# Patient Record
Sex: MALE | Race: BLACK OR AFRICAN AMERICAN | NOT HISPANIC OR LATINO | Employment: OTHER | URBAN - METROPOLITAN AREA
[De-identification: names, ages, dates, MRNs, and addresses within clinical notes are randomized per-mention and may not be internally consistent; named-entity substitution may affect disease eponyms.]

---

## 2021-04-24 ENCOUNTER — COMMUNICATION - HEALTHEAST (OUTPATIENT)
Dept: SCHEDULING | Facility: CLINIC | Age: 66
End: 2021-04-24

## 2021-04-24 ENCOUNTER — TRANSFERRED RECORDS (OUTPATIENT)
Dept: HEALTH INFORMATION MANAGEMENT | Facility: CLINIC | Age: 66
End: 2021-04-24

## 2021-04-24 LAB — HBA1C MFR BLD: 6.2 % (ref 0–5.7)

## 2021-04-26 ENCOUNTER — TELEPHONE (OUTPATIENT)
Dept: FAMILY MEDICINE | Facility: CLINIC | Age: 66
End: 2021-04-26

## 2021-04-26 NOTE — TELEPHONE ENCOUNTER
Date of discharge: 04/24/2021  Facility of discharge: [facility]  Patient concerns about condition: No concerns at this time.  Patient concerns about medications: No concerns at this time.  Full med reconciliation will be completed at clinic visit.  Patient concerns about transitioning: No concerns at this time.    Was the patient diagnosed with COVID while in the hospital? No    Clinic office visit appointment date: 05/07/2021  Patient reminded to bring all medications (prescription and over-the-counter) to clinic appointment: Yes

## 2021-05-02 ENCOUNTER — MEDICAL CORRESPONDENCE (OUTPATIENT)
Dept: HEALTH INFORMATION MANAGEMENT | Facility: CLINIC | Age: 66
End: 2021-05-02

## 2021-05-03 ENCOUNTER — TELEPHONE (OUTPATIENT)
Dept: FAMILY MEDICINE | Facility: CLINIC | Age: 66
End: 2021-05-03

## 2021-05-03 NOTE — TELEPHONE ENCOUNTER
[facility] phone call message - order or referral request for patient:     Order or referral being requested: Verbal order to ok start of care for nursing 3x a week for 4 weeks an d 2x a week for 4 weeks after.   PT Eval  to ok nursing to change wound on leg with saline, adaptic with gauze 3x a week.       Additional Comments: Abigail would also like to place a referral to [facility] vascular clinic to address wound and lipoidemia. Patient has an appointment 5/7 @ 10 AM with Dr. Albert     OK to leave a message on voice mail? Yes    Primary language: English      needed? No    Call taken on May 3, 2021 at 8:08 AM by Aleja Farrell

## 2021-05-03 NOTE — TELEPHONE ENCOUNTER
Called Abigail QURESHI, given verbal approval for requested skilled nursing orders below and PT evaluation/treat.     Abigail would like to inform Dr ROLLY Ngo of wound care intervention she did for today's home visit: 1 large wound right, posterior calf, 1 large wound left, posterior calf and 2 wounds on outer,left, calf. No current wound care instructions/order Abigail changed dressing and has done the following: cleansed areas with saline, placed adaptive to cover areas, secured with roll of gauze and tape.     Hx- chronic Lymphedema with wounds. On discharge from hospital, patient was not given referral for Vascular Clinic and nurse feels this would best suit Shaheen. Home care can perform dressing changes for now until Shaheen can be seen at Vascular Clinic. Shaheen has follow up appt with Dr Ngo for 5/7/2021. Nanette QURESHI

## 2021-05-07 ENCOUNTER — RECORDS - HEALTHEAST (OUTPATIENT)
Dept: VASCULAR SURGERY | Facility: CLINIC | Age: 66
End: 2021-05-07

## 2021-05-07 ENCOUNTER — OFFICE VISIT (OUTPATIENT)
Dept: FAMILY MEDICINE | Facility: CLINIC | Age: 66
End: 2021-05-07
Payer: COMMERCIAL

## 2021-05-07 ENCOUNTER — RECORDS - HEALTHEAST (OUTPATIENT)
Dept: ADMINISTRATIVE | Facility: OTHER | Age: 66
End: 2021-05-07

## 2021-05-07 ENCOUNTER — OFFICE VISIT (OUTPATIENT)
Dept: PHARMACY | Facility: CLINIC | Age: 66
End: 2021-05-07
Payer: COMMERCIAL

## 2021-05-07 VITALS
OXYGEN SATURATION: 95 % | HEART RATE: 108 BPM | TEMPERATURE: 97.5 F | WEIGHT: 315 LBS | DIASTOLIC BLOOD PRESSURE: 84 MMHG | SYSTOLIC BLOOD PRESSURE: 150 MMHG | HEIGHT: 75 IN | BODY MASS INDEX: 39.17 KG/M2 | RESPIRATION RATE: 22 BRPM

## 2021-05-07 DIAGNOSIS — E11.9 TYPE 2 DIABETES MELLITUS WITHOUT COMPLICATION, WITHOUT LONG-TERM CURRENT USE OF INSULIN (H): ICD-10-CM

## 2021-05-07 DIAGNOSIS — I89.0 LYMPHEDEMA: ICD-10-CM

## 2021-05-07 DIAGNOSIS — I10 BENIGN ESSENTIAL HYPERTENSION: Primary | ICD-10-CM

## 2021-05-07 DIAGNOSIS — I10 ESSENTIAL HYPERTENSION: Primary | ICD-10-CM

## 2021-05-07 DIAGNOSIS — E11.69 TYPE 2 DIABETES MELLITUS WITH OTHER SPECIFIED COMPLICATION, WITHOUT LONG-TERM CURRENT USE OF INSULIN (H): ICD-10-CM

## 2021-05-07 DIAGNOSIS — I89.0 LYMPHEDEMA OF BOTH LOWER EXTREMITIES: ICD-10-CM

## 2021-05-07 PROBLEM — Z87.898 H/O ANGIOEDEMA: Status: ACTIVE | Noted: 2020-06-15

## 2021-05-07 PROBLEM — E87.70 HYPERVOLEMIA: Status: ACTIVE | Noted: 2021-04-23

## 2021-05-07 LAB
BUN SERPL-MCNC: 14 MG/DL (ref 7–30)
CALCIUM SERPL-MCNC: 9.6 MG/DL (ref 8.5–10.4)
CHLORIDE SERPLBLD-SCNC: 98 MMOL/L (ref 94–109)
CO2 SERPL-SCNC: 30 MMOL/L (ref 20–32)
CREAT SERPL-MCNC: 1 MG/DL (ref 0.8–1.5)
EGFR CALCULATED (NON BLACK REFERENCE): 79.2 ML/MIN
GLUCOSE SERPL-MCNC: 105 MG/DL (ref 60–109)
POTASSIUM SERPL-SCNC: 3.6 MMOL/L (ref 3.4–5.3)
SODIUM SERPL-SCNC: 139 MMOL/L (ref 133–144)

## 2021-05-07 PROCEDURE — 99207 PR NO CHARGE LOS: CPT | Performed by: PHARMACIST

## 2021-05-07 PROCEDURE — 99495 TRANSJ CARE MGMT MOD F2F 14D: CPT | Mod: GC | Performed by: STUDENT IN AN ORGANIZED HEALTH CARE EDUCATION/TRAINING PROGRAM

## 2021-05-07 PROCEDURE — 36415 COLL VENOUS BLD VENIPUNCTURE: CPT | Performed by: STUDENT IN AN ORGANIZED HEALTH CARE EDUCATION/TRAINING PROGRAM

## 2021-05-07 PROCEDURE — 80048 BASIC METABOLIC PNL TOTAL CA: CPT | Performed by: STUDENT IN AN ORGANIZED HEALTH CARE EDUCATION/TRAINING PROGRAM

## 2021-05-07 RX ORDER — LOSARTAN POTASSIUM 25 MG/1
25 TABLET ORAL DAILY
Qty: 90 TABLET | Refills: 3 | Status: SHIPPED | OUTPATIENT
Start: 2021-05-07 | End: 2021-05-17

## 2021-05-07 RX ORDER — METHADONE HYDROCHLORIDE 10 MG/ML
100 CONCENTRATE ORAL DAILY
COMMUNITY

## 2021-05-07 RX ORDER — LOSARTAN POTASSIUM 25 MG/1
25 TABLET ORAL DAILY
Qty: 90 TABLET | Refills: 3 | COMMUNITY
Start: 2021-05-07 | End: 2021-05-07

## 2021-05-07 RX ORDER — LOSARTAN POTASSIUM 25 MG/1
1 TABLET ORAL DAILY
COMMUNITY
Start: 2021-04-24 | End: 2021-05-07

## 2021-05-07 RX ORDER — ATORVASTATIN CALCIUM 20 MG/1
20 TABLET, FILM COATED ORAL DAILY
Qty: 90 TABLET | Refills: 3 | Status: SHIPPED | OUTPATIENT
Start: 2021-05-07 | End: 2022-07-05

## 2021-05-07 RX ORDER — FUROSEMIDE 40 MG
40 TABLET ORAL 2 TIMES DAILY
COMMUNITY
Start: 2020-06-15 | End: 2021-05-07

## 2021-05-07 RX ORDER — ATORVASTATIN CALCIUM 20 MG/1
20 TABLET, FILM COATED ORAL DAILY
COMMUNITY
Start: 2020-06-15 | End: 2021-05-07

## 2021-05-07 RX ORDER — FUROSEMIDE 40 MG
40 TABLET ORAL 2 TIMES DAILY
Qty: 180 TABLET | Refills: 3 | Status: SHIPPED | OUTPATIENT
Start: 2021-05-07 | End: 2022-06-08

## 2021-05-07 RX ORDER — FUROSEMIDE 40 MG
40 TABLET ORAL 2 TIMES DAILY
Qty: 180 TABLET | Refills: 3 | COMMUNITY
Start: 2021-05-07 | End: 2021-05-07

## 2021-05-07 ASSESSMENT — MIFFLIN-ST. JEOR: SCORE: 2651.2

## 2021-05-07 NOTE — PROGRESS NOTES
Preceptor Attestation:  Patient's case reviewed and discussed with the resident, Evgeny Ngo MD, and I personally evaluated the patient. I agree with written assessment and plan of care.    Supervising Physician:  Socorro Rubin MD   [facility]

## 2021-05-07 NOTE — PROGRESS NOTES
Medication Therapy Management (MTM) Encounter    ASSESSMENT:                            HTN: Uncontrolled but likely warrants more data/assessment before changes. New Losartan to replace Amlodipine, opportunity to titrate prior to adding second agent.   DM: Controlled <7%. Sounds like opportunity for dietary counseling but not addressed today!  Lymphedema: Uncontrolled, opportunity to optimize Furosemide.   Asthma: Needs further assessment, physician visit before mine today so warm hand off. PCP agreeable to reassess at next visit.   Heartburn: Uncontrolled, need further assessment from physician but if truly heartburn could consider H2RA PRN or scheduled.     PLAN:                            - Continue medications unchanged   --- you can take your second Furosemide pill as early as 3 pm    Follow-up: Return for as needed per patient or provider request.    SUBJECTIVE/OBJECTIVE:                          Shaheen Sepulveda is a 65 year old male coming in for a TCM visit.  He was discharged from [facility] on 4/24/21 for worsening of chronic lymphedema; during his hospitalization his amlodipine was discontinue due to concerns for contributing to his lymphedema and he was changed to Losartan for blood pressure management. Today's visit is a co-visit with Dr. Ngo. Patient was accompanied by his daughter.     Reason for visit: medication reconciliation.    Allergies/ADRs: Reviewed in chart  Social History     Tobacco Use     Smoking status: Former Smoker     Smokeless tobacco: Former User   Substance Use Topics     Alcohol use: Not on file     Drug use: Not on file     ^Reviewed today  Past Medical History: Reviewed in chart      Medication Adherence/Access: no issues reported  HTN: Losartan daily. No concerns for dizziness / lightheadedness. Stopped Amlodipine.   DM: Metformin BID, no concerns for adverse effects. Congratulated patient on a1c result, patient reports that daughter encourages him to make dietary changes  "however he doesn't always comply :)    A1c in [facility] Everywhere record: 6.2 (H) (4/24/2021)    Lymphedema: Furosemide BID, second dose 6 pm. Concerns somewhat for night time awakenings.   Asthma: History in Care Everywhere record, however no current medicines in use.   Heartburn: Uses multiple doses of Tums daily. Reports intermittent constipation.     Today's Vitals:   BP Readings from Last 1 Encounters:   05/07/21 (!) 150/84     Pulse Readings from Last 1 Encounters:   05/07/21 108     Wt Readings from Last 1 Encounters:   05/07/21 (!) 394 lb 12 oz (179.1 kg)     Ht Readings from Last 1 Encounters:   05/07/21 6' 3\" (1.905 m)     Estimated body mass index is 49.34 kg/m  as calculated from the following:    Height as of an earlier encounter on 5/7/21: 6' 3\" (1.905 m).    Weight as of an earlier encounter on 5/7/21: 394 lb 12 oz (179.1 kg).    Temp Readings from Last 1 Encounters:   05/07/21 97.5  F (36.4  C)     ----------------  Post Discharge Medication Reconciliation Status: discharge medications reconciled, continue medications without change.    I spent 20 minutes with this patient today. Dr. Ngo was provided the recommendations above  in clinic today and Dr. Rubin is the authorizing prescriber for this visit through the pharmacist collaborative practice agreement.. A copy of the visit note was provided to the patient's primary care provider.    The patient was given a summary of these recommendations. See Provider note/AVS from today.     Magdalena Mooney, PharmD, CDCES (previously, CDE)  [facility]  Phone: [phone]  May 7, 2021 at 10:45 AM    For insurance/tracking purposes, MTM Team Documentation:      Medication Therapy Recommendations  Lymphedema    Current Medication: furosemide (LASIX) 40 MG tablet   Rationale: Incorrect administration   Recommendation: Provide Education - patient interested in retiming evening dosing to earlier to see if improves night " awakenings   Status: Patient Agreed - Adherence/Education

## 2021-05-07 NOTE — PATIENT INSTRUCTIONS
Today:  - Continue medications unchanged   --- you can take your second Furosemide pill as early as 3 pm  - Home care will call you   - Lymphedema clinic will call you   - Return for annual physical   --- next time we will talk more about your heartburn to see if another medicine can help this    Mediterranean diet-Plant based    Diet overview: Largely plant-based using minimally processed fruits, vegetables, bread, seeds, beans, nuts. Olive oil is used as the primary source of fats. Avoidance of red meats, white-flour products, and sweets. Using herbs to enhance flavor instead of salt or fat. Mediterranean countries had fewer deaths related to coronary heart disease because their diet reduces CVD risk factors.    Diet Example:  Fresh fruits: 3 servings/day (1 serving =   -1 cup)  Vegetables: over 3 servings/day (1 serving =   -1 cup)  Legumes (beans and lentils): 3 servings/week  Extra virgin olive oil: at least 1 Tbsp/day (no more than 4 Tbsp)  Fish: 3 servings/week (1 serving = 3-4 oz)  Nuts: At least 3 servings/week (1 serving = 1 oz or   cup)  Whole grains: 3-6 servings/day (1 serving =   cup cooked, 1 slice of bread, 1 oz dry cereal)  Poultry (white meat)  Dairy: fat-free or low-fat  Eggs: Limit egg yolks, 1/day  No red meat or no more than 1 serving /week (1 serving = 3 oz)  Avoid baked goods, sweets, or deserts  Red wine in moderation    Effect on health outcome:  CV Health:   may reduce risk for  heart attack, pulmonary embolism, stroke, and death related to heart problems.  may improve non-LDL cholesterol levels: increasing HDL and lowering Triglycerides    Diabetes   Reduces risk for metabolic syndrome (cluster of conditions including high blood pressure, high blood sugar, increased waist circumference, and abnormal cholesterol levels)   Metabolic syndrome can increase risk of stroke and diabetes  May reduce HbA1c and weight in patients with type 2 diabetes   Maybe even more than low-fat diet    Weight  loss/maintenance   Associated with increased weight loss and lower BMI   Similar weight loss at 5 years to low-fat diets in patients with high risk CVD  Low carb Mediterranean diet may increase weight loss compared to other diets recommended to patients with diabetes    Diet Pros:   -Can be used long-term and integrate into any diet   -Allows for more calories from healthy fats like olive oil   -May protect against chronic conditions like cancer, Alzheimer s, and Parkinson    Diet Cons:  -Iron deficient patients should ensure they consume enough dietary irons (spinach) with vitamin C (strawberries).   -Will reduce calcium intake due to less dairy product consumption    Where to get more info:  [facility], [email or web address]  [facility], [email or web address]  American Academy of Family Physicians, familydoctor.org  American Heart Association, heart.org  National Institutes of Health MedlinePlus, medlineplus.gov    Referral for :     Lymphedema    LOCATION/PLACE/Provider :    [facility]   DATE & TIME :    Faxed referral and visit notes to location, they will call   PHONE :     [phone]  FAX :    [phone]  Appointment made by clinic staff/:    Yovana    Referral for :     Home care    LOCATION/PLACE/Provider :    [facility]   DATE & TIME :    Faxed documents to location   PHONE :     [phone]  FAX :    [phone]  Appointment made by clinic staff/:    Yovana
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
## 2021-05-08 ASSESSMENT — ASTHMA QUESTIONNAIRES: ACT_TOTALSCORE: 25

## 2021-05-10 ENCOUNTER — AMBULATORY - HEALTHEAST (OUTPATIENT)
Dept: VASCULAR SURGERY | Facility: CLINIC | Age: 66
End: 2021-05-10

## 2021-05-10 DIAGNOSIS — R22.43 LOCALIZED SWELLING, MASS AND LUMP, LOWER LIMB, BILATERAL: ICD-10-CM

## 2021-05-10 DIAGNOSIS — I89.0 LYMPHEDEMA: ICD-10-CM

## 2021-05-14 ENCOUNTER — TELEPHONE (OUTPATIENT)
Dept: FAMILY MEDICINE | Facility: CLINIC | Age: 66
End: 2021-05-14

## 2021-05-14 NOTE — TELEPHONE ENCOUNTER
TCM APPOINTMENT FOLLOW UP    Language:English    Medication questions: no    Specialist follow up: no    Concerns since last visit: no    Next appointment at [facility]:Yes    Comments: Pt is doing ok.    @[facility] [email or web address]@ Yes   J CARLOS Gomez

## 2021-05-17 ENCOUNTER — OFFICE VISIT (OUTPATIENT)
Dept: FAMILY MEDICINE | Facility: CLINIC | Age: 66
End: 2021-05-17
Payer: COMMERCIAL

## 2021-05-17 VITALS
HEIGHT: 75 IN | HEART RATE: 92 BPM | BODY MASS INDEX: 39.17 KG/M2 | OXYGEN SATURATION: 95 % | RESPIRATION RATE: 20 BRPM | DIASTOLIC BLOOD PRESSURE: 76 MMHG | TEMPERATURE: 97.6 F | WEIGHT: 315 LBS | SYSTOLIC BLOOD PRESSURE: 157 MMHG

## 2021-05-17 DIAGNOSIS — I10 BENIGN ESSENTIAL HYPERTENSION: ICD-10-CM

## 2021-05-17 DIAGNOSIS — I89.0 LYMPHEDEMA OF BOTH LOWER EXTREMITIES: ICD-10-CM

## 2021-05-17 DIAGNOSIS — Z00.00 HEALTHCARE MAINTENANCE: ICD-10-CM

## 2021-05-17 DIAGNOSIS — Z02.89 ENCOUNTER FOR OTHER ADMINISTRATIVE EXAMINATIONS: ICD-10-CM

## 2021-05-17 DIAGNOSIS — Z00.00 ROUTINE GENERAL MEDICAL EXAMINATION AT A HEALTH CARE FACILITY: Primary | ICD-10-CM

## 2021-05-17 DIAGNOSIS — E66.01 OBESITY, MORBID, BMI 40.0-49.9 (H): ICD-10-CM

## 2021-05-17 DIAGNOSIS — Z87.891 HISTORY OF TOBACCO USE DISORDER: ICD-10-CM

## 2021-05-17 LAB
BUN SERPL-MCNC: 15 MG/DL (ref 7–30)
CALCIUM SERPL-MCNC: 9.3 MG/DL (ref 8.5–10.4)
CHLORIDE SERPLBLD-SCNC: 96 MMOL/L (ref 94–109)
CO2 SERPL-SCNC: 31 MMOL/L (ref 20–32)
CREAT SERPL-MCNC: 1 MG/DL (ref 0.8–1.5)
EGFR CALCULATED (NON BLACK REFERENCE): 79.2 ML/MIN
GLUCOSE SERPL-MCNC: 93 MG/DL (ref 60–109)
POTASSIUM SERPL-SCNC: 3.7 MMOL/L (ref 3.4–5.3)
SODIUM SERPL-SCNC: 137 MMOL/L (ref 133–144)

## 2021-05-17 PROCEDURE — 99397 PER PM REEVAL EST PAT 65+ YR: CPT | Mod: GC | Performed by: STUDENT IN AN ORGANIZED HEALTH CARE EDUCATION/TRAINING PROGRAM

## 2021-05-17 PROCEDURE — 80048 BASIC METABOLIC PNL TOTAL CA: CPT | Performed by: STUDENT IN AN ORGANIZED HEALTH CARE EDUCATION/TRAINING PROGRAM

## 2021-05-17 PROCEDURE — 36415 COLL VENOUS BLD VENIPUNCTURE: CPT | Performed by: STUDENT IN AN ORGANIZED HEALTH CARE EDUCATION/TRAINING PROGRAM

## 2021-05-17 RX ORDER — LOSARTAN POTASSIUM 25 MG/1
50 TABLET ORAL DAILY
COMMUNITY
Start: 2021-05-17 | End: 2021-06-10

## 2021-05-17 ASSESSMENT — MIFFLIN-ST. JEOR: SCORE: 2625.12

## 2021-05-17 NOTE — PATIENT INSTRUCTIONS
Preventive Health Recommendations:     See your health care provider every year to    Review health changes.     Discuss preventive care.      Review your medicines if your doctor has prescribed any.      Talk with your health care provider about whether you should have a test to screen for prostate cancer (PSA).    Every 3 years, have a diabetes test (fasting glucose). If you are at risk for diabetes, you should have this test more often.    Every 5 years, have a cholesterol test. Have this test more often if you are at risk for high cholesterol or heart disease.     Every 10 years, have a colonoscopy. Or, have a yearly FIT test (stool test). These exams will check for colon cancer.    Talk to with your health care provider about screening for Abdominal Aortic Aneurysm if you have a family history of AAA or have a history of smoking.    Shots:     Get a flu shot each year.     Get a tetanus shot every 10 years.     Talk to your doctor about your pneumonia vaccines. There are now two you should receive - Pneumovax (PPSV 23) and Prevnar (PCV 13).     Talk to your pharmacist about a shingles vaccine.     Talk to your doctor about the hepatitis B vaccine.  Nutrition:     Eat at least 5 servings of fruits and vegetables each day.     Eat whole-grain bread, whole-wheat pasta and brown rice instead of white grains and rice.     Get adequate Calcium and Vitamin D.   Lifestyle    Exercise for at least 150 minutes a week (30 minutes a day, 5 days a week). This will help you control your weight and prevent disease.     Limit alcohol to one drink per day.     No smoking.     Wear sunscreen to prevent skin cancer.    See your dentist every six months for an exam and cleaning.    See your eye doctor every 1 to 2 years to screen for conditions such as glaucoma, macular degeneration, cataracts, etc.    Personalized Prevention Plan  You are due for the preventive services outlined below.  Your care team is available to assist you  in scheduling these services.  If you have already completed any of these items, please share that information with your care team to update in your medical record.  Health Maintenance Due   Topic Date Due     Cholesterol Lab  Never done     Kidney Microalbumin Urine Test  Never done     Diabetic Foot Exam  Never done     Discuss Advance Care Planning  Never done     Eye Exam  Never done     Pneumococcal Vaccine (1 of 2 - PPSV23) Never done     Pneumococcal Vaccine (1 of 2 - PPSV23) Never done     Colorectal Cancer Screening  Never done     Hepatitis C Screening  Never done     Diptheria Tetanus Pertussis (DTAP/TDAP/TD) Vaccine (1 - Tdap) Never done     Zoster (Shingles) Vaccine (1 of 2) Never done     FALL RISK ASSESSMENT  Never done     AORTIC ANEURYSM SCREENING (SYSTEM ASSIGNED)  Never done   Lung cancer screening has at least three risks--    A lung cancer screening test can suggest that a person has lung cancer when no cancer is present. This is called a false-positive result. False-positive results can lead to follow-up tests and surgeries that are not needed and may have more risks.  A lung cancer screening test can find cases of cancer that may never have caused a problem for the patient. This is called overdiagnosis. Overdiagnosis can lead to treatment that is not needed.  Radiation from repeated LDCT tests can cause cancer in otherwise healthy people.    Goal for next visit: Get out for walks    Referral for :    US aorta    LOCATION/PLACE/Provider :    [facility]   DATE & TIME :    Faxed referral, location will reach out   PHONE :     [phone]  FAX :    [phone]  Appointment made by clinic staff/:    Yovana

## 2021-05-17 NOTE — PROGRESS NOTES
Faculty Supervision of Residents   I have examined this patient and the medical care has been evaluated and discussed with the resident. See resident note to follow outlining our discussion.    DOS 5/17/2021    Alessia Calderon MD

## 2021-05-17 NOTE — PROGRESS NOTES
Male Physical Note      Concerns today:   Patient has home visits coming up.  Apparently there is going to be a physician visiting his home to assess the need for future home visits.    We discussed getting in to see Dr. Reyes.  Sounds like she is booked out until September 2021.  He will continue to have home lymphedema wraps in the interim.    Patient states he has been checking his blood pressure at home.  His values have been about what we are seeing here today, 150s over 70s.    Quit smoking 8 years ago. Started smoking 20-19 years of age. 1 PPD.     Discussed bariatric surgery, and daughter had a friend who ended up dying after bariatric surgery, so they would not like to pursue this at any point time.    ROS:                      A 10 point review of systems including constitutional, cardiovascular, respiratory, HEENT, GI, , neurological, MSK, skin, endocrine, is negative unless stated in HPI    Past Medical History:   Diagnosis Date     Diabetes (H)      Hypertension      Uncomplicated asthma         Reviewed no other significant FH           Family History and past Medical History reviewed and unchanged/updated.    Social History     Tobacco Use     Smoking status: Former Smoker     Smokeless tobacco: Former User   Substance Use Topics     Alcohol use: Not on file       Children ? yes daughter    Has anyone hurt you physically, for example by pushing, hitting, slapping or kicking you or forcing you to have sex? Denies  Do you feel threatened or controlled by a partner, ex-partner or anyone in your life? Denies    RISK BEHAVIORS AND HEALTHY HABITS:  Tobacco Use/Smoking: Details smoked from age 18 to roughly 53.  He was a 1 pack/day smoker.  Illicit Drug Use: None  ETOH: None  Do you use alcohol? No  Sexually Active: No  Diet (5-7 servings of fruits/veg daily): Trying to change diet; daughter increasing veggies  Exercise (30 min accumulated most days):Trying to get out and walk  Dental Care:  "No, recommended    Colon CA Screening (>50-75 ): Recommended.  Patient is going to think about whether or not he wants to do FIT testing or colonoscopy.  We will discuss in 2 weeks., US for AAA (men 65-76 yo who ever smoked):  Recommended and patient accepted testing. and Lung CA Screening with low dose CT (55 - 80, with >= 30 ppy smoking history who are current smokers or quit within last 15y - annual low dose CT) Recommended and patient declined testing.  Prostate screening discussed and patient informed that risks of screening may exceed benefits. and Patient declined PSA screening.    No FMH colon  No FMH prostate        There is no immunization history on file for this patient.     EXAMINATION:  BP (!) 157/76   Pulse 92   Temp 97.6  F (36.4  C)   Resp 20   Ht 1.905 m (6' 3\")   Wt (!) 176.4 kg (389 lb)   SpO2 95%   BMI 48.62 kg/m    GENERAL: healthy, alert and no distress  EYES: Eyes grossly normal to inspection, extraocular movements - intact, and PERRL  HENT: ear canals- normal; TMs- normal; Nose- normal; Mouth- no ulcers, no lesions  NECK: no tenderness, no adenopathy, no asymmetry, no masses, no stiffness; thyroid- normal to palpation  RESP: lungs clear to auscultation - no rales, no rhonchi, no wheezes  BREAST: no masses, no tenderness, no nipple discharge, no palpable axillary masses or adenopathy  CV: regular rates and rhythm, normal S1 S2, no S3 or S4 and no murmur, no click or rub -  ABDOMEN: soft, no tenderness, no  hepatosplenomegaly, no masses, normal bowel sounds  MS: extremities- no gross deformities noted, no edema  SKIN: no suspicious lesions, no rashes  NEURO: strength and tone- normal, sensory exam- grossly normal, mentation- intact, speech- normal, reflexes- symmetric  BACK: no CVA tenderness, no paralumbar tenderness  - male: testicles- normal, no atrophy, no masses;  no inguinal hernias  RECTAL- male: no masses, no hemorhoids, Prostate- symmetric, no  nodularity, no masses, no " hypertrophy  PSYCH: Alert and oriented times 3; speech- coherent , normal rate and volume; able to articulate logical thoughts, able to abstract reason, no tangential thoughts, no hallucinations or delusions, affect- normal  LYMPHATICS: ant. cervical- normal, post. cervical- normal, axillary- normal, supraclavicular- normal, inguinal- normal    ASSESSMENT/PLAN:  1. Routine general medical examination at a health care facility  Patient will decide whether or not he wants to do FIT or colonoscopy, which we will discuss when he comes back in 2 weeks.  AAA screening with ultrasound order was placed.  We discussed risks and benefits of lung cancer screening, patient declined at this time.  - Hepatitis C Antibody ([facility])    2. Benign essential hypertension  Elevated here and at home.  Increase lisinopril to 50 mg.  BMP looks good.  - losartan (COZAAR) 25 MG tablet; Take 2 tablets (50 mg) by mouth daily  - Basic Metabolic Panel ([facility]) - Results < 1 hr    3. Lymphedema of both lower extremities  Sounds like he will have home care coming out to assess his ongoing needs.  Disability parking form filled out in relation to patient's lymphedema.    4. History of tobacco use disorder  -  [employer] AAA SCREENING; Future    5. Healthcare maintenance  Discussed FIT versus colonoscopy at next visit.  Should discuss lung cancer screening again in 1 year, he declined at this time.  -  [employer] AAA SCREENING; Future    6. Obesity, morbid, BMI 40.0-49.9 (H)  Declined bariatric referral.    7. Encounter for other administrative examinations  Form filled out for disability parking.      Evgeny Ngo MD  [facility] Resident, PGY3    Staffed with Dr. Rubin

## 2021-05-18 LAB — HCV AB SER QL: NEGATIVE

## 2021-05-19 ENCOUNTER — RECORDS - HEALTHEAST (OUTPATIENT)
Dept: SCHEDULING | Facility: CLINIC | Age: 66
End: 2021-05-19

## 2021-05-19 ENCOUNTER — RECORDS - HEALTHEAST (OUTPATIENT)
Dept: ADMINISTRATIVE | Facility: OTHER | Age: 66
End: 2021-05-19

## 2021-05-19 DIAGNOSIS — Z87.891 PERSONAL HISTORY OF TOBACCO USE: ICD-10-CM

## 2021-05-19 DIAGNOSIS — Z00.00 HEALTHCARE MAINTENANCE: ICD-10-CM

## 2021-05-20 DIAGNOSIS — Z53.9 DIAGNOSIS NOT YET DEFINED: Primary | ICD-10-CM

## 2021-06-09 NOTE — PROGRESS NOTES
"       HPI:       Shaheen Sepulveda is a 65 year old  male who presents for follow up of the following.     Chronic Bilateral Lymphedema  Longstanding history of chronic lymphedema in both legs, no noticible becker, endorses difficulty moving, has clinic appointment with Dr. Reyes. They have not heard back about home care yet after I placed the last order.  There had been insurance issues in the past.     Benign Essential Hypertension  Persistent hypertension (today 157/67) despite multiple medication changes, previously tried lisinopril (caused angioedema), amlodipine (possible added side effect of peripheral edema and discontinued) and now on Losartan (50 mg daily).     Health Maintenance               Obesity, morbid, BMI 40.0-49.9 (H)               Encounter for other administrative examinations  Declined bariatric referral.  Agreed to colonoscopy. Okay with eventual abdominal ultrasound for AAA and CT for lung cancer screening. Feels guilty for having to get daughter to take off work to take him to clinical appointments and did not follow up on AAA ultrasound appointment because of this.  Daughter filled out forms for diabillity parking and waiting to hear back.  Trying to eat healthy by adding veggies and fruit, difficult to exercise with hot weather. Does not endorse new stressors, depression, sleep changes, or sick contacts.     From last visit:  \"ASSESSMENT/PLAN:  1. Routine general medical examination at a health care facility  Patient will decide whether or not he wants to do FIT or colonoscopy, which we will discuss when he comes back in 2 weeks.  AAA screening with ultrasound order was placed.  We discussed risks and benefits of lung cancer screening, patient declined at this time.  - Hepatitis C Antibody ([facility])     2. Benign essential hypertension  Elevated here and at home.  Increase lisinopril to 50 mg.  BMP looks good.  - losartan (COZAAR) 25 MG tablet; Take 2 tablets (50 mg) by mouth daily  - " "Basic Metabolic Panel ([facility]) - Results < 1 hr     3. Lymphedema of both lower extremities  Sounds like he will have home care coming out to assess his ongoing needs.  Disability parking form filled out in relation to patient's lymphedema.     4. History of tobacco use disorder  -  [employer] AAA SCREENING; Future     5. Healthcare maintenance  Discussed FIT versus colonoscopy at next visit.  Should discuss lung cancer screening again in 1 year, he declined at this time.  -  [employer] AAA SCREENING; Future     6. Obesity, morbid, BMI 40.0-49.9 (H)  Declined bariatric referral.     7. Encounter for other administrative examinations  Form filled out for disability parking.\"           PMHX:     Patient Active Problem List   Diagnosis     Hypertension     H/O angioedema     Hip joint replacement status     Hypervolemia     Lymphedema     Lymphedema of both lower extremities     Methadone use (H)     Obesity, morbid, BMI 40.0-49.9 (H)     Obstructive sleep apnea     Osteoarthritis of hip     Osteoarthritis of knee     History of tobacco use disorder       Current Outpatient Medications   Medication Sig Dispense Refill     atorvastatin (LIPITOR) 20 MG tablet Take 1 tablet (20 mg) by mouth daily 90 tablet 3     furosemide (LASIX) 40 MG tablet Take 1 tablet (40 mg) by mouth 2 times daily 180 tablet 3     losartan (COZAAR) 100 MG tablet Take 1 tablet (100 mg) by mouth daily 30 tablet 3     metFORMIN (GLUCOPHAGE) 500 MG tablet Take 1 tablet (500 mg) by mouth 2 times daily (with meals) 180 tablet 3     methadone (DOLOPHINE-INTENSOL) 10 MG/ML (HIGH CONC) solution Take 100 mg by mouth daily         Social History     Socioeconomic History     Marital status: Single     Spouse name: Not on file     Number of children: Not on file     Years of education: Not on file     Highest education level: Not on file   Occupational History     Not on file   Social Needs     Financial resource strain: Not on file     Food " "insecurity     Worry: Not on file     Inability: Not on file     Transportation needs     Medical: Not on file     Non-medical: Not on file   Tobacco Use     Smoking status: Former Smoker     Smokeless tobacco: Former User   Substance and Sexual Activity     Alcohol use: Not on file     Drug use: Not on file     Sexual activity: Not on file   Lifestyle     Physical activity     Days per week: Not on file     Minutes per session: Not on file     Stress: Not on file   Relationships     Social connections     Talks on phone: Not on file     Gets together: Not on file     Attends [employer] service: Not on file     Active member of club or organization: Not on file     Attends meetings of clubs or organizations: Not on file     Relationship status: Not on file     Intimate partner violence     Fear of current or ex partner: Not on file     Emotionally abused: Not on file     Physically abused: Not on file     Forced sexual activity: Not on file   Other Topics Concern     Not on file   Social History Narrative     Not on file          Allergies   Allergen Reactions     Lisinopril      Patient reports \"neck swelling\"       Results for orders placed or performed in visit on 06/10/21 (from the past 24 hour(s))   Basic Metabolic Panel ([facility]) - Results < 1 hr   Result Value Ref Range    Glucose 112.0 (H) 60.0 - 109.0 mg/dL    Urea Nitrogen 16.0 7.0 - 30.0 mg/dL    Creatinine 1.0 0.8 - 1.5 mg/dL    Sodium 142.0 133.0 - 144.0 mmol/L    Potassium 4.4 3.4 - 5.3 mmol/L    Chloride 100.0 94.0 - 109.0 mmol/L    Carbon Dioxide 33.0 (H) 20.0 - 32.0 mmol/L    Calcium 9.5 8.5 - 10.4 mg/dL    eGFR Calculated (Non Black Reference) 79.2 >60.0 mL/min            Review of Systems:     A 10 point review of systems including constitutional, cardiovascular, respiratory, HEENT, GI, , neurological, MSK, skin, endocrine, is negative unless stated in HPI          Physical Exam:     Vitals:    06/10/21 1500 06/10/21 1503   BP: (!) 146/78 (!) " "154/67   Pulse: 83    Temp: 97.5  F (36.4  C)    SpO2: 96%    Weight: (!) 183.3 kg (404 lb)    Height: 1.905 m (6' 3\")      Body mass index is 50.5 kg/m .    GENERAL APPEARANCE: healthy, alert and no distress,  EYES: lateral deviation of the left eye, PERRL  NECK: no adenopathy, no asymmetry, masses, or scars and thyroid normal to palpation  RESP: lungs clear to auscultation - no rales, rhonchi or wheezes  CV: regular rate and rhythm,  and no murmur, click,  rub or gallop  MSK: Bilateral severe lymphedema of both legs extending up to the hip reduced range of motion in the legs  SKIN: 30 cm keratosis over the left cubital fossa, otherwise no suspicious lesions or rashes on visible skin  PSYCH: mood and affect normal/bright    Assessment and Plan     1. Essential hypertension  Uncontrolled in clinic here today.  Plan to increase to losartan 100 mg daily from 50 mg daily.  He has an allergy to lisinopril with past angioedema.  Should not start amlodipine as he has bad peripheral edema.  Could contemplate adding thiazide if still uncontrolled on losartan and Lasix.  BMP looks good today, bicarb is 33.  Question if he has some ongoing CO2 retention secondary to obesity hypoventilation.  Plan to have patient come in for a nurse only visit in 2 to 3 weeks to check a BMP after changing to losartan 100 mg today.  - Basic Metabolic Panel ([facility]) - Results < 1 hr  - Basic Metabolic Panel ([facility]) - Results < 1 hr; Future  - losartan (COZAAR) 100 MG tablet; Take 1 tablet (100 mg) by mouth daily  Dispense: 30 tablet; Refill: 3    2. Lymphedema of both lower extremities  I sent a message to our referral coordinator on the question of home care, as he will not be able to get in until fall 2021 with Dr. Reyes at the lymphedema clinic.  He has not heard back from the home care folks since I replaced the order.    3. Healthcare maintenance  Patient feels he is a burden to his daughter, this is one of the main reasons he does not " want to go in for health maintenance screens.  He did decide on colonoscopy today.  AAA ultrasound order has already been placed at last visit.  Can continue to talk about low-dose lung CT, but okay to defer further discussion until 5/2022.  - GASTROENTEROLOGY ADULT REF PROCEDURE ONLY; Future    4. Obesity, morbid, BMI 40.0-49.9 (H)  Discussed healthy diet, exercise today.  Bicarb is 33 on today's BMP.  Question if he has some ongoing CO2 retention secondary to obesity hypoventilation.  Currently deferring sleep study secondary to so many things going on, but he would almost certainly benefit from CPAP therapy, both from an DG standpoint, and from a likely obesity hypoventilation standpoint    Options for treatment and follow-up care were reviewed with the patient and/or guardian. Shaheen Sepulveda and/or guardian engaged in the decision making process and verbalized understanding of the options discussed and agreed with the final plan.    Evgeny Ngo MD  [facility] Resident, PGY3      Precepted today with: Peg Galeana MD

## 2021-06-10 ENCOUNTER — OFFICE VISIT (OUTPATIENT)
Dept: FAMILY MEDICINE | Facility: CLINIC | Age: 66
End: 2021-06-10
Payer: COMMERCIAL

## 2021-06-10 VITALS
HEIGHT: 75 IN | HEART RATE: 83 BPM | DIASTOLIC BLOOD PRESSURE: 67 MMHG | SYSTOLIC BLOOD PRESSURE: 154 MMHG | OXYGEN SATURATION: 96 % | WEIGHT: 315 LBS | TEMPERATURE: 97.5 F | BODY MASS INDEX: 39.17 KG/M2

## 2021-06-10 DIAGNOSIS — Z00.00 HEALTHCARE MAINTENANCE: ICD-10-CM

## 2021-06-10 DIAGNOSIS — I10 BENIGN ESSENTIAL HYPERTENSION: ICD-10-CM

## 2021-06-10 DIAGNOSIS — E66.01 OBESITY, MORBID, BMI 40.0-49.9 (H): ICD-10-CM

## 2021-06-10 DIAGNOSIS — I10 ESSENTIAL HYPERTENSION: Primary | ICD-10-CM

## 2021-06-10 DIAGNOSIS — I89.0 LYMPHEDEMA OF BOTH LOWER EXTREMITIES: ICD-10-CM

## 2021-06-10 LAB
BUN SERPL-MCNC: 16 MG/DL (ref 7–30)
CALCIUM SERPL-MCNC: 9.5 MG/DL (ref 8.5–10.4)
CHLORIDE SERPLBLD-SCNC: 100 MMOL/L (ref 94–109)
CO2 SERPL-SCNC: 33 MMOL/L (ref 20–32)
CREAT SERPL-MCNC: 1 MG/DL (ref 0.8–1.5)
EGFR CALCULATED (NON BLACK REFERENCE): 79.2 ML/MIN
GLUCOSE SERPL-MCNC: 112 MG/DL (ref 60–109)
POTASSIUM SERPL-SCNC: 4.4 MMOL/L (ref 3.4–5.3)
SODIUM SERPL-SCNC: 142 MMOL/L (ref 133–144)

## 2021-06-10 PROCEDURE — 80048 BASIC METABOLIC PNL TOTAL CA: CPT | Performed by: STUDENT IN AN ORGANIZED HEALTH CARE EDUCATION/TRAINING PROGRAM

## 2021-06-10 PROCEDURE — 36415 COLL VENOUS BLD VENIPUNCTURE: CPT | Performed by: STUDENT IN AN ORGANIZED HEALTH CARE EDUCATION/TRAINING PROGRAM

## 2021-06-10 PROCEDURE — 99214 OFFICE O/P EST MOD 30 MIN: CPT | Mod: GC | Performed by: STUDENT IN AN ORGANIZED HEALTH CARE EDUCATION/TRAINING PROGRAM

## 2021-06-10 RX ORDER — LOSARTAN POTASSIUM 100 MG/1
100 TABLET ORAL DAILY
Qty: 30 TABLET | Refills: 3 | Status: SHIPPED | OUTPATIENT
Start: 2021-06-10 | End: 2021-10-20

## 2021-06-10 ASSESSMENT — MIFFLIN-ST. JEOR: SCORE: 2693.16

## 2021-06-10 NOTE — PROGRESS NOTES
Preceptor Attestation:  Patient's case reviewed and discussed with Evgeny Ngo MD resident and I evaluated the patient. I agree with written assessment and plan of care.  Supervising Physician:  GABRIELA POSEY MD  [facility]

## 2021-06-10 NOTE — LETTER
June 11, 2021      Shaheen Sepulveda  [address] [address]        Dear ,    We are writing to inform you of your test results.    Results are normal. Continue on current dose of losartan at 100 mg.     Resulted Orders   Basic Metabolic Panel ([facility]) - Results < 1 hr   Result Value Ref Range    Glucose 112.0 (H) 60.0 - 109.0 mg/dL    Urea Nitrogen 16.0 7.0 - 30.0 mg/dL    Creatinine 1.0 0.8 - 1.5 mg/dL    Sodium 142.0 133.0 - 144.0 mmol/L    Potassium 4.4 3.4 - 5.3 mmol/L    Chloride 100.0 94.0 - 109.0 mmol/L    Carbon Dioxide 33.0 (H) 20.0 - 32.0 mmol/L    Calcium 9.5 8.5 - 10.4 mg/dL    eGFR Calculated (Non Black Reference) 79.2 >60.0 mL/min      Comment:      eGFR is calculated by the CKD-EPI creatinine equation, without race   adjustment. eGFR can be influenced by muscle mass, exercise, and diet. The   reported eGFR is an estimation only and is only applicable if the renal   function is stable.         If you have any questions or concerns, please call the clinic at the number listed above.       Sincerely,      Peg Galeana MD

## 2021-06-14 ENCOUNTER — TELEPHONE (OUTPATIENT)
Dept: FAMILY MEDICINE | Facility: CLINIC | Age: 66
End: 2021-06-14

## 2021-06-14 NOTE — TELEPHONE ENCOUNTER
Made a call out to [facility], they stated that they don't have the referral,  notified of the number I sent it to she said that it was correct and wasn't sure why they hadn't gotten it.   She asked for patient's insurance, she then notified me that they don't take it.   I faxed his referral over to [facility]    [facility]- is a full capacity.     Faxed to [facility]- out of network with them.    Faxed to [facility]- Declined no lymphedema care available.     Faxed to [facility]- [facility]. Is out of network    Faxed to [facility] ([phone])- UNABLE  To take patient.

## 2021-06-25 NOTE — PATIENT INSTRUCTIONS
Sent referral for colonoscopy over to [facility] as patient's BMI is to high to do at surgery center (their protocol).

## 2021-08-06 NOTE — LETTER
May 18, 2021      Shaheen Sepulveda  [address] [address]        Dear ,    We are writing to inform you of your test results.    Lab results are normal.     Resulted Orders   Basic Metabolic Panel ([facility]) - Results < 1 hr   Result Value Ref Range    Glucose 93.0 60.0 - 109.0 mg/dL    Urea Nitrogen 15.0 7.0 - 30.0 mg/dL    Creatinine 1.0 0.8 - 1.5 mg/dL    Sodium 137.0 133.0 - 144.0 mmol/L    Potassium 3.7 3.4 - 5.3 mmol/L    Chloride 96.0 94.0 - 109.0 mmol/L    Carbon Dioxide 31.0 20.0 - 32.0 mmol/L    Calcium 9.3 8.5 - 10.4 mg/dL    eGFR Calculated (Non Black Reference) 79.2 >60.0 mL/min      Comment:      eGFR is calculated by the CKD-EPI creatinine equation, without race   adjustment. eGFR can be influenced by muscle mass, exercise, and diet. The   reported eGFR is an estimation only and is only applicable if the renal   function is stable.     Hepatitis C Antibody ([facility] [facility])   Result Value Ref Range    Hepatitis C Antibody Screen Negative Negative    Narrative    Test performed by:  [facility] [address] [place] [address]       If you have any questions or concerns, please call the clinic at the number listed above.       Sincerely,      Socorro Rubin MD           see operative report

## 2021-09-22 ENCOUNTER — ANCILLARY PROCEDURE (OUTPATIENT)
Dept: VASCULAR ULTRASOUND | Facility: CLINIC | Age: 66
End: 2021-09-22
Attending: PHYSICAL MEDICINE & REHABILITATION
Payer: COMMERCIAL

## 2021-09-22 ENCOUNTER — OFFICE VISIT (OUTPATIENT)
Dept: VASCULAR SURGERY | Facility: CLINIC | Age: 66
End: 2021-09-22
Attending: PHYSICAL MEDICINE & REHABILITATION
Payer: COMMERCIAL

## 2021-09-22 VITALS
SYSTOLIC BLOOD PRESSURE: 144 MMHG | DIASTOLIC BLOOD PRESSURE: 80 MMHG | RESPIRATION RATE: 12 BRPM | TEMPERATURE: 98 F | HEART RATE: 80 BPM

## 2021-09-22 DIAGNOSIS — L85.9 HYPERKERATOSIS OF SKIN: ICD-10-CM

## 2021-09-22 DIAGNOSIS — I87.333 VENOUS HYPERTENSION, CHRONIC, WITH ULCER AND INFLAMMATION, BILATERAL (H): ICD-10-CM

## 2021-09-22 DIAGNOSIS — L85.9 EPITHELIAL HYPERPLASIA OF SKIN: ICD-10-CM

## 2021-09-22 DIAGNOSIS — I87.2 VENOUS (PERIPHERAL) INSUFFICIENCY: ICD-10-CM

## 2021-09-22 DIAGNOSIS — M79.89 LEG SWELLING: Primary | ICD-10-CM

## 2021-09-22 DIAGNOSIS — B35.1 ONYCHOMYCOSIS: ICD-10-CM

## 2021-09-22 DIAGNOSIS — E66.01 OBESITY, MORBID, BMI 40.0-49.9 (H): ICD-10-CM

## 2021-09-22 DIAGNOSIS — L60.2 ONYCHAUXIS: ICD-10-CM

## 2021-09-22 DIAGNOSIS — I89.0 LYMPHEDEMA: ICD-10-CM

## 2021-09-22 DIAGNOSIS — E11.42 TYPE 2 DIABETES MELLITUS WITH PERIPHERAL NEUROPATHY (H): ICD-10-CM

## 2021-09-22 DIAGNOSIS — I89.0 LYMPHEDEMA OF BOTH LOWER EXTREMITIES: ICD-10-CM

## 2021-09-22 DIAGNOSIS — R22.43 LOCALIZED SWELLING, MASS AND LUMP, LOWER LIMB, BILATERAL: ICD-10-CM

## 2021-09-22 PROBLEM — S81.809A OPEN WOUND OF LOWER LIMB: Status: ACTIVE | Noted: 2021-09-22

## 2021-09-22 PROCEDURE — 250N000009 HC RX 250: Performed by: PHYSICAL MEDICINE & REHABILITATION

## 2021-09-22 PROCEDURE — 99205 OFFICE O/P NEW HI 60 MIN: CPT | Performed by: PHYSICAL MEDICINE & REHABILITATION

## 2021-09-22 PROCEDURE — 87205 SMEAR GRAM STAIN: CPT | Performed by: PHYSICAL MEDICINE & REHABILITATION

## 2021-09-22 PROCEDURE — G0463 HOSPITAL OUTPT CLINIC VISIT: HCPCS | Mod: 25

## 2021-09-22 PROCEDURE — 11045 DBRDMT SUBQ TISS EACH ADDL: CPT | Performed by: PHYSICAL MEDICINE & REHABILITATION

## 2021-09-22 PROCEDURE — 11042 DBRDMT SUBQ TIS 1ST 20SQCM/<: CPT | Performed by: PHYSICAL MEDICINE & REHABILITATION

## 2021-09-22 PROCEDURE — 93970 EXTREMITY STUDY: CPT | Mod: 26 | Performed by: SURGERY

## 2021-09-22 PROCEDURE — 11044 DBRDMT BONE 1ST 20 SQ CM/<: CPT | Performed by: PHYSICAL MEDICINE & REHABILITATION

## 2021-09-22 PROCEDURE — 93970 EXTREMITY STUDY: CPT

## 2021-09-22 PROCEDURE — 11721 DEBRIDE NAIL 6 OR MORE: CPT | Mod: 59 | Performed by: PHYSICAL MEDICINE & REHABILITATION

## 2021-09-22 RX ORDER — AMMONIUM LACTATE 12 G/100G
LOTION TOPICAL DAILY PRN
Qty: 500 G | Refills: 4 | Status: SHIPPED | OUTPATIENT
Start: 2021-09-22 | End: 2021-10-22

## 2021-09-22 RX ORDER — LIDOCAINE HYDROCHLORIDE 20 MG/ML
JELLY TOPICAL DAILY PRN
Status: ACTIVE | OUTPATIENT
Start: 2021-09-22

## 2021-09-22 RX ADMIN — LIDOCAINE HYDROCHLORIDE: 20 JELLY TOPICAL at 10:47

## 2021-09-22 ASSESSMENT — PAIN SCALES - GENERAL: PAINLEVEL: NO PAIN (0)

## 2021-09-22 NOTE — PROGRESS NOTES
Compression Applied to Bilateral  2-Layer Coban Lite: I Applied the inner foam layer with the foot dorsiflexed and started atthe base of the fifth metatarsal head. I Left the bottom of the heel exposed, and proceed by winding the foam up the leg using minimal overlap to just below the fibular head. I then applied the compression layer with the foot dorsiflexed and startingat the base of the fifth metatarsal head. I applied at full stretch and proceeded up the leg using 50% overlap. The bottom of the heel is covered with the compression layer up to the end at the fibular head just below the back of the knee and levelwith the top edge of the foam layer.  I gently pressed and conformed the entire surface of the system to ensurethat the two layers are firmly bound together

## 2021-09-22 NOTE — LETTER
2021    [facility]  Fax: [phone] Wound Dressing Rx and Order Form  Customer Service: [phone] Order Status: New   Verbal: Kim QURESHI  Patient Info:  Name: Shaheen Sepulveda  : 1955  Address: [address] [address]        Insurance Info:  INSURER: Payor: COMMERCIAL / Plan: [employer] MEDICARE ADVANTAGE / Product Type: Medicare /   Policy ID#:  [id number]  : 1955    Physician Info:   Name: Lucia Reyes MD   Dept Address/Phones:   [address] [place] [address], [address] [address] [phone]  Fax: [phone]        Impression:   Encounter Diagnoses   Name Primary?     Leg swelling Yes     Venous hypertension, chronic, with ulcer and inflammation, bilateral (H)      Venous (peripheral) insufficiency      Lymphedema of both lower extremities      Onychauxis      Onychomycosis      Hyperkeratosis of skin      Epithelial hyperplasia of skin      Type 2 diabetes mellitus with peripheral neuropathy (H)      Obesity, morbid, BMI 40.0-49.9 (H)        Lymphedema circumferential measurements (in cm):      Circumferential Measures (cm)  Right just above MTP: 28.7  Right Ankle: 35.2  Right Widest Calf: 69.8  Right Thigh Up 10cm: 81.5  Left - just above MTP: 28.2  Left Ankle: 33.9  Left Widest Calf: 69  Left Thigh Up 10cm: 76.8       Wound info:    VASC Wound Right lower leg lateral (Active)   Pre Size Length 1.5 21 1000   Pre Size Width 3 21 1000   Pre Size Depth 0.3 21 1000   Pre Total Sq cm 4.5 21 1000       VASC Wound Left lower leg medial (Active)   Pre Size Length 4.2 21 1000   Pre Size Width 5 21 1000   Pre Size Depth 0.3 21 1000   Pre Total Sq cm 21 21 1000         Drainage: Moderate  Thickness:  Full  Duration of Need: 30 days  Days Supply: 30 days  Start Date: 2021  Starter Kit:NA  Qualifying wound/Debridement: Yes     Dressing Type Brand Size Number of pieces  Frequency of change   Primary Silver alginate  4x4 24 (for 2 wounds) 2 times a week   Secondary Abd pads  8X10 24 2 times per week    Roll gauze  4 inch 24 2 times per week    Paper tape  1 inch 2 rolls 2 times per week                   Secondary                                        Tape              Note: If total out of pocket is more than $50.00 please contact the patient before processing order.     OK to forward to covered supplier.    Electronically Signed Physician: AMY RODRIGUEZ                         Date: 9/22/2021

## 2021-09-22 NOTE — PROGRESS NOTES
Patient had swelling in both legs and wounds. Been dealing with for a long time. Has not been wearing compression, does not have any at home. For wounds is using neosporin and gauze. They have been slowly healing but still weeping. He used to have home care but no longer.

## 2021-09-22 NOTE — PROGRESS NOTES
Leg Swelling Consult         I have been asked to see Shaheen Sepulveda referred by Radha Sal MD     Date of Service: September 22, 2021     Chief Complaint:  Leg swelling and sores    History of Present Illness:     Shaheen Sepulveda presents to the [facility] as a new consult to evaluate  bilateral leg swelling with sores. Daughter is present. The swelling began around 10 years ago and he was treated with lymphedema therapy with wrapping and massage.  He continued to get custom zippered socks but they wore down and when he retired insurance would not cover the $500 cost so he stopped wearing them.  He started to get sores over a year ago and he has been using neosporin and gauze to them until he could see me. The sores are pretty stable. There was no associated trauma, medication change or major illness. The swelling has worsened since onset.  There is no pain.. The patient sleeps in a bed.The swelling is improved with elevation.  There has been no new numbness, new tingling and new weakness .  There have been no new masses, rashes, or swellings of any other joints. There has been no new decrease in appetite, unexplained weight loss, abdominal bloating and bowel or bladder changes. He has always been heavy but has gained about 40 pounds in the last year. Venous insufficiency study today showed deep and superficial vein insufficiency on right with proximal superficial insufficiency in SFV on left.  No blood clots were noted. He has had his immunizations against the COVID-19.    Past Medical History:    Past Medical History:   Diagnosis Date     Diabetes (H)      H/O angioedema 6/15/2020    Formatting of this note might be different from the original. Unexplained angioedema twice, discontinue lisinopril for possible connection to it, though he had used it afterwards with no symptoms     History of tobacco use disorder 8/1/2013    Formatting of this note might be different  "from the original. Added per documetation     Hypertension      Lymphedema of both lower extremities 12/22/2014     Methadone use (H) 5/8/2012     Obstructive sleep apnea 2/2/2015    Formatting of this note might be different from the original. Epic     Pleural mass 7/2/2013     Uncomplicated asthma         Surgical History:   Past Surgical History:   Procedure Laterality Date     JOINT REPLACEMENT          Medications:    Current Outpatient Medications   Medication     atorvastatin (LIPITOR) 20 MG tablet     furosemide (LASIX) 40 MG tablet     losartan (COZAAR) 100 MG tablet     metFORMIN (GLUCOPHAGE) 500 MG tablet     methadone (DOLOPHINE-INTENSOL) 10 MG/ML (HIGH CONC) solution     Current Facility-Administered Medications   Medication     lidocaine (XYLOCAINE) 2 % external gel        Allergies:    Allergies   Allergen Reactions     Lisinopril Swelling     Patient reports \"neck swelling\"  Swelling in throat        Family history:   Family History   Problem Relation Age of Onset     No Known Problems Mother      No Known Problems Father         Social History:   Social History     Tobacco Use     Smoking status: Former Smoker     Smokeless tobacco: Former User   Substance Use Topics     Alcohol use: None     Drug use: None          Labs:      I personally reviewed the following lab results today and those on care everywhere, if indicated     CRP   Date Value Ref Range Status   04/23/2021 8.5 (H) 0.0 - 0.8 mg/dL Final      No results found for: SED   Last Renal Panel:  Sodium   Date Value Ref Range Status   06/10/2021 142.0 133.0 - 144.0 mmol/L Final     Potassium   Date Value Ref Range Status   06/10/2021 4.4 3.4 - 5.3 mmol/L Final     Chloride   Date Value Ref Range Status   06/10/2021 100.0 94.0 - 109.0 mmol/L Final     Carbon Dioxide   Date Value Ref Range Status   06/10/2021 33.0 (H) 20.0 - 32.0 mmol/L Final     Anion Gap   Date Value Ref Range Status   04/24/2021 10 5 - 18 mmol/L Final     Glucose   Date Value " Ref Range Status   06/10/2021 112.0 (H) 60.0 - 109.0 mg/dL Final     Urea Nitrogen   Date Value Ref Range Status   06/10/2021 16.0 7.0 - 30.0 mg/dL Final     Creatinine   Date Value Ref Range Status   06/10/2021 1.0 0.8 - 1.5 mg/dL Final     GFR Estimate   Date Value Ref Range Status   04/24/2021 >60 >60 mL/min/1.73m2 Final     Calcium   Date Value Ref Range Status   06/10/2021 9.5 8.5 - 10.4 mg/dL Final     Albumin   Date Value Ref Range Status   04/24/2021 2.7 (L) 3.5 - 5.0 g/dL Final      Lab Results   Component Value Date    WBC 6.5 04/24/2021     Lab Results   Component Value Date    RBC 3.84 04/24/2021     Lab Results   Component Value Date    HGB 9.3 04/24/2021     Lab Results   Component Value Date    HCT 31.6 04/24/2021     No components found for: MCT  Lab Results   Component Value Date    MCV 82 04/24/2021     Lab Results   Component Value Date    MCH 24.2 04/24/2021     Lab Results   Component Value Date    MCHC 29.4 04/24/2021     Lab Results   Component Value Date    RDW 14.3 04/24/2021     Lab Results   Component Value Date     04/24/2021      Lab Results   Component Value Date    A1C 6.2 04/24/2021    A1C 6.2 04/24/2021      No results found for: TSH   No results found for: VITDT     @[facility][cult:*@      Imaging:     I personally reviewed the following imaging results today and those on care everywhere, if indicated     EXAM: XR CHEST 2 VIEWS  LOCATION: [facility]  DATE/TIME: 4/23/2021 1:56 PM     INDICATION: SOB  COMPARISON: 09/22/2014     IMPRESSION:  The heart and pulmonary vasculature are within normal limits and the lungs are clear.    EXAM: US VENOUS LEGS BILATERAL  LOCATION: [facility]  DATE/TIME: 4/23/2021 1:54 PM     INDICATION: Worsening leg swelling  COMPARISON: None.  TECHNIQUE: Venous Duplex ultrasound of bilateral lower extremities with and without compression, augmentation and duplex. Color flow and spectral Doppler with  waveform analysis performed.     FINDINGS: Exam includes the common femoral, femoral, popliteal veins as well as segmentally visualized deep calf veins and greater saphenous vein.      RIGHT: No deep vein thrombosis. No superficial thrombophlebitis. No popliteal cyst.     LEFT: No deep vein thrombosis. No superficial thrombophlebitis. No popliteal cyst.     IMPRESSION:  1.  No deep venous thrombosis in the bilateral lower extremities. Examination was somewhat limited due to lymphedema and skin thickening                   Review of Symptoms:  Full 12 point review of systems is negative, except as noted above.     Physical Exam:     Vital Signs: BP (!) 144/80   Pulse 80   Temp 98  F (36.7  C) (Oral)   Resp 12  There is no height or weight on file to calculate BMI.   Wt Readings from Last 2 Encounters:   06/10/21 (!) 404 lb (183.3 kg)   05/17/21 (!) 389 lb (176.4 kg)        Circumferential volume measures:    Circumferential Measures 9/22/2021   Right just above MTP 28.7   Right Ankle 35.2   Right Widest Calf 69.8   Right Thigh Up 10cm 81.5   Left - just above MTP 28.2   Left Ankle 33.9   Left Widest Calf 69   Left Thigh Up 10cm 76.8       Ulceration(s)/Wound(s):     VASC Wound Right lower leg lateral (Active)   Pre Size Length 1.5 09/22/21 1000   Pre Size Width 3 09/22/21 1000   Pre Size Depth 0.3 09/22/21 1000   Pre Total Sq cm 4.5 09/22/21 1000   Number of days: 0       VASC Wound Left lower leg medial (Active)   Pre Size Length 4.2 09/22/21 1000   Pre Size Width 5 09/22/21 1000   Pre Size Depth 0.3 09/22/21 1000   Pre Total Sq cm 21 09/22/21 1000   Number of days: 0        General: In no apparent distress.      Psych: Alert and oriented X 3. Affect normal.     HEENT: Atraumatic, normocephalic     Respiratory:  Lungs clear to ausculation throughout with full inspiration. No crackles or rales.    Cardiovascular: Normal S1, S2 without murmur, gallop or rub.  No audible carotid bruits. Regular rhythm.      Abdominal: Normal bowel sounds without any pain, guarding or rigidity. No inguinal lymphadenopathy palpated.  Exam compromised by body habitus.    Musculoskeletal:  Normal range of motion in hips, knees and ankles bilaterally.  There is no active joint synovitis, erythema, swelling or joint laxity.      Neurological:  Sensation is is decreased to pinprick and light touch in a stocking distribution in both legs Strength testing is normal in hip flexion, knee flexion, knee extension, ankle dorsiflexion and great toe extension bilaterally. Deep tendon reflexes knee jerks and ankle jerks are not present bilaterally.     Vascular: Dorsalis pedis and posterior tibialis pulses are decreased on manual palpation in both feet and audible with handheld doppler with biphasic sounds bilaterally. There are no significant telangietasias, medial ankle venous flares, venous varicosities and spider veins.      Integumentary: Skin of the legs is  crusting, weeping, fibrotic, scarred and thickened with severe papillomatosis and multiple ulcerations along bilateral calves and small on on the dorsum on the left foot.  Ulcerations are irregularly-shaped.  There is significant fibrin and slough with strong odor.  There is no pain to palpation or debridement.  Periwound areas have slight maceration.  Ulcers are full thickness with moderate serous drainage.  Nails are thickened, thin surrounding skin, decreased hair growth on toes, discolored, ingrown, brittle, enlarged with increased curvature, digging into the skin and painful.  They are essentially horned toenails digging into the skin and extremely long.  Please see flow sheet for measures of wounds and also see photos below.              Legs                                                Right medial calf                          Left medial calf    Impression:     1. Bilateral leg swelling  2. Venous hypertension with insufficiency of legs  3. Bilateral leg and left foot  ulcerations-full thickness with moderate drainage  4. Acquired lymphedema  5. Multiple joint osteoarthritis  6. Severe onychomycosis with onychauxis   7. Hyperkeratosis of skin with epithelial skin hyperplasia  8. DM with PN  9. Morbid obesity     Plan:     1. Type of swelling was reviewed in detail with the patient and daughter.  Questions were answered and education was completed.  This appears to be a phlebolymphedema with venous and lymphatic dysfunction longstanding complicated by type 2 diabetes peripheral neuropathy with morbid obesity.  There are significant ulcerations due to this which are now chronic.  With the significant odor there is concern about underlying wound infection.  2. Bariatric medicine consultation.  He is considering a sleep medicine referral after discussion with me.  However, as not to become too overwhelmed I will let bariatric medicine address this as this is usually part of their evaluation and treatment program.  3. After discussion of risk factors and consent obtained 2% Lidocaine HCL jelly was applied, under clean conditions, the bilateral, calves, venous stasis/insufficiency with venous hypertension and diabetic ulceration(s) were debrided using currette.  Devitalized and non viable tissue, along with any fibrin and slough, was removed to improve granulation tissue formation, stimulate wound healing, decrease overall bacteria load, disrupt biofilm formation and decrease edge senescence.  Total excisional debridement was 26 sq cm into the subcutaneous tissue.   Ulcers were improved afterwards and .  Measures were as noted on the flow sheet .  4. Because of underlying concern of cellulitis and wound infection, Spear swab culture technique was used to send off for aerobic Gram stain and culture from the left lateral calf ulcer.  5. Wound treatment will include irrigation and dressings to promote autolytic debridement and will be:  Bilateral legs-Vashe wash then alginate with  silver then abd then 2 layer lite bilaterally change twice a week.  Therapy can do once they start.    If patient is coming in to clinic, nurses certified for wound debridement may debride the wounds.    6. Urea-based lotion has been ordered for the hyperkeratosis.  Will be used during dressing changes.  7. There is significant thickening of the toenails with decreased hair growth, discoloration, shininess to the skin of the toes with some rubor.  There is onychomycosis noted.  Patient has lymphedema and diabetes mellitus type 2.  Thus, after risks factors were discussed and permission was obtained, toenails were all debrided.  8. I am sending him to therapy to get the swelling down along with the fibrosis and scarring.  Once his swelling is down he will need to be measured for Velcro compression.  This was ordered.  9. With history of diabetes and peripheral neuropathy per exam with right foot ulceration, I will also order ABIs.  10. Patient will follow up in in 4 weeks with CNP, or when needed. CNP will continue wound care. He will be scheduled for follow up with Dr. Maza in about 4 months.  If a new wound care provider MD/ comes to the clinic I have asked nursing to coordinate that.   If any questions or concerns they are to contact the clinic.  If the wounds are not healing then I would recommend nutritional work-up along with consideration of abdominal pelvic CT scan in view of some inguinal lymphadenopathy seen on his ultrasound.  Most likely the latter is due to the significant inflammation with longstanding wounds.  In addition, biopsy of one of the wounds could be considered.  I also feel he would eventually benefit from diabetic shoes and insoles.   This hopefully be addressed at follow up visits.    Thank you very much for referring Shaheen Sepuvleda  If you have any questions please feel free to contact me at [phone].     On day of encounter time spent in chart review and with patient in consultation,  exam, education and coordination of care, excluding procedures:  70 minutes         Lucia Reyes MD, Founding Diplomate ABWMS, FACCWS, FAAPMR   Medical Director Wound Care and Lymphedema   [facility]   [phone]       This note was dictated using a voice recognition software.  Any grammatical or context distortion are unintentional and inherent to the software.

## 2021-09-22 NOTE — PATIENT INSTRUCTIONS
Wound Care:  Bilateral legs-Vashe wash then alginate with silver then abd then 2 layer lite bilaterally change twice a week in the clinic.  Therapy can do once they start (bring supplies with you)  *vashe wash available for purschase in [facility] pharmacy      To schedule your appointment with Bariatric Care, please call:   [phone]       About 2-3 weeks after therapy starts, you can make an appointment for new compression:  [facility] (Please call to make an appointment)  [facility]  [address], [address]  [place], [place].  Phone: [phone]      Other [facility] Locations: (Please call ahead- depending on location may be limited in what products are available)    [facility]  [address]  [place] [address]  Phone [phone]    [facility]  [address]  [place] [address]  Phone: [phone]    [facility]   [address]  [place] [address]  Phone: [phone]    [facility]  [address]  [place] [address]  Phone: [phone]    [facility]  [address]  [place] [address]  Phone: [phone]    [facility]  [address]  [address]   [place] [address]   Phone: [phone]    [facility]  [address]  [place] [address] [phone]    [facility]  [address] [place] [address]   Phone: [phone]             Please call us if your compression wraps fall more than 1-2 inches below the bend of the knee. Remove the wraps if you experience any shortness of breathe or notice your toes turning blue/purple and then give us a call. Call if they are too painful. Call if they get  wet. If it is a weekend and the wraps fall down, are too painful, or get wet take the wraps off and put on another form of compression. Compression such as velcro wraps, compression stockings, short stretch bandages, or tubular compression. Apply one of these until you can be seen in clinic. Please call us if you have any questions [phone].    - Treatment:  Layered Compression Bandaging (2-layer)    What is it?  The layered compression bandaging has a layer of absorbent material that will soak up drainage.     Why we do it.   This is done to treat swelling, wounds, or both.  This will in turn help circulation and healing.    How to care for your bandages.  The wraps need to be kept dry. If  the wraps become wet, remove them and call the clinic to have another wrap applied.    What to expect.  It is common for the wraps to be uncomfortable at the beginning. The first two days are usually the hardest; then they will become more comfortable.       Elevating your legs will help the discomfort. Try to elevate your legs as much as possible.    If rest and elevation does not help your discomfort, call your provider.  If your provider is not available you can remove the wrap and leave a message for further instructions.    - Swelling and Compression Therapy    Swelling in the legs can be caused by many reasons. No matter what the reason, treatment usually includes some type of compression. This may be done with a support sock, dressing, ace wrap, or layered wraps.     It is important to treat the swelling for many reasons. If the swelling is not treated you may develop blisters that can lead to ulcerations. This is caused when extra fluid goes into tissue causing damage and blocking blood flow to the tissue.     It is important that you wear your compression every day, including days that you will be seen in clinic.     Compression is often the most important part of treating leg wounds. Without controlling the  swelling it is often not possible to heal wounds.     Going without compression for even brief periods of time can be damaging to your legs and your health.  Your compression should be put on first thing in the morning. Take the compression off at night only when instructed by your care provider to do so. Sometimes wearing compression 24 hours a day will be recommended.       If you are having difficulty wearing your compression it is important to notify your care provider so that other options may be reviewed.    Thank you for choosing [facility]. Please call us if you have any questions [phone].

## 2021-09-24 ENCOUNTER — ALLIED HEALTH/NURSE VISIT (OUTPATIENT)
Dept: VASCULAR SURGERY | Facility: CLINIC | Age: 66
End: 2021-09-24
Attending: PHYSICAL MEDICINE & REHABILITATION
Payer: COMMERCIAL

## 2021-09-24 VITALS — TEMPERATURE: 97.9 F | SYSTOLIC BLOOD PRESSURE: 180 MMHG | DIASTOLIC BLOOD PRESSURE: 90 MMHG | HEART RATE: 104 BPM

## 2021-09-24 DIAGNOSIS — I87.333 VENOUS HYPERTENSION, CHRONIC, WITH ULCER AND INFLAMMATION, BILATERAL (H): Primary | ICD-10-CM

## 2021-09-24 LAB
GRAM STAIN RESULT: ABNORMAL

## 2021-09-24 PROCEDURE — 29581 APPL MULTLAYER CMPRN SYS LEG: CPT

## 2021-09-24 PROCEDURE — 97602 WOUND(S) CARE NON-SELECTIVE: CPT

## 2021-09-24 ASSESSMENT — PAIN SCALES - GENERAL: PAINLEVEL: NO PAIN (0)

## 2021-09-24 NOTE — NURSING NOTE
"[facility]  -  Nurse Visit              Chief Complaint: Patient presents to clinic for assement, and treatment of their wound(s) and swelling. I measured wound areas as entire inflamed area, noted scattered openings throughout.     Dressing on Arrival: bilateral 2 layer lite      ICD-10-CM    1. Venous hypertension, chronic, with ulcer and inflammation, bilateral (H)  I87.333     L97.919     L97.929          Allergies:   Allergies   Allergen Reactions     Lisinopril Swelling     Patient reports \"neck swelling\"  Swelling in throat       Medications:   Current Outpatient Medications:      ammonium lactate (LAC-HYDRIN) 12 % external lotion, Apply topically daily as needed for dry skin (with dressing changes) Put on crusting areas., Disp: 500 g, Rfl: 4     atorvastatin (LIPITOR) 20 MG tablet, Take 1 tablet (20 mg) by mouth daily, Disp: 90 tablet, Rfl: 3     furosemide (LASIX) 40 MG tablet, Take 1 tablet (40 mg) by mouth 2 times daily, Disp: 180 tablet, Rfl: 3     losartan (COZAAR) 100 MG tablet, Take 1 tablet (100 mg) by mouth daily, Disp: 30 tablet, Rfl: 3     metFORMIN (GLUCOPHAGE) 500 MG tablet, Take 1 tablet (500 mg) by mouth 2 times daily (with meals), Disp: 180 tablet, Rfl: 3     methadone (DOLOPHINE-INTENSOL) 10 MG/ML (HIGH CONC) solution, Take 100 mg by mouth daily, Disp: , Rfl:     Current Facility-Administered Medications:      lidocaine (XYLOCAINE) 2 % external gel, , Topical, Daily PRN, Lucia Reyes MD, Given at 09/22/21 1047    Vital Signs: BP (!) 180/90   Pulse 104   Temp 97.9  F (36.6  C)         Assessment:    General:  Patient presents to clinic in no apparent distress.  Psychiatric:  Alert and oriented x3.   Lower extremity:  edema is present.    Integumentary:  Skin is inflamed    Swelling Measurements:  Circumferential Measures 9/22/2021   Right just above MTP 28.7   Right Ankle 35.2   Right Widest Calf 69.8   Right Thigh Up 10cm 81.5   Left - just above MTP 28.2 "   Left Ankle 33.9   Left Widest Calf 69   Left Thigh Up 10cm 76.8       Wound info:  VASC Wound Right lower leg lateral (Active)   Pre Size Length 16.5 21 1200   Pre Size Width 10 21 1200   Pre Size Depth 0.3 21 1200   Pre Total Sq cm 165 21 1200       VASC Wound Left lower leg medial (Active)   Pre Size Length 15 21 1200   Pre Size Width 15 21 1200   Pre Size Depth 0.3 21 1200   Pre Total Sq cm 225 21 1200       Undermining is not present.    The periwoundskin is inflamed    Plan:         1. Patient will follow up in 4 days.          2. Treatment provided will include irrigation and dressings to promote autolytic debridement and will be as listed below     Cleansed with: vashe wash    Protected skin with: Remedy skin repair lotion    Dressings Applied: Silver alginate, ABD and Secured with roll gauze and tape.     Compression Applied to the Left Le-Layer Coban Lite    Compression Applied to the Right Le-Layer [place]an Lite    Offloading applied: None    Trial Products: No  Provider notified regarding concerns: No  Treatment Changes: No        Judith Kevin

## 2021-09-27 ENCOUNTER — TELEPHONE (OUTPATIENT)
Dept: VASCULAR SURGERY | Facility: CLINIC | Age: 66
End: 2021-09-27

## 2021-09-27 NOTE — TELEPHONE ENCOUNTER
----- Message from Lucia Reyes MD sent at 9/27/2021  3:46 PM CDT -----  Please let the patient know that based on his culture results, if he is doing the vashe wash and alginate with silver with wound care prescribed this should work well for him.  If fever, worsening of ulcers or increased pain please let us know.

## 2021-09-28 ENCOUNTER — ALLIED HEALTH/NURSE VISIT (OUTPATIENT)
Dept: VASCULAR SURGERY | Facility: CLINIC | Age: 66
End: 2021-09-28
Attending: NURSE PRACTITIONER
Payer: COMMERCIAL

## 2021-09-28 VITALS
HEART RATE: 88 BPM | RESPIRATION RATE: 14 BRPM | DIASTOLIC BLOOD PRESSURE: 70 MMHG | SYSTOLIC BLOOD PRESSURE: 142 MMHG | TEMPERATURE: 98.2 F

## 2021-09-28 DIAGNOSIS — I89.0 LYMPHEDEMA OF BOTH LOWER EXTREMITIES: ICD-10-CM

## 2021-09-28 DIAGNOSIS — M79.89 LEG SWELLING: ICD-10-CM

## 2021-09-28 DIAGNOSIS — I87.2 VENOUS (PERIPHERAL) INSUFFICIENCY: ICD-10-CM

## 2021-09-28 DIAGNOSIS — E66.01 OBESITY, MORBID, BMI 40.0-49.9 (H): ICD-10-CM

## 2021-09-28 DIAGNOSIS — I87.333 VENOUS HYPERTENSION, CHRONIC, WITH ULCER AND INFLAMMATION, BILATERAL (H): Primary | ICD-10-CM

## 2021-09-28 PROCEDURE — 97602 WOUND(S) CARE NON-SELECTIVE: CPT

## 2021-09-28 PROCEDURE — 29581 APPL MULTLAYER CMPRN SYS LEG: CPT

## 2021-09-28 ASSESSMENT — PAIN SCALES - GENERAL: PAINLEVEL: NO PAIN (0)

## 2021-09-28 NOTE — PROGRESS NOTES
"                Bilateral weeping/swelling lower legs    [facility]  -  Nurse Visit      Chief Complaint: Patient presents to clinic for assement, and treatment of their wound(s) and swelling.bilateral lower legs continues. Odor continues, denies pain, moderate amount of clear fluid. Tolerates dressing change with no complaints. 3 2 layer lites needed, I did add soft roll to ankle folds. Advised to elevate as much as possible and call with any issues.    Dressing on Arrival:      ICD-10-CM    1. Venous hypertension, chronic, with ulcer and inflammation, bilateral (H)  I87.333     L97.919     L97.929    2. Leg swelling  M79.89    3. Venous (peripheral) insufficiency  I87.2    4. Obesity, morbid, BMI 40.0-49.9 (H)  E66.01    5. Lymphedema of both lower extremities  I89.0          Allergies:   Allergies   Allergen Reactions     Lisinopril Swelling     Patient reports \"neck swelling\"  Swelling in throat       Medications:   Current Outpatient Medications:      ammonium lactate (LAC-HYDRIN) 12 % external lotion, Apply topically daily as needed for dry skin (with dressing changes) Put on crusting areas., Disp: 500 g, Rfl: 4     atorvastatin (LIPITOR) 20 MG tablet, Take 1 tablet (20 mg) by mouth daily, Disp: 90 tablet, Rfl: 3     furosemide (LASIX) 40 MG tablet, Take 1 tablet (40 mg) by mouth 2 times daily, Disp: 180 tablet, Rfl: 3     losartan (COZAAR) 100 MG tablet, Take 1 tablet (100 mg) by mouth daily, Disp: 30 tablet, Rfl: 3     metFORMIN (GLUCOPHAGE) 500 MG tablet, Take 1 tablet (500 mg) by mouth 2 times daily (with meals), Disp: 180 tablet, Rfl: 3     methadone (DOLOPHINE-INTENSOL) 10 MG/ML (HIGH CONC) solution, Take 100 mg by mouth daily, Disp: , Rfl:     Current Facility-Administered Medications:      lidocaine (XYLOCAINE) 2 % external gel, , Topical, Daily PRN, Lucia Reyes MD, Given at 09/22/21 1047    Vital Signs: BP (!) 142/70   Pulse 88   Temp 98.2  F (36.8  C)   Resp 14 "         Assessment:    General:  Patient presents to clinic in no apparent distress.  Psychiatric:  Alert and oriented x3.   Lower extremity:  edema is present.    Integumentary:  Skin is crusted skin continues    Swelling Measurements:  Circumferential Measures 2021   Right just above MTP 28.7 29   Right Ankle 35.2 37.5   Right Widest Calf 69.8 64   Right Thigh Up 10cm 81.5 -   Left - just above MTP 28.2 28   Left Ankle 33.9 35   Left Widest Calf 69 60   Left Thigh Up 10cm 76.8 -       Wound info:  VASC Wound Right lower leg lateral (Active)   Pre Size Length 16.5 21 0800   Pre Size Width 10 21 0800   Pre Size Depth 0.3 21 0800   Pre Total Sq cm 165 21 0800   Product Used Alginate 21 0800       VASC Wound Left lower leg medial (Active)   Pre Size Length 15 21 0800   Pre Size Width 15 21 0800   Pre Size Depth 0.3 21 0800   Pre Total Sq cm 225 21 0800   Product Used Alginate 21 0800       Undermining is not present.    The periwoundskin is wet crusting areas continue    Plan:         1. Patient will follow up in Friday.          2. Treatment provided will include irrigation and dressings to promote autolytic debridement and will be as listed below     Cleansed with: Vashe wash patient brings in    Protected skin with: Remedy skin repair lotion intact skin, legs washed with soap and water    Dressings Applied: Silver alginate    Compression Applied to the Left Le-Layer Coban Lite    Compression Applied to the Right Le-Layer Coban Lite  Compression Applied to Bilateral  2-Layer Coban Lite: I Applied the inner foam layer with the foot dorsiflexed and started atthe base of the fifth metatarsal head. I Left the bottom of the heel exposed, and proceed by winding the foam up the leg using minimal overlap to just below the fibular head. I then applied the compression layer with the foot dorsiflexed and startingat the base of the fifth  metatarsal head. I applied at full stretch and proceeded up the leg using 50% overlap. The bottom of the heel is covered with the compression layer up to the end at the fibular head just below the back of the knee and levelwith the top edge of the foam layer.  I gently pressed and conformed the entire surface of the system to ensurethat the two layers are firmly bound together  Offloading applied: None    Trial Products: No  Provider notified regarding concerns: No  Treatment Changes: No        Mychal Bagley RN

## 2021-09-28 NOTE — PATIENT INSTRUCTIONS
Wound Care:  Bilateral legs-Vashe wash then alginate with silver then abd then 2 layer lite bilaterally change twice a week in the clinic.  Therapy can do once they start (bring supplies with you)  *vashe wash available for purschase in [facility] pharmacy        To schedule your appointment with Bariatric Care, please call:   [phone]         About 2-3 weeks after therapy starts, you can make an appointment for new compression:  [facility] (Please call to make an appointment)  [facility]  [facility], [address]  [place], [place].  Phone: [phone]

## 2021-10-01 ENCOUNTER — ALLIED HEALTH/NURSE VISIT (OUTPATIENT)
Dept: VASCULAR SURGERY | Facility: CLINIC | Age: 66
End: 2021-10-01
Attending: NURSE PRACTITIONER
Payer: COMMERCIAL

## 2021-10-01 VITALS
DIASTOLIC BLOOD PRESSURE: 76 MMHG | TEMPERATURE: 97.6 F | HEART RATE: 80 BPM | SYSTOLIC BLOOD PRESSURE: 150 MMHG | RESPIRATION RATE: 18 BRPM

## 2021-10-01 DIAGNOSIS — I87.333 VENOUS HYPERTENSION, CHRONIC, WITH ULCER AND INFLAMMATION, BILATERAL (H): Primary | ICD-10-CM

## 2021-10-01 PROCEDURE — 29581 APPL MULTLAYER CMPRN SYS LEG: CPT

## 2021-10-01 PROCEDURE — 97602 WOUND(S) CARE NON-SELECTIVE: CPT

## 2021-10-01 ASSESSMENT — PAIN SCALES - GENERAL: PAINLEVEL: NO PAIN (0)

## 2021-10-01 NOTE — PATIENT INSTRUCTIONS
Wound Care:  Bilateral legs-Vashe wash then alginate with silver then abd then 2 layer lite bilaterally change twice a week in the clinic.  Therapy can do once they start (bring supplies with you)  *vashe wash available for purschase in [facility] pharmacy        To schedule your appointment with Bariatric Care, please call:   [phone]         About 2-3 weeks after therapy starts, you can make an appointment for new compression:  [facility] (Please call to make an appointment)  [facility]  [facility], [address]  [place], [place].  Phone: [phone]

## 2021-10-01 NOTE — PROGRESS NOTES
"[facility]  -  Nurse Visit      Chief Complaint: Patient presents to clinic for assement, and treatment of their wound(s) and swelling.bilateral lower legs continues. Odor continues, denies pain, moderate amount of clear fluid. Tolerated dressing change, no complaints.. 3 2-layer lites needed. Soft roll reapplied to ankle folds.    Dressing on Arrival: silver alginate, abd, roll gauze, soft roll, and 2-layer lite compression.    No diagnosis found.      Allergies:   Allergies   Allergen Reactions     Lisinopril Swelling     Patient reports \"neck swelling\"  Swelling in throat       Medications:   Current Outpatient Medications:      ammonium lactate (LAC-HYDRIN) 12 % external lotion, Apply topically daily as needed for dry skin (with dressing changes) Put on crusting areas., Disp: 500 g, Rfl: 4     atorvastatin (LIPITOR) 20 MG tablet, Take 1 tablet (20 mg) by mouth daily, Disp: 90 tablet, Rfl: 3     furosemide (LASIX) 40 MG tablet, Take 1 tablet (40 mg) by mouth 2 times daily, Disp: 180 tablet, Rfl: 3     losartan (COZAAR) 100 MG tablet, Take 1 tablet (100 mg) by mouth daily, Disp: 30 tablet, Rfl: 3     metFORMIN (GLUCOPHAGE) 500 MG tablet, Take 1 tablet (500 mg) by mouth 2 times daily (with meals), Disp: 180 tablet, Rfl: 3     methadone (DOLOPHINE-INTENSOL) 10 MG/ML (HIGH CONC) solution, Take 100 mg by mouth daily, Disp: , Rfl:     Current Facility-Administered Medications:      lidocaine (XYLOCAINE) 2 % external gel, , Topical, Daily PRN, Lucia Reyes MD, Given at 09/22/21 1047    Vital Signs: BP (!) 150/76   Pulse 80   Temp 97.6  F (36.4  C)   Resp 18         Assessment:    General:  Patient presents to clinic in no apparent distress.  Psychiatric:  Alert and oriented x3.   Lower extremity:  edema is present.    Integumentary:  Skin is crusted    Swelling Measurements:  Circumferential Measures 9/22/2021 9/28/2021   Right just above MTP 28.7 29   Right Ankle 35.2 37.5   Right " Widest Calf 69.8 64   Right Thigh Up 10cm 81.5 -   Left - just above MTP 28.2 28   Left Ankle 33.9 35   Left Widest Calf 69 60   Left Thigh Up 10cm 76.8 -       Wound info:  VASC Wound Right lower leg lateral (Active)   Pre Size Length 16.5 10/01/21 0800   Pre Size Width 9.8 10/01/21 0800   Pre Size Depth 0.3 10/01/21 0800   Pre Total Sq cm 161.7 10/01/21 0800   Product Used Alginate 21       VASC Wound Left lower leg medial (Active)   Pre Size Length 4.1 10/01/21 0800   Pre Size Width 4.3 10/01/21 0800   Pre Size Depth 0.3 10/01/21 0800   Pre Total Sq cm 17.63 10/01/21 0800   Product Used Alginate 21       Undermining is not present.    The periwoundskin is wet, crusting areas continue    Plan:         1. Patient will follow up in four days.         2. Treatment provided will include irrigation and dressings to promote autolytic debridement and will be as listed below     Cleansed with: Vashe wash patient brings in     Protected skin with: Remedy skin repair lotion intact skin, legs washed with soap and water     Dressings Applied: Silver alginate     Compression Applied to the Left Le-Layer Coban Lite     Compression Applied to the Right Le-Layer Coban Lite  Compression Applied to Bilateral  2-Layer Coban Lite: I Applied the inner foam layer with the foot dorsiflexed and started atthe base of the fifth metatarsal head. I Left the bottom of the heel exposed, and proceed by winding the foam up the leg using minimal overlap to just below the fibular head. I then applied the compression layer with the foot dorsiflexed and startingat the base of the fifth metatarsal head. I applied at full stretch and proceeded up the leg using 50% overlap. The bottom of the heel is covered with the compression layer up to the end at the fibular head just below the back of the knee and levelwith the top edge of the foam layer.  I gently pressed and conformed the entire surface of the system to ensurethat  the two layers are firmly bound together    Offloading applied: None      Trial Products: No  Provider notified regarding concerns: No  Treatment Changes: No        Michaelle Valencia RN

## 2021-10-05 ENCOUNTER — ALLIED HEALTH/NURSE VISIT (OUTPATIENT)
Dept: VASCULAR SURGERY | Facility: CLINIC | Age: 66
End: 2021-10-05
Attending: PHYSICAL MEDICINE & REHABILITATION
Payer: COMMERCIAL

## 2021-10-05 ENCOUNTER — ANCILLARY PROCEDURE (OUTPATIENT)
Dept: VASCULAR ULTRASOUND | Facility: CLINIC | Age: 66
End: 2021-10-05
Attending: PHYSICAL MEDICINE & REHABILITATION
Payer: COMMERCIAL

## 2021-10-05 VITALS
DIASTOLIC BLOOD PRESSURE: 90 MMHG | TEMPERATURE: 98 F | RESPIRATION RATE: 12 BRPM | SYSTOLIC BLOOD PRESSURE: 130 MMHG | HEART RATE: 84 BPM

## 2021-10-05 DIAGNOSIS — I87.2 VENOUS (PERIPHERAL) INSUFFICIENCY: ICD-10-CM

## 2021-10-05 DIAGNOSIS — I87.333 VENOUS HYPERTENSION, CHRONIC, WITH ULCER AND INFLAMMATION, BILATERAL (H): Primary | ICD-10-CM

## 2021-10-05 DIAGNOSIS — L60.2 ONYCHAUXIS: ICD-10-CM

## 2021-10-05 DIAGNOSIS — E11.42 TYPE 2 DIABETES MELLITUS WITH PERIPHERAL NEUROPATHY (H): ICD-10-CM

## 2021-10-05 DIAGNOSIS — L85.9 HYPERKERATOSIS OF SKIN: ICD-10-CM

## 2021-10-05 DIAGNOSIS — M79.89 LEG SWELLING: ICD-10-CM

## 2021-10-05 DIAGNOSIS — L85.9 EPITHELIAL HYPERPLASIA OF SKIN: ICD-10-CM

## 2021-10-05 DIAGNOSIS — E66.01 OBESITY, MORBID, BMI 40.0-49.9 (H): ICD-10-CM

## 2021-10-05 DIAGNOSIS — B35.1 ONYCHOMYCOSIS: ICD-10-CM

## 2021-10-05 DIAGNOSIS — I89.0 LYMPHEDEMA OF BOTH LOWER EXTREMITIES: ICD-10-CM

## 2021-10-05 DIAGNOSIS — I87.333 VENOUS HYPERTENSION, CHRONIC, WITH ULCER AND INFLAMMATION, BILATERAL (H): ICD-10-CM

## 2021-10-05 PROCEDURE — 29581 APPL MULTLAYER CMPRN SYS LEG: CPT

## 2021-10-05 PROCEDURE — 93922 UPR/L XTREMITY ART 2 LEVELS: CPT

## 2021-10-05 PROCEDURE — 93925 LOWER EXTREMITY STUDY: CPT

## 2021-10-05 PROCEDURE — 97602 WOUND(S) CARE NON-SELECTIVE: CPT

## 2021-10-05 ASSESSMENT — PAIN SCALES - GENERAL: PAINLEVEL: NO PAIN (0)

## 2021-10-05 NOTE — PROGRESS NOTES
"  bilat lower extem swelling               [facility]  -  Nurse Visit      Chief Complaint: Patient presents to clinic for assement, and treatment of their wound(s) and swelling.tolerating 2 layer lite dressing change with no complaints. US done prior to dressing change. tostart therapy 10/14/21. Moderate drainage continues with odor.    Dressing on Arrival: bilateral 2 layer lite,silvercel,abd,roll gauze intact      ICD-10-CM    1. Venous hypertension, chronic, with ulcer and inflammation, bilateral (H)  I87.333     L97.919     L97.929    2. Leg swelling  M79.89    3. Venous (peripheral) insufficiency  I87.2    4. Obesity, morbid, BMI 40.0-49.9 (H)  E66.01    5. Lymphedema of both lower extremities  I89.0    6. Hyperkeratosis of skin  L85.9          Allergies:   Allergies   Allergen Reactions     Lisinopril Swelling     Patient reports \"neck swelling\"  Swelling in throat       Medications:   Current Outpatient Medications:      ammonium lactate (LAC-HYDRIN) 12 % external lotion, Apply topically daily as needed for dry skin (with dressing changes) Put on crusting areas., Disp: 500 g, Rfl: 4     atorvastatin (LIPITOR) 20 MG tablet, Take 1 tablet (20 mg) by mouth daily, Disp: 90 tablet, Rfl: 3     furosemide (LASIX) 40 MG tablet, Take 1 tablet (40 mg) by mouth 2 times daily, Disp: 180 tablet, Rfl: 3     losartan (COZAAR) 100 MG tablet, Take 1 tablet (100 mg) by mouth daily, Disp: 30 tablet, Rfl: 3     metFORMIN (GLUCOPHAGE) 500 MG tablet, Take 1 tablet (500 mg) by mouth 2 times daily (with meals), Disp: 180 tablet, Rfl: 3     methadone (DOLOPHINE-INTENSOL) 10 MG/ML (HIGH CONC) solution, Take 100 mg by mouth daily, Disp: , Rfl:     Current Facility-Administered Medications:      lidocaine (XYLOCAINE) 2 % external gel, , Topical, Daily PRN, Lucia Reyes MD, Given at 09/22/21 1047    Vital Signs: BP (!) 130/90   Pulse 84   Temp 98  F (36.7  C)   Resp 12         Assessment:    General:  " Patient presents to clinic in no apparent distress.  Psychiatric:  Alert and oriented x3.   Lower extremity:  edema is present.    Integumentary:  Skin is weeping o/a's    Swelling Measurements:  Circumferential Measures 2021 2021 10/5/2021   Right just above MTP 28.7 29 30.5   Right Ankle 35.2 37.5 38   Right Widest Calf 69.8 64 57   Right Thigh Up 10cm 81.5 - -   Left - just above MTP 28.2 28 29   Left Ankle 33.9 35 34   Left Widest Calf 69 60 60   Left Thigh Up 10cm 76.8 - -       Wound info:  VASC Wound Right lower leg lateral (Active)   Pre Size Length 16.5 10/05/21 1500   Pre Size Width 10 10/05/21 1500   Pre Size Depth 0.3 10/05/21 1500   Pre Total Sq cm 165 10/05/21 1500   Product Used Alginate 10/05/21 1500       VASC Wound Left lower leg medial (Active)   Pre Size Length 4.1 10/05/21 1500   Pre Size Width 4.3 10/05/21 1500   Pre Size Depth 0.3 10/05/21 1500   Pre Total Sq cm 17.63 10/05/21 1500   Product Used Alginate 10/05/21 1500       Undermining is not present.    The periwoundskin is WNL    Plan:         1. Patient will follow up in Friday.          2. Treatment provided will include irrigation and dressings to promote autolytic debridement and will be as listed below     Cleansed with: vasche  wash    Protected skin with: Remedy skin repair lotion    Dressings Applied: Silver alginate/abd/2 layer    Compression Applied to the Left Le-Layer Coban Lite    Compression Applied to the Right Le-Layer Coban Lite  Compression Applied to Bilateral  2-Layer Coban Lite: I Applied the inner foam layer with the foot dorsiflexed and started atthe base of the fifth metatarsal head. I Left the bottom of the heel exposed, and proceed by winding the foam up the leg using minimal overlap to just below the fibular head. I then applied the compression layer with the foot dorsiflexed and startingat the base of the fifth metatarsal head. I applied at full stretch and proceeded up the leg using 50%  overlap. The bottom of the heel is covered with the compression layer up to the end at the fibular head just below the back of the knee and levelwith the top edge of the foam layer.  I gently pressed and conformed the entire surface of the system to ensurethat the two layers are firmly bound together  Offloading applied: None    Trial Products: No  Provider notified regarding concerns: No  Treatment Changes: No        Mychal Bagley RN

## 2021-10-05 NOTE — PATIENT INSTRUCTIONS
Wound Care:  Bilateral legs-Vashe wash then alginate with silver then abd then 2 layer lite bilaterally change twice a week in the clinic.  Therapy can do once they start (bring supplies with you)  *vashe wash available for purschase in [facility] pharmacy        To schedule your appointment with Bariatric Care, please call:   [phone]         About 2-3 weeks after therapy starts, you can make an appointment for new compression:  [facility] (Please call to make an appointment)  [facility]  [facility], [address]  [place], [place].  Phone: [phone]        - Please call us if your compression wraps fall more than 1-2 inches below the bend of the knee. Remove the wraps if you experience any shortness of breathe or notice your toes turning blue/purple and then give us a call. Call if they are too painful. Call if they get wet. If it is a weekend and the wraps fall down, are too painful, or get wet take the wraps off and put on another form of compression. Compression such as velcro wraps, compression stockings, short stretch bandages, or tubular compression. Apply one of these until you can be seen in clinic. Please call us if you have any questions [phone].    - Treatment:  Layered Compression Bandaging (2-layer)    What is it?  The layered compression bandaging has a layer of absorbent material that will soak up drainage.     Why we do it.   This is done to treat swelling, wounds, or both.  This will in turn help circulation and healing.    How to care for your bandages.  The wraps need to be kept dry. If  the wraps become wet, remove them and call the clinic to have another wrap applied.    What to expect.  It is common for the wraps to be uncomfortable at the beginning. The first two days are usually the hardest; then they will become more comfortable.       Elevating your legs will help the discomfort. Try to elevate your legs as much as  possible.    If rest and elevation does not help your discomfort, call your provider.  If your provider is not available you can remove the wrap and leave a message for further instructions.    - Swelling and Compression Therapy    Swelling in the legs can be caused by many reasons. No matter what the reason, treatment usually includes some type of compression. This may be done with a support sock, dressing, ace wrap, or layered wraps.     It is important to treat the swelling for many reasons. If the swelling is not treated you may develop blisters that can lead to ulcerations. This is caused when extra fluid goes into tissue causing damage and blocking blood flow to the tissue.     It is important that you wear your compression every day, including days that you will be seen in clinic.     Compression is often the most important part of treating leg wounds. Without controlling the swelling it is often not possible to heal wounds.     Going without compression for even brief periods of time can be damaging to your legs and your health.  Your compression should be put on first thing in the morning. Take the compression off at night only when instructed by your care provider to do so. Sometimes wearing compression 24 hours a day will be recommended.       If you are having difficulty wearing your compression it is important to notify your care provider so that other options may be reviewed.    Thank you for choosing  [facility]. Please call us if you have any questions [phone].

## 2021-10-07 ENCOUNTER — ALLIED HEALTH/NURSE VISIT (OUTPATIENT)
Dept: VASCULAR SURGERY | Facility: CLINIC | Age: 66
End: 2021-10-07
Attending: NURSE PRACTITIONER
Payer: COMMERCIAL

## 2021-10-07 ENCOUNTER — TELEPHONE (OUTPATIENT)
Dept: VASCULAR SURGERY | Facility: CLINIC | Age: 66
End: 2021-10-07

## 2021-10-07 VITALS
TEMPERATURE: 97.2 F | DIASTOLIC BLOOD PRESSURE: 100 MMHG | SYSTOLIC BLOOD PRESSURE: 148 MMHG | HEART RATE: 78 BPM | RESPIRATION RATE: 18 BRPM

## 2021-10-07 DIAGNOSIS — I87.333 VENOUS HYPERTENSION, CHRONIC, WITH ULCER AND INFLAMMATION, BILATERAL (H): Primary | ICD-10-CM

## 2021-10-07 DIAGNOSIS — E66.01 OBESITY, MORBID, BMI 40.0-49.9 (H): ICD-10-CM

## 2021-10-07 DIAGNOSIS — M79.89 LEG SWELLING: ICD-10-CM

## 2021-10-07 DIAGNOSIS — I89.0 LYMPHEDEMA OF BOTH LOWER EXTREMITIES: ICD-10-CM

## 2021-10-07 PROCEDURE — 97602 WOUND(S) CARE NON-SELECTIVE: CPT

## 2021-10-07 PROCEDURE — 29581 APPL MULTLAYER CMPRN SYS LEG: CPT

## 2021-10-07 ASSESSMENT — PAIN SCALES - GENERAL: PAINLEVEL: NO PAIN (0)

## 2021-10-07 NOTE — TELEPHONE ENCOUNTER
----- Message from Lucia Reyes MD sent at 10/6/2021  5:03 PM CDT -----  Please let the patient know his arterial studies look good.  He should continue the plan of care as outlined at his visit.

## 2021-10-07 NOTE — PATIENT INSTRUCTIONS
- Please call us if your compression wraps fall more than 1-2 inches below the bend of the knee. Remove the wraps if you experience any shortness of breathe or notice your toes turning blue/purple and then give us a call. Call if they are too painful. Call if they get wet. If it is a weekend and the wraps fall down, are too painful, or get wet take the wraps off and put on another form of compression. Compression such as velcro wraps, compression stockings, short stretch bandages, or tubular compression. Apply one of these until you can be seen in clinic. Please call us if you have any questions [phone].    - Treatment:  Layered Compression Bandaging (2-layer)    What is it?  The layered compression bandaging has a layer of absorbent material that will soak up drainage.     Why we do it.   This is done to treat swelling, wounds, or both.  This will in turn help circulation and healing.    How to care for your bandages.  The wraps need to be kept dry. If  the wraps become wet, remove them and call the clinic to have another wrap applied.    What to expect.  It is common for the wraps to be uncomfortable at the beginning. The first two days are usually the hardest; then they will become more comfortable.       Elevating your legs will help the discomfort. Try to elevate your legs as much as possible.    If rest and elevation does not help your discomfort, call your provider.  If your provider is not available you can remove the wrap and leave a message for further instructions.    - Swelling and Compression Therapy    Swelling in the legs can be caused by many reasons. No matter what the reason, treatment usually includes some type of compression. This may be done with a support sock, dressing, ace wrap, or layered wraps.     It is important to treat the swelling for many reasons. If the swelling is not treated you may develop blisters that can lead to ulcerations. This is caused when extra fluid goes into tissue  causing damage and blocking blood flow to the tissue.     It is important that you wear your compression every day, including days that you will be seen in clinic.     Compression is often the most important part of treating leg wounds. Without controlling the swelling it is often not possible to heal wounds.     Going without compression for even brief periods of time can be damaging to your legs and your health.  Your compression should be put on first thing in the morning. Take the compression off at night only when instructed by your care provider to do so. Sometimes wearing compression 24 hours a day will be recommended.       If you are having difficulty wearing your compression it is important to notify your care provider so that other options may be reviewed.    Thank you for choosing [facility]. Please call us if you have any questions [phone].

## 2021-10-07 NOTE — PROGRESS NOTES
"    Bilateral legs 10/7        Right lateral 10/7        Left medial 10/7    [facility]  -  Nurse Visit      Chief Complaint: Patient presents to clinic for assement, and treatment of their bilateral lower leg swelling and wounds.     Dressing on Arrival: 2-layer lite bilaterally C/D/I      ICD-10-CM    1. Venous hypertension, chronic, with ulcer and inflammation, bilateral (H)  I87.333     L97.919     L97.929    2. Leg swelling  M79.89    3. Obesity, morbid, BMI 40.0-49.9 (H)  E66.01    4. Lymphedema of both lower extremities  I89.0          Allergies:   Allergies   Allergen Reactions     Lisinopril Swelling     Patient reports \"neck swelling\"  Swelling in throat       Medications:   Current Outpatient Medications:      ammonium lactate (LAC-HYDRIN) 12 % external lotion, Apply topically daily as needed for dry skin (with dressing changes) Put on crusting areas., Disp: 500 g, Rfl: 4     atorvastatin (LIPITOR) 20 MG tablet, Take 1 tablet (20 mg) by mouth daily, Disp: 90 tablet, Rfl: 3     furosemide (LASIX) 40 MG tablet, Take 1 tablet (40 mg) by mouth 2 times daily, Disp: 180 tablet, Rfl: 3     losartan (COZAAR) 100 MG tablet, Take 1 tablet (100 mg) by mouth daily, Disp: 30 tablet, Rfl: 3     metFORMIN (GLUCOPHAGE) 500 MG tablet, Take 1 tablet (500 mg) by mouth 2 times daily (with meals), Disp: 180 tablet, Rfl: 3     methadone (DOLOPHINE-INTENSOL) 10 MG/ML (HIGH CONC) solution, Take 100 mg by mouth daily, Disp: , Rfl:     Current Facility-Administered Medications:      lidocaine (XYLOCAINE) 2 % external gel, , Topical, Daily PRN, Lucia Reyes MD, Given at 09/22/21 1047    Vital Signs: BP (!) 148/100 (BP Location: Left arm, Patient Position: Sitting, Cuff Size: Adult Large)   Pulse 78   Temp 97.2  F (36.2  C) (Temporal)   Resp 18         Assessment:    General:  Patient presents to clinic in no apparent distress.  Psychiatric:  Alert and oriented x3.   Lower extremity:  edema is " present.    Integumentary:  Skin is hemosiderin/dry/scaly    Swelling Measurements:  Circumferential Measures 2021 2021 10/5/2021 10/7/2021   Right just above MTP 28.7 29 30.5 29.6   Right Ankle 35.2 37.5 38 36.1   Right Widest Calf 69.8 64 57 59.6   Right Thigh Up 10cm 81.5 - - -   Left - just above MTP 28.2 28 29 27.4   Left Ankle 33.9 35 34 34.4   Left Widest Calf 69 60 60 54.5   Left Thigh Up 10cm 76.8 - - -       Wound info:  VASC Wound Right lower leg lateral (Active)   Pre Size Length 1.4 10/07/21 0900   Pre Size Width 2 10/07/21 0900   Pre Size Depth 0.3 10/07/21 0900   Pre Total Sq cm 2.8 10/07/21 0900   Product Used Alginate 10/07/21 0900       VASC Wound Left lower leg medial (Active)   Pre Size Length 3 10/07/21 0900   Pre Size Width 3.4 10/07/21 0900   Pre Size Depth 0.3 10/07/21 0900   Pre Total Sq cm 10.2 10/07/21 0900   Product Used Alginate 10/07/21 0900       Undermining is not present.    The periwoundskin is macerated    Plan:         1. Patient will follow up in 5 days.          2. Treatment provided will include irrigation and dressings to promote autolytic debridement and will be as listed below     Cleansed with: Vashe wash    Protected skin with: Remedy skin repair lotion    Dressings Applied: silver alginate, ABD    Compression Applied to the Left Le-Layer Coban Lite    Compression Applied to the Right Le-Layer Coban Lite     Compression Applied to Bilateral  2-Layer Coban Lite: I Applied the inner foam layer with the foot dorsiflexed and started atthe base of the fifth metatarsal head. I Left the bottom of the heel exposed, and proceed by winding the foam up the leg using minimal overlap to just below the fibular head. I then applied the compression layer with the foot dorsiflexed and startingat the base of the fifth metatarsal head. I applied at full stretch and proceeded up the leg using 50% overlap. The bottom of the heel is covered with the compression layer up to the  end at the fibular head just below the back of the knee and levelwith the top edge of the foam layer.  I gently pressed and conformed the entire surface of the system to ensurethat the two layers are firmly bound together    Offloading applied: None    Trial Products: No  Provider notified regarding concerns: No  Treatment Changes: No        Tracey Graves RN, WTA-C

## 2021-10-12 ENCOUNTER — ALLIED HEALTH/NURSE VISIT (OUTPATIENT)
Dept: VASCULAR SURGERY | Facility: CLINIC | Age: 66
End: 2021-10-12
Attending: NURSE PRACTITIONER
Payer: COMMERCIAL

## 2021-10-12 VITALS
HEART RATE: 72 BPM | TEMPERATURE: 97.9 F | RESPIRATION RATE: 14 BRPM | SYSTOLIC BLOOD PRESSURE: 148 MMHG | DIASTOLIC BLOOD PRESSURE: 62 MMHG

## 2021-10-12 DIAGNOSIS — M79.89 LEG SWELLING: ICD-10-CM

## 2021-10-12 DIAGNOSIS — I87.333 VENOUS HYPERTENSION, CHRONIC, WITH ULCER AND INFLAMMATION, BILATERAL (H): Primary | ICD-10-CM

## 2021-10-12 DIAGNOSIS — I89.0 LYMPHEDEMA OF BOTH LOWER EXTREMITIES: ICD-10-CM

## 2021-10-12 DIAGNOSIS — E66.01 OBESITY, MORBID, BMI 40.0-49.9 (H): ICD-10-CM

## 2021-10-12 PROCEDURE — 29581 APPL MULTLAYER CMPRN SYS LEG: CPT

## 2021-10-12 ASSESSMENT — PAIN SCALES - GENERAL: PAINLEVEL: NO PAIN (0)

## 2021-10-12 NOTE — PATIENT INSTRUCTIONS
- Please call us if your compression wraps fall more than 1-2 inches below the bend of the knee. Remove the wraps if you experience any shortness of breathe or notice your toes turning blue/purple and then give us a call. Call if they are too painful. Call if they get wet. If it is a weekend and the wraps fall down, are too painful, or get wet take the wraps off and put on another form of compression. Compression such as velcro wraps, compression stockings, short stretch bandages, or tubular compression. Apply one of these until you can be seen in clinic. Please call us if you have any questions [phone].     - Treatment:  Layered Compression Bandaging (2-layer)     What is it?  The layered compression bandaging has a layer of absorbent material that will soak up drainage.      Why we do it.   This is done to treat swelling, wounds, or both.  This will in turn help circulation and healing.     How to care for your bandages.  The wraps need to be kept dry. If  the wraps become wet, remove them and call the clinic to have another wrap applied.     What to expect.  It is common for the wraps to be uncomfortable at the beginning. The first two days are usually the hardest; then they will become more comfortable.       Elevating your legs will help the discomfort. Try to elevate your legs as much as possible.     If rest and elevation does not help your discomfort, call your provider.  If your provider is not available you can remove the wrap and leave a message for further instructions.     - Swelling and Compression Therapy     Swelling in the legs can be caused by many reasons. No matter what the reason, treatment usually includes some type of compression. This may be done with a support sock, dressing, ace wrap, or layered wraps.      It is important to treat the swelling for many reasons. If the swelling is not treated you may develop blisters that can lead to ulcerations. This is caused when extra fluid goes  into tissue causing damage and blocking blood flow to the tissue.      It is important that you wear your compression every day, including days that you will be seen in clinic.      Compression is often the most important part of treating leg wounds. Without controlling the swelling it is often not possible to heal wounds.      Going without compression for even brief periods of time can be damaging to your legs and your health.  Your compression should be put on first thing in the morning. Take the compression off at night only when instructed by your care provider to do so. Sometimes wearing compression 24 hours a day will be recommended.        If you are having difficulty wearing your compression it is important to notify your care provider so that other options may be reviewed.     Thank you for choosing [facility]. Please call us if you have any questions [phone].     Please call us if any increase of pain, numbness or tingling as we are applying a tighter compression today.

## 2021-10-12 NOTE — PROGRESS NOTES
"  xi leg swelling    Left medial    Right shin    Right lateral      Left leg    [facility]  -  Nurse Visit      Chief Complaint: Patient presents to clinic for assement, and treatment of their wound(s) and swelling. Patient arrives without complaints. Tolerating 2 layer lite wraps without issues. Spoke with Kim garcias CNP who will see patient for the first time next week. Ok to increase compression after reviewing tests. Patient advised to call and or remove 2 layer compression if SOB ,increased numbness,tingling or pain.drainage and odor improving.Will see Lymphedema therapy on 10/14/21. Advised to make apts as able.    Dressing on Arrival: intact bilateral 2 layer lite,silvercel,abd roll gauze      ICD-10-CM    1. Venous hypertension, chronic, with ulcer and inflammation, bilateral (H)  I87.333 Wound care    L97.919     L97.929    2. Leg swelling  M79.89 Wound care   3. Obesity, morbid, BMI 40.0-49.9 (H)  E66.01 Wound care   4. Lymphedema of both lower extremities  I89.0 Wound care         Allergies:   Allergies   Allergen Reactions     Lisinopril Swelling     Patient reports \"neck swelling\"  Swelling in throat       Medications:   Current Outpatient Medications:      ammonium lactate (LAC-HYDRIN) 12 % external lotion, Apply topically daily as needed for dry skin (with dressing changes) Put on crusting areas., Disp: 500 g, Rfl: 4     atorvastatin (LIPITOR) 20 MG tablet, Take 1 tablet (20 mg) by mouth daily, Disp: 90 tablet, Rfl: 3     furosemide (LASIX) 40 MG tablet, Take 1 tablet (40 mg) by mouth 2 times daily, Disp: 180 tablet, Rfl: 3     losartan (COZAAR) 100 MG tablet, Take 1 tablet (100 mg) by mouth daily, Disp: 30 tablet, Rfl: 3     metFORMIN (GLUCOPHAGE) 500 MG tablet, Take 1 tablet (500 mg) by mouth 2 times daily (with meals), Disp: 180 tablet, Rfl: 3     methadone (DOLOPHINE-INTENSOL) 10 MG/ML (HIGH CONC) solution, Take 100 mg by mouth daily, Disp: , Rfl:     Current " Facility-Administered Medications:      lidocaine (XYLOCAINE) 2 % external gel, , Topical, Daily PRN, Lucia Reyes MD, Given at 21 1047    Vital Signs: BP (!) 148/62   Pulse 72   Temp 97.9  F (36.6  C)   Resp 14         Assessment:    General:  Patient presents to clinic in no apparent distress.  Psychiatric:  Alert and oriented x3.   Lower extremity:  edema is present.    Integumentary:  Skin is flaking scattered open areas    Swelling Measurements:  Circumferential Measures 2021 2021 10/5/2021 10/7/2021 10/12/2021   Right just above MTP 28.7 29 30.5 29.6 29   Right Ankle 35.2 37.5 38 36.1 37   Right Widest Calf 69.8 64 57 59.6 59   Right Thigh Up 10cm 81.5 - - - -   Left - just above MTP 28.2 28 29 27.4 28.5   Left Ankle 33.9 35 34 34.4 35   Left Widest Calf 69 60 60 54.5 53   Left Thigh Up 10cm 76.8 - - - -       Wound info:  VASC Wound Right lower leg lateral (Active)   Pre Size Length 1.5 10/12/21 0800   Pre Size Width 1 10/12/21 08   Pre Size Depth 0.3 10/12/21 08   Pre Total Sq cm 1.5 10/12/21 0800   Product Used Alginate 10/12/21 08       VASC Wound Left lower leg medial (Active)   Pre Size Length 3.5 10/12/21 0800   Pre Size Width 3 10/12/21 08   Pre Size Depth 0.3 10/12/21 0800   Pre Total Sq cm 10.5 10/12/21 0800   Product Used Alginate 10/12/21 08       Undermining is not present.    The periwoundskin is denudement/improved with less drainage    Plan:         1. Patient will follow up in Friday with [facility].          2. Treatment provided will include irrigation and dressings to promote autolytic debridement and will be as listed below     Cleansed with: vasche wash/intact skin washed with soap and water    Protected skin with: Remedy skin repair lotion    Dressings Applied: silvercel,abd, roll gauze cast padding to ankle folds    Compression Applied to the Left Le-Layer Coban    Compression Applied to the Right Le-Layer Coban  Compression Applied to  Bilateral  2-Layer Coban: I Applied the inner foam layer with the foot dorsiflexed and started atthe base of the fifth metatarsal head. I left the bottom of the heel exposed, and proceed by winding the foam up the leg using minimal overlap to just below the fibular head. I then applied the compression layer with the foot dorsiflexed and startingat the base of the fifth metatarsal head. I applied at full stretch and proceeded up the leg using 50% overlap. The bottom of the heel is covered with the compression layer up to the end at the fibular head just below the back of the knee and levelwith the top edge of the foam layer.  I gently pressed and conformed the entire surface of the system to ensurethat the two layers are firmly bound together  Offloading applied: None    Trial Products: No  Provider notified regarding concerns: Yes: increase compression to regular 2 layer  Treatment Changes: Yes: see orders        Mychal Bagley RN

## 2021-10-14 ENCOUNTER — HOSPITAL ENCOUNTER (OUTPATIENT)
Dept: PHYSICAL THERAPY | Facility: REHABILITATION | Age: 66
End: 2021-10-14
Attending: PHYSICAL MEDICINE & REHABILITATION
Payer: COMMERCIAL

## 2021-10-14 DIAGNOSIS — I89.0 LYMPHEDEMA OF BOTH LOWER EXTREMITIES: Primary | ICD-10-CM

## 2021-10-14 DIAGNOSIS — I87.2 VENOUS (PERIPHERAL) INSUFFICIENCY: ICD-10-CM

## 2021-10-14 DIAGNOSIS — L85.9 EPITHELIAL HYPERPLASIA OF SKIN: ICD-10-CM

## 2021-10-14 DIAGNOSIS — I87.333 VENOUS HYPERTENSION, CHRONIC, WITH ULCER AND INFLAMMATION, BILATERAL (H): ICD-10-CM

## 2021-10-14 DIAGNOSIS — B35.1 ONYCHOMYCOSIS: ICD-10-CM

## 2021-10-14 DIAGNOSIS — E11.42 TYPE 2 DIABETES MELLITUS WITH PERIPHERAL NEUROPATHY (H): ICD-10-CM

## 2021-10-14 DIAGNOSIS — E66.01 OBESITY, MORBID, BMI 40.0-49.9 (H): ICD-10-CM

## 2021-10-14 DIAGNOSIS — L60.2 ONYCHAUXIS: ICD-10-CM

## 2021-10-14 DIAGNOSIS — L85.9 HYPERKERATOSIS OF SKIN: ICD-10-CM

## 2021-10-14 DIAGNOSIS — M79.89 LEG SWELLING: ICD-10-CM

## 2021-10-14 PROCEDURE — 97140 MANUAL THERAPY 1/> REGIONS: CPT | Mod: GP | Performed by: PHYSICAL THERAPIST

## 2021-10-14 PROCEDURE — 97161 PT EVAL LOW COMPLEX 20 MIN: CPT | Mod: GP | Performed by: PHYSICAL THERAPIST

## 2021-10-14 NOTE — PROGRESS NOTES
[facility]        OUTPATIENT PHYSICAL THERAPY EDEMA EVALUATION  PLAN OF TREATMENT FOR OUTPATIENT REHABILITATION  (COMPLETE FOR INITIAL CLAIMS ONLY)  Patient's Last Name, First Name, Shaheen Mishra                              Provider s Name:   [facility] Medical Record No.  [id number]     Start of Care Date:  10/14/21   Onset Date:   Order date: 9/22/21 (re-occuring since late 2014)   Type:  PT   Medical Diagnosis:  Leg swelling ,Venous hypertension, chronic, with ulcer and inflammation, bilateral (H), Venous (peripheral) insufficiency, Lymphedema of both lower extremities, Obesity, morbid, BMI 40.0-49.9 (H), Type 2 diabetes mellitus with peripheral neuropathy (H), Onychauxis, Hyperkeratosis of skin, Epithelial hyperplasia of skin   Therapy Diagnosis:  xi LE lymphedema, venous insufficiency Visits from SOC:  1                                     __________________________________________________________________________________   Plan of Treatment/Functional Goals:    Manual lymph drainage, Gradient compression bandaging, Fit for compression garment, Exercises, Precautions to prevent infection / exacerbation, Education, Manual therapy, Wound care / dressing changes, Skin care / precautions, Scar mobilization, Soft tissue mobilization, Myofascial release, Home management program development        GOALS  1. Goal description: (P) Pt will be independent in HEP to address impairments and improve function       Target date: (P) 11/13/21  2. Goal description: (P) Pt will be independent in self care including compression wear, compression care, self-MLD, exercise, skin and wound care to manage swelling independently long term       Target date: (P) 12/13/21  3. Goal description: (P) Pt will demonstrate a >10% decrease in limb volume to improve donning/doffing shoes and  improve healing of wounds       Target date: (P) 12/13/21  4.            5.            6.               7.             8.              Treatment Frequency: Other (see comments) (3-4x/week)   Treatment duration: 8 weeks due to clinic availability for up to 12-16 sessions    Elaine Seth, PT                                    I CERTIFY THE NEED FOR THESE SERVICES FURNISHED UNDER        THIS PLAN OF TREATMENT AND WHILE UNDER MY CARE     (Physician co-signature of this document indicates review and certification of the therapy plan).                   Certification date from: 10/14/21       Certification date to: (P) 12/13/21           Referring physician: Lucia Reyes MD   Initial Assessment  See Epic Evaluation- Start of care: 10/14/21                10/14/21 1300   Quick Adds   Quick Adds Certification   Rehab Discipline   Discipline PT   Type of Visit   Type of visit Initial Edema Evaluation       present No   General Information   Start of care 10/14/21   Referring physician Lucia Reyes MD   Order date 09/22/21   Medical diagnosis Leg swelling ,Venous hypertension, chronic, with ulcer and inflammation, bilateral (H), Venous (peripheral) insufficiency, Lymphedema of both lower extremities, Obesity, morbid, BMI 40.0-49.9 (H), Type 2 diabetes mellitus with peripheral neuropathy (H), Onychauxis, Hyperkeratosis of skin, Epithelial hyperplasia of skin   Edema onset   (re-occuring since late 2014)   Affected body parts LLE;RLE   Edema etiology Ulcers;Chronic Venous Insufficiency   Pertinent history of current problem (PT: include personal factors and/or comorbidities that impact the POC; OT: include additional occupational profile info) Per chart review: Venous insufficiency study today showed deep and superficial vein insufficiency on right with proximal superficial insufficiency in SFV on left. Arterial studies look good.   Surgical / medical history reviewed Yes   Prior  "treatment Complete decongestive therapy;Compression garments;Gradient compression bandaging;MLD;Elevation   Patient role / employment history Retired   Current assistive devices Standard cane   General observations Pt's daughter present during sessions. Pt has difficulty with transitions, change sleeping positions, bending, stairs, walk on uneven surfaces, lift, groom/dress, kneel/squat, wash/bath/shower, carry laundry/groceries. Currently does not have any compression garments but in the past has used custom compression socsk with zippers but could not afford when insurance changed.   Fall Risk Screen   Fall screen completed by PT   Have you fallen 2 or more times in the past year? No   Have you fallen and had an injury in the past year? No   Is patient a fall risk? No   Abuse Screen (yes response referral indicated)   Feels Unsafe at Home or Work/School no   Feels Threatened by Someone no   Does Anyone Try to Keep You From Having Contact with Others or Doing Things Outside Your Home? no   Physical Signs of Abuse Present no   System Outcome Measures   Outcome Measures Lymphedema   Lymphedema Life Impact Scale (score range 0-72). A higher score indicates greater impairment. 26   Subjective Report   Patient report of symptoms Since starting with the Vascular clinic the weeping is improving. He was wrapped on Tuesday - it was tighter than previously and he noticed an improvement. He does have wound care supplies at home but has not been doing any dressing changes at home. He has been going 2x/week for wound care at the vascular clinic.    Patient / Family Goals   Patient / family goals statement \"reduce and maintain reduction of lymphedema\"   Pain   Patient currently in pain Yes   Pain location xi knees   Pain rating 0/10 at best, 5/10 at worse   Pain description comment pain in knees with sitting too long, not constant   Cognitive Status   Orientation Orientation to person, place and time   Edema Exam / Assessment "   Skin condition Dryness;Other  (hyperkeratosis, Onychomycosis, Onychauxis)   Ulceration Yes   Wounds Wound 1;Wound 2   Wound 1   Location right lateral lower leg   Size 3 cm x 4 cm   Odor Foul   Color White;Yellow   Drainage Serous;Purulent   Drainage amount Small   Classification Venous   Wound 2   Location left medial lower leg   Size 5 cm x 6 cm   Odor Foul   Color Yellow;White   Drainage Serous;Purulent   Drainage amount Small   Classification Venous   Girth Measurements   Girth Measurements Refer to separate girth measurement flowsheet   Volume LE   Right LE (mL) 37799   Left LE (mL) 55986   LE volume comparison RLE volume greater than LLE volume   % difference 87%   Vascular Assessment   Vascular Assessment Vascular concerns   Planned Edema Interventions   Planned edema interventions Manual lymph drainage;Gradient compression bandaging;Fit for compression garment;Exercises;Precautions to prevent infection / exacerbation;Education;Manual therapy;Wound care / dressing changes;Skin care / precautions;Scar mobilization;Soft tissue mobilization;Myofascial release;Home management program development   Clinical Impression   Criteria for skilled therapeutic intervention met Yes   Therapy diagnosis xi LE lymphedema, venous insufficiency   Influenced by the following impairments / conditions Stage 3;Phlebolymphedema;Wounds / Ulcers   Functional limitations due to impairments / conditions donning/doffing shoes, walking, skin care, lifting, ADLs   Clinical Presentation Stable/Uncomplicated   Clinical Decision Making (Complexity) Low complexity   Treatment Frequency Other (see comments)  (3-4x/week)   Treatment duration 8 weeks due to clinic availability for up to 12-16 sessions   Patient / family and/or staff in agreement with plan of care Yes   Risks and benefits of therapy have been explained Yes   Goals   Edema Eval Goals 1;2;3   Goal 1   Goal identifier HEP   Goal description Pt will be independent in HEP to address  impairments and improve function   Target date 11/13/21   Goal 2   Goal identifier Self-care   Goal description Pt will be independent in self care including compression wear, compression care, self-MLD, exercise, skin and wound care to manage swelling independently long term   Target date 12/13/21   Goal 3   Goal identifier Edema    Goal description Pt will demonstrate a >10% decrease in limb volume to improve donning/doffing shoes and improve healing of wounds   Target date 12/13/21   Total Evaluation Time   PT Eval, Low Complexity Minutes (48857) 25   Certification   Certification date from 10/14/21   Certification date to 12/13/21   Medical Diagnosis Leg swelling ,Venous hypertension, chronic, with ulcer and inflammation, bilateral (H), Venous (peripheral) insufficiency, Lymphedema of both lower extremities, Obesity, morbid, BMI 40.0-49.9 (H), Type 2 diabetes mellitus with peripheral neuropathy (H), Onychauxis, Hyperkeratosis of skin, Epithelial hyperplasia of skin   Certification I certify the need for these services furnished under this plan of treatment and while under my care.  (Physician co-signature of this document indicates review and certification of the therapy plan).      Elaine Seth, PT, DPT, CLT-[facility]DELARIO

## 2021-10-19 ENCOUNTER — OFFICE VISIT (OUTPATIENT)
Dept: VASCULAR SURGERY | Facility: CLINIC | Age: 66
End: 2021-10-19
Attending: NURSE PRACTITIONER
Payer: COMMERCIAL

## 2021-10-19 VITALS
TEMPERATURE: 97.6 F | WEIGHT: 315 LBS | BODY MASS INDEX: 52.53 KG/M2 | HEART RATE: 82 BPM | SYSTOLIC BLOOD PRESSURE: 146 MMHG | RESPIRATION RATE: 20 BRPM | DIASTOLIC BLOOD PRESSURE: 80 MMHG

## 2021-10-19 DIAGNOSIS — E11.42 TYPE 2 DIABETES MELLITUS WITH PERIPHERAL NEUROPATHY (H): ICD-10-CM

## 2021-10-19 DIAGNOSIS — I89.0 LYMPHEDEMA OF BOTH LOWER EXTREMITIES: ICD-10-CM

## 2021-10-19 DIAGNOSIS — I87.333 VENOUS HYPERTENSION, CHRONIC, WITH ULCER AND INFLAMMATION, BILATERAL (H): Primary | ICD-10-CM

## 2021-10-19 DIAGNOSIS — D36.9 PAPILLOMATOSIS: ICD-10-CM

## 2021-10-19 DIAGNOSIS — I89.0 ELEPHANTIASIS: ICD-10-CM

## 2021-10-19 DIAGNOSIS — M79.3 LIPODERMATOSCLEROSIS OF BOTH LOWER EXTREMITIES: ICD-10-CM

## 2021-10-19 DIAGNOSIS — I87.2 VENOUS (PERIPHERAL) INSUFFICIENCY: ICD-10-CM

## 2021-10-19 DIAGNOSIS — E66.01 OBESITY, MORBID, BMI 40.0-49.9 (H): ICD-10-CM

## 2021-10-19 DIAGNOSIS — L85.9 HYPERKERATOSIS OF SKIN: ICD-10-CM

## 2021-10-19 DIAGNOSIS — M79.89 LEG SWELLING: ICD-10-CM

## 2021-10-19 DIAGNOSIS — R22.43 LOCALIZED SWELLING, MASS AND LUMP, LOWER LIMB, BILATERAL: ICD-10-CM

## 2021-10-19 PROCEDURE — 11045 DBRDMT SUBQ TISS EACH ADDL: CPT | Performed by: NURSE PRACTITIONER

## 2021-10-19 PROCEDURE — 11044 DBRDMT BONE 1ST 20 SQ CM/<: CPT | Performed by: NURSE PRACTITIONER

## 2021-10-19 PROCEDURE — 11042 DBRDMT SUBQ TIS 1ST 20SQCM/<: CPT | Performed by: NURSE PRACTITIONER

## 2021-10-19 RX ADMIN — LIDOCAINE HYDROCHLORIDE: 20 JELLY TOPICAL at 08:28

## 2021-10-19 ASSESSMENT — PAIN SCALES - GENERAL: PAINLEVEL: NO PAIN (0)

## 2021-10-19 NOTE — PATIENT INSTRUCTIONS
"Consider CT scan of the abdomen and pelvis    Consider scheduling with bariatrics to discuss weight loss options    Continue going to lymphedema therapy; bring all dressings; lotions and supplies with you to these appt    Take x2 protein shakes daily such as premier protein      Wound Care Instructions    Every 3 days , Cleanse your BLE wound(s) with Vashe; pour the vashe onto gauze and gently cleanse the wounds    Use soap and water and wash cloth to the rest of the legs.    Pat Dry with non-sterile gauze    Apply Lotion to the intact skin surrounding your wound and other dry skin locations. Some good lotions include: Remedy Skin Repair Cream, Sarna, Vanicream or Cetaphil    Apply Urea based lotion to the scaling, crusting and thickened skin areas    Primary Dressing: Apply silvercel 4.25\"x4.25\" into/onto the wounds    Secondary dressing: Cover with ABD 8\"x10\"    Secure with non-sterile roll gauze (4\" x 75\" roll) and tape (1\" roll tape) as needed; avoid adhesive directly on the skin    Compression: tubular compression; foam; short stretch    It is not ok to get your wound wet in the bath or shower    SEEK MEDICAL CARE IF:    You have an increase in swelling, pain, or redness around the wound.    You have an increase in the amount of pus coming from the wound.    There is a bad smell coming from the wound.    The wound appears to be worsening/enlarging    You have a fever greater than 101.5 F      It is ok to continue current wound care treatment/products for the next 2-3 days until new wound care supplies are ordered and arrive. If longer than this please contact our office at [phone].    High Protein Foods  Chicken  -Chicken breast, 3.5oz.-30 grams protein  -Chicken thigh-10 grams(average size)  -Drumstick-11 grams  -Wing- 6 grams  -Chicken meat, cooked, 4 oz.  Beef  -Hamburger katia, 4 oz-28 grams protein  -Steak, 6 oz-42 grams  -Most cuts of beef- 7 grams of protein per ounce  Fish  -Most fish fillets or " steaks are about 22 grams of protein for 3 1/2 oz(100 grams) of cooked fish, or 6 grams per ounce  -Tuna, 6 oz can-40 grams of protein  Pork  -Pork chop, average-22 grams protein  -Pork loin or tenderloin, 4 oz.-29 grams  -Ham, 3oz serving- 19 grams  -Ground pork 3oz cooked-22 grams  -Winkler, 1 slice-3 grams  -[place]-style winkler(black winkler), slice-5-6 grams  Eggs and Dairy  -Egg, large-7 grams  -Milk, 1 cup-8 grams  -Cottage cheese, 1/2 cup-15 grams  -Greek yogurt, 1 cup-usually 8-12 grams, check label  -Soft cheeses (Mozzarella, Brie, Camembert)- 6 grams  -Medium cheeses(cheddar, swiss)- 7 or 8 grams per oz  -Hard cheeses(parmesan)- 10 grams per oz  Beans  -Tofu, 1/2 cup 20 grams  -Tofu, 1 oz., 2.3 grams  -Soy milk, 1 cup-6-10 grams  -Most beans(black, marcelino, lentils, etc.) about 7-10 grams protein per half cup of cooked beans  -soy beans, 1/2 cup cooked-14 grams  -Split peas, 1/2 cup cooked- 8 grams  Nuts and Seeds  -Peanut butter, 2 Tablespoons- 8 grams protein  -Almonds, 1/4 cup- 8 grams  -Peanuts, 1/4 cup-9 grams  -Cashews, 1/4 cup- 5 grams  -Pecans, 1/4 cup- 2.5 grams  -Sunflower seeds, 1/4 cup- 6 grams  -Pumpkin seeds, 1/4 cup-8 grams  -Flax seeds- 1/4 cup- 8 grams  Protein Supplements  -Ensure  -Boost  -Glucerna, if diabetic  When you have an open ulcer, your bodies protein needs are much higher, so it is recommended eat good sources of protein

## 2021-10-19 NOTE — LETTER
[facility]    [facility]           Fax: [phone]            Customer Service: [phone]    Wound Dressing Rx and Order Form  Order Status: reorderVerbal: Audrey  Date: 2021      *Due to increased heavy drainage pt needs additional supplies before his next appointment scheduled 21     Shaheen Sepulveda  Gender: male  : 1955  [address] [place] [address]  [phone] (home)     Medical Record: [id number]  Primary Care Provider: Radha Sal      ICD-10-CM    1. Venous hypertension, chronic, with ulcer and inflammation, bilateral (H)  I87.333 DEBRIDE SKIN/SUBQ TISSUE    L97.919 CO DEBRIDEMENT,SUB-Q TISSUE, EA ADDL 20 SQ CM    L97.929    2. Obesity, morbid, BMI 40.0-49.9 (H)  E66.01 DEBRIDE SKIN/SUBQ TISSUE     CO DEBRIDEMENT,SUB-Q TISSUE, EA ADDL 20 SQ CM   3. Lymphedema of both lower extremities  I89.0 DEBRIDE SKIN/SUBQ TISSUE     CO DEBRIDEMENT,SUB-Q TISSUE, EA ADDL 20 SQ CM   4. Leg swelling  M79.89 DEBRIDE SKIN/SUBQ TISSUE     CO DEBRIDEMENT,SUB-Q TISSUE, EA ADDL 20 SQ CM   5. Venous (peripheral) insufficiency  I87.2 DEBRIDE SKIN/SUBQ TISSUE     CO DEBRIDEMENT,SUB-Q TISSUE, EA ADDL 20 SQ CM   6. Hyperkeratosis of skin  L85.9 DEBRIDE SKIN/SUBQ TISSUE     CO DEBRIDEMENT,SUB-Q TISSUE, EA ADDL 20 SQ CM   7. Elephantiasis  I89.0 DEBRIDE SKIN/SUBQ TISSUE     CO DEBRIDEMENT,SUB-Q TISSUE, EA ADDL 20 SQ CM   8. Type 2 diabetes mellitus with peripheral neuropathy (H)  E11.42 DEBRIDE SKIN/SUBQ TISSUE     CO DEBRIDEMENT,SUB-Q TISSUE, EA ADDL 20 SQ CM   9. Localized swelling, mass and lump, lower limb, bilateral  R22.43 DEBRIDE SKIN/SUBQ TISSUE     CO DEBRIDEMENT,SUB-Q TISSUE, EA ADDL 20 SQ CM   10. Lipodermatosclerosis of both lower extremities  I83.11 DEBRIDE SKIN/SUBQ TISSUE    I83.12 CO DEBRIDEMENT,SUB-Q TISSUE, EA ADDL 20 SQ CM   11. Papillomatosis  D36.9 DEBRIDE SKIN/SUBQ TISSUE     CO DEBRIDEMENT,SUB-Q TISSUE, EA ADDL 20 SQ CM         Insurance  Info:  INSURER: Payor: COMMERCIAL / Plan: [employer]IC[employer] ADVANTAGE / Product Type: Medicare /   Policy ID#:  [id number]    Physician Info:   Name:  JOHAN RICHMOND     Dept Address/Phones:   [address] [place] [address], [address] [address] [phone]  Fax: [phone]    Lymphedema circumferential measurements (in cm):  Circumferential Measures 9/28/2021 10/5/2021 10/7/2021 10/12/2021 10/19/2021   Right just above MTP 29 30.5 29.6 29 28.3   Right Ankle 37.5 38 36.1 37 36.2   Right Widest Calf 64 57 59.6 59 60.7   Right Thigh Up 10cm - - - - -   Left - just above MTP 28 29 27.4 28.5 27.6   Left Ankle 35 34 34.4 35 33.6   Left Widest Calf 60 60 54.5 53 58.4   Left Thigh Up 10cm - - - - -   Left Knee to Ankle - - - - 42         Wound info:  VASC Wound Right lower leg lateral (Active)   Pre Size Length 1.8 10/19/21 0800   Pre Size Width 1.8 10/19/21 0800   Pre Size Depth 0.5 10/19/21 0800   Pre Total Sq cm 3.24 10/19/21 0800   Product Used Alginate 10/12/21 0800   Number of days: 47       VASC Wound Left lower leg medial (Active)   Pre Size Length 2 10/19/21 0800   Pre Size Width 5 10/19/21 0800   Pre Size Depth 0.5 10/19/21 0800   Pre Total Sq cm 10 10/19/21 0800   Product Used Alginate 10/12/21 0800   Number of days: 47       VASC Wound rt lateral weeping (Active)   Pre Size Length 10 10/19/21 0800   Pre Size Width 10 10/19/21 0800   Pre Size Depth 0.1 10/19/21 0800   Pre Total Sq cm 100 10/19/21 0800   Number of days: 20       VASC Wound Lt medial (Active)   Pre Size Length 3.5 10/19/21 0800   Pre Size Width 3.5 10/19/21 0800   Pre Size Depth 0.3 10/19/21 0800   Pre Total Sq cm 12.25 10/19/21 0800   Number of days: 20        Drainage: excessive/heavy  Thickness:  full  Duration of Need: 30 DAYS  Days Supply: 30 DAYS  Start Date: 11/8/21  Starter Kit: none  Qualifying wound/Debridement: Yes      Dressing Type Brand Size Number of pieces Frequency of change    Primary silvercel (no  "substitution)  4.25\"x4.25\" 8 Every 3 days    secondary ABD pad  8\"x10\" 10 Every 3 days        Every 3 days , Cleanse your BLE wound(s) with Vashe; pour the vashe onto gauze and gently cleanse the wounds    Use soap and water and wash cloth to the rest of the legs.    Pat Dry with non-sterile gauze    Apply Lotion to the intact skin surrounding your wound and other dry skin locations. Some good lotions include: Remedy Skin Repair Cream, Sarna, Vanicream or Cetaphil    Apply Urea based lotion to the scaling, crusting and thickened skin areas    Primary Dressing: Apply silvercel 4.25\"x4.25\" into/onto the wounds    Secondary dressing: Cover with ABD 8\"x10\"    Secure with non-sterile roll gauze (4\" x 75\" roll) and tape (1\" roll tape) as needed; avoid adhesive directly on the skin    Compression: tubular compression; foam; short stretch    It is not ok to get your wound wet in the bath or shower      Note: If total out of pocket is more than $50.00 please contact the patient before processing order.     OK to forward to covered supplier.    Electronically Signed Physician:  JOHAN RICHMOND             Date: November 8, 2021    "

## 2021-10-19 NOTE — LETTER
[facility]    [facility]           Fax: [phone]            Customer Service: [phone]    Wound Dressing Rx and Order Form  Order Status: reorderVerbal: Audrey  Date: 2021      *Due to increased heavy drainage pt needs additional supplies before his next appointment scheduled 21     Shaheen Sepulveda  Gender: male  : 1955  [address] [facility] [address]  [phone] (home)     Medical Record: [id number]  Primary Care Provider: Radha Sal      ICD-10-CM    1. Venous hypertension, chronic, with ulcer and inflammation, bilateral (H)  I87.333 DEBRIDE SKIN/SUBQ TISSUE    L97.919 NV DEBRIDEMENT,SUB-Q TISSUE, EA ADDL 20 SQ CM    L97.929    2. Obesity, morbid, BMI 40.0-49.9 (H)  E66.01 DEBRIDE SKIN/SUBQ TISSUE     NV DEBRIDEMENT,SUB-Q TISSUE, EA ADDL 20 SQ CM   3. Lymphedema of both lower extremities  I89.0 DEBRIDE SKIN/SUBQ TISSUE     NV DEBRIDEMENT,SUB-Q TISSUE, EA ADDL 20 SQ CM   4. Leg swelling  M79.89 DEBRIDE SKIN/SUBQ TISSUE     NV DEBRIDEMENT,SUB-Q TISSUE, EA ADDL 20 SQ CM   5. Venous (peripheral) insufficiency  I87.2 DEBRIDE SKIN/SUBQ TISSUE     NV DEBRIDEMENT,SUB-Q TISSUE, EA ADDL 20 SQ CM   6. Hyperkeratosis of skin  L85.9 DEBRIDE SKIN/SUBQ TISSUE     NV DEBRIDEMENT,SUB-Q TISSUE, EA ADDL 20 SQ CM   7. Elephantiasis  I89.0 DEBRIDE SKIN/SUBQ TISSUE     NV DEBRIDEMENT,SUB-Q TISSUE, EA ADDL 20 SQ CM   8. Type 2 diabetes mellitus with peripheral neuropathy (H)  E11.42 DEBRIDE SKIN/SUBQ TISSUE     NV DEBRIDEMENT,SUB-Q TISSUE, EA ADDL 20 SQ CM   9. Localized swelling, mass and lump, lower limb, bilateral  R22.43 DEBRIDE SKIN/SUBQ TISSUE     NV DEBRIDEMENT,SUB-Q TISSUE, EA ADDL 20 SQ CM   10. Lipodermatosclerosis of both lower extremities  I83.11 DEBRIDE SKIN/SUBQ TISSUE    I83.12 NV DEBRIDEMENT,SUB-Q TISSUE, EA ADDL 20 SQ CM   11. Papillomatosis  D36.9 DEBRIDE SKIN/SUBQ TISSUE     NV DEBRIDEMENT,SUB-Q TISSUE, EA ADDL 20 SQ CM         Insurance  Info:  INSURER: Payor: COMMERCIAL / Plan: [employer] MEDICARE ADVANTAGE / Product Type: Medicare /   Policy ID#:  [id number]    Physician Info:   Name:  JOHAN RICHMOND     Dept Address/Phones:   [address] [place] [address], [address] [address] [phone]  Fax: [phone]    Lymphedema circumferential measurements (in cm):  Circumferential Measures 9/28/2021 10/5/2021 10/7/2021 10/12/2021 10/19/2021   Right just above MTP 29 30.5 29.6 29 28.3   Right Ankle 37.5 38 36.1 37 36.2   Right Widest Calf 64 57 59.6 59 60.7   Right Thigh Up 10cm - - - - -   Left - just above MTP 28 29 27.4 28.5 27.6   Left Ankle 35 34 34.4 35 33.6   Left Widest Calf 60 60 54.5 53 58.4   Left Thigh Up 10cm - - - - -   Left Knee to Ankle - - - - 42         Wound info:  VASC Wound Right lower leg lateral (Active)   Pre Size Length 1.8 10/19/21 0800   Pre Size Width 1.8 10/19/21 0800   Pre Size Depth 0.5 10/19/21 0800   Pre Total Sq cm 3.24 10/19/21 0800   Product Used Alginate 10/12/21 0800   Number of days: 47       VASC Wound Left lower leg medial (Active)   Pre Size Length 2 10/19/21 0800   Pre Size Width 5 10/19/21 0800   Pre Size Depth 0.5 10/19/21 0800   Pre Total Sq cm 10 10/19/21 0800   Product Used Alginate 10/12/21 0800   Number of days: 47       VASC Wound rt lateral weeping (Active)   Pre Size Length 10 10/19/21 0800   Pre Size Width 10 10/19/21 0800   Pre Size Depth 0.1 10/19/21 0800   Pre Total Sq cm 100 10/19/21 0800   Number of days: 20       VASC Wound Lt medial (Active)   Pre Size Length 3.5 10/19/21 0800   Pre Size Width 3.5 10/19/21 0800   Pre Size Depth 0.3 10/19/21 0800   Pre Total Sq cm 12.25 10/19/21 0800   Number of days: 20        Drainage: excessive/heavy  Thickness:  full  Duration of Need: 30 DAYS  Days Supply: 30 DAYS  Start Date: 11/8/21  Starter Kit: none  Qualifying wound/Debridement: Yes      Dressing Type Brand Size Number of pieces Frequency of change    Primary silvercel (no  "substitution)  4.25\"x4.25\" 8 Every 3 days    secondary ABD pad  8\"x10\" 10 Every 3 days        Every 3 days , Cleanse your BLE wound(s) with Vashe; pour the vashe onto gauze and gently cleanse the wounds    Use soap and water and wash cloth to the rest of the legs.    Pat Dry with non-sterile gauze    Apply Lotion to the intact skin surrounding your wound and other dry skin locations. Some good lotions include: Remedy Skin Repair Cream, Sarna, Vanicream or Cetaphil    Apply Urea based lotion to the scaling, crusting and thickened skin areas    Primary Dressing: Apply silvercel 4.25\"x4.25\" into/onto the wounds    Secondary dressing: Cover with ABD 8\"x10\"    Secure with non-sterile roll gauze (4\" x 75\" roll) and tape (1\" roll tape) as needed; avoid adhesive directly on the skin    Compression: tubular compression; foam; short stretch    It is not ok to get your wound wet in the bath or shower      Note: If total out of pocket is more than $50.00 please contact the patient before processing order.     OK to forward to covered supplier.    Electronically Signed Physician:  JOHAN RICHMOND             Date: November 8, 2021    "

## 2021-10-19 NOTE — LETTER
10/19/2021    [facility]  Fax: [phone] Wound Dressing Rx and Order Form  Customer Service: [phone] Order Status: New   Verbal: Jenny   Patient Info:  Name: Shaheen Sepulveda  : 1955  Address: [address] [address]    Insurance Info:  INSURER: Payor: COMMERCIAL / Plan: [employer] MEDICARE ADVANTAGE / Product Type: Medicare /   Policy ID#:  [id number]  : 1955    Physician Info:   Name: Kim Fuentes   Dept Address/Phones:   [facility] [facility], [address]  [facility] [address] [phone]  Fax: [phone]    Impression:   Encounter Diagnoses   Name Primary?     Venous hypertension, chronic, with ulcer and inflammation, bilateral (H) Yes     Obesity, morbid, BMI 40.0-49.9 (H)      Lymphedema of both lower extremities      Leg swelling      Venous (peripheral) insufficiency      Hyperkeratosis of skin      Elephantiasis      Type 2 diabetes mellitus with peripheral neuropathy (H)      Localized swelling, mass and lump, lower limb, bilateral      Lipodermatosclerosis of both lower extremities      Papillomatosis        Lymphedema circumferential measurements (in cm):      Circumferential Measures (cm)  Right just above MTP: 28.3  Right Ankle: 36.2  Right Widest Calf: 60.7  Left - just above MTP: 27.6  Left Ankle: 33.6  Left Widest Calf: 58.4  Left Knee to Ankle: 42       Wound info:    VASC Wound Right lower leg lateral (Active)   Pre Size Length 1.8 10/19/21 0800   Pre Size Width 1.8 10/19/21 0800   Pre Size Depth 0.5 10/19/21 0800   Pre Total Sq cm 3.24 10/19/21 0800       VASC Wound Left lower leg medial (Active)   Pre Size Length 2 10/19/21 0800   Pre Size Width 5 10/19/21 0800   Pre Size Depth 0.5 10/19/21 0800   Pre Total Sq cm 10 10/19/21 0800       VASC Wound rt lateral weeping (Active)   Pre Size Length 10 10/19/21 0800   Pre Size Width 10 10/19/21 0800   Pre Size Depth 0.1 10/19/21 0800   Pre Total Sq cm 100  10/19/21 0800       [facility] Wound Lt medial (Active)   Pre Size Length 3.5 10/19/21 0800   Pre Size Width 3.5 10/19/21 0800   Pre Size Depth 0.3 10/19/21 0800   Pre Total Sq cm 12.25 10/19/21 0800         Drainage: Moderate  Thickness:  Full  Duration of Need: 30 days  Days Supply: 30 days  Start Date: 10/19/2021  Starter Kit:Ancillary  Qualifying wound/Debridement: yes/yes     Dressing Type Brand Size Number of pieces Frequency of change   Primary Silvercel  4.25''x4.25'' 24 sheets (needs 2 per dressing change)  3 times per week   Secondary ABD Pads  8''x10'' 27 Pads (needs 3 per dressing change) 3 times per week    Sof form roll gauze   4''x75'' 48 rolls (needs 4 per dressing change) 3 times per week    Square gauze   4''x4'' 2 loafs 3 times per week    Latex free tubular compression bandage  Size J/K 1 box 3 times per week    Comprilan   4'' 4 boxes 3 times per week   Tape Paper tape  2'' 2 rolls 3 times per week       Note: If total out of pocket is more than $50.00 please contact the patient before processing order.     OK to forward to covered supplier.    Electronically Signed Physician: JOHAN RICHMOND                         Date: 10/19/2021

## 2021-10-19 NOTE — PROGRESS NOTES
Compression Applied to Bilateral  Tubigrip Size J/K  Compression Applied to Bilateral  Short Stretch

## 2021-10-19 NOTE — PROGRESS NOTES
"            Follow up Vascular Visit       Date of Service:10/19/21      Chief Complaint: BLE wounds and swelling; chronic      Pt returns to [facility] with regards to their BLE wounds and swelling.  They arrive today alone. They are currently using plain alginate; abd; rolled gauze to the wounds. This is being done by lymphedema therapy weekly. They are using lymphedema wraps for compression. They are feeling well today. Denies fevers, chills. No shortness of breath. He just started lymphedema therapy last week. Was previously being seen by Dr. Reyes I will assuming the care of the patient. He has had issues with swelling for 10 years; was doing well with custom compression; however these wore out and he did not replace and stopped wearing. Developed wounds 1 year ago. Venous insufficiency study today showed deep and superficial vein insufficiency on right with proximal superficial insufficiency in SFV on left. Culture was done last visit this grew out mixed ghanshyam without predominating organism. ABIs were also ordered; this was switch to an arterial duplex due to NC arteries and this was normal. Dr. Reyes was considering CT of abdomen and pelvis if swelling and wounds were not improving. She would also like him fit for shoes and insoles once out of wrappings. He was referred to bariatrics; he was called by them but opted to not make appt as he is too overwhelmed with all the other appt.     Allergies:   Allergies   Allergen Reactions     Lisinopril Swelling     Patient reports \"neck swelling\"  Swelling in throat       Medications:   Current Outpatient Medications:      ammonium lactate (LAC-HYDRIN) 12 % external lotion, Apply topically daily as needed for dry skin (with dressing changes) Put on crusting areas., Disp: 500 g, Rfl: 4     atorvastatin (LIPITOR) 20 MG tablet, Take 1 tablet (20 mg) by mouth daily, Disp: 90 tablet, Rfl: 3     furosemide (LASIX) 40 MG tablet, Take 1 tablet (40 mg) " by mouth 2 times daily, Disp: 180 tablet, Rfl: 3     losartan (COZAAR) 100 MG tablet, Take 1 tablet (100 mg) by mouth daily, Disp: 30 tablet, Rfl: 3     metFORMIN (GLUCOPHAGE) 500 MG tablet, Take 1 tablet (500 mg) by mouth 2 times daily (with meals), Disp: 180 tablet, Rfl: 3     methadone (DOLOPHINE-INTENSOL) 10 MG/ML (HIGH CONC) solution, Take 100 mg by mouth daily, Disp: , Rfl:     Current Facility-Administered Medications:      lidocaine (XYLOCAINE) 2 % external gel, , Topical, Daily PRN, Lucia Reyes MD, Given at 10/19/21 0828    History:   Past Medical History:   Diagnosis Date     Diabetes (H)      H/O angioedema 6/15/2020    Formatting of this note might be different from the original. Unexplained angioedema twice, discontinue lisinopril for possible connection to it, though he had used it afterwards with no symptoms     History of tobacco use disorder 8/1/2013    Formatting of this note might be different from the original. Added per documetation     Hypertension      Lymphedema of both lower extremities 12/22/2014     Methadone use 5/8/2012     Obstructive sleep apnea 2/2/2015    Formatting of this note might be different from the original. Epic     Pleural mass 7/2/2013     Uncomplicated asthma        Physical Exam:    BP (!) 146/80   Pulse 82   Temp 97.6  F (36.4  C)   Resp 20   Wt (!) 420 lb 4.8 oz (190.6 kg)   BMI 52.53 kg/m      General:  Patient presents to clinic in no apparent distress.  Head: normocephalic atraumatic  Psychiatric:  Alert and oriented x3.   Respiratory: unlabored breathing; no cough  Integumentary:  Skin is uniformly warm, dry and pink.    Wound #1 Location: right lateral leg inferior Size: 1.8L x 1.8W x 0.5depth.  No sinus tract present, Wound base: surrounding papillomatosis; slough; retained dressing  No undermining present. Wound is full thickness. There is moderate drainage. Periwound: no denudement, erythema, induration, maceration or warmth.      Wound #2  Location: left medial leg Size: 2x5 x 0.5depth.  No sinus tract present, Wound base: surrounding papillomatosis; slough; retained dressing  No undermining present. Wound is full thickness. There is moderate drainage. Periwound: no denudement, erythema, induration, maceration or warmth.        Wound #3 Location: right lateral leg  Size: [id number].  No sinus tract present, Wound base: surrounding papillomatosis; slough; retained dressing  No undermining present. Wound is full thickness. There is moderate drainage. Periwound: no denudement, erythema, induration, maceration or warmth.      Wound #4 Location: left medial  Size: 3.5x3.5x 0.3cmdepth.  No sinus tract present, Wound base: surrounding papillomatosis; slough; retained dressing  No undermining present. Wound is full thickness. There is moderate drainage. Periwound: no denudement, erythema, induration, maceration or warmth.      Significant skin changes due to chronic uncontrolled lymphedema; with severe papillomatous; lipodermatosclerosis; and hyperkeratosis with hyperpigmentation of the skin; tissues are firm and fibrotic; woody; nails were thickened; well trimmed    VASC Wound Right lower leg lateral (Active)   Pre Size Length 1.8 10/19/21 0800   Pre Size Width 1.8 10/19/21 0800   Pre Size Depth 0.5 10/19/21 0800   Pre Total Sq cm 3.24 10/19/21 0800   Product Used Alginate 10/12/21 0800   Number of days: 27       VASC Wound Left lower leg medial (Active)   Pre Size Length 2 10/19/21 0800   Pre Size Width 5 10/19/21 0800   Pre Size Depth 0.5 10/19/21 0800   Pre Total Sq cm 10 10/19/21 0800   Product Used Alginate 10/12/21 0800   Number of days: 27       VASC Wound rt lateral weeping (Active)   Pre Size Length 10 10/19/21 0800   Pre Size Width 10 10/19/21 0800   Pre Size Depth 0.1 10/19/21 0800   Pre Total Sq cm 100 10/19/21 0800   Number of days: 0       VASC Wound Lt medial (Active)   Pre Size Length 3.5 10/19/21 0800   Pre Size Width 3.5 10/19/21 0800    Pre Size Depth 0.3 10/19/21 0800   Pre Total Sq cm 12.25 10/19/21 0800   Number of days: 0            Circumferential volume measures:      Circumferential Measures 9/28/2021 10/5/2021 10/7/2021 10/12/2021 10/19/2021   Right just above MTP 29 30.5 29.6 29 28.3   Right Ankle 37.5 38 36.1 37 36.2   Right Widest Calf 64 57 59.6 59 60.7   Right Thigh Up 10cm - - - - -   Left - just above MTP 28 29 27.4 28.5 27.6   Left Ankle 35 34 34.4 35 33.6   Left Widest Calf 60 60 54.5 53 58.4   Left Thigh Up 10cm - - - - -   Left Knee to Ankle - - - - 42       Labs:    I personally reviewed the following lab results today and those on care everywhere, if indicated     CRP   Date Value Ref Range Status   04/23/2021 8.5 (H) 0.0 - 0.8 mg/dL Final      No results found for: SED   Last Renal Panel:  Sodium   Date Value Ref Range Status   06/10/2021 142.0 133.0 - 144.0 mmol/L Final     Potassium   Date Value Ref Range Status   06/10/2021 4.4 3.4 - 5.3 mmol/L Final     Chloride   Date Value Ref Range Status   06/10/2021 100.0 94.0 - 109.0 mmol/L Final     Carbon Dioxide   Date Value Ref Range Status   06/10/2021 33.0 (H) 20.0 - 32.0 mmol/L Final     Anion Gap   Date Value Ref Range Status   04/24/2021 10 5 - 18 mmol/L Final     Glucose   Date Value Ref Range Status   06/10/2021 112.0 (H) 60.0 - 109.0 mg/dL Final     Urea Nitrogen   Date Value Ref Range Status   06/10/2021 16.0 7.0 - 30.0 mg/dL Final     Creatinine   Date Value Ref Range Status   06/10/2021 1.0 0.8 - 1.5 mg/dL Final     GFR Estimate   Date Value Ref Range Status   04/24/2021 >60 >60 mL/min/1.73m2 Final     Calcium   Date Value Ref Range Status   06/10/2021 9.5 8.5 - 10.4 mg/dL Final     Albumin   Date Value Ref Range Status   04/24/2021 2.7 (L) 3.5 - 5.0 g/dL Final      Lab Results   Component Value Date    WBC 6.5 04/24/2021     Lab Results   Component Value Date    RBC 3.84 04/24/2021     Lab Results   Component Value Date    HGB 9.3 04/24/2021     Lab Results    Component Value Date    HCT 31.6 04/24/2021     No components found for: MCT  Lab Results   Component Value Date    MCV 82 04/24/2021     Lab Results   Component Value Date    MCH 24.2 04/24/2021     Lab Results   Component Value Date    MCHC 29.4 04/24/2021     Lab Results   Component Value Date    RDW 14.3 04/24/2021     Lab Results   Component Value Date     04/24/2021      Lab Results   Component Value Date    A1C 6.2 04/24/2021    A1C 6.2 04/24/2021      No results found for: TSH   No results found for: VITDT                Impression:  Encounter Diagnoses   Name Primary?     Venous hypertension, chronic, with ulcer and inflammation, bilateral (H) Yes     Obesity, morbid, BMI 40.0-49.9 (H)      Lymphedema of both lower extremities      Leg swelling      Venous (peripheral) insufficiency      Hyperkeratosis of skin      Elephantiasis      Type 2 diabetes mellitus with peripheral neuropathy (H)      Localized swelling, mass and lump, lower limb, bilateral      Lipodermatosclerosis of both lower extremities      Papillomatosis                                Are any of these wounds new today: No; Location: na    Assessment/Plan:          1. Debridement: After discussion of risk factors and verbal consent was obtained 2% Lidocaine HCL jelly was applied, under clean conditions, the BLE ulceration(s) were debrided using currette. Devitalized and nonviable tissue, along with any fibrin and slough, was removed to improve granulation tissue formation, stimulate wound healing, decrease overall bacteria load, disrupt biofilm formation and decrease edge senescence.  Total excisional debridement was 125.49 sq cm from the epidermis/dermis area and into the subcutaneous tissue with a depth of 0.1-0.5 cm.   Ulcers were improved afterwards and .  Measures were unchanged after debridement.       2.  Wound treatment: wound treatment will include irrigation and dressings to promote autolytic debridement which will  include:will continue with vashe to the wounds; soap and water to the rest of the legs; urea based lotion to crusting and scaling; silvercel to the wounds; ABD; rolled gauze; change these every 3 days at lymphedema therapy or his daughter can assist when at home Stable            3. Edema: venous insufficiency demonstrated deep system disease in the Right leg with x2 areas of superficial incompetence; would not qualify for closure at this time. I spoke to him about CT of abdomen and pelvis to evaluate for proximal obstruction; again he did not want to do this at this time. His ABIs were normal. Will continue lymphedema therapy; may need to consider pump in the future. The compression wraps were applied today in clinic. Stable            4. Nutrition: we spoke about his weight and how this contributes to his swelling process; he was already referred to bariatrics; he does not want to schedule with them at this time; he does not check his fs; historically his albumin has been low; recommended x2 premier protein shakes daily           5. Offloading: he has good shoes at this time; eventually could be referred for diabetic shoes and insoles     Patient will follow up with me in 4 weeks for reevaluation. They were instructed to call the clinic sooner with any signs or symptoms of infection or any further questions/concerns. Answered all questions.          Kim Fuentes DNP, RN, CNP, CWOCN, CFCN, CLT  [facility]   [phone]        This note was electronically signed by Kim Fuentes NP

## 2021-10-20 DIAGNOSIS — I10 BENIGN ESSENTIAL HYPERTENSION: ICD-10-CM

## 2021-10-20 RX ORDER — LOSARTAN POTASSIUM 100 MG/1
100 TABLET ORAL DAILY
Qty: 30 TABLET | Refills: 0 | Status: SHIPPED | OUTPATIENT
Start: 2021-10-20 | End: 2021-11-22

## 2021-10-20 NOTE — TELEPHONE ENCOUNTER
Message to physician:     Date of last visit: 6/10/2021     Date of next visit if scheduled:none.    Last Comprehensive Metabolic Panel:  Sodium   Date Value Ref Range Status   06/10/2021 142.0 133.0 - 144.0 mmol/L Final     Potassium   Date Value Ref Range Status   06/10/2021 4.4 3.4 - 5.3 mmol/L Final     Chloride   Date Value Ref Range Status   06/10/2021 100.0 94.0 - 109.0 mmol/L Final     Carbon Dioxide   Date Value Ref Range Status   06/10/2021 33.0 (H) 20.0 - 32.0 mmol/L Final     Anion Gap   Date Value Ref Range Status   04/24/2021 10 5 - 18 mmol/L Final     Glucose   Date Value Ref Range Status   06/10/2021 112.0 (H) 60.0 - 109.0 mg/dL Final     Urea Nitrogen   Date Value Ref Range Status   06/10/2021 16.0 7.0 - 30.0 mg/dL Final     Creatinine   Date Value Ref Range Status   06/10/2021 1.0 0.8 - 1.5 mg/dL Final     GFR Estimate   Date Value Ref Range Status   04/24/2021 >60 >60 mL/min/1.73m2 Final     Calcium   Date Value Ref Range Status   06/10/2021 9.5 8.5 - 10.4 mg/dL Final       BP Readings from Last 3 Encounters:   10/19/21 (!) 146/80   10/12/21 (!) 148/62   10/07/21 (!) 148/100       Hemoglobin A1C   Date Value Ref Range Status   04/24/2021 6.2 (H) <=5.6 % Final     Comment:     Prediabetes:   HBA1c       5.7 to 6.4%        Diabetes:        HBA1c        >=6.5%   Patients with Hgb F >5%, total bilirubin >10.0 mg/dL, abnormal red cell turnover, severe renal or hepatic disease or malignancy should not have this A1C method used to diagnose or monitor diabetes.         04/24/2021 6.2 (A) <=5.6 % Final       Please complete refill and CLOSE ENCOUNTER.  Closing the encounter signifies the refill is complete.

## 2021-10-25 ENCOUNTER — TELEPHONE (OUTPATIENT)
Dept: VASCULAR SURGERY | Facility: CLINIC | Age: 66
End: 2021-10-25

## 2021-10-28 ENCOUNTER — HOSPITAL ENCOUNTER (OUTPATIENT)
Dept: PHYSICAL THERAPY | Facility: REHABILITATION | Age: 66
End: 2021-10-28
Payer: COMMERCIAL

## 2021-10-28 DIAGNOSIS — I89.0 LYMPHEDEMA OF BOTH LOWER EXTREMITIES: ICD-10-CM

## 2021-10-28 DIAGNOSIS — B35.1 ONYCHOMYCOSIS: ICD-10-CM

## 2021-10-28 DIAGNOSIS — M79.89 LEG SWELLING: Primary | ICD-10-CM

## 2021-10-28 DIAGNOSIS — I87.333 VENOUS HYPERTENSION, CHRONIC, WITH ULCER AND INFLAMMATION, BILATERAL (H): ICD-10-CM

## 2021-10-28 DIAGNOSIS — I87.2 VENOUS (PERIPHERAL) INSUFFICIENCY: ICD-10-CM

## 2021-10-28 DIAGNOSIS — L85.9 EPITHELIAL HYPERPLASIA OF SKIN: ICD-10-CM

## 2021-10-28 DIAGNOSIS — L85.9 HYPERKERATOSIS OF SKIN: ICD-10-CM

## 2021-10-28 DIAGNOSIS — L60.2 ONYCHAUXIS: ICD-10-CM

## 2021-10-28 PROCEDURE — 97140 MANUAL THERAPY 1/> REGIONS: CPT | Mod: GP | Performed by: PHYSICAL THERAPIST

## 2021-10-28 PROCEDURE — 97110 THERAPEUTIC EXERCISES: CPT | Mod: GP | Performed by: PHYSICAL THERAPIST

## 2021-11-08 NOTE — TELEPHONE ENCOUNTER
"Pt called stating he is running low on wound care supplies, particularly the \"Silverscel and 8 by 10 abdominal pads.\" Please call pt back to discuss.   "

## 2021-11-11 ENCOUNTER — HOSPITAL ENCOUNTER (OUTPATIENT)
Dept: PHYSICAL THERAPY | Facility: REHABILITATION | Age: 66
End: 2021-11-11
Payer: COMMERCIAL

## 2021-11-11 DIAGNOSIS — L85.9 EPITHELIAL HYPERPLASIA OF SKIN: ICD-10-CM

## 2021-11-11 DIAGNOSIS — M79.89 LEG SWELLING: Primary | ICD-10-CM

## 2021-11-11 DIAGNOSIS — L85.9 HYPERKERATOSIS OF SKIN: ICD-10-CM

## 2021-11-11 DIAGNOSIS — I89.0 LYMPHEDEMA OF BOTH LOWER EXTREMITIES: ICD-10-CM

## 2021-11-11 DIAGNOSIS — B35.1 ONYCHOMYCOSIS: ICD-10-CM

## 2021-11-11 DIAGNOSIS — E11.42 TYPE 2 DIABETES MELLITUS WITH PERIPHERAL NEUROPATHY (H): ICD-10-CM

## 2021-11-11 DIAGNOSIS — I87.333 VENOUS HYPERTENSION, CHRONIC, WITH ULCER AND INFLAMMATION, BILATERAL (H): ICD-10-CM

## 2021-11-11 DIAGNOSIS — I87.2 VENOUS (PERIPHERAL) INSUFFICIENCY: ICD-10-CM

## 2021-11-11 DIAGNOSIS — E66.01 OBESITY, MORBID, BMI 40.0-49.9 (H): ICD-10-CM

## 2021-11-11 DIAGNOSIS — L60.2 ONYCHAUXIS: ICD-10-CM

## 2021-11-11 PROCEDURE — 97140 MANUAL THERAPY 1/> REGIONS: CPT | Mod: GP | Performed by: PHYSICAL THERAPIST

## 2021-11-11 PROCEDURE — 97110 THERAPEUTIC EXERCISES: CPT | Mod: GP | Performed by: PHYSICAL THERAPIST

## 2021-11-16 ENCOUNTER — HOSPITAL ENCOUNTER (OUTPATIENT)
Dept: PHYSICAL THERAPY | Facility: REHABILITATION | Age: 66
End: 2021-11-16
Payer: COMMERCIAL

## 2021-11-16 PROCEDURE — 97140 MANUAL THERAPY 1/> REGIONS: CPT | Mod: GP | Performed by: PHYSICAL THERAPIST

## 2021-11-17 NOTE — TELEPHONE ENCOUNTER
Spoke to Shaheen. He has 2 therapy sessions prior to his follow up with Kim Fuentes CNP on 11/23 and he only has enough supplies for one more therapy session. He is having increased drainage so he is needing to use more silvercel and ABD's at this time. He has therapy tomorrow 11/18 at [facility]  and his son can come by the  clinic and pick some up. Plan to reevaluate supply needs at clinic appointment 11/23.

## 2021-11-17 NOTE — TELEPHONE ENCOUNTER
Patient calling back to follow up on supplies as he still has not received any. He is seeing lymphedema therapy tomorrow and will be completely out of supplies after that appointment.

## 2021-11-18 ENCOUNTER — HOSPITAL ENCOUNTER (OUTPATIENT)
Dept: PHYSICAL THERAPY | Facility: REHABILITATION | Age: 66
End: 2021-11-18
Payer: COMMERCIAL

## 2021-11-18 DIAGNOSIS — E11.42 TYPE 2 DIABETES MELLITUS WITH PERIPHERAL NEUROPATHY (H): ICD-10-CM

## 2021-11-18 DIAGNOSIS — L60.2 ONYCHAUXIS: ICD-10-CM

## 2021-11-18 DIAGNOSIS — L85.9 HYPERKERATOSIS OF SKIN: ICD-10-CM

## 2021-11-18 DIAGNOSIS — L85.9 EPITHELIAL HYPERPLASIA OF SKIN: ICD-10-CM

## 2021-11-18 DIAGNOSIS — I87.2 VENOUS (PERIPHERAL) INSUFFICIENCY: ICD-10-CM

## 2021-11-18 DIAGNOSIS — E66.01 OBESITY, MORBID, BMI 40.0-49.9 (H): ICD-10-CM

## 2021-11-18 DIAGNOSIS — M79.89 LEG SWELLING: Primary | ICD-10-CM

## 2021-11-18 DIAGNOSIS — B35.1 ONYCHOMYCOSIS: ICD-10-CM

## 2021-11-18 DIAGNOSIS — I89.0 LYMPHEDEMA OF BOTH LOWER EXTREMITIES: ICD-10-CM

## 2021-11-18 DIAGNOSIS — I87.333 VENOUS HYPERTENSION, CHRONIC, WITH ULCER AND INFLAMMATION, BILATERAL (H): ICD-10-CM

## 2021-11-18 PROCEDURE — 97140 MANUAL THERAPY 1/> REGIONS: CPT | Mod: GP | Performed by: PHYSICAL THERAPIST

## 2021-11-18 PROCEDURE — 97110 THERAPEUTIC EXERCISES: CPT | Mod: GP | Performed by: PHYSICAL THERAPIST

## 2021-11-18 NOTE — PROGRESS NOTES
[facility]    Outpatient Physical Therapy Progress Note  Patient: Shaheen Sepulveda  : 1955    Beginning/End Dates of Reporting Period:  10/14/21 to 2021    Referring Provider: Dr. Reyes (currently being seen by Kim Fuentes)    Therapy Diagnosis: xi LE lymphedema     Client Self Report: Pt kept wrapping on until this morning. He is picking up new wound care supplies today.     Objective Measurements:  Objective Measure: Wound  Details: new wound on left anterior ankle <1 cm long with red tissue, no drainage- healed today, continues to have larger venous ulcers in xi lower legs.     Patient has tried conservative therapy (compression, elevation, exercise) for >4 weeks, but due to the severity of his condition, he is unable to manage at home and has his symptoms remain persistent.    Pt has severe changes in lower extremities due to stage 3 lymphedema including hyperkeratosis fo skin, Epithelial hyperplasia of skin, chronic venous ulcers and onychauxis.    Pt has swelling into his hips and needs the advanced device to address swelling in this area.     Goals:  Goal Identifier HEP   Goal Description Pt will be independent in HEP to address impairments and improve function   Target Date 21   Date Met      Progress (detail required for progress note): progressing     Goal Identifier Self-care   Goal Description Pt will be independent in self care including compression wear, compression care, self-MLD, exercise, skin and wound care to manage swelling independently long term   Target Date 21   Date Met      Progress (detail required for progress note):       Goal Identifier Edema    Goal Description Pt will demonstrate a >10% decrease in limb volume to improve donning/doffing shoes and improve healing of wounds   Target Date 21   Date Met      Progress (detail required for progress  note):       Plan:  Continue therapy per current plan of care.    Discharge:  No    Elaine Seth, PT, DPT, CLARIELLE[facility]

## 2021-11-19 ENCOUNTER — TELEPHONE (OUTPATIENT)
Dept: VASCULAR SURGERY | Facility: CLINIC | Age: 66
End: 2021-11-19

## 2021-11-22 ENCOUNTER — HOSPITAL ENCOUNTER (OUTPATIENT)
Dept: PHYSICAL THERAPY | Facility: REHABILITATION | Age: 66
End: 2021-11-22
Payer: COMMERCIAL

## 2021-11-22 ENCOUNTER — TELEPHONE (OUTPATIENT)
Dept: VASCULAR SURGERY | Facility: CLINIC | Age: 66
End: 2021-11-22

## 2021-11-22 DIAGNOSIS — I89.0 LYMPHEDEMA OF BOTH LOWER EXTREMITIES: ICD-10-CM

## 2021-11-22 DIAGNOSIS — E66.01 OBESITY, MORBID, BMI 40.0-49.9 (H): ICD-10-CM

## 2021-11-22 DIAGNOSIS — M79.89 LEG SWELLING: Primary | ICD-10-CM

## 2021-11-22 DIAGNOSIS — L60.2 ONYCHAUXIS: ICD-10-CM

## 2021-11-22 DIAGNOSIS — L85.9 HYPERKERATOSIS OF SKIN: ICD-10-CM

## 2021-11-22 DIAGNOSIS — I87.333 VENOUS HYPERTENSION, CHRONIC, WITH ULCER AND INFLAMMATION, BILATERAL (H): ICD-10-CM

## 2021-11-22 DIAGNOSIS — I87.2 VENOUS (PERIPHERAL) INSUFFICIENCY: ICD-10-CM

## 2021-11-22 DIAGNOSIS — B35.1 ONYCHOMYCOSIS: ICD-10-CM

## 2021-11-22 DIAGNOSIS — E11.42 TYPE 2 DIABETES MELLITUS WITH PERIPHERAL NEUROPATHY (H): ICD-10-CM

## 2021-11-22 DIAGNOSIS — L85.9 EPITHELIAL HYPERPLASIA OF SKIN: ICD-10-CM

## 2021-11-22 DIAGNOSIS — I10 BENIGN ESSENTIAL HYPERTENSION: ICD-10-CM

## 2021-11-22 PROCEDURE — 97140 MANUAL THERAPY 1/> REGIONS: CPT | Mod: GP | Performed by: PHYSICAL THERAPIST

## 2021-11-22 RX ORDER — LOSARTAN POTASSIUM 100 MG/1
100 TABLET ORAL DAILY
Qty: 90 TABLET | Refills: 3 | Status: SHIPPED | OUTPATIENT
Start: 2021-11-22 | End: 2022-11-29

## 2021-11-23 ENCOUNTER — OFFICE VISIT (OUTPATIENT)
Dept: VASCULAR SURGERY | Facility: CLINIC | Age: 66
End: 2021-11-23
Attending: NURSE PRACTITIONER
Payer: COMMERCIAL

## 2021-11-23 VITALS
TEMPERATURE: 98 F | RESPIRATION RATE: 20 BRPM | DIASTOLIC BLOOD PRESSURE: 72 MMHG | WEIGHT: 315 LBS | HEART RATE: 96 BPM | OXYGEN SATURATION: 92 % | SYSTOLIC BLOOD PRESSURE: 136 MMHG | BODY MASS INDEX: 53 KG/M2

## 2021-11-23 DIAGNOSIS — I87.333 VENOUS HYPERTENSION, CHRONIC, WITH ULCER AND INFLAMMATION, BILATERAL (H): Primary | ICD-10-CM

## 2021-11-23 DIAGNOSIS — M79.89 LEG SWELLING: ICD-10-CM

## 2021-11-23 DIAGNOSIS — I87.2 VENOUS (PERIPHERAL) INSUFFICIENCY: ICD-10-CM

## 2021-11-23 DIAGNOSIS — I89.0 ELEPHANTIASIS: ICD-10-CM

## 2021-11-23 DIAGNOSIS — E11.42 TYPE 2 DIABETES MELLITUS WITH PERIPHERAL NEUROPATHY (H): ICD-10-CM

## 2021-11-23 DIAGNOSIS — L85.9 HYPERKERATOSIS OF SKIN: ICD-10-CM

## 2021-11-23 DIAGNOSIS — I89.0 LYMPHEDEMA OF BOTH LOWER EXTREMITIES: ICD-10-CM

## 2021-11-23 DIAGNOSIS — E66.01 OBESITY, MORBID, BMI 40.0-49.9 (H): ICD-10-CM

## 2021-11-23 PROCEDURE — 97597 DBRDMT OPN WND 1ST 20 CM/<: CPT | Performed by: NURSE PRACTITIONER

## 2021-11-23 PROCEDURE — 97598 DBRDMT OPN WND ADDL 20CM/<: CPT | Performed by: NURSE PRACTITIONER

## 2021-11-23 ASSESSMENT — PAIN SCALES - GENERAL: PAINLEVEL: NO PAIN (0)

## 2021-11-23 NOTE — PROGRESS NOTES
"            Follow up Vascular Visit       Date of Service:11/23/21      Chief Complaint: BLE venous hypertension; lymphedema; BLE ulcers; chronic      Pt returns to [facility] with regards to their  BLE venous hypertension; lymphedema; BLE ulcers; chronic.  They arrive today alone. They are currently using Vashe; silvercel; ABD; rolled gauze to the wounds. This is being done by lymphedema therapy 3 days per week. They are using tubular compression and short stretch for compression. They are feeling well today. Denies fevers, chills. No shortness of breath. He has completed 4 weeks of lymphedema therapy. He has been dealing with swelling in the legs for at least 10 years. He developed wounds on the legs about 1 year ago. Venous insufficiency study today showed deep and superficial vein insufficiency on right with proximal superficial insufficiency in SFV on left. Culture was done last visit this grew out mixed ghanshyam without predominating organism. ABIs were also ordered; this was switch to an arterial duplex due to NC arteries and this was normal. Dr. Reyes was considering CT of abdomen and pelvis if swelling and wounds were not improving. She would also like him fit for shoes and insoles once out of wrappings. He was referred to bariatrics; he was called by them but opted to not make appt as he is too overwhelmed with all the other appt.     Allergies:   Allergies   Allergen Reactions     Lisinopril Swelling     Patient reports \"neck swelling\"  Swelling in throat       Medications:   Current Outpatient Medications:      atorvastatin (LIPITOR) 20 MG tablet, Take 1 tablet (20 mg) by mouth daily, Disp: 90 tablet, Rfl: 3     furosemide (LASIX) 40 MG tablet, Take 1 tablet (40 mg) by mouth 2 times daily, Disp: 180 tablet, Rfl: 3     losartan (COZAAR) 100 MG tablet, Take 1 tablet (100 mg) by mouth daily, Disp: 90 tablet, Rfl: 3     metFORMIN (GLUCOPHAGE) 500 MG tablet, Take 1 tablet (500 mg) by mouth 2 " times daily (with meals), Disp: 180 tablet, Rfl: 3     methadone (DOLOPHINE-INTENSOL) 10 MG/ML (HIGH CONC) solution, Take 100 mg by mouth daily, Disp: , Rfl:     Current Facility-Administered Medications:      lidocaine (XYLOCAINE) 2 % external gel, , Topical, Daily PRN, Lucia Reyes MD, Given at 10/19/21 0828    History:   Past Medical History:   Diagnosis Date     Diabetes (H)      H/O angioedema 6/15/2020    Formatting of this note might be different from the original. Unexplained angioedema twice, discontinue lisinopril for possible connection to it, though he had used it afterwards with no symptoms     History of tobacco use disorder 8/1/2013    Formatting of this note might be different from the original. Added per documetation     Hypertension      Lymphedema of both lower extremities 12/22/2014     Methadone use 5/8/2012     Obstructive sleep apnea 2/2/2015    Formatting of this note might be different from the original. Epic     Pleural mass 7/2/2013     Uncomplicated asthma        Physical Exam:    /72   Pulse 96   Temp 98  F (36.7  C)   Resp 20   Wt (!) 424 lb (192.3 kg)   SpO2 92%   BMI 53.00 kg/m      General:  Patient presents to clinic in no apparent distress.  Head: normocephalic atraumatic  Psychiatric:  Alert and oriented x3.   Respiratory: unlabored breathing; no cough  Integumentary:  Skin is uniformly warm, dry and pink.     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  No sinus tract present, Wound base: macerated weeping sloughing skin within the papillomatosis and deep crevices of the skin  No undermining present. Wound is partial thickness. There is moderate drainage. Periwound: no denudement, erythema, induration, maceration or warmth.      Wound 2 Location: left medial lower leg  Size:3x3W x 0.1depth.  No sinus  tract present, Wound base: macerated weeping sloughing skin within the papillomatosis and deep crevices of the skin  No undermining present. Wound is partial thickness. There is moderate drainage. Periwound: no denudement, erythema, induration, maceration or warmth.          VASC Wound Right lower leg lateral (Active)   Pre Size Length 9 11/23/21 0700   Pre Size Width 14 11/23/21 0700   Pre Size Depth 0.1 11/23/21 0700   Pre Total Sq cm 126 11/23/21 0700   Number of days: 62       VASC Wound Left lower leg medial (Active)   Number of days: 62       VASC Wound rt lateral weeping (Active)   Number of days: 35       VASC Wound Lt medial (Active)   Pre Size Length 3 11/23/21 0700   Pre Size Width 3 11/23/21 0700   Pre Size Depth 0.1 11/23/21 0700   Pre Total Sq cm 9 11/23/21 0700   Number of days: 35            Circumferential volume measures:      Circumferential Measures 10/5/2021 10/7/2021 10/12/2021 10/19/2021 11/23/2021   Right just above MTP 30.5 29.6 29 28.3 28.6   Right Ankle 38 36.1 37 36.2 36.4   Right Widest Calf 57 59.6 59 60.7 58   Right Thigh Up 10cm - - - - -   Left - just above MTP 29 27.4 28.5 27.6 28.1   Left Ankle 34 34.4 35 33.6 34.3   Left Widest Calf 60 54.5 53 58.4 54   Left Thigh Up 10cm - - - - -   Left Knee to Ankle - - - 42 -       Labs:    I personally reviewed the following lab results today and those on care everywhere    CRP   Date Value Ref Range Status   04/23/2021 8.5 (H) 0.0 - 0.8 mg/dL Final      No results found for: SED   Last Renal Panel:  Sodium   Date Value Ref Range Status   06/10/2021 142.0 133.0 - 144.0 mmol/L Final     Potassium   Date Value Ref Range Status   06/10/2021 4.4 3.4 - 5.3 mmol/L Final     Chloride   Date Value Ref Range Status   06/10/2021 100.0 94.0 - 109.0 mmol/L Final     Carbon Dioxide   Date Value Ref Range Status   06/10/2021 33.0 (H) 20.0 - 32.0 mmol/L Final     Anion Gap   Date Value Ref Range Status   04/24/2021 10 5 - 18 mmol/L Final     Glucose   Date  Value Ref Range Status   06/10/2021 112.0 (H) 60.0 - 109.0 mg/dL Final     Urea Nitrogen   Date Value Ref Range Status   06/10/2021 16.0 7.0 - 30.0 mg/dL Final     Creatinine   Date Value Ref Range Status   06/10/2021 1.0 0.8 - 1.5 mg/dL Final     GFR Estimate   Date Value Ref Range Status   04/24/2021 >60 >60 mL/min/1.73m2 Final     Calcium   Date Value Ref Range Status   06/10/2021 9.5 8.5 - 10.4 mg/dL Final     Albumin   Date Value Ref Range Status   04/24/2021 2.7 (L) 3.5 - 5.0 g/dL Final      Lab Results   Component Value Date    WBC 6.5 04/24/2021     Lab Results   Component Value Date    RBC 3.84 04/24/2021     Lab Results   Component Value Date    HGB 9.3 04/24/2021     Lab Results   Component Value Date    HCT 31.6 04/24/2021     No components found for: MCT  Lab Results   Component Value Date    MCV 82 04/24/2021     Lab Results   Component Value Date    MCH 24.2 04/24/2021     Lab Results   Component Value Date    MCHC 29.4 04/24/2021     Lab Results   Component Value Date    RDW 14.3 04/24/2021     Lab Results   Component Value Date     04/24/2021      Lab Results   Component Value Date    A1C 6.2 04/24/2021    A1C 6.2 04/24/2021      No results found for: TSH   No results found for: VITDT                Impression:  Encounter Diagnoses   Name Primary?     Venous hypertension, chronic, with ulcer and inflammation, bilateral (H) Yes     Obesity, morbid, BMI 40.0-49.9 (H)      Lymphedema of both lower extremities      Leg swelling      Venous (peripheral) insufficiency      Hyperkeratosis of skin      Elephantiasis      Type 2 diabetes mellitus with peripheral neuropathy (H)                                Are any of these wounds new today: No; Location: na    Assessment/Plan:          1. Debridement: After discussion of risk factors and verbal consent was obtained 2% Lidocaine HCL jelly was applied, under clean conditions, the BLE ulceration(s) were debrided using currette. Devitalized and nonviable  tissue, along with any fibrin and slough, was removed to improve granulation tissue formation, stimulate wound healing, decrease overall bacteria load, disrupt biofilm formation and decrease edge senescence.  Total excisional debridement was 135 sq cm from the epidermis/dermis area with a depth of 0-0.1 cm.   Ulcers were improved afterwards and .  Measures were unchanged after debridement.       2.  Wound treatment: wound treatment will include irrigation and dressings to promote autolytic debridement which will include:will continue to wash with Vashe solution; today I first used dilute hibiclens and then went over just the wound areas with Vashe; I did not rinse; applied AmLactin to the scaling areas and Remedy lotion to the healthier skin; applied silvercel to the wounds and ABD; rolled gauze; will have these changed at lymphedema therapy 3 days per week; will order supplies again; provided him with samples to go home with as well Stable            3. Edema: venous insufficiency demonstrated deep system disease in the Right leg with x2 areas of superficial incompetence; would not qualify for closure at this time. He declined CT of abdomen and pelvis to evaluate for proximal obstruction.  His ABIs were normal. Will continue lymphedema therapy; may need to consider pump in the future. The compression wraps were applied today in clinic. Stable . The compression wraps were applied today in clinic. Stable            4. Nutrition: we spoke about his weight and how this contributes to his swelling process; he was already referred to bariatrics; he does not want to schedule with them at this time; he does not check his fs; historically his albumin has been low; recommended x2 premier protein shakes daily           5. Offloading: will get him new shoes and insoles once out of the wraps and foot swelling measures are stable.     Patient will follow up with me in 4 weeks for reevaluation. They were instructed to call  the clinic sooner with any signs or symptoms of infection or any further questions/concerns. Answered all questions.          Kim Fuentes DNP, RN, CNP, CWOCN, CFCN, CLT  [facility]   [phone]        This note was electronically signed by Kim Fuentes NP

## 2021-11-23 NOTE — PATIENT INSTRUCTIONS
"Consider CT scan of the abdomen and pelvis    Consider scheduling with bariatrics to discuss weight loss options    Continue going to lymphedema therapy; bring all dressings; lotions and supplies with you to these appt    Take x2 protein shakes daily such as premier protein      Wound Care Instructions    Every 3 days , Cleanse your BLE wound(s) with Vashe; pour the vashe onto gauze and gently cleanse the wounds    Use soap and water and wash cloth to the rest of the legs.    Pat Dry with non-sterile gauze    Apply Lotion to the intact skin surrounding your wound and other dry skin locations. Some good lotions include: Remedy Skin Repair Cream, Sarna, Vanicream or Cetaphil    Apply Urea based lotion to the scaling, crusting and thickened skin areas    Primary Dressing: Apply silvercel 4.25\"x4.25\" into/onto the wounds    Secondary dressing: Cover with ABD 8\"x10\"    Secure with non-sterile roll gauze (4\" x 75\" roll) and tape (1\" roll tape) as needed; avoid adhesive directly on the skin    Compression: tubular compression; foam; short stretch    It is not ok to get your wound wet in the bath or shower    SEEK MEDICAL CARE IF:    You have an increase in swelling, pain, or redness around the wound.    You have an increase in the amount of pus coming from the wound.    There is a bad smell coming from the wound.    The wound appears to be worsening/enlarging    You have a fever greater than 101.5 F      It is ok to continue current wound care treatment/products for the next 2-3 days until new wound care supplies are ordered and arrive. If longer than this please contact our office at [phone].    High Protein Foods  Chicken  -Chicken breast, 3.5oz.-30 grams protein  -Chicken thigh-10 grams(average size)  -Drumstick-11 grams  -Wing- 6 grams  -Chicken meat, cooked, 4 oz.  Beef  -Hamburger katia, 4 oz-28 grams protein  -Steak, 6 oz-42 grams  -Most cuts of beef- 7 grams of protein per ounce  Fish  -Most fish fillets or " steaks are about 22 grams of protein for 3 1/2 oz(100 grams) of cooked fish, or 6 grams per ounce  -Tuna, 6 oz can-40 grams of protein  Pork  -Pork chop, average-22 grams protein  -Pork loin or tenderloin, 4 oz.-29 grams  -Ham, 3oz serving- 19 grams  -Ground pork 3oz cooked-22 grams  -Winkler, 1 slice-3 grams  -[place]-style winkler(black winkler), slice-5-6 grams  Eggs and Dairy  -Egg, large-7 grams  -Milk, 1 cup-8 grams  -Cottage cheese, 1/2 cup-15 grams  -Greek yogurt, 1 cup-usually 8-12 grams, check label  -Soft cheeses (Mozzarella, Brie, Camembert)- 6 grams  -Medium cheeses(cheddar, swiss)- 7 or 8 grams per oz  -Hard cheeses(parmesan)- 10 grams per oz  Beans  -Tofu, 1/2 cup 20 grams  -Tofu, 1 oz., 2.3 grams  -Soy milk, 1 cup-6-10 grams  -Most beans(black, marcelino, lentils, etc.) about 7-10 grams protein per half cup of cooked beans  -soy beans, 1/2 cup cooked-14 grams  -Split peas, 1/2 cup cooked- 8 grams  Nuts and Seeds  -Peanut butter, 2 Tablespoons- 8 grams protein  -Almonds, 1/4 cup- 8 grams  -Peanuts, 1/4 cup-9 grams  -Cashews, 1/4 cup- 5 grams  -Pecans, 1/4 cup- 2.5 grams  -Sunflower seeds, 1/4 cup- 6 grams  -Pumpkin seeds, 1/4 cup-8 grams  -Flax seeds- 1/4 cup- 8 grams  Protein Supplements  -Ensure  -Boost  -Glucerna, if diabetic  When you have an open ulcer, your bodies protein needs are much higher, so it is recommended eat good sources of protein

## 2021-11-23 NOTE — LETTER
2021    [facility]  Fax: [phone] Wound Dressing Rx and Order Form  Customer Service: [phone] Order Status: New   Verbal: Jenny   Patient Info:  Name: Shaheen Sepulveda  : 1955  Address: [address] [place] [address]    Insurance Info:  INSURER: Payor: COMMERCIAL / Plan: [employer] [place] [employer] MEDICARE ADVANTAGE / Product Type: Medicare /   Policy ID#:  [id number]  : 1955    Physician Info:   Name: elaina garcias   Dept Address/Phones:   [facility], [facility] [address] [address] [phone]  Fax: [phone]  Impression:   Encounter Diagnoses   Name Primary?     Venous hypertension, chronic, with ulcer and inflammation, bilateral (H) Yes     Obesity, morbid, BMI 40.0-49.9 (H)      Lymphedema of both lower extremities      Leg swelling      Venous (peripheral) insufficiency      Hyperkeratosis of skin      Elephantiasis      Type 2 diabetes mellitus with peripheral neuropathy (H)        Lymphedema circumferential measurements (in cm):      Circumferential Measures (cm)  Right just above MTP: 28.6  Right Ankle: 36.4  Right Widest Calf: 58  Left - just above MTP: 28.1  Left Ankle: 34.3  Left Widest Calf: 54       Wound info:    VASC Wound Right lower leg lateral (Active)   Pre Size Length 9 21 0700   Pre Size Width 14 21 0700   Pre Size Depth 0.1 21 0700   Pre Total Sq cm 126 21 07       VASC Wound Lt medial (Active)   Pre Size Length 3 21 0700   Pre Size Width 3 21 0700   Pre Size Depth 0.1 21 0700   Pre Total Sq cm 9 21 0700       VASC Wound Lt lateral leg (Active)   Pre Size Length 8 21 0700   Pre Size Width 8 21 0700   Pre Size Depth 0.1 21 0700   Pre Total Sq cm 64 21 0700   Description weeping 21 07         Drainage: Moderate  Thickness:  full  Duration of Need: 30 days  Days Supply: 30 days  Start Date: 2021  Starter Kit:ancillary    Qualifying wound/Debridement: yes     Dressing Type Brand Size Number of pieces Frequency of change   Primary Silvercel   4.25''x4.25'' 24 sheets(needs two sheets per dressing change)  3 times per week    Secondary  ABD Pads   8''x10'' 24 (needs two per dressing change)  3 times per week     Square gauze   4''x4'' 2 loafs 3 times per week     sof form roll gauze   4''x75'' 48 rolls (needs 4 rolls per dressing change0  3 times per week    Tape Paper tape  2'' 2 rolls 3 times per week        Note: If total out of pocket is more than $50.00 please contact the patient before processing order.     OK to forward to covered supplier.    Electronically Signed Physician: JOHAN RICHMOND                         Date: 11/23/2021

## 2021-11-24 ENCOUNTER — MEDICAL CORRESPONDENCE (OUTPATIENT)
Dept: HEALTH INFORMATION MANAGEMENT | Facility: CLINIC | Age: 66
End: 2021-11-24

## 2021-11-29 ENCOUNTER — HOSPITAL ENCOUNTER (OUTPATIENT)
Dept: PHYSICAL THERAPY | Facility: REHABILITATION | Age: 66
End: 2021-11-29
Payer: COMMERCIAL

## 2021-11-29 DIAGNOSIS — E66.01 OBESITY, MORBID, BMI 40.0-49.9 (H): ICD-10-CM

## 2021-11-29 DIAGNOSIS — L60.2 ONYCHAUXIS: ICD-10-CM

## 2021-11-29 DIAGNOSIS — B35.1 ONYCHOMYCOSIS: ICD-10-CM

## 2021-11-29 DIAGNOSIS — I87.333 VENOUS HYPERTENSION, CHRONIC, WITH ULCER AND INFLAMMATION, BILATERAL (H): ICD-10-CM

## 2021-11-29 DIAGNOSIS — L85.9 HYPERKERATOSIS OF SKIN: ICD-10-CM

## 2021-11-29 DIAGNOSIS — L85.9 EPITHELIAL HYPERPLASIA OF SKIN: ICD-10-CM

## 2021-11-29 DIAGNOSIS — I87.2 VENOUS (PERIPHERAL) INSUFFICIENCY: ICD-10-CM

## 2021-11-29 DIAGNOSIS — I89.0 LYMPHEDEMA OF BOTH LOWER EXTREMITIES: ICD-10-CM

## 2021-11-29 DIAGNOSIS — M79.89 LEG SWELLING: Primary | ICD-10-CM

## 2021-11-29 DIAGNOSIS — E11.42 TYPE 2 DIABETES MELLITUS WITH PERIPHERAL NEUROPATHY (H): ICD-10-CM

## 2021-11-29 PROCEDURE — 97140 MANUAL THERAPY 1/> REGIONS: CPT | Mod: GP | Performed by: PHYSICAL THERAPIST

## 2021-12-06 ENCOUNTER — HOSPITAL ENCOUNTER (OUTPATIENT)
Dept: PHYSICAL THERAPY | Facility: REHABILITATION | Age: 66
End: 2021-12-06
Payer: COMMERCIAL

## 2021-12-06 DIAGNOSIS — M79.89 LEG SWELLING: Primary | ICD-10-CM

## 2021-12-06 DIAGNOSIS — I87.2 VENOUS (PERIPHERAL) INSUFFICIENCY: ICD-10-CM

## 2021-12-06 DIAGNOSIS — I87.333 VENOUS HYPERTENSION, CHRONIC, WITH ULCER AND INFLAMMATION, BILATERAL (H): ICD-10-CM

## 2021-12-06 DIAGNOSIS — E11.42 TYPE 2 DIABETES MELLITUS WITH PERIPHERAL NEUROPATHY (H): ICD-10-CM

## 2021-12-06 DIAGNOSIS — E66.01 OBESITY, MORBID, BMI 40.0-49.9 (H): ICD-10-CM

## 2021-12-06 DIAGNOSIS — L60.2 ONYCHAUXIS: ICD-10-CM

## 2021-12-06 DIAGNOSIS — B35.1 ONYCHOMYCOSIS: ICD-10-CM

## 2021-12-06 DIAGNOSIS — L85.9 HYPERKERATOSIS OF SKIN: ICD-10-CM

## 2021-12-06 DIAGNOSIS — I89.0 LYMPHEDEMA OF BOTH LOWER EXTREMITIES: ICD-10-CM

## 2021-12-06 DIAGNOSIS — L85.9 EPITHELIAL HYPERPLASIA OF SKIN: ICD-10-CM

## 2021-12-06 PROCEDURE — 97140 MANUAL THERAPY 1/> REGIONS: CPT | Mod: GP | Performed by: PHYSICAL THERAPIST

## 2021-12-09 ENCOUNTER — HOSPITAL ENCOUNTER (OUTPATIENT)
Dept: PHYSICAL THERAPY | Facility: REHABILITATION | Age: 66
End: 2021-12-09
Payer: COMMERCIAL

## 2021-12-09 DIAGNOSIS — L85.9 HYPERKERATOSIS OF SKIN: ICD-10-CM

## 2021-12-09 DIAGNOSIS — L60.2 ONYCHAUXIS: ICD-10-CM

## 2021-12-09 DIAGNOSIS — M79.89 LEG SWELLING: Primary | ICD-10-CM

## 2021-12-09 DIAGNOSIS — I89.0 LYMPHEDEMA OF BOTH LOWER EXTREMITIES: ICD-10-CM

## 2021-12-09 DIAGNOSIS — I87.333 VENOUS HYPERTENSION, CHRONIC, WITH ULCER AND INFLAMMATION, BILATERAL (H): ICD-10-CM

## 2021-12-09 DIAGNOSIS — I87.2 VENOUS (PERIPHERAL) INSUFFICIENCY: ICD-10-CM

## 2021-12-09 DIAGNOSIS — L85.9 EPITHELIAL HYPERPLASIA OF SKIN: ICD-10-CM

## 2021-12-09 DIAGNOSIS — E66.01 OBESITY, MORBID, BMI 40.0-49.9 (H): ICD-10-CM

## 2021-12-09 DIAGNOSIS — B35.1 ONYCHOMYCOSIS: ICD-10-CM

## 2021-12-09 DIAGNOSIS — E11.42 TYPE 2 DIABETES MELLITUS WITH PERIPHERAL NEUROPATHY (H): ICD-10-CM

## 2021-12-09 PROCEDURE — 97140 MANUAL THERAPY 1/> REGIONS: CPT | Mod: GP | Performed by: PHYSICAL THERAPIST

## 2021-12-13 ENCOUNTER — HOSPITAL ENCOUNTER (OUTPATIENT)
Dept: PHYSICAL THERAPY | Facility: REHABILITATION | Age: 66
End: 2021-12-13
Payer: COMMERCIAL

## 2021-12-13 DIAGNOSIS — I87.333 VENOUS HYPERTENSION, CHRONIC, WITH ULCER AND INFLAMMATION, BILATERAL (H): ICD-10-CM

## 2021-12-13 DIAGNOSIS — B35.1 ONYCHOMYCOSIS: ICD-10-CM

## 2021-12-13 DIAGNOSIS — I89.0 LYMPHEDEMA OF BOTH LOWER EXTREMITIES: ICD-10-CM

## 2021-12-13 DIAGNOSIS — E11.42 TYPE 2 DIABETES MELLITUS WITH PERIPHERAL NEUROPATHY (H): ICD-10-CM

## 2021-12-13 DIAGNOSIS — L85.9 EPITHELIAL HYPERPLASIA OF SKIN: ICD-10-CM

## 2021-12-13 DIAGNOSIS — E66.01 OBESITY, MORBID, BMI 40.0-49.9 (H): ICD-10-CM

## 2021-12-13 DIAGNOSIS — I87.2 VENOUS (PERIPHERAL) INSUFFICIENCY: ICD-10-CM

## 2021-12-13 DIAGNOSIS — M79.89 LEG SWELLING: Primary | ICD-10-CM

## 2021-12-13 DIAGNOSIS — L85.9 HYPERKERATOSIS OF SKIN: ICD-10-CM

## 2021-12-13 DIAGNOSIS — L60.2 ONYCHAUXIS: ICD-10-CM

## 2021-12-13 PROCEDURE — 97140 MANUAL THERAPY 1/> REGIONS: CPT | Mod: GP | Performed by: PHYSICAL THERAPIST

## 2021-12-13 NOTE — PROGRESS NOTES
[facility]    OUTPATIENT PHYSICAL THERAPY  PLAN OF TREATMENT FOR OUTPATIENT REHABILITATION AND PROGRESS NOTE           Patient's Last Name, First Name, Shaheen Mishra    Date of Birth  1955   Provider's Name  [facility] Medical Record No.  [id number]    Onset Date  9/22/21 Start of Care Date  10/14/21   Type:     _X_PT   ___OT   ___SLP Medical Diagnosis  Leg swelling ,Venous hypertension, chronic, with ulcer and inflammation, bilateral (H), Venous (peripheral) insufficiency, Lymphedema of both lower extremities, Obesity, morbid, BMI 40.0-49.9 (H), Type 2 diabetes mellitus with peripheral neuropathy (H), Onychauxis, Hyperkeratosis of skin, Epithelial hyperplasia of skin     PT Diagnosis  xi LE lymphedema, venous insufficiency Plan of Treatment  Frequency/Duration: 2-3x/week for up to 8 weeks and 6-10 sessions  Certification date from 12/13/2021 to 2/11/22     Goals:  Goal Identifier HEP   Goal Description Pt will be independent in HEP to address impairments and improve function   Target Date 11/13/21   Date Met  12/13/21   Progress (detail required for progress note): MET     Goal Identifier Self-care   Goal Description Pt will be independent in self care including compression wear, compression care, self-MLD, exercise, skin and wound care to manage swelling independently long term   Target Date 02/11/22   Date Met      Progress (detail required for progress note): improving- pt able to complete self-MLD, exercise and needs assist with dressing changes and compression bandages     Goal Identifier Edema    Goal Description Pt will demonstrate a >10% decrease in limb volume to improve donning/doffing shoes and improve healing of wounds   Target Date 02/11/22   Date Met      Progress (detail required for progress note): at last session- slight increase      Beginning/End Dates of Progress Note Reporting Period:  10/14/21 to 12/13/2021    Progress Toward Goals:   Progress this reporting period: Pt is completing exercise, wound care and compression bandaging. Despite this, his edema had increased last session slightly.      Client Self (Subjective) Report for Progress Note Reporting Period: Pt is waiting to get the compression pump set up but then he will be able to start. It should happen within the next week. No issues with the bandages this time.     Outcome Measures (Most Recent Score):    Lymphedema Life Impact Scale (score range 0-72). A higher score indicates greater impairment.: 26    Objective Measurements:   Objective Measure: Wounds  Details: increased drainage today off all wounds, red drainage on left lateral medial wound  Objective Measure: New wound  Details: Left anterior ankle- healing                                    I CERTIFY THE NEED FOR THESE SERVICES FURNISHED UNDER        THIS PLAN OF TREATMENT AND WHILE UNDER MY CARE     (Physician co-signature of this document indicates review and certification of the therapy plan).                Referring Provider: CACHORRO Machado, PT, DPT, CLVERONIKA

## 2021-12-16 ENCOUNTER — HOSPITAL ENCOUNTER (OUTPATIENT)
Dept: PHYSICAL THERAPY | Facility: REHABILITATION | Age: 66
End: 2021-12-16
Payer: COMMERCIAL

## 2021-12-16 DIAGNOSIS — L85.9 EPITHELIAL HYPERPLASIA OF SKIN: ICD-10-CM

## 2021-12-16 DIAGNOSIS — I87.2 VENOUS (PERIPHERAL) INSUFFICIENCY: ICD-10-CM

## 2021-12-16 DIAGNOSIS — E66.01 OBESITY, MORBID, BMI 40.0-49.9 (H): ICD-10-CM

## 2021-12-16 DIAGNOSIS — E11.42 TYPE 2 DIABETES MELLITUS WITH PERIPHERAL NEUROPATHY (H): ICD-10-CM

## 2021-12-16 DIAGNOSIS — M79.89 LEG SWELLING: Primary | ICD-10-CM

## 2021-12-16 DIAGNOSIS — L85.9 HYPERKERATOSIS OF SKIN: ICD-10-CM

## 2021-12-16 DIAGNOSIS — I89.0 LYMPHEDEMA OF BOTH LOWER EXTREMITIES: ICD-10-CM

## 2021-12-16 DIAGNOSIS — I87.333 VENOUS HYPERTENSION, CHRONIC, WITH ULCER AND INFLAMMATION, BILATERAL (H): ICD-10-CM

## 2021-12-16 DIAGNOSIS — B35.1 ONYCHOMYCOSIS: ICD-10-CM

## 2021-12-16 DIAGNOSIS — L60.2 ONYCHAUXIS: ICD-10-CM

## 2021-12-16 PROCEDURE — 97140 MANUAL THERAPY 1/> REGIONS: CPT | Mod: GP | Performed by: PHYSICAL THERAPIST

## 2021-12-17 ENCOUNTER — TELEPHONE (OUTPATIENT)
Dept: VASCULAR SURGERY | Facility: CLINIC | Age: 66
End: 2021-12-17

## 2021-12-17 NOTE — TELEPHONE ENCOUNTER
Patient unable to come to clinic today to get supplies.  He will wait until he is seen in clinic in 4 days.

## 2021-12-17 NOTE — TELEPHONE ENCOUNTER
Patient is calling is calling stating he is out of supplies, his next apt is on 12/21. Please advise.

## 2021-12-21 ENCOUNTER — OFFICE VISIT (OUTPATIENT)
Dept: VASCULAR SURGERY | Facility: CLINIC | Age: 66
End: 2021-12-21
Attending: NURSE PRACTITIONER
Payer: COMMERCIAL

## 2021-12-21 VITALS
TEMPERATURE: 98.1 F | WEIGHT: 315 LBS | RESPIRATION RATE: 20 BRPM | BODY MASS INDEX: 52.31 KG/M2 | HEART RATE: 72 BPM | DIASTOLIC BLOOD PRESSURE: 80 MMHG | SYSTOLIC BLOOD PRESSURE: 148 MMHG

## 2021-12-21 DIAGNOSIS — L85.9 HYPERKERATOSIS OF SKIN: ICD-10-CM

## 2021-12-21 DIAGNOSIS — R22.43 LOCALIZED SWELLING, MASS AND LUMP, LOWER LIMB, BILATERAL: ICD-10-CM

## 2021-12-21 DIAGNOSIS — I89.0 LYMPHEDEMA OF BOTH LOWER EXTREMITIES: ICD-10-CM

## 2021-12-21 DIAGNOSIS — M79.3 LIPODERMATOSCLEROSIS OF BOTH LOWER EXTREMITIES: ICD-10-CM

## 2021-12-21 DIAGNOSIS — E11.42 TYPE 2 DIABETES MELLITUS WITH PERIPHERAL NEUROPATHY (H): ICD-10-CM

## 2021-12-21 DIAGNOSIS — D36.9 PAPILLOMATOSIS: ICD-10-CM

## 2021-12-21 DIAGNOSIS — I87.2 VENOUS (PERIPHERAL) INSUFFICIENCY: ICD-10-CM

## 2021-12-21 DIAGNOSIS — I87.333 VENOUS HYPERTENSION, CHRONIC, WITH ULCER AND INFLAMMATION, BILATERAL (H): Primary | ICD-10-CM

## 2021-12-21 DIAGNOSIS — M79.89 LEG SWELLING: ICD-10-CM

## 2021-12-21 DIAGNOSIS — E66.01 OBESITY, MORBID, BMI 40.0-49.9 (H): ICD-10-CM

## 2021-12-21 DIAGNOSIS — I89.0 ELEPHANTIASIS: ICD-10-CM

## 2021-12-21 PROCEDURE — 97597 DBRDMT OPN WND 1ST 20 CM/<: CPT | Performed by: NURSE PRACTITIONER

## 2021-12-21 PROCEDURE — 97598 DBRDMT OPN WND ADDL 20CM/<: CPT | Performed by: NURSE PRACTITIONER

## 2021-12-21 RX ORDER — SODIUM HYPOCHLORITE 1.25 MG/ML
SOLUTION TOPICAL
Qty: 1000 ML | Refills: 3 | Status: SHIPPED | OUTPATIENT
Start: 2021-12-21 | End: 2022-09-13

## 2021-12-21 ASSESSMENT — PAIN SCALES - GENERAL: PAINLEVEL: NO PAIN (0)

## 2021-12-21 NOTE — LETTER
2021    [facility]  Fax: [phone] Wound Dressing Rx and Order Form  Customer Service: [phone] Order Status: new   Verbal: Jenny   Patient Info:  Name: Shaheen Sepulveda  : 1955  Address: [address] [address]    Insurance Info:  INSURER: Payor: COMMERCIAL / Plan: [employer] MEDICARE ADVANTAGE / Product Type: Medicare /   Policy ID#:  [id number]  : 1955    Physician Info:   Name: elaina garcias    Dept Address/Phones:   [facility], [address]  [place] [address] [phone]  Fax: [phone]    Impression:   Encounter Diagnoses   Name Primary?     Venous hypertension, chronic, with ulcer and inflammation, bilateral (H) Yes     Obesity, morbid, BMI 40.0-49.9 (H)      Lymphedema of both lower extremities      Leg swelling      Venous (peripheral) insufficiency      Hyperkeratosis of skin      Elephantiasis      Type 2 diabetes mellitus with peripheral neuropathy (H)      Localized swelling, mass and lump, lower limb, bilateral      Lipodermatosclerosis of both lower extremities      Papillomatosis      Wound info:    VASC Wound Right lower leg lateral (Active)   Pre Size Length 12 21 0800   Pre Size Width 13 21 0800   Pre Size Depth 0.1 21 0800   Pre Total Sq cm 156 21 0800       VASC Wound Lt medial (Active)   Pre Size Length 4 21 0800   Pre Size Width 3 21 0800   Pre Size Depth 0.1 21 0800   Pre Total Sq cm 12 21 0800       VASC Wound Lt lateral leg (Active)   Pre Size Length 14 21 0800   Pre Size Width 9 21 0800   Pre Size Depth 0.1 21 0800   Pre Total Sq cm 126 21 0800         Drainage: Moderate  Thickness:  Full  Duration of Need: 30 days  Days Supply: 30 days  Start Date: 2021  Starter Kit:ancillary   Qualifying wound/Debridement: yes/yes      Dressing Type Brand Size Number of pieces Frequency of change   Primary Silvercel    4.25''x4.25'' 24 sheets  Twice per week     ABD Pads   8''x10'' 24 sheets  Twice per week    Sof form roll gauze   4''x75'' 32 rolls  Twice per week     Square gauze   4''x4'' 2 loafs  Twice per week     Paper tape  1'' 2 rolls Twice per week        Note: If total out of pocket is more than $50.00 please contact the patient before processing order.     OK to forward to covered supplier.    Electronically Signed Physician: JOHAN RICHMOND                         Date: 12/21/2021

## 2021-12-21 NOTE — PATIENT INSTRUCTIONS
"Consider CT scan of the abdomen and pelvis    Consider scheduling with bariatrics to discuss weight loss options    Continue going to lymphedema therapy; bring all dressings; lotions and supplies with you to these appt    Take x2 protein shakes daily such as premier protein      Wound Care Instructions    Every 3 days , Cleanse your BLE wound(s) with Dakin's solution wash the wound and legs with the Dakin's solution; ok to let sit for a few minutes; ok to rinse with saline if burning sensation occurs.    Pat Dry with non-sterile gauze    Apply Lotion to the intact skin surrounding your wound and other dry skin locations. Some good lotions include: Remedy Skin Repair Cream, Sarna, Vanicream or Cetaphil    Apply Urea based lotion to the scaling, crusting and thickened skin areas    Primary Dressing: Apply silvercel 4.25\"x4.25\" into/onto the wounds    Secondary dressing: Cover with ABD 8\"x10\"    Secure with non-sterile roll gauze (4\" x 75\" roll) and tape (1\" roll tape) as needed; avoid adhesive directly on the skin    Compression: tubular compression; foam; short stretch    It is not ok to get your wound wet in the bath or shower    Use lymphedema pump 1-2 times per day for 30-60 minutes intervals    Elevate the legs  SEEK MEDICAL CARE IF:    You have an increase in swelling, pain, or redness around the wound.    You have an increase in the amount of pus coming from the wound.    There is a bad smell coming from the wound.    The wound appears to be worsening/enlarging    You have a fever greater than 101.5 F      It is ok to continue current wound care treatment/products for the next 2-3 days until new wound care supplies are ordered and arrive. If longer than this please contact our office at [phone].    High Protein Foods  Chicken  -Chicken breast, 3.5oz.-30 grams protein  -Chicken thigh-10 grams(average size)  -Drumstick-11 grams  -Wing- 6 grams  -Chicken meat, cooked, 4 oz.  Beef  -Hamburger katia, 4 oz-28 " grams protein  -Steak, 6 oz-42 grams  -Most cuts of beef- 7 grams of protein per ounce  Fish  -Most fish fillets or steaks are about 22 grams of protein for 3 1/2 oz(100 grams) of cooked fish, or 6 grams per ounce  -Tuna, 6 oz can-40 grams of protein  Pork  -Pork chop, average-22 grams protein  -Pork loin or tenderloin, 4 oz.-29 grams  -Ham, 3oz serving- 19 grams  -Ground pork 3oz cooked-22 grams  -Winkler, 1 slice-3 grams  -Senegalese-style winkler(black winkler), slice-5-6 grams  Eggs and Dairy  -Egg, large-7 grams  -Milk, 1 cup-8 grams  -Cottage cheese, 1/2 cup-15 grams  -Greek yogurt, 1 cup-usually 8-12 grams, check label  -Soft cheeses (Mozzarella, Brie, Camembert)- 6 grams  -Medium cheeses(cheddar, swiss)- 7 or 8 grams per oz  -Hard cheeses(parmesan)- 10 grams per oz  Beans  -Tofu, 1/2 cup 20 grams  -Tofu, 1 oz., 2.3 grams  -Soy milk, 1 cup-6-10 grams  -Most beans(black, marcelino, lentils, etc.) about 7-10 grams protein per half cup of cooked beans  -soy beans, 1/2 cup cooked-14 grams  -Split peas, 1/2 cup cooked- 8 grams  Nuts and Seeds  -Peanut butter, 2 Tablespoons- 8 grams protein  -Almonds, 1/4 cup- 8 grams  -Peanuts, 1/4 cup-9 grams  -Cashews, 1/4 cup- 5 grams  -Pecans, 1/4 cup- 2.5 grams  -Sunflower seeds, 1/4 cup- 6 grams  -Pumpkin seeds, 1/4 cup-8 grams  -Flax seeds- 1/4 cup- 8 grams  Protein Supplements  -Ensure  -Boost  -Glucerna, if diabetic  When you have an open ulcer, your bodies protein needs are much higher, so it is recommended eat good sources of protein

## 2021-12-21 NOTE — PROGRESS NOTES
"            Follow up Vascular Visit       Date of Service:12/21/21      Chief Complaint: BLE swelling and wounds; chronic      Pt returns to [facility] with regards to their BLE swelling and wounds.  They arrive today alone. They are currently using silvercel; abd; rolled gauze to the wounds. This is being done by lymphedema therapy twice per week. They are using lymphedema wraps for compression. They are feeling well today. Denies fevers, chills. No shortness of breath. He has been dealing with swelling in the legs for at least 10 years. He developed wounds on the legs about 1 year ago. Venous insufficiency study showed deep and superficial vein insufficiency on right with proximal superficial insufficiency in SFV on left. Culture was done 2 months ago this grew out mixed ghanshyam without predominating organism. ABIs were also ordered; this was switched to an arterial duplex due to NC arteries and this was normal. Dr. Reyes was considering CT of abdomen and pelvis if swelling and wounds were not improving; pt has declined doing this at this time. She would also like him fit for shoes and insoles once out of wrappings. He was referred to bariatrics; he was called by them but opted to not make appt as he is too overwhelmed with all the other appt.     Allergies:   Allergies   Allergen Reactions     Lisinopril Swelling     Patient reports \"neck swelling\"  Swelling in throat       Medications:   Current Outpatient Medications:      atorvastatin (LIPITOR) 20 MG tablet, Take 1 tablet (20 mg) by mouth daily, Disp: 90 tablet, Rfl: 3     furosemide (LASIX) 40 MG tablet, Take 1 tablet (40 mg) by mouth 2 times daily, Disp: 180 tablet, Rfl: 3     losartan (COZAAR) 100 MG tablet, Take 1 tablet (100 mg) by mouth daily, Disp: 90 tablet, Rfl: 3     metFORMIN (GLUCOPHAGE) 500 MG tablet, Take 1 tablet (500 mg) by mouth 2 times daily (with meals), Disp: 180 tablet, Rfl: 3     methadone (DOLOPHINE-INTENSOL) 10 MG/ML " (HIGH CONC) solution, Take 100 mg by mouth daily, Disp: , Rfl:     Current Facility-Administered Medications:      lidocaine (XYLOCAINE) 2 % external gel, , Topical, Daily PRN, Lucia Reyes MD, Given at 10/19/21 0828    History:   Past Medical History:   Diagnosis Date     Diabetes (H)      H/O angioedema 6/15/2020    Formatting of this note might be different from the original. Unexplained angioedema twice, discontinue lisinopril for possible connection to it, though he had used it afterwards with no symptoms     History of tobacco use disorder 8/1/2013    Formatting of this note might be different from the original. Added per documetation     Hypertension      Lymphedema of both lower extremities 12/22/2014     Methadone use 5/8/2012     Obstructive sleep apnea 2/2/2015    Formatting of this note might be different from the original. Epic     Pleural mass 7/2/2013     Uncomplicated asthma        Physical Exam:    BP (!) 148/80   Pulse 72   Temp 98.1  F (36.7  C)   Resp 20   Wt (!) 418 lb 8 oz (189.8 kg)   BMI 52.31 kg/m      General:  Patient presents to clinic in no apparent distress.  Head: normocephalic atraumatic  Psychiatric:  Alert and oriented x3.   Respiratory: unlabored breathing; no cough  Integumentary:  Skin is uniformly warm, dry and pink.    Wound #1 Location: right lower leg  Size: 12L x 13W x 0.1cmdepth.  No sinus tract present, Wound base: macerated papillomatosis;   No undermining present. Wound is partial thickness. There is heavy; malodorous drainage. Periwound: no denudement, erythema, induration, maceration or warmth.      Wound #2 Location:left medial leg  Size: 4x3 x 0.1cmdepth.  No sinus tract present, Wound base: macerated papillomatosis;   No undermining present. Wound is partial thickness. There is heavy; malodorous drainage. Periwound: no denudement, erythema, induration, maceration or warmth.      Wound #3 Location:left lateral leg  Size: 14x9x 0.1cmdepth.  No sinus tract  present, Wound base: macerated papillomatosis;   No undermining present. Wound is partial thickness. There is heavy; malodorous drainage. Periwound: no denudement, erythema, induration, maceration or warmth.      Extensive scaling and crusting; elephantiasis; tissues are firm and fibrotic          VASC Wound Right lower leg lateral (Active)   Pre Size Length 12 12/21/21 0800   Pre Size Width 13 12/21/21 0800   Pre Size Depth 0.1 12/21/21 0800   Pre Total Sq cm 156 12/21/21 0800   Number of days: 90       VASC Wound Left lower leg medial (Active)   Number of days: 90       VASC Wound rt lateral weeping (Active)   Number of days: 63       VASC Wound Lt medial (Active)   Pre Size Length 4 12/21/21 0800   Pre Size Width 3 12/21/21 0800   Pre Size Depth 0.1 12/21/21 0800   Pre Total Sq cm 12 12/21/21 0800   Number of days: 63       VASC Wound Lt lateral leg (Active)   Pre Size Length 14 12/21/21 0800   Pre Size Width 9 12/21/21 0800   Pre Size Depth 0.1 12/21/21 0800   Pre Total Sq cm 126 12/21/21 0800   Description weeping 11/23/21 0700   Number of days: 28            Circumferential volume measures:      Circumferential Measures 10/5/2021 10/7/2021 10/12/2021 10/19/2021 11/23/2021   Right just above MTP 30.5 29.6 29 28.3 28.6   Right Ankle 38 36.1 37 36.2 36.4   Right Widest Calf 57 59.6 59 60.7 58   Right Thigh Up 10cm - - - - -   Left - just above MTP 29 27.4 28.5 27.6 28.1   Left Ankle 34 34.4 35 33.6 34.3   Left Widest Calf 60 54.5 53 58.4 54   Left Thigh Up 10cm - - - - -   Left Knee to Ankle - - - 42 -       Labs:    I personally reviewed the following lab results today and those on care everywhere    CRP   Date Value Ref Range Status   04/23/2021 8.5 (H) 0.0 - 0.8 mg/dL Final      No results found for: SED   Last Renal Panel:  Sodium   Date Value Ref Range Status   06/10/2021 142.0 133.0 - 144.0 mmol/L Final     Potassium   Date Value Ref Range Status   06/10/2021 4.4 3.4 - 5.3 mmol/L Final     Chloride   Date  Value Ref Range Status   06/10/2021 100.0 94.0 - 109.0 mmol/L Final     Carbon Dioxide   Date Value Ref Range Status   06/10/2021 33.0 (H) 20.0 - 32.0 mmol/L Final     Anion Gap   Date Value Ref Range Status   04/24/2021 10 5 - 18 mmol/L Final     Glucose   Date Value Ref Range Status   06/10/2021 112.0 (H) 60.0 - 109.0 mg/dL Final     Urea Nitrogen   Date Value Ref Range Status   06/10/2021 16.0 7.0 - 30.0 mg/dL Final     Creatinine   Date Value Ref Range Status   06/10/2021 1.0 0.8 - 1.5 mg/dL Final     GFR Estimate   Date Value Ref Range Status   04/24/2021 >60 >60 mL/min/1.73m2 Final     Calcium   Date Value Ref Range Status   06/10/2021 9.5 8.5 - 10.4 mg/dL Final     Albumin   Date Value Ref Range Status   04/24/2021 2.7 (L) 3.5 - 5.0 g/dL Final      Lab Results   Component Value Date    WBC 6.5 04/24/2021     Lab Results   Component Value Date    RBC 3.84 04/24/2021     Lab Results   Component Value Date    HGB 9.3 04/24/2021     Lab Results   Component Value Date    HCT 31.6 04/24/2021     No components found for: MCT  Lab Results   Component Value Date    MCV 82 04/24/2021     Lab Results   Component Value Date    MCH 24.2 04/24/2021     Lab Results   Component Value Date    MCHC 29.4 04/24/2021     Lab Results   Component Value Date    RDW 14.3 04/24/2021     Lab Results   Component Value Date     04/24/2021      Lab Results   Component Value Date    A1C 6.2 04/24/2021    A1C 6.2 04/24/2021      No results found for: TSH   No results found for: VITDT                Impression:  Encounter Diagnoses   Name Primary?     Venous hypertension, chronic, with ulcer and inflammation, bilateral (H) Yes     Obesity, morbid, BMI 40.0-49.9 (H)      Lymphedema of both lower extremities      Leg swelling      Venous (peripheral) insufficiency      Hyperkeratosis of skin      Elephantiasis      Type 2 diabetes mellitus with peripheral neuropathy (H)      Localized swelling, mass and lump, lower limb, bilateral       Lipodermatosclerosis of both lower extremities      Papillomatosis           12/21/2021      12/21/2021      12/21/2021                10/2021 BLE          10/2021 BLE          10/2021 BLE            Are any of these wounds new today: No; Location: na    Assessment/Plan:          1. Debridement: After discussion of risk factors and verbal consent was obtained 2% Lidocaine HCL jelly was applied, under clean conditions, the BLE ulceration(s) were debrided using currette. Devitalized and nonviable tissue, along with any fibrin and slough, was removed to improve granulation tissue formation, stimulate wound healing, decrease overall bacteria load, disrupt biofilm formation and decrease edge senescence.  Total excisional debridement was 294 sq cm from the epidermis/dermis area with a depth of 0-0.1 cm.   Ulcers were improved afterwards and .  Measures were unchanged after debridement.       2.  Wound treatment: wound treatment will include irrigation and dressings to promote autolytic debridement which will include:He continues to severe odor and drainage; will stop the vashe and go to Dakins solution to wash the skin and wounds to help decrease odor and bacterial load on the skin; continue urea based lotion to the scaling/crusting skin; continue silvercel to the wounds; change twice per week at lymphedema therapy; when he has completed lymphedema therapy his daughter and nephew will assist with daily cares; I am hopeful that daily application of the urea based cream will help the quality of his skin Stable            3. Edema: continue lymphedema therapy; these sessions are coming to an end; will need to be sized for velcro wraps at next visit; he will then need to take over applying the compression; he has a daughter and nephew will assist. He has a flexitouch pump at home he finds this cumbersome and difficult to use; I encouraged him to keep trying to use it daily for 30-60 minutes; it will get easier to use  with more practice; encouraged elevaton . The compression wraps were applied today in clinic. Stable            4. Nutrition: he has declined bariatrics referral; he did not purchase protein supplements as previously discussed; again discussed his last albumin level was low; he needs 90-110g of protein per day; recommend 1-2 premier protein shakes per day; we discussed the importance of protein for healing as well as oncotic pressure to help with weeping. We also spoke about low sodium diet; he is now reading labels and no longer adding salt to his meals            5. Offloading: will eventually need shoes and insoles after out of the wraps     Patient will follow up with me in 4 weeks for reevaluation. They were instructed to call the clinic sooner with any signs or symptoms of infection or any further questions/concerns. Answered all questions.          Kim Fuentes DNP, RN, CNP, CWOCN, CFCN, CLT  [facility]   [phone]        This note was electronically signed by Kim Fuentes NP

## 2021-12-24 ENCOUNTER — HOSPITAL ENCOUNTER (OUTPATIENT)
Dept: PHYSICAL THERAPY | Facility: REHABILITATION | Age: 66
End: 2021-12-24
Payer: COMMERCIAL

## 2021-12-24 DIAGNOSIS — I87.2 VENOUS (PERIPHERAL) INSUFFICIENCY: ICD-10-CM

## 2021-12-24 DIAGNOSIS — L85.9 HYPERKERATOSIS OF SKIN: ICD-10-CM

## 2021-12-24 DIAGNOSIS — E66.01 OBESITY, MORBID, BMI 40.0-49.9 (H): ICD-10-CM

## 2021-12-24 DIAGNOSIS — E11.42 TYPE 2 DIABETES MELLITUS WITH PERIPHERAL NEUROPATHY (H): ICD-10-CM

## 2021-12-24 DIAGNOSIS — I87.333 VENOUS HYPERTENSION, CHRONIC, WITH ULCER AND INFLAMMATION, BILATERAL (H): ICD-10-CM

## 2021-12-24 DIAGNOSIS — L85.9 EPITHELIAL HYPERPLASIA OF SKIN: ICD-10-CM

## 2021-12-24 DIAGNOSIS — M79.89 LEG SWELLING: Primary | ICD-10-CM

## 2021-12-24 DIAGNOSIS — B35.1 ONYCHOMYCOSIS: ICD-10-CM

## 2021-12-24 DIAGNOSIS — L60.2 ONYCHAUXIS: ICD-10-CM

## 2021-12-24 DIAGNOSIS — I89.0 LYMPHEDEMA OF BOTH LOWER EXTREMITIES: ICD-10-CM

## 2021-12-24 PROCEDURE — 97140 MANUAL THERAPY 1/> REGIONS: CPT | Mod: GP | Performed by: PHYSICAL THERAPIST

## 2021-12-27 ENCOUNTER — HOSPITAL ENCOUNTER (OUTPATIENT)
Dept: PHYSICAL THERAPY | Facility: REHABILITATION | Age: 66
End: 2021-12-27
Payer: COMMERCIAL

## 2021-12-27 DIAGNOSIS — B35.1 ONYCHOMYCOSIS: ICD-10-CM

## 2021-12-27 DIAGNOSIS — I87.2 VENOUS (PERIPHERAL) INSUFFICIENCY: ICD-10-CM

## 2021-12-27 DIAGNOSIS — M79.89 LEG SWELLING: Primary | ICD-10-CM

## 2021-12-27 DIAGNOSIS — I87.333 VENOUS HYPERTENSION, CHRONIC, WITH ULCER AND INFLAMMATION, BILATERAL (H): ICD-10-CM

## 2021-12-27 DIAGNOSIS — I89.0 LYMPHEDEMA OF BOTH LOWER EXTREMITIES: ICD-10-CM

## 2021-12-27 DIAGNOSIS — E66.01 OBESITY, MORBID, BMI 40.0-49.9 (H): ICD-10-CM

## 2021-12-27 DIAGNOSIS — L85.9 EPITHELIAL HYPERPLASIA OF SKIN: ICD-10-CM

## 2021-12-27 DIAGNOSIS — E11.42 TYPE 2 DIABETES MELLITUS WITH PERIPHERAL NEUROPATHY (H): ICD-10-CM

## 2021-12-27 DIAGNOSIS — L85.9 HYPERKERATOSIS OF SKIN: ICD-10-CM

## 2021-12-27 DIAGNOSIS — L60.2 ONYCHAUXIS: ICD-10-CM

## 2021-12-27 PROCEDURE — 97140 MANUAL THERAPY 1/> REGIONS: CPT | Mod: GP | Performed by: PHYSICAL THERAPIST

## 2021-12-30 ENCOUNTER — HOSPITAL ENCOUNTER (OUTPATIENT)
Dept: PHYSICAL THERAPY | Facility: REHABILITATION | Age: 66
End: 2021-12-30
Payer: COMMERCIAL

## 2021-12-30 DIAGNOSIS — E66.01 OBESITY, MORBID, BMI 40.0-49.9 (H): ICD-10-CM

## 2021-12-30 DIAGNOSIS — L60.2 ONYCHAUXIS: ICD-10-CM

## 2021-12-30 DIAGNOSIS — E11.42 TYPE 2 DIABETES MELLITUS WITH PERIPHERAL NEUROPATHY (H): ICD-10-CM

## 2021-12-30 DIAGNOSIS — I87.2 VENOUS (PERIPHERAL) INSUFFICIENCY: ICD-10-CM

## 2021-12-30 DIAGNOSIS — B35.1 ONYCHOMYCOSIS: ICD-10-CM

## 2021-12-30 DIAGNOSIS — I89.0 LYMPHEDEMA OF BOTH LOWER EXTREMITIES: ICD-10-CM

## 2021-12-30 DIAGNOSIS — M79.89 LEG SWELLING: Primary | ICD-10-CM

## 2021-12-30 DIAGNOSIS — I87.333 VENOUS HYPERTENSION, CHRONIC, WITH ULCER AND INFLAMMATION, BILATERAL (H): ICD-10-CM

## 2021-12-30 DIAGNOSIS — L85.9 HYPERKERATOSIS OF SKIN: ICD-10-CM

## 2021-12-30 DIAGNOSIS — L85.9 EPITHELIAL HYPERPLASIA OF SKIN: ICD-10-CM

## 2021-12-30 PROCEDURE — 97140 MANUAL THERAPY 1/> REGIONS: CPT | Mod: GP | Performed by: PHYSICAL THERAPIST

## 2021-12-30 NOTE — PROGRESS NOTES
[facility]    Outpatient Physical Therapy Discharge Note  Patient: Shaheen Sepulveda  : 1955    Beginning/End Dates of Reporting Period:  10/14/21 to 21    Referring Provider: Kim Cordova Diagnosis: lymphedema     Client Self Report: Pt has new wound care supplies.     Objective Measurements:  Objective Measure: Wounds  Details: slightly improved drainage from wounds today- still moderate yellow/greenish discharge on ABD pads                                    Outcome Measures (most recent score):  Lymphedema Life Impact Scale (score range 0-72). A higher score indicates greater impairment.: (P) 25    Goals:  Goal Identifier HEP   Goal Description Pt will be independent in HEP to address impairments and improve function   Target Date 21   Date Met  21   Progress (detail required for progress note): MET     Goal Identifier Self-care   Goal Description Pt will be independent in self care including compression wear, compression care, self-MLD, exercise, skin and wound care to manage swelling independently long term   Target Date 22   Date Met  21   Progress (detail required for progress note): MET- pt has compression pump, self-MLD, exercise, is getting fitted for velcro compressions and has home help with wound care and bandaging until velcros arrive     Goal Identifier Edema    Goal Description Pt will demonstrate a >10% decrease in limb volume to improve donning/doffing shoes and improve healing of wounds   Target Date 22   Date Met      Progress (detail required for progress note): no improvement     Goal Identifier     Goal Description     Target Date     Date Met      Progress (detail required for progress note):       Goal Identifier     Goal Description     Target Date     Date Met      Progress (detail required for progress note):       Goal Identifier      Goal Description     Target Date     Date Met      Progress (detail required for progress note):       Goal Identifier     Goal Description     Target Date     Date Met      Progress (detail required for progress note):       Goal Identifier     Goal Description     Target Date     Date Met      Progress (detail required for progress note):         Plan:  Discharge from therapy.    Discharge:    Reason for Discharge: No further expectation of progress.  Pt has met 2/3 goals. No change in limb volume measures and pt has reached plateau in progress. Pt is to continue to follow up with Vascular clinic. He is to use compression bandaging until velcro compressions arrive and focus on compression pump, exercise and wound care.    Equipment Issued: compression bandaging supplies    Discharge Plan: Follow up with Vascular clinic.    Elaine Seth, PT, DPT, CLT-CANDELARIO

## 2022-01-10 ENCOUNTER — TELEPHONE (OUTPATIENT)
Dept: VASCULAR SURGERY | Facility: CLINIC | Age: 67
End: 2022-01-10

## 2022-01-17 ENCOUNTER — OFFICE VISIT (OUTPATIENT)
Dept: VASCULAR SURGERY | Facility: CLINIC | Age: 67
End: 2022-01-17
Attending: NURSE PRACTITIONER
Payer: COMMERCIAL

## 2022-01-17 VITALS
BODY MASS INDEX: 52.68 KG/M2 | TEMPERATURE: 97.3 F | DIASTOLIC BLOOD PRESSURE: 82 MMHG | RESPIRATION RATE: 18 BRPM | SYSTOLIC BLOOD PRESSURE: 146 MMHG | HEART RATE: 84 BPM | WEIGHT: 315 LBS

## 2022-01-17 DIAGNOSIS — I87.333 VENOUS HYPERTENSION, CHRONIC, WITH ULCER AND INFLAMMATION, BILATERAL (H): Primary | ICD-10-CM

## 2022-01-17 DIAGNOSIS — D36.9 PAPILLOMATOSIS: ICD-10-CM

## 2022-01-17 DIAGNOSIS — M79.3 LIPODERMATOSCLEROSIS OF BOTH LOWER EXTREMITIES: ICD-10-CM

## 2022-01-17 DIAGNOSIS — E11.42 TYPE 2 DIABETES MELLITUS WITH PERIPHERAL NEUROPATHY (H): ICD-10-CM

## 2022-01-17 DIAGNOSIS — I89.0 ELEPHANTIASIS: ICD-10-CM

## 2022-01-17 DIAGNOSIS — L85.9 HYPERKERATOSIS OF SKIN: ICD-10-CM

## 2022-01-17 DIAGNOSIS — M79.89 LEG SWELLING: ICD-10-CM

## 2022-01-17 DIAGNOSIS — I87.2 VENOUS (PERIPHERAL) INSUFFICIENCY: ICD-10-CM

## 2022-01-17 DIAGNOSIS — E66.01 OBESITY, MORBID, BMI 40.0-49.9 (H): ICD-10-CM

## 2022-01-17 DIAGNOSIS — I89.0 LYMPHEDEMA OF BOTH LOWER EXTREMITIES: ICD-10-CM

## 2022-01-17 RX ORDER — AMMONIUM LACTATE 12 G/100G
LOTION TOPICAL DAILY PRN
COMMUNITY
Start: 2021-12-22 | End: 2022-10-11

## 2022-01-17 ASSESSMENT — PAIN SCALES - GENERAL: PAINLEVEL: NO PAIN (0)

## 2022-01-17 NOTE — LETTER
[facility]    [facility]           Fax: [phone]            Customer Service: [phone]    Wound Dressing Rx and Order Form  Order Status: re-order  Verbal: Audrey  Date: 2022     Shaheen Sepulveda  Gender: male  : 1955  [address] [address]  [phone] (home)     Medical Record: [id number]  Primary Care Provider: Radha Sal      ICD-10-CM    1. Venous hypertension, chronic, with ulcer and inflammation, bilateral (H)  I87.333 Wound care    L97.919     L97.929    2. Obesity, morbid, BMI 40.0-49.9 (H)  E66.01 Wound care   3. Lymphedema of both lower extremities  I89.0 Wound care   4. Leg swelling  M79.89 Wound care   5. Venous (peripheral) insufficiency  I87.2 Wound care   6. Hyperkeratosis of skin  L85.9 Wound care   7. Elephantiasis  I89.0 Wound care   8. Type 2 diabetes mellitus with peripheral neuropathy (H)  E11.42 Wound care   9. Lipodermatosclerosis of both lower extremities  I83.11 Wound care    I83.12    10. Papillomatosis  D36.9 Wound care         Insurance Info:  INSURER: Payor: COMMERCIAL / [facility] / Product Type: Medicare /   Policy ID#:  [id number]    Physician Info:   Name:  JOHAN RICHMOND     Dept Address/Phones:   [address] [place] [address], [address] [address] [phone]  Fax: [phone]    Lymphedema circumferential measurements (in cm):  Circumferential Measures 10/7/2021 10/12/2021 10/19/2021 2021 2022   Right just above MTP 29.6 29 28.3 28.6 24.4   Right Ankle 36.1 37 36.2 36.4 39.2   Right Widest Calf 59.6 59 60.7 58 55.2   Right Thigh Up 10cm - - - - -   Left - just above MTP 27.4 28.5 27.6 28.1 28.2   Left Ankle 34.4 35 33.6 34.3 35.5   Left Widest Calf 54.5 53 58.4 54 52.3   Left Thigh Up 10cm - - - - -   Left Knee to Ankle - - 42 - -         Wound info:  VASC Wound Right lower leg lateral (Active)   Pre Size Length 10 22 0800   Pre Size Width  "13 01/17/22 0800   Pre Size Depth 0.1 01/17/22 0800   Pre Total Sq cm 130 01/17/22 0800   Number of days: 117       VASC Wound Left lower leg medial (Active)   Number of days: 117       VASC Wound rt lateral weeping (Active)   Number of days: 90       VASC Wound Lt medial (Active)   Pre Size Length 5 01/17/22 0800   Pre Size Width 5 01/17/22 0800   Pre Size Depth 0.1 01/17/22 0800   Pre Total Sq cm 25 01/17/22 0800   Number of days: 90       VASC Wound Lt lateral leg (Active)   Pre Size Length 15 01/17/22 0800   Pre Size Width 13 01/17/22 0800   Pre Size Depth 0.1 01/17/22 0800   Pre Total Sq cm 195 01/17/22 0800   Number of days: 55        Drainage: heavy  Thickness:  full  Duration of Need: 30 DAYS  Days Supply: 30 DAYS  Start Date: 1/17/22  Starter Kit: ancillary  Qualifying wound/Debridement: Yes      Dressing Type Brand Size Number of pieces Frequency of change    Primary Silver alginate silvercel 4.25\"x4.25\" 20 Every 3days    Secondary ABD pad  8\"x10\" 30 Every 3 days     Sof form roll gauze  4\"x75\" 30 Every 3 days      tape paper  2\" 2 rolls Every 3 days    Square gauze  4\"x4\" 2 loaves Every 3 days       Note: If total out of pocket is more than $50.00 please contact the patient before processing order.     OK to forward to covered supplier.    Consider CT scan of the abdomen and pelvis    Consider scheduling with bariatrics to discuss weight loss options    Take x2 protein shakes daily such as premier protein      Wound Care Instructions    Every 3 days , Cleanse your BLE wound(s) with Dakin's solution wash the wound and legs with the Dakin's solution; ok to let sit for a few minutes; ok to rinse with saline if burning sensation occurs.    Pat Dry with non-sterile gauze 4\"x4\"    Apply Lotion to the intact skin surrounding your wound and other dry skin locations. Some good lotions include: Remedy Skin Repair Cream, Sarna, Vanicream or Cetaphil    Apply Urea based lotion to the scaling, crusting and thickened " "skin areas    Primary Dressing: Apply silvercel 4.25\"x4.25\" into/onto the wounds    Secondary dressing: Cover with ABD 8\"x10\"    Secure with non-sterile roll gauze (4\" x 75\" roll) and tape (2\" roll tape) as needed; avoid adhesive directly on the skin    Compression: tubular compression; foam; short stretch    It is not ok to get your wound wet in the bath or shower    Use lymphedema pump 1-2 times per day for 30-60 minutes intervals    Elevate the legs      Electronically Signed Physician:  JOHAN RICHMOND             Date: January 17, 2022    "

## 2022-01-17 NOTE — PATIENT INSTRUCTIONS
"Consider CT scan of the abdomen and pelvis    Consider scheduling with bariatrics to discuss weight loss options    Take x2 protein shakes daily such as premier protein      Wound Care Instructions    Every 3 days , Cleanse your BLE wound(s) with Dakin's solution wash the wound and legs with the Dakin's solution; ok to let sit for a few minutes; ok to rinse with saline if burning sensation occurs.    Pat Dry with non-sterile gauze    Apply Lotion to the intact skin surrounding your wound and other dry skin locations. Some good lotions include: Remedy Skin Repair Cream, Sarna, Vanicream or Cetaphil    Apply Urea based lotion to the scaling, crusting and thickened skin areas    Primary Dressing: Apply silvercel 4.25\"x4.25\" into/onto the wounds    Secondary dressing: Cover with ABD 8\"x10\"    Secure with non-sterile roll gauze (4\" x 75\" roll) and tape (1\" roll tape) as needed; avoid adhesive directly on the skin    Compression: tubular compression; foam; short stretch    It is not ok to get your wound wet in the bath or shower    Use lymphedema pump 1-2 times per day for 30-60 minutes intervals    Elevate the legs  SEEK MEDICAL CARE IF:    You have an increase in swelling, pain, or redness around the wound.    You have an increase in the amount of pus coming from the wound.    There is a bad smell coming from the wound.    The wound appears to be worsening/enlarging    You have a fever greater than 101.5 F      It is ok to continue current wound care treatment/products for the next 2-3 days until new wound care supplies are ordered and arrive. If longer than this please contact our office at [phone].    High Protein Foods  Chicken  -Chicken breast, 3.5oz.-30 grams protein  -Chicken thigh-10 grams(average size)  -Drumstick-11 grams  -Wing- 6 grams  -Chicken meat, cooked, 4 oz.  Beef  -Hamburger katia, 4 oz-28 grams protein  -Steak, 6 oz-42 grams  -Most cuts of beef- 7 grams of protein per ounce  Fish  -Most fish " fillets or steaks are about 22 grams of protein for 3 1/2 oz(100 grams) of cooked fish, or 6 grams per ounce  -Tuna, 6 oz can-40 grams of protein  Pork  -Pork chop, average-22 grams protein  -Pork loin or tenderloin, 4 oz.-29 grams  -Ham, 3oz serving- 19 grams  -Ground pork 3oz cooked-22 grams  -Winkler, 1 slice-3 grams  -Rwandan-style winkler(black winkler), slice-5-6 grams  Eggs and Dairy  -Egg, large-7 grams  -Milk, 1 cup-8 grams  -Cottage cheese, 1/2 cup-15 grams  -Greek yogurt, 1 cup-usually 8-12 grams, check label  -Soft cheeses (Mozzarella, Brie, Camembert)- 6 grams  -Medium cheeses(cheddar, swiss)- 7 or 8 grams per oz  -Hard cheeses(parmesan)- 10 grams per oz  Beans  -Tofu, 1/2 cup 20 grams  -Tofu, 1 oz., 2.3 grams  -Soy milk, 1 cup-6-10 grams  -Most beans(black, marcelino, lentils, etc.) about 7-10 grams protein per half cup of cooked beans  -soy beans, 1/2 cup cooked-14 grams  -Split peas, 1/2 cup cooked- 8 grams  Nuts and Seeds  -Peanut butter, 2 Tablespoons- 8 grams protein  -Almonds, 1/4 cup- 8 grams  -Peanuts, 1/4 cup-9 grams  -Cashews, 1/4 cup- 5 grams  -Pecans, 1/4 cup- 2.5 grams  -Sunflower seeds, 1/4 cup- 6 grams  -Pumpkin seeds, 1/4 cup-8 grams  -Flax seeds- 1/4 cup- 8 grams  Protein Supplements  -Ensure  -Boost  -Glucerna, if diabetic  When you have an open ulcer, your bodies protein needs are much higher, so it is recommended eat good sources of protein

## 2022-01-17 NOTE — PROGRESS NOTES
"            Follow up Vascular Visit       Date of Service:01/17/22      Chief Complaint: BLE edema; venous stasis ulcers; chronic      Pt returns to [facility] with regards to their BLE edema; venous stasis ulcers; chronic.  They arrive today alone. They are currently using Dakin's solution to wash the wounds and legs; silvercel; ABD; rolled gauze to the wounds. This is being done by patient or family 3 days per week. They are using tubular compression and short stretch for compression; arrives with no compression on states he removed this today before his appt. They are feeling well today. Denies fevers, chills. No shortness of breath. He has been dealing with swelling in the legs for at least 10 years. He developed wounds on the legs about 1 year ago. Venous insufficiency study showed deep and superficial vein insufficiency on right with proximal superficial insufficiency in SFV on left; not severe enough at this time for RFA. Culture was done 3 months ago this grew out mixed ghanshyam without predominating organism. ABIs were also ordered; this was switched to an arterial duplex due to NC arteries and this was normal. Dr. Reyes was considering CT of abdomen and pelvis if swelling and wounds were not improving; pt has declined doing this at this time. She would also like him fit for shoes and insoles once out of wrappings. He was referred to bariatrics; he was called by them but opted to not make appt as he is too overwhelmed with all the other appt. He is not using his lymphedema pump as he finds it too complicated.       Allergies:   Allergies   Allergen Reactions     Lisinopril Swelling     Patient reports \"neck swelling\"  Swelling in throat       Medications:   Current Outpatient Medications:      ammonium lactate (LAC-HYDRIN) 12 % external lotion, , Disp: , Rfl:      atorvastatin (LIPITOR) 20 MG tablet, Take 1 tablet (20 mg) by mouth daily, Disp: 90 tablet, Rfl: 3     furosemide (LASIX) 40 MG " tablet, Take 1 tablet (40 mg) by mouth 2 times daily, Disp: 180 tablet, Rfl: 3     losartan (COZAAR) 100 MG tablet, Take 1 tablet (100 mg) by mouth daily, Disp: 90 tablet, Rfl: 3     metFORMIN (GLUCOPHAGE) 500 MG tablet, Take 1 tablet (500 mg) by mouth 2 times daily (with meals), Disp: 180 tablet, Rfl: 3     methadone (DOLOPHINE-INTENSOL) 10 MG/ML (HIGH CONC) solution, Take 100 mg by mouth daily, Disp: , Rfl:      sodium hypochlorite (QUARTER-STRENGTH DAKINS) external solution, Apply topically every 72 hours Use 300mL every 72 hours to wash the bilateral legs and wounds on the legs, Disp: 1000 mL, Rfl: 3    Current Facility-Administered Medications:      lidocaine (XYLOCAINE) 2 % external gel, , Topical, Daily PRN, Lucia Reyes MD, Given at 10/19/21 0828    History:   Past Medical History:   Diagnosis Date     Diabetes (H)      H/O angioedema 6/15/2020    Formatting of this note might be different from the original. Unexplained angioedema twice, discontinue lisinopril for possible connection to it, though he had used it afterwards with no symptoms     History of tobacco use disorder 8/1/2013    Formatting of this note might be different from the original. Added per documetation     Hypertension      Lymphedema of both lower extremities 12/22/2014     Methadone use 5/8/2012     Obstructive sleep apnea 2/2/2015    Formatting of this note might be different from the original. Epic     Pleural mass 7/2/2013     Uncomplicated asthma        Physical Exam:    BP (!) 146/82   Pulse 84   Temp 97.3  F (36.3  C)   Resp 18   Wt (!) 421 lb 8 oz (191.2 kg)   BMI 52.68 kg/m      General:  Patient presents to clinic in no apparent distress.  Head: normocephalic atraumatic  Psychiatric:  Alert and oriented x3.   Respiratory: unlabored breathing; no cough  Integumentary:  Skin is uniformly warm, dry and pink.    Wound #1 Location: right lower leg  Size: 10L x 13W x 0.1depth.  No sinus tract present, Wound base: macerated  papillomatosis with surrounding crusting and scaling  No undermining present. Wound is full thickness. There is heavy drainage. Periwound: no denudement, erythema, induration, maceration or warmth.      Wound #2 Location: left medial leg  Size:5x5x 0.1depth.  No sinus tract present, Wound base: macerated papillomatosis with surrounding crusting and scaling  No undermining present. Wound is full thickness. There is heavy drainage. Periwound: no denudement, erythema, induration, maceration or warmth.      Wound #3 Location: left lateral leg  Size: 15 x 13W x 0.1depth.  No sinus tract present, Wound base: macerated papillomatosis with surrounding crusting and scaling  No undermining present. Wound is full thickness. There is heavy drainage. Periwound: no denudement, erythema, induration, maceration or warmth.          VASC Wound Right lower leg lateral (Active)   Pre Size Length 10 01/17/22 0800   Pre Size Width 13 01/17/22 0800   Pre Size Depth 0.1 01/17/22 0800   Pre Total Sq cm 130 01/17/22 0800   Number of days: 117   VASC Wound Lt medial (Active)   Pre Size Length 5 01/17/22 0800   Pre Size Width 5 01/17/22 0800   Pre Size Depth 0.1 01/17/22 0800   Pre Total Sq cm 25 01/17/22 0800   Number of days: 90       VASC Wound Lt lateral leg (Active)   Pre Size Length 15 01/17/22 0800   Pre Size Width 13 01/17/22 0800   Pre Size Depth 0.1 01/17/22 0800   Pre Total Sq cm 195 01/17/22 0800   Number of days: 55            Circumferential volume measures:      Circumferential Measures 10/7/2021 10/12/2021 10/19/2021 11/23/2021 1/17/2022   Right just above MTP 29.6 29 28.3 28.6 24.4   Right Ankle 36.1 37 36.2 36.4 39.2   Right Widest Calf 59.6 59 60.7 58 55.2   Right Thigh Up 10cm - - - - -   Left - just above MTP 27.4 28.5 27.6 28.1 28.2   Left Ankle 34.4 35 33.6 34.3 35.5   Left Widest Calf 54.5 53 58.4 54 52.3   Left Thigh Up 10cm - - - - -   Left Knee to Ankle - - 42 - -       Labs:    I personally reviewed the following  lab results today and those on care everywhere    CRP   Date Value Ref Range Status   04/23/2021 8.5 (H) 0.0 - 0.8 mg/dL Final      No results found for: SED   Last Renal Panel:  Sodium   Date Value Ref Range Status   06/10/2021 142.0 133.0 - 144.0 mmol/L Final     Potassium   Date Value Ref Range Status   06/10/2021 4.4 3.4 - 5.3 mmol/L Final     Chloride   Date Value Ref Range Status   06/10/2021 100.0 94.0 - 109.0 mmol/L Final     Carbon Dioxide   Date Value Ref Range Status   06/10/2021 33.0 (H) 20.0 - 32.0 mmol/L Final     Anion Gap   Date Value Ref Range Status   04/24/2021 10 5 - 18 mmol/L Final     Glucose   Date Value Ref Range Status   06/10/2021 112.0 (H) 60.0 - 109.0 mg/dL Final     Urea Nitrogen   Date Value Ref Range Status   06/10/2021 16.0 7.0 - 30.0 mg/dL Final     Creatinine   Date Value Ref Range Status   06/10/2021 1.0 0.8 - 1.5 mg/dL Final     GFR Estimate   Date Value Ref Range Status   04/24/2021 >60 >60 mL/min/1.73m2 Final     Calcium   Date Value Ref Range Status   06/10/2021 9.5 8.5 - 10.4 mg/dL Final     Albumin   Date Value Ref Range Status   04/24/2021 2.7 (L) 3.5 - 5.0 g/dL Final      Lab Results   Component Value Date    WBC 6.5 04/24/2021     Lab Results   Component Value Date    RBC 3.84 04/24/2021     Lab Results   Component Value Date    HGB 9.3 04/24/2021     Lab Results   Component Value Date    HCT 31.6 04/24/2021     No components found for: MCT  Lab Results   Component Value Date    MCV 82 04/24/2021     Lab Results   Component Value Date    MCH 24.2 04/24/2021     Lab Results   Component Value Date    MCHC 29.4 04/24/2021     Lab Results   Component Value Date    RDW 14.3 04/24/2021     Lab Results   Component Value Date     04/24/2021      Lab Results   Component Value Date    A1C 6.2 04/24/2021    A1C 6.2 04/24/2021      No results found for: TSH   No results found for: VITDT                Impression:  Encounter Diagnoses   Name Primary?     Venous hypertension,  chronic, with ulcer and inflammation, bilateral (H) Yes     Obesity, morbid, BMI 40.0-49.9 (H)      Lymphedema of both lower extremities      Leg swelling      Venous (peripheral) insufficiency      Hyperkeratosis of skin      Elephantiasis      Type 2 diabetes mellitus with peripheral neuropathy (H)      Lipodermatosclerosis of both lower extremities      Papillomatosis                                Are any of these wounds new today: No; Location: na    Assessment/Plan:          1. Debridement: After discussion of risk factors and verbal consent was obtained 2% Lidocaine HCL jelly was applied, under clean conditions, the BLE ulceration(s) were debrided using currette. Devitalized and nonviable tissue, along with any fibrin and slough, was removed to improve granulation tissue formation, stimulate wound healing, decrease overall bacteria load, disrupt biofilm formation and decrease edge senescence.  Total excisional debridement was 350 sq cm from the epidermis/dermis area and into the subcutaneous tissue with a depth of 0.1-0.2 cm.   Ulcers were improved afterwards and .  Measures were unchanged after debridement.       2.  Wound treatment: wound treatment will include irrigation and dressings to promote autolytic debridement which will include:will continue the Dakin's to try and decrease the odor; silvercel; ABD; rolled gauze; continue lymphedema therapy; 3 days per week due to drainage amounts.  prognosis is poor for healing due to his severe obesity and long standing venous and lymphedema which have been poorly controled in the past Stable            3. Edema: completed lymphedema therapy; continue lymphedema wraps; elevation. The compression wraps were applied today in clinic. He is not using the lymphedema pump states it is too complicated will reach out to [employer] and see if they can help him. Stable            4. Nutrition: without significant weight loss this will be a continued and progressive  issue; he is now taking 1-2 premier protein shakes per day and doing well with this; A1C is stable at 6.2%.           5. Offloading: na     Patient will follow up with me in 4 weeks for reevaluation. They were instructed to call the clinic sooner with any signs or symptoms of infection or any further questions/concerns. Answered all questions.          Kim Fuentes DNP, RN, CNP, CWOCN, CFCN, CLT  [facility]   [phone]        This note was electronically signed by Kim Fuentes NP

## 2022-01-31 ENCOUNTER — TELEPHONE (OUTPATIENT)
Dept: VASCULAR SURGERY | Facility: CLINIC | Age: 67
End: 2022-01-31

## 2022-02-04 ENCOUNTER — TELEPHONE (OUTPATIENT)
Dept: VASCULAR SURGERY | Facility: CLINIC | Age: 67
End: 2022-02-04

## 2022-02-14 ENCOUNTER — OFFICE VISIT (OUTPATIENT)
Dept: VASCULAR SURGERY | Facility: CLINIC | Age: 67
End: 2022-02-14
Attending: NURSE PRACTITIONER
Payer: COMMERCIAL

## 2022-02-14 VITALS
HEART RATE: 88 BPM | SYSTOLIC BLOOD PRESSURE: 152 MMHG | TEMPERATURE: 98 F | BODY MASS INDEX: 52.52 KG/M2 | WEIGHT: 315 LBS | RESPIRATION RATE: 18 BRPM | DIASTOLIC BLOOD PRESSURE: 76 MMHG

## 2022-02-14 DIAGNOSIS — L97.929 VENOUS STASIS ULCER OF LEFT LOWER LEG WITH EDEMA OF LEFT LOWER LEG (H): Primary | ICD-10-CM

## 2022-02-14 DIAGNOSIS — L85.9 HYPERKERATOSIS OF SKIN: ICD-10-CM

## 2022-02-14 DIAGNOSIS — M79.3 LIPODERMATOSCLEROSIS OF BOTH LOWER EXTREMITIES: ICD-10-CM

## 2022-02-14 DIAGNOSIS — I87.2 VENOUS (PERIPHERAL) INSUFFICIENCY: ICD-10-CM

## 2022-02-14 DIAGNOSIS — I87.333 VENOUS HYPERTENSION, CHRONIC, WITH ULCER AND INFLAMMATION, BILATERAL (H): ICD-10-CM

## 2022-02-14 DIAGNOSIS — R60.0 VENOUS STASIS ULCER OF LEFT LOWER LEG WITH EDEMA OF LEFT LOWER LEG (H): Primary | ICD-10-CM

## 2022-02-14 DIAGNOSIS — I83.891 VENOUS STASIS ULCER OF RIGHT LOWER LEG WITH EDEMA OF RIGHT LOWER LEG (H): ICD-10-CM

## 2022-02-14 DIAGNOSIS — I89.0 ELEPHANTIASIS: ICD-10-CM

## 2022-02-14 DIAGNOSIS — I83.892 VENOUS STASIS ULCER OF LEFT LOWER LEG WITH EDEMA OF LEFT LOWER LEG (H): Primary | ICD-10-CM

## 2022-02-14 DIAGNOSIS — L97.919 VENOUS STASIS ULCER OF RIGHT LOWER LEG WITH EDEMA OF RIGHT LOWER LEG (H): ICD-10-CM

## 2022-02-14 DIAGNOSIS — M79.89 LEG SWELLING: ICD-10-CM

## 2022-02-14 DIAGNOSIS — E11.42 TYPE 2 DIABETES MELLITUS WITH PERIPHERAL NEUROPATHY (H): ICD-10-CM

## 2022-02-14 DIAGNOSIS — I83.019 VENOUS STASIS ULCER OF RIGHT LOWER LEG WITH EDEMA OF RIGHT LOWER LEG (H): ICD-10-CM

## 2022-02-14 DIAGNOSIS — R60.0 VENOUS STASIS ULCER OF RIGHT LOWER LEG WITH EDEMA OF RIGHT LOWER LEG (H): ICD-10-CM

## 2022-02-14 DIAGNOSIS — D36.9 PAPILLOMATOSIS: ICD-10-CM

## 2022-02-14 DIAGNOSIS — B35.1 ONYCHOMYCOSIS: ICD-10-CM

## 2022-02-14 DIAGNOSIS — E66.01 OBESITY, MORBID, BMI 40.0-49.9 (H): ICD-10-CM

## 2022-02-14 DIAGNOSIS — I89.0 LYMPHEDEMA OF BOTH LOWER EXTREMITIES: ICD-10-CM

## 2022-02-14 DIAGNOSIS — L60.2 ONYCHAUXIS: ICD-10-CM

## 2022-02-14 DIAGNOSIS — I83.029 VENOUS STASIS ULCER OF LEFT LOWER LEG WITH EDEMA OF LEFT LOWER LEG (H): Primary | ICD-10-CM

## 2022-02-14 PROCEDURE — 11044 DBRDMT BONE 1ST 20 SQ CM/<: CPT | Performed by: NURSE PRACTITIONER

## 2022-02-14 PROCEDURE — 11045 DBRDMT SUBQ TISS EACH ADDL: CPT | Performed by: NURSE PRACTITIONER

## 2022-02-14 PROCEDURE — 11721 DEBRIDE NAIL 6 OR MORE: CPT | Mod: Q9 | Performed by: NURSE PRACTITIONER

## 2022-02-14 PROCEDURE — 11042 DBRDMT SUBQ TIS 1ST 20SQCM/<: CPT | Performed by: NURSE PRACTITIONER

## 2022-02-14 ASSESSMENT — PAIN SCALES - GENERAL: PAINLEVEL: NO PAIN (0)

## 2022-02-14 NOTE — PROGRESS NOTES
"            Follow up Vascular Visit       Date of Service:02/14/22      Chief Complaint: BLE edema and venous stasis ulcers; chronic      Pt returns to [facility] with regards to their BLE edema and venous stasis ulcers.  They arrive today alone. They are currently using silver alginate to the wounds. This is being done by his daughter twice per week. They are using nothing for compression supposed to have tubular compression with short stretch with artiflex; he reports that [facility] was told [facility] that he needs to remove his compression wraps before he does his pump; he has been doing this but it is too much work to get his wraps back on; so they have been off for 3 days. They are feeling well today. Denies fevers, chills. No shortness of breath.     Allergies:   Allergies   Allergen Reactions     Lisinopril Swelling     Patient reports \"neck swelling\"  Swelling in throat       Medications:   Current Outpatient Medications:      ammonium lactate (LAC-HYDRIN) 12 % external lotion, , Disp: , Rfl:      atorvastatin (LIPITOR) 20 MG tablet, Take 1 tablet (20 mg) by mouth daily, Disp: 90 tablet, Rfl: 3     furosemide (LASIX) 40 MG tablet, Take 1 tablet (40 mg) by mouth 2 times daily, Disp: 180 tablet, Rfl: 3     losartan (COZAAR) 100 MG tablet, Take 1 tablet (100 mg) by mouth daily, Disp: 90 tablet, Rfl: 3     metFORMIN (GLUCOPHAGE) 500 MG tablet, Take 1 tablet (500 mg) by mouth 2 times daily (with meals), Disp: 180 tablet, Rfl: 3     methadone (DOLOPHINE-INTENSOL) 10 MG/ML (HIGH CONC) solution, Take 100 mg by mouth daily, Disp: , Rfl:      sodium hypochlorite (QUARTER-STRENGTH DAKINS) external solution, Apply topically every 72 hours Use 300mL every 72 hours to wash the bilateral legs and wounds on the legs, Disp: 1000 mL, Rfl: 3    Current Facility-Administered Medications:      lidocaine (XYLOCAINE) 2 % external gel, , Topical, Daily PRN, Lucia Reyes MD, Given at 10/19/21 " 0828    History:   Past Medical History:   Diagnosis Date     Diabetes (H)      H/O angioedema 6/15/2020    Formatting of this note might be different from the original. Unexplained angioedema twice, discontinue lisinopril for possible connection to it, though he had used it afterwards with no symptoms     History of tobacco use disorder 8/1/2013    Formatting of this note might be different from the original. Added per documetation     Hypertension      Lymphedema of both lower extremities 12/22/2014     Methadone use 5/8/2012     Obstructive sleep apnea 2/2/2015    Formatting of this note might be different from the original. Epic     Pleural mass 7/2/2013     Uncomplicated asthma        Physical Exam:    BP (!) 152/76   Pulse 88   Temp 98  F (36.7  C)   Resp 18   Wt (!) 420 lb 3.2 oz (190.6 kg)   BMI 52.52 kg/m      General:  Patient presents to clinic in no apparent distress.  Head: normocephalic atraumatic  Psychiatric:  Alert and oriented x3.   Respiratory: unlabored breathing; no cough  Integumentary:  Skin is uniformly warm, dry and pink.    Wound #1 Location: right lateral leg  Size: 14L x 17W x 0.1depth.  No sinus tract present, Wound base: weeping papilamatosis  No undermining present. Wound is full thickness. There is moderate drainage. Periwound: no denudement, erythema, induration, maceration or warmth.        Wound #2 Location: left medial  Size: 1.5x1x 0.1depth.  No sinus tract present, Wound base: weeping papilamatosis  No undermining present. Wound is full thickness. There is moderate drainage. Periwound: no denudement, erythema, induration, maceration or warmth.    Wound #3 Location: left lateral leg Size: [id number].  No sinus tract present, Wound base: weeping papilamatosis  No undermining present. Wound is full thickness. There is moderate drainage. Periwound: no denudement, erythema, induration, maceration or warmth.      Wound #4 Location: left medial posterior  Size: 1.5x1.5x  0.1depth.  No sinus tract present, Wound base: weeping papilamatosis  No undermining present. Wound is full thickness. There is moderate drainage. Periwound: no denudement, erythema, induration, maceration or warmth.    Nails 1-5 bilaterally were thickened; elongated, and discolored            VASC Wound Right lower leg lateral (Active)   Pre Size Length 14 02/14/22 0800   Pre Size Width 17 02/14/22 0800   Pre Size Depth 0.1 02/14/22 0800   Pre Total Sq cm 130 01/17/22 0800   Number of days: 145       VASC Wound Left lower leg medial (Active)   Number of days: 145       VASC Wound rt lateral weeping (Active)   Number of days: 118       VASC Wound Lt medial (Active)   Pre Size Length 1.5 02/14/22 0800   Pre Size Width 1 02/14/22 0800   Pre Size Depth 0.1 02/14/22 0800   Pre Total Sq cm 1.5 02/14/22 0800   Number of days: 118       VASC Wound Lt lateral leg (Active)   Pre Size Length 13 02/14/22 0800   Pre Size Width 13 02/14/22 0800   Pre Size Depth 0.1 02/14/22 0800   Pre Total Sq cm 195 01/17/22 0800   Number of days: 83       VASC Wound Lt medial ( posterior) (Active)   Pre Size Length 1.5 02/14/22 0800   Pre Size Width 1.5 02/14/22 0800   Pre Size Depth 0.1 02/14/22 0800   Pre Total Sq cm 2.25 02/14/22 0800   Number of days: 0            Circumferential volume measures:      Circumferential Measures 10/12/2021 10/19/2021 11/23/2021 1/17/2022 2/14/2022   Right just above MTP 29 28.3 28.6 24.4 29.2   Right Ankle 37 36.2 36.4 39.2 40   Right Widest Calf 59 60.7 58 55.2 60.8   Right Thigh Up 10cm - - - - -   Left - just above MTP 28.5 27.6 28.1 28.2 28.7   Left Ankle 35 33.6 34.3 35.5 37   Left Widest Calf 53 58.4 54 52.3 57.6   Left Thigh Up 10cm - - - - -   Left Knee to Ankle - 42 - - -       Labs:    I personally reviewed the following lab results today and those on care everywhere    CRP   Date Value Ref Range Status   04/23/2021 8.5 (H) 0.0 - 0.8 mg/dL Final      No results found for: SED   Last Renal  Panel:  Sodium   Date Value Ref Range Status   06/10/2021 142.0 133.0 - 144.0 mmol/L Final     Potassium   Date Value Ref Range Status   06/10/2021 4.4 3.4 - 5.3 mmol/L Final     Chloride   Date Value Ref Range Status   06/10/2021 100.0 94.0 - 109.0 mmol/L Final     Carbon Dioxide   Date Value Ref Range Status   06/10/2021 33.0 (H) 20.0 - 32.0 mmol/L Final     Anion Gap   Date Value Ref Range Status   04/24/2021 10 5 - 18 mmol/L Final     Glucose   Date Value Ref Range Status   06/10/2021 112.0 (H) 60.0 - 109.0 mg/dL Final     Urea Nitrogen   Date Value Ref Range Status   06/10/2021 16.0 7.0 - 30.0 mg/dL Final     Creatinine   Date Value Ref Range Status   06/10/2021 1.0 0.8 - 1.5 mg/dL Final     GFR Estimate   Date Value Ref Range Status   04/24/2021 >60 >60 mL/min/1.73m2 Final     Calcium   Date Value Ref Range Status   06/10/2021 9.5 8.5 - 10.4 mg/dL Final     Albumin   Date Value Ref Range Status   04/24/2021 2.7 (L) 3.5 - 5.0 g/dL Final      Lab Results   Component Value Date    WBC 6.5 04/24/2021     Lab Results   Component Value Date    RBC 3.84 04/24/2021     Lab Results   Component Value Date    HGB 9.3 04/24/2021     Lab Results   Component Value Date    HCT 31.6 04/24/2021     No components found for: MCT  Lab Results   Component Value Date    MCV 82 04/24/2021     Lab Results   Component Value Date    MCH 24.2 04/24/2021     Lab Results   Component Value Date    MCHC 29.4 04/24/2021     Lab Results   Component Value Date    RDW 14.3 04/24/2021     Lab Results   Component Value Date     04/24/2021      Lab Results   Component Value Date    A1C 6.2 04/24/2021    A1C 6.2 04/24/2021      No results found for: TSH   No results found for: VITDT                Impression:  Encounter Diagnoses   Name Primary?     Venous stasis ulcer of left lower leg with edema of left lower leg (H) Yes     Venous hypertension, chronic, with ulcer and inflammation, bilateral (H)      Obesity, morbid, BMI 40.0-49.9 (H)       Lymphedema of both lower extremities      Leg swelling      Venous (peripheral) insufficiency      Hyperkeratosis of skin      Elephantiasis      Type 2 diabetes mellitus with peripheral neuropathy (H)      Lipodermatosclerosis of both lower extremities      Papillomatosis      Venous stasis ulcer of right lower leg with edema of right lower leg (H)      Onychauxis      Onychomycosis                                  Are any of these wounds new today: No; Location: na    Assessment/Plan:          1. Debridement: After discussion of risk factors and verbal consent was obtained 2% Lidocaine HCL jelly was applied, under clean conditions, the BLE ulceration(s) were debrided using currette. Devitalized and nonviable tissue, along with any fibrin and slough, was removed to improve granulation tissue formation, stimulate wound healing, decrease overall bacteria load, disrupt biofilm formation and decrease edge senescence.  Total excisional debridement was 328.75 sq cm from the epidermis/dermis area and into the subcutaneous tissue with a depth of 0.1 cm.   Ulcers were improved afterwards and .  Measures were unchanged after debridement.       2.  Wound treatment: wound treatment will include irrigation and dressings to promote autolytic debridement which will include:will continue dakins solution; am lactin to scaling; silvercel; abd; rolled gauze; change more frequently currently doing twice per week I would like every other day; daughter doing this; will order more supplies     If for some reason the patient is not able to get their dressing(s) changed as outlined above (due to illness, lack of supplies, lack of help) please do the following: remove old, soiled dressings; wash the wounds with saline; pat dry; apply ABD pad or other absorbant pad and secure with rolled gauze; avoid tape directly on your skin; patient instructed to call the clinic as soon as possible to let us know what the current issues are in  receiving wound care. Stable            3. Edema: he had a lapse in compression for the past 3 days; states that it is too hard to do the pump and having to remove the compression wraps for this; I told him to leave the wraps on to do the pump; while this is not the best it is better then him going without compression for several days. Encouraged elevation. We will continue the tubular compression; artiflex padding; short stretch; re-wrap every 1-2 days; his foam is in poor condition; will see if [facility] can get this for him.  The compression wraps were applied today in clinic.     If a 2 layer or 4 layer compression wrap is being used; these are safe to have on for ONLY 7 days. If for some reason the patient is not able to get the wrap(s) changed (due to illness; lack of supplies, lack of help, lack of transportation) please do the following: unwrap the old 2 or 4 layer compression wrap; avoid using scissors as you could cut your skin and cause wounds; use tubular compression when available. Call to reschedule your home care or clinic visit appointment as soon as possible.  Stable            4. Nutrition: continue to work on weight           5. Offloading: na          6. Nails: nails 1-5 bilaterally were debrided and filed smooth; tolerated well, no complications.        Patient will follow up with me in 4 weeks for reevaluation. They were instructed to call the clinic sooner with any signs or symptoms of infection or any further questions/concerns. Answered all questions.          Kim Fuentes DNP, RN, CNP, CWOCN, CFCN, CLT  [facility]   [phone]        This note was electronically signed by Kim Fuentes NP

## 2022-02-14 NOTE — LETTER
[facility]  [facility], [place] [address]  [phone]      Fax [phone]    2022    [facility]  Fax: [phone] Wound Dressing Rx and Order Form  Customer Service: [phone] Order Status: New   Verbal: Jenny  Patient Info:  Name: Shaheen Sepulveda  : 1955  Address: [address] [id number] [phone] (home)           Insurance Info:  INSURER: Payor: COMMERCIAL / Plan: [employer] MEDICARE ADVANTAGE / Product Type: Medicare /   Policy ID#:  [id number]  SECONDARY INSURANCE:    Secondary Policy ID#:  N/A    Physician Info:   Name: elaina garcias   Dept Address/Phones:   [address] [place] [address] [address] [address] [phone]  Fax: [phone]    Impression:   Encounter Diagnoses   Name Primary?     Venous stasis ulcer of left lower leg with edema of left lower leg (H) Yes     Venous hypertension, chronic, with ulcer and inflammation, bilateral (H)      Obesity, morbid, BMI 40.0-49.9 (H)      Lymphedema of both lower extremities      Leg swelling      Venous (peripheral) insufficiency      Hyperkeratosis of skin      Elephantiasis      Type 2 diabetes mellitus with peripheral neuropathy (H)      Lipodermatosclerosis of both lower extremities      Papillomatosis      Venous stasis ulcer of right lower leg with edema of right lower leg (H)      Onychauxis      Onychomycosis        Lymphedema circumferential measurements (in cm):      Circumferential Measures (cm)  Right just above MTP: 29.2  Right Ankle: 40  Right Widest Calf: 60.8  Left - just above MTP: 28.7  Left Ankle: 37  Left Widest Calf: 57.6       Wound info:    VASC Wound Right lower leg lateral (Active)   Pre Size Length 14 22 0800   Pre Size Width 17 22 0800   Pre Size Depth 0.1 22 0800       VASC Wound Lt medial (Active)   Pre Size Length 1.5 22 0800   Pre Size Width 1 22 0800    Pre Size Depth 0.1 02/14/22 0800   Pre Total Sq cm 1.5 02/14/22 0800       VASC Wound Lt lateral leg (Active)   Pre Size Length 13 02/14/22 0800   Pre Size Width 13 02/14/22 0800   Pre Size Depth 0.1 02/14/22 0800       VASC Wound Lt medial ( posterior) (Active)   Pre Size Length 1.5 02/14/22 0800   Pre Size Width 1.5 02/14/22 0800   Pre Size Depth 0.1 02/14/22 0800   Pre Total Sq cm 2.25 02/14/22 0800         Drainage: moderate  Thickness:  full  Duration of Need: 30 days  Days Supply: 30 days  Start Date: 2/14/2022  Starter Kit:shaka   Qualifying wound/Debridement: yes/yes      Dressing Type Brand Size Number of pieces Frequency of change   Primary Silvercel   4.25''x4.25'' 48 sheets (needs 4 sheets per dressing change)  3 times a week     ABD Pads  8''x10'' 48 sheets (needs 4 sheets per dressing change) 3 times per week     sof form roll gauze  4''x75'' 24 rolls (needs 2 per dressing change)  3 times per week     Square gauze   4''x4'' 2 loafs  3 times per week     Paper tape  1'' 2 rolls  3 times per week      No substitutions preferred. Call [phone].     Note: If total out of pocket is more than $50.00 please contact the patient before processing order.     OK to forward to covered supplier.    Electronically Signed Physician: JOHAN RICHMOND                         Date: 2/14/2022

## 2022-02-14 NOTE — PROGRESS NOTES
Compression Applied to Bilateral  Tubigrip Size J/K   Compression Applied to Bilateral  foam  Compression Applied to Bilateral  Short Stretch

## 2022-02-14 NOTE — PATIENT INSTRUCTIONS
"Consider CT scan of the abdomen and pelvis    Consider scheduling with bariatrics to discuss weight loss options    Take x2 protein shakes daily such as premier protein      Wound Care Instructions    Every other day Cleanse your BLE wound(s) with Dakin's solution wash the wound and legs with the Dakin's solution; ok to let sit for a few minutes; ok to rinse with saline if burning sensation occurs.    Pat Dry with non-sterile gauze    Apply Lotion to the intact skin surrounding your wound and other dry skin locations. Some good lotions include: Remedy Skin Repair Cream, Sarna, Vanicream or Cetaphil    Apply Urea based lotion to the scaling, crusting and thickened skin areas    Primary Dressing: Apply silvercel 4.25\"x4.25\" into/onto the wounds no substitutions    Secondary dressing: Cover with ABD 8\"x10\"    Secure with non-sterile roll gauze (4\" x 75\" roll) and tape (1\" roll tape) as needed; avoid adhesive directly on the skin    Compression: tubular compression; foam; short stretch    It is not ok to get your wound wet in the bath or shower    Use lymphedema pump 1-2 times per day for 30-60 minutes intervals    Elevate the legs  SEEK MEDICAL CARE IF:    You have an increase in swelling, pain, or redness around the wound.    You have an increase in the amount of pus coming from the wound.    There is a bad smell coming from the wound.    The wound appears to be worsening/enlarging    You have a fever greater than 101.5 F      It is ok to continue current wound care treatment/products for the next 2-3 days until new wound care supplies are ordered and arrive. If longer than this please contact our office at [phone].    High Protein Foods  Chicken  -Chicken breast, 3.5oz.-30 grams protein  -Chicken thigh-10 grams(average size)  -Drumstick-11 grams  -Wing- 6 grams  -Chicken meat, cooked, 4 oz.  Beef  -Hamburger katia, 4 oz-28 grams protein  -Steak, 6 oz-42 grams  -Most cuts of beef- 7 grams of protein per " ounce  Fish  -Most fish fillets or steaks are about 22 grams of protein for 3 1/2 oz(100 grams) of cooked fish, or 6 grams per ounce  -Tuna, 6 oz can-40 grams of protein  Pork  -Pork chop, average-22 grams protein  -Pork loin or tenderloin, 4 oz.-29 grams  -Ham, 3oz serving- 19 grams  -Ground pork 3oz cooked-22 grams  -Winkler, 1 slice-3 grams  -[place]ague-style winkler(black winkler), slice-5-6 grams  Eggs and Dairy  -Egg, large-7 grams  -Milk, 1 cup-8 grams  -Cottage cheese, 1/2 cup-15 grams  -Greek yogurt, 1 cup-usually 8-12 grams, check label  -Soft cheeses (Mozzarella, Brie, Camembert)- 6 grams  -Medium cheeses(cheddar, swiss)- 7 or 8 grams per oz  -Hard cheeses(parmesan)- 10 grams per oz  Beans  -Tofu, 1/2 cup 20 grams  -Tofu, 1 oz., 2.3 grams  -Soy milk, 1 cup-6-10 grams  -Most beans(black, marcelino, lentils, etc.) about 7-10 grams protein per half cup of cooked beans  -soy beans, 1/2 cup cooked-14 grams  -Split peas, 1/2 cup cooked- 8 grams  Nuts and Seeds  -Peanut butter, 2 Tablespoons- 8 grams protein  -Almonds, 1/4 cup- 8 grams  -Peanuts, 1/4 cup-9 grams  -Cashews, 1/4 cup- 5 grams  -Pecans, 1/4 cup- 2.5 grams  -Sunflower seeds, 1/4 cup- 6 grams  -Pumpkin seeds, 1/4 cup-8 grams  -Flax seeds- 1/4 cup- 8 grams  Protein Supplements  -Ensure  -Boost  -Glucerna, if diabetic  When you have an open ulcer, your bodies protein needs are much higher, so it is recommended eat good sources of protein

## 2022-03-11 NOTE — PROGRESS NOTES
"            Follow up Vascular Visit       Date of Service:03/11/22      Chief Complaint: BLE swelling; and ulcers and weeping      Pt returns to [facility] with regards to their BLE swelling and ulcers and weeping.  They arrive today alone. They are currently using Dakin's solution; silver calcium alginate; ABD to the wounds. This is being done by family every other day. They are using tubular compression and short stretch for compression; he arrives with no compression on. They are feeling well today. Denies fevers, chills. No shortness of breath. He has flexitouch lymphedema pumps he does not like these they are difficult to don/doff. He is frustrated that his wound care supplies were not correct and inadequate over the past month. He is noted to have elevated bp today; he reports he has not taken his bp meds yet; he is asymptomatic.     Allergies:   Allergies   Allergen Reactions     Lisinopril Swelling     Patient reports \"neck swelling\"  Swelling in throat       Medications:   Current Outpatient Medications:      ammonium lactate (LAC-HYDRIN) 12 % external lotion, , Disp: , Rfl:      atorvastatin (LIPITOR) 20 MG tablet, Take 1 tablet (20 mg) by mouth daily, Disp: 90 tablet, Rfl: 3     furosemide (LASIX) 40 MG tablet, Take 1 tablet (40 mg) by mouth 2 times daily, Disp: 180 tablet, Rfl: 3     losartan (COZAAR) 100 MG tablet, Take 1 tablet (100 mg) by mouth daily, Disp: 90 tablet, Rfl: 3     metFORMIN (GLUCOPHAGE) 500 MG tablet, Take 1 tablet (500 mg) by mouth 2 times daily (with meals), Disp: 180 tablet, Rfl: 3     methadone (DOLOPHINE-INTENSOL) 10 MG/ML (HIGH CONC) solution, Take 100 mg by mouth daily, Disp: , Rfl:      sodium hypochlorite (QUARTER-STRENGTH DAKINS) external solution, Apply topically every 72 hours Use 300mL every 72 hours to wash the bilateral legs and wounds on the legs, Disp: 1000 mL, Rfl: 3    Current Facility-Administered Medications:      lidocaine (XYLOCAINE) 2 % external " gel, , Topical, Daily PRN, Lucia Reyes MD, Given at 10/19/21 0828    History:   Past Medical History:   Diagnosis Date     Diabetes (H)      H/O angioedema 6/15/2020    Formatting of this note might be different from the original. Unexplained angioedema twice, discontinue lisinopril for possible connection to it, though he had used it afterwards with no symptoms     History of tobacco use disorder 8/1/2013    Formatting of this note might be different from the original. Added per documetation     Hypertension      Lymphedema of both lower extremities 12/22/2014     Methadone use 5/8/2012     Obstructive sleep apnea 2/2/2015    Formatting of this note might be different from the original. Epic     Pleural mass 7/2/2013     Uncomplicated asthma        Physical Exam:    BP (!) 170/90   Pulse 88   Temp 97.6  F (36.4  C)   Resp 22   Wt (!) 428 lb 11.2 oz (194.5 kg)   BMI 53.58 kg/m      General:  Patient presents to clinic in no apparent distress.  Head: normocephalic atraumatic  Psychiatric:  Alert and oriented x3.   Respiratory: unlabored breathing; no cough  Integumentary:  Skin is uniformly warm, dry and pink.    Extremities: BLE with chronic swelling skin changes; +lipodermatosclerosis; +severe papillomatosis on the legs; severe scaling and crusting; there are multiple scattered partial thickness ulcers located within the papillomatosis lesions with surrounding weeping; no erythema noted; less odor today; nails are thickened; yellowed but well trimmed.    VASC Wound rt lateral weeping (Active)   Pre Size Length 17 03/14/22 0700   Pre Size Width 17 03/14/22 0700   Pre Size Depth 0.1 03/14/22 0700   Pre Total Sq cm 289 03/14/22 0700   Number of days: 146       VASC Wound Lt lateral leg (Active)   Pre Size Length 17 03/14/22 0700   Pre Size Width 10 03/14/22 0700   Pre Size Depth 0.1 03/14/22 0700   Pre Total Sq cm 170 03/14/22 0700   Description weeping 11/23/21 0700   Number of days: 111       VASC  Wound Lt medial ( posterior) (Active)   Pre Size Length 5 03/14/22 0700   Pre Size Width 5 03/14/22 0700   Pre Size Depth 0.1 03/14/22 0700   Pre Total Sq cm 25 03/14/22 0700   Number of days: 28            Circumferential volume measures:      Circumferential Measures 10/19/2021 11/23/2021 1/17/2022 2/14/2022 3/14/2022   Right just above MTP 28.3 28.6 24.4 29.2 29.5   Right Ankle 36.2 36.4 39.2 40 54   Right Widest Calf 60.7 58 55.2 60.8 60.5   Right Thigh Up 10cm - - - - -   Left - just above MTP 27.6 28.1 28.2 28.7 29   Left Ankle 33.6 34.3 35.5 37 48.3   Left Widest Calf 58.4 54 52.3 57.6 62   Left Thigh Up 10cm - - - - -   Left Knee to Ankle 42 - - - -       Labs:    I personally reviewed the following lab results today and those on care everywhere    CRP   Date Value Ref Range Status   04/23/2021 8.5 (H) 0.0 - 0.8 mg/dL Final      No results found for: SED   Last Renal Panel:  Sodium   Date Value Ref Range Status   06/10/2021 142.0 133.0 - 144.0 mmol/L Final     Potassium   Date Value Ref Range Status   06/10/2021 4.4 3.4 - 5.3 mmol/L Final     Chloride   Date Value Ref Range Status   06/10/2021 100.0 94.0 - 109.0 mmol/L Final     Carbon Dioxide   Date Value Ref Range Status   06/10/2021 33.0 (H) 20.0 - 32.0 mmol/L Final     Anion Gap   Date Value Ref Range Status   04/24/2021 10 5 - 18 mmol/L Final     Glucose   Date Value Ref Range Status   06/10/2021 112.0 (H) 60.0 - 109.0 mg/dL Final     Urea Nitrogen   Date Value Ref Range Status   06/10/2021 16.0 7.0 - 30.0 mg/dL Final     Creatinine   Date Value Ref Range Status   06/10/2021 1.0 0.8 - 1.5 mg/dL Final     GFR Estimate   Date Value Ref Range Status   04/24/2021 >60 >60 mL/min/1.73m2 Final     Calcium   Date Value Ref Range Status   06/10/2021 9.5 8.5 - 10.4 mg/dL Final     Albumin   Date Value Ref Range Status   04/24/2021 2.7 (L) 3.5 - 5.0 g/dL Final      Lab Results   Component Value Date    WBC 6.5 04/24/2021     Lab Results   Component Value Date     RBC 3.84 04/24/2021     Lab Results   Component Value Date    HGB 9.3 04/24/2021     Lab Results   Component Value Date    HCT 31.6 04/24/2021     No components found for: MCT  Lab Results   Component Value Date    MCV 82 04/24/2021     Lab Results   Component Value Date    MCH 24.2 04/24/2021     Lab Results   Component Value Date    MCHC 29.4 04/24/2021     Lab Results   Component Value Date    RDW 14.3 04/24/2021     Lab Results   Component Value Date     04/24/2021      Lab Results   Component Value Date    A1C 6.2 04/24/2021    A1C 6.2 04/24/2021      No results found for: TSH   No results found for: VITDT                Impression:  Encounter Diagnoses   Name Primary?     Venous hypertension, chronic, with ulcer and inflammation, bilateral (H) Yes     Obesity, morbid, BMI 40.0-49.9 (H)      Lymphedema of both lower extremities      Leg swelling      Venous (peripheral) insufficiency      Hyperkeratosis of skin      Elephantiasis      Type 2 diabetes mellitus with peripheral neuropathy (H)      Lipodermatosclerosis of both lower extremities      Papillomatosis      Venous stasis ulcer of left lower leg with edema of left lower leg (H)                                Are any of these wounds new today: No; Location: na    Assessment/Plan:          1. Debridement: After discussion of risk factors and verbal consent was obtained 2% Lidocaine HCL jelly was applied, under clean conditions, the BLE weeping and ulceration(s) were debrided using currette. Devitalized and nonviable tissue, along with any fibrin and slough, was removed to improve granulation tissue formation, stimulate wound healing, decrease overall bacteria load, disrupt biofilm formation and decrease edge senescence.  Total excisional debridement was 484 sq cm from the epidermis/dermis area with a depth of 0-0.1 cm.   Ulcers were improved afterwards and .  Measures were unchanged after debridement.       2.  Wound treatment: wound  treatment will include irrigation and dressings to promote autolytic debridement which will include:will continue the use of dakin's to reduce bacterial load and risk of infection; will contact [place] and see if they can get him silvercel instead of the generic silver calcium alginate; then ABD; rolled gauze; change every other day by the family. Pt has declined abd/pelvic CT scan at this time     If for some reason the patient is not able to get their dressing(s) changed as outlined above (due to illness, lack of supplies, lack of help) please do the following: remove old, soiled dressings; wash the wounds with saline; pat dry; apply ABD pad or other absorbant pad and secure with rolled gauze; avoid tape directly on your skin; patient instructed to call the clinic as soon as possible to let us know what the current issues are in receiving wound care.  Stable            3. Edema: ok to use foam padding around the ankles to create a more flat wrapping surface; he received artiflex instead of comprifoam; will contact [facility] to see if they stock comprifoam; tubular compression size G then short stretch; elevation; lymphedema pump twice per day. The compression wraps were applied today in clinic.     If a 2 layer or 4 layer compression wrap is being used; these are safe to have on for ONLY 7 days. If for some reason the patient is not able to get the wrap(s) changed (due to illness; lack of supplies, lack of help, lack of transportation) please do the following: unwrap the old 2 or 4 layer compression wrap; avoid using scissors as you could cut your skin and cause wounds; use tubular compression when available. Call to reschedule your home care or clinic visit appointment as soon as possible.  Stable            4. Nutrition: focus on weight loss; has declined bariatrics           5. Offloading: na     Patient will follow up with me in 4 weeks for reevaluation. They were instructed to call the clinic sooner with any signs  or symptoms of infection or any further questions/concerns. Answered all questions.          Kim Fuentes DNP, RN, CNP, CWOCN, CFCN, CLT  [facility]   [phone]        This note was electronically signed by Kim Fuentes NP

## 2022-03-11 NOTE — PATIENT INSTRUCTIONS
"Consider CT scan of the abdomen and pelvis    Consider scheduling with bariatrics to discuss weight loss options    Take x2 protein shakes daily such as premier protein      Wound Care Instructions    Every other day Cleanse your BLE wound(s) with Dakin's solution wash the wound and legs with the Dakin's solution; ok to let sit for a few minutes; ok to rinse with saline if burning sensation occurs.    Pat Dry with non-sterile gauze    Apply Lotion to the intact skin surrounding your wound and other dry skin locations. Some good lotions include: Remedy Skin Repair Cream, Sarna, Vanicream or Cetaphil    Apply Urea based lotion to the scaling, crusting and thickened skin areas    Primary Dressing: Apply silvercel 4.25\"x4.25\" into/onto the wounds no substitutions    Secondary dressing: Cover with ABD 8\"x10\"    Secure with non-sterile roll gauze (4\" x 75\" roll) and tape (1\" roll tape) as needed; avoid adhesive directly on the skin    Compression: tubular compression; foam; short stretch; ok to use foam around the ankles    It is not ok to get your wound wet in the bath or shower    Use lymphedema pump 1-2 times per day for 30-60 minutes intervals    Elevate the legs  SEEK MEDICAL CARE IF:    You have an increase in swelling, pain, or redness around the wound.    You have an increase in the amount of pus coming from the wound.    There is a bad smell coming from the wound.    The wound appears to be worsening/enlarging     You have a fever greater than 101.5 F      It is ok to continue current wound care treatment/products for the next 2-3 days until new wound care supplies are ordered and arrive. If longer than this please contact our office at [phone].    High Protein Foods  Chicken  -Chicken breast, 3.5oz.-30 grams protein  -Chicken thigh-10 grams(average size)  -Drumstick-11 grams  -Wing- 6 grams  -Chicken meat, cooked, 4 oz.  Beef  -Hamburger katia, 4 oz-28 grams protein  -Steak, 6 oz-42 grams  -Most cuts of " beef- 7 grams of protein per ounce  Fish  -Most fish fillets or steaks are about 22 grams of protein for 3 1/2 oz(100 grams) of cooked fish, or 6 grams per ounce  -Tuna, 6 oz can-40 grams of protein  Pork  -Pork chop, average-22 grams protein  -Pork loin or tenderloin, 4 oz.-29 grams  -Ham, 3oz serving- 19 grams  -Ground pork 3oz cooked-22 grams  -Winkler, 1 slice-3 grams  -Eritrean-style winkler(black winkler), slice-5-6 grams  Eggs and Dairy  -Egg, large-7 grams  -Milk, 1 cup-8 grams  -Cottage cheese, 1/2 cup-15 grams  -Greek yogurt, 1 cup-usually 8-12 grams, check label  -Soft cheeses (Mozzarella, Brie, Camembert)- 6 grams  -Medium cheeses(cheddar, swiss)- 7 or 8 grams per oz  -Hard cheeses(parmesan)- 10 grams per oz  Beans  -Tofu, 1/2 cup 20 grams  -Tofu, 1 oz., 2.3 grams  -Soy milk, 1 cup-6-10 grams  -Most beans(black, marcelino, lentils, etc.) about 7-10 grams protein per half cup of cooked beans  -soy beans, 1/2 cup cooked-14 grams  -Split peas, 1/2 cup cooked- 8 grams  Nuts and Seeds  -Peanut butter, 2 Tablespoons- 8 grams protein  -Almonds, 1/4 cup- 8 grams  -Peanuts, 1/4 cup-9 grams  -Cashews, 1/4 cup- 5 grams  -Pecans, 1/4 cup- 2.5 grams  -Sunflower seeds, 1/4 cup- 6 grams  -Pumpkin seeds, 1/4 cup-8 grams  -Flax seeds- 1/4 cup- 8 grams  Protein Supplements  -Ensure  -Boost  -Glucerna, if diabetic  When you have an open ulcer, your bodies protein needs are much higher, so it is recommended eat good sources of protein

## 2022-03-14 ENCOUNTER — OFFICE VISIT (OUTPATIENT)
Dept: VASCULAR SURGERY | Facility: CLINIC | Age: 67
End: 2022-03-14
Attending: NURSE PRACTITIONER
Payer: COMMERCIAL

## 2022-03-14 VITALS
SYSTOLIC BLOOD PRESSURE: 170 MMHG | TEMPERATURE: 97.6 F | HEART RATE: 88 BPM | WEIGHT: 315 LBS | RESPIRATION RATE: 22 BRPM | BODY MASS INDEX: 53.58 KG/M2 | DIASTOLIC BLOOD PRESSURE: 90 MMHG

## 2022-03-14 DIAGNOSIS — L97.929 VENOUS STASIS ULCER OF LEFT LOWER LEG WITH EDEMA OF LEFT LOWER LEG (H): ICD-10-CM

## 2022-03-14 DIAGNOSIS — M79.89 LEG SWELLING: ICD-10-CM

## 2022-03-14 DIAGNOSIS — I83.892 VENOUS STASIS ULCER OF LEFT LOWER LEG WITH EDEMA OF LEFT LOWER LEG (H): ICD-10-CM

## 2022-03-14 DIAGNOSIS — I87.2 VENOUS (PERIPHERAL) INSUFFICIENCY: ICD-10-CM

## 2022-03-14 DIAGNOSIS — E11.42 TYPE 2 DIABETES MELLITUS WITH PERIPHERAL NEUROPATHY (H): ICD-10-CM

## 2022-03-14 DIAGNOSIS — I89.0 LYMPHEDEMA OF BOTH LOWER EXTREMITIES: ICD-10-CM

## 2022-03-14 DIAGNOSIS — I83.029 VENOUS STASIS ULCER OF LEFT LOWER LEG WITH EDEMA OF LEFT LOWER LEG (H): ICD-10-CM

## 2022-03-14 DIAGNOSIS — R60.0 VENOUS STASIS ULCER OF LEFT LOWER LEG WITH EDEMA OF LEFT LOWER LEG (H): ICD-10-CM

## 2022-03-14 DIAGNOSIS — I87.333 VENOUS HYPERTENSION, CHRONIC, WITH ULCER AND INFLAMMATION, BILATERAL (H): Primary | ICD-10-CM

## 2022-03-14 DIAGNOSIS — M79.3 LIPODERMATOSCLEROSIS OF BOTH LOWER EXTREMITIES: ICD-10-CM

## 2022-03-14 DIAGNOSIS — D36.9 PAPILLOMATOSIS: ICD-10-CM

## 2022-03-14 DIAGNOSIS — E66.01 OBESITY, MORBID, BMI 40.0-49.9 (H): ICD-10-CM

## 2022-03-14 DIAGNOSIS — L85.9 HYPERKERATOSIS OF SKIN: ICD-10-CM

## 2022-03-14 DIAGNOSIS — I89.0 ELEPHANTIASIS: ICD-10-CM

## 2022-03-14 PROCEDURE — 97598 DBRDMT OPN WND ADDL 20CM/<: CPT | Performed by: NURSE PRACTITIONER

## 2022-03-14 PROCEDURE — 97597 DBRDMT OPN WND 1ST 20 CM/<: CPT | Performed by: NURSE PRACTITIONER

## 2022-03-14 ASSESSMENT — PAIN SCALES - GENERAL: PAINLEVEL: NO PAIN (0)

## 2022-03-14 NOTE — LETTER
[facility]  [address] [place] [address] [address], [place] [address]  [phone]      Fax [phone]    3/14/2022    [facility]  Fax: [phone] Wound Dressing Rx and Order Form  Customer Service: [phone] Order Status: NEW   Verbal: Rae Mulligan LPN  Patient Info:  Name: Shaheen Sepulveda  : 1955  Address: [address] [place] [address]  [phone] (home)           Insurance Info:  INSURER: Payor: COMMERCIAL / Plan: [employer] MEDICARE ADVANTAGE / Product Type: Medicare /   Policy ID#:  [id number]  SECONDARY INSURANCE:    Secondary Policy ID#:  N/A    Physician Info:   Name: Kim Fuentes   Dept Address/Phones:   [address] [place] [address], [facility] [address] [address] [phone]  Fax: [phone]      Impression:   Encounter Diagnoses   Name Primary?     Venous hypertension, chronic, with ulcer and inflammation, bilateral (H) Yes     Obesity, morbid, BMI 40.0-49.9 (H)      Lymphedema of both lower extremities      Leg swelling      Venous (peripheral) insufficiency      Hyperkeratosis of skin      Elephantiasis      Type 2 diabetes mellitus with peripheral neuropathy (H)      Lipodermatosclerosis of both lower extremities      Papillomatosis      Venous stasis ulcer of left lower leg with edema of left lower leg (H)        Lymphedema circumferential measurements (in cm):      Circumferential Measures (cm)  Right just above MTP: 29.5  Right Ankle: 54  Right Widest Calf: 60.5  Left - just above MTP: 29  Left Ankle: 48.3  Left Widest Calf: 62       Wound info:    VASC Wound rt lateral weeping (Active)   Pre Size Length 17 22 0700   Pre Size Width 17 22 0700   Pre Size Depth 0.1 22 0700   Pre Total Sq cm 289 22 0700       VASC Wound Lt lateral leg (Active)   Pre Size Length 17 22 0700   Pre Size Width 10 22 0700   Pre Size Depth 0.1 22 0700   Pre Total Sq cm 170 22  0700       VASC Wound Lt medial ( posterior) (Active)   Pre Size Length 5 03/14/22 0700   Pre Size Width 5 03/14/22 0700   Pre Size Depth 0.1 03/14/22 0700   Pre Total Sq cm 25 03/14/22 0700         Drainage: yes  Thickness:  full  Duration of Need: 30 days  Days Supply: 30    Start Date: 3/14/2022  Starter Kit:Ancillary  Qualifying wound/Debridement: ***     Dressing Type Brand Size Number of pieces Frequency of change   Primary                                                Secondary                                        Tape            No substitutions preferred. Call [phone].       OK to forward to covered supplier.    Electronically Signed Physician: JOHAN RICHMOND                         Date: 3/14/2022

## 2022-03-14 NOTE — PROGRESS NOTES
Compression Applied to Bilateral  Short Stretch   Compression Applied to Bilateral  Tubigrip Size G      Rae Mulligan LPN on 3/14/2022 at 8:49 AM

## 2022-03-14 NOTE — LETTER
[facility]  [facility]  [place], [place] [address]  [phone]      Fax [phone]    3/14/2022    [facility]  Fax: [phone] Wound Dressing Rx and Order Form  Customer Service: [phone] Order Status: NEW   Verbal: Rae Mulligan  Patient Info:  Name: Shaheen Sepulveda  : 1955  Address: [address] [id number] [phone] (home)           Insurance Info:  INSURER: Payor: COMMERCIAL / Plan: [employer] MEDICARE ADVANTAGE / Product Type: Medicare /   Policy ID#:  [id number]  SECONDARY INSURANCE:    Secondary Policy ID#:  N/A    Physician Info:   Name: Kim Fuentes   Dept Address/Phones:   [address] [place] [address] [address] [address] [phone]  Fax: [phone]      Impression:   Encounter Diagnoses   Name Primary?     Venous hypertension, chronic, with ulcer and inflammation, bilateral (H) Yes     Obesity, morbid, BMI 40.0-49.9 (H)      Lymphedema of both lower extremities      Leg swelling      Venous (peripheral) insufficiency      Hyperkeratosis of skin      Elephantiasis      Type 2 diabetes mellitus with peripheral neuropathy (H)      Lipodermatosclerosis of both lower extremities      Papillomatosis      Venous stasis ulcer of left lower leg with edema of left lower leg (H)        Lymphedema circumferential measurements (in cm):      Circumferential Measures (cm)  Right just above MTP: 29.5  Right Ankle: 54  Right Widest Calf: 60.5  Left - just above MTP: 29  Left Ankle: 48.3  Left Widest Calf: 62       Wound info:    VASC Wound rt lateral weeping (Active)   Pre Size Length 17 22 0700   Pre Size Width 17 22 0700   Pre Size Depth 0.1 22 0700   Pre Total Sq cm 289 22 0700       VASC Wound Lt lateral leg (Active)   Pre Size Length 17 22 0700   Pre Size Width 10 22 0700   Pre Size Depth 0.1 22 0700   Pre Total Sq cm 170 22 0700  "      VASC Wound Lt medial ( posterior) (Active)   Pre Size Length 5 03/14/22 0700   Pre Size Width 5 03/14/22 0700   Pre Size Depth 0.1 03/14/22 0700   Pre Total Sq cm 25 03/14/22 0700         Drainage: YES  Thickness:  FULL  Duration of Need: 30 days  Days Supply: 30   Start Date: 3/14/2022  Starter Kit: Ancillary  Qualifying wound/Debridement: Yes/Yes     Dressing Type Brand Size Number of pieces Frequency of change   Primary Silvercel (no substitutions)  4.25\"x4.25\" 48 (needs 4 per dressing change) 3x a week                                           Secondary ABD pad  8\"x10\" 48 (needs 4 per dressing change) 3x per week    sof form roll gauze  4\"x75\" 24 rolls (needs 2 per dressing change) 3x per week    Square gauze  4\"x4\" 2 loafs 3x per week    comprifoam  05dmr0p 24 rolls (needs 2 per dressing change) 3x per week           Tape Paper tape  1\" 2 rolls   3x per week     No substitutions preferred. Call [phone].       OK to forward to covered supplier.    Electronically Signed Physician: JOHAN RICHMOND                         Date: 3/14/2022    "

## 2022-03-15 ENCOUNTER — TELEPHONE (OUTPATIENT)
Dept: VASCULAR SURGERY | Facility: CLINIC | Age: 67
End: 2022-03-15

## 2022-03-15 NOTE — LETTER
[facility]  [address] [place] [address] [address]  [place], [place] [address]  [phone]      Fax [phone]    [facility]            Fax: [phone]              Customer Service: [phone]    Wound Dressing Rx and Order Form  Order Status: New order  Verbal: Johan Richmond NP/Michaelle RN  Date: March 15, 2022     Shaheen Sepulveda  Gender: male  : 1955  [address] [facility] [address]  [phone] (home)     Medical Record: [id number]  Primary Care Provider: Radha Sal      Venous stasis ulcer of left lower leg with edema of left lower leg (H)       Insurance Info:  INSURER: Payor: COMMERCIAL / Plan: [employer] MEDICARE ADVANTAGE / Product Type: Medicare /   Policy ID#:  [id number]  SECONDARY INSURANCE:    Secondary Policy ID#:  N/A    Physician Info:   Name:  JOHAN RICHMOND     Dept Address/Phones:   [address] [place] [address], [facility] [address] [address] [phone]  Fax: [phone]    Lymphedema circumferential measurements (in cm):  Circumferential Measures 10/19/2021 2021 2022 2022 3/14/2022   Right just above MTP 28.3 28.6 24.4 29.2 29.5   Right Ankle 36.2 36.4 39.2 40 54   Right Widest Calf 60.7 58 55.2 60.8 60.5   Right Thigh Up 10cm - - - - -   Left - just above MTP 27.6 28.1 28.2 28.7 29   Left Ankle 33.6 34.3 35.5 37 48.3   Left Widest Calf 58.4 54 52.3 57.6 62   Left Thigh Up 10cm - - - - -   Left Knee to Ankle 42 - - - -         Wound info:  VASC Wound Right lower leg lateral (Active)   Pre Size Length 14 22 0800   Pre Size Width 17 22 0800   Pre Size Depth 0.1 22 0800   Pre Total Sq cm 130 22 0800   Product Used Alginate 10/12/21 0800   Number of days: 174       VASC Wound Left lower leg medial (Active)   Pre Size Length 2 10/19/21 0800   Pre Size Width 5 10/19/21 0800   Pre Size Depth 0.5 10/19/21 0800   Pre Total Sq cm 10 10/19/21 0800   Product Used Alginate 10/12/21 0800   Number of  days: 174       VASC Wound rt lateral weeping (Active)   Pre Size Length 17 03/14/22 0700   Pre Size Width 17 03/14/22 0700   Pre Size Depth 0.1 03/14/22 0700   Pre Total Sq cm 289 03/14/22 0700   Number of days: 147       VASC Wound Lt medial (Active)   Pre Size Length 1.5 02/14/22 0800   Pre Size Width 1 02/14/22 0800   Pre Size Depth 0.1 02/14/22 0800   Pre Total Sq cm 1.5 02/14/22 0800   Number of days: 147       VASC Wound Lt lateral leg (Active)   Pre Size Length 17 03/14/22 0700   Pre Size Width 10 03/14/22 0700   Pre Size Depth 0.1 03/14/22 0700   Pre Total Sq cm 170 03/14/22 0700   Description weeping 11/23/21 0700   Number of days: 112       VASC Wound Lt medial ( posterior) (Active)   Pre Size Length 5 03/14/22 0700   Pre Size Width 5 03/14/22 0700   Pre Size Depth 0.1 03/14/22 0700   Pre Total Sq cm 25 03/14/22 0700   Number of days: 29        Drainage: Moderate  Thickness:  full  Duration of Need: 30 DAYS  Days Supply: 30 DAYS  Start Date: 3/15/22  Starter Kit: Ancillary  Qualifying wound/Debridement: Yes      Dressing Type Brand Size Number of pieces Frequency of change    Primary                                                Secondary         Comprifoam  12cm x 8ft roll 4 rolls EVERY OTHER DAY and as needed                           Tape                          No substitutions preferred. Call [phone].       Patient aware of out of pocket costs.  Please contact patient for payment.    Electronically Signed Physician:  JOHAN RICHMOND             Date: March 15, 2022

## 2022-03-15 NOTE — TELEPHONE ENCOUNTER
Spoke with patient.  He is ok with paying out of pocket for the Comprifoam.  Order placed with [facility].

## 2022-03-15 NOTE — TELEPHONE ENCOUNTER
----- Message from Kim Fuentes NP sent at 3/15/2022 10:30 AM CDT -----  Regarding: Comprifoam  Can you call the patient and let him know that [facility] does not stock Comprifoam; I have reached out to [facility] and they do stock this product but it is not ever covered by insurance; it is $7 per roll and he would need at least 4 rolls. If he would like to order this can you order through [facility]     12 cm x 8ft roll    LK

## 2022-03-21 NOTE — TELEPHONE ENCOUNTER
Sent email to Davian at [facility] regarding update on status of comprifoam that was ordered 3/15/22

## 2022-03-21 NOTE — TELEPHONE ENCOUNTER
Patient called to state he has not received any supplies yet and is requesting someone to call him back to discuss.      [phone]

## 2022-03-21 NOTE — TELEPHONE ENCOUNTER
Will refax orders.       Johnnie Disla,  I m showing we sent the following on 2/25:supplies- see email.       I m not showing anything after that - but have sent a message off to customer service to see if they received additional orders after the one above.  I also asked for address where supplies were sent so we can check to make sure we have the correct address.  I ll let you know the minute I hear.  Thank you!

## 2022-04-06 ENCOUNTER — TELEPHONE (OUTPATIENT)
Dept: VASCULAR SURGERY | Facility: CLINIC | Age: 67
End: 2022-04-06

## 2022-04-11 ENCOUNTER — OFFICE VISIT (OUTPATIENT)
Dept: VASCULAR SURGERY | Facility: CLINIC | Age: 67
End: 2022-04-11
Attending: NURSE PRACTITIONER
Payer: COMMERCIAL

## 2022-04-11 VITALS
TEMPERATURE: 98 F | RESPIRATION RATE: 20 BRPM | SYSTOLIC BLOOD PRESSURE: 160 MMHG | WEIGHT: 315 LBS | DIASTOLIC BLOOD PRESSURE: 74 MMHG | BODY MASS INDEX: 52.82 KG/M2 | HEART RATE: 76 BPM

## 2022-04-11 DIAGNOSIS — L97.929 VENOUS STASIS ULCER OF LEFT LOWER LEG WITH EDEMA OF LEFT LOWER LEG (H): ICD-10-CM

## 2022-04-11 DIAGNOSIS — I83.892 VENOUS STASIS ULCER OF LEFT LOWER LEG WITH EDEMA OF LEFT LOWER LEG (H): ICD-10-CM

## 2022-04-11 DIAGNOSIS — L85.9 HYPERKERATOSIS OF SKIN: ICD-10-CM

## 2022-04-11 DIAGNOSIS — M79.89 LEG SWELLING: ICD-10-CM

## 2022-04-11 DIAGNOSIS — R60.0 VENOUS STASIS ULCER OF LEFT LOWER LEG WITH EDEMA OF LEFT LOWER LEG (H): ICD-10-CM

## 2022-04-11 DIAGNOSIS — I89.0 LYMPHEDEMA OF BOTH LOWER EXTREMITIES: ICD-10-CM

## 2022-04-11 DIAGNOSIS — E66.01 OBESITY, MORBID, BMI 40.0-49.9 (H): ICD-10-CM

## 2022-04-11 DIAGNOSIS — I89.0 ELEPHANTIASIS: ICD-10-CM

## 2022-04-11 DIAGNOSIS — D36.9 PAPILLOMATOSIS: ICD-10-CM

## 2022-04-11 DIAGNOSIS — I87.333 VENOUS HYPERTENSION, CHRONIC, WITH ULCER AND INFLAMMATION, BILATERAL (H): Primary | ICD-10-CM

## 2022-04-11 DIAGNOSIS — M79.3 LIPODERMATOSCLEROSIS OF BOTH LOWER EXTREMITIES: ICD-10-CM

## 2022-04-11 DIAGNOSIS — E11.42 TYPE 2 DIABETES MELLITUS WITH PERIPHERAL NEUROPATHY (H): ICD-10-CM

## 2022-04-11 DIAGNOSIS — I87.2 VENOUS (PERIPHERAL) INSUFFICIENCY: ICD-10-CM

## 2022-04-11 DIAGNOSIS — I83.029 VENOUS STASIS ULCER OF LEFT LOWER LEG WITH EDEMA OF LEFT LOWER LEG (H): ICD-10-CM

## 2022-04-11 PROCEDURE — 97598 DBRDMT OPN WND ADDL 20CM/<: CPT | Performed by: NURSE PRACTITIONER

## 2022-04-11 PROCEDURE — 97597 DBRDMT OPN WND 1ST 20 CM/<: CPT | Performed by: NURSE PRACTITIONER

## 2022-04-11 ASSESSMENT — PAIN SCALES - GENERAL: PAINLEVEL: NO PAIN (0)

## 2022-04-11 NOTE — PROGRESS NOTES
Compression Applied to Bilateral  Tubigrip Size J/K   Compression Applied to Bilateral  Short Stretch Compression Applied to Bilateral  foam padding to ankles and foam padding to legs

## 2022-04-11 NOTE — PATIENT INSTRUCTIONS
"Consider CT scan of the abdomen and pelvis    Consider scheduling with bariatrics to discuss weight loss options    Make appt with PCP to discuss your blood pressure    Take x2 protein shakes daily such as premier protein      Wound Care Instructions    Every other day Cleanse your BLE wound(s) with Dakin's solution wash the wound and legs with the Dakin's solution; ok to let sit for a few minutes; ok to rinse with saline if burning sensation occurs.    Pat Dry with non-sterile gauze 4''x4''    Apply Lotion to the intact skin surrounding your wound and other dry skin locations. Some good lotions include: Remedy Skin Repair Cream, Sarna, Vanicream or Cetaphil    Apply Urea based lotion to the scaling, crusting and thickened skin areas    Primary Dressing: Apply silvercel 4.25\"x4.25\" into/onto the wounds no substitutions    Secondary dressing: Cover with ABD 8\"x10\"    Secure with non-sterile roll gauze (4\" x 75\" roll) and tape (1\" roll tape) as needed; avoid adhesive directly on the skin    Compression: tubular compression; size J/K  foam; short stretch 4''; ok to use foam around the ankles    It is not ok to get your wound wet in the bath or shower    Use lymphedema pump 1-2 times per day for 30-60 minutes intervals    Elevate the legs  SEEK MEDICAL CARE IF:  You have an increase in swelling, pain, or redness around the wound.  You have an increase in the amount of pus coming from the wound.  There is a bad smell coming from the wound.  The wound appears to be worsening/enlarging   You have a fever greater than 101.5 F      It is ok to continue current wound care treatment/products for the next 2-3 days until new wound care supplies are ordered and arrive. If longer than this please contact our office at [phone].    High Protein Foods  Chicken  -Chicken breast, 3.5oz.-30 grams protein  -Chicken thigh-10 grams(average size)  -Drumstick-11 grams  -Wing- 6 grams  -Chicken meat, cooked, 4 oz.  Beef  -Hamburger " katia, 4 oz-28 grams protein  -Steak, 6 oz-42 grams  -Most cuts of beef- 7 grams of protein per ounce  Fish  -Most fish fillets or steaks are about 22 grams of protein for 3 1/2 oz(100 grams) of cooked fish, or 6 grams per ounce  -Tuna, 6 oz can-40 grams of protein  Pork  -Pork chop, average-22 grams protein  -Pork loin or tenderloin, 4 oz.-29 grams  -Ham, 3oz serving- 19 grams  -Ground pork 3oz cooked-22 grams  -Winkler, 1 slice-3 grams  -[place]-style winkler(black winkler), slice-5-6 grams  Eggs and Dairy  -Egg, large-7 grams  -Milk, 1 cup-8 grams  -Cottage cheese, 1/2 cup-15 grams  -Greek yogurt, 1 cup-usually 8-12 grams, check label  -Soft cheeses (Mozzarella, Brie, Camembert)- 6 grams  -Medium cheeses(cheddar, swiss)- 7 or 8 grams per oz  -Hard cheeses(parmesan)- 10 grams per oz  Beans  -Tofu, 1/2 cup 20 grams  -Tofu, 1 oz., 2.3 grams  -Soy milk, 1 cup-6-10 grams  -Most beans(black, marcelino, lentils, etc.) about 7-10 grams protein per half cup of cooked beans  -soy beans, 1/2 cup cooked-14 grams  -Split peas, 1/2 cup cooked- 8 grams  Nuts and Seeds  -Peanut butter, 2 Tablespoons- 8 grams protein  -Almonds, 1/4 cup- 8 grams  -Peanuts, 1/4 cup-9 grams  -Cashews, 1/4 cup- 5 grams  -Pecans, 1/4 cup- 2.5 grams  -Sunflower seeds, 1/4 cup- 6 grams  -Pumpkin seeds, 1/4 cup-8 grams  -Flax seeds- 1/4 cup- 8 grams  Protein Supplements  -Ensure  -Boost  -Glucerna, if diabetic  When you have an open ulcer, your bodies protein needs are much higher, so it is recommended eat good sources of protein

## 2022-04-11 NOTE — PROGRESS NOTES
"            Follow up Vascular Visit       Date of Service:04/11/22      Chief Complaint: BLE venous stasis ulcers and lymphedema      Pt returns to [facility] with regards to their BLE venous stasis ulcers and lymphedema.  They arrive today alone. They are currently using silver alginate; ABD to the wounds. This is being done by his family 3 days per week. They are using tubular compression and short stretch for compression; he arrives today with no compression on states he removed these. They are feeling well today. Denies fevers, chills. No shortness of breath. He has flexitouch lymphedema pumps he does not like these they are difficult to don/doff. He is frustrated that his wound care supplies were not correct and inadequate over the past month. He is noted to have elevated bp today; he reports he has not taken his bp meds yet; he is asymptomatic.     Allergies:   Allergies   Allergen Reactions     Lisinopril Swelling     Patient reports \"neck swelling\"  Swelling in throat       Medications:   Current Outpatient Medications:      ammonium lactate (LAC-HYDRIN) 12 % external lotion, , Disp: , Rfl:      atorvastatin (LIPITOR) 20 MG tablet, Take 1 tablet (20 mg) by mouth daily, Disp: 90 tablet, Rfl: 3     furosemide (LASIX) 40 MG tablet, Take 1 tablet (40 mg) by mouth 2 times daily, Disp: 180 tablet, Rfl: 3     losartan (COZAAR) 100 MG tablet, Take 1 tablet (100 mg) by mouth daily, Disp: 90 tablet, Rfl: 3     metFORMIN (GLUCOPHAGE) 500 MG tablet, Take 1 tablet (500 mg) by mouth 2 times daily (with meals), Disp: 180 tablet, Rfl: 3     methadone (DOLOPHINE-INTENSOL) 10 MG/ML (HIGH CONC) solution, Take 100 mg by mouth daily, Disp: , Rfl:      sodium hypochlorite (QUARTER-STRENGTH DAKINS) external solution, Apply topically every 72 hours Use 300mL every 72 hours to wash the bilateral legs and wounds on the legs, Disp: 1000 mL, Rfl: 3    Current Facility-Administered Medications:      lidocaine " (XYLOCAINE) 2 % external gel, , Topical, Daily PRN, Lucia Reyes MD, Given at 10/19/21 0828    History:   Past Medical History:   Diagnosis Date     Diabetes (H)      H/O angioedema 6/15/2020    Formatting of this note might be different from the original. Unexplained angioedema twice, discontinue lisinopril for possible connection to it, though he had used it afterwards with no symptoms     History of tobacco use disorder 8/1/2013    Formatting of this note might be different from the original. Added per documetation     Hypertension      Lymphedema of both lower extremities 12/22/2014     Methadone use 5/8/2012     Obstructive sleep apnea 2/2/2015    Formatting of this note might be different from the original. Epic     Pleural mass 7/2/2013     Uncomplicated asthma        Physical Exam:    BP (!) 160/74   Pulse 76   Temp 98  F (36.7  C)   Resp 20   Wt (!) 422 lb 9.6 oz (191.7 kg)   BMI 52.82 kg/m      General:  Patient presents to clinic in no apparent distress.  Head: normocephalic atraumatic  Psychiatric:  Alert and oriented x3.   Respiratory: unlabored breathing; no cough  Integumentary:  Skin is uniformly warm, dry and pink.    Extremities: BLE with chronic unmanaged swelling skin changes; hyperkeratosis about the legs; with scattered full and partial thickness tissues loss; moderate serous drainage; less odor; +pappilamatosis       VASC Wound Right lower leg lateral (Active)   Pre Size Length 14 02/14/22 0800   Pre Size Width 17 02/14/22 0800   Pre Size Depth 0.1 02/14/22 0800   Pre Total Sq cm 130 01/17/22 0800   Product Used Alginate 10/12/21 0800   Number of days: 201       VASC Wound Left lower leg medial (Active)   Pre Size Length 2 10/19/21 0800   Pre Size Width 5 10/19/21 0800   Pre Size Depth 0.5 10/19/21 0800   Pre Total Sq cm 10 10/19/21 0800   Product Used Alginate 10/12/21 0800   Number of days: 201       VASC Wound rt lateral weeping (Active)   Pre Size Length 15 04/11/22 0700   Pre  Size Width 15 04/11/22 0700   Pre Size Depth 0.1 04/11/22 0700   Pre Total Sq cm 225 04/11/22 0700   Number of days: 174       VASC Wound Lt medial (Active)   Pre Size Length 1.5 02/14/22 0800   Pre Size Width 1 02/14/22 0800   Pre Size Depth 0.1 02/14/22 0800   Pre Total Sq cm 1.5 02/14/22 0800   Number of days: 174       VASC Wound Lt lateral leg (Active)   Pre Size Length 13 04/11/22 0700   Pre Size Width 15 04/11/22 0700   Pre Size Depth 0.1 04/11/22 0700   Pre Total Sq cm 195 04/11/22 0700   Description weeping 11/23/21 0700   Number of days: 139       VASC Wound Lt medial ( posterior) (Active)   Pre Size Length 5 03/14/22 0700   Pre Size Width 5 03/14/22 0700   Pre Size Depth 0.1 03/14/22 0700   Pre Total Sq cm 25 03/14/22 0700   Number of days: 56       VASC Wound Rt medial calf (Active)   Pre Size Length 7 04/11/22 0700   Pre Size Width 6.5 04/11/22 0700   Pre Size Depth 0.1 04/11/22 0700   Pre Total Sq cm 45.5 04/11/22 0700   Number of days: 0            Circumferential volume measures:      Circumferential Measures 10/19/2021 11/23/2021 1/17/2022 2/14/2022 3/14/2022   Right just above MTP 28.3 28.6 24.4 29.2 29.5   Right Ankle 36.2 36.4 39.2 40 54   Right Widest Calf 60.7 58 55.2 60.8 60.5   Right Thigh Up 10cm - - - - -   Left - just above MTP 27.6 28.1 28.2 28.7 29   Left Ankle 33.6 34.3 35.5 37 48.3   Left Widest Calf 58.4 54 52.3 57.6 62   Left Thigh Up 10cm - - - - -   Left Knee to Ankle 42 - - - -       Labs:    I personally reviewed the following lab results today and those on care everywhere    CRP   Date Value Ref Range Status   04/23/2021 8.5 (H) 0.0 - 0.8 mg/dL Final      No results found for: SED   Last Renal Panel:  Sodium   Date Value Ref Range Status   06/10/2021 142.0 133.0 - 144.0 mmol/L Final     Potassium   Date Value Ref Range Status   06/10/2021 4.4 3.4 - 5.3 mmol/L Final     Chloride   Date Value Ref Range Status   06/10/2021 100.0 94.0 - 109.0 mmol/L Final     Carbon Dioxide   Date  Value Ref Range Status   06/10/2021 33.0 (H) 20.0 - 32.0 mmol/L Final     Anion Gap   Date Value Ref Range Status   04/24/2021 10 5 - 18 mmol/L Final     Glucose   Date Value Ref Range Status   06/10/2021 112.0 (H) 60.0 - 109.0 mg/dL Final     Urea Nitrogen   Date Value Ref Range Status   06/10/2021 16.0 7.0 - 30.0 mg/dL Final     Creatinine   Date Value Ref Range Status   06/10/2021 1.0 0.8 - 1.5 mg/dL Final     GFR Estimate   Date Value Ref Range Status   04/24/2021 >60 >60 mL/min/1.73m2 Final     Calcium   Date Value Ref Range Status   06/10/2021 9.5 8.5 - 10.4 mg/dL Final     Albumin   Date Value Ref Range Status   04/24/2021 2.7 (L) 3.5 - 5.0 g/dL Final      Lab Results   Component Value Date    WBC 6.5 04/24/2021     Lab Results   Component Value Date    RBC 3.84 04/24/2021     Lab Results   Component Value Date    HGB 9.3 04/24/2021     Lab Results   Component Value Date    HCT 31.6 04/24/2021     No components found for: MCT  Lab Results   Component Value Date    MCV 82 04/24/2021     Lab Results   Component Value Date    MCH 24.2 04/24/2021     Lab Results   Component Value Date    MCHC 29.4 04/24/2021     Lab Results   Component Value Date    RDW 14.3 04/24/2021     Lab Results   Component Value Date     04/24/2021      Lab Results   Component Value Date    A1C 6.2 04/24/2021    A1C 6.2 04/24/2021      No results found for: TSH   No results found for: VITDT                Impression:  Encounter Diagnoses   Name Primary?     Venous hypertension, chronic, with ulcer and inflammation, bilateral (H) Yes     Obesity, morbid, BMI 40.0-49.9 (H)      Lymphedema of both lower extremities      Leg swelling      Venous (peripheral) insufficiency      Hyperkeratosis of skin      Elephantiasis      Type 2 diabetes mellitus with peripheral neuropathy (H)      Lipodermatosclerosis of both lower extremities      Papillomatosis      Venous stasis ulcer of left lower leg with edema of left lower leg (H)                                 Are any of these wounds new today: No; Location: na    Assessment/Plan:          1. Debridement: After discussion of risk factors and verbal consent was obtained 2% Lidocaine HCL jelly was applied, under clean conditions, the BLE ulceration(s) were debrided using currette. Devitalized and nonviable tissue, along with any fibrin and slough, was removed to improve granulation tissue formation, stimulate wound healing, decrease overall bacteria load, disrupt biofilm formation and decrease edge senescence.  Total excisional debridement was 465.5 sq cm from the epidermis/dermis area  with a depth of 0-0.1 cm.   Ulcers were improved afterwards and .  Measures were unchanged after debridement.        2.  Wound treatment: wound treatment will include irrigation and dressings to promote autolytic debridement which will include:will continue the dakin's to reduce odor and decrease bacterial load; will again try to order silvercel as the off name brand being sent from the DME is disintegrating on his leg; cover with ABD; rolled gauze; change every 1-3 days by his family     If for some reason the patient is not able to get their dressing(s) changed as outlined above (due to illness, lack of supplies, lack of help) please do the following: remove old, soiled dressings; wash the wounds with saline; pat dry; apply ABD pad or other absorbant pad and secure with rolled gauze; avoid tape directly on your skin; patient instructed to call the clinic as soon as possible to let us know what the current issues are in receiving wound care. Stable            3. Edema: lymphedma wraps; encouraged lymphedema pump; encouraged elevation; encouraged walking program. The compression wraps were applied today in clinic.     If a 2 layer or 4 layer compression wrap is being used; these are safe to have on for ONLY 7 days. If for some reason the patient is not able to get the wrap(s) changed (due to illness; lack of  supplies, lack of help, lack of transportation) please do the following: unwrap the old 2 or 4 layer compression wrap; avoid using scissors as you could cut your skin and cause wounds; use tubular compression when available. Call to reschedule your home care or clinic visit appointment as soon as possible.  Stable            4. Nutrition: focus on weight loss; low sodium diet; encouraged walking program           5. Offloading: na             6. HTN: bp was again elevated today; encouraged him to make appt with pcp to address; we spoke about the negative consequences of uncontrolled bp; including kidney damage; vision loss; heart attack and stroke     Patient will follow up with me in 4 weeks for reevaluation. They were instructed to call the clinic sooner with any signs or symptoms of infection or any further questions/concerns. Answered all questions.          Kim Fuentes DNP, RN, CNP, CWOCN, CFCN, CLT  [facility]   [phone]        This note was electronically signed by Kim Fuentes NP

## 2022-04-12 ENCOUNTER — TELEPHONE (OUTPATIENT)
Dept: VASCULAR SURGERY | Facility: CLINIC | Age: 67
End: 2022-04-12

## 2022-04-21 ENCOUNTER — OFFICE VISIT (OUTPATIENT)
Dept: FAMILY MEDICINE | Facility: CLINIC | Age: 67
End: 2022-04-21
Payer: COMMERCIAL

## 2022-04-21 VITALS
HEART RATE: 95 BPM | OXYGEN SATURATION: 98 % | RESPIRATION RATE: 20 BRPM | TEMPERATURE: 97.8 F | BODY MASS INDEX: 39.17 KG/M2 | HEIGHT: 75 IN | DIASTOLIC BLOOD PRESSURE: 77 MMHG | WEIGHT: 315 LBS | SYSTOLIC BLOOD PRESSURE: 159 MMHG

## 2022-04-21 DIAGNOSIS — I10 BENIGN ESSENTIAL HYPERTENSION: ICD-10-CM

## 2022-04-21 DIAGNOSIS — E11.9 TYPE 2 DIABETES MELLITUS WITHOUT COMPLICATION, WITHOUT LONG-TERM CURRENT USE OF INSULIN (H): Primary | ICD-10-CM

## 2022-04-21 DIAGNOSIS — R68.2 DRY MOUTH: ICD-10-CM

## 2022-04-21 DIAGNOSIS — F11.21 OPIOID USE DISORDER, MODERATE, IN SUSTAINED REMISSION, ON MAINTENANCE THERAPY (H): ICD-10-CM

## 2022-04-21 DIAGNOSIS — E66.01 OBESITY, MORBID, BMI 40.0-49.9 (H): ICD-10-CM

## 2022-04-21 LAB
ANION GAP SERPL CALCULATED.3IONS-SCNC: 12 MMOL/L (ref 3–14)
BUN SERPL-MCNC: 18 MG/DL (ref 7–30)
CALCIUM SERPL-MCNC: 9.7 MG/DL (ref 8.5–10.1)
CHLORIDE BLD-SCNC: 94 MMOL/L (ref 94–109)
CHOLEST SERPL-MCNC: 110 MG/DL
CO2 SERPL-SCNC: 31 MMOL/L (ref 20–32)
CREAT SERPL-MCNC: 1.1 MG/DL (ref 0.66–1.25)
CREAT UR-MCNC: 153 MG/DL
FASTING STATUS PATIENT QL REPORTED: NORMAL
GFR SERPL CREATININE-BSD FRML MDRD: 73 ML/MIN/1.73M2
GLUCOSE BLD-MCNC: 125 MG/DL (ref 70–99)
HBA1C MFR BLD: 6.7 % (ref 0–5.6)
HDLC SERPL-MCNC: 56 MG/DL
LDLC SERPL CALC-MCNC: 42 MG/DL
MICROALBUMIN UR-MCNC: 0.5 MG/DL (ref 0–1.99)
MICROALBUMIN/CREAT UR: 3.3 MG/G CR
POTASSIUM BLD-SCNC: 4 MMOL/L (ref 3.4–5.3)
SODIUM SERPL-SCNC: 137 MMOL/L (ref 133–144)
TRIGL SERPL-MCNC: 60 MG/DL
TSH SERPL DL<=0.005 MIU/L-ACNC: 2.58 UIU/ML (ref 0.3–5)

## 2022-04-21 PROCEDURE — 83036 HEMOGLOBIN GLYCOSYLATED A1C: CPT | Performed by: FAMILY MEDICINE

## 2022-04-21 PROCEDURE — 99215 OFFICE O/P EST HI 40 MIN: CPT | Performed by: FAMILY MEDICINE

## 2022-04-21 PROCEDURE — 80048 BASIC METABOLIC PNL TOTAL CA: CPT | Performed by: FAMILY MEDICINE

## 2022-04-21 PROCEDURE — 80061 LIPID PANEL: CPT | Performed by: FAMILY MEDICINE

## 2022-04-21 PROCEDURE — 36415 COLL VENOUS BLD VENIPUNCTURE: CPT | Performed by: FAMILY MEDICINE

## 2022-04-21 PROCEDURE — 82043 UR ALBUMIN QUANTITATIVE: CPT | Performed by: FAMILY MEDICINE

## 2022-04-21 PROCEDURE — 84443 ASSAY THYROID STIM HORMONE: CPT | Performed by: FAMILY MEDICINE

## 2022-04-21 RX ORDER — METFORMIN HYDROCHLORIDE 750 MG/1
750 TABLET, EXTENDED RELEASE ORAL 2 TIMES DAILY WITH MEALS
Qty: 180 TABLET | Refills: 1 | Status: SHIPPED | OUTPATIENT
Start: 2022-04-21 | End: 2022-08-02

## 2022-04-21 RX ORDER — AMLODIPINE BESYLATE 5 MG/1
5 TABLET ORAL DAILY
Qty: 30 TABLET | Refills: 1 | Status: SHIPPED | OUTPATIENT
Start: 2022-04-21 | End: 2022-06-02

## 2022-04-21 ASSESSMENT — ANXIETY QUESTIONNAIRES
7. FEELING AFRAID AS IF SOMETHING AWFUL MIGHT HAPPEN: NOT AT ALL
3. WORRYING TOO MUCH ABOUT DIFFERENT THINGS: NOT AT ALL
6. BECOMING EASILY ANNOYED OR IRRITABLE: NOT AT ALL
1. FEELING NERVOUS, ANXIOUS, OR ON EDGE: NOT AT ALL
2. NOT BEING ABLE TO STOP OR CONTROL WORRYING: NOT AT ALL
GAD7 TOTAL SCORE: 0
5. BEING SO RESTLESS THAT IT IS HARD TO SIT STILL: NOT AT ALL

## 2022-04-21 ASSESSMENT — PATIENT HEALTH QUESTIONNAIRE - PHQ9
SUM OF ALL RESPONSES TO PHQ QUESTIONS 1-9: 1
5. POOR APPETITE OR OVEREATING: NOT AT ALL

## 2022-04-21 ASSESSMENT — ENCOUNTER SYMPTOMS: CONSTITUTIONAL NEGATIVE: 1

## 2022-04-21 NOTE — PROGRESS NOTES
Assessment and Plan     1. Type 2 diabetes mellitus without complication, without long-term current use of insulin (H)  A1c at goal. Await further annual labs. Discussed slight increase of his metformin and switch to XR and he also felt this was reasonable. Recheck in 6 months.  - Hemoglobin A1c; Future  - Albumin Random Urine Quantitative with Creat Ratio; Future  - TSH with free T4 reflex; Future  - Lipid panel; Future  - Albumin Random Urine Quantitative with Creat Ratio  - Hemoglobin A1c  - TSH with free T4 reflex  - Lipid panel  - metFORMIN (GLUCOPHAGE-XR) 750 MG 24 hr tablet; Take 1 tablet (750 mg) by mouth 2 times daily (with meals)  Dispense: 180 tablet; Refill: 1    2. Benign essential hypertension  Uncontrolled. Start CCB. Recheck in 2-4 weeks.  - Basic metabolic panel; Future  - Basic metabolic panel  - amLODIPine (NORVASC) 5 MG tablet; Take 1 tablet (5 mg) by mouth daily  Dispense: 30 tablet; Refill: 1    3. Opioid use disorder, moderate, in sustained remission, on maintenance therapy (H)  Congratulated him on his sustained remission. Looking at his med list, I wonder if the methadone could be causing some of his dry mouth. We talked about how important continued sobriety from heroin is and how that is worth continued therapy with methadone. If he is interested, he could talk to his clinic about decreasing his dose slightly to see if he gets improvement in his dry mouth. He also seems to have significant, chronic lower extremity discomfort. We reviewed that his methadone might be helping him with this too.    4. Dry mouth  See above.    5. Obesity, morbid, BMI 40.0-49.9 (H)  Briefly discussed bariatric surgery as a possible therapy for his HTN, DM, and his lower extremity swelling and discomfort. He will continue to think about it.    42 minutes spent on the date of the encounter doing chart review, history and exam, documentation, and further activities as noted above.    Options for treatment and  follow-up care were reviewed with the patient and/or guardian. Shaheen Sepulveda and/or guardian engaged in the decision making process and verbalized understanding of the options discussed and agreed with the final plan. I answered all of their questions.    Ricardo Spivey III, MD, FAAFP  [facility]  04/21/22 2:51 PM           HPI:       Shaheen Sepulveda is a 66 year old  male  Patient presents with:  Follow Up: Has not had labs since June. Has been seeing the vascular clinic regularly. Experiencing dry mouth nightly.     Tolerating his metformin without issue. The patient denies signs and symptoms of hyper/hypoglycemia.    The patient denies signs and symptoms of hypertensive urgency or emergency. Doesn't remember having issues with amlodipine previously.    Complains of dry mouth. Denies heroin use or cravings. Gets take home doses of his methadone. Hasn't used heroin since 2005.    Has had discussions about gastric bypass with his doctors before. Not sure that he is ready for it.           PMHX:     Patient Active Problem List   Diagnosis     Hypertension     H/O angioedema     Hip joint replacement status     Hypervolemia     Lymphedema     Lymphedema of both lower extremities     Methadone use     Obesity, morbid, BMI 40.0-49.9 (H)     Obstructive sleep apnea     Osteoarthritis of hip     Osteoarthritis of knee     History of tobacco use disorder     Pleural mass     Open wound of lower limb     Type 2 diabetes mellitus without complication, without long-term current use of insulin (H)     Opioid use disorder, moderate, in sustained remission, on maintenance therapy (H)       Current Outpatient Medications   Medication Sig Dispense Refill     amLODIPine (NORVASC) 5 MG tablet Take 1 tablet (5 mg) by mouth daily 30 tablet 1     atorvastatin (LIPITOR) 20 MG tablet Take 1 tablet (20 mg) by mouth daily 90 tablet 3     furosemide (LASIX) 40 MG tablet Take 1 tablet (40 mg) by mouth 2 times daily 180 tablet 3  "    losartan (COZAAR) 100 MG tablet Take 1 tablet (100 mg) by mouth daily 90 tablet 3     metFORMIN (GLUCOPHAGE-XR) 750 MG 24 hr tablet Take 1 tablet (750 mg) by mouth 2 times daily (with meals) 180 tablet 1     methadone (DOLOPHINE-INTENSOL) 10 MG/ML (HIGH CONC) solution Take 100 mg by mouth daily       ammonium lactate (LAC-HYDRIN) 12 % external lotion        sodium hypochlorite (QUARTER-STRENGTH DAKINS) external solution Apply topically every 72 hours Use 300mL every 72 hours to wash the bilateral legs and wounds on the legs 1000 mL 3       Social History     Socioeconomic History     Marital status: Single     Spouse name: Not on file     Number of children: Not on file     Years of education: Not on file     Highest education level: Not on file   Occupational History     Not on file   Tobacco Use     Smoking status: Former Smoker     Smokeless tobacco: Former User   Substance and Sexual Activity     Alcohol use: Not on file     Drug use: Not on file     Sexual activity: Not on file   Other Topics Concern     Parent/sibling w/ CABG, MI or angioplasty before 65F 55M? Not Asked   Social History Narrative     Not on file     Social Determinants of Health     Financial Resource Strain: Not on file   Food Insecurity: Not on file   Transportation Needs: Not on file   Physical Activity: Not on file   Stress: Not on file   Social Connections: Not on file   Intimate Partner Violence: Not on file   Housing Stability: Not on file       Allergies   Allergen Reactions     Lisinopril Swelling     Patient reports \"neck swelling\"  Swelling in throat       Results for orders placed or performed in visit on 04/21/22 (from the past 24 hour(s))   Hemoglobin A1c   Result Value Ref Range    Hemoglobin A1C 6.7 (H) 0.0 - 5.6 %   Basic metabolic panel   Result Value Ref Range    Sodium 137 133 - 144 mmol/L    Potassium 4.0 3.4 - 5.3 mmol/L    Chloride 94 94 - 109 mmol/L    Carbon Dioxide (CO2) 31 20 - 32 mmol/L    Anion Gap 12 3 - 14 " "mmol/L    Urea Nitrogen 18 7 - 30 mg/dL    Creatinine 1.10 0.66 - 1.25 mg/dL    Calcium 9.7 8.5 - 10.1 mg/dL    Glucose 125 (H) 70 - 99 mg/dL    GFR Estimate 73 >60 mL/min/1.73m2            Review of Systems:     Review of Systems   Constitutional: Negative.             Physical Exam:     Vitals:    04/21/22 0803 04/21/22 0804   BP: (!) 160/81 (!) 159/77   Pulse: 95    Resp: 20    Temp: 97.8  F (36.6  C)    SpO2: 98%    Weight: (!) 190.5 kg (420 lb)    Height: 1.905 m (6' 3\")      Body mass index is 52.5 kg/m .    Physical Exam  Vitals and nursing note reviewed.   Constitutional:       General: He is not in acute distress.     Appearance: Normal appearance. He is not ill-appearing, toxic-appearing or diaphoretic.   Pulmonary:      Effort: No respiratory distress.   Neurological:      Mental Status: He is alert and oriented to person, place, and time.           "

## 2022-04-22 ASSESSMENT — ANXIETY QUESTIONNAIRES: GAD7 TOTAL SCORE: 0

## 2022-04-27 ENCOUNTER — TELEPHONE (OUTPATIENT)
Dept: FAMILY MEDICINE | Facility: CLINIC | Age: 67
End: 2022-04-27

## 2022-04-27 DIAGNOSIS — F41.9 ANXIETY: Primary | ICD-10-CM

## 2022-04-27 RX ORDER — HYDROXYZINE HYDROCHLORIDE 25 MG/1
12.5-25 TABLET, FILM COATED ORAL 3 TIMES DAILY PRN
Qty: 30 TABLET | Refills: 0 | Status: SHIPPED | OUTPATIENT
Start: 2022-04-27 | End: 2022-06-02

## 2022-04-27 NOTE — TELEPHONE ENCOUNTER
I honestly don't remember talking with him about anxiety. We talked about his dry mouth and the other things that I noted in the encounter documentation (along with his diabetes and his blood pressure).    We can try a small amount of a medicine to hopefully help calm him when he is feeling very anxious. He should come in sooner if it isn't helping enough or he isn't getting better.    He shouldn't drive or operate heavy machinery after he takes it.    1. Anxiety  - hydrOXYzine (ATARAX) 25 MG tablet; Take 0.5-1 tablets (12.5-25 mg) by mouth 3 times daily as needed for anxiety  Dispense: 30 tablet; Refill: 0      Ricardo Spivey III, MD, FAAFP  [facility]  04/27/22 4:35 PM

## 2022-04-27 NOTE — TELEPHONE ENCOUNTER
[facility] phone call message- medication clarification/question:    Full Medication Name: ANXIETY MEDICATION   Dose:     Question: Patient called stating he recently had an appointment with  and discussed about a minor anxiety attack he had but was not given medication for it, patients states he fell he's been having more and it's been consistent and would like to know if it would be ok to prescribe him some medication to help. Call and advise if needed or if ok send to pharmacy.      Pharmacy confirmed as [id number] [address]: Yes    OK to leave a message on voice mail? Yes    Primary language: English      needed? No    Call taken on April 27, 2022 at 8:29 AM by Aleja Farrell

## 2022-04-30 ENCOUNTER — HOSPITAL ENCOUNTER (EMERGENCY)
Facility: HOSPITAL | Age: 67
Discharge: HOME | End: 2022-04-30
Attending: STUDENT IN AN ORGANIZED HEALTH CARE EDUCATION/TRAINING PROGRAM | Admitting: STUDENT IN AN ORGANIZED HEALTH CARE EDUCATION/TRAINING PROGRAM
Payer: COMMERCIAL

## 2022-04-30 VITALS
TEMPERATURE: 98.1 F | OXYGEN SATURATION: 94 % | HEART RATE: 78 BPM | RESPIRATION RATE: 16 BRPM | DIASTOLIC BLOOD PRESSURE: 80 MMHG | SYSTOLIC BLOOD PRESSURE: 172 MMHG

## 2022-04-30 DIAGNOSIS — F41.9 ANXIETY: ICD-10-CM

## 2022-04-30 LAB — GLUCOSE BLDC GLUCOMTR-MCNC: 97 MG/DL (ref 70–99)

## 2022-04-30 PROCEDURE — 250N000013 HC RX MED GY IP 250 OP 250 PS 637: Performed by: STUDENT IN AN ORGANIZED HEALTH CARE EDUCATION/TRAINING PROGRAM

## 2022-04-30 PROCEDURE — 99284 EMERGENCY DEPT VISIT MOD MDM: CPT

## 2022-04-30 PROCEDURE — 93005 ELECTROCARDIOGRAM TRACING: CPT | Performed by: STUDENT IN AN ORGANIZED HEALTH CARE EDUCATION/TRAINING PROGRAM

## 2022-04-30 RX ORDER — HYDROXYZINE HYDROCHLORIDE 25 MG/1
25 TABLET, FILM COATED ORAL ONCE
Status: COMPLETED | OUTPATIENT
Start: 2022-04-30 | End: 2022-04-30

## 2022-04-30 RX ORDER — HYDROXYZINE HYDROCHLORIDE 25 MG/1
12.5-25 TABLET, FILM COATED ORAL 3 TIMES DAILY PRN
Qty: 60 TABLET | Refills: 1 | Status: SHIPPED | OUTPATIENT
Start: 2022-04-30 | End: 2022-07-14

## 2022-04-30 RX ADMIN — HYDROXYZINE HYDROCHLORIDE 25 MG: 25 TABLET, FILM COATED ORAL at 19:02

## 2022-04-30 NOTE — ED TRIAGE NOTES
Patient arrives today saying he's noticed increased anxiety today and difficulty concentrating on things started within the last day or so.  Patient arrives with his son.  BG=[id number]     Triage Assessment     Row Name 04/30/22 2995       Triage Assessment (Adult)    Airway WDL WDL       Respiratory WDL    Respiratory WDL WDL       Cognitive/Neuro/Behavioral WDL    Cognitive/Neuro/Behavioral WDL WDL

## 2022-04-30 NOTE — ED PROVIDER NOTES
EMERGENCY DEPARTMENT ENCOUNTER       ED Course & Medical Decision Making     6:45 PM I met with the patient and his son, obtained history, performed an initial exam, and discussed options and plan for diagnostics and treatment here in the ED. Will start with an EKG and Hydroxyzine.   8:45 PM Patient is feeling improved after receiving Hydroxyzine 25 mg. We discussed the plan for discharge and the patient is agreeable. Reviewed supportive cares, symptomatic treatment, outpatient follow up, and reasons to return to the Emergency Department.     Final Impression  66 year old male presents for evaluation of feeling anxious over the last several days.  States that he has difficulty sitting still, difficulty focusing.  States that he did talk to his primary doctor about this at the last appointment, though does not think he was prescribed anything.  States that he did later send a message to his doctor about it after the fact.  On chart review it does appear that there was a telephone encounter documented where the it looks like they wrote a prescription for hydroxyzine 12.5-25 mg as needed up to 3 times daily as needed, though does not sound like he ever got picked up that prescription.  Denies any chest pain or shortness of breath.  Son present with him.  Patient no acute distress, calm, cooperative.  EKG nonischemic.  Patient trialed with a dose of hydroxyzine in the ED and does report significant improvement in his symptoms.  Patient appeared totally unaware that a prescription was sent for him, looks like it was sent to the [facility] pharmacy.  Was hoping to have some over the weekend if needed, thus will write a paper prescription just to make sure he has some if needed and then can follow-up in the primary care clinic.  Patient and son agreeable.  Will discharge home.    Prior to making a final disposition on this patient the results of patient's tests and other diagnostic studies were discussed with the patient. All  questions were answered. Patient expressed understanding of the plan and was amenable to it.    Medications   hydrOXYzine (ATARAX) tablet 25 mg (25 mg Oral Given 4/30/22 1902)       Final Impression     1. 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     Per chart review, patient was most recently seen by his PCP on 4/21. He reportedly discussed a minor anxiety attack at that appointment though this was not documented in the note. He called his PCP on 4/27 (3 days ago) reporting increasing anxiety and requested antianxiety medication. He was prescribed Hydroxyzine 12.5-25 mg TID prn.     Patient reports increasing anxiety over the last several days. He reports he cannot sit still and has difficulty focusing. This morning he reports having an anxiety attack that resolved after practicing distraction exercises. He had another anxiety attack about 20 minutes prior to arrival that was not relieved with the distraction exercises, which prompted his presentation to the ED. Denies any associated chest pain or dyspnea. Patient does not identify any increased personal or social stressors. He does not have a history of anxiety and has never taken antianxiety medications or followed with a therapist before. Patient reports he was not informed of the Hydroxyzine prescription from his PCP and has not started this. He has scheduled an appointment with his PCP on 5/16 to further discuss his anxiety. Of note, patient with a history of chronic BLE lymphedema and he denies any recent changes with this.     ILeelee, am serving as a scribe to document services personally performed by Dr. Santhosh Medellin MD, based on my observation and the  "provider's statements to me. I, Dr. Santhosh Medellin MD attest that Leelee Herminia is acting in a scribe capacity, has observed my performance of the services and has documented them in accordance with my direction.    Past Medical History     Past Medical History:   Diagnosis Date     Diabetes (H)      H/O angioedema 6/15/2020     History of tobacco use disorder 8/1/2013     Hypertension      Lymphedema of both lower extremities 12/22/2014     Methadone use 5/8/2012     Obstructive sleep apnea 2/2/2015     Pleural mass 7/2/2013     Uncomplicated asthma      Past Surgical History:   Procedure Laterality Date     JOINT REPLACEMENT       Family History   Problem Relation Age of Onset     No Known Problems Mother      No Known Problems Father      Edema Sister      Edema Sister       Social History     Tobacco Use     Smoking status: Former Smoker     Smokeless tobacco: Former User     Allergies   Allergen Reactions     Lisinopril Swelling     Patient reports \"neck swelling\"  Swelling in throat       Relevant past medical, surgical, family and social history as documented above, has been reviewed and discussed with patient. No changes or additions, unless otherwise noted in the HPI.    Current Medications     hydrOXYzine (ATARAX) 25 MG tablet  amLODIPine (NORVASC) 5 MG tablet  ammonium lactate (LAC-HYDRIN) 12 % external lotion  atorvastatin (LIPITOR) 20 MG tablet  furosemide (LASIX) 40 MG tablet  hydrOXYzine (ATARAX) 25 MG tablet  losartan (COZAAR) 100 MG tablet  metFORMIN (GLUCOPHAGE-XR) 750 MG 24 hr tablet  methadone (DOLOPHINE-INTENSOL) 10 MG/ML (HIGH CONC) solution  sodium hypochlorite (QUARTER-STRENGTH DAKINS) external solution        Review of Systems     Respiratory: Denies shortness of breath    Cardiovascular: Denies chest pain, palpitations or leg swelling  GI: Denies abdominal pain, nausea, vomiting  Musculoskeletal: Denies any new back pain  Psychiatric: Positive for anxiety.     Remainder of systems " reviewed, unless noted in HPI all others negative.    Physical Exam     BP (!) 142/67   Pulse 69   Temp 98.1  F (36.7  C) (Oral)   Resp 16   SpO2 96%   Constitutional: Awake, alert, in no acute distress  Head: Normocephalic, atraumatic.  ENT: Mucous membranes moist.   Eyes: PERRL, EOMI, Conjunctiva normal  Respiratory: Respirations even, unlabored. Lungs clear to ascultation bilaterally, in no acute respiratory distress.  Cardiovascular: Regular rate and rhythm. +2 radial pulses, equal bilaterally.  Severe chronic lymphedema bilateral lower extremities.  GI: Abdomen soft, non-tender   Musculoskeletal: Moves all 4 extremities equally, strength symmetrical on bilateral uppers and lowers.  Integument: Warm, dry.  Neurologic: Alert & oriented x 3. Normal speech. Grossly normal motor and sensory function. No focal deficits noted.  Psychiatric: Normal mood and affect.    Labs & Imaging     Results for orders placed or performed during the hospital encounter of 04/30/22   Glucose by meter   Result Value Ref Range    GLUCOSE BY METER POCT 97 70 - 99 mg/dL       EKG     Sinus rhythm with first-degree AV block.  Rate 71.  .  .  QTc 458.  No STEMI.     Santhosh Medellin MD  04/30/22 1604

## 2022-05-01 NOTE — ED NOTES
Patient reports anxiety is better after administration of Atarax.  When he came in his anxiety was a 7-8 and is now a 3-4.

## 2022-05-01 NOTE — ED NOTES
Nursing assessment---    Patient was brought in by his son.  Pt reports that he has been feeling very anxious this week.  He feels restless, having to pace.  He did speak to his doctor this week regarding this, was prescribed a medication but could not get it filled. Patient deny's feeling suicidal and her never felt suicidal.

## 2022-05-02 LAB
ATRIAL RATE - MUSE: 71 BPM
DIASTOLIC BLOOD PRESSURE - MUSE: NORMAL MMHG
INTERPRETATION ECG - MUSE: NORMAL
P AXIS - MUSE: 44 DEGREES
PR INTERVAL - MUSE: 216 MS
QRS DURATION - MUSE: 104 MS
QT - MUSE: 422 MS
QTC - MUSE: 458 MS
R AXIS - MUSE: -6 DEGREES
SYSTOLIC BLOOD PRESSURE - MUSE: NORMAL MMHG
T AXIS - MUSE: 18 DEGREES
VENTRICULAR RATE- MUSE: 71 BPM

## 2022-05-06 ENCOUNTER — TELEPHONE (OUTPATIENT)
Dept: VASCULAR SURGERY | Facility: CLINIC | Age: 67
End: 2022-05-06
Payer: COMMERCIAL

## 2022-05-09 ENCOUNTER — OFFICE VISIT (OUTPATIENT)
Dept: VASCULAR SURGERY | Facility: CLINIC | Age: 67
End: 2022-05-09
Attending: NURSE PRACTITIONER
Payer: COMMERCIAL

## 2022-05-09 VITALS
RESPIRATION RATE: 22 BRPM | DIASTOLIC BLOOD PRESSURE: 76 MMHG | HEART RATE: 76 BPM | TEMPERATURE: 97 F | SYSTOLIC BLOOD PRESSURE: 150 MMHG

## 2022-05-09 DIAGNOSIS — D36.9 PAPILLOMATOSIS: ICD-10-CM

## 2022-05-09 DIAGNOSIS — M79.89 LEG SWELLING: ICD-10-CM

## 2022-05-09 DIAGNOSIS — E66.01 OBESITY, MORBID, BMI 40.0-49.9 (H): ICD-10-CM

## 2022-05-09 DIAGNOSIS — L84 PRE-ULCERATIVE CORN OR CALLOUS: ICD-10-CM

## 2022-05-09 DIAGNOSIS — I83.029 VENOUS STASIS ULCER OF LEFT LOWER LEG WITH EDEMA OF LEFT LOWER LEG (H): ICD-10-CM

## 2022-05-09 DIAGNOSIS — E11.42 TYPE 2 DIABETES MELLITUS WITH PERIPHERAL NEUROPATHY (H): ICD-10-CM

## 2022-05-09 DIAGNOSIS — R60.0 VENOUS STASIS ULCER OF LEFT LOWER LEG WITH EDEMA OF LEFT LOWER LEG (H): ICD-10-CM

## 2022-05-09 DIAGNOSIS — L85.9 HYPERKERATOSIS OF SKIN: ICD-10-CM

## 2022-05-09 DIAGNOSIS — L97.929 VENOUS STASIS ULCER OF LEFT LOWER LEG WITH EDEMA OF LEFT LOWER LEG (H): ICD-10-CM

## 2022-05-09 DIAGNOSIS — I87.333 VENOUS HYPERTENSION, CHRONIC, WITH ULCER AND INFLAMMATION, BILATERAL (H): Primary | ICD-10-CM

## 2022-05-09 DIAGNOSIS — M79.3 LIPODERMATOSCLEROSIS OF BOTH LOWER EXTREMITIES: ICD-10-CM

## 2022-05-09 DIAGNOSIS — I89.0 ELEPHANTIASIS: ICD-10-CM

## 2022-05-09 DIAGNOSIS — I87.2 VENOUS (PERIPHERAL) INSUFFICIENCY: ICD-10-CM

## 2022-05-09 DIAGNOSIS — I83.892 VENOUS STASIS ULCER OF LEFT LOWER LEG WITH EDEMA OF LEFT LOWER LEG (H): ICD-10-CM

## 2022-05-09 DIAGNOSIS — I89.0 LYMPHEDEMA OF BOTH LOWER EXTREMITIES: ICD-10-CM

## 2022-05-09 PROCEDURE — 97597 DBRDMT OPN WND 1ST 20 CM/<: CPT | Performed by: NURSE PRACTITIONER

## 2022-05-09 PROCEDURE — 11056 PARNG/CUTG B9 HYPRKR LES 2-4: CPT | Mod: XS | Performed by: NURSE PRACTITIONER

## 2022-05-09 PROCEDURE — 97598 DBRDMT OPN WND ADDL 20CM/<: CPT | Performed by: NURSE PRACTITIONER

## 2022-05-09 ASSESSMENT — PAIN SCALES - GENERAL: PAINLEVEL: NO PAIN (0)

## 2022-05-09 NOTE — LETTER
[facility]  [address] [place] [address] [address], [place] [address]  [phone]      Fax [phone]    2022    [facility]  Fax: [phone] Wound Dressing Rx and Order Form  Customer Service: [phone] Order Status: New   Verbal: Jenny   Patient Info:  Name: Shaheen Sepulveda  : 1955  Address: [address] [address]  [phone] (home)     Insurance Info:  INSURER: Payor: [employer] / Plan: [employer] MEDICARE ADVANTAGE / Product Type: Medicare /   Policy ID#:  [id number]  SECONDARY INSURANCE:    Secondary Policy ID#:  N/A    Physician Info:   Name: Kim Fuentes   Dept Address/Phones:   [address] [place] [address], [facility] [address] [address] [phone]  Fax: [phone]     Impression:   Encounter Diagnoses   Name Primary?     Venous hypertension, chronic, with ulcer and inflammation, bilateral (H) Yes     Obesity, morbid, BMI 40.0-49.9 (H)      Lymphedema of both lower extremities      Leg swelling      Venous (peripheral) insufficiency      Hyperkeratosis of skin      Elephantiasis      Type 2 diabetes mellitus with peripheral neuropathy (H)      Lipodermatosclerosis of both lower extremities      Papillomatosis      Venous stasis ulcer of left lower leg with edema of left lower leg (H)      Pre-ulcerative corn or callous        Lymphedema circumferential measurements (in cm):              Wound info:    VASC Wound rt lateral weeping (Active)   Pre Size Length 8 22 0800   Pre Size Width 17 22 0800   Pre Size Depth 0.1 22 0800   Pre Total Sq cm 136 22 0800       VASC Wound Lt medial ( posterior) (Active)   Pre Size Length 7 22 0800   Pre Size Width 6 22 0800   Pre Size Depth 0.1 22 0800   Pre Total Sq cm 42 22 0800       VASC Wound Rt medial calf (Active)   Pre Size Length 7 22 0800   Pre Size Width 10 22 0800   Pre Size Depth 0.1 22 0800    Pre Total Sq cm 70 05/09/22 0800         Drainage: Moderate  Thickness:  Full  Duration of Need: 30 days  Days Supply: 30 days  Start Date: 5/9/2022  Starter Kit:ancillary   Qualifying wound/Debridement: yes/yes     Dressing Type Brand Size Number of pieces Frequency of change   Primary ABD Pads  8''x10'' 48 pads 3 times per week     Square gauze   4''x4'' 2 loafs 3 times per week     Sof form roll gauze   4''x75'' 48 rolls 3 times per week     Latex free tubular compression bandages   J/K  1 box 3 times per week     Comprilan   4'' 4 rolls 3 times per week     Paper tape   2'' 2 rolls 3 times per week      No substitutions preferred. Call [phone].       OK to forward to covered supplier.    Electronically Signed Physician: JOHAN RICHMOND                         Date: 5/9/2022

## 2022-05-09 NOTE — PROGRESS NOTES
Compression Applied to Bilateral  Tubigrip Size J/K      Foam bilateral     Compression Applied to Bilateral  Short Stretch

## 2022-05-09 NOTE — PROGRESS NOTES
"            Follow up Vascular Visit       Date of Service:05/09/22      Chief Complaint: BLE swelling and weeping ulcerations      Pt returns to [facility] with regards to their BLE swelling and weeping ulcerations.  They arrive today alone; walked to the exam room with a cane. They are currently using Dakin's solution; silvercel; ABD; rolled gauze to the wounds. This is being done by family every 3 days. They are using tubular compression and short stretch for compression. They are feeling well today. Denies fevers, chills. No shortness of breath. He admits to rarely using his lymphedema pumps; these are too difficult for him to don and doff. He admits to not taking protein shakes as recommended states that he just hasn't made this a habit. He did see his PCP as recommended for his BP; was started on amlodipine; tolerating well; has another follow up next week. Also had his metformin increased from 500mg BID to 750mg BID; states that the pills are really big and hard to swallow. He is not checking blood sugars. We discussed again obtaining abdominal pelvic CT and he has again declined. Was previously referred to bariatrics and has not made appt. He was recently in the ER for anxiety feelings; he was previously prescribed Vistaril by his PCP but he did not understand what this medication was for and never picked it up from the pharmacy; he was given a dose in ER and helped significantly with his feelings of anxiety.        Allergies:   Allergies   Allergen Reactions     Lisinopril Swelling     Patient reports \"neck swelling\"  Swelling in throat       Medications:   Current Outpatient Medications:      amLODIPine (NORVASC) 5 MG tablet, Take 1 tablet (5 mg) by mouth daily, Disp: 30 tablet, Rfl: 1     ammonium lactate (LAC-HYDRIN) 12 % external lotion, , Disp: , Rfl:      atorvastatin (LIPITOR) 20 MG tablet, Take 1 tablet (20 mg) by mouth daily, Disp: 90 tablet, Rfl: 3     furosemide (LASIX) 40 MG " tablet, Take 1 tablet (40 mg) by mouth 2 times daily, Disp: 180 tablet, Rfl: 3     hydrOXYzine (ATARAX) 25 MG tablet, Take 0.5-1 tablets (12.5-25 mg) by mouth 3 times daily as needed for anxiety, Disp: 60 tablet, Rfl: 1     hydrOXYzine (ATARAX) 25 MG tablet, Take 0.5-1 tablets (12.5-25 mg) by mouth 3 times daily as needed for anxiety, Disp: 30 tablet, Rfl: 0     losartan (COZAAR) 100 MG tablet, Take 1 tablet (100 mg) by mouth daily, Disp: 90 tablet, Rfl: 3     metFORMIN (GLUCOPHAGE-XR) 750 MG 24 hr tablet, Take 1 tablet (750 mg) by mouth 2 times daily (with meals), Disp: 180 tablet, Rfl: 1     methadone (DOLOPHINE-INTENSOL) 10 MG/ML (HIGH CONC) solution, Take 100 mg by mouth daily, Disp: , Rfl:      sodium hypochlorite (QUARTER-STRENGTH DAKINS) external solution, Apply topically every 72 hours Use 300mL every 72 hours to wash the bilateral legs and wounds on the legs, Disp: 1000 mL, Rfl: 3    Current Facility-Administered Medications:      lidocaine (XYLOCAINE) 2 % external gel, , Topical, Daily PRN, Lucia Reyes MD, Given at 10/19/21 0828    History:   Past Medical History:   Diagnosis Date     Diabetes (H)      H/O angioedema 6/15/2020    Formatting of this note might be different from the original. Unexplained angioedema twice, discontinue lisinopril for possible connection to it, though he had used it afterwards with no symptoms     History of tobacco use disorder 8/1/2013    Formatting of this note might be different from the original. Added per documetation     Hypertension      Lymphedema of both lower extremities 12/22/2014     Methadone use 5/8/2012     Obstructive sleep apnea 2/2/2015    Formatting of this note might be different from the original. Epic     Pleural mass 7/2/2013     Uncomplicated asthma        Physical Exam:    BP (!) 150/76   Pulse 76   Temp 97  F (36.1  C)   Resp 22     General:  Patient presents to clinic in no apparent distress.  Head: normocephalic atraumatic  Psychiatric:   Alert and oriented x3.   Respiratory: unlabored breathing; no cough  Integumentary:  Skin is uniformly warm, dry and pink.    Extremities: swelling is stable; continues to have extensive scaling and crusting on the legs; continues to have several scattered full and partial thickness ulcers about the legs; see measures below; +odor; dressings were near saturation with brown drainage; noted to have several calluses on the hammer toe deformed toes; these were removed and intact underneath; +papilamatosis; +verrucous lesions + fibrosity and scarring of the tissues      VASC Wound Right lower leg lateral (Active)   Pre Size Length 14 02/14/22 0800   Pre Size Width 17 02/14/22 0800   Pre Size Depth 0.1 02/14/22 0800   Pre Total Sq cm 130 01/17/22 0800   Product Used Alginate 10/12/21 0800   Number of days: 229       VASC Wound Left lower leg medial (Active)   Pre Size Length 2 10/19/21 0800   Pre Size Width 5 10/19/21 0800   Pre Size Depth 0.5 10/19/21 0800   Pre Total Sq cm 10 10/19/21 0800   Product Used Alginate 10/12/21 0800   Number of days: 229       VASC Wound rt lateral weeping (Active)   Pre Size Length 8 05/09/22 0800   Pre Size Width 17 05/09/22 0800   Pre Size Depth 0.1 05/09/22 0800   Pre Total Sq cm 136 05/09/22 0800   Number of days: 202       VASC Wound Lt medial (Active)   Pre Size Length 1.5 02/14/22 0800   Pre Size Width 1 02/14/22 0800   Pre Size Depth 0.1 02/14/22 0800   Pre Total Sq cm 1.5 02/14/22 0800   Number of days: 202       VASC Wound Lt lateral leg (Active)   Pre Size Length 13 04/11/22 0700   Pre Size Width 15 04/11/22 0700   Pre Size Depth 0.1 04/11/22 0700   Pre Total Sq cm 195 04/11/22 0700   Description weeping 11/23/21 0700   Number of days: 167       VASC Wound Lt medial ( posterior) (Active)   Pre Size Length 7 05/09/22 0800   Pre Size Width 6 05/09/22 0800   Pre Size Depth 0.1 05/09/22 0800   Pre Total Sq cm 42 05/09/22 0800   Number of days: 84       VASC Wound Rt medial calf  (Active)   Pre Size Length 7 05/09/22 0800   Pre Size Width 10 05/09/22 0800   Pre Size Depth 0.1 05/09/22 0800   Pre Total Sq cm 70 05/09/22 0800   Number of days: 28            Circumferential volume measures:      Circumferential Measures 10/19/2021 11/23/2021 1/17/2022 2/14/2022 3/14/2022   Right just above MTP 28.3 28.6 24.4 29.2 29.5   Right Ankle 36.2 36.4 39.2 40 54   Right Widest Calf 60.7 58 55.2 60.8 60.5   Right Thigh Up 10cm - - - - -   Left - just above MTP 27.6 28.1 28.2 28.7 29   Left Ankle 33.6 34.3 35.5 37 48.3   Left Widest Calf 58.4 54 52.3 57.6 62   Left Thigh Up 10cm - - - - -   Left Knee to Ankle 42 - - - -       Labs:    I personally reviewed the following lab results today and those on care everywhere    CRP   Date Value Ref Range Status   04/23/2021 8.5 (H) 0.0 - 0.8 mg/dL Final      No results found for: SED   Last Renal Panel:  Sodium   Date Value Ref Range Status   04/21/2022 137 133 - 144 mmol/L Final   06/10/2021 142.0 133.0 - 144.0 mmol/L Final     Potassium   Date Value Ref Range Status   04/21/2022 4.0 3.4 - 5.3 mmol/L Final   06/10/2021 4.4 3.4 - 5.3 mmol/L Final     Chloride   Date Value Ref Range Status   04/21/2022 94 94 - 109 mmol/L Final   06/10/2021 100.0 94.0 - 109.0 mmol/L Final     Carbon Dioxide   Date Value Ref Range Status   06/10/2021 33.0 (H) 20.0 - 32.0 mmol/L Final     Carbon Dioxide (CO2)   Date Value Ref Range Status   04/21/2022 31 20 - 32 mmol/L Final     Anion Gap   Date Value Ref Range Status   04/21/2022 12 3 - 14 mmol/L Final     Glucose   Date Value Ref Range Status   04/21/2022 125 (H) 70 - 99 mg/dL Final   06/10/2021 112.0 (H) 60.0 - 109.0 mg/dL Final     GLUCOSE BY METER POCT   Date Value Ref Range Status   04/30/2022 97 70 - 99 mg/dL Final     Urea Nitrogen   Date Value Ref Range Status   04/21/2022 18 7 - 30 mg/dL Final   06/10/2021 16.0 7.0 - 30.0 mg/dL Final     Creatinine   Date Value Ref Range Status   04/21/2022 1.10 0.66 - 1.25 mg/dL Final    06/10/2021 1.0 0.8 - 1.5 mg/dL Final     GFR Estimate   Date Value Ref Range Status   04/21/2022 73 >60 mL/min/1.73m2 Final     Comment:     Effective December 21, 2021 eGFRcr in adults is calculated using the 2021 CKD-EPI creatinine equation which includes age and gender (Lucie whiteside al., [place], DOI: 10.1056/LKSQen5866820)   04/24/2021 >60 >60 mL/min/1.73m2 Final     Calcium   Date Value Ref Range Status   04/21/2022 9.7 8.5 - 10.1 mg/dL Final   06/10/2021 9.5 8.5 - 10.4 mg/dL Final     Albumin   Date Value Ref Range Status   04/24/2021 2.7 (L) 3.5 - 5.0 g/dL Final      Lab Results   Component Value Date    WBC 6.5 04/24/2021     Lab Results   Component Value Date    RBC 3.84 04/24/2021     Lab Results   Component Value Date    HGB 9.3 04/24/2021     Lab Results   Component Value Date    HCT 31.6 04/24/2021     No components found for: MCT  Lab Results   Component Value Date    MCV 82 04/24/2021     Lab Results   Component Value Date    MCH 24.2 04/24/2021     Lab Results   Component Value Date    MCHC 29.4 04/24/2021     Lab Results   Component Value Date    RDW 14.3 04/24/2021     Lab Results   Component Value Date     04/24/2021      Lab Results   Component Value Date    A1C 6.7 04/21/2022    A1C 6.2 04/24/2021    A1C 6.2 04/24/2021      TSH   Date Value Ref Range Status   04/21/2022 2.58 0.30 - 5.00 uIU/mL Final      No results found for: VITDT                Impression:  Encounter Diagnoses   Name Primary?     Venous hypertension, chronic, with ulcer and inflammation, bilateral (H) Yes     Obesity, morbid, BMI 40.0-49.9 (H)      Lymphedema of both lower extremities      Leg swelling      Venous (peripheral) insufficiency      Hyperkeratosis of skin      Elephantiasis      Type 2 diabetes mellitus with peripheral neuropathy (H)      Lipodermatosclerosis of both lower extremities      Papillomatosis      Venous stasis ulcer of left lower leg with edema of left lower leg (H)                                 Are any of these wounds new today: No; Location: na    Assessment/Plan:          1. Debridement: After discussion of risk factors and verbal consent was obtained 2% Lidocaine HCL jelly was applied, under clean conditions, the BLE ulceration(s) were debrided using currette. Devitalized and nonviable tissue, along with any fibrin and slough, was removed to improve granulation tissue formation, stimulate wound healing, decrease overall bacteria load, disrupt biofilm formation and decrease edge senescence.  Total excisional debridement was 100 sq cm from the epidermis/dermis area with a depth of 0.1 cm.   Ulcers were improved afterwards and .  Measures were unchanged after debridement.       2.  Wound treatment: wound treatment will include irrigation and dressings to promote autolytic debridement which will include:will continue with Dakin's wash to decrease odor and bacterial load; continue silvercel; ABD; rolled gauze; change every 3 days by family; pt is requesting that supplies be ordered today.     If for some reason the patient is not able to get their dressing(s) changed as outlined above (due to illness, lack of supplies, lack of help) please do the following: remove old, soiled dressings; wash the wounds with saline; pat dry; apply ABD pad or other absorbant pad and secure with rolled gauze; avoid tape directly on your skin; patient instructed to call the clinic as soon as possible to let us know what the current issues are in receiving wound care. Stable            3. Edema: will continue with elevation; lymphedema wraps; encouraged lymphedema pump. We spoke again about obtaining abdominal pelvic CT scan and he declined. The compression wraps were applied today in clinic.     If a 2 layer or 4 layer compression wrap is being used; these are safe to have on for ONLY 7 days. If for some reason the patient is not able to get the wrap(s) changed (due to illness; lack of supplies, lack of  help, lack of transportation) please do the following: unwrap the old 2 or 4 layer compression wrap; avoid using scissors as you could cut your skin and cause wounds; use tubular compression when available. Call to reschedule your home care or clinic visit appointment as soon as possible.  Stable            4. Nutrition: focus on protein; take 1-2 protein shakes per day; has declined bariatrics           5. Offloading: we spoke about his calluses and the cause of the this; the risk associated with his hammer toe deformity including ulceration and amputation          6 Callous: a total of 4 Callous was pared using a #15 blade scalpel; this was done to evaluate for underlying ulceration; reduce pressure; patient comfort; and to reduce risk of future ulceration formation. The skin was intact underneath and patient tolerated well; no complications.        Patient will follow up with me in 4 weeks for reevaluation; will need nail care at his next visit. They were instructed to call the clinic sooner with any signs or symptoms of infection or any further questions/concerns. Answered all questions.          Kim Fuentes DNP, RN, CNP, CWOCN, CFCN, CLT  [facility]   [phone]        This note was electronically signed by Kim Funetes NP

## 2022-05-09 NOTE — PATIENT INSTRUCTIONS
"Consider CT scan of the abdomen and pelvis    Consider scheduling with bariatrics to discuss weight loss options    Continue working with your  PCP on your blood pressure and diabetes medications    Take x2 protein shakes daily such as premier protein      Wound Care Instructions    Every other day Cleanse your BLE wound(s) with Dakin's solution wash the wound and legs with the Dakin's solution; ok to let sit for a few minutes; ok to rinse with saline if burning sensation occurs.    Pat Dry with non-sterile gauze 4''x4''    Apply Lotion to the intact skin surrounding your wound and other dry skin locations. Some good lotions include: Remedy Skin Repair Cream, Sarna, Vanicream or Cetaphil    Apply Urea based lotion to the scaling, crusting and thickened skin areas    Primary Dressing: Apply silvercel 4.25\"x4.25\" into/onto the wounds no substitutions    Secondary dressing: Cover with ABD 8\"x10\"    Secure with non-sterile roll gauze (4\" x 75\" roll) and tape (1\" roll tape) as needed; avoid adhesive directly on the skin    Compression: tubular compression; size J/K  foam; short stretch 4''; ok to use foam around the ankles    It is not ok to get your wound wet in the bath or shower    Use lymphedema pump 1-2 times per day for 30-60 minutes intervals    Elevate the legs  SEEK MEDICAL CARE IF:  You have an increase in swelling, pain, or redness around the wound.  You have an increase in the amount of pus coming from the wound.  There is a bad smell coming from the wound.  The wound appears to be worsening/enlarging   You have a fever greater than 101.5 F      It is ok to continue current wound care treatment/products for the next 2-3 days until new wound care supplies are ordered and arrive. If longer than this please contact our office at [phone].    High Protein Foods  Chicken  -Chicken breast, 3.5oz.-30 grams protein  -Chicken thigh-10 grams(average size)  -Drumstick-11 grams  -Wing- 6 grams  -Chicken meat, cooked, " 4 oz.  Beef  -Hamburger katia, 4 oz-28 grams protein  -Steak, 6 oz-42 grams  -Most cuts of beef- 7 grams of protein per ounce  Fish  -Most fish fillets or steaks are about 22 grams of protein for 3 1/2 oz(100 grams) of cooked fish, or 6 grams per ounce  -Tuna, 6 oz can-40 grams of protein  Pork  -Pork chop, average-22 grams protein  -Pork loin or tenderloin, 4 oz.-29 grams  -Ham, 3oz serving- 19 grams  -Ground pork 3oz cooked-22 grams  -Winkler, 1 slice-3 grams  -[place]-style winkler(black winkler), slice-5-6 grams  Eggs and Dairy  -Egg, large-7 grams  -Milk, 1 cup-8 grams  -Cottage cheese, 1/2 cup-15 grams  -Greek yogurt, 1 cup-usually 8-12 grams, check label  -Soft cheeses (Mozzarella, Brie, Camembert)- 6 grams  -Medium cheeses(cheddar, swiss)- 7 or 8 grams per oz  -Hard cheeses(parmesan)- 10 grams per oz  Beans  -Tofu, 1/2 cup 20 grams  -Tofu, 1 oz., 2.3 grams  -Soy milk, 1 cup-6-10 grams  -Most beans(black, marcelino, lentils, etc.) about 7-10 grams protein per half cup of cooked beans  -soy beans, 1/2 cup cooked-14 grams  -Split peas, 1/2 cup cooked- 8 grams  Nuts and Seeds  -Peanut butter, 2 Tablespoons- 8 grams protein  -Almonds, 1/4 cup- 8 grams  -Peanuts, 1/4 cup-9 grams  -Cashews, 1/4 cup- 5 grams  -Pecans, 1/4 cup- 2.5 grams  -Sunflower seeds, 1/4 cup- 6 grams  -Pumpkin seeds, 1/4 cup-8 grams  -Flax seeds- 1/4 cup- 8 grams  Protein Supplements  -Ensure  -Boost  -Glucerna, if diabetic  When you have an open ulcer, your bodies protein needs are much higher, so it is recommended eat good sources of protein

## 2022-05-16 ENCOUNTER — OFFICE VISIT (OUTPATIENT)
Dept: FAMILY MEDICINE | Facility: CLINIC | Age: 67
End: 2022-05-16
Payer: COMMERCIAL

## 2022-05-16 VITALS
OXYGEN SATURATION: 96 % | BODY MASS INDEX: 39.17 KG/M2 | HEART RATE: 92 BPM | HEIGHT: 75 IN | DIASTOLIC BLOOD PRESSURE: 71 MMHG | RESPIRATION RATE: 22 BRPM | SYSTOLIC BLOOD PRESSURE: 148 MMHG | TEMPERATURE: 98 F | WEIGHT: 315 LBS

## 2022-05-16 DIAGNOSIS — F41.9 ANXIETY: ICD-10-CM

## 2022-05-16 DIAGNOSIS — I10 BENIGN ESSENTIAL HYPERTENSION: Primary | ICD-10-CM

## 2022-05-16 PROCEDURE — 99214 OFFICE O/P EST MOD 30 MIN: CPT | Mod: GC | Performed by: STUDENT IN AN ORGANIZED HEALTH CARE EDUCATION/TRAINING PROGRAM

## 2022-05-16 RX ORDER — AMLODIPINE BESYLATE 10 MG/1
10 TABLET ORAL DAILY
Qty: 30 TABLET | Refills: 1 | Status: SHIPPED | OUTPATIENT
Start: 2022-05-16 | End: 2022-06-02 | Stop reason: SINTOL

## 2022-05-16 NOTE — PROGRESS NOTES
CHIEF COMPLAINT                                                      Chief Complaint   Patient presents with     Follow Up     From last visit.      Medication Reconciliation     Completed       SUBJECTIVE:                                                    Shaheen Sepulveda is a 66 year old year old male who presents to clinic today for the following health issues:    Hypertension Follow-up  Do you check your blood pressure regularly outside of the clinic? No   Are you following a low salt diet? No  Are your blood pressures ever more than 140 on the top number (systolic) OR more   than 90 on the bottom number (diastolic), for example 140/90? Yes    How many servings of fruits and vegetables do you eat daily?  2-3  On average, how many sweetened beverages do you drink each day (Examples: soda, juice, sweet tea, etc.  Do NOT count diet or artificially sweetened beverages)?   2  How many days per week do you exercise enough to make your heart beat faster? 3 or less  How many minutes a day do you exercise enough to make your heart beat faster? 9 or less  How many days per week do you miss taking your medication? 0  Denies any symptoms since starting amlodipine at his last appointment    Anxiety follow up  - Patient was seen in the ED for anxiety on 4/30  - Had been started on hydroxyzine but was not taking it  - ED staff discussed its use with the patient   - When he followed up with vascular surgery for his legs, they commented that he stated he was feeling improved after starting to take the hydroxyzine PRN  - Today the patient is describing that it is less effective but it does take the edge off    Patient is an established patient of this clinic.  ----------------------------------------------------------------------------------------------------------------------  Patient Active Problem List   Diagnosis     Hypertension     H/O angioedema     Hip joint replacement status     Hypervolemia     Lymphedema     Lymphedema  of both lower extremities     Methadone use     Obesity, morbid, BMI 40.0-49.9 (H)     Obstructive sleep apnea     Osteoarthritis of hip     Osteoarthritis of knee     History of tobacco use disorder     Pleural mass     Open wound of lower limb     Type 2 diabetes mellitus without complication, without long-term current use of insulin (H)     Opioid use disorder, moderate, in sustained remission, on maintenance therapy (H)     Past Surgical History:   Procedure Laterality Date     JOINT REPLACEMENT         Social History     Tobacco Use     Smoking status: Former Smoker     Smokeless tobacco: Former User   Substance Use Topics     Alcohol use: Not on file     Family History   Problem Relation Age of Onset     No Known Problems Mother      No Known Problems Father      Edema Sister      Edema Sister          Problem list and past medical, surgical, social, and family histories reviewed & adjusted, as indicated.    Current Outpatient Medications   Medication Sig Dispense Refill     amLODIPine (NORVASC) 5 MG tablet Take 1 tablet (5 mg) by mouth daily 30 tablet 1     ammonium lactate (LAC-HYDRIN) 12 % external lotion        atorvastatin (LIPITOR) 20 MG tablet Take 1 tablet (20 mg) by mouth daily 90 tablet 3     furosemide (LASIX) 40 MG tablet Take 1 tablet (40 mg) by mouth 2 times daily 180 tablet 3     hydrOXYzine (ATARAX) 25 MG tablet Take 0.5-1 tablets (12.5-25 mg) by mouth 3 times daily as needed for anxiety 60 tablet 1     hydrOXYzine (ATARAX) 25 MG tablet Take 0.5-1 tablets (12.5-25 mg) by mouth 3 times daily as needed for anxiety 30 tablet 0     losartan (COZAAR) 100 MG tablet Take 1 tablet (100 mg) by mouth daily 90 tablet 3     metFORMIN (GLUCOPHAGE-XR) 750 MG 24 hr tablet Take 1 tablet (750 mg) by mouth 2 times daily (with meals) 180 tablet 1     methadone (DOLOPHINE-INTENSOL) 10 MG/ML (HIGH CONC) solution Take 100 mg by mouth daily       sodium hypochlorite (QUARTER-STRENGTH DAKINS) external solution Apply  "topically every 72 hours Use 300mL every 72 hours to wash the bilateral legs and wounds on the legs 1000 mL 3     Medication list reviewed and updated as indicated.    Allergies   Allergen Reactions     Lisinopril Swelling     Patient reports \"neck swelling\"  Swelling in throat     Allergies reviewed and updated as indicated.  ----------------------------------------------------------------------------------------------------------------------  ROS:  Constitutional, HEENT, cardiovascular, pulmonary, GI, musculoskeletal, neuro, skin, and psych systems are negative, except as otherwise noted.    OBJECTIVE:     BP (!) 148/71   Pulse 92   Temp 98  F (36.7  C)   Resp 22   Ht 1.905 m (6' 3\")   Wt (!) 191 kg (421 lb)   SpO2 96%   BMI 52.62 kg/m    Body mass index is 52.62 kg/m .  Exam:  Constitutional: healthy, alert and no distress  Head: Normocephalic. No masses, lesions, tenderness or abnormalities  Cardiovascular: RRR, no murmurs, rub or gallop  Respiratory: CTAB, no wheezing, rales, or rhonchi  Gastrointestinal: Abdomen soft, non-tender. BS normal. No masses, organomegaly  Musculoskeletal: Significant lower extremity edema, has some open sores, appears chronic in nature  Neurologic: Gait normal. Sensation grossly WNL.  Psychiatric: mentation appears normal and affect normal/bright  Hematologic/Lymphatic/Immunologic: Normal cervical lymph nodes    Diagnostic Test Results:  none     ASSESSMENT/PLAN:     (I10) Benign essential hypertension  (primary encounter diagnosis)  -Patient is returning to clinic for BP follow up after starting amlodipine at his last visit (was already on losartan 100 mg)  -Patient denies any symptoms or side effects related to this  -BP remains a little elevated today  -Was also elevated at his ED visit and at his vascular clinic  -On recheck here today was improved but still elevated  -Discussed options, will increase his amlodipine from 5 to 10 mg  -Cautioned on potential side effects to " look for  -Will call and let us know  -Otherwise will follow up in two weeks to ensure improvement of BP    (F41.9) Anxiety  -Patient was seen in the ED for some anxiety on 4/30  -Per their note it sounds like the patient was not taking his hydroxyzine as he did not understand that it was a PRN  -Since starting he noticed some improvement that has started to plateau   -Discussed treatment options for anxiety, including daily medication, therapy, or a combination of the two  -At this time patient declines starting a daily medication  -Also declines therapy at this time  -Did recommend placing a referral for therapy as there is a long wait list and if things got worse it would be scheduled so he would not have to wait  -He declined doing this today  -Denies any thoughts of hurting himself or others  -Knows he is welcome to reach out to the clinic with any acute questions or concerns    There are no discontinued medications.    Options for treatment and follow-up care were reviewed with the patient and/or guardian. Shaheen Sepulveda and/or guardian engaged in the decision making process and verbalized understanding of the options discussed and agreed with the final plan    Precepted with Dr. Naranjo.    Hema Saucedo MD on 5/16/2022 at 8:16 AM

## 2022-05-17 NOTE — PROGRESS NOTES
Preceptor Attestation:   Patient seen, evaluated and discussed with the resident. I have verified the content of the note, which accurately reflects my assessment of the patient and the plan of care.  Supervising Physician:Pascale Naranjo MD  [facility]

## 2022-06-02 ENCOUNTER — OFFICE VISIT (OUTPATIENT)
Dept: FAMILY MEDICINE | Facility: CLINIC | Age: 67
End: 2022-06-02
Payer: COMMERCIAL

## 2022-06-02 VITALS
OXYGEN SATURATION: 97 % | HEART RATE: 96 BPM | RESPIRATION RATE: 20 BRPM | SYSTOLIC BLOOD PRESSURE: 137 MMHG | DIASTOLIC BLOOD PRESSURE: 66 MMHG | TEMPERATURE: 98 F

## 2022-06-02 DIAGNOSIS — E87.1 HYPONATREMIA: ICD-10-CM

## 2022-06-02 DIAGNOSIS — I10 BENIGN ESSENTIAL HYPERTENSION: Primary | ICD-10-CM

## 2022-06-02 DIAGNOSIS — I89.0 LYMPHEDEMA OF BOTH LOWER EXTREMITIES: ICD-10-CM

## 2022-06-02 DIAGNOSIS — E66.01 OBESITY, MORBID, BMI 40.0-49.9 (H): ICD-10-CM

## 2022-06-02 DIAGNOSIS — N17.9 ACUTE KIDNEY INJURY (H): ICD-10-CM

## 2022-06-02 LAB
ANION GAP SERPL CALCULATED.3IONS-SCNC: 8 MMOL/L (ref 3–14)
BUN SERPL-MCNC: 28 MG/DL (ref 7–30)
CALCIUM SERPL-MCNC: 11.2 MG/DL (ref 8.5–10.1)
CHLORIDE BLD-SCNC: 96 MMOL/L (ref 94–109)
CO2 SERPL-SCNC: 28 MMOL/L (ref 20–32)
CREAT SERPL-MCNC: 1.8 MG/DL (ref 0.66–1.25)
GFR SERPL CREATININE-BSD FRML MDRD: 40 ML/MIN/1.73M2
GLUCOSE BLD-MCNC: 108 MG/DL (ref 70–99)
POTASSIUM BLD-SCNC: 4.3 MMOL/L (ref 3.4–5.3)
SODIUM SERPL-SCNC: 132 MMOL/L (ref 133–144)

## 2022-06-02 PROCEDURE — 99214 OFFICE O/P EST MOD 30 MIN: CPT | Mod: GC | Performed by: STUDENT IN AN ORGANIZED HEALTH CARE EDUCATION/TRAINING PROGRAM

## 2022-06-02 PROCEDURE — 36415 COLL VENOUS BLD VENIPUNCTURE: CPT | Performed by: STUDENT IN AN ORGANIZED HEALTH CARE EDUCATION/TRAINING PROGRAM

## 2022-06-02 PROCEDURE — 80048 BASIC METABOLIC PNL TOTAL CA: CPT | Performed by: STUDENT IN AN ORGANIZED HEALTH CARE EDUCATION/TRAINING PROGRAM

## 2022-06-02 NOTE — PROGRESS NOTES
CHIEF COMPLAINT                                                      Chief Complaint   Patient presents with     RECHECK     Blood pressure follow up. Pt concern about extra swollen legs.      SUBJECTIVE:                                                    Shaheen Sepulveda is a 66 year old year old male who presents to clinic today for the following health issues:    Hypertension Follow-up    Do you check your blood pressure regularly outside of the clinic? No   Are you following a low salt diet? No  Are your blood pressures ever more than 140 on the top number (systolic) OR more   than 90 on the bottom number (diastolic), for example 140/90? Doesn't check    How many servings of fruits and vegetables do you eat daily?  2-3  On average, how many sweetened beverages do you drink each day (Examples: soda, juice, sweet tea, etc.  Do NOT count diet or artificially sweetened beverages)?   0  How many days per week do you exercise enough to make your heart beat faster? 3 or less  How many minutes a day do you exercise enough to make your heart beat faster? 9 or less  How many days per week do you miss taking your medication? 0    Anxiety follow up  -Feels like atarax is helping, no concerns with this today or acute flares    Lymphedema  -Patient has a longstanding history of lymphedema and follows with vascular surgery regularly  -Seen monthly for his chronic lymphedema, last seen 5/9/2022  -Continuing to work with debriding areas of the legs  -Ashley has refused abdominal/pelvis CT imaging in the past despite recommendations  -Also has lymphedema pumps but struggles to use these has home  -Also recommending bariatric evaluation which the patient is not interested in at this point in time  -For the last week has felt lymphedema is worsening  -Denies any new activities, significant change in diet, chest pain, shortness of breath, orthopnea, or other acute concerns  -Continues to take his lasix  -Has had increased difficulty  getting around his home  -Has declined to do physical therapy for this in the past  -Next sees the clinic next week on 6/6/2022    Patient is an established patient of this clinic.  ----------------------------------------------------------------------------------------------------------------------  Patient Active Problem List   Diagnosis     Hypertension     H/O angioedema     Hip joint replacement status     Hypervolemia     Lymphedema     Lymphedema of both lower extremities     Methadone use     Obesity, morbid, BMI 40.0-49.9 (H)     Obstructive sleep apnea     Osteoarthritis of hip     Osteoarthritis of knee     History of tobacco use disorder     Pleural mass     Open wound of lower limb     Type 2 diabetes mellitus without complication, without long-term current use of insulin (H)     Opioid use disorder, moderate, in sustained remission, on maintenance therapy (H)     Past Surgical History:   Procedure Laterality Date     JOINT REPLACEMENT         Social History     Tobacco Use     Smoking status: Former Smoker     Smokeless tobacco: Former User   Substance Use Topics     Alcohol use: Never     Family History   Problem Relation Age of Onset     No Known Problems Mother      No Known Problems Father      Edema Sister      Edema Sister          Problem list and past medical, surgical, social, and family histories reviewed & adjusted, as indicated.    Current Outpatient Medications   Medication Sig Dispense Refill     amLODIPine (NORVASC) 10 MG tablet Take 1 tablet (10 mg) by mouth daily 30 tablet 1     amLODIPine (NORVASC) 5 MG tablet Take 1 tablet (5 mg) by mouth daily 30 tablet 1     ammonium lactate (LAC-HYDRIN) 12 % external lotion        atorvastatin (LIPITOR) 20 MG tablet Take 1 tablet (20 mg) by mouth daily 90 tablet 3     furosemide (LASIX) 40 MG tablet Take 1 tablet (40 mg) by mouth 2 times daily 180 tablet 3     hydrOXYzine (ATARAX) 25 MG tablet Take 0.5-1 tablets (12.5-25 mg) by mouth 3 times daily  "as needed for anxiety 60 tablet 1     hydrOXYzine (ATARAX) 25 MG tablet Take 0.5-1 tablets (12.5-25 mg) by mouth 3 times daily as needed for anxiety 30 tablet 0     losartan (COZAAR) 100 MG tablet Take 1 tablet (100 mg) by mouth daily 90 tablet 3     metFORMIN (GLUCOPHAGE-XR) 750 MG 24 hr tablet Take 1 tablet (750 mg) by mouth 2 times daily (with meals) 180 tablet 1     methadone (DOLOPHINE-INTENSOL) 10 MG/ML (HIGH CONC) solution Take 100 mg by mouth daily       sodium hypochlorite (QUARTER-STRENGTH DAKINS) external solution Apply topically every 72 hours Use 300mL every 72 hours to wash the bilateral legs and wounds on the legs 1000 mL 3     Medication list reviewed and updated as indicated.    Allergies   Allergen Reactions     Lisinopril Swelling     Patient reports \"neck swelling\"  Swelling in throat     Allergies reviewed and updated as indicated.  ----------------------------------------------------------------------------------------------------------------------  ROS:  Constitutional, HEENT, cardiovascular, pulmonary, GI, musculoskeletal, neuro, skin, and psych systems are negative, except as otherwise noted.    OBJECTIVE:     /66   Pulse 96   Temp 98  F (36.7  C) (Oral)   Resp 20   SpO2 97%   There is no height or weight on file to calculate BMI.  Exam:  Constitutional: healthy, alert and no distress  Head: Normocephalic. No masses, lesions, tenderness or abnormalities  Cardiovascular: RRR, no murmurs appreciated  Respiratory: CTAB, no wheezing, rales or rhonchi  Gastrointestinal: Abdomen soft, non-tender. BS normal. No masses, organomegaly  Musculoskeletal: Significant edema to the bilateral lower extremities   Skin: Difficult to assess legs in the setting of wearing pants over wraps.  Has rash in the left antecubital area   Neurologic: Sitting in wheelchair, moves all four extremities, decreased sensation to the bilateral lower extremities  Psychiatric: mentation appears normal and affect " normal/bright  Hematologic/Lymphatic/Immunologic: Significant lymphedema appreciated to the bilateral lower extremities, in wraps without any obvious fluid drainage at this time     Diagnostic Test Results:  Results for orders placed or performed in visit on 06/02/22 (from the past 24 hour(s))   Basic metabolic panel   Result Value Ref Range    Sodium 132 (L) 133 - 144 mmol/L    Potassium 4.3 3.4 - 5.3 mmol/L    Chloride 96 94 - 109 mmol/L    Carbon Dioxide (CO2) 28 20 - 32 mmol/L    Anion Gap 8 3 - 14 mmol/L    Urea Nitrogen 28 7 - 30 mg/dL    Creatinine 1.80 (H) 0.66 - 1.25 mg/dL    Calcium 11.2 (H) 8.5 - 10.1 mg/dL    Glucose 108 (H) 70 - 99 mg/dL    GFR Estimate 40 (L) >60 mL/min/1.73m2       ASSESSMENT/PLAN:     (I10) Benign essential hypertension  (primary encounter diagnosis)  -Patient here to follow up on his blood pressure which was elevated at his last clinic appointment   -On losartan  100 mg and amlodipine now 10 mg, had been 5 mg   -BP looking better today, patient is denying any symptoms related to this   -Otherwise feels at his baseline   -Concerned his worsening peripheral edema could be related to his calcium channel blocker use   -Hesitant to start a thiazide diuretic in the setting of MARLENE at this time  -We will hold amlodipine at this time   -Discussed starting another medication at this time vs waiting until early next week and patient would like to wait to see how he feels next week  -Patient was asymptomatic when he was hypertensive previously and he should likely be okay the next couple of days  -If MARLENE persists could think about starting a beta blocker    (I89.0) Lymphedema of both lower extremities  -Patient has worsening lymphedema on report  -Family who is present agrees with this assessment  -Denies any changes other than increased amlodipine dose at this time  -Has not been using his lymphedema pumps or putting his legs up  -Is taking his furosemide 40 mg BID and using leg  wraps  -Initially discussed increasing furosemide dose to 80 mg in the AM and 40 mg in the PM, but patient found to have an MARLENE and increasing his dose of medication was not felt appropriate at this time   -After contemplating possible causes felt felt it could be secondary to his recent amlodipine dose  -Called and spoke to the patient and asked he refrain from using amlodipine further at this time  -Also recommended using his lymphedema pumps and putting his legs up as able  -Ordered home lymphedema care as patient has refused this in the past when offered by lymphedema clinic  -Provided strict warning to present to the ED or return to clinic with any further acute changes     (E66.01) Obesity, morbid, BMI 40.0-49.9 (H)  -Family requesting a DME order to see if insurance would cover a bed to help accommodate the patient   -Order placed, will continue to follow up and fill out appropriate paperwork per insurance requirements     (N17.9) Acute kidney injury (H)  -Checked kidney function in the setting of possible increase of furosemide dose  -Unfortunately it appears the patient has an MARLENE with an elevated creatinine and decreased GFR  -Called patient to discuss not increasing his medication as we had discussed and placed in his AVS  -It sounds like the patient has not been drinking much fluid in the setting of worsening lymphedema   -Stressed the importance of staying hydrated despite worsening lymphedema and discussed how the two are sometimes not related as the fluid is outside of our intravascular space  -Recommended the patient return to clinic tomorrow for repeat evaluation and recheck his kidney function to ensure it is not getting worse   -At this time patient feels well and is declining to return tomorrow  -Is agreeable to return to clinic next week on Tuesday 6/7/2022  -Discussed holding lasix totally in the setting of being volume up but patient is understandably hesitant to do this  -Will continue with  the lasix  -Will work on drinking fluid, using his lymphedema pumps and putting his legs up in an attempt to get his volume status under control  -Understands that if he has any change in symptoms it is recommended he seek care immediately for possible hospitalization     (E87.1) Hyponatremia  -Mildly hyponatremic on BMP today  -Feel it is likely secondary to being mildly dehydrated in the setting of decreased fluid intake   -Will recheck at his next appointment to ensure improvement     There are no discontinued medications.    Options for treatment and follow-up care were reviewed with the patient and/or guardian. Shaheen Sepulveda and/or guardian engaged in the decision making process and verbalized understanding of the options discussed and agreed with the final plan    Precepted with Dr. Leal.    Hema Saucedo MD on 6/2/2022 at 7:57 AM

## 2022-06-02 NOTE — PROGRESS NOTES
Preceptor Attestation:   Patient seen, evaluated and discussed with the resident. I have verified the content of the note, which accurately reflects my assessment of the patient and the plan of care.    Supervising Physician:Ginny Leal MD    [facility]

## 2022-06-02 NOTE — PATIENT INSTRUCTIONS
-Please start using the lymphedema pumps to help with the swelling  -Follow up with the lymphedema specialist   -If you develop any fevers, chills, pain or other acute concerns please let us know  -Would recommend getting home lymphedema set up

## 2022-06-05 LAB
ALBUMIN UR-MCNC: NEGATIVE MG/DL
APPEARANCE UR: CLEAR
BILIRUB UR QL STRIP: NEGATIVE
COLOR UR AUTO: COLORLESS
GLUCOSE UR STRIP-MCNC: NEGATIVE MG/DL
HGB UR QL STRIP: NEGATIVE
HOLD SPECIMEN: NORMAL
HYALINE CASTS: 12 /LPF
KETONES UR STRIP-MCNC: NEGATIVE MG/DL
LEUKOCYTE ESTERASE UR QL STRIP: NEGATIVE
MUCOUS THREADS #/AREA URNS LPF: PRESENT /LPF
NITRATE UR QL: NEGATIVE
PH UR STRIP: 5 [PH] (ref 5–7)
RBC URINE: <1 /HPF
SP GR UR STRIP: 1.01 (ref 1–1.03)
SQUAMOUS EPITHELIAL: <1 /HPF
UROBILINOGEN UR STRIP-MCNC: <2 MG/DL
WBC URINE: 1 /HPF

## 2022-06-05 PROCEDURE — 81001 URINALYSIS AUTO W/SCOPE: CPT | Performed by: STUDENT IN AN ORGANIZED HEALTH CARE EDUCATION/TRAINING PROGRAM

## 2022-06-05 PROCEDURE — 82728 ASSAY OF FERRITIN: CPT | Performed by: STUDENT IN AN ORGANIZED HEALTH CARE EDUCATION/TRAINING PROGRAM

## 2022-06-05 PROCEDURE — C9803 HOPD COVID-19 SPEC COLLECT: HCPCS

## 2022-06-05 PROCEDURE — 85025 COMPLETE CBC W/AUTO DIFF WBC: CPT | Performed by: EMERGENCY MEDICINE

## 2022-06-05 PROCEDURE — 82310 ASSAY OF CALCIUM: CPT | Performed by: EMERGENCY MEDICINE

## 2022-06-05 PROCEDURE — 99285 EMERGENCY DEPT VISIT HI MDM: CPT | Mod: 25

## 2022-06-05 PROCEDURE — 81001 URINALYSIS AUTO W/SCOPE: CPT | Performed by: EMERGENCY MEDICINE

## 2022-06-05 PROCEDURE — 36415 COLL VENOUS BLD VENIPUNCTURE: CPT | Performed by: STUDENT IN AN ORGANIZED HEALTH CARE EDUCATION/TRAINING PROGRAM

## 2022-06-06 ENCOUNTER — APPOINTMENT (OUTPATIENT)
Dept: OCCUPATIONAL THERAPY | Facility: HOSPITAL | Age: 67
DRG: 603 | End: 2022-06-06
Payer: COMMERCIAL

## 2022-06-06 ENCOUNTER — HOSPITAL ENCOUNTER (INPATIENT)
Facility: HOSPITAL | Age: 67
LOS: 2 days | Discharge: HOME | DRG: 603 | End: 2022-06-08
Attending: EMERGENCY MEDICINE | Admitting: FAMILY MEDICINE
Payer: COMMERCIAL

## 2022-06-06 ENCOUNTER — PATIENT OUTREACH (OUTPATIENT)
Dept: FAMILY MEDICINE | Facility: CLINIC | Age: 67
End: 2022-06-06
Payer: COMMERCIAL

## 2022-06-06 ENCOUNTER — APPOINTMENT (OUTPATIENT)
Dept: CT IMAGING | Facility: HOSPITAL | Age: 67
DRG: 603 | End: 2022-06-06
Payer: COMMERCIAL

## 2022-06-06 ENCOUNTER — APPOINTMENT (OUTPATIENT)
Dept: ULTRASOUND IMAGING | Facility: HOSPITAL | Age: 67
End: 2022-06-06
Attending: EMERGENCY MEDICINE
Payer: COMMERCIAL

## 2022-06-06 DIAGNOSIS — L03.115 CELLULITIS OF RIGHT LOWER EXTREMITY: ICD-10-CM

## 2022-06-06 DIAGNOSIS — I89.0 LYMPHEDEMA: ICD-10-CM

## 2022-06-06 DIAGNOSIS — S81.801D OPEN WOUND OF BOTH LOWER EXTREMITIES, SUBSEQUENT ENCOUNTER: Primary | ICD-10-CM

## 2022-06-06 DIAGNOSIS — S81.802D OPEN WOUND OF BOTH LOWER EXTREMITIES, SUBSEQUENT ENCOUNTER: Primary | ICD-10-CM

## 2022-06-06 LAB
ANION GAP SERPL CALCULATED.3IONS-SCNC: 11 MMOL/L (ref 5–18)
ANION GAP SERPL CALCULATED.3IONS-SCNC: 8 MMOL/L (ref 5–18)
BASOPHILS # BLD AUTO: 0 10E3/UL (ref 0–0.2)
BASOPHILS NFR BLD AUTO: 0 %
BUN SERPL-MCNC: 31 MG/DL (ref 8–22)
BUN SERPL-MCNC: 37 MG/DL (ref 8–22)
CALCIUM SERPL-MCNC: 10.2 MG/DL (ref 8.5–10.5)
CALCIUM SERPL-MCNC: 9.5 MG/DL (ref 8.5–10.5)
CHLORIDE BLD-SCNC: 94 MMOL/L (ref 98–107)
CHLORIDE BLD-SCNC: 99 MMOL/L (ref 98–107)
CO2 SERPL-SCNC: 31 MMOL/L (ref 22–31)
CO2 SERPL-SCNC: 31 MMOL/L (ref 22–31)
CREAT SERPL-MCNC: 1.59 MG/DL (ref 0.7–1.3)
CREAT SERPL-MCNC: 1.98 MG/DL (ref 0.7–1.3)
EOSINOPHIL # BLD AUTO: 0.3 10E3/UL (ref 0–0.7)
EOSINOPHIL NFR BLD AUTO: 4 %
ERYTHROCYTE [DISTWIDTH] IN BLOOD BY AUTOMATED COUNT: 14.9 % (ref 10–15)
ERYTHROCYTE [DISTWIDTH] IN BLOOD BY AUTOMATED COUNT: 15 % (ref 10–15)
FERRITIN SERPL-MCNC: 389 NG/ML (ref 27–300)
GFR SERPL CREATININE-BSD FRML MDRD: 36 ML/MIN/1.73M2
GFR SERPL CREATININE-BSD FRML MDRD: 47 ML/MIN/1.73M2
GLUCOSE BLD-MCNC: 73 MG/DL (ref 70–125)
GLUCOSE BLD-MCNC: 94 MG/DL (ref 70–125)
GLUCOSE BLDC GLUCOMTR-MCNC: 110 MG/DL (ref 70–99)
GLUCOSE BLDC GLUCOMTR-MCNC: 110 MG/DL (ref 70–99)
GLUCOSE BLDC GLUCOMTR-MCNC: 152 MG/DL (ref 70–99)
HCT VFR BLD AUTO: 27.7 % (ref 40–53)
HCT VFR BLD AUTO: 30.1 % (ref 40–53)
HGB BLD-MCNC: 8.4 G/DL (ref 13.3–17.7)
HGB BLD-MCNC: 9.2 G/DL (ref 13.3–17.7)
IMM GRANULOCYTES # BLD: 0.1 10E3/UL
IMM GRANULOCYTES NFR BLD: 1 %
LACTATE SERPL-SCNC: 1.5 MMOL/L (ref 0.7–2)
LYMPHOCYTES # BLD AUTO: 1 10E3/UL (ref 0.8–5.3)
LYMPHOCYTES NFR BLD AUTO: 14 %
MCH RBC QN AUTO: 24 PG (ref 26.5–33)
MCH RBC QN AUTO: 24.1 PG (ref 26.5–33)
MCHC RBC AUTO-ENTMCNC: 30.3 G/DL (ref 31.5–36.5)
MCHC RBC AUTO-ENTMCNC: 30.6 G/DL (ref 31.5–36.5)
MCV RBC AUTO: 79 FL (ref 78–100)
MCV RBC AUTO: 80 FL (ref 78–100)
MONOCYTES # BLD AUTO: 0.5 10E3/UL (ref 0–1.3)
MONOCYTES NFR BLD AUTO: 7 %
NEUTROPHILS # BLD AUTO: 5 10E3/UL (ref 1.6–8.3)
NEUTROPHILS NFR BLD AUTO: 74 %
NRBC # BLD AUTO: 0 10E3/UL
NRBC BLD AUTO-RTO: 0 /100
PLATELET # BLD AUTO: 304 10E3/UL (ref 150–450)
PLATELET # BLD AUTO: 338 10E3/UL (ref 150–450)
POTASSIUM BLD-SCNC: 4.2 MMOL/L (ref 3.5–5)
POTASSIUM BLD-SCNC: 4.4 MMOL/L (ref 3.5–5)
RBC # BLD AUTO: 3.48 10E6/UL (ref 4.4–5.9)
RBC # BLD AUTO: 3.83 10E6/UL (ref 4.4–5.9)
SARS-COV-2 RNA RESP QL NAA+PROBE: NEGATIVE
SODIUM SERPL-SCNC: 136 MMOL/L (ref 136–145)
SODIUM SERPL-SCNC: 138 MMOL/L (ref 136–145)
WBC # BLD AUTO: 5.7 10E3/UL (ref 4–11)
WBC # BLD AUTO: 6.7 10E3/UL (ref 4–11)

## 2022-06-06 PROCEDURE — 999N000197 HC STATISTIC WOC PT EDUCATION, 0-15 MIN

## 2022-06-06 PROCEDURE — 250N000013 HC RX MED GY IP 250 OP 250 PS 637: Performed by: STUDENT IN AN ORGANIZED HEALTH CARE EDUCATION/TRAINING PROGRAM

## 2022-06-06 PROCEDURE — 97166 OT EVAL MOD COMPLEX 45 MIN: CPT | Mod: GO

## 2022-06-06 PROCEDURE — 87635 SARS-COV-2 COVID-19 AMP PRB: CPT | Performed by: MASSAGE THERAPIST

## 2022-06-06 PROCEDURE — 87077 CULTURE AEROBIC IDENTIFY: CPT | Performed by: STUDENT IN AN ORGANIZED HEALTH CARE EDUCATION/TRAINING PROGRAM

## 2022-06-06 PROCEDURE — 36415 COLL VENOUS BLD VENIPUNCTURE: CPT | Performed by: EMERGENCY MEDICINE

## 2022-06-06 PROCEDURE — 120N000001 HC R&B MED SURG/OB

## 2022-06-06 PROCEDURE — 96365 THER/PROPH/DIAG IV INF INIT: CPT | Mod: 59

## 2022-06-06 PROCEDURE — 96372 THER/PROPH/DIAG INJ SC/IM: CPT

## 2022-06-06 PROCEDURE — 87040 BLOOD CULTURE FOR BACTERIA: CPT | Performed by: EMERGENCY MEDICINE

## 2022-06-06 PROCEDURE — 83605 ASSAY OF LACTIC ACID: CPT | Performed by: EMERGENCY MEDICINE

## 2022-06-06 PROCEDURE — 36415 COLL VENOUS BLD VENIPUNCTURE: CPT

## 2022-06-06 PROCEDURE — 85027 COMPLETE CBC AUTOMATED: CPT

## 2022-06-06 PROCEDURE — 87205 SMEAR GRAM STAIN: CPT | Performed by: STUDENT IN AN ORGANIZED HEALTH CARE EDUCATION/TRAINING PROGRAM

## 2022-06-06 PROCEDURE — 99223 1ST HOSP IP/OBS HIGH 75: CPT | Mod: AI

## 2022-06-06 PROCEDURE — 258N000003 HC RX IP 258 OP 636: Performed by: EMERGENCY MEDICINE

## 2022-06-06 PROCEDURE — 250N000011 HC RX IP 250 OP 636: Performed by: FAMILY MEDICINE

## 2022-06-06 PROCEDURE — 250N000011 HC RX IP 250 OP 636: Performed by: EMERGENCY MEDICINE

## 2022-06-06 PROCEDURE — 82310 ASSAY OF CALCIUM: CPT

## 2022-06-06 PROCEDURE — 97535 SELF CARE MNGMENT TRAINING: CPT | Mod: GO

## 2022-06-06 PROCEDURE — 96366 THER/PROPH/DIAG IV INF ADDON: CPT

## 2022-06-06 PROCEDURE — 74177 CT ABD & PELVIS W/CONTRAST: CPT

## 2022-06-06 PROCEDURE — 250N000011 HC RX IP 250 OP 636

## 2022-06-06 PROCEDURE — 93971 EXTREMITY STUDY: CPT | Mod: RT

## 2022-06-06 RX ORDER — METHADONE HYDROCHLORIDE 10 MG/ML
100 CONCENTRATE ORAL DAILY
Status: DISCONTINUED | OUTPATIENT
Start: 2022-06-06 | End: 2022-06-08 | Stop reason: HOSPADM

## 2022-06-06 RX ORDER — ATORVASTATIN CALCIUM 10 MG/1
20 TABLET, FILM COATED ORAL DAILY
Status: DISCONTINUED | OUTPATIENT
Start: 2022-06-06 | End: 2022-06-08 | Stop reason: HOSPADM

## 2022-06-06 RX ORDER — DEXTROSE MONOHYDRATE 25 G/50ML
25-50 INJECTION, SOLUTION INTRAVENOUS
Status: DISCONTINUED | OUTPATIENT
Start: 2022-06-06 | End: 2022-06-08 | Stop reason: HOSPADM

## 2022-06-06 RX ORDER — NICOTINE POLACRILEX 4 MG
15-30 LOZENGE BUCCAL
Status: DISCONTINUED | OUTPATIENT
Start: 2022-06-06 | End: 2022-06-08 | Stop reason: HOSPADM

## 2022-06-06 RX ORDER — CEFAZOLIN SODIUM 1 G/50ML
2000 SOLUTION INTRAVENOUS ONCE
Status: COMPLETED | OUTPATIENT
Start: 2022-06-06 | End: 2022-06-06

## 2022-06-06 RX ORDER — PIPERACILLIN SODIUM, TAZOBACTAM SODIUM 3; .375 G/15ML; G/15ML
3.38 INJECTION, POWDER, LYOPHILIZED, FOR SOLUTION INTRAVENOUS EVERY 8 HOURS
Status: DISCONTINUED | OUTPATIENT
Start: 2022-06-06 | End: 2022-06-08 | Stop reason: HOSPADM

## 2022-06-06 RX ORDER — ACETAMINOPHEN 325 MG/1
650 TABLET ORAL EVERY 6 HOURS PRN
Status: DISCONTINUED | OUTPATIENT
Start: 2022-06-06 | End: 2022-06-08 | Stop reason: HOSPADM

## 2022-06-06 RX ORDER — ACETAMINOPHEN 650 MG/1
650 SUPPOSITORY RECTAL EVERY 6 HOURS PRN
Status: DISCONTINUED | OUTPATIENT
Start: 2022-06-06 | End: 2022-06-08 | Stop reason: HOSPADM

## 2022-06-06 RX ORDER — IOPAMIDOL 755 MG/ML
80 INJECTION, SOLUTION INTRAVASCULAR ONCE
Status: COMPLETED | OUTPATIENT
Start: 2022-06-06 | End: 2022-06-06

## 2022-06-06 RX ORDER — CEFAZOLIN SODIUM 1 G/50ML
1250 SOLUTION INTRAVENOUS EVERY 24 HOURS
Status: DISCONTINUED | OUTPATIENT
Start: 2022-06-07 | End: 2022-06-07

## 2022-06-06 RX ORDER — LIDOCAINE 40 MG/G
CREAM TOPICAL
Status: DISCONTINUED | OUTPATIENT
Start: 2022-06-06 | End: 2022-06-08 | Stop reason: HOSPADM

## 2022-06-06 RX ORDER — HEPARIN SODIUM 5000 [USP'U]/.5ML
5000 INJECTION, SOLUTION INTRAVENOUS; SUBCUTANEOUS EVERY 12 HOURS
Status: DISCONTINUED | OUTPATIENT
Start: 2022-06-06 | End: 2022-06-08 | Stop reason: HOSPADM

## 2022-06-06 RX ORDER — PIPERACILLIN SODIUM, TAZOBACTAM SODIUM 3; .375 G/15ML; G/15ML
3.38 INJECTION, POWDER, LYOPHILIZED, FOR SOLUTION INTRAVENOUS ONCE
Status: COMPLETED | OUTPATIENT
Start: 2022-06-06 | End: 2022-06-06

## 2022-06-06 RX ADMIN — PIPERACILLIN AND TAZOBACTAM 3.38 G: 3; .375 INJECTION, POWDER, LYOPHILIZED, FOR SOLUTION INTRAVENOUS at 08:08

## 2022-06-06 RX ADMIN — PIPERACILLIN AND TAZOBACTAM 3.38 G: 3; .375 INJECTION, POWDER, LYOPHILIZED, FOR SOLUTION INTRAVENOUS at 20:55

## 2022-06-06 RX ADMIN — PIPERACILLIN AND TAZOBACTAM 3.38 G: 3; .375 INJECTION, POWDER, LYOPHILIZED, FOR SOLUTION INTRAVENOUS at 13:13

## 2022-06-06 RX ADMIN — HEPARIN SODIUM 5000 UNITS: 10000 INJECTION, SOLUTION INTRAVENOUS; SUBCUTANEOUS at 20:55

## 2022-06-06 RX ADMIN — METHADONE HYDROCHLORIDE 100 MG: 10 CONCENTRATE ORAL at 12:23

## 2022-06-06 RX ADMIN — HEPARIN SODIUM 5000 UNITS: 10000 INJECTION, SOLUTION INTRAVENOUS; SUBCUTANEOUS at 08:07

## 2022-06-06 RX ADMIN — IOPAMIDOL 75 ML: 755 INJECTION, SOLUTION INTRAVENOUS at 13:41

## 2022-06-06 RX ADMIN — VANCOMYCIN HYDROCHLORIDE 2000 MG: 10 INJECTION, POWDER, LYOPHILIZED, FOR SOLUTION INTRAVENOUS at 01:41

## 2022-06-06 RX ADMIN — ATORVASTATIN CALCIUM 20 MG: 10 TABLET, FILM COATED ORAL at 12:23

## 2022-06-06 RX ADMIN — SODIUM CHLORIDE 1000 ML: 9 INJECTION, SOLUTION INTRAVENOUS at 01:34

## 2022-06-06 ASSESSMENT — ACTIVITIES OF DAILY LIVING (ADL): DEPENDENT_IADLS:: CLEANING;COOKING;LAUNDRY;SHOPPING;MEAL PREPARATION;TRANSPORTATION

## 2022-06-06 ASSESSMENT — ENCOUNTER SYMPTOMS
VOMITING: 0
FEVER: 0
CHILLS: 0

## 2022-06-06 NOTE — ED PROVIDER NOTES
EMERGENCY DEPARTMENT ENCOUNTER      NAME: Shaheen Sepulveda  AGE: 66 year old male  YOB: 1955  MRN: [id number]  EVALUATION DATE & TIME: 6/6/2022  1:07 AM    PCP: Radha Sal    ED PROVIDER: Marielle Sabillon M.D.      Chief Complaint   Patient presents with     Leg Swelling     Mobility Issues         FINAL IMPRESSION:  1. Lymphedema    2. Cellulitis of right lower extremity        MEDICAL DECISION MAKING:    Pertinent Labs & Imaging studies reviewed. (See chart for details)  ED Course as of 06/06/22 0235   Mon Jun 06, 2022   0129 Afebrile.  Vital signs are unremarkable.  Patient is coming in for evaluation of right leg pain.  Has a history of lymphedema, follows with lymphedema clinic.  Gets his bandages changed every other day.  States over the last couple days he has noticed his right leg is been more swollen.  He only has minimal feeling to his leg but states its been very sore when he is walking on it.  No fevers chills.  No other systemic signs of illness.    Physical exam for patient here significant lymphedema bilateral lower extremities with chronic changes.  In removing his bandages his right lower extremity is more swollen than his left up to the level of his mid thigh.  From mid shin down to his ankle he has worsening erythema.  On the lateral aspect of his calf he has a cracked area that is oozing a brownish purulent material.  Quite malodorous.  No injury to the bottom of his foot.  Does have some swelling across the dorsum of his foot as well.  Left lower extremity has lymphedema, no significant erythema but 1 small area of breakdown with some purulence and discharge from his anterior shin.  Pictures are included.    Patient does appear here to have some cellulitis.  He does not appear to be septic here, will check labs for patient here, give some fluids, check basic labs, start vancomycin.  Patient is not able to get around very well at home, will likely need to come in for cellulitis.   Will call and speak with hospitalist for admission           Critical care: 0 minutes excluding separately billable procedures.  Includes bedside management, time reviewing test results, review of records, discussing the case with staff, documenting the medical record and time spent with family members (or surrogate decision makers) discussing specific treatment issues.          ED COURSE:  1:20 AM Initial encounter and examination of the patient.  1:28 AM Paged hospitalist.  1:47 AM Spoke with Dr. Dean, hospitalist.    The importance of close follow up was discussed. We reviewed warning signs and symptoms, and I instructed Mr. Sepulveda to return to the emergency department immediately if he develops any new or worsening symptoms. I provided additional verbal discharge instructions. Mr. Sepulveda expressed understanding and agreement with this plan of care, his questions were answered, and he was discharged in stable condition.     MEDICATIONS GIVEN IN THE EMERGENCY:  Medications   vancomycin (VANCOCIN) 2,000 mg in sodium chloride 0.9 % 500 mL intermittent infusion (2,000 mg Intravenous New Bag 6/6/22 0141)   0.9% sodium chloride BOLUS (1,000 mLs Intravenous New Bag 6/6/22 0134)       NEW PRESCRIPTIONS STARTED AT TODAY'S ER VISIT:  New Prescriptions    No medications on file          =================================================================    HPI    Patient information was obtained from: Patient    Use of : N/A      Shaheen Sepulveda is a 66 year old male who presents with leg swelling. For over 6 years, the patient has dealt with lymphedema, and reports normal swelling to bilateral legs. Recently, he endorses increased swelling to his right leg and foot, making it difficult to walk, prompting him to present to the ED today (6/6). He denies pain, but states that the bottom of his right foot is sore. The patient states that he goes to the lymphedema clinic regularly, and has an appointment at clinic tomorrow  morning. His leg wraps get changed every other day, and he reports getting new bandages last night (6/4).  He still reports minimal feeling in his right leg, and can normally walk around okay. He has never had infections to his legs prior to this incident. The patient denies fever, chills, vomiting, or any other  Complaints at this time.      REVIEW OF SYSTEMS   Review of Systems   Constitutional: Negative for chills and fever.   Gastrointestinal: Negative for vomiting.   Skin:        Positive for swelling to right leg with difficulty walking. Soreness to bottom of right foot.   All other systems reviewed and are negative.        PAST MEDICAL HISTORY:  Past Medical History:   Diagnosis Date     Diabetes (H)      H/O angioedema 6/15/2020    Formatting of this note might be different from the original. Unexplained angioedema twice, discontinue lisinopril for possible connection to it, though he had used it afterwards with no symptoms     History of tobacco use disorder 8/1/2013    Formatting of this note might be different from the original. Added per documetation     Hypertension      Lymphedema of both lower extremities 12/22/2014     Methadone use 5/8/2012     Obstructive sleep apnea 2/2/2015    Formatting of this note might be different from the original. Epic     Pleural mass 7/2/2013     Uncomplicated asthma        PAST SURGICAL HISTORY:  Past Surgical History:   Procedure Laterality Date     JOINT REPLACEMENT         CURRENT MEDICATIONS:      Current Facility-Administered Medications:      lidocaine (XYLOCAINE) 2 % external gel, , Topical, Daily PRN, Lucia Reyes MD, Given at 10/19/21 0828     vancomycin (VANCOCIN) 2,000 mg in sodium chloride 0.9 % 500 mL intermittent infusion, 2,000 mg, Intravenous, Once, Helen Sabillon MD, 2,000 mg at 06/06/22 0141    Current Outpatient Medications:      ammonium lactate (LAC-HYDRIN) 12 % external lotion, , Disp: , Rfl:      atorvastatin (LIPITOR) 20 MG tablet, Take 1  "tablet (20 mg) by mouth daily, Disp: 90 tablet, Rfl: 3     furosemide (LASIX) 40 MG tablet, Take 1 tablet (40 mg) by mouth 2 times daily, Disp: 180 tablet, Rfl: 3     hydrOXYzine (ATARAX) 25 MG tablet, Take 0.5-1 tablets (12.5-25 mg) by mouth 3 times daily as needed for anxiety, Disp: 60 tablet, Rfl: 1     losartan (COZAAR) 100 MG tablet, Take 1 tablet (100 mg) by mouth daily, Disp: 90 tablet, Rfl: 3     metFORMIN (GLUCOPHAGE-XR) 750 MG 24 hr tablet, Take 1 tablet (750 mg) by mouth 2 times daily (with meals), Disp: 180 tablet, Rfl: 1     methadone (DOLOPHINE-INTENSOL) 10 MG/ML (HIGH CONC) solution, Take 100 mg by mouth daily, Disp: , Rfl:      sodium hypochlorite (QUARTER-STRENGTH DAKINS) external solution, Apply topically every 72 hours Use 300mL every 72 hours to wash the bilateral legs and wounds on the legs, Disp: 1000 mL, Rfl: 3    ALLERGIES:  Allergies   Allergen Reactions     Lisinopril Swelling     Patient reports \"neck swelling\"  Swelling in throat       FAMILY HISTORY:  Family History   Problem Relation Age of Onset     No Known Problems Mother      No Known Problems Father      Edema Sister      Edema Sister        SOCIAL HISTORY:   Social History     Socioeconomic History     Marital status: Single   Tobacco Use     Smoking status: Former Smoker     Smokeless tobacco: Former User   Vaping Use     Vaping Use: Never used   Substance and Sexual Activity     Alcohol use: Never     Drug use: Never       PHYSICAL EXAM:    Vitals: BP (!) 144/63   Pulse 82   Temp 97.7  F (36.5  C) (Temporal)   Resp 20   Ht 1.905 m (6' 3\")   Wt (!) 192.3 kg (424 lb)   SpO2 97%   BMI 53.00 kg/m     General:. Alert and interactive, comfortable appearing.  HENT: Oropharynx without erythema or exudates. MMM.  TMs clear bilaterally.  Eyes: Pupils mid-sized and equally reactive.   Neck: Full AROM.  No midline tenderness to palpation.  Cardiovascular: Regular rate and rhythm. Peripheral pulses 2+ bilaterally.  Chest/Pulmonary: " Normal work of breathing. Lung sounds clear and equal throughout, no wheezes or crackles. No chest wall tenderness or deformities.  Abdomen: Soft, nondistended. Nontender without guarding or rebound.  Back/Spine: No CVA or midline tenderness.  Extremities: Normal ROM of all major joints. Significant lymphedema bilateral lower extremities with chronic changes.  In removing his bandages his right lower extremity is more swollen than his left up to the level of his mid thigh.  From mid shin down to his ankle he has worsening erythema.  On the lateral aspect of his calf he has a cracked area that is oozing a brownish purulent material.  Quite malodorous.  No injury to the bottom of his foot.  Does have some swelling across the dorsum of his foot as well.  Left lower extremity has lymphedema, no significant erythema but 1 small area of breakdown with some purulence and discharge from his anterior shin.  Pictures are included.  Skin: Warm and dry. Normal skin color.   Neuro: Speech clear. CNs grossly intact. Moves all extremities appropriately. Strength and sensation grossly intact to all extremities.   Psych: Normal affect/mood, cooperative, memory appropriate.                 LAB:  All pertinent labs reviewed and interpreted.  Labs Ordered and Resulted from Time of ED Arrival to Time of ED Departure   ROUTINE UA WITH MICROSCOPIC REFLEX TO CULTURE - Abnormal       Result Value    Color Urine Colorless      Appearance Urine Clear      Glucose Urine Negative      Bilirubin Urine Negative      Ketones Urine Negative      Specific Gravity Urine 1.008      Blood Urine Negative      pH Urine 5.0      Protein Albumin Urine Negative      Urobilinogen Urine <2.0      Nitrite Urine Negative      Leukocyte Esterase Urine Negative      Mucus Urine Present (*)     RBC Urine <1      WBC Urine 1      Squamous Epithelials Urine <1      Hyaline Casts Urine 12 (*)    BASIC METABOLIC PANEL - Abnormal    Sodium 136      Potassium 4.4       Chloride 94 (*)     Carbon Dioxide (CO2) 31      Anion Gap 11      Urea Nitrogen 37 (*)     Creatinine 1.98 (*)     Calcium 10.2      Glucose 94      GFR Estimate 36 (*)    CBC WITH PLATELETS AND DIFFERENTIAL - Abnormal    WBC Count 6.7      RBC Count 3.83 (*)     Hemoglobin 9.2 (*)     Hematocrit 30.1 (*)     MCV 79      MCH 24.0 (*)     MCHC 30.6 (*)     RDW 15.0      Platelet Count 338      % Neutrophils 74      % Lymphocytes 14      % Monocytes 7      % Eosinophils 4      % Basophils 0      % Immature Granulocytes 1      NRBCs per 100 WBC 0      Absolute Neutrophils 5.0      Absolute Lymphocytes 1.0      Absolute Monocytes 0.5      Absolute Eosinophils 0.3      Absolute Basophils 0.0      Absolute Immature Granulocytes 0.1      Absolute NRBCs 0.0     LACTIC ACID WHOLE BLOOD   BLOOD CULTURE   BLOOD CULTURE       RADIOLOGY:  US Lower Extremity Venous Duplex Right   Final Result   IMPRESSION:   1.  No deep venous thrombosis in the right lower extremity.          EKG:  See MDM  I have independently reviewed and interpreted the EKG(s) documented above.           I, Shona Sheppard, am serving as a scribe to document services personally performed by Dr. Marielle Sabillon  based on my observation and the provider's statements to me. I, Marielle Sabillon MD attest that Shona Sheppard is acting in a scribe capacity, has observed my performance of the services and has documented them in accordance with my direction.      Marielle Sabillon M.D.  Emergency Medicine  [facility] EMERGENCY DEPARTMENT  [address] [place] [id number] [phone]  Dept: [phone]       Helen Sabillon MD  06/06/22 0231

## 2022-06-06 NOTE — H&P
[facility]    History and Physical - [facility]      Date of Admission:  6/6/2022    Assessment & Plan      Shaheen Sepulveda is a 66 year old male admitted on 6/6/2022.  He has a past medical history significant for lymphedema, DG, hypertension, type 2 diabetes mellitus.  He is admitted for right lower extremity cellulitis.    Right lower extremity cellulitis  Bilateral lymphedema  Worsening lymphedema over the past week, now with multiple wounds and purulent discharge, most significant in the right lower extremity. Purulent discharge concerning for cellulitis.  Ultrasound negative for DVT.  Normal vitals/WBC/lactic acid, less concerning for bacteremia/sepsis. Worsening edema possibly in the setting of recent amlodipine dose adjustment.   Currently on Lasix 40 mg 2 times daily.  We will hold morning dose in the setting of MARLENE.  Vascular surgery has recommended CT abdomen pelvis in the past, however has declined this.  Could discuss further during this admission.  Started on vancomycin in the ED, will continue for now.  We will add Zosyn for pseudomonal coverage.  -Vancomycin, pharmacy to dose.  -Zosyn  -Wound care consult  -CBC in a.m.  -Follow-up blood cultures  -PT/OT    Acute kidney injury  Creatinine measuring 1.98 BUN of 37.  Up from 1.8 four days prior.  Baseline around 1.0.  Likely intravascularly dry with decreased oral intake and diuretic use.  1 L bolus in the ED.  -BMP in a.m.  -Hold losartan    Normocytic anemia  Hemoglobin between 9 and 10 over the past year.  Borderline microcytic.  Normal ferritin in 2021.  No evidence of blood loss.  Likely anemia of chronic disease.      Chronic conditions  Hypertension -hold losartan in the setting of MARLENE  Type 2 diabetes mellitus -A1c 6.7 1-month prior.  Continue metformin once med rec complete  Hyperlipidemia -continue atorvastatin once med rec complete.  Opioid use disorder -continue methadone once med rec  "complete  DG - CPAP  Anxiety -continue hydroxyzine as needed once med rec complete  Obesity - Previously referred to bariatrics, has not yet made an appointment.    Medication reconciliation not yet complete     Diet: Combination Diet Regular Diet Adult  DVT Prophylaxis: Heparin SQ  Waddell Catheter: Not present  Fluids: None   Central Lines: None  Cardiac Monitoring: None  Code Status: Full Code    Clinically Significant Risk Factors Present on Admission          # Hypercalcemia: Ca = 10.2 mg/dL (Ref range: 8.5 - 10.5 mg/dL) and/or iCa = N/A on admission, will monitor as appropriate         # Diabetes, type II: last A1C 6.7 % (Ref range: 0.0 - 5.6 %)  # Severe Obesity: Estimated body mass index is 53 kg/m  as calculated from the following:    Height as of this encounter: 1.905 m (6' 3\").    Weight as of this encounter: 192.3 kg (424 lb).      Disposition Plan   Likely discharge to prior living arrangement in the next 2 to 3 days after completion of IV antibiotics and safe disposition plan established.       The patient's care was discussed with the attending physician at morning report.    Akhil Dean MD PGY-1  [facility]         ______________________________________________________________________    Chief Complaint   Lower extremity swelling    History obtained from patient.    History of Present Illness   Shaheen Sepulveda is a 66 year old male who is admitted for lower extremity cellulitis.    Patient noted worsening swelling in his legs over the past week.  He has chronic lymphedema but notes the swelling has significantly worsened in his thighs.  Has had more difficulty ambulating over the past few days, uses a cane for assistance.  Denies any leg pain but does note some soreness in his right leg with ambulation.  Has lymphedema wraps, changed every other day.  Uses Dakin's solution, silvercel, ABD, and rolled gauze.  He denies any fever or chills.  No chest pain or shortness of " breath.    He believes it was due to his recent increase in amlodipine from 5 mg to 10 mg daily.  He saw his PCP few days ago and amlodipine was held.  There was also discussion to increase Lasix to 80 mg in the morning and 40 in the evening however MARLENE was noted on routine BMP.  Notes decreased fluid or intake over the past week or so.    He was seen in vascular surgery clinic about a month ago.  Noted to have weeping ulcerations at that time.     ED course.  CBC severe for hemoglobin of 9.2 BMP with a creatinine of 1.98, up from 1.804 days ago.  UA was negative.  Lactic acid negative.  Started on vancomycin.    Review of Systems    The 10 point Review of Systems is negative other than noted in the HPI     Past Medical History    I have reviewed this patient's medical history and updated it with pertinent information if needed.   Past Medical History:   Diagnosis Date     Diabetes (H)      H/O angioedema 6/15/2020    Formatting of this note might be different from the original. Unexplained angioedema twice, discontinue lisinopril for possible connection to it, though he had used it afterwards with no symptoms     History of tobacco use disorder 8/1/2013    Formatting of this note might be different from the original. Added per documetation     Hypertension      Lymphedema of both lower extremities 12/22/2014     Methadone use 5/8/2012     Obstructive sleep apnea 2/2/2015    Formatting of this note might be different from the original. Epic     Pleural mass 7/2/2013     Uncomplicated asthma        Past Surgical History   I have reviewed this patient's surgical history and updated it with pertinent information if needed.  Past Surgical History:   Procedure Laterality Date     JOINT REPLACEMENT          Social History   I have reviewed this patient's social history and updated it with pertinent information if needed. Shaheen Sepulveda  reports that he has quit smoking. He has quit using smokeless tobacco. He reports that he  "does not drink alcohol and does not use drugs.  Lives at home with family members.    Family History   I have reviewed this patient's family history and updated it with pertinent information if needed.  Family History   Problem Relation Age of Onset     No Known Problems Mother      No Known Problems Father      Edema Sister      Edema Sister        Prior to Admission Medications   Prior to Admission Medications   Prescriptions Last Dose Informant Patient Reported? Taking?   ammonium lactate (LAC-HYDRIN) 12 % external lotion   Yes No   atorvastatin (LIPITOR) 20 MG tablet   No No   Sig: Take 1 tablet (20 mg) by mouth daily   furosemide (LASIX) 40 MG tablet   No No   Sig: Take 1 tablet (40 mg) by mouth 2 times daily   hydrOXYzine (ATARAX) 25 MG tablet   No No   Sig: Take 0.5-1 tablets (12.5-25 mg) by mouth 3 times daily as needed for anxiety   losartan (COZAAR) 100 MG tablet   No No   Sig: Take 1 tablet (100 mg) by mouth daily   metFORMIN (GLUCOPHAGE-XR) 750 MG 24 hr tablet   No No   Sig: Take 1 tablet (750 mg) by mouth 2 times daily (with meals)   methadone (DOLOPHINE-INTENSOL) 10 MG/ML (HIGH CONC) solution   Yes No   Sig: Take 100 mg by mouth daily   sodium hypochlorite (QUARTER-STRENGTH DAKINS) external solution   No No   Sig: Apply topically every 72 hours Use 300mL every 72 hours to wash the bilateral legs and wounds on the legs      Facility-Administered Medications Last Administration Doses Remaining   lidocaine (XYLOCAINE) 2 % external gel 10/19/2021  8:28 AM         Allergies   Allergies   Allergen Reactions     Lisinopril Swelling     Patient reports \"neck swelling\"  Swelling in throat       Physical Exam   Vital Signs: Temp: 97.7  F (36.5  C) Temp src: Temporal BP: (!) 148/67 Pulse: 69   Resp: 20 SpO2: 96 %      Weight: 424 lbs 0 oz    Constitutional: awake, alert, cooperative, no apparent distress, and appears stated age  Eyes: pupils equal, round and reactive to light, lateral deviation of the left eye..  " Extra ocular muscles intact, sclera clear, conjunctiva normal  ENT: atraumatic, oral pharynx with moist mucous membranes, poor dentition.    Respiratory: No increased work of breathing, good air exchange, clear to auscultation bilaterally, no crackles or wheezing  Cardiovascular: regular rate and rhythm, normal S1 and S2, no S3 or S4, and no murmur noted  GI: normal bowel sounds, soft, non-distended, non-tender  Musculoskeletal: significant lymphedema bilaterally.  Worse on the right.  Dry scaly skin throughout.  Open wound of the right lower extremity.  No significant tenderness to palpation.  Neurologic: Awake, alert, oriented to name, place and time.  Cranial nerves II-XII are grossly intact.  Limited sensation to touch in lower extremities bilaterally.  Neuropsychiatric: General: normal, calm and normal eye contact                       Data   Data reviewed today: I reviewed all medications, new labs and imaging results over the last 24 hours.

## 2022-06-06 NOTE — PHARMACY-VANCOMYCIN DOSING SERVICE
Pharmacy Vancomycin Initial Note  Date of Service 2022  Patient's  1955  66 year old, male  Indication: Skin and Soft Tissue Infection  Current estimated CrCl = Estimated Creatinine Clearance: 70.9 mL/min (A) (based on SCr of 1.8 mg/dL (H)).  Creatinine for last 3 days  No results found for requested labs within last 72 hours.    Recent Vancomycin Level(s) for last 3 days  No results found for requested labs within last 72 hours.      Vancomycin IV Administrations (past 72 hours)      No vancomycin orders with administrations in past 72 hours.                Nephrotoxins and other renal medications (From now, onward)    Start     Dose/Rate Route Frequency Ordered Stop    22 0200  vancomycin (VANCOCIN) 2,000 mg in sodium chloride 0.9 % 500 mL intermittent infusion         2,000 mg  over 2 Hours Intravenous ONCE 22 0130            Contrast Orders - past 72 hours (72h ago, onward)    None             Plan:  1. Give vancomycin  2000 mg IV x1 in ED.     Abigail Bond, [facility]

## 2022-06-06 NOTE — PHARMACY-ADMISSION MEDICATION HISTORY
Pharmacy Note - Admission Medication History    Pertinent Provider Information: Patient last received 27 takeouts of Methadone 100 mg from [facility] on 5/27/22     ______________________________________________________________________    Prior To Admission (PTA) med list completed and updated in EMR.       Current Facility-Administered Medications for the 6/6/22 encounter (Hospital Encounter)   Medication     lidocaine (XYLOCAINE) 2 % external gel     PTA Med List   Medication Sig Note Last Dose     ammonium lactate (LAC-HYDRIN) 12 % external lotion Apply topically daily as needed With dressing changes  6/5/2022 at Unknown time     atorvastatin (LIPITOR) 20 MG tablet Take 1 tablet (20 mg) by mouth daily  6/5/2022 at am     furosemide (LASIX) 40 MG tablet Take 1 tablet (40 mg) by mouth 2 times daily  6/5/2022 at pm     hydrOXYzine (ATARAX) 25 MG tablet Take 0.5-1 tablets (12.5-25 mg) by mouth 3 times daily as needed for anxiety  Past Week at prn     losartan (COZAAR) 100 MG tablet Take 1 tablet (100 mg) by mouth daily  6/5/2022 at am     metFORMIN (GLUCOPHAGE-XR) 750 MG 24 hr tablet Take 1 tablet (750 mg) by mouth 2 times daily (with meals)  6/5/2022 at pm     methadone (DOLOPHINE-INTENSOL) 10 MG/ML (HIGH CONC) solution Take 100 mg by mouth daily 6/6/2022: Last received 27 take outs on 5/27 from [facility]  6/5/2022 at Unknown time     sodium hypochlorite (QUARTER-STRENGTH DAKINS) external solution Apply topically every 72 hours Use 300mL every 72 hours to wash the bilateral legs and wounds on the legs (Patient taking differently: Apply topically every 72 hours as needed Use 300mL every 72 hours to wash the bilateral legs and wounds on the legs)  Past Week at prn       Information source(s): Patient, Clinic records and CareEverywhere/[facility]  Method of interview communication: in-person    Summary of Changes to PTA Med List  New: -  Discontinued: -  Changed: -    Patient was asked about  OTC/herbal products specifically.  PTA med list reflects this.    In the past week, patient estimated taking medication this percent of the time:  greater than 90%.    Allergies were reviewed, assessed, and updated with the patient.      Patient does not use any multi-dose medications prior to admission.    The information provided in this note is only as accurate as the sources available at the time of the update(s).    Thank you for the opportunity to participate in the care of this patient.    DANIA SOLIS RPH  6/6/2022 9:03 AM

## 2022-06-06 NOTE — ED TRIAGE NOTES
"Patient arrives from home where he lives with family.  Reported that he has been having some new BP medication changes.  Noted that he has been having increased right leg swelling over the past 4 days after medication changes.  States \"I normally only have swelling from the knee down but now it is up into my thigh.\"  Taking diuretics as directed.  Denies any SOB or CP.  Reports generalized weakness and difficulties with mobility due to leg swelling.      Triage Assessment     Row Name 06/05/22 2031       Triage Assessment (Adult)    Airway WDL WDL       Respiratory WDL    Respiratory WDL WDL       Skin Circulation/Temperature WDL    Skin Circulation/Temperature WDL WDL       Cardiac WDL    Cardiac WDL WDL       Peripheral/Neurovascular WDL    Peripheral Neurovascular WDL X  BHAVESH lower extremity pulses in triage- prominent bilateral leg swelling       Cognitive/Neuro/Behavioral WDL    Cognitive/Neuro/Behavioral WDL WDL              "

## 2022-06-06 NOTE — PROGRESS NOTES
Lower BLE LINDA. Pt having large amt of serous foul smelling drainage to right lateral and posterior calf. RLE mod amt of serous foul smelling drainage from posterior lateral calf. WOC seen. Changing srinivas pads often. ABD pads applied. Legs not wrapped at this time. Will monitor.

## 2022-06-06 NOTE — PHARMACY-VANCOMYCIN DOSING SERVICE
Pharmacy Vancomycin Initial Note  Date of Service 2022  Patient's  1955  66 year old, male    Indication: Skin and Soft Tissue Infection    Current estimated CrCl = Estimated Creatinine Clearance: 64.4 mL/min (A) (based on SCr of 1.98 mg/dL (H)).    Creatinine for last 3 days  2022:  9:08 PM Creatinine 1.98 mg/dL    Recent Vancomycin Level(s) for last 3 days  No results found for requested labs within last 72 hours.      Vancomycin IV Administrations (past 72 hours)                   vancomycin (VANCOCIN) 2,000 mg in sodium chloride 0.9 % 500 mL intermittent infusion (mg) 2,000 mg New Bag 22 0141                Nephrotoxins and other renal medications (From now, onward)    Start     Dose/Rate Route Frequency Ordered Stop    22 0000  vancomycin (VANCOCIN) 1,250 mg in sodium chloride 0.9 % 250 mL intermittent infusion         1,250 mg  over 90 Minutes Intravenous EVERY 24 HOURS 22 0711            Contrast Orders - past 72 hours (72h ago, onward)    None          [employer] Prediction of Planned Initial Vancomycin Regimen  Regimen: 1250 mg IV every 24 hours.  Start time: 08:06 on 2022  Exposure target: AUC24 (range)400-600 mg/L.hr   AUC24,ss: 518 mg/L.hr  Probability of AUC24 > 400: 69 %  Ctrough,ss: 13.7 mg/L  Probability of Ctrough,ss > 20: 32 %  Probability of nephrotoxicity (Lodise DEWEY ): 9 %          Plan:  1. Start vancomycin  1250 mg IV q24h.   2. Vancomycin monitoring method: AUC  3. Vancomycin therapeutic monitoring goal: 400-600 mg*h/L  4. Pharmacy will check vancomycin levels as appropriate in 3-5 Days.      Janice Fernandez, [facility]

## 2022-06-06 NOTE — PROGRESS NOTES
[facility]    Progress Note - Hospitalist Service       Date of Admission:  6/6/2022    Assessment & Plan          Shaheen Sepulveda is a 66 year old male admitted on 6/6/2022.  He has a past medical history significant for lymphedema, DG, hypertension, type 2 diabetes mellitus.  He is admitted for right lower extremity cellulitis.     Right lower extremity cellulitis  Bilateral lymphedema  Worsening lymphedema over the past week, now with multiple wounds and purulent discharge, most significant in the right lower extremity. Purulent discharge concerning for cellulitis.  Ultrasound negative for DVT.  Normal vitals/WBC/lactic acid, less concerning for bacteremia/sepsis. Worsening edema possibly in the setting of recent amlodipine dose adjustment.   Currently on Lasix 40 mg 2 times daily.  We will hold morning dose in the setting of MARLENE.  Vascular surgery has recommended CT abdomen pelvis in the past, however has declined this in the past but is now willing to obtain this.  Started on vancomycin in the ED, will continue for now.  We will add Zosyn for pseudomonal coverage.  -CT abdomen/pelvis to evaluate for cause of lymphedema  -Vancomycin, pharmacy to dose.  -Zosyn  -Wound care consult  -Wound culture pending  -CBC in a.m.  -Follow-up blood cultures  -PT/OT     Acute kidney injury, prerenal  Creatinine measuring 1.98 BUN of 37.  Up from 1.8 four days prior.  Baseline around 1.0.  Likely intravascularly dry with decreased oral intake and diuretic use.  1 L bolus in the ED.  -BMP in a.m.  -Hold losartan and home diuretics     Normocytic anemia  Hemoglobin between 9 and 10 over the past year.  Borderline microcytic. Check ferritin  -Ferritin  -CBC in the am       Chronic conditions  Hypertension -hold losartan in the setting of MARLENE  Type 2 diabetes mellitus -A1c 6.7 1-month prior.  -Hold metformin with MARLENE, check blood sugars and will start insulin if > 180  Hyperlipidemia -continue  "atorvastatin  Opioid use disorder -continue methadone  DG - CPAP  Obesity - Previously referred to bariatrics, has not yet made an appointment.     Diet: Combination Diet Regular Diet Adult    DVT Prophylaxis: Heparin SQ  Waddell Catheter: Not present  Fluids: None  Central Lines: None  Cardiac Monitoring: None  Code Status: Full Code      Disposition Plan   Expected Discharge:Unknown at this time   Anticipated discharge location:  Awaiting care coordination huddle  Delays: Improvement in infection        The patient's care was discussed with the Attending Physician, Dr. Light.    Shailesh Knowles MD  Hospitalist [facility]  Securely message with the Vocera Web Console (learn more here)  Text page via [employer] Paging/Directory         Clinically Significant Risk Factors Present on Admission                  # Diabetes, type II: last A1C 6.7 % (Ref range: 0.0 - 5.6 %)  # Severe Obesity: Estimated body mass index is 53 kg/m  as calculated from the following:    Height as of this encounter: 1.905 m (6' 3\").    Weight as of this encounter: 192.3 kg (424 lb).      ______________________________________________________________________    Interval History   Feels OK this AM. Legs about the same. Denies any new symptoms or concerns.     Data reviewed today: I reviewed all medications, new labs and imaging results over the last 24 hours. I personally reviewed no images or EKG's today.    Physical Exam   Vital Signs: Temp: 97.7  F (36.5  C) Temp src: Temporal BP: 125/59 Pulse: 67   Resp: 20 SpO2: 100 %      Weight: 424 lbs 0 oz  Constitutional: awake, alert, cooperative, no apparent distress, and appears stated age  Eyes: extra-ocular muscles intact  ENT: normocepalic, without obvious abnormality  Respiratory: No increased work of breathing, good air exchange, clear to auscultation bilaterally, no crackles or wheezing  Cardiovascular: Normal apical impulse, regular rate and rhythm, normal " S1 and S2, no S3 or S4, and no murmur noted  GI: Nontender  Skin: no bruising or bleeding  Musculoskeletal: Lymphedema present to the hips  Neurologic: No focal deficits    Data   Recent Labs   Lab 06/06/22  0727 06/05/22  2108 06/02/22  0851   WBC 5.7 6.7  --    HGB 8.4* 9.2*  --    MCV 80 79  --     338  --     136 132*   POTASSIUM 4.2 4.4 4.3   CHLORIDE 99 94* 96   CO2 31 31 28   BUN 31* 37* 28   CR 1.59* 1.98* 1.80*   ANIONGAP 8 11 8   NARCISA 9.5 10.2 11.2*   GLC 73 94 108*     Recent Results (from the past 24 hour(s))   US Lower Extremity Venous Duplex Right    Narrative    EXAM: US LOWER EXTREMITY VENOUS DUPLEX RIGHT  LOCATION: [facility]  DATE/TIME: 6/6/2022 1:00 AM    INDICATION: Right leg swelling  COMPARISON: None.  TECHNIQUE: Venous Duplex ultrasound of the right lower extremity with and without compression, augmentation and duplex. Color flow and spectral Doppler with waveform analysis performed. Exam is significantly limited by patient body habitus and skin   thickening, with nonvisualization of the peripheral femoral vein, as well as peroneal veins..    FINDINGS: Exam includes the common femoral, femoral, popliteal, and contralateral common femoral veins as well as segmentally visualized deep calf veins and greater saphenous vein.     RIGHT: No deep vein thrombosis. No superficial thrombophlebitis. No popliteal cyst.      Impression    IMPRESSION:  1.  No deep venous thrombosis in the right lower extremity.

## 2022-06-06 NOTE — PROGRESS NOTES
Pt BLE continue to drain large amt of serous foul smelling fluid. Chucks changed hourly. ABD pads applied to heavy draining areas. Legs LINDA. No new orders from WOC. Legs washed with soapy water and dried. Will continue to change chucks and ABDs often. Skin very moist appearing.

## 2022-06-06 NOTE — PLAN OF CARE
Problem: Plan of Care - These are the overarching goals to be used throughout the patient stay.    Goal: Plan of Care Review/Shift Note  Description: The Plan of Care Review/Shift note should be completed every shift.  The Outcome Evaluation is a brief statement about your assessment that the patient is improving, declining, or no change.  This information will be displayed automatically on your shift note.  Outcome: Ongoing, Progressing   Goal Outcome Evaluation:      Pt alert and orientated X4, able to make needs known. Pt in bed and able to roll side to side with assist of two. Pt using urinal to void without difficulty, good UOP. Pt denies pain or discomfort at rest, does c/o pain in knees and legs when turning and repositioning. Pt states he uses a can and is WBAT at home. However last few days with increased swelling to BLE, mobility has been difficult. Pt afebrile, on IV abx for cellulitis. RLE wound on back and lateral calf draining large amount of foul smelling serous drainage. LLE draining mod amt of serous foul smelling drainage.  Wound culture sent. WOC to see. Lymphedema eval ordered. Appetite good. Pulmonary toilet encouarged. Pt turned and repositioned Q2H. Pt to go for CT.

## 2022-06-06 NOTE — PLAN OF CARE
Problem: Plan of Care - These are the overarching goals to be used throughout the patient stay.    Goal: Plan of Care Review/Shift Note  Description: The Plan of Care Review/Shift note should be completed every shift.  The Outcome Evaluation is a brief statement about your assessment that the patient is improving, declining, or no change.  This information will be displayed automatically on your shift note.  6/6/2022 1654 by Nessa Thompson RN  Outcome: Ongoing, Progressing  6/6/2022 1252 by Nessa Thompson RN  Outcome: Ongoing, Progressing   Goal Outcome Evaluation:    Pt alert and orientated, denies pain. Was able to stand and take steps in place with therapy. Continues to c/o weakness in legs due to increased swelling. Home lasix held due to MARLENE. Pt continues on IV abx, afebrile. WOC did see pt, legs continue to have foul smelling drainage. Chucks and ABD pads applied. Waiting for wound care direction from wound nurse. Page out, awaiting return response. Lab called regarding wound swab results. Per MD, pt on appropriate abx coverage until culture results. Pt having good UOP. Will continue current plan of care.

## 2022-06-06 NOTE — PLAN OF CARE
[facility]      OUTPATIENT OCCUPATIONAL THERAPY  EVALUATION  PLAN OF TREATMENT FOR OUTPATIENT REHABILITATION  (COMPLETE FOR INITIAL CLAIMS ONLY)  Patient's Last Name, First Name, M.I.  YOB: 1955  Shaheen Sepulveda                             Provider's Name  [facility] Medical Record No.  [id number]                               Onset Date:  06/06/22   Start of Care Date:  06/06/22     Type:     ___PT   _X_OT   ___SLP Medical Diagnosis:  BLE cellulitis                        OT Diagnosis:  dec ADL indep   Visits from SOC:  1   _________________________________________________________________________________  Plan of Treatment/Functional Goals    Planned Interventions: ADL retraining, bed mobility training, transfer training, home program guidelines, progressive activity/exercise, strengthening   Goals: See Occupational Therapy Goals on Care Plan in [email or web address] electronic health record.    Therapy Frequency: Daily  Predicted Duration of Therapy Intervention: 06/11/22  _________________________________________________________________________________    I CERTIFY THE NEED FOR THESE SERVICES FURNISHED UNDER        THIS PLAN OF TREATMENT AND WHILE UNDER MY CARE     (Physician co-signature of this document indicates review and certification of the therapy plan).                Certification date from: 06/06/22, Certification date to: 06/13/22    Referring Physician: Akhil Dean MD            Initial Assessment        See Occupational Therapy evaluation dated 06/06/22 in Epic electronic health record.

## 2022-06-06 NOTE — PROGRESS NOTES
Clinic Care Coordination Contact    Follow Up Progress Note      Assessment: There was a care coordination put in for the pt, for PCA services and home safety. I called and talked to the pt, pt stated that he is at the ED. I informed the pt that his medical insurance does not cover PCA services. I told him only state insurance covers PCA. As for feeling safe at home, lately he has been feeling ok. It was just at times that he feels uneasy and unsafe at home. Pt stated that he is doing ok. I told the pt to give the clinic a call once he gets discharged. Pt stated that he will call the clinic when he gets discharged.     Care Gaps:    Health Maintenance Due   Topic Date Due     DIABETIC FOOT EXAM  Never done     URINE DRUG SCREEN  Never done     ADVANCE CARE PLANNING  Never done     EYE EXAM  Never done     TREATMENT AGREEMENT FOR CHRONIC PAIN MANAGEMENT  Never done     Pneumococcal Vaccine: 65+ Years (1 - PCV) Never done     COLORECTAL CANCER SCREENING  Never done     ZOSTER IMMUNIZATION (1 of 2) Never done     LUNG CANCER SCREENING  Never done     FALL RISK ASSESSMENT  Never done     DTAP/TDAP/TD IMMUNIZATION (3 - Tdap) 02/19/2021     COVID-19 Vaccine (3 - Booster for Pfizer series) 08/19/2021     MEDICARE ANNUAL WELLNESS VISIT  05/17/2022           Goals addressed this encounter:    Goals Addressed    None         Intervention/Education provided during outreach:               Plan:     Care Coordinator will follow up in month

## 2022-06-06 NOTE — PROGRESS NOTES
06/06/22 1420   Quick Adds   Quick Adds Certification   Type of Visit Initial Occupational Therapy Evaluation   Living Environment   People in Home child(shayna), adult  (daughter and grandson)   Current Living Arrangements house  (town home)   Home Accessibility stairs within home   Number of Stairs, Within Home, Primary greater than 10 stairs   Stair Railings, Within Home, Primary railing on left side (ascending)   Living Environment Comments tub shower, pt reports someone is always home with him. pt's daughter is looking into a hospital bed   Self-Care   Equipment Currently Used at Home cane, straight   Activity/Exercise/Self-Care Comment ind for most BADLs, assist for leg wraps and socks/shoes   Instrumental Activities of Daily Living (IADL)   IADL Comments assist for meals, cleaning, driving; ind for meds   General Information   Onset of Illness/Injury or Date of Surgery 06/06/22   Referring Physician Akhil Dean MD   Patient/Family Therapy Goal Statement (OT) go home, daughter looking into hospital bed   Additional Occupational Profile Info/Pertinent History of Current Problem admitted with BLE cellulitis   Cognitive Status Examination   Orientation Status person;place;time   Affect/Mental Status (Cognitive) WFL   Follows Commands WFL   Pain Assessment   Patient Currently in Pain Yes, see Vital Sign flowsheet   Range of Motion Comprehensive   General Range of Motion no range of motion deficits identified   Strength Comprehensive (MMT)   General Manual Muscle Testing (MMT) Assessment no strength deficits identified   Bed Mobility   Bed Mobility supine-sit;sit-supine   Supine-Sit [place] (Bed Mobility) contact guard   Sit-Supine [place] (Bed Mobility) moderate assist (50% patient effort)  (assist with LE)   Assistive Device (Bed Mobility) bed rails   Transfers   Transfers sit-stand transfer   Sit-Stand Transfer   Sit-Stand [place] (Transfers) contact guard;2 person assist   Assistive  Device (Sit-Stand Transfers) walker, front-wheeled   Sit/Stand Transfer Comments elevated bed height   Activities of Daily Living   BADL Assessment/Intervention lower body dressing   Lower Body Dressing Assessment/Training   Comment, (Lower Body Dressing) while sitting EOB, pt attempts to show ability to complete LB dressing but is unable   [place] Level (Lower Body Dressing) dependent (less than 25% patient effort)   Clinical Impression   Criteria for Skilled Therapeutic Interventions Met (OT) Yes, treatment indicated   OT Diagnosis dec ADL indep   OT Problem List-Impairments impacting ADL problems related to;activity tolerance impaired;mobility;pain   Assessment of Occupational Performance 3-5 Performance Deficits   Identified Performance Deficits bathing, toileting, dressing, transfers   Planned Therapy Interventions (OT) ADL retraining;bed mobility training;transfer training;home program guidelines;progressive activity/exercise;strengthening   Clinical Decision Making Complexity (OT) moderate complexity   Risk & Benefits of therapy have been explained evaluation/treatment results reviewed;participants included;patient   OT Discharge Planning   OT Discharge Recommendation (DC Rec) Transitional Care Facility;home with home care occupational therapy;home with assist   OT Rationale for DC Rec assist of 2 for ADLs and transfers today. pending progress, pt may be able to return home as he has good family support but would currently require too much assist to d/c home today.   Therapy Certification   Start of Care Date 06/06/22   Certification date from 06/06/22   Certification date to 06/13/22   Medical Diagnosis BLE cellulitis   Total Evaluation Time (Minutes)   Total Evaluation Time (Minutes) 10   OT Goals   Therapy Frequency (OT) Daily   OT Predicted Duration/Target Date for Goal Attainment 06/11/22   OT Goals Lower Body Dressing;Toilet Transfer/Toileting   OT: Lower Body Dressing Supervision/stand-by assist    OT: Toilet Transfer/Toileting Supervision/stand-by assist

## 2022-06-07 ENCOUNTER — APPOINTMENT (OUTPATIENT)
Dept: PHYSICAL THERAPY | Facility: HOSPITAL | Age: 67
DRG: 603 | End: 2022-06-07
Payer: COMMERCIAL

## 2022-06-07 ENCOUNTER — APPOINTMENT (OUTPATIENT)
Dept: OCCUPATIONAL THERAPY | Facility: HOSPITAL | Age: 67
DRG: 603 | End: 2022-06-07
Payer: COMMERCIAL

## 2022-06-07 LAB
ANION GAP SERPL CALCULATED.3IONS-SCNC: 9 MMOL/L (ref 5–18)
BUN SERPL-MCNC: 20 MG/DL (ref 8–22)
CALCIUM SERPL-MCNC: 8.8 MG/DL (ref 8.5–10.5)
CHLORIDE BLD-SCNC: 99 MMOL/L (ref 98–107)
CO2 SERPL-SCNC: 29 MMOL/L (ref 22–31)
CREAT SERPL-MCNC: 1.35 MG/DL (ref 0.7–1.3)
ERYTHROCYTE [DISTWIDTH] IN BLOOD BY AUTOMATED COUNT: 15 % (ref 10–15)
GFR SERPL CREATININE-BSD FRML MDRD: 57 ML/MIN/1.73M2
GLUCOSE BLD-MCNC: 101 MG/DL (ref 70–125)
GLUCOSE BLDC GLUCOMTR-MCNC: 116 MG/DL (ref 70–99)
GLUCOSE BLDC GLUCOMTR-MCNC: 94 MG/DL (ref 70–99)
GLUCOSE BLDC GLUCOMTR-MCNC: 99 MG/DL (ref 70–99)
HCT VFR BLD AUTO: 28.6 % (ref 40–53)
HGB BLD-MCNC: 8.6 G/DL (ref 13.3–17.7)
MCH RBC QN AUTO: 24.1 PG (ref 26.5–33)
MCHC RBC AUTO-ENTMCNC: 30.1 G/DL (ref 31.5–36.5)
MCV RBC AUTO: 80 FL (ref 78–100)
PLATELET # BLD AUTO: 320 10E3/UL (ref 150–450)
POTASSIUM BLD-SCNC: 4 MMOL/L (ref 3.5–5)
RBC # BLD AUTO: 3.57 10E6/UL (ref 4.4–5.9)
SODIUM SERPL-SCNC: 137 MMOL/L (ref 136–145)
WBC # BLD AUTO: 6.1 10E3/UL (ref 4–11)

## 2022-06-07 PROCEDURE — 99233 SBSQ HOSP IP/OBS HIGH 50: CPT | Performed by: STUDENT IN AN ORGANIZED HEALTH CARE EDUCATION/TRAINING PROGRAM

## 2022-06-07 PROCEDURE — 36415 COLL VENOUS BLD VENIPUNCTURE: CPT | Performed by: STUDENT IN AN ORGANIZED HEALTH CARE EDUCATION/TRAINING PROGRAM

## 2022-06-07 PROCEDURE — 80048 BASIC METABOLIC PNL TOTAL CA: CPT | Performed by: STUDENT IN AN ORGANIZED HEALTH CARE EDUCATION/TRAINING PROGRAM

## 2022-06-07 PROCEDURE — 250N000013 HC RX MED GY IP 250 OP 250 PS 637: Performed by: STUDENT IN AN ORGANIZED HEALTH CARE EDUCATION/TRAINING PROGRAM

## 2022-06-07 PROCEDURE — 85027 COMPLETE CBC AUTOMATED: CPT | Performed by: STUDENT IN AN ORGANIZED HEALTH CARE EDUCATION/TRAINING PROGRAM

## 2022-06-07 PROCEDURE — 85041 AUTOMATED RBC COUNT: CPT | Performed by: STUDENT IN AN ORGANIZED HEALTH CARE EDUCATION/TRAINING PROGRAM

## 2022-06-07 PROCEDURE — 250N000011 HC RX IP 250 OP 636

## 2022-06-07 PROCEDURE — G0463 HOSPITAL OUTPT CLINIC VISIT: HCPCS

## 2022-06-07 PROCEDURE — 97116 GAIT TRAINING THERAPY: CPT | Mod: GP

## 2022-06-07 PROCEDURE — 97162 PT EVAL MOD COMPLEX 30 MIN: CPT | Mod: GP

## 2022-06-07 PROCEDURE — 97530 THERAPEUTIC ACTIVITIES: CPT | Mod: GP

## 2022-06-07 PROCEDURE — 96372 THER/PROPH/DIAG INJ SC/IM: CPT

## 2022-06-07 PROCEDURE — 96368 THER/DIAG CONCURRENT INF: CPT

## 2022-06-07 PROCEDURE — 258N000003 HC RX IP 258 OP 636: Performed by: FAMILY MEDICINE

## 2022-06-07 PROCEDURE — 97535 SELF CARE MNGMENT TRAINING: CPT | Mod: GO

## 2022-06-07 PROCEDURE — 120N000001 HC R&B MED SURG/OB

## 2022-06-07 PROCEDURE — 250N000011 HC RX IP 250 OP 636: Performed by: FAMILY MEDICINE

## 2022-06-07 RX ADMIN — PIPERACILLIN AND TAZOBACTAM 3.38 G: 3; .375 INJECTION, POWDER, LYOPHILIZED, FOR SOLUTION INTRAVENOUS at 21:16

## 2022-06-07 RX ADMIN — PIPERACILLIN AND TAZOBACTAM 3.38 G: 3; .375 INJECTION, POWDER, LYOPHILIZED, FOR SOLUTION INTRAVENOUS at 14:09

## 2022-06-07 RX ADMIN — VANCOMYCIN HYDROCHLORIDE 1250 MG: 5 INJECTION, POWDER, LYOPHILIZED, FOR SOLUTION INTRAVENOUS at 00:19

## 2022-06-07 RX ADMIN — METHADONE HYDROCHLORIDE 100 MG: 10 CONCENTRATE ORAL at 09:19

## 2022-06-07 RX ADMIN — HEPARIN SODIUM 5000 UNITS: 10000 INJECTION, SOLUTION INTRAVENOUS; SUBCUTANEOUS at 09:15

## 2022-06-07 RX ADMIN — ATORVASTATIN CALCIUM 20 MG: 10 TABLET, FILM COATED ORAL at 09:15

## 2022-06-07 RX ADMIN — PIPERACILLIN AND TAZOBACTAM 3.38 G: 3; .375 INJECTION, POWDER, LYOPHILIZED, FOR SOLUTION INTRAVENOUS at 04:58

## 2022-06-07 RX ADMIN — HEPARIN SODIUM 5000 UNITS: 10000 INJECTION, SOLUTION INTRAVENOUS; SUBCUTANEOUS at 21:15

## 2022-06-07 ASSESSMENT — ACTIVITIES OF DAILY LIVING (ADL)
TOILETING_ISSUES: YES
WEAR_GLASSES_OR_BLIND: YES
CHANGE_IN_FUNCTIONAL_STATUS_SINCE_ONSET_OF_CURRENT_ILLNESS/INJURY: NO
TOILETING: 2-->COMPLETELY DEPENDENT
VISION_MANAGEMENT: GOOD
WALKING_OR_CLIMBING_STAIRS: AMBULATION DIFFICULTY, REQUIRES EQUIPMENT;STAIR CLIMBING DIFFICULTY, REQUIRES EQUIPMENT
DRESSING/BATHING_DIFFICULTY: YES
TOILETING_ASSISTANCE: TOILETING DIFFICULTY, ASSISTANCE 1 PERSON
DRESS: 2-->COMPLETELY DEPENDENT
TRANSFERRING: 2-->COMPLETELY DEPENDENT (NOT DEVELOPMENTALLY APPROPRIATE)
DIFFICULTY_EATING/SWALLOWING: NO
BATHING: 1-->ASSISTANCE NEEDED
ADLS_ACUITY_SCORE: 52
DOING_ERRANDS_INDEPENDENTLY_DIFFICULTY: YES
TRANSFERRING: 2-->COMPLETELY DEPENDENT
DIFFICULTY_COMMUNICATING: NO
ADLS_ACUITY_SCORE: 45
HEARING_DIFFICULTY_OR_DEAF: NO
CONCENTRATING,_REMEMBERING_OR_MAKING_DECISIONS_DIFFICULTY: NO
DRESS: 2-->COMPLETELY DEPENDENT (NOT DEVELOPMENTALLY APPROPRIATE)
TOILETING: 2-->COMPLETELY DEPENDENT (NOT DEVELOPMENTALLY APPROPRIATE)
EQUIPMENT_CURRENTLY_USED_AT_HOME: CANE, STRAIGHT
WALKING_OR_CLIMBING_STAIRS_DIFFICULTY: YES
DRESSING/BATHING: BATHING DIFFICULTY, ASSISTANCE 1 PERSON
FALL_HISTORY_WITHIN_LAST_SIX_MONTHS: NO
ADLS_ACUITY_SCORE: 52

## 2022-06-07 NOTE — CONSULTS
"Care Management Initial Consult    General Information  Assessment completed with: Patient, Shaheen  Type of CM/SW Visit: Initial Assessment    Primary Care Provider verified and updated as needed: Yes   Readmission within the last 30 days: no previous admission in last 30 days      Reason for Consult: discharge planning  Advance Care Planning: Advance Care Planning Reviewed: no concerns identified          Communication Assessment  Patient's communication style: spoken language (English or Bilingual)              Living Environment:   People in home: child(shayna), adult, grandchild(shayna) (\"daughter and grandson\")     Current living Arrangements: house (\"town house\")      Able to return to prior arrangements: yes       Family/Social Support:  Care provided by: self, child(shayna)  Provides care for: no one, unable/limited ability to care for self     Children, Other (specify) (\"grandson\")          Description of Support System: Supportive, Involved    Support Assessment: Adequate family and caregiver support, Adequate social supports, Patient communicates needs well met    Current Resources:   Patient receiving home care services: No     Community Resources: DME  Equipment currently used at home: cane, straight, glucometer  Supplies currently used at home: Diabetic Supplies, Oxygen Tubing/Supplies, Wound Care Supplies (\"CPAP\")    Employment/Financial:  Employment Status: retired        Financial Concerns:     Referral to Financial Worker: No       Lifestyle & Psychosocial Needs:  Social Determinants of Health     Tobacco Use: Medium Risk     Smoking Tobacco Use: Former Smoker     Smokeless Tobacco Use: Former User   Alcohol Use: Not on file   Financial Resource Strain: Not on file   Food Insecurity: Not on file   Transportation Needs: Not on file   Physical Activity: Not on file   Stress: Not on file   Social Connections: Not on file   Intimate Partner Violence: Not on file   Depression: Not at risk     PHQ-2 Score: 0 " "  Housing Stability: Not on file       Functional Status:  Prior to admission patient needed assistance:   Dependent ADLs:: Ambulation-cane, Bathing, Dressing (\"Someone in my family is always around when I bathe. Also my daughter helps with wrapping my legs and putting my shoes on\".)  Dependent IADLs:: Cleaning, Cooking, Laundry, Shopping, Meal Preparation, Transportation       Mental Health Status:          Chemical Dependency Status:  Chemical Dependency Status: Past Concern  Chemical Dependency Management: Methadone (\"on Methadone at home\")          Values/Beliefs:  Spiritual, Cultural Beliefs, Faith Practices, Values that affect care:                 Additional Information:  Shaheen lives in a town house with his daughter Giovana and grandson.     He is independent with most ADLs and daughter helps some also. He states, \"Someone in my family is always around when I bathe. Also my daughter helps with wrapping my legs and putting my shoes on. My daughter is also looking into getting me a hospital bed\".    He uses a cane for mobility. He is on Methadone.    He may need wound care help or Lymphedema home care at discharge.    Daughter to transport at discharge.    Cuca Sanches RN      "

## 2022-06-07 NOTE — PROGRESS NOTES
[facility]      OUTPATIENT PHYSICAL THERAPY EVALUATION  PLAN OF TREATMENT FOR OUTPATIENT REHABILITATION  (COMPLETE FOR INITIAL CLAIMS ONLY)  Patient's Last Name, First Name, M.I.  YOB: 1955  Shaheen Sepulveda                           Provider's Name  [facility] Medical Record No.  [id number]                               Onset Date:  06/06/22   Start of Care Date:  06/07/22      Type:     _X_PT   ___OT   ___SLP Medical Diagnosis:  Lymphedema, Cellulitis                        PT Diagnosis:  Impaired functional mobility   Visits from SOC:  1   _________________________________________________________________________________  Plan of Treatment/Functional Goals    Planned Interventions: balance training, gait training, transfer training     Goals: See Physical Therapy Goals on Care Plan in [employer] electronic health record.    Therapy Frequency: Daily  Predicted Duration of Therapy Intervention: 06/14/22  _________________________________________________________________________________    I CERTIFY THE NEED FOR THESE SERVICES FURNISHED UNDER        THIS PLAN OF TREATMENT AND WHILE UNDER MY CARE     (Physician co-signature of this document indicates review and certification of the therapy plan).              Certification date from: 06/07/22, Certification date to: 06/14/22    Referring Physician: Akhil Dean MD            Initial Assessment        See Physical Therapy evaluation dated 06/07/22 in Epic electronic health record.

## 2022-06-07 NOTE — CONSULTS
[facility] Nurse Inpatient Assessment     Consulted for: BLE    Patient History (according to provider note(s):      Shaheen Sepulveda is a 66 year old male admitted on 6/6/2022.  He has a past medical history significant for lymphedema, DG, hypertension, type 2 diabetes mellitus.  He is admitted for right lower extremity cellulitis.     Right lower extremity cellulitis  Bilateral lymphedema  Worsening lymphedema over the past week, now with multiple wounds and purulent discharge, most significant in the right lower extremity. Purulent discharge concerning for cellulitis.  Ultrasound negative for DVT.  Normal vitals/WBC/lactic acid, less concerning for bacteremia/sepsis. Worsening edema possibly in the setting of recent amlodipine dose adjustment.   Currently on Lasix 40 mg 2 times daily.  We will hold morning dose in the setting of MARLENE.  Vascular surgery has recommended CT abdomen pelvis in the past, however has declined this.  Could discuss further during this admission.  Started on vancomycin in the ED, will continue for now.  We will add Zosyn for pseudomonal coverage.  -Vancomycin, pharmacy to dose.  -Zosyn  -Wound care consult  -CBC in a.m.  -Follow-up blood cultures  -PT/OT       Areas Assessed:      Areas visualized during today's visit: Focused: BILATERAL LOWER EXTREMITIES    Skin Injury Location:  Lower legs          Left lower leg ulcer on anterior/lateral aspect    Soft yellow, able to remove some with wooden end of qtip    Right lower lateral leg location of weeping serous fluid      Last photo: today 6/7/22  Skin injury due to: unmanaged lymphedema   Skin history - pt seen in wound clinic, refer to clinic notes  Plan of care: Lymphedema consult ordered  Affected area:      Skin assessment: Erosion of epidermis     Measurements (length x width x depth, in cm) - not measured today      Color: see photo     Temperature  normal      Drainage: moderate .      Color: serous       Odor: none  Pain: denies , generalized legs tenderness not wound related  Pain interventions prior to dressing change: N/A  Treatment goal: Maintain (prevention of deterioration)  STATUS: initial assessment in ED  Supplies ordered: gathered and at bedside        Treatment Plan:     Bilateral legs wound(s): every other day or sooner prn for moisture control  Wound care at the time of compression wraps change    Cleanse ulcerations area with bath wipes for wound hygiene   Right leg - apply Triad paste  Left leg - apply ABD pad for moisture control  Change every other day or everyday for moisture control      Pressure Injury Prevention (PIP) Plan:      Moisture management: Perineal cleansing /protection: Follow Incontinence Protocol, Avoid brief in bed and Clean and dry skin folds with bathing       Mattress: Follow bed algorithm, reassess daily and order specialty mattress, if indicated.      HOB: Maintain at or below 30 degrees, unless contraindicated      Repositioning in bed: Every 1-2 hours       Heels: Keep elevated off mattress and Pillows under calves      Protective dressing: Sacral Mepilex for prevention (#[id number]),  especially for the agitated patient       Positioning equipment: per OT      Chair positioning: Chair cushion (#[id number])               If patient has a buttock pressure injury, or high risk for PI use chair cushion or SPS.      Under devices: Inspect skin under all medical devices during skin inspection              Ask provider to discontinue device when no longer needed.      If patient is declining pressure injury prevention interventions: Explore reason why and address patient's concerns    Orders: Written    RECOMMEND PRIMARY TEAM ORDER: None, at this time LYMPHEDEMA consult ordered  Education provided: plan of care, wound progress and Moisture management  Discussed plan of care with: Patient and and lymphedema provider  WOC nurse follow-up plan: weekly  Notify WOC if wound(s)  deteriorate.  Nursing to notify the Provider(s) and re-consult the [facility] Nurse if new skin concern.    DATA:     Current support surface:  ED silvana  BMI: Body mass index is 53 kg/m .   Active diet order: Orders Placed This Encounter      Combination Diet Regular Diet Adult     Output: I/O last 3 completed shifts:  In: 1543 [P.O.:1440; I.V.:3; IV Piggyback:100]  Out: 1900 [Urine:1900]     Labs:   Recent Labs   Lab 06/07/22  0653   HGB 8.6*   WBC 6.1     Pressure injury risk assessment:   Sensory Perception: 4-->no impairment  Moisture: 2-->very moist  Activity: 2-->chairfast  Mobility: 2-->very limited  Nutrition: 3-->adequate  Friction and Shear: 2-->potential problem  Jaylen Score: 15    Jeannette Stevens RN

## 2022-06-07 NOTE — PLAN OF CARE
Goal Outcome Evaluation:    Plan of Care Reviewed With: patient     Overall Patient Progress: improving    Outcome Evaluation: A&Ox4.  Denies pain.  Reduced sensation in lower extremities.  Currently requiring 2 person assist for rolling in bed, but boosts self well.  Tolerating room air.  Chux under legs changed several times.      Problem: Hypertension Comorbidity  Goal: Blood Pressure in Desired Range  Outcome: Ongoing, Progressing  Intervention: Maintain Blood Pressure Management  Recent Flowsheet Documentation  Taken 6/7/2022 0014 by Atiya Myles RN  Medication Review/Management: medications reviewed     Problem: Pain Chronic (Persistent) (Comorbidity Management)  Goal: Acceptable Pain Control and Functional Ability  Outcome: Ongoing, Progressing  Intervention: Manage Persistent Pain  Recent Flowsheet Documentation  Taken 6/7/2022 0014 by Atiya Myles RN  Medication Review/Management: medications reviewed

## 2022-06-07 NOTE — PROGRESS NOTES
[facility]    Progress Note - Hospitalist Service       Date of Admission:  6/6/2022    Assessment & Plan          Shaheen Sepulveda is a 66 year old male admitted on 6/6/2022.  He has a past medical history significant for lymphedema, DG, hypertension, type 2 diabetes mellitus.  He is admitted for right lower extremity cellulitis.     Right lower extremity cellulitis  Bilateral lymphedema  Worsening lymphedema over the past week, now with multiple wounds and purulent discharge, most significant in the right lower extremity. Purulent discharge concerning for cellulitis.  Ultrasound negative for DVT.  Normal vitals/WBC/lactic acid, less concerning for bacteremia/sepsis. Worsening edema possibly in the setting of recent amlodipine dose adjustment.   Currently on Lasix 40 mg 2 times daily.  We will hold morning dose in the setting of MARLENE. Started on vanc/zosyn on admission with improvement in drainage per patient.   -CT abdomen/pelvis to evaluate for cause of lymphedema - showed right inguinal lymphadenopathy. Not compressing vein, could be 2/2 infection. Recommend follow up CT as outpatient to evaluate progression and to consider biopsy if persistent.   -Vancomycin, pharmacy to dose.  -Zosyn  -Wound care consult  -Wound culture - polymicrobial, awaiting identification  -CBC in a.m.  -Follow-up blood cultures  -PT/OT     Acute kidney injury, prerenal - improving  Creatinine measuring 1.98 BUN of 37.  Up from 1.8 four days prior.  Baseline around 1.0.  Likely intravascularly dry with decreased oral intake and diuretic use.  1 L bolus in the ED. Creatinine 1.35 today.   -BMP in a.m.  -Hold losartan and home diuretics     Normocytic anemia  Hemoglobin between 9 and 10 over the past year.  Borderline microcytic. Ferritin 389, suspect this is anemia chronic disease related to chronic inflammation from draining wounds.   -Ferritin 389.   -CBC in the am       Chronic conditions  Hypertension -hold  "losartan in the setting of MARLENE  Type 2 diabetes mellitus -A1c 6.7 1-month prior.  -Hold metformin with MARLENE, check blood sugars and will start insulin if > 180  Hyperlipidemia -continue atorvastatin  Opioid use disorder -continue methadone  DG - CPAP  Obesity - Previously referred to bariatrics, has not yet made an appointment.     Diet: Combination Diet Regular Diet Adult    DVT Prophylaxis: Heparin SQ  Waddell Catheter: Not present  Fluids: None  Central Lines: None  Cardiac Monitoring: None  Code Status: Full Code      Disposition Plan   Expected Discharge:Unknown at this time   Anticipated discharge location:  Awaiting care coordination huddle  Delays: Improvement in infection        The patient's care was discussed with the Attending Physician, Dr. Light.    Shailesh Knowles MD  Hospitalist [facility]  Securely message with the Vocera Web Console (learn more here)  Text page via [facility] Paging/Directory         Clinically Significant Risk Factors Present on Admission                  # Diabetes, type II: last A1C 6.7 % (Ref range: 0.0 - 5.6 %)  # Severe Obesity: Estimated body mass index is 53 kg/m  as calculated from the following:    Height as of this encounter: 1.905 m (6' 3\").    Weight as of this encounter: 192.3 kg (424 lb).      ______________________________________________________________________    Interval History   Feels better today. States the drainage is improving. No other concerns. Appetite is good.     Data reviewed today: I reviewed all medications, new labs and imaging results over the last 24 hours. I personally reviewed no images or EKG's today.    Physical Exam   Vital Signs: Temp: 98  F (36.7  C) Temp src: Oral BP: 129/64 Pulse: 71   Resp: 18 SpO2: 96 % O2 Device: None (Room air)    Weight: 424 lbs 0 oz  Constitutional: awake, alert, cooperative, no apparent distress, and appears stated age  Eyes: extra-ocular muscles intact  ENT: normocepalic, without " obvious abnormality  Respiratory: No increased work of breathing, good air exchange, clear to auscultation bilaterally, no crackles or wheezing  Cardiovascular: Normal apical impulse, regular rate and rhythm, normal S1 and S2, no S3 or S4, and no murmur noted  GI: Nontender  Skin: no bruising or bleeding  Musculoskeletal: Lymphedema present to the hips  Neurologic: No focal deficits    Data   Recent Labs   Lab 06/07/22  0913 06/07/22  0653 06/06/22  2128 06/06/22  1231 06/06/22  0727 06/05/22  2108   WBC  --  6.1  --   --  5.7 6.7   HGB  --  8.6*  --   --  8.4* 9.2*   MCV  --  80  --   --  80 79   PLT  --  320  --   --  304 338   NA  --  137  --   --  138 136   POTASSIUM  --  4.0  --   --  4.2 4.4   CHLORIDE  --  99  --   --  99 94*   CO2  --  29  --   --  31 31   BUN  --  20  --   --  31* 37*   CR  --  1.35*  --   --  1.59* 1.98*   ANIONGAP  --  9  --   --  8 11   NARCISA  --  8.8  --   --  9.5 10.2   GLC 94 101 110*   < > 73 94    < > = values in this interval not displayed.     Recent Results (from the past 24 hour(s))   CT Abdomen Pelvis w Contrast    Narrative    EXAM: CT ABDOMEN PELVIS W CONTRAST  LOCATION: [facility]  DATE/TIME: 6/6/2022 1:35 PM    INDICATION: Bilateral lower extremity lymphedema.  COMPARISON: None.  TECHNIQUE: CT scan of the abdomen and pelvis was performed following injection of IV contrast. Multiplanar reformats were obtained. Dose reduction techniques were used.  CONTRAST: IsoVue 370 75mL    FINDINGS:   LOWER CHEST: Fat-containing hiatal hernia.    HEPATOBILIARY: Normal.    PANCREAS: Normal.    SPLEEN: Normal.    ADRENAL GLANDS: Normal.    KIDNEYS/BLADDER: Right renal 1.8 cm cortical cyst. No further imaging recommended.    BOWEL: No obstruction or inflammatory change.    LYMPH NODES: Right external iliac 3.2 x 2.1 cm node adjacent the vein which does not appear to compress the vein. More cephalad, there is a 3.2 x 1.4 cm node. No significant right pelvic node. No  upper abdominal adenopathy.    VASCULATURE: Unremarkable.    PELVIC ORGANS: Normal.    MUSCULOSKELETAL: Left total hip prosthesis. Old right rib fractures. Small umbilical and periumbilical fat-containing hernias.      Impression    IMPRESSION:   1.  Right external iliac adenopathy. The most inferior node is amenable to percutaneous biopsy.  2.  No abdominal adenopathy.  3.  Normal spleen size.

## 2022-06-07 NOTE — PROGRESS NOTES
06/07/22 0915   Quick Adds   Quick Adds Certification   Type of Visit Initial PT Evaluation   Living Environment   People in Home child(shayna), adult;grandchild(shayna)  (dtr, grandson)   Current Living Arrangements house   Home Accessibility stairs within home   Number of Stairs, Within Home, Primary greater than 10 stairs  (2nd floor bedroom)   Stair Railings, Within Home, Primary railing on left side (ascending)   Transportation Anticipated health plan transportation   Living Environment Comments Pt plans on moving bedroom to main floor.   Self-Care   Usual Activity Tolerance moderate   Current Activity Tolerance fair   Equipment Currently Used at Home cane, straight   General Information   Onset of Illness/Injury or Date of Surgery 06/06/22   Referring Physician Akhil Dean MD   Patient/Family Therapy Goals Statement (PT) return home   Pertinent History of Current Problem (include personal factors and/or comorbidities that impact the POC) Lymphedema, cellulitis   Existing Precautions/Restrictions no known precautions/restrictions   Strength (Manual Muscle Testing)   Strength (Manual Muscle Testing) strength is WFL   Bed Mobility   Bed Mobility supine-sit   Supine-Sit [place] (Bed Mobility) supervision;verbal cues   Assistive Device (Bed Mobility) bed rails   Transfers   Transfers sit-stand transfer   Sit-Stand Transfer   Sit-Stand [place] (Transfers) contact guard;verbal cues   Assistive Device (Sit-Stand Transfers) walker, front-wheeled   Gait/Stairs (Locomotion)   [place] Level (Gait) contact guard   Assistive Device (Gait) walker, front-wheeled  (bariatric)   Distance in Feet (Required for LE Total Joints) 120'   Pattern (Gait) step-through   Deviations/Abnormal Patterns (Gait) gait speed decreased   Clinical Impression   Criteria for Skilled Therapeutic Intervention Yes, treatment indicated   PT Diagnosis (PT) Impaired functional mobility   Influenced by the following impairments pain    Functional limitations due to impairments Impaired transfers, impaired gait   Clinical Presentation (PT Evaluation Complexity) Stable/Uncomplicated   Clinical Presentation Rationale Presents as diagnosed   Clinical Decision Making (Complexity) moderate complexity   Planned Therapy Interventions (PT) balance training;gait training;transfer training   Anticipated Equipment Needs at Discharge (PT) walker, rolling   Risk & Benefits of therapy have been explained patient   PT Discharge Planning   PT Discharge Recommendation (DC Rec) home with assist;home with home care physical therapy   Therapy Certification   Start of care date 06/07/22   Certification date from 06/07/22   Certification date to 06/14/22   Medical Diagnosis Lymphedema, Cellulitis   Total Evaluation Time   Total Evaluation Time (Minutes) 10   Physical Therapy Goals   PT Frequency Daily   PT Predicted Duration/Target Date for Goal Attainment 06/14/22   PT Goals Bed Mobility;Transfers;Gait   PT: Bed Mobility Independent;Supine to/from sit   PT: Transfers Modified independent;Sit to/from stand;Assistive device   PT: Gait Modified independent;Rolling walker;150 feet       Janice Guillaume, PT, DPT  6/7/2022

## 2022-06-08 ENCOUNTER — APPOINTMENT (OUTPATIENT)
Dept: OCCUPATIONAL THERAPY | Facility: HOSPITAL | Age: 67
DRG: 603 | End: 2022-06-08
Payer: COMMERCIAL

## 2022-06-08 ENCOUNTER — PATIENT OUTREACH (OUTPATIENT)
Dept: FAMILY MEDICINE | Facility: CLINIC | Age: 67
End: 2022-06-08
Payer: COMMERCIAL

## 2022-06-08 VITALS
RESPIRATION RATE: 18 BRPM | HEIGHT: 75 IN | HEART RATE: 70 BPM | DIASTOLIC BLOOD PRESSURE: 62 MMHG | WEIGHT: 315 LBS | OXYGEN SATURATION: 95 % | SYSTOLIC BLOOD PRESSURE: 131 MMHG | TEMPERATURE: 97.9 F | BODY MASS INDEX: 39.17 KG/M2

## 2022-06-08 LAB
ANION GAP SERPL CALCULATED.3IONS-SCNC: 7 MMOL/L (ref 5–18)
BUN SERPL-MCNC: 15 MG/DL (ref 8–22)
CALCIUM SERPL-MCNC: 8.7 MG/DL (ref 8.5–10.5)
CHLORIDE BLD-SCNC: 102 MMOL/L (ref 98–107)
CO2 SERPL-SCNC: 29 MMOL/L (ref 22–31)
CREAT SERPL-MCNC: 1.21 MG/DL (ref 0.7–1.3)
ERYTHROCYTE [DISTWIDTH] IN BLOOD BY AUTOMATED COUNT: 15.2 % (ref 10–15)
GFR SERPL CREATININE-BSD FRML MDRD: 65 ML/MIN/1.73M2
GLUCOSE BLD-MCNC: 82 MG/DL (ref 70–125)
GLUCOSE BLDC GLUCOMTR-MCNC: 119 MG/DL (ref 70–99)
GLUCOSE BLDC GLUCOMTR-MCNC: 139 MG/DL (ref 70–99)
GLUCOSE BLDC GLUCOMTR-MCNC: 76 MG/DL (ref 70–99)
GLUCOSE BLDC GLUCOMTR-MCNC: 94 MG/DL (ref 70–99)
HCT VFR BLD AUTO: 27.1 % (ref 40–53)
HGB BLD-MCNC: 8.2 G/DL (ref 13.3–17.7)
MCH RBC QN AUTO: 24.3 PG (ref 26.5–33)
MCHC RBC AUTO-ENTMCNC: 30.3 G/DL (ref 31.5–36.5)
MCV RBC AUTO: 80 FL (ref 78–100)
PLATELET # BLD AUTO: 320 10E3/UL (ref 150–450)
POTASSIUM BLD-SCNC: 4.1 MMOL/L (ref 3.5–5)
RBC # BLD AUTO: 3.37 10E6/UL (ref 4.4–5.9)
SODIUM SERPL-SCNC: 138 MMOL/L (ref 136–145)
WBC # BLD AUTO: 7 10E3/UL (ref 4–11)

## 2022-06-08 PROCEDURE — 99238 HOSP IP/OBS DSCHRG MGMT 30/<: CPT | Performed by: STUDENT IN AN ORGANIZED HEALTH CARE EDUCATION/TRAINING PROGRAM

## 2022-06-08 PROCEDURE — 99221 1ST HOSP IP/OBS SF/LOW 40: CPT | Performed by: SURGERY

## 2022-06-08 PROCEDURE — 80048 BASIC METABOLIC PNL TOTAL CA: CPT | Performed by: STUDENT IN AN ORGANIZED HEALTH CARE EDUCATION/TRAINING PROGRAM

## 2022-06-08 PROCEDURE — 250N000011 HC RX IP 250 OP 636

## 2022-06-08 PROCEDURE — 36415 COLL VENOUS BLD VENIPUNCTURE: CPT | Performed by: STUDENT IN AN ORGANIZED HEALTH CARE EDUCATION/TRAINING PROGRAM

## 2022-06-08 PROCEDURE — 97530 THERAPEUTIC ACTIVITIES: CPT | Mod: GP

## 2022-06-08 PROCEDURE — 97535 SELF CARE MNGMENT TRAINING: CPT | Mod: GO

## 2022-06-08 PROCEDURE — 250N000013 HC RX MED GY IP 250 OP 250 PS 637: Performed by: STUDENT IN AN ORGANIZED HEALTH CARE EDUCATION/TRAINING PROGRAM

## 2022-06-08 PROCEDURE — 85027 COMPLETE CBC AUTOMATED: CPT | Performed by: STUDENT IN AN ORGANIZED HEALTH CARE EDUCATION/TRAINING PROGRAM

## 2022-06-08 RX ORDER — NALOXONE HYDROCHLORIDE 0.4 MG/ML
0.4 INJECTION, SOLUTION INTRAMUSCULAR; INTRAVENOUS; SUBCUTANEOUS
Status: DISCONTINUED | OUTPATIENT
Start: 2022-06-08 | End: 2022-06-08 | Stop reason: HOSPADM

## 2022-06-08 RX ORDER — SULFAMETHOXAZOLE/TRIMETHOPRIM 800-160 MG
1 TABLET ORAL 2 TIMES DAILY
Qty: 14 TABLET | Refills: 0 | Status: SHIPPED | OUTPATIENT
Start: 2022-06-08 | End: 2022-06-14

## 2022-06-08 RX ORDER — NALOXONE HYDROCHLORIDE 0.4 MG/ML
0.2 INJECTION, SOLUTION INTRAMUSCULAR; INTRAVENOUS; SUBCUTANEOUS
Status: DISCONTINUED | OUTPATIENT
Start: 2022-06-08 | End: 2022-06-08 | Stop reason: HOSPADM

## 2022-06-08 RX ADMIN — HEPARIN SODIUM 5000 UNITS: 10000 INJECTION, SOLUTION INTRAVENOUS; SUBCUTANEOUS at 08:07

## 2022-06-08 RX ADMIN — METHADONE HYDROCHLORIDE 100 MG: 10 CONCENTRATE ORAL at 09:02

## 2022-06-08 RX ADMIN — PIPERACILLIN AND TAZOBACTAM 3.38 G: 3; .375 INJECTION, POWDER, LYOPHILIZED, FOR SOLUTION INTRAVENOUS at 05:14

## 2022-06-08 RX ADMIN — ATORVASTATIN CALCIUM 20 MG: 10 TABLET, FILM COATED ORAL at 08:07

## 2022-06-08 ASSESSMENT — ACTIVITIES OF DAILY LIVING (ADL)
ADLS_ACUITY_SCORE: 54
DEPENDENT_IADLS:: INDEPENDENT
ADLS_ACUITY_SCORE: 52
ADLS_ACUITY_SCORE: 54
ADLS_ACUITY_SCORE: 54
ADLS_ACUITY_SCORE: 52

## 2022-06-08 NOTE — PLAN OF CARE
"  Problem: Plan of Care - These are the overarching goals to be used throughout the patient stay.    Goal: Plan of Care Review/Shift Note  Description: The Plan of Care Review/Shift note should be completed every shift.  The Outcome Evaluation is a brief statement about your assessment that the patient is improving, declining, or no change.  This information will be displayed automatically on your shift note.  Outcome: Adequate for Care Transition  Goal: Patient-Specific Goal (Individualized)  Description: You can add care plan individualizations to a care plan. Examples of Individualization might be:  \"Parent requests to be called daily at 9am for status\", \"I have a hard time hearing out of my right ear\", or \"Do not touch me to wake me up as it startles me\".  Outcome: Adequate for Care Transition  Goal: Absence of Hospital-Acquired Illness or Injury  Outcome: Adequate for Care Transition  Intervention: Identify and Manage Fall Risk  Recent Flowsheet Documentation  Taken 6/8/2022 0815 by Ashlee Mueller RN  Safety Promotion/Fall Prevention:   activity supervised   assistive device/personal items within reach   nonskid shoes/slippers when out of bed   patient and family education  Intervention: Prevent Skin Injury  Recent Flowsheet Documentation  Taken 6/8/2022 0815 by Ashlee Mueller RN  Body Position: sitting up in bed  Intervention: Prevent and Manage VTE (Venous Thromboembolism) Risk  Recent Flowsheet Documentation  Taken 6/8/2022 0815 by Ashlee Mueller RN  Activity Management:   activity adjusted per tolerance   activity encouraged  Goal: Optimal Comfort and Wellbeing  6/8/2022 1405 by Ashlee Mueller RN  Outcome: Adequate for Care Transition  6/8/2022 1405 by Ashlee Mueller RN  Outcome: Ongoing, Progressing  Goal: Readiness for Transition of Care  6/8/2022 1405 by Ashlee Mueller RN  Outcome: Adequate for Care Transition  6/8/2022 1405 by Ashlee Mueller RN  Outcome: Ongoing, Progressing     Problem: " Diabetes Comorbidity  Goal: Blood Glucose Level Within Targeted Range  Outcome: Adequate for Care Transition     Problem: Hypertension Comorbidity  Goal: Blood Pressure in Desired Range  Outcome: Adequate for Care Transition     Problem: Obstructive Sleep Apnea Risk or Actual Comorbidity Management  Goal: Unobstructed Breathing During Sleep  Outcome: Adequate for Care Transition     Problem: Pain Chronic (Persistent) (Comorbidity Management)  Goal: Acceptable Pain Control and Functional Ability  Outcome: Adequate for Care Transition     Problem: Infection  Goal: Absence of Infection Signs and Symptoms  Outcome: Adequate for Care Transition     Problem: Impaired Wound Healing  Goal: Optimal Wound Healing  Outcome: Adequate for Care Transition  Intervention: Promote Wound Healing  Recent Flowsheet Documentation  Taken 6/8/2022 0815 by Ashlee Mueller, RN  Activity Management:   activity adjusted per tolerance   activity encouraged   Goal Outcome Evaluation:

## 2022-06-08 NOTE — CONSULTS
VASCULAR SURGERY INPATIENT CONSULTATION / Initial In-Patient visit    VASCULAR SURGEON: Fatoumata Ann MD    LOCATION: [facility]     Shaheen Sepulveda  Medical Record #:  [id number]  YOB: 1955  Age:  66 year old     Date of Service: 6/5/2022    PRIMARY CARE PROVIDER: Radha Sal      Reason for consultation: Evaluation for lymphedema related infection and wounds    IMPRESSION: Patient improving on oral antibiotic regimen with local compression for his advanced bilateral lower extremity lymphedema.    RECOMMENDATION: Agree with plan discharge today.  Needs to be seen by Kim Fuentes for reevaluation of his wounds and possible progression within the week.    HPI:  Shaheen Sepulveda is a 66 year old male who was seen today in consultation for progression of his lymphedema with related infection in both legs.  This has been an ongoing problem for 30 years.  Normally gets local wound care at our wound center under the guidance of Kim Fuentes.  Last seen by her on May 9 at which point she thought things were going quite well.  Since then had to be admitted with a bout of cellulitis in the legs and some drainage.  Started on broad-spectrum antibiotics and ongoing local care.  Had DVT ruled out.  They have instituted a care plan and things appear to be progressing well.    PHH:    Past Medical History:   Diagnosis Date     Diabetes (H)      H/O angioedema 6/15/2020    Formatting of this note might be different from the original. Unexplained angioedema twice, discontinue lisinopril for possible connection to it, though he had used it afterwards with no symptoms     History of tobacco use disorder 8/1/2013    Formatting of this note might be different from the original. Added per documetation     Hypertension      Lymphedema of both lower extremities 12/22/2014     Methadone use 5/8/2012     Obstructive sleep apnea 2/2/2015    Formatting of this note might be different from the original. Epic      "Pleural mass 7/2/2013     Uncomplicated asthma          Past Surgical History:   Procedure Laterality Date     JOINT REPLACEMENT          ALLERGIES:     Allergies   Allergen Reactions     Lisinopril Swelling     Patient reports \"neck swelling\"  Swelling in throat        MEDS:    Current Facility-Administered Medications:      acetaminophen (TYLENOL) tablet 650 mg, 650 mg, Oral, Q6H PRN **OR** acetaminophen (TYLENOL) Suppository 650 mg, 650 mg, Rectal, Q6H PRN, Akhil Dean MD     atorvastatin (LIPITOR) tablet 20 mg, 20 mg, Oral, Daily, Shailesh Knowles MD, 20 mg at 06/08/22 0807     glucose gel 15-30 g, 15-30 g, Oral, Q15 Min PRN **OR** dextrose 50 % injection 25-50 mL, 25-50 mL, Intravenous, Q15 Min PRN **OR** glucagon injection 1 mg, 1 mg, Subcutaneous, Q15 Min PRN, Shailesh Knowles MD     heparin ANTICOAGULANT injection 5,000 Units, 5,000 Units, Subcutaneous, Q12H, Akhil Dean MD, 5,000 Units at 06/08/22 0807     lidocaine (LMX4) cream, , Topical, Q1H PRN, Akhil Dean MD     lidocaine 1 % 0.1-1 mL, 0.1-1 mL, Other, Q1H PRN, Akhil Dean MD     melatonin tablet 1 mg, 1 mg, Oral, At Bedtime PRN, Ángel Maxwell MD     methadone (DOLOPHINE-INTENSOL) 10 MG/ML (HIGH CONC) solution 100 mg, 100 mg, Oral, Daily, Shailesh Knowles MD, 100 mg at 06/08/22 0902     naloxone (NARCAN) injection 0.2 mg, 0.2 mg, Intravenous, Q2 Min PRN **OR** naloxone (NARCAN) injection 0.4 mg, 0.4 mg, Intravenous, Q2 Min PRN **OR** naloxone (NARCAN) injection 0.2 mg, 0.2 mg, Intramuscular, Q2 Min PRN **OR** naloxone (NARCAN) injection 0.4 mg, 0.4 mg, Intramuscular, Q2 Min PRN, Gerson Light MD     Patient is already receiving anticoagulation with heparin, enoxaparin (LOVENOX), warfarin (COUMADIN)  or other anticoagulant medication, , Does not apply, Continuous PRN, Ángel Maxwell MD     [COMPLETED] piperacillin-tazobactam (ZOSYN) 3.375 g vial to attach to  mL bag, 3.375 g, Intravenous, Once, Stopped at 06/06/22 " "1931 **FOLLOWED BY** piperacillin-tazobactam (ZOSYN) 3.375 g vial to attach to  mL bag, 3.375 g, Intravenous, Q8H, Akhil Dean MD, 3.375 g at 06/08/22 0514     sodium chloride (PF) 0.9% PF flush 3 mL, 3 mL, Intracatheter, Q8H, Akhil Dean MD, 3 mL at 06/08/22 0515     sodium chloride (PF) 0.9% PF flush 3 mL, 3 mL, Intracatheter, q1 min prn, Akhil Dean MD     SOCIAL HABITS:    Tobacco Use      Smoking status: Former Smoker      Smokeless tobacco: Former User     Social History    Substance and Sexual Activity      Alcohol use: Never       History   Drug Use Unknown        FAMILY HISTORY:    Family History   Problem Relation Age of Onset     No Known Problems Mother      No Known Problems Father      Edema Sister      Edema Sister        REVIEW OF SYSTEMS:    A 12 point ROS was reviewed and except for what is listed in the HPI above, all others are negative    PE:    Vital signs:  Temp: 97.9  F (36.6  C) Temp src: Oral BP: 131/62 Pulse: 70   Resp: 18 SpO2: 95 % O2 Device: None (Room air)   Height: 190.5 cm (6' 3\") Weight: (!) 192.3 kg (423 lb 15.1 oz)  Estimated body mass index is 52.99 kg/m  as calculated from the following:    Height as of this encounter: 1.905 m (6' 3\").    Weight as of this encounter: 192.3 kg (423 lb 15.1 oz).       Wt Readings from Last 1 Encounters:   06/07/22 (!) 192.3 kg (423 lb 15.1 oz)     Body mass index is 52.99 kg/m .    EXAM:  GENERAL: This is a well-developed 66 year old male who appears his stated age  EYES: Grossly normal.  MOUTH: Buccal mucosa normal   CARDIAC:  Not assessed  CHEST/LUNG:  Not Assessed  GASTROINTESINAL (ABDOMEN):Not Assessed   MUSCULOSKELETAL: Grossly normal and both lower extremities are intact.  HEME/LYMPH: Very advanced bilateral lower extremity lymphedema.  NEUROLOGIC: Focally intact, Alert and oriented x 3.   PSYCH: appropriate affect  INTEGUMENT: Advanced skin changes with papillomata's changes on pictures.  I did not take down " the dressings to look at his wounds myself as he had just been done by the wound care team  Pulse Exam: Nonpalpable pulses in setting of swelling.      DIAGNOSTIC STUDIES:     Images:  CT Abdomen Pelvis w Contrast    Result Date: 6/6/2022  EXAM: CT ABDOMEN PELVIS W CONTRAST LOCATION: [facility] DATE/TIME: 6/6/2022 1:35 PM INDICATION: Bilateral lower extremity lymphedema. COMPARISON: None. TECHNIQUE: CT scan of the abdomen and pelvis was performed following injection of IV contrast. Multiplanar reformats were obtained. Dose reduction techniques were used. CONTRAST: IsoVue 370 75mL FINDINGS: LOWER CHEST: Fat-containing hiatal hernia. HEPATOBILIARY: Normal. PANCREAS: Normal. SPLEEN: Normal. ADRENAL GLANDS: Normal. KIDNEYS/BLADDER: Right renal 1.8 cm cortical cyst. No further imaging recommended. BOWEL: No obstruction or inflammatory change. LYMPH NODES: Right external iliac 3.2 x 2.1 cm node adjacent the vein which does not appear to compress the vein. More cephalad, there is a 3.2 x 1.4 cm node. No significant right pelvic node. No upper abdominal adenopathy. VASCULATURE: Unremarkable. PELVIC ORGANS: Normal. MUSCULOSKELETAL: Left total hip prosthesis. Old right rib fractures. Small umbilical and periumbilical fat-containing hernias.     IMPRESSION: 1.  Right external iliac adenopathy. The most inferior node is amenable to percutaneous biopsy. 2.  No abdominal adenopathy. 3.  Normal spleen size.    US Lower Extremity Venous Duplex Right    Result Date: 6/6/2022  EXAM: US LOWER EXTREMITY VENOUS DUPLEX RIGHT LOCATION: [facility] DATE/TIME: 6/6/2022 1:00 AM INDICATION: Right leg swelling COMPARISON: None. TECHNIQUE: Venous Duplex ultrasound of the right lower extremity with and without compression, augmentation and duplex. Color flow and spectral Doppler with waveform analysis performed. Exam is significantly limited by patient body habitus and skin thickening, with  nonvisualization of the peripheral femoral vein, as well as peroneal veins.. FINDINGS: Exam includes the common femoral, femoral, popliteal, and contralateral common femoral veins as well as segmentally visualized deep calf veins and greater saphenous vein. RIGHT: No deep vein thrombosis. No superficial thrombophlebitis. No popliteal cyst.     IMPRESSION: 1.  No deep venous thrombosis in the right lower extremity.      I personally reviewed the images and my interpretation is no evidence of DVT.    LABS:      Sodium   Date Value Ref Range Status   06/08/2022 138 136 - 145 mmol/L Final   06/07/2022 137 136 - 145 mmol/L Final   06/06/2022 138 136 - 145 mmol/L Final   06/10/2021 142.0 133.0 - 144.0 mmol/L Final   05/17/2021 137.0 133.0 - 144.0 mmol/L Final   05/07/2021 139.0 133.0 - 144.0 mmol/L Final     Urea Nitrogen   Date Value Ref Range Status   06/08/2022 15 8 - 22 mg/dL Final   06/07/2022 20 8 - 22 mg/dL Final   06/06/2022 31 (H) 8 - 22 mg/dL Final   06/10/2021 16.0 7.0 - 30.0 mg/dL Final   05/17/2021 15.0 7.0 - 30.0 mg/dL Final   05/07/2021 14.0 7.0 - 30.0 mg/dL Final     Hemoglobin   Date Value Ref Range Status   06/08/2022 8.2 (L) 13.3 - 17.7 g/dL Final   06/07/2022 8.6 (L) 13.3 - 17.7 g/dL Final   06/06/2022 8.4 (L) 13.3 - 17.7 g/dL Final     Platelet Count   Date Value Ref Range Status   06/08/2022 320 150 - 450 10e3/uL Final   06/07/2022 320 150 - 450 10e3/uL Final   06/06/2022 304 150 - 450 10e3/uL Final     BNP   Date Value Ref Range Status   04/23/2021 167 (H) 0 - 62 pg/mL Final       Total time spent 30  minutes face to face with patient with more than 50% time spent in counseling and coordination of care.    Fatoumata Ann MD, MD  [facility] Vascular surgery

## 2022-06-08 NOTE — PLAN OF CARE
Occupational Therapy Discharge Summary    Reason for therapy discharge:    Discharged to home.    Progress towards therapy goal(s). See goals on Care Plan in [facility] electronic health record for goal details.  Goals partially met.  Barriers to achieving goals:   discharge from facility.    Therapy recommendation(s):    No further therapy is recommended.    Peggy CRAWFORD, OTR/L, CLT 6/8/2022 , 11:54 AM

## 2022-06-08 NOTE — PLAN OF CARE
"  Problem: Plan of Care - These are the overarching goals to be used throughout the patient stay.    Goal: Plan of Care Review/Shift Note  Description: The Plan of Care Review/Shift note should be completed every shift.  The Outcome Evaluation is a brief statement about your assessment that the patient is improving, declining, or no change.  This information will be displayed automatically on your shift note.  Outcome: Ongoing, Progressing  Goal: Patient-Specific Goal (Individualized)  Description: You can add care plan individualizations to a care plan. Examples of Individualization might be:  \"Parent requests to be called daily at 9am for status\", \"I have a hard time hearing out of my right ear\", or \"Do not touch me to wake me up as it startles me\".  Outcome: Ongoing, Progressing  Goal: Absence of Hospital-Acquired Illness or Injury  Outcome: Ongoing, Progressing  Intervention: Identify and Manage Fall Risk  Recent Flowsheet Documentation  Taken 6/8/2022 0000 by Araceli Donohue, RN  Safety Promotion/Fall Prevention:   fall prevention program maintained   lighting adjusted  Intervention: Prevent Skin Injury  Recent Flowsheet Documentation  Taken 6/8/2022 0000 by Araceli Donohue, RN  Body Position:   legs elevated   heels elevated   tilted  Intervention: Prevent and Manage VTE (Venous Thromboembolism) Risk  Recent Flowsheet Documentation  Taken 6/8/2022 0000 by Araceli Donohue, RN  Activity Management:   activity adjusted per tolerance   activity encouraged  Goal: Optimal Comfort and Wellbeing  Outcome: Ongoing, Progressing  Goal: Readiness for Transition of Care  Outcome: Ongoing, Progressing     Problem: Diabetes Comorbidity  Goal: Blood Glucose Level Within Targeted Range  Outcome: Ongoing, Progressing     Problem: Hypertension Comorbidity  Goal: Blood Pressure in Desired Range  Outcome: Ongoing, Progressing     Problem: Obstructive Sleep Apnea Risk or Actual Comorbidity Management  Goal: Unobstructed " Breathing During Sleep  Outcome: Ongoing, Progressing     Problem: Pain Chronic (Persistent) (Comorbidity Management)  Goal: Acceptable Pain Control and Functional Ability  Outcome: Ongoing, Progressing     Problem: Infection  Goal: Absence of Infection Signs and Symptoms  Outcome: Ongoing, Progressing     Problem: Impaired Wound Healing  Goal: Optimal Wound Healing  Outcome: Ongoing, Progressing  Intervention: Promote Wound Healing  Recent Flowsheet Documentation  Taken 6/8/2022 0000 by Araceli Donohue, RN  Activity Management:   activity adjusted per tolerance   activity encouraged   Goal Outcome Evaluation:      Pt a/o without c/o pain. Le's ace wraps intact and legs elevated on pillows. Iv antibiotics given as ordered. Will monitor.

## 2022-06-08 NOTE — DISCHARGE SUMMARY
[facility]  Discharge Summary - Medicine & Pediatrics       Date of Admission:  6/6/2022  Date of Discharge:  6/8/2022  Discharging Provider: Shailesh Knowles MD  Discharge Service: Hospitalist Service    Discharge Diagnoses   Right lower extremity cellulitis  Bilateral lymphedema    Follow-ups Needed After Discharge   Follow up CT abdomen/pelvis in 1 month    Unresulted Labs Ordered in the Past 30 Days of this Admission     Date and Time Order Name Status Description    6/6/2022 10:01 AM Wound Aerobic Bacterial Culture Routine with Gram Stain Preliminary     6/6/2022  1:24 AM Blood Culture Line, venous Preliminary     6/6/2022  1:24 AM Blood Culture Peripheral Blood Preliminary       These results will be followed up by Residency service    Discharge Disposition   Discharged to home  Condition at discharge: Stable    Hospital Course   Shaheen Sepulveda is a 66 year old male admitted on 6/6/2022.  He has a past medical history significant for lymphedema, DG, hypertension, type 2 diabetes mellitus.  He was admitted for right lower extremity cellulitis.The following problems were addressed during his hospitalization:     Right lower extremity cellulitis  Bilateral lymphedema  Worsening lymphedema over the past week, now with multiple wounds and purulent discharge, most significant in the right lower extremity. Purulent discharge concerning for cellulitis.  Ultrasound negative for DVT.  Normal vitals/WBC/lactic acid, less concerning for bacteremia/sepsis. Started on vanc/zosyn on admission with improvement in drainage. On hospital day 3 wound culture grew out Morganella Morganii. On literature has been known to cause cellulitis and can be drug resistant, but typically sensitive to anti-pseudomonal penicillins, carbapenems and bactrim. Vancomycin was discontinued and he remained on zosyn until discharge. Vascular surgery follows with patient as an outpatient and did not believe surgical intervention was  warranted at this time and he will need follow up in their clinic within 1 week of discharge. Transitioned to oral bactrim on discharge for 7 days and discussed with lab and expect sensitivities to be completed in next 1-2 days.     CT abdomen/pelvis was obtained to evaluate cause of of lymphedema. This showed right inguinal lymphadenopathy. Not compressing vein, could be 2/2 infection. Recommend follow up CT as outpatient to evaluate progression and to consider biopsy if persistent.     -Bactrim DS BID for 7 days  -Follow up with vascular clinic within the next week  -Follow up wound culture sensitivities, will call patient if resistant to bactrim  -Home PT and nursing care ordered     Acute kidney injury, prerenal - improving  Creatinine measuring 1.98 BUN of 37.  Up from 1.8 four days prior.  Baseline around 1.0.  Likely intravascularly dry with decreased oral intake and diuretic use.  Resolved with IV fluids.   -Discontinued his diuretic on discharge as lymphedema likely unresponsive to systemic diuresis and more likely to cause kidney injury.   -Continue PTA losartan     Normocytic anemia  Hemoglobin between 9 and 10 over the past year.  Borderline microcytic. Ferritin 389, suspect this is anemia chronic disease related to chronic inflammation from draining wounds.   -Follow up as an outpatient.         Chronic conditions  Hypertension -PTA losartan  Type 2 diabetes mellitus -A1c 6.7 1-month prior.  -Metformin held with MARLENE. Resume on discharge  Hyperlipidemia -continue atorvastatin  Opioid use disorder -continue methadone  DG - CPAP  Obesity - Previously referred to bariatrics, has not yet made an appointment.       Consultations This Hospital Stay   PHARMACY TO DOSE VANCO  PHYSICAL THERAPY ADULT IP CONSULT  OCCUPATIONAL THERAPY ADULT IP CONSULT  WOUND OSTOMY CONTINENCE NURSE  IP CONSULT  PHARMACY TO DOSE VANCO  LYMPHEDEMA THERAPY IP CONSULT  VASCULAR SURGERY IP CONSULT  CARE MANAGEMENT / SOCIAL WORK IP  CONSULT    Code Status   Full Code       The patient was discussed with Dr. Kuldeep Knowles MD  [facility] Resident  Pager# [phone]    ______________________________________________________________________    Physical Exam   Vital Signs: Temp: 97.9  F (36.6  C) Temp src: Oral BP: 131/62 Pulse: 70   Resp: 18 SpO2: 95 % O2 Device: None (Room air)    Weight: 423 lbs 15.11 oz  Constitutional: awake, alert, cooperative, no apparent distress, and appears stated age  Eyes: extra-ocular muscles intact  ENT: normocepalic, without obvious abnormality  Respiratory: No increased work of breathing, good air exchange, clear to auscultation bilaterally, no crackles or wheezing  Cardiovascular: Normal apical impulse, regular rate and rhythm, normal S1 and S2, no S3 or S4, and no murmur noted  GI: Nontender  Skin: Lower legs in wraps today.   Musculoskeletal: 4+ edema to the hips  Neurologic: No focal neurologic deficits      Primary Care Physician   Radha Sal    Discharge Orders      Home Care Referral      Reason for your hospital stay    Your were hospitalized for a leg infection. Your symptoms improved with antibiotics.     Follow-up and recommended labs and tests     Follow up with primary care provider, Radha Sal, within 7 days for hospital follow- up.  No follow up labs or test are needed.     Activity    Your activity upon discharge: activity as tolerated     Diet    Follow this diet upon discharge: Orders Placed This Encounter      Combination Diet Regular Diet Adult       Significant Results and Procedures   Most Recent 3 CBC's:  Recent Labs   Lab Test 06/08/22  0512 06/07/22  0653 06/06/22  0727   WBC 7.0 6.1 5.7   HGB 8.2* 8.6* 8.4*   MCV 80 80 80    320 304     Most Recent 3 BMP's:  Recent Labs   Lab Test 06/08/22  0816 06/08/22  0512 06/08/22  0327 06/07/22  0913 06/07/22  0653 06/06/22  1231 06/06/22  0727   NA  --  138  --   --  137  --  138   POTASSIUM  --  4.1  --    --  4.0  --  4.2   CHLORIDE  --  102  --   --  99  --  99   CO2  --  29  --   --  29  --  31   BUN  --  15  --   --  20  --  31*   CR  --  1.21  --   --  1.35*  --  1.59*   ANIONGAP  --  7  --   --  9  --  8   NARCISA  --  8.7  --   --  8.8  --  9.5   GLC 76 82 94   < > 101   < > 73    < > = values in this interval not displayed.     Most Recent 6 Bacteria Isolates From Any Culture (See EPIC Reports for Culture Details):No lab results found.,   Results for orders placed or performed during the hospital encounter of 06/06/22   US Lower Extremity Venous Duplex Right    Narrative    EXAM: US LOWER EXTREMITY VENOUS DUPLEX RIGHT  LOCATION: [facility]  DATE/TIME: 6/6/2022 1:00 AM    INDICATION: Right leg swelling  COMPARISON: None.  TECHNIQUE: Venous Duplex ultrasound of the right lower extremity with and without compression, augmentation and duplex. Color flow and spectral Doppler with waveform analysis performed. Exam is significantly limited by patient body habitus and skin   thickening, with nonvisualization of the peripheral femoral vein, as well as peroneal veins..    FINDINGS: Exam includes the common femoral, femoral, popliteal, and contralateral common femoral veins as well as segmentally visualized deep calf veins and greater saphenous vein.     RIGHT: No deep vein thrombosis. No superficial thrombophlebitis. No popliteal cyst.      Impression    IMPRESSION:  1.  No deep venous thrombosis in the right lower extremity.   CT Abdomen Pelvis w Contrast    Narrative    EXAM: CT ABDOMEN PELVIS W CONTRAST  LOCATION: [facility]  DATE/TIME: 6/6/2022 1:35 PM    INDICATION: Bilateral lower extremity lymphedema.  COMPARISON: None.  TECHNIQUE: CT scan of the abdomen and pelvis was performed following injection of IV contrast. Multiplanar reformats were obtained. Dose reduction techniques were used.  CONTRAST: IsoVue 370 75mL    FINDINGS:   LOWER CHEST: Fat-containing hiatal  hernia.    HEPATOBILIARY: Normal.    PANCREAS: Normal.    SPLEEN: Normal.    ADRENAL GLANDS: Normal.    KIDNEYS/BLADDER: Right renal 1.8 cm cortical cyst. No further imaging recommended.    BOWEL: No obstruction or inflammatory change.    LYMPH NODES: Right external iliac 3.2 x 2.1 cm node adjacent the vein which does not appear to compress the vein. More cephalad, there is a 3.2 x 1.4 cm node. No significant right pelvic node. No upper abdominal adenopathy.    VASCULATURE: Unremarkable.    PELVIC ORGANS: Normal.    MUSCULOSKELETAL: Left total hip prosthesis. Old right rib fractures. Small umbilical and periumbilical fat-containing hernias.      Impression    IMPRESSION:   1.  Right external iliac adenopathy. The most inferior node is amenable to percutaneous biopsy.  2.  No abdominal adenopathy.  3.  Normal spleen size.       Discharge Medications   Current Discharge Medication List      START taking these medications    Details   sulfamethoxazole-trimethoprim (BACTRIM DS) 800-160 MG tablet Take 1 tablet by mouth 2 times daily for 7 days  Qty: 14 tablet, Refills: 0    Associated Diagnoses: Cellulitis of right lower extremity         CONTINUE these medications which have NOT CHANGED    Details   ammonium lactate (LAC-HYDRIN) 12 % external lotion Apply topically daily as needed With dressing changes      atorvastatin (LIPITOR) 20 MG tablet Take 1 tablet (20 mg) by mouth daily  Qty: 90 tablet, Refills: 3    Associated Diagnoses: Type 2 diabetes mellitus without complication, without long-term current use of insulin (H)      hydrOXYzine (ATARAX) 25 MG tablet Take 0.5-1 tablets (12.5-25 mg) by mouth 3 times daily as needed for anxiety  Qty: 60 tablet, Refills: 1      losartan (COZAAR) 100 MG tablet Take 1 tablet (100 mg) by mouth daily  Qty: 90 tablet, Refills: 3    Associated Diagnoses: Benign essential hypertension      metFORMIN (GLUCOPHAGE-XR) 750 MG 24 hr tablet Take 1 tablet (750 mg) by mouth 2 times daily (with  "meals)  Qty: 180 tablet, Refills: 1    Associated Diagnoses: Type 2 diabetes mellitus without complication, without long-term current use of insulin (H)      methadone (DOLOPHINE-INTENSOL) 10 MG/ML (HIGH CONC) solution Take 100 mg by mouth daily      sodium hypochlorite (QUARTER-STRENGTH DAKINS) external solution Apply topically every 72 hours Use 300mL every 72 hours to wash the bilateral legs and wounds on the legs  Qty: 1000 mL, Refills: 3    Associated Diagnoses: Venous hypertension, chronic, with ulcer and inflammation, bilateral (H); Obesity, morbid, BMI 40.0-49.9 (H); Lymphedema of both lower extremities; Leg swelling; Venous (peripheral) insufficiency; Hyperkeratosis of skin; Elephantiasis; Type 2 diabetes mellitus with peripheral neuropathy (H); Localized swelling, mass and lump, lower limb, bilateral; Lipodermatosclerosis of both lower extremities; Papillomatosis         STOP taking these medications       furosemide (LASIX) 40 MG tablet Comments:   Reason for Stopping:             Allergies   Allergies   Allergen Reactions     Lisinopril Swelling     Patient reports \"neck swelling\"  Swelling in throat     "

## 2022-06-08 NOTE — PLAN OF CARE
Physical Therapy Discharge Summary    Reason for therapy discharge:    Discharged to home.    Progress towards therapy goal(s). See goals on Care Plan in [facility] electronic health record for goal details.  Goals met    Therapy recommendation(s):    Pt did not want to walk today.  And pt is getting ready to dc to home shortly.   Pt to get a FWW from an outside medical provider.  Information provided to the pt.  .

## 2022-06-08 NOTE — PROGRESS NOTES
Clinic Care Coordination Contact  [facility]: Post-Discharge Note  SITUATION                                                      Admission:    Admission Date: 06/06/22   Reason for Admission: Right lower extremity cellulitis  Bilateral lymphedema  Discharge:   Discharge Date: 06/08/22  Discharge Diagnosis: Right lower extremity cellulitis  Bilateral lymphedema    BACKGROUND                                                      Per hospital discharge summary and inpatient provider notes.      ASSESSMENT      Enrollment  Primary Care Care Coordination Status: Declined    Discharge Assessment  How are you doing now that you are home?: Pt doing ok  How are your symptoms? (Red Flag symptoms escalate to triage hotline per guidelines): Improved  Do you feel your condition is stable enough to be safe at home until your provider visit?: Yes  Does the patient have their discharge instructions? : Yes  Does the patient have questions regarding their discharge instructions? : No  Were you started on any new medications or were there changes to any of your previous medications? : Yes  Does the patient have all of their medications?: Yes  Do you have questions regarding any of your medications? : No  Do you have all of your needed medical supplies or equipment (DME)?  (i.e. oxygen tank, CPAP, cane, etc.): No - What equipment or supplies are needed?  Discharge follow-up appointment scheduled within 14 calendar days? : Yes  Discharge Follow Up Appointment Date: 06/14/22  Discharge Follow Up Appointment Scheduled with?: Primary Care Provider                  PLAN                                                      Outpatient Plan:      Future Appointments   Date Time Provider Department Center   6/8/2022  1:30 PM Faby Hooker PT JNPTHR [facility]   6/8/2022  3:00 PM Peggy Rockwell OTR JNARACELI [facility]   6/9/2022  7:30 AM Jennifer Nguyen OTR [facility]OCCT [facility]   6/9/2022  1:30 PM Faby Hooker PT [facility]R [facility]    6/14/2022  8:40 AM Hema Saucedo MD [facility]         For any urgent concerns, please contact our 24 hour clinic line:   [facility]: [phone]       J CARLOS Gomez

## 2022-06-08 NOTE — PROGRESS NOTES
Reviewed AVS at bedside with pt including meds to be picked up, home PT/nursing and follow up appts. Pt had no questions at time of discharge

## 2022-06-08 NOTE — PLAN OF CARE
"  Problem: Plan of Care - These are the overarching goals to be used throughout the patient stay.    Goal: Plan of Care Review/Shift Note  Description: The Plan of Care Review/Shift note should be completed every shift.  The Outcome Evaluation is a brief statement about your assessment that the patient is improving, declining, or no change.  This information will be displayed automatically on your shift note.  Outcome: Ongoing, Progressing  Flowsheets (Taken 6/7/2022 1938)  Plan of Care Reviewed With: patient  Goal: Patient-Specific Goal (Individualized)  Description: You can add care plan individualizations to a care plan. Examples of Individualization might be:  \"Parent requests to be called daily at 9am for status\", \"I have a hard time hearing out of my right ear\", or \"Do not touch me to wake me up as it startles me\".  Outcome: Ongoing, Progressing  Goal: Absence of Hospital-Acquired Illness or Injury  Outcome: Ongoing, Progressing  Intervention: Identify and Manage Fall Risk  Recent Flowsheet Documentation  Taken 6/7/2022 1927 by Steven Palomares RN  Safety Promotion/Fall Prevention:   clutter free environment maintained   activity supervised   assistive device/personal items within reach   bed alarm on  Intervention: Prevent Skin Injury  Recent Flowsheet Documentation  Taken 6/7/2022 1900 by Steven Palomares RN  Body Position: position maintained  Intervention: Prevent and Manage VTE (Venous Thromboembolism) Risk  Recent Flowsheet Documentation  Taken 6/7/2022 1900 by Steven Palomares RN  Activity Management:   activity adjusted per tolerance   activity encouraged  Goal: Optimal Comfort and Wellbeing  Outcome: Ongoing, Progressing  Goal: Readiness for Transition of Care  Outcome: Ongoing, Progressing  Intervention: Mutually Develop Transition Plan  Recent Flowsheet Documentation  Taken 6/7/2022 1930 by Steven Palomares RN  Equipment Currently Used at Home: cane, straight     Problem: Diabetes Comorbidity  Goal: Blood " Glucose Level Within Targeted Range  Outcome: Ongoing, Progressing     Problem: Hypertension Comorbidity  Goal: Blood Pressure in Desired Range  Outcome: Ongoing, Progressing     Problem: Obstructive Sleep Apnea Risk or Actual Comorbidity Management  Goal: Unobstructed Breathing During Sleep  Outcome: Ongoing, Progressing     Problem: Pain Chronic (Persistent) (Comorbidity Management)  Goal: Acceptable Pain Control and Functional Ability  Outcome: Ongoing, Progressing     Problem: Infection  Goal: Absence of Infection Signs and Symptoms  Outcome: Ongoing, Progressing     Problem: Impaired Wound Healing  Goal: Optimal Wound Healing  Outcome: Ongoing, Progressing  Intervention: Promote Wound Healing  Recent Flowsheet Documentation  Taken 6/7/2022 1900 by Steven Palomares, RN  Activity Management:   activity adjusted per tolerance   activity encouraged   Goal Outcome Evaluation:    Plan of Care Reviewed With: patient

## 2022-06-08 NOTE — PLAN OF CARE
Lymphedema   Discharge Summary    Reason for therapy discharge:    Discharged to home.    Progress towards therapy goal(s). See goals on Care Plan in [facility] electronic health record for goal details.  Goals met    Therapy recommendation(s):    Recommend follow up with vascular center, would benefit from outpt. lymph treatment or home care    Peggy CRAWFORD, OTR/L, CLT 6/8/2022 , 11:55 AM

## 2022-06-09 ENCOUNTER — PATIENT OUTREACH (OUTPATIENT)
Dept: CARE COORDINATION | Facility: CLINIC | Age: 67
End: 2022-06-09
Payer: COMMERCIAL

## 2022-06-09 ENCOUNTER — TELEPHONE (OUTPATIENT)
Dept: FAMILY MEDICINE | Facility: CLINIC | Age: 67
End: 2022-06-09
Payer: COMMERCIAL

## 2022-06-09 DIAGNOSIS — L03.119 CELLULITIS AND ABSCESS OF LEG: Primary | ICD-10-CM

## 2022-06-09 DIAGNOSIS — L02.419 CELLULITIS AND ABSCESS OF LEG: Primary | ICD-10-CM

## 2022-06-09 DIAGNOSIS — Z71.89 OTHER SPECIFIED COUNSELING: ICD-10-CM

## 2022-06-09 RX ORDER — CIPROFLOXACIN 500 MG/1
500 TABLET, FILM COATED ORAL 2 TIMES DAILY
Qty: 14 TABLET | Refills: 0 | Status: SHIPPED | OUTPATIENT
Start: 2022-06-09 | End: 2022-06-16

## 2022-06-09 NOTE — PROGRESS NOTES
Clinic Care Coordination Contact    Background: Care Coordination referral placed from [facility] discharge report for reason of patient meeting criteria for a TCM outreach call by [facility] team.    Assessment: Upon chart review, [facility] Team member will cancel/close the referral for TCM outreach due to reason below:    SW completed TCM call.    Plan: Care Coordination referral for TCM outreach canceled.    Ava Martin MA  [facility] Resource [place], [facility]

## 2022-06-11 LAB
BACTERIA BLD CULT: NO GROWTH
BACTERIA BLD CULT: NO GROWTH

## 2022-06-14 ENCOUNTER — OFFICE VISIT (OUTPATIENT)
Dept: FAMILY MEDICINE | Facility: CLINIC | Age: 67
End: 2022-06-14
Payer: COMMERCIAL

## 2022-06-14 ENCOUNTER — PATIENT OUTREACH (OUTPATIENT)
Dept: FAMILY MEDICINE | Facility: CLINIC | Age: 67
End: 2022-06-14

## 2022-06-14 VITALS
BODY MASS INDEX: 52.68 KG/M2 | DIASTOLIC BLOOD PRESSURE: 72 MMHG | WEIGHT: 315 LBS | HEART RATE: 90 BPM | RESPIRATION RATE: 18 BRPM | TEMPERATURE: 97.9 F | SYSTOLIC BLOOD PRESSURE: 143 MMHG | OXYGEN SATURATION: 95 %

## 2022-06-14 DIAGNOSIS — L03.119 CELLULITIS AND ABSCESS OF LEG: ICD-10-CM

## 2022-06-14 DIAGNOSIS — I10 BENIGN ESSENTIAL HYPERTENSION: ICD-10-CM

## 2022-06-14 DIAGNOSIS — N17.9 ACUTE KIDNEY INJURY (H): ICD-10-CM

## 2022-06-14 DIAGNOSIS — L02.419 CELLULITIS AND ABSCESS OF LEG: ICD-10-CM

## 2022-06-14 DIAGNOSIS — G47.00 INSOMNIA, UNSPECIFIED TYPE: ICD-10-CM

## 2022-06-14 DIAGNOSIS — I89.0 LYMPHEDEMA OF BOTH LOWER EXTREMITIES: ICD-10-CM

## 2022-06-14 DIAGNOSIS — Z09 HOSPITAL DISCHARGE FOLLOW-UP: Primary | ICD-10-CM

## 2022-06-14 PROCEDURE — 99214 OFFICE O/P EST MOD 30 MIN: CPT | Mod: GC | Performed by: STUDENT IN AN ORGANIZED HEALTH CARE EDUCATION/TRAINING PROGRAM

## 2022-06-14 RX ORDER — LANOLIN ALCOHOL/MO/W.PET/CERES
3 CREAM (GRAM) TOPICAL
Qty: 30 TABLET | Refills: 1 | Status: SHIPPED | OUTPATIENT
Start: 2022-06-14 | End: 2023-07-26

## 2022-06-14 ASSESSMENT — ANXIETY QUESTIONNAIRES
3. WORRYING TOO MUCH ABOUT DIFFERENT THINGS: SEVERAL DAYS
GAD7 TOTAL SCORE: 5
2. NOT BEING ABLE TO STOP OR CONTROL WORRYING: SEVERAL DAYS
1. FEELING NERVOUS, ANXIOUS, OR ON EDGE: SEVERAL DAYS
IF YOU CHECKED OFF ANY PROBLEMS ON THIS QUESTIONNAIRE, HOW DIFFICULT HAVE THESE PROBLEMS MADE IT FOR YOU TO DO YOUR WORK, TAKE CARE OF THINGS AT HOME, OR GET ALONG WITH OTHER PEOPLE: SOMEWHAT DIFFICULT
5. BEING SO RESTLESS THAT IT IS HARD TO SIT STILL: SEVERAL DAYS
4. TROUBLE RELAXING: SEVERAL DAYS
GAD7 TOTAL SCORE: 5
7. FEELING AFRAID AS IF SOMETHING AWFUL MIGHT HAPPEN: NOT AT ALL
6. BECOMING EASILY ANNOYED OR IRRITABLE: NOT AT ALL

## 2022-06-14 ASSESSMENT — PATIENT HEALTH QUESTIONNAIRE - PHQ9: SUM OF ALL RESPONSES TO PHQ QUESTIONS 1-9: 9

## 2022-06-14 NOTE — COMMUNITY RESOURCES LIST (ENGLISH)
06/14/2022   [facility] Out[facility]  [address]  For questions about this resource list or additional care needs, please contact your primary care clinic or care manager.  Phone: [phone]   Email: N/A   Address: [address] [place] [address]   Hours: N/A        Hotlines and Helplines       Hotline - Crisis help  1  [facility] Crisis Line Distance: 0.05 miles      COVID-19 Status: Phone/Virtual   [address] [place] [address]  Language: English, Guatemalan  Hours: Mon - Sun Open 24 Hours   Phone: (353) 623-2102 Email: info@[employer] Website: http://www.[employer] [place]     2  [place] Department of Human Services - Crisis Text Line - Crisis Text Line Distance: 5.46 miles      COVID-19 Status: Phone/Virtual   [address] [place] [address]  Language: English  Hours: Mon - Sun Open 24 Hours   Website: https://[place].gov/dhs/partners-and-providers/policies-procedures/adult-mental-health/crisis-text-line/          Mental Health       Individual counseling  3  [facility] Distance: 1.23 miles      COVID-19 Status: Regular Operations, COVID-19 Status: Phone/Virtual   [address] [place] [address]  Language: English, Guatemalan  Hours: Mon - Fri 8:00 AM - 4:30 PM  Fees: Insurance, Self Pay, Sliding Fee   Phone: (584) 699-4115 Email: info@[employer] Website: http://www.[employer].org/location/[place]/     4  [facility] [place] Distance: 1.32 miles      COVID-19 Status: Regular Operations, COVID-19 Status: Phone/Virtual   [address] [facility] [address] [address]  Language: English  Hours: Mon - Thu 8:30 AM - 5:00 PM , Fri 8:30 AM - 4:00 PM  Fees: Insurance, Self Pay   Phone: (783) 616-7503 Website: http://[facility]     Mental health crisis care  5  [facility] - Adult Mental Health Urgent [facility] - Adult Program Distance: 5.5 miles      COVID-19 Status: Regular Operations   [address]  [place] [address]  Language: English, Hmong, Czech  Hours: Mon - Fri 8:00 AM - 5:30 PM  Fees: Insurance, Self Pay, Sliding Fee   Phone: (884) 500-9922 Website: https://www.[facility]./residents/health-medical/clinics-services/mental-behavorial-health/adult-mental-health-chemical-health/urgent-care-adult-mental-health     6  [employer] - Crisis Stabilization Services ([facility]) Distance: 7.4 miles      COVID-19 Status: Regular Operations   [address] [place] [address]  Language: English, Ugandan  Hours: Mon - Sun Open 24 Hours  Fees: Insurance   Phone: (318) 431-7193 Email: info@[employer] Website: https://[employer]/services-programs/residential-services/[facility]-[facility]/     Mental health support group  7  [facility] Distance: 4.86 miles      COVID-19 Status: Phone/Virtual   [address] [address]  Language: English  Hours: Mon - Thu 7:00 AM - 8:00 PM , Fri 7:00 AM - 5:00 PM  Fees: Insurance, Self Pay   Phone: (153) 420-3110 Email: alondra@[employer] Website: https://www.[employer]/[facility]-locations/[place]/     8  [facility] Distance: 6.05 miles      COVID-19 Status: Phone/Virtual   [address] [address]  Language: English  Hours: Mon - Fri 8:00 AM - 4:30 PM  Fees: Free   Phone: (684) 949-7976 Website: https://www.[place].gov/find-locations/facility/vc_0416V          Important Numbers & Websites       Emergency Services   911  City Services   311  Poison Control   (515) 542-3818  Suicide Prevention Lifeline   (619) 194-7933 (TALK)  Child Abuse Hotline   (877) 254-5824 (4-A-Child)  Sexual Assault Hotline   (192) 950-6558 (HOPE)  National Runaway Safeline   (389) 747-7323 (RUNAWAY)  All-Options Talkline   (127) 386-8156  Substance Abuse Referral   (448) 392-9781 (HELP)

## 2022-06-14 NOTE — PROGRESS NOTES
CHIEF COMPLAINT                                                      Chief Complaint   Patient presents with     Hospital F/U     [facility] Follow up      SUBJECTIVE:                                                    Shaheen Sepulveda is a 66 year old year old male who presents to clinic today for the following health issues:      Post Discharge Outreach 6/8/2022   Admission Date 6/6/2022   Reason for Admission Right lower extremity cellulitis  Bilateral lymphedema   Discharge Date 6/8/2022   Discharge Diagnosis Right lower extremity cellulitis  Bilateral lymphedema   How are you doing now that you are home? Pt doing ok   How are your symptoms? (Red Flag symptoms escalate to triage hotline per guidelines) Improved   Do you feel your condition is stable enough to be safe at home until your provider visit? Yes   Does the patient have their discharge instructions?  Yes   Does the patient have questions regarding their discharge instructions?  No   Were you started on any new medications or were there changes to any of your previous medications?  Yes   Does the patient have all of their medications? Yes   Do you have questions regarding any of your medications?  No   Do you have all of your needed medical supplies or equipment (DME)?  (i.e. oxygen tank, CPAP, cane, etc.) No - What equipment or supplies are needed?   Discharge follow-up appointment scheduled within 14 calendar days?  Yes   Discharge Follow Up Appointment Date 6/14/2022   Discharge Follow Up Appointment Scheduled with? Primary Care Provider     Was your hospitalization related to COVID-19? No   Problems taking medications regularly:  None  Medication changes since discharge: Bactrim switched to ciprofloxacin  Problems adhering to non-medication therapy:  Has not yet had home physical therapy or occupational therapy     -Starting to get better at this point in time   -Has not seen the vascular surgeon since that time, appointment on 6/21/2022  -No problems  taking the antibiotics that he has been sent home with   -Have not heard from home nurse or home PT yet  -Has not been using lymphedema pumps   -The patient is struggling to get follow up scheduled   -Also has not heard from the DME store regarding the home hospital bed    Follow-ups Needed After Discharge     Follow up CT abdomen/pelvis in 1 month             Unresulted Labs Ordered in the Past 30 Days of this Admission      Date and Time Order Name Status Description     6/6/2022 10:01 AM Wound Aerobic Bacterial Culture Routine with Gram Stain Preliminary       6/6/2022  1:24 AM Blood Culture Line, venous Preliminary       6/6/2022  1:24 AM Blood Culture Peripheral Blood Preliminary         These results will be followed up by Residency service     Hospital Course     Shaheen Sepulveda is a 66 year old male admitted on 6/6/2022.  He has a past medical history significant for lymphedema, DG, hypertension, type 2 diabetes mellitus.  He was admitted for right lower extremity cellulitis.The following problems were addressed during his hospitalization:     Right lower extremity cellulitis  Bilateral lymphedema  Worsening lymphedema over the past week, now with multiple wounds and purulent discharge, most significant in the right lower extremity. Purulent discharge concerning for cellulitis.  Ultrasound negative for DVT.  Normal vitals/WBC/lactic acid, less concerning for bacteremia/sepsis. Started on vanc/zosyn on admission with improvement in drainage. On hospital day 3 wound culture grew out Morganella Morganii. On literature has been known to cause cellulitis and can be drug resistant, but typically sensitive to anti-pseudomonal penicillins, carbapenems and bactrim. Vancomycin was discontinued and he remained on zosyn until discharge. Vascular surgery follows with patient as an outpatient and did not believe surgical intervention was warranted at this time and he will need follow up in their clinic within 1 week of  discharge. Transitioned to oral bactrim on discharge for 7 days and discussed with lab and expect sensitivities to be completed in next 1-2 days.      CT abdomen/pelvis was obtained to evaluate cause of of lymphedema. This showed right inguinal lymphadenopathy. Not compressing vein, could be 2/2 infection. Recommend follow up CT as outpatient to evaluate progression and to consider biopsy if persistent.      -Bactrim DS BID for 7 days  -Follow up with vascular clinic within the next week  -Follow up wound culture sensitivities, will call patient if resistant to bactrim  -Home PT and nursing care ordered     Acute kidney injury, prerenal - improving  Creatinine measuring 1.98 BUN of 37.  Up from 1.8 four days prior.  Baseline around 1.0.  Likely intravascularly dry with decreased oral intake and diuretic use.  Resolved with IV fluids.   -Discontinued his diuretic on discharge as lymphedema likely unresponsive to systemic diuresis and more likely to cause kidney injury.   -Continue PTA losartan     Normocytic anemia  Hemoglobin between 9 and 10 over the past year.  Borderline microcytic. Ferritin 389, suspect this is anemia chronic disease related to chronic inflammation from draining wounds.   -Follow up as an outpatient.         Chronic conditions  Hypertension -PTA losartan  Type 2 diabetes mellitus -A1c 6.7 1-month prior.  -Metformin held with MARLENE. Resume on discharge  Hyperlipidemia -continue atorvastatin  Opioid use disorder -continue methadone  DG - CPAP  Obesity - Previously referred to bariatrics, has not yet made an appointment.    Patient is an established patient of this clinic.    ----------------------------------------------------------------------------------------------------------------------  Patient Active Problem List   Diagnosis     Hypertension     H/O angioedema     Hip joint replacement status     Hypervolemia     Lymphedema     Lymphedema of both lower extremities     Methadone use      Obesity, morbid, BMI 40.0-49.9 (H)     Obstructive sleep apnea     Osteoarthritis of hip     Osteoarthritis of knee     History of tobacco use disorder     Pleural mass     Open wound of lower limb     Type 2 diabetes mellitus without complication, without long-term current use of insulin (H)     Opioid use disorder, moderate, in sustained remission, on maintenance therapy (H)     Cellulitis of right lower extremity     Past Surgical History:   Procedure Laterality Date     JOINT REPLACEMENT         Social History     Tobacco Use     Smoking status: Former Smoker     Smokeless tobacco: Former User   Substance Use Topics     Alcohol use: Never     Family History   Problem Relation Age of Onset     No Known Problems Mother      No Known Problems Father      Edema Sister      Edema Sister          Problem list and past medical, surgical, social, and family histories reviewed & adjusted, as indicated.    Current Outpatient Medications   Medication Sig Dispense Refill     ammonium lactate (LAC-HYDRIN) 12 % external lotion Apply topically daily as needed With dressing changes       atorvastatin (LIPITOR) 20 MG tablet Take 1 tablet (20 mg) by mouth daily 90 tablet 3     ciprofloxacin (CIPRO) 500 MG tablet Take 1 tablet (500 mg) by mouth 2 times daily for 7 days 14 tablet 0     hydrOXYzine (ATARAX) 25 MG tablet Take 0.5-1 tablets (12.5-25 mg) by mouth 3 times daily as needed for anxiety 60 tablet 1     losartan (COZAAR) 100 MG tablet Take 1 tablet (100 mg) by mouth daily 90 tablet 3     metFORMIN (GLUCOPHAGE-XR) 750 MG 24 hr tablet Take 1 tablet (750 mg) by mouth 2 times daily (with meals) 180 tablet 1     methadone (DOLOPHINE-INTENSOL) 10 MG/ML (HIGH CONC) solution Take 100 mg by mouth daily       sodium hypochlorite (QUARTER-STRENGTH DAKINS) external solution Apply topically every 72 hours Use 300mL every 72 hours to wash the bilateral legs and wounds on the legs (Patient taking differently: Apply topically every 72 hours  "as needed Use 300mL every 72 hours to wash the bilateral legs and wounds on the legs) 1000 mL 3     sulfamethoxazole-trimethoprim (BACTRIM DS) 800-160 MG tablet Take 1 tablet by mouth 2 times daily for 7 days 14 tablet 0     Medication list reviewed and updated as indicated.    Allergies   Allergen Reactions     Lisinopril Swelling     Patient reports \"neck swelling\"  Swelling in throat     Allergies reviewed and updated as indicated.  ----------------------------------------------------------------------------------------------------------------------  ROS:  Constitutional, HEENT, cardiovascular, pulmonary, GI, musculoskeletal, neuro, skin, and psych systems are negative, except as otherwise noted.    OBJECTIVE:     Temp 97.9  F (36.6  C) (Oral)   Resp 18   Wt (!) 191.2 kg (421 lb 8 oz)   BMI 52.68 kg/m    Body mass index is 52.68 kg/m .  Exam:  Constitutional: healthy, alert and no distress  Head: Normocephalic. No masses, lesions, tenderness or abnormalities  Cardiovascular: RRR, no murmurs appreciated  Respiratory: CTAB, no wheezing, rales or rhonchi  Musculoskeletal: Significant edema of the bilateral lower extremities, currently in wraps.  Gait at his baseline at this time   Neurologic: Gait normal with cane. Decrased sensation of the lower extremities at its baseline  Psychiatric: Mentation appears normal at baseline, has good reasoning, when asked about any suicidal ideation states that he is safe to go home and has no active plans to hurt himself    ASSESSMENT/PLAN:     (Z09) Hospital discharge follow-up  (primary encounter diagnosis)  (I89.0) Lymphedema of both lower extremities  (L03.119,  L02.419) Cellulitis and abscess of leg  -Patient here for follow up from recent hospitalization for worsening lymphoedema and cellulitis of the lower extremity  -Patient initially discharged on Bactrim but this was switched to ciprofloxacin after discharge, he has two and a half days left of this " antibiotic  -Vascular surgery was following while admitted  -Obtained a CT of the abdomen and pelvis to help investigate cause of lymphedema that showed some reactive lymph nodes that needed to be followed up in 1 month to see if it was related to the cellulitis of the extremity or something else  -This was ordered today for ~7/4/2022  -Has been feeling improved since going home  -No issues taking medications  -Since stopping his lasix has been feeling no change in symptoms  -Has follow up scheduled with vascular surgery next week  -Understands meliton to follow up  -We will see him back in two weeks for repeat BMP and CBC before getting CT, patient declined any blood draws today as he was frequently poked in the hospital and is feeling stable   -Working on geting a hospital bed to help with swelling of legs, placed a referral to care coordination to help with this    (G47.00) Insomnia, unspecified type  -Patient struggling with sleep since going home   -We will try some melatonin to start with, understands he needs to take it an hour before sleep to help create     (N17.9) Acute kidney injury (H)   -Will follow up with blood work at next visit, he requested we wait until that time    (I10) Benign essential hypertension  -Blood pressure is elevated again, had been on amlodipine but this was stopped due to worsening lymphedema  -Will continue to follow up in the outpatient setting, will need to get started on another agent but will wait until his symptoms are more stable       There are no discontinued medications.    Options for treatment and follow-up care were reviewed with the patient and/or guardian. Shaheen Sepulveda and/or guardian engaged in the decision making process and verbalized understanding of the options discussed and agreed with the final plan    Precepted with Dr. Galeana.    Hema Saucedo MD on 6/14/2022 at 8:40 AM

## 2022-06-14 NOTE — PROGRESS NOTES
Clinic Care Coordination Contact    Follow Up Progress Note      Assessment: There was a care coordination referral put in for the pt for home care, and PT. I called and talked to the pt, pt stated that for his PT, they called him about it, but he just has not been well enough to setup an appointment to go. Pt stated that right now, he is more interested in home care, a home nurse. I told the pt that our referral person is not in today, I told him that when she comes in, I will asked her, and once I found out, I will let him know.     Care Gaps:    Health Maintenance Due   Topic Date Due     DIABETIC FOOT EXAM  Never done     URINE DRUG SCREEN  Never done     ADVANCE CARE PLANNING  Never done     EYE EXAM  Never done     TREATMENT AGREEMENT FOR CHRONIC PAIN MANAGEMENT  Never done     Pneumococcal Vaccine: 65+ Years (1 - PCV) Never done     COLORECTAL CANCER SCREENING  Never done     ZOSTER IMMUNIZATION (1 of 2) Never done     LUNG CANCER SCREENING  Never done     DTAP/TDAP/TD IMMUNIZATION (3 - Tdap) 02/19/2021     COVID-19 Vaccine (3 - Booster for Pfizer series) 08/19/2021     MEDICARE ANNUAL WELLNESS VISIT  05/17/2022           Goals addressed this encounter:    Goals Addressed    None         Intervention/Education provided during outreach:               Plan:     Care Coordinator will follow up in month

## 2022-06-16 ENCOUNTER — PATIENT OUTREACH (OUTPATIENT)
Dept: FAMILY MEDICINE | Facility: CLINIC | Age: 67
End: 2022-06-16
Payer: COMMERCIAL

## 2022-06-16 DIAGNOSIS — L03.115 CELLULITIS OF RIGHT LOWER EXTREMITY: Primary | ICD-10-CM

## 2022-06-16 NOTE — PROGRESS NOTES
I got a call back from [facility] about the pt. They wanted to get some information and get the pt medical insurances info. I gave her the pt medical insurance and other information. They will run it and see.

## 2022-06-16 NOTE — PROGRESS NOTES
There was no order for home care from our provider, it was put in from the hospital. Home care is in demand right now and not too many places are taking new pt. I told the pt to call his insurance to see what home care agency they work with, and he can call them.

## 2022-06-16 NOTE — PROGRESS NOTES
Clinic Care Coordination Contact    Follow Up Progress Note      Assessment: I got a call from the pt, pt called to say that he called his insurance about his home care. Pt stated that he called his insurance, and they gave him [facility]. Pt stated that he needs a order from the doctor for the home care, and faxed it to them. I told the pt that I will have a provider put in the order and fax it to them.     I talked to  about putting in an order for home care for the pt. He put in the order, and I faxed it to them.    [facility]   [phone] fax  [phone]        Care Gaps:    Health Maintenance Due   Topic Date Due     DIABETIC FOOT EXAM  Never done     URINE DRUG SCREEN  Never done     ADVANCE CARE PLANNING  Never done     EYE EXAM  Never done     TREATMENT AGREEMENT FOR CHRONIC PAIN MANAGEMENT  Never done     Pneumococcal Vaccine: 65+ Years (1 - PCV) Never done     COLORECTAL CANCER SCREENING  Never done     ZOSTER IMMUNIZATION (1 of 2) Never done     LUNG CANCER SCREENING  Never done     DTAP/TDAP/TD IMMUNIZATION (3 - Tdap) 02/19/2021     COVID-19 Vaccine (3 - Booster for Pfizer series) 08/19/2021     MEDICARE ANNUAL WELLNESS VISIT  05/17/2022           Goals addressed this encounter:    Goals Addressed    None         Intervention/Education provided during outreach:               Plan:     Care Coordinator will follow up in month

## 2022-06-17 ENCOUNTER — MEDICAL CORRESPONDENCE (OUTPATIENT)
Dept: HEALTH INFORMATION MANAGEMENT | Facility: CLINIC | Age: 67
End: 2022-06-17

## 2022-06-17 LAB
BACTERIA WND CULT: ABNORMAL
GRAM STAIN RESULT: ABNORMAL

## 2022-06-17 NOTE — PROGRESS NOTES
Preceptor Attestation:  Patient's case reviewed and discussed with Hema Saucedo MD resident and I evaluated the patient. I agree with written assessment and plan of care.  Supervising Physician:  GABRIELA POSEY MD  [facility]

## 2022-06-21 ENCOUNTER — OFFICE VISIT (OUTPATIENT)
Dept: VASCULAR SURGERY | Facility: CLINIC | Age: 67
End: 2022-06-21
Attending: REGISTERED NURSE
Payer: COMMERCIAL

## 2022-06-21 ENCOUNTER — TELEPHONE (OUTPATIENT)
Dept: VASCULAR SURGERY | Facility: CLINIC | Age: 67
End: 2022-06-21

## 2022-06-21 VITALS — DIASTOLIC BLOOD PRESSURE: 72 MMHG | HEART RATE: 76 BPM | RESPIRATION RATE: 22 BRPM | SYSTOLIC BLOOD PRESSURE: 138 MMHG

## 2022-06-21 DIAGNOSIS — I83.019 VENOUS STASIS ULCER OF RIGHT LOWER LEG WITH EDEMA OF RIGHT LOWER LEG (H): ICD-10-CM

## 2022-06-21 DIAGNOSIS — L97.919 VENOUS STASIS ULCER OF RIGHT LOWER LEG WITH EDEMA OF RIGHT LOWER LEG (H): ICD-10-CM

## 2022-06-21 DIAGNOSIS — L97.929 VENOUS STASIS ULCER OF LEFT LOWER LEG WITH EDEMA OF LEFT LOWER LEG (H): ICD-10-CM

## 2022-06-21 DIAGNOSIS — I83.892 VENOUS STASIS ULCER OF LEFT LOWER LEG WITH EDEMA OF LEFT LOWER LEG (H): ICD-10-CM

## 2022-06-21 DIAGNOSIS — I87.333 VENOUS HYPERTENSION, CHRONIC, WITH ULCER AND INFLAMMATION, BILATERAL (H): ICD-10-CM

## 2022-06-21 DIAGNOSIS — E11.42 TYPE 2 DIABETES MELLITUS WITH PERIPHERAL NEUROPATHY (H): Primary | ICD-10-CM

## 2022-06-21 DIAGNOSIS — D36.9 PAPILLOMATOSIS: ICD-10-CM

## 2022-06-21 DIAGNOSIS — I83.891 VENOUS STASIS ULCER OF RIGHT LOWER LEG WITH EDEMA OF RIGHT LOWER LEG (H): ICD-10-CM

## 2022-06-21 DIAGNOSIS — L84 PRE-ULCERATIVE CORN OR CALLOUS: ICD-10-CM

## 2022-06-21 DIAGNOSIS — I89.0 LYMPHEDEMA OF BOTH LOWER EXTREMITIES: ICD-10-CM

## 2022-06-21 DIAGNOSIS — R60.0 VENOUS STASIS ULCER OF RIGHT LOWER LEG WITH EDEMA OF RIGHT LOWER LEG (H): ICD-10-CM

## 2022-06-21 DIAGNOSIS — R60.0 VENOUS STASIS ULCER OF LEFT LOWER LEG WITH EDEMA OF LEFT LOWER LEG (H): ICD-10-CM

## 2022-06-21 DIAGNOSIS — I83.029 VENOUS STASIS ULCER OF LEFT LOWER LEG WITH EDEMA OF LEFT LOWER LEG (H): ICD-10-CM

## 2022-06-21 DIAGNOSIS — I87.2 VENOUS (PERIPHERAL) INSUFFICIENCY: ICD-10-CM

## 2022-06-21 PROCEDURE — 11057 PARNG/CUTG B9 HYPRKR LES >4: CPT | Mod: XS | Performed by: REGISTERED NURSE

## 2022-06-21 PROCEDURE — 11042 DBRDMT SUBQ TIS 1ST 20SQCM/<: CPT | Performed by: REGISTERED NURSE

## 2022-06-21 PROCEDURE — 11045 DBRDMT SUBQ TISS EACH ADDL: CPT | Performed by: REGISTERED NURSE

## 2022-06-21 PROCEDURE — 11044 DBRDMT BONE 1ST 20 SQ CM/<: CPT | Performed by: REGISTERED NURSE

## 2022-06-21 ASSESSMENT — PAIN SCALES - GENERAL: PAINLEVEL: NO PAIN (0)

## 2022-06-21 NOTE — TELEPHONE ENCOUNTER
Lesly QURESHI from [facility] is calling wondering if there was any changes to the wound care plan after todays visit and if she could get a copy of the notes.  She can be at [phone], and the fax number is [phone]

## 2022-06-21 NOTE — PROGRESS NOTES
"            Follow up Vascular Visit       Date of Service:06/21/22      Chief Complaint: BLE sweeping and weeping ulcerations       Pt returns to [facility] with regards to their BLE swelling and weeping ulcerations.  They arrive today alone. They are currently using silvercel, abd, rolled gauze to the wounds. This is being done family. [facility] to start this week They are using nothing for compression. Short stretch and tubular compression ordered for compression. Pt reports that he wears the compression every day but he takes it off the night before his appointment to make it easier on staff. They are feeling well today. Denies fevers, chills. No shortness of breath. Pt was admitted to the hospital 6/6-6/8/22 for RLE cellulitis. Pt was on IV antibiotics while in the hospital and discharged on Bactrim. Pt finished Bactrim 6/16/22. While in the hospital Lasix was discontinued d/t creatinine and no noticeable improvement on the lymphedema. CT ABD/Pelvis completed 6/6/22 while in hospital. CT showed right external iliac adenopathy with no abdominal adenopathy, spleen size normal.    Allergies:   Allergies   Allergen Reactions     Lisinopril Swelling     Patient reports \"neck swelling\"  Swelling in throat       Medications:   Current Outpatient Medications:      ammonium lactate (LAC-HYDRIN) 12 % external lotion, Apply topically daily as needed With dressing changes, Disp: , Rfl:      atorvastatin (LIPITOR) 20 MG tablet, Take 1 tablet (20 mg) by mouth daily, Disp: 90 tablet, Rfl: 3     hydrOXYzine (ATARAX) 25 MG tablet, Take 0.5-1 tablets (12.5-25 mg) by mouth 3 times daily as needed for anxiety, Disp: 60 tablet, Rfl: 1     losartan (COZAAR) 100 MG tablet, Take 1 tablet (100 mg) by mouth daily, Disp: 90 tablet, Rfl: 3     melatonin 3 MG tablet, Take 1 tablet (3 mg) by mouth nightly as needed for sleep, Disp: 30 tablet, Rfl: 1     metFORMIN (GLUCOPHAGE-XR) 750 MG 24 hr tablet, Take 1 tablet (750 " mg) by mouth 2 times daily (with meals), Disp: 180 tablet, Rfl: 1     methadone (DOLOPHINE-INTENSOL) 10 MG/ML (HIGH CONC) solution, Take 100 mg by mouth daily, Disp: , Rfl:      sodium hypochlorite (QUARTER-STRENGTH DAKINS) external solution, Apply topically every 72 hours Use 300mL every 72 hours to wash the bilateral legs and wounds on the legs (Patient taking differently: Apply topically every 72 hours as needed Use 300mL every 72 hours to wash the bilateral legs and wounds on the legs), Disp: 1000 mL, Rfl: 3    Current Facility-Administered Medications:      lidocaine (XYLOCAINE) 2 % external gel, , Topical, Daily PRN, Lucia Reyes MD, Given at 10/19/21 0899    History:   Past Medical History:   Diagnosis Date     Diabetes (H)      H/O angioedema 6/15/2020    Formatting of this note might be different from the original. Unexplained angioedema twice, discontinue lisinopril for possible connection to it, though he had used it afterwards with no symptoms     History of tobacco use disorder 8/1/2013    Formatting of this note might be different from the original. Added per documetation     Hypertension      Lymphedema of both lower extremities 12/22/2014     Methadone use 5/8/2012     Obstructive sleep apnea 2/2/2015    Formatting of this note might be different from the original. Epic     Pleural mass 7/2/2013     Uncomplicated asthma        Physical Exam:    /72   Pulse 76   Resp 22     General:  Patient presents to clinic in no apparent distress.  Head: normocephalic atraumatic  Psychiatric:  Alert and oriented x3.   Respiratory: unlabored breathing; no cough  Integumentary:  Skin is uniformly warm, dry and pink.  Extremities: swelling is stable; continues to have extensive scaling and crusting on the legs; continues to have several scattered full and partial thickness ulcers about the legs; see measures below; +odor; noted to have several calluses on the hammer toe deformed toes; these were  removed and intact underneath; +papilamatosis; +verrucous lesions + fibrosity and scarring of the tissues    Wound #1 Location: RLL lateral  Size: 4L x 4W x 0.1depth.  No sinus tract present, Wound base: papilamatosis  No undermining present. Wound is partial thickness. There is moderate drainage. Periwound: no denudement, erythema, induration, maceration or warmth.    Wound #2 Location: L lateral leg  Size: 17L x 15W x 0.1depth.  No sinus tract present, Wound base: papilamatosis  No undermining present. Wound is full thickness. There is moderate drainage. Periwound: no denudement, erythema, induration, maceration or warmth.    Wound #3 Location: L medial posterior lateral  Size: 8L x 8W x 0.1depth.  No sinus tract present, Wound base: papilamatosis  No undermining present. Wound is full thickness. There is moderate drainage. Periwound: no denudement, erythema, induration, maceration or warmth.    Wound #4 Location: R medial calf  Size: 10L x 10W x 0.1depth.  No sinus tract present, Wound base: papilamatosis  No undermining present. Wound is full thickness. There is moderate drainage. Periwound: no denudement, erythema, induration, maceration or warmth.      Wound Leg Ulceration (Active)   Wound Bed Other (Comment) 06/08/22 0000   Beatriz-wound Assessment Denuded;Moist;Maceration;Edema 06/07/22 0930   Drainage Amount UTV 06/08/22 0000   Drainage Color/Characteristics UTV 06/08/22 0000   Wound Care/Cleansing Normal saline 06/06/22 1150   Dressing Other (Comment) 06/07/22 1651   Dressing Status Clean, dry, intact 06/08/22 0815   Number of days: 15       VASC Wound Right lower leg lateral (Active)   Pre Size Length 4 06/21/22 0700   Pre Size Width 4 06/21/22 0700   Pre Size Depth 0.1 06/21/22 0700   Pre Total Sq cm 16 06/21/22 0700   Product Used Alginate 10/12/21 0800   Number of days: 272       VASC Wound Left lower leg medial (Active)   Pre Size Length 2 10/19/21 0800   Pre Size Width 5 10/19/21 0800   Pre Size Depth  0.5 10/19/21 0800   Pre Total Sq cm 10 10/19/21 0800   Product Used Alginate 10/12/21 0800   Number of days: 272       VASC Wound rt lateral weeping (Active)   Pre Size Length 8 05/09/22 0800   Pre Size Width 17 05/09/22 0800   Pre Size Depth 0.1 05/09/22 0800   Pre Total Sq cm 136 05/09/22 0800   Number of days: 245       VASC Wound Lt medial (Active)   Pre Size Length 1.5 02/14/22 0800   Pre Size Width 1 02/14/22 0800   Pre Size Depth 0.1 02/14/22 0800   Pre Total Sq cm 1.5 02/14/22 0800   Number of days: 245       VASC Wound Lt lateral leg (Active)   Pre Size Length 17 06/21/22 0700   Pre Size Width 15 06/21/22 0700   Pre Size Depth 0.1 06/21/22 0700   Pre Total Sq cm 255 06/21/22 0700   Description weeping 11/23/21 0700   Number of days: 210       VASC Wound Lt medial ( posterior) (Active)   Pre Size Length 8 06/21/22 0700   Pre Size Width 8 06/21/22 0700   Pre Size Depth 0.1 06/21/22 0700   Pre Total Sq cm 64 06/21/22 0700   Number of days: 127       VASC Wound Rt medial calf (Active)   Pre Size Length 10 06/21/22 0700   Pre Size Width 10 06/21/22 0700   Pre Size Depth 0.1 06/21/22 0700   Pre Total Sq cm 100 06/21/22 0700   Number of days: 71            Circumferential volume measures:      Circumferential Measures 10/19/2021 11/23/2021 1/17/2022 2/14/2022 3/14/2022   Right just above MTP 28.3 28.6 24.4 29.2 29.5   Right Ankle 36.2 36.4 39.2 40 54   Right Widest Calf 60.7 58 55.2 60.8 60.5   Right Thigh Up 10cm - - - - -   Left - just above MTP 27.6 28.1 28.2 28.7 29   Left Ankle 33.6 34.3 35.5 37 48.3   Left Widest Calf 58.4 54 52.3 57.6 62   Left Thigh Up 10cm - - - - -   Left Knee to Ankle 42 - - - -       Labs:    I personally reviewed the following lab results today and those on care everywhere    CRP   Date Value Ref Range Status   04/23/2021 8.5 (H) 0.0 - 0.8 mg/dL Final      No results found for: SED   Last Renal Panel:  Sodium   Date Value Ref Range Status   06/08/2022 138 136 - 145 mmol/L Final    06/10/2021 142.0 133.0 - 144.0 mmol/L Final     Potassium   Date Value Ref Range Status   06/08/2022 4.1 3.5 - 5.0 mmol/L Final   06/10/2021 4.4 3.4 - 5.3 mmol/L Final     Chloride   Date Value Ref Range Status   06/08/2022 102 98 - 107 mmol/L Final   06/10/2021 100.0 94.0 - 109.0 mmol/L Final     Carbon Dioxide   Date Value Ref Range Status   06/10/2021 33.0 (H) 20.0 - 32.0 mmol/L Final     Carbon Dioxide (CO2)   Date Value Ref Range Status   06/08/2022 29 22 - 31 mmol/L Final     Anion Gap   Date Value Ref Range Status   06/08/2022 7 5 - 18 mmol/L Final     Glucose   Date Value Ref Range Status   06/08/2022 82 70 - 125 mg/dL Final   06/10/2021 112.0 (H) 60.0 - 109.0 mg/dL Final     GLUCOSE BY METER POCT   Date Value Ref Range Status   06/08/2022 119 (H) 70 - 99 mg/dL Final     Urea Nitrogen   Date Value Ref Range Status   06/08/2022 15 8 - 22 mg/dL Final   06/10/2021 16.0 7.0 - 30.0 mg/dL Final     Creatinine   Date Value Ref Range Status   06/08/2022 1.21 0.70 - 1.30 mg/dL Final   06/10/2021 1.0 0.8 - 1.5 mg/dL Final     GFR Estimate   Date Value Ref Range Status   06/08/2022 65 >60 mL/min/1.73m2 Final     Comment:     Effective December 21, 2021 eGFRcr in adults is calculated using the 2021 CKD-EPI creatinine equation which includes age and gender (Lucie et al., NEJM, DOI: 10.1056/RYMXrs0828426)   04/24/2021 >60 >60 mL/min/1.73m2 Final     Calcium   Date Value Ref Range Status   06/08/2022 8.7 8.5 - 10.5 mg/dL Final   06/10/2021 9.5 8.5 - 10.4 mg/dL Final     Albumin   Date Value Ref Range Status   04/24/2021 2.7 (L) 3.5 - 5.0 g/dL Final      Lab Results   Component Value Date    WBC 7.0 06/08/2022     Lab Results   Component Value Date    RBC 3.37 06/08/2022     Lab Results   Component Value Date    HGB 8.2 06/08/2022     Lab Results   Component Value Date    HCT 27.1 06/08/2022     No components found for: MCT  Lab Results   Component Value Date    MCV 80 06/08/2022     Lab Results   Component Value Date     MCH 24.3 06/08/2022     Lab Results   Component Value Date    MCHC 30.3 06/08/2022     Lab Results   Component Value Date    RDW 15.2 06/08/2022     Lab Results   Component Value Date     06/08/2022      Lab Results   Component Value Date    A1C 6.7 04/21/2022    A1C 6.2 04/24/2021    A1C 6.2 04/24/2021      TSH   Date Value Ref Range Status   04/21/2022 2.58 0.30 - 5.00 uIU/mL Final      No results found for: VITDT                Impression:  Encounter Diagnoses   Name Primary?     Type 2 diabetes mellitus with peripheral neuropathy (H) Yes     Venous hypertension, chronic, with ulcer and inflammation, bilateral (H)      Lymphedema of both lower extremities      Venous (peripheral) insufficiency      Venous stasis ulcer of left lower leg with edema of left lower leg (H)      Venous stasis ulcer of right lower leg with edema of right lower leg (H)      Pre-ulcerative corn or callous      Papillomatosis         6/21/22 BLE       6/21/22 LL lateral leg        6/21/22 R posterior leg      6/21/22 R lateral leg            Are any of these wounds new today: No; Location: na    Assessment/Plan:          1. Debridement: After discussion of risk factors and verbal consent was obtained 2% Lidocaine HCL jelly was applied, under clean conditions, the BLE ulceration(s) were debrided using currette. Devitalized and nonviable tissue, along with any fibrin and slough, was removed to improve granulation tissue formation, stimulate wound healing, decrease overall bacteria load, disrupt biofilm formation and decrease edge senescence.  Total excisional debridement was 435 sq cm into the subcutaneous tissue with a depth of 0.1 cm.   Ulcers were improved afterwards and .  Measures were unchanged after debridement.        2.  Wound treatment: wound treatment will include irrigation and dressings to promote autolytic debridement which will include: Cleanse with Dakins solution to decrease odor and bacterial load; continue  silvercel; ABD; rolled gauze; change every 3 days by family. Home Health to start this week. Pt currently out of supplies. Will provide a few days worth of supplies and home care will take over supplying dressings. If for some reason the patient is not able to get their dressing(s) changed as outlined above (due to illness, lack of supplies, lack of help) please do the following: remove old, soiled dressings; wash the wounds with saline; pat dry; apply ABD pad or other absorbant pad and secure with rolled gauze; avoid tape directly on your skin; patient instructed to call the clinic as soon as possible to let us know what the current issues are in receiving wound care. Stable.            3. Edema: Will continue with elevation. Pt has lymphedema pumps at home, but doesn't like asking for help to don and doff. Instructed pt if family is willing to help to use the lymphedema pumps daily. He is in agreeance with this plan. Also instructed the pt to leave his compression on when coming to his appointments, he understood. Diuretic was discontinued in the hospital. Creat 1.98. The compression wraps were applied today in clinic.     If a 2 layer or 4 layer compression wrap is being used; these are safe to have on for ONLY 7 days. If for some reason the patient is not able to get the wrap(s) changed (due to illness; lack of supplies, lack of help, lack of transportation) please do the following: unwrap the old 2 or 4 layer compression wrap; avoid using scissors as you could cut your skin and cause wounds; use tubular compression when available. Call to reschedule your home care or clinic visit appointment as soon as possible.  Stable           4. Nutrition: focus on protein; take 1-2 protein shakes per day           5. Offloading:  We spoke about his calluses and the cause of this. Instructed the pt to start using a pumice stone to keep calluses down. Ordered diabetic shoes and inserts for pt to help offload pressure to the  toe pads of the hammertoes.             6. Callous: a total of 6 calluses was pared using a #15 blade scalpel; this was done to evaluate for underlying ulceration; reduce pressure; patient comfort; and to reduce risk of future ulceration formation. The skin was intact underneath and patient tolerated well; no complications. Patient will follow up with me in 4 weeks for reevaluation. They were instructed to call the clinic sooner with any signs or symptoms of infection or any further questions/concerns. Answered all questions.            7. HTN: blood pressure was better today; patient has been working with his pcp. It looks like Amlodipine was added and then increased to 10mg. This along with his Losartan seems to be working better for patient. BP today 138/72          Verona Stallworth MS, APRN, AGNP-C, CWCN  [facility]   [phone]        This note was electronically signed by HAYDEE Whatley CNP

## 2022-06-21 NOTE — PATIENT INSTRUCTIONS
"Consider scheduling with bariatrics to discuss weight loss options    Continue working with your  PCP on your blood pressure and diabetes medications    Take x2 protein shakes daily such as premier protein    New order placed for Diabetic shoes and inserts  Wound Care Instructions    Every other day Cleanse your BLE wound(s) with Dakin's solution wash the wound and legs with the Dakin's solution; ok to let sit for a few minutes; ok to rinse with saline if burning sensation occurs.    Pat Dry with non-sterile gauze 4''x4''    Apply Lotion to the intact skin surrounding your wound and other dry skin locations. Some good lotions include: Remedy Skin Repair Cream, Sarna, Vanicream or Cetaphil    Apply Urea based lotion to the scaling, crusting and thickened skin areas    Primary Dressing: Apply silvercel 4.25\"x4.25\" into/onto the wounds no substitutions    Secondary dressing: Cover with ABD 8\"x10\"    Secure with non-sterile roll gauze (4\" x 75\" roll) and tape (1\" roll tape) as needed; avoid adhesive directly on the skin    Compression: tubular compression; size J/K  foam; short stretch 4''; ok to use foam around the ankles    It is not ok to get your wound wet in the bath or shower    Use lymphedema pump 1-2 times per day for 30-60 minutes intervals    Elevate the legs  SEEK MEDICAL CARE IF:  You have an increase in swelling, pain, or redness around the wound.  You have an increase in the amount of pus coming from the wound.  There is a bad smell coming from the wound.  The wound appears to be worsening/enlarging   You have a fever greater than 101.5 F      It is ok to continue current wound care treatment/products for the next 2-3 days until new wound care supplies are ordered and arrive. If longer than this please contact our office at [phone].    High Protein Foods  Chicken  -Chicken breast, 3.5oz.-30 grams protein  -Chicken thigh-10 grams(average size)  -Drumstick-11 grams  -Wing- 6 grams  -Chicken meat, " cooked, 4 oz.  Beef  -Hamburger katia, 4 oz-28 grams protein  -Steak, 6 oz-42 grams  -Most cuts of beef- 7 grams of protein per ounce  Fish  -Most fish fillets or steaks are about 22 grams of protein for 3 1/2 oz(100 grams) of cooked fish, or 6 grams per ounce  -Tuna, 6 oz can-40 grams of protein  Pork  -Pork chop, average-22 grams protein  -Pork loin or tenderloin, 4 oz.-29 grams  -Ham, 3oz serving- 19 grams  -Ground pork 3oz cooked-22 grams  -Winkler, 1 slice-3 grams  -Wolfe-style winkler(black winkler), slice-5-6 grams  Eggs and Dairy  -Egg, large-7 grams  -Milk, 1 cup-8 grams  -Cottage cheese, 1/2 cup-15 grams  -Greek yogurt, 1 cup-usually 8-12 grams, check label  -Soft cheeses (Mozzarella, Brie, Camembert)- 6 grams  -Medium cheeses(cheddar, swiss)- 7 or 8 grams per oz  -Hard cheeses(parmesan)- 10 grams per oz  Beans  -Tofu, 1/2 cup 20 grams  -Tofu, 1 oz., 2.3 grams  -Soy milk, 1 cup-6-10 grams  -Most beans(black, marcelino, lentils, etc.) about 7-10 grams protein per half cup of cooked beans  -soy beans, 1/2 cup cooked-14 grams  -Split peas, 1/2 cup cooked- 8 grams  Nuts and Seeds  -Peanut butter, 2 Tablespoons- 8 grams protein  -Almonds, 1/4 cup- 8 grams  -Peanuts, 1/4 cup-9 grams  -Cashews, 1/4 cup- 5 grams  -Pecans, 1/4 cup- 2.5 grams  -Sunflower seeds, 1/4 cup- 6 grams  -Pumpkin seeds, 1/4 cup-8 grams  -Flax seeds- 1/4 cup- 8 grams  Protein Supplements  -Ensure  -Boost  -Glucerna, if diabetic  When you have an open ulcer, your bodies protein needs are much higher, so it is recommended eat good sources of protein

## 2022-06-23 ENCOUNTER — TELEPHONE (OUTPATIENT)
Dept: FAMILY MEDICINE | Facility: CLINIC | Age: 67
End: 2022-06-23

## 2022-06-23 NOTE — TELEPHONE ENCOUNTER
[facility] phone call message - order or referral request for patient:     Order or referral being requested: Verbal orders      Additional Comments:     Skilled nursing   - 2 times a week for 2 weeks  - 1 time a week for 4 weeks  For wound management - wound care per [facility] vascular provider    Okay for late start of care, patient was seen on 6/21/2022 at the vascular clinic and patient requested start date of care 6/23/2022.    OK to leave a message on voice mail? Yes    Primary language: English      needed? No    Call taken on June 23, 2022 at 1:29 PM by Pranav Vivas

## 2022-06-23 NOTE — TELEPHONE ENCOUNTER
The Home Care/Assisted Living/Nursing Facility is calling regarding an established patient.  Has the patient seen Home Care in the past or is currently residing in Assisted Living or Nursing Facility? Yes.     Lesly calling from [facility] requesting the following orders that are within the Home Care, Assisted Living or Nursing Home Eval and Treatment standing order and can be signed as standing order signature required by RN.    Preferred Call Back Number:     Home Care Visits Continuation and Wound care supplies per Wound Care Specialist's recommendation    Any additional Orders:  Are there any orders requested, not stated above, that are outside of the standing order and must be routed to a licensed practitioner for approval?    No   LVM with verbal orders  Mario QURESHI

## 2022-06-24 NOTE — TELEPHONE ENCOUNTER
9:15 AM    Left voicemail on secure line for home care twice per week for 2 weeks and then once weekly for 4 weeks.    Radha Sal MD  [facility]

## 2022-06-28 ENCOUNTER — OFFICE VISIT (OUTPATIENT)
Dept: FAMILY MEDICINE | Facility: CLINIC | Age: 67
End: 2022-06-28
Payer: COMMERCIAL

## 2022-06-28 VITALS
DIASTOLIC BLOOD PRESSURE: 70 MMHG | SYSTOLIC BLOOD PRESSURE: 150 MMHG | BODY MASS INDEX: 53.5 KG/M2 | HEART RATE: 90 BPM | WEIGHT: 315 LBS | RESPIRATION RATE: 18 BRPM | TEMPERATURE: 97.9 F | OXYGEN SATURATION: 95 %

## 2022-06-28 DIAGNOSIS — I89.0 LYMPHEDEMA OF BOTH LOWER EXTREMITIES: ICD-10-CM

## 2022-06-28 DIAGNOSIS — I10 BENIGN ESSENTIAL HYPERTENSION: Primary | ICD-10-CM

## 2022-06-28 PROCEDURE — 99214 OFFICE O/P EST MOD 30 MIN: CPT | Mod: GC | Performed by: STUDENT IN AN ORGANIZED HEALTH CARE EDUCATION/TRAINING PROGRAM

## 2022-06-28 RX ORDER — HYDROCHLOROTHIAZIDE 12.5 MG/1
12.5 TABLET ORAL DAILY
Qty: 30 TABLET | Refills: 0 | Status: SHIPPED | OUTPATIENT
Start: 2022-06-28 | End: 2022-07-14

## 2022-06-28 ASSESSMENT — ANXIETY QUESTIONNAIRES
4. TROUBLE RELAXING: NOT AT ALL
2. NOT BEING ABLE TO STOP OR CONTROL WORRYING: NOT AT ALL
GAD7 TOTAL SCORE: 1
2. NOT BEING ABLE TO STOP OR CONTROL WORRYING: NOT AT ALL
1. FEELING NERVOUS, ANXIOUS, OR ON EDGE: SEVERAL DAYS
5. BEING SO RESTLESS THAT IT IS HARD TO SIT STILL: NOT AT ALL
2. NOT BEING ABLE TO STOP OR CONTROL WORRYING: NOT AT ALL
7. FEELING AFRAID AS IF SOMETHING AWFUL MIGHT HAPPEN: NOT AT ALL
IF YOU CHECKED OFF ANY PROBLEMS ON THIS QUESTIONNAIRE, HOW DIFFICULT HAVE THESE PROBLEMS MADE IT FOR YOU TO DO YOUR WORK, TAKE CARE OF THINGS AT HOME, OR GET ALONG WITH OTHER PEOPLE: NOT DIFFICULT AT ALL
3. WORRYING TOO MUCH ABOUT DIFFERENT THINGS: NOT AT ALL
3. WORRYING TOO MUCH ABOUT DIFFERENT THINGS: NOT AT ALL
5. BEING SO RESTLESS THAT IT IS HARD TO SIT STILL: NOT AT ALL
4. TROUBLE RELAXING: NOT AT ALL
6. BECOMING EASILY ANNOYED OR IRRITABLE: NOT AT ALL
6. BECOMING EASILY ANNOYED OR IRRITABLE: NOT AT ALL
GAD7 TOTAL SCORE: 1
GAD7 TOTAL SCORE: 1
1. FEELING NERVOUS, ANXIOUS, OR ON EDGE: SEVERAL DAYS
4. TROUBLE RELAXING: NOT AT ALL
7. FEELING AFRAID AS IF SOMETHING AWFUL MIGHT HAPPEN: NOT AT ALL
5. BEING SO RESTLESS THAT IT IS HARD TO SIT STILL: NOT AT ALL
1. FEELING NERVOUS, ANXIOUS, OR ON EDGE: SEVERAL DAYS
IF YOU CHECKED OFF ANY PROBLEMS ON THIS QUESTIONNAIRE, HOW DIFFICULT HAVE THESE PROBLEMS MADE IT FOR YOU TO DO YOUR WORK, TAKE CARE OF THINGS AT HOME, OR GET ALONG WITH OTHER PEOPLE: NOT DIFFICULT AT ALL

## 2022-06-28 NOTE — PROGRESS NOTES
CHIEF COMPLAINT                                                      Chief Complaint   Patient presents with     Follow Up     Follow up      SUBJECTIVE:                                                    Shaheen Sepulveda is a 66 year old year old male who presents to clinic today for the following health issues:    Follow up for multiple conditions:  -Last seen 6/14 after being discharged from the hospital   -Was hospitalized for worsening lymphedema and cellulitis of the lower extremities  -Patient has seen vascular surgery since being in the hospital (has been under their care for lymphedema prior to hospitalization) and they continue to stress good was to keep the legs cared for and clean  -They did slightly alter his care regimen at home, he states that he has been doing this at home  -Feels like he is continuing to improve since his last visit  -Continues to struggle to use his lymphedema devices, stressed that he should continue with this and that asking for help from his family is not a sign of weakness (family is willing, patient just struggles with asking)  -Patient has CT of the abdomen and pelvis ordered for 7/7/2022  -Of note patient is up ~7 lbs since his last visit since stopping lasix after being discharged from the hospital  -Working on geting a hospital bed to help with swelling of legs, patient states he heard from them for a height and weight but has not heard from them again    Patient is an established patient of this clinic.  ----------------------------------------------------------------------------------------------------------------------  Patient Active Problem List   Diagnosis     Hypertension     H/O angioedema     Hip joint replacement status     Hypervolemia     Lymphedema     Lymphedema of both lower extremities     Methadone use     Obesity, morbid, BMI 40.0-49.9 (H)     Obstructive sleep apnea     Osteoarthritis of hip     Osteoarthritis of knee     History of tobacco use disorder      Pleural mass     Open wound of lower limb     Type 2 diabetes mellitus without complication, without long-term current use of insulin (H)     Opioid use disorder, moderate, in sustained remission, on maintenance therapy (H)     Cellulitis of right lower extremity     Past Surgical History:   Procedure Laterality Date     JOINT REPLACEMENT         Social History     Tobacco Use     Smoking status: Former Smoker     Smokeless tobacco: Former User   Substance Use Topics     Alcohol use: Never     Family History   Problem Relation Age of Onset     No Known Problems Mother      No Known Problems Father      Edema Sister      Edema Sister          Problem list and past medical, surgical, social, and family histories reviewed & adjusted, as indicated.    Current Outpatient Medications   Medication Sig Dispense Refill     ammonium lactate (LAC-HYDRIN) 12 % external lotion Apply topically daily as needed With dressing changes       atorvastatin (LIPITOR) 20 MG tablet Take 1 tablet (20 mg) by mouth daily 90 tablet 3     hydrOXYzine (ATARAX) 25 MG tablet Take 0.5-1 tablets (12.5-25 mg) by mouth 3 times daily as needed for anxiety 60 tablet 1     losartan (COZAAR) 100 MG tablet Take 1 tablet (100 mg) by mouth daily 90 tablet 3     melatonin 3 MG tablet Take 1 tablet (3 mg) by mouth nightly as needed for sleep 30 tablet 1     metFORMIN (GLUCOPHAGE-XR) 750 MG 24 hr tablet Take 1 tablet (750 mg) by mouth 2 times daily (with meals) 180 tablet 1     methadone (DOLOPHINE-INTENSOL) 10 MG/ML (HIGH CONC) solution Take 100 mg by mouth daily       sodium hypochlorite (QUARTER-STRENGTH DAKINS) external solution Apply topically every 72 hours Use 300mL every 72 hours to wash the bilateral legs and wounds on the legs (Patient taking differently: Apply topically every 72 hours as needed Use 300mL every 72 hours to wash the bilateral legs and wounds on the legs) 1000 mL 3     Medication list reviewed and updated as indicated.    Allergies  "  Allergen Reactions     Lisinopril Swelling     Patient reports \"neck swelling\"  Swelling in throat     Allergies reviewed and updated as indicated.  ----------------------------------------------------------------------------------------------------------------------  ROS:  Constitutional, HEENT, cardiovascular, pulmonary, GI, musculoskeletal, neuro, skin, and psych systems are negative, except as otherwise noted.    OBJECTIVE:     BP (!) 160/76   Pulse 90   Temp 97.9  F (36.6  C) (Oral)   Resp 18   Wt (!) 194.1 kg (428 lb)   SpO2 95%   BMI 53.50 kg/m    Body mass index is 53.5 kg/m .  Exam:  Constitutional: healthy, alert and no distress  Head: Normocephalic. No masses, lesions, tenderness or abnormalities  Cardiovascular: RRR, no murmurs appreciated  Respiratory: CTAB, no wheezing, rales or rhonchi  Musculoskeletal: Significant edema of the bilateral lower extremities, currently in wraps.  Gait at his baseline at this time   Neurologic: Gait normal with cane. Decrased sensation of the lower extremities at its baseline  Psychiatric: Mentation appears normal at baseline, has good reasoning    ASSESSMENT/PLAN:     (I10) Benign essential hypertension  (primary encounter diagnosis)  -Patient remains hypertensive at this time  -Currently only on losartan for his blood pressure  -Denies any symptoms with this, states when his home health care visits he is typically in the 130's-140's  -Discussed options at this time  -Will avoid a calcium channel blocker  -Plan to trial hydrochlorothiazide  -Patient requested we wait to check blood until the next visit  -When last checked 6/8 was stable  -Will need it rechecked at his next visit given change in medication  -Cautioned on side effects to monitor from the medication  -Will see back in 2 weeks      (I89.0) Lymphedema of both lower extremities  -Stable at this time  -Has put on 7 lbs since his last visit but patient denies any acute changes he notices to his legs  -Is " getting some home care to help with this and will hopefull start home PT  -Has repeat CT scan of the abdomen scheduled for 7/7 which he needs to recheck some lymphadenopathy seen on previous CT thought related to his active cellulitis infection at that time  -Continued to stress getting help from family members to help get on top of his lymphedema and patient seemed receptive  -Still struggling to get bed at this time, will continue to work with care coordinator to try and get this covered  -Will follow up in 2-4 weeks   -Will get CBC and BMP at that time     There are no discontinued medications.    Options for treatment and follow-up care were reviewed with the patient and/or guardian. Shaheen Sepulveda and/or guardian engaged in the decision making process and verbalized understanding of the options discussed and agreed with the final plan    Precepted with Dr. Rosenstein.    Hema Saucedo MD on 6/28/2022 at 9:13 AM

## 2022-06-28 NOTE — PROGRESS NOTES
Preceptor Attestation:   Patient seen, evaluated and discussed with the resident Dr. Saucedo. I have verified the content of the note, which accurately reflects my assessment of the patient and the plan of care.    Supervising Physician:Benjamin Rosenstein, MD, MA [facility]

## 2022-07-05 DIAGNOSIS — E11.9 TYPE 2 DIABETES MELLITUS WITHOUT COMPLICATION, WITHOUT LONG-TERM CURRENT USE OF INSULIN (H): ICD-10-CM

## 2022-07-06 ENCOUNTER — MEDICAL CORRESPONDENCE (OUTPATIENT)
Dept: HEALTH INFORMATION MANAGEMENT | Facility: CLINIC | Age: 67
End: 2022-07-06

## 2022-07-06 RX ORDER — ATORVASTATIN CALCIUM 20 MG/1
20 TABLET, FILM COATED ORAL DAILY
Qty: 90 TABLET | Refills: 4 | Status: SHIPPED | OUTPATIENT
Start: 2022-07-06 | End: 2023-07-20

## 2022-07-07 ENCOUNTER — HOSPITAL ENCOUNTER (OUTPATIENT)
Dept: CT IMAGING | Facility: HOSPITAL | Age: 67
Discharge: HOME | End: 2022-07-07
Attending: FAMILY MEDICINE | Admitting: FAMILY MEDICINE
Payer: COMMERCIAL

## 2022-07-07 ENCOUNTER — TELEPHONE (OUTPATIENT)
Dept: FAMILY MEDICINE | Facility: CLINIC | Age: 67
End: 2022-07-07

## 2022-07-07 DIAGNOSIS — I89.0 LYMPHEDEMA OF BOTH LOWER EXTREMITIES: ICD-10-CM

## 2022-07-07 LAB — RADIOLOGIST FLAGS: ABNORMAL

## 2022-07-07 PROCEDURE — 74177 CT ABD & PELVIS W/CONTRAST: CPT

## 2022-07-07 PROCEDURE — 255N000002 HC RX 255 OP 636: Performed by: FAMILY MEDICINE

## 2022-07-07 RX ADMIN — IOHEXOL 100 ML: 350 INJECTION, SOLUTION INTRAVENOUS at 08:28

## 2022-07-07 NOTE — TELEPHONE ENCOUNTER
Received phone call from radiology regarding pt's abd/pelvic CT results.  Pt has persistent right inguinal lymph nodes present which are unchanged since 6/6/22 scan, at which time he had cellulitis.  Repeat CT done to recheck these nodes after resolution of cellulitis.  Routing results to PCP, Dr. Radha Sal, who will see pt on 7/14/22.  Would consider referral for biopsy pending clinical context.    Ginny Leal MD

## 2022-07-11 NOTE — TELEPHONE ENCOUNTER
Writer spoke with Lesly, she stated that each week the drainage is increasing.     Writer asked about elevation and the compression pump. Pt is not doing either.     She is wondering if there is another secondary dressing that can be applied like optilock? Informed her that message would be sent to [place].

## 2022-07-11 NOTE — TELEPHONE ENCOUNTER
Lesly from [facility] is calling stating that she saw Shaheen today and he has a lot of drainage coming from wound and soaking through.  Because of this and using the ABD pad there is some maturation happening. She was not able to take a photo. She can be reached at [phone]

## 2022-07-12 NOTE — TELEPHONE ENCOUNTER
Message left, per [facility] can use Optilock or diapers. Told to call back with any further questions.

## 2022-07-13 NOTE — TELEPHONE ENCOUNTER
I am following up on our mutual patient Shaheen Sepulveda  51.  You had requested pictures of the maceration on lower leg wounds.  I also observed a small amount of bloody drainage from the left lateral wound today, see photos.  He did tell me the only change in medications is the hospital discontinued his Lasix in May, and he did just start HCTZ from pcp 22.  I have noticed that his drainage overall has been gradually increasing over last few weeks, however just this last week to the extent that he has been draining through all layers.  Today we started opti lock for secondary dressing, hopefully we see improvement with this.  Please send updated wound care orders after you see him on .  Thank you,      Charley Harman RN  [facility]  [facility]   [phone]  Fax [phone]

## 2022-07-14 ENCOUNTER — OFFICE VISIT (OUTPATIENT)
Dept: FAMILY MEDICINE | Facility: CLINIC | Age: 67
End: 2022-07-14
Payer: COMMERCIAL

## 2022-07-14 VITALS
OXYGEN SATURATION: 98 % | DIASTOLIC BLOOD PRESSURE: 69 MMHG | SYSTOLIC BLOOD PRESSURE: 161 MMHG | HEART RATE: 89 BPM | RESPIRATION RATE: 18 BRPM

## 2022-07-14 DIAGNOSIS — R59.1 LYMPHADENOPATHY: ICD-10-CM

## 2022-07-14 DIAGNOSIS — F41.9 ANXIETY: ICD-10-CM

## 2022-07-14 DIAGNOSIS — E11.9 TYPE 2 DIABETES MELLITUS WITHOUT COMPLICATION, WITHOUT LONG-TERM CURRENT USE OF INSULIN (H): ICD-10-CM

## 2022-07-14 DIAGNOSIS — G89.4 CHRONIC PAIN SYNDROME: ICD-10-CM

## 2022-07-14 DIAGNOSIS — I89.0 LYMPHEDEMA OF BOTH LOWER EXTREMITIES: ICD-10-CM

## 2022-07-14 DIAGNOSIS — I10 BENIGN ESSENTIAL HYPERTENSION: Primary | ICD-10-CM

## 2022-07-14 LAB
ANION GAP SERPL CALCULATED.3IONS-SCNC: 13 MMOL/L (ref 7–15)
BASOPHILS # BLD AUTO: 0 10E3/UL (ref 0–0.2)
BASOPHILS NFR BLD AUTO: 1 %
BUN SERPL-MCNC: 13.7 MG/DL (ref 8–23)
CALCIUM SERPL-MCNC: 9.3 MG/DL (ref 8.8–10.2)
CHLORIDE SERPL-SCNC: 96 MMOL/L (ref 98–107)
CREAT SERPL-MCNC: 0.93 MG/DL (ref 0.67–1.17)
DEPRECATED HCO3 PLAS-SCNC: 28 MMOL/L (ref 22–29)
EOSINOPHIL # BLD AUTO: 0.2 10E3/UL (ref 0–0.7)
EOSINOPHIL NFR BLD AUTO: 4 %
ERYTHROCYTE [DISTWIDTH] IN BLOOD BY AUTOMATED COUNT: 14.9 % (ref 10–15)
GFR SERPL CREATININE-BSD FRML MDRD: 89 ML/MIN/1.73M2
GLUCOSE SERPL-MCNC: 104 MG/DL (ref 70–99)
HBA1C MFR BLD: 6.2 % (ref 0–5.6)
HCT VFR BLD AUTO: 30.9 % (ref 40–53)
HGB BLD-MCNC: 9.5 G/DL (ref 13.3–17.7)
IMM GRANULOCYTES # BLD: 0 10E3/UL
IMM GRANULOCYTES NFR BLD: 0 %
LYMPHOCYTES # BLD AUTO: 0.9 10E3/UL (ref 0.8–5.3)
LYMPHOCYTES NFR BLD AUTO: 18 %
MCH RBC QN AUTO: 23.6 PG (ref 26.5–33)
MCHC RBC AUTO-ENTMCNC: 30.7 G/DL (ref 31.5–36.5)
MCV RBC AUTO: 77 FL (ref 78–100)
MONOCYTES # BLD AUTO: 0.3 10E3/UL (ref 0–1.3)
MONOCYTES NFR BLD AUTO: 7 %
NEUTROPHILS # BLD AUTO: 3.5 10E3/UL (ref 1.6–8.3)
NEUTROPHILS NFR BLD AUTO: 70 %
PLATELET # BLD AUTO: 254 10E3/UL (ref 150–450)
POTASSIUM SERPL-SCNC: 3.9 MMOL/L (ref 3.4–5.3)
RBC # BLD AUTO: 4.03 10E6/UL (ref 4.4–5.9)
SODIUM SERPL-SCNC: 137 MMOL/L (ref 136–145)
WBC # BLD AUTO: 5 10E3/UL (ref 4–11)

## 2022-07-14 PROCEDURE — 83036 HEMOGLOBIN GLYCOSYLATED A1C: CPT | Performed by: STUDENT IN AN ORGANIZED HEALTH CARE EDUCATION/TRAINING PROGRAM

## 2022-07-14 PROCEDURE — 99214 OFFICE O/P EST MOD 30 MIN: CPT | Mod: 25 | Performed by: STUDENT IN AN ORGANIZED HEALTH CARE EDUCATION/TRAINING PROGRAM

## 2022-07-14 PROCEDURE — 0054A COVID-19,PF,PFIZER (12+ YRS): CPT | Performed by: STUDENT IN AN ORGANIZED HEALTH CARE EDUCATION/TRAINING PROGRAM

## 2022-07-14 PROCEDURE — 91305 COVID-19,PF,PFIZER (12+ YRS): CPT | Performed by: STUDENT IN AN ORGANIZED HEALTH CARE EDUCATION/TRAINING PROGRAM

## 2022-07-14 PROCEDURE — 85025 COMPLETE CBC W/AUTO DIFF WBC: CPT | Performed by: STUDENT IN AN ORGANIZED HEALTH CARE EDUCATION/TRAINING PROGRAM

## 2022-07-14 PROCEDURE — 36415 COLL VENOUS BLD VENIPUNCTURE: CPT | Performed by: STUDENT IN AN ORGANIZED HEALTH CARE EDUCATION/TRAINING PROGRAM

## 2022-07-14 PROCEDURE — 80048 BASIC METABOLIC PNL TOTAL CA: CPT | Performed by: STUDENT IN AN ORGANIZED HEALTH CARE EDUCATION/TRAINING PROGRAM

## 2022-07-14 RX ORDER — HYDROCHLOROTHIAZIDE 25 MG/1
25 TABLET ORAL DAILY
Qty: 30 TABLET | Refills: 0 | Status: SHIPPED | OUTPATIENT
Start: 2022-07-14 | End: 2022-08-09

## 2022-07-14 RX ORDER — HYDROXYZINE HYDROCHLORIDE 25 MG/1
12.5-25 TABLET, FILM COATED ORAL 3 TIMES DAILY PRN
Qty: 60 TABLET | Refills: 1 | Status: SHIPPED | OUTPATIENT
Start: 2022-07-14 | End: 2023-05-08

## 2022-07-14 ASSESSMENT — ANXIETY QUESTIONNAIRES
1. FEELING NERVOUS, ANXIOUS, OR ON EDGE: MORE THAN HALF THE DAYS
3. WORRYING TOO MUCH ABOUT DIFFERENT THINGS: NOT AT ALL
4. TROUBLE RELAXING: SEVERAL DAYS
7. FEELING AFRAID AS IF SOMETHING AWFUL MIGHT HAPPEN: NOT AT ALL
GAD7 TOTAL SCORE: 4
5. BEING SO RESTLESS THAT IT IS HARD TO SIT STILL: SEVERAL DAYS
2. NOT BEING ABLE TO STOP OR CONTROL WORRYING: NOT AT ALL
GAD7 TOTAL SCORE: 4
6. BECOMING EASILY ANNOYED OR IRRITABLE: NOT AT ALL

## 2022-07-14 NOTE — PROGRESS NOTES
Assessment & Plan     Benign essential hypertension  Continues to be above goal. Will increase hydrochlorothiazide to 25 mg daily. Recheck BMP and CBC today. Also due for A1c.   - Basic metabolic panel  - Hemoglobin A1c  - CBC with Platelets & Differential  - hydrochlorothiazide (HYDRODIURIL) 25 MG tablet  Dispense: 30 tablet; Refill: 0  - Basic metabolic panel  - Hemoglobin A1c  - CBC with Platelets & Differential    Type 2 diabetes mellitus without complication, without long-term current use of insulin (H)  A1c pending today, on metformin, tolerating well.  - Hemoglobin A1c  - Hemoglobin A1c    Lymphedema of both lower extremities  Will repeat BMP today, if Cr is stable/improving, may need to consider resumption of lasix. Continue home PT/OT, home care and vascular clinic follow up. Would benefit greatly from hospital grade bed as patient has signficant mobility issues due to lymphedema as well as chronic pain related to this.  - Basic metabolic panel  - Hemoglobin A1c  - CBC with Platelets & Differential  - hydrochlorothiazide (HYDRODIURIL) 25 MG tablet  Dispense: 30 tablet; Refill: 0  - Basic metabolic panel  - Hemoglobin A1c  - CBC with Platelets & Differential    Lymphadenopathy  Plan to follow up at next visit. Would recommend biopsy, patient would like to wait. Understands risks of waiting, possible delay of diagnosis. Plan to evaluate with CBC today, again, suspect it is reactive due to ongoing inflammation and recent infection. Could also consider repeat CT in 1 month.    Anxiety  KENDRA-7 shows good control with PRN atarax, refills sent.  - hydrOXYzine (ATARAX) 25 MG tablet  Dispense: 60 tablet; Refill: 1    Chronic pain syndrome  Patient has chronic pain, on methadone. Pain is largely and severely impacted by his lymphedema, which is why he currently requires a hospital grade bed for pain relief during sleep and mobility.         Return in about 2 weeks (around 7/28/2022).     Precepted with   Mel Sal MD  [facility]    Frank Jamison is a 66 year old presenting for the following health issues:  RECHECK (Follow up on new meds, and wants CT results.) and Refill Request (Refill on 2 medications Hydrochlorothiazide and Hydroxyzine)      HPI     Hypertension  Per last visit with Dr. Saucedo  (I10) Benign essential hypertension  (primary encounter diagnosis)  -Will avoid a calcium channel blocker  -Plan to trial hydrochlorothiazide     Today,  BP at home: 150-160s/60-80s  BP elevated to 161/69 today.  Tolerating hydrochlorothiazide well.    Lymphedema  Per last visit with Dr. Saucedo  (I89.0) Lymphedema of both lower extremities  -Stable at this time  -Has put on 7 lbs since his last visit but patient denies any acute changes he notices to his legs  -Is getting some home care to help with this and will hopefull start home PT      Today,  Declines weight check, states home weights have been stable at 428 lbs.  Continues with home PT/home care for lymphedema care  Following closely with vascular clinic  May need to consider resuming lasix, pending BMP today.      Lymphadenopathy on CT  Repeat CT on 7/7 shows stable inguinal lymphadenopathy.   Continue to question reactive lymph nodes in the setting of chronic inflammation due to lymphedema  Discussed options of biopsy vs following.  Patient would like to continue to discuss, wait on biopsy.  Discussed risks of waiting--delaying possible diagnosis that would delay treatment/options, patient expresses understanding and would like to wait for lab work.    Chronic Pain  Continues to require hospital bed for pain and mobility, continue to coordinate.    Anxiety  KENDRA-7: 4  Atarax 3-4 times per week PRN  Feels well controlled at this time.      Review of Systems   Constitutional, HEENT, cardiovascular, pulmonary, gi and gu systems are negative, except as otherwise noted.      Objective    BP (!) 161/69   Pulse 89    Resp 18   SpO2 98%   There is no height or weight on file to calculate BMI.  Physical Exam   GENERAL: healthy, alert and no distress  RESP: normal work of breathing, no audible wheezes, speaking in full sentences without difficulty.  MS: severe b/l lymphedema with lymphedema wraps in place, difficulty ambulating, uses cane for assistive device.  PSYCH: mentation appears normal, affect normal/bright    Labs pending.       ----- Service Performed and Documented by Resident or Fellow ------            .  ..

## 2022-07-14 NOTE — LETTER
July 15, 2022      Shaheen Sepulveda  [address] [facility] [address]        Dear ,    We are writing to inform you of your test results.    Your labs from our recent visit look good. Your A1c is at goal for your diabetes, your kidney function is back to normal, and your blood counts are stable. Please contact the clinic with any questions, otherwise we can discuss this at our follow up appointment.       Resulted Orders   Basic metabolic panel   Result Value Ref Range    Creatinine 0.93 0.67 - 1.17 mg/dL    Sodium 137 136 - 145 mmol/L    Potassium 3.9 3.4 - 5.3 mmol/L    Urea Nitrogen 13.7 8.0 - 23.0 mg/dL    Chloride 96 (L) 98 - 107 mmol/L    Carbon Dioxide (CO2) 28 22 - 29 mmol/L    Anion Gap 13 7 - 15 mmol/L    Glucose 104 (H) 70 - 99 mg/dL    GFR Estimate 89 >60 mL/min/1.73m2      Comment:      Effective December 21, 2021 eGFRcr in adults is calculated using the 2021 CKD-EPI creatinine equation which includes age and gender (Lucie et al., [place], DOI: 10.1056/IOQCxd0433318)    Calcium 9.3 8.8 - 10.2 mg/dL   Hemoglobin A1c   Result Value Ref Range    Hemoglobin A1C 6.2 (H) 0.0 - 5.6 %      Comment:      Normal <5.7%   Prediabetes 5.7-6.4%    Diabetes 6.5% or higher     Note: Adopted from ADA consensus guidelines.   CBC with platelets and differential   Result Value Ref Range    WBC Count 5.0 4.0 - 11.0 10e3/uL    RBC Count 4.03 (L) 4.40 - 5.90 10e6/uL    Hemoglobin 9.5 (L) 13.3 - 17.7 g/dL    Hematocrit 30.9 (L) 40.0 - 53.0 %    MCV 77 (L) 78 - 100 fL    MCH 23.6 (L) 26.5 - 33.0 pg    MCHC 30.7 (L) 31.5 - 36.5 g/dL    RDW 14.9 10.0 - 15.0 %    Platelet Count 254 150 - 450 10e3/uL    % Neutrophils 70 %    % Lymphocytes 18 %    % Monocytes 7 %    % Eosinophils 4 %    % Basophils 1 %    % Immature Granulocytes 0 %    Absolute Neutrophils 3.5 1.6 - 8.3 10e3/uL    Absolute Lymphocytes 0.9 0.8 - 5.3 10e3/uL    Absolute Monocytes 0.3 0.0 - 1.3 10e3/uL    Absolute Eosinophils 0.2 0.0 - 0.7 10e3/uL    Absolute  Basophils 0.0 0.0 - 0.2 10e3/uL    Absolute Immature Granulocytes 0.0 <=0.4 10e3/uL       If you have any questions or concerns, please call the clinic at the number listed above.       Sincerely,      Ginny Leal MD

## 2022-07-19 ENCOUNTER — OFFICE VISIT (OUTPATIENT)
Dept: VASCULAR SURGERY | Facility: CLINIC | Age: 67
End: 2022-07-19
Attending: REGISTERED NURSE
Payer: COMMERCIAL

## 2022-07-19 VITALS
DIASTOLIC BLOOD PRESSURE: 82 MMHG | SYSTOLIC BLOOD PRESSURE: 160 MMHG | HEIGHT: 75 IN | RESPIRATION RATE: 22 BRPM | TEMPERATURE: 98.3 F | WEIGHT: 315 LBS | BODY MASS INDEX: 39.17 KG/M2 | HEART RATE: 100 BPM

## 2022-07-19 DIAGNOSIS — R60.0 VENOUS STASIS ULCER OF LEFT LOWER LEG WITH EDEMA OF LEFT LOWER LEG (H): ICD-10-CM

## 2022-07-19 DIAGNOSIS — L84 PRE-ULCERATIVE CORN OR CALLOUS: ICD-10-CM

## 2022-07-19 DIAGNOSIS — I83.029 VENOUS STASIS ULCER OF LEFT LOWER LEG WITH EDEMA OF LEFT LOWER LEG (H): ICD-10-CM

## 2022-07-19 DIAGNOSIS — I83.891 VENOUS STASIS ULCER OF RIGHT LOWER LEG WITH EDEMA OF RIGHT LOWER LEG (H): ICD-10-CM

## 2022-07-19 DIAGNOSIS — L97.919 VENOUS STASIS ULCER OF RIGHT LOWER LEG WITH EDEMA OF RIGHT LOWER LEG (H): ICD-10-CM

## 2022-07-19 DIAGNOSIS — I87.333 VENOUS HYPERTENSION, CHRONIC, WITH ULCER AND INFLAMMATION, BILATERAL (H): ICD-10-CM

## 2022-07-19 DIAGNOSIS — I83.019 VENOUS STASIS ULCER OF RIGHT LOWER LEG WITH EDEMA OF RIGHT LOWER LEG (H): ICD-10-CM

## 2022-07-19 DIAGNOSIS — I83.892 VENOUS STASIS ULCER OF LEFT LOWER LEG WITH EDEMA OF LEFT LOWER LEG (H): ICD-10-CM

## 2022-07-19 DIAGNOSIS — I89.0 LYMPHEDEMA OF BOTH LOWER EXTREMITIES: ICD-10-CM

## 2022-07-19 DIAGNOSIS — L97.929 VENOUS STASIS ULCER OF LEFT LOWER LEG WITH EDEMA OF LEFT LOWER LEG (H): ICD-10-CM

## 2022-07-19 DIAGNOSIS — D36.9 PAPILLOMATOSIS: Primary | ICD-10-CM

## 2022-07-19 DIAGNOSIS — I87.2 VENOUS (PERIPHERAL) INSUFFICIENCY: ICD-10-CM

## 2022-07-19 DIAGNOSIS — R60.0 VENOUS STASIS ULCER OF RIGHT LOWER LEG WITH EDEMA OF RIGHT LOWER LEG (H): ICD-10-CM

## 2022-07-19 PROCEDURE — 97598 DBRDMT OPN WND ADDL 20CM/<: CPT | Performed by: REGISTERED NURSE

## 2022-07-19 PROCEDURE — 97597 DBRDMT OPN WND 1ST 20 CM/<: CPT | Performed by: REGISTERED NURSE

## 2022-07-19 ASSESSMENT — PAIN SCALES - GENERAL: PAINLEVEL: MODERATE PAIN (4)

## 2022-07-19 NOTE — PROGRESS NOTES
"            Follow up Vascular Visit       Date of Service:07/19/22      Chief Complaint: BLE swelling and weeping ulcerations      Pt returns to [facility] with regards to their BLE swelling and weeping ulcerations.  They arrive today alone. They are currently using silvercel, optilock, abd, rolled gauze to the wounds. This is being done by home health. They are using tubular compression, foam wrap, and short stretch for compression. They are feeling well today. Denies fevers, chills. No shortness of breath. PCP talked to pt about biopsying the external ilac lymphadenopathy that was found during hospital stay, pt reluctant.  Last hgb a1c 6.2 on 7/14/22.  Pt has been started on hydrochlorothiazide in place of Lasix and to help with blood pressure control.    Allergies:   Allergies   Allergen Reactions     Lisinopril Swelling     Patient reports \"neck swelling\"  Swelling in throat       Medications:   Current Outpatient Medications:      ammonium lactate (LAC-HYDRIN) 12 % external lotion, Apply topically daily as needed With dressing changes, Disp: , Rfl:      atorvastatin (LIPITOR) 20 MG tablet, Take 1 tablet (20 mg) by mouth daily, Disp: 90 tablet, Rfl: 4     hydrochlorothiazide (HYDRODIURIL) 25 MG tablet, Take 1 tablet (25 mg) by mouth daily, Disp: 30 tablet, Rfl: 0     hydrOXYzine (ATARAX) 25 MG tablet, Take 0.5-1 tablets (12.5-25 mg) by mouth 3 times daily as needed for anxiety, Disp: 60 tablet, Rfl: 1     losartan (COZAAR) 100 MG tablet, Take 1 tablet (100 mg) by mouth daily, Disp: 90 tablet, Rfl: 3     melatonin 3 MG tablet, Take 1 tablet (3 mg) by mouth nightly as needed for sleep, Disp: 30 tablet, Rfl: 1     metFORMIN (GLUCOPHAGE-XR) 750 MG 24 hr tablet, Take 1 tablet (750 mg) by mouth 2 times daily (with meals), Disp: 180 tablet, Rfl: 1     methadone (DOLOPHINE-INTENSOL) 10 MG/ML (HIGH CONC) solution, Take 100 mg by mouth daily, Disp: , Rfl:      sodium hypochlorite (QUARTER-STRENGTH DAKINS) " "external solution, Apply topically every 72 hours Use 300mL every 72 hours to wash the bilateral legs and wounds on the legs (Patient taking differently: Apply topically every 72 hours as needed Use 300mL every 72 hours to wash the bilateral legs and wounds on the legs), Disp: 1000 mL, Rfl: 3    Current Facility-Administered Medications:      lidocaine (XYLOCAINE) 2 % external gel, , Topical, Daily PRN, Lucia Reyes MD, Given at 10/19/21 0828    History:   Past Medical History:   Diagnosis Date     Diabetes (H)      H/O angioedema 6/15/2020    Formatting of this note might be different from the original. Unexplained angioedema twice, discontinue lisinopril for possible connection to it, though he had used it afterwards with no symptoms     History of tobacco use disorder 8/1/2013    Formatting of this note might be different from the original. Added per documetation     Hypertension      Lymphedema of both lower extremities 12/22/2014     Methadone use 5/8/2012     Obstructive sleep apnea 2/2/2015    Formatting of this note might be different from the original. Epic     Pleural mass 7/2/2013     Uncomplicated asthma        Physical Exam:    BP (!) 160/82   Pulse 100   Temp 98.3  F (36.8  C)   Resp 22   Ht 6' 3\" (1.905 m)   Wt (!) 411 lb 12.8 oz (186.8 kg)   BMI 51.47 kg/m      General:  Patient presents to clinic in no apparent distress.  Head: normocephalic atraumatic  Psychiatric:  Alert and oriented x3.   Respiratory: unlabored breathing; no cough  Integumentary:  Skin is uniformly warm, dry and pink.    Extremities: swelling is stable; continues to have extensive scaling and crusting on the legs; continues to have several scattered full and partial thickness ulcers about the legs; see measures below; +odor; noted to have several calluses on the hammer toe deformed toes; these were removed and intact underneath; +papillomatosis; +verrucous lesions + fibrosity and scarring of the tissues    Wound #1 " Location: R Medial calf  Size: 4L x 4.5W x 0.1depth.  No sinus tract present, Wound base: papillomatosis  No undermining present. Wound is partial thickness. There is moderate drainage. Periwound: no denudement, erythema, induration, + maceration or warmth.    Wound #2 Location: L medial (posterior)  Size: 0L x 0W x 0depth.  No sinus tract present, Wound base: papillomatosis  No undermining present. Wound is partial thickness. There is moderate drainage. Periwound: no denudement, erythema, induration, maceration or warmth.  Wound #3 Location: L Lateral Leg  Size: 8L x 8W x 0.1depth.  No sinus tract present, Wound base: papillomatosis  No undermining present. Wound is partial thickness. There is moderate drainage. Periwound: no denudement, erythema, induration, maceration or warmth.  Wound #4 Location: R lateral leg  Size: 15L x 15W x 0.1depth.  No sinus tract present, Wound base: papillomatosis  No undermining present. Wound is partial thickness. There is heavy drainage. Periwound: no denudement, erythema, induration, maceration or warmth.  Wound #5 Location: R lower lateral leg  Size: 4L x 4W x 0.1depth.  No sinus tract present, Wound base: papillomatosis   No undermining present. Wound is partial thickness. There is heavy drainage. Periwound: no denudement, erythema, induration,  + maceration or warmth.    Wound Leg Ulceration (Active)   Number of days: 43       VASC Wound Right lower leg lateral (Active)   Pre Size Length 4 06/21/22 0700   Pre Size Width 4 06/21/22 0700   Pre Size Depth 0.1 06/21/22 0700   Pre Total Sq cm 16 06/21/22 0700   Product Used Alginate 10/12/21 0800   Number of days: 300       VASC Wound rt lateral weeping (Active)   Pre Size Length 15 07/19/22 0700   Pre Size Width 15 07/19/22 0700   Pre Size Depth 0.1 07/19/22 0700   Pre Total Sq cm 225 07/19/22 0700   Description weeping areas 07/19/22 0700   Number of days: 273       VASC Wound Lt lateral leg (Active)   Pre Size Length 8 07/19/22  0700   Pre Size Width 8 07/19/22 0700   Pre Size Depth 0.1 07/19/22 0700   Pre Total Sq cm 64 07/19/22 0700   Tunneling weeping areas 07/19/22 0700   Description weeping 11/23/21 0700   Number of days: 238       VASC Wound Lt medial ( posterior) (Active)   Pre Size Length 0 07/19/22 0700   Pre Size Width 0 07/19/22 0700   Pre Size Depth 0 07/19/22 0700   Pre Total Sq cm 0 07/19/22 0700   Description dry 07/19/22 0700   Number of days: 155       VASC Wound Rt medial calf (Active)   Pre Size Length 4 07/19/22 0700   Pre Size Width 4.5 07/19/22 0700   Pre Size Depth 0.1 07/19/22 0700   Pre Total Sq cm 18 07/19/22 0700   Description maceration 07/19/22 0700   Number of days: 99            Circumferential volume measures:      Circumferential Measures 11/23/2021 1/17/2022 2/14/2022 3/14/2022 7/19/2022   Right just above MTP 28.6 24.4 29.2 29.5 28   Right Ankle 36.4 39.2 40 54 35   Right Widest Calf 58 55.2 60.8 60.5 54.5   Right Thigh Up 10cm - - - - -   Left - just above MTP 28.1 28.2 28.7 29 28.5   Left Ankle 34.3 35.5 37 48.3 35   Left Widest Calf 54 52.3 57.6 62 51   Left Thigh Up 10cm - - - - -   Left Knee to Ankle - - - - -       Labs:    I personally reviewed the following lab results today and those on care everywhere    CRP   Date Value Ref Range Status   04/23/2021 8.5 (H) 0.0 - 0.8 mg/dL Final      No results found for: SED   Last Renal Panel:  Sodium   Date Value Ref Range Status   07/14/2022 137 136 - 145 mmol/L Final   06/10/2021 142.0 133.0 - 144.0 mmol/L Final     Potassium   Date Value Ref Range Status   07/14/2022 3.9 3.4 - 5.3 mmol/L Final   06/08/2022 4.1 3.5 - 5.0 mmol/L Final   06/10/2021 4.4 3.4 - 5.3 mmol/L Final     Chloride   Date Value Ref Range Status   07/14/2022 96 (L) 98 - 107 mmol/L Final   06/08/2022 102 98 - 107 mmol/L Final   06/10/2021 100.0 94.0 - 109.0 mmol/L Final     Carbon Dioxide   Date Value Ref Range Status   06/10/2021 33.0 (H) 20.0 - 32.0 mmol/L Final     Carbon Dioxide (CO2)    Date Value Ref Range Status   07/14/2022 28 22 - 29 mmol/L Final   06/08/2022 29 22 - 31 mmol/L Final     Anion Gap   Date Value Ref Range Status   07/14/2022 13 7 - 15 mmol/L Final   06/08/2022 7 5 - 18 mmol/L Final     Glucose   Date Value Ref Range Status   07/14/2022 104 (H) 70 - 99 mg/dL Final   06/08/2022 82 70 - 125 mg/dL Final   06/10/2021 112.0 (H) 60.0 - 109.0 mg/dL Final     GLUCOSE BY METER POCT   Date Value Ref Range Status   06/08/2022 119 (H) 70 - 99 mg/dL Final     Urea Nitrogen   Date Value Ref Range Status   07/14/2022 13.7 8.0 - 23.0 mg/dL Final   06/08/2022 15 8 - 22 mg/dL Final   06/10/2021 16.0 7.0 - 30.0 mg/dL Final     Creatinine   Date Value Ref Range Status   07/14/2022 0.93 0.67 - 1.17 mg/dL Final   06/10/2021 1.0 0.8 - 1.5 mg/dL Final     GFR Estimate   Date Value Ref Range Status   07/14/2022 89 >60 mL/min/1.73m2 Final     Comment:     Effective December 21, 2021 eGFRcr in adults is calculated using the 2021 CKD-EPI creatinine equation which includes age and gender (Lucie et al., NEJ, DOI: 10.1056/IDHWlz9767121)   04/24/2021 >60 >60 mL/min/1.73m2 Final     Calcium   Date Value Ref Range Status   07/14/2022 9.3 8.8 - 10.2 mg/dL Final   06/10/2021 9.5 8.5 - 10.4 mg/dL Final     Albumin   Date Value Ref Range Status   04/24/2021 2.7 (L) 3.5 - 5.0 g/dL Final      Lab Results   Component Value Date    WBC 5.0 07/14/2022     Lab Results   Component Value Date    RBC 4.03 07/14/2022     Lab Results   Component Value Date    HGB 9.5 07/14/2022     Lab Results   Component Value Date    HCT 30.9 07/14/2022     No components found for: MCT  Lab Results   Component Value Date    MCV 77 07/14/2022     Lab Results   Component Value Date    MCH 23.6 07/14/2022     Lab Results   Component Value Date    MCHC 30.7 07/14/2022     Lab Results   Component Value Date    RDW 14.9 07/14/2022     Lab Results   Component Value Date     07/14/2022      Lab Results   Component Value Date    A1C 6.2  07/14/2022    A1C 6.7 04/21/2022    A1C 6.2 04/24/2021    A1C 6.2 04/24/2021      TSH   Date Value Ref Range Status   04/21/2022 2.58 0.30 - 5.00 uIU/mL Final      No results found for: VITDT                Impression:  Encounter Diagnoses   Name Primary?     Papillomatosis Yes     Venous hypertension, chronic, with ulcer and inflammation, bilateral (H)      Lymphedema of both lower extremities      Venous (peripheral) insufficiency      Venous stasis ulcer of left lower leg with edema of left lower leg (H)      Venous stasis ulcer of right lower leg with edema of right lower leg (H)      Pre-ulcerative corn or callous           7/19/22 R Lateral leg       7/19/22 R medial leg      7/19/22 L lateral leg      7/19/22 L posterior leg          Are any of these wounds new today: No; Location: na    Assessment/Plan:          1. Debridement: After discussion of risk factors and verbal consent was obtained 2% Lidocaine HCL jelly was applied, under clean conditions, the BLE ulceration(s) were debrided using currette. Devitalized and nonviable tissue, along with any fibrin and slough, was removed to improve granulation tissue formation, stimulate wound healing, decrease overall bacteria load, disrupt biofilm formation and decrease edge senescence.  Total excisional debridement was 323 sq cm from the epidermis/dermis area with a depth of 0.1 cm.   Ulcers were improved afterwards and .  Measures were unchanged after debridement.       2.  Wound treatment: wound treatment will include irrigation and dressings to promote autolytic debridement which will include: Cleanse wounds with dakin's solution. Apply viscopaste to weeping areas in strips, cover with optilock, gauze roll to be changed every other day. If for some reason the patient is not able to get their dressing(s) changed as outlined above (due to illness, lack of supplies, lack of help) please do the following: remove old, soiled dressings; wash the wounds with  "saline; pat dry; apply ABD pad or other absorbant pad and secure with rolled gauze; avoid tape directly on your skin; patient instructed to call the clinic as soon as possible to let us know what the current issues are in receiving wound care. Stable.            3. Edema: Edema stable. Continue tubular compression and short stretch, may add foam for drainage management. . The compression wraps were applied today in clinic. Encouraged pt to use lymphedema pumps daily. Pt reports once he get his hospital bed he will get his family to assist him with the lymphedema pumps.    If a 2 layer or 4 layer compression wrap is being used; these are safe to have on for ONLY 7 days. If for some reason the patient is not able to get the wrap(s) changed (due to illness; lack of supplies, lack of help, lack of transportation) please do the following: unwrap the old 2 or 4 layer compression wrap; avoid using scissors as you could cut your skin and cause wounds; use tubular compression when available. Call to reschedule your home care or clinic visit appointment as soon as possible.  Stable            4. Nutrition: Diabetic Diet           5. Offloading: Pt has not made appointment to be fitted for diabetic shoes. Instructed pt to call to get fitted. This will help offload pressure to the toe pads of the hammertoes.           6.  Callous: a total of 5 calluses was pared using a #15 blade scalpel; this was done to evaluate for underlying ulceration; reduce pressure; patient comfort; and to reduce risk of future ulceration formation. The skin was intact underneath and patient tolerated well; no complications.           7.  HTN:  Blood pressure elevated today 160/82. PCP added hydrochlorothiazide to pt's regimen. Encouraged pt to check blood pressure at home and to keep a record of his readings to take to his PCP. Possible \"white coat syndrome\"     Patient will follow up with me in 4 weeks for reevaluation. They were instructed to call " the clinic sooner with any signs or symptoms of infection or any further questions/concerns. Answered all questions.      60 minutes spent on the date of the encounter doing chart review, history and exam, documentation and further activities per the note    Verona Stallworth MS, APRN, AGNP-C, [facility]   [phone]        This note was electronically signed by HAYDEE Whatley CNP

## 2022-07-19 NOTE — PATIENT INSTRUCTIONS
"Wound care supplies should be ordered by your home care agency. Please contact clinic if home care agency is not able to order supplies.     Lymphedema pump daily.   Record blood pressure readings at home.   Consider lymph node biopsy.   Call to make appointment to get diabetic farheen [phone]    Wound Care Instructions    EVERY OTHER DAY and as needed, Cleanse your leg wound(s) with Dakin's Solution 0.125%.    Pat Dry with non-sterile gauze    Apply Lotion to the intact skin surrounding your wound and other dry skin locations. Some good lotions include: Remedy Skin Repair Cream, Sarna, Vanicream or Cetaphil    Apply Amlactin to hard, fibrotic areas on feet    Primary Dressing: Apply viscopaste strips into/onto the wounds    Secondary dressing: Cover with optilock    Secure with non-sterile roll gauze (4\" x 75\" roll) and tape (1\" roll tape) as needed; avoid adhesive directly on the skin    Compression: tubular compression, short stretch, may add foam if needed for drainage management    It is not ok to get your wound wet in the bath or shower    It is recommended that you do not get your ulcer wet when showering.  Listed below are several ways of keeping it dry when you shower.     1. Wrap it with Press and Seal plastic wrap.  It can be found in the stores where the plastic wraps or tin foil is kept.               2.  Some people take a bath and hang their leg/foot out of the tub.                        3  Put your leg in a plastic bag and tape it on.           4. You can purchase a shower cover for casts at some pharmacies and through the Internet.            5. Take a Bed Bath or wash up at the sink            If for some reason you are not able to get your dressing(s) changed as outlined above (due to illness, lack of supplies, lack of help) please do the following: remove old, soiled dressings; wash the wounds with saline; pat dry; apply ABD pad or other absorbant pad and secure with rolled gauze; avoid tape " directly on your skin; Call the clinic as soon as possible to let us know what the current issues are in receiving wound care [phone].      SEEK MEDICAL CARE IF:  You have an increase in swelling, pain, or redness around the wound.  You have an increase in the amount of pus coming from the wound.  There is a bad smell coming from the wound.  The wound appears to be worsening/enlarging  You have a fever greater than 101.5 F      It is ok to continue current wound care treatment/products for the next 2-3 days until new wound care supplies are ordered and arrive. If longer than this please contact our office at [phone].    If you have a 2 layer or 4 layer compression wrap on these are safe to have on for ONLY 7 days. If for some reason you are not able to get the wrap(s) changed (due to illness; lack of supplies, lack of help, lack of transportation) please do the following: unwrap the old 2 or 4 layer compression wrap; avoid using scissors as you could cut your skin and cause wounds; use tubular compression when available. Call to reschedule your home care or clinic visit appointment as soon as possible.    Please NOTE: if you are 15 minutes late to your clinic appointment you will have to be rescheduled. Please call our clinic as soon as possible if you know you will not be able to get to your appointment at [phone].    If you fail to show up to 3 scheduled clinic appointments you will be dismissed from our clinic.              We want to hear from you!  In the next few weeks, you should receive a call or email to complete a survey about your visit at [facility]. Please help us improve your appointment experience by letting us know how we did today. We strive to make your experience good and value any ways in which we could do better.      We value your input and suggestions.    Thank you for choosing the [facility]!

## 2022-07-22 ENCOUNTER — MEDICAL CORRESPONDENCE (OUTPATIENT)
Dept: HEALTH INFORMATION MANAGEMENT | Facility: CLINIC | Age: 67
End: 2022-07-22

## 2022-07-27 ENCOUNTER — MEDICAL CORRESPONDENCE (OUTPATIENT)
Dept: HEALTH INFORMATION MANAGEMENT | Facility: CLINIC | Age: 67
End: 2022-07-27

## 2022-07-28 ENCOUNTER — TELEPHONE (OUTPATIENT)
Dept: FAMILY MEDICINE | Facility: CLINIC | Age: 67
End: 2022-07-28

## 2022-07-28 NOTE — TELEPHONE ENCOUNTER
[facility] phone call message - order or referral request for patient:     Order or referral being requested:     Skill nursing 1X for 3 Weeks with 1 PRN      Additional Comments:     Caller placed in verbal order. Please call back and advised if needed. Thanks.     OK to leave a message on voice mail? Yes    Primary language: English      needed? No    Call taken on July 28, 2022 at 8:16 AM by Audrey Doan

## 2022-07-28 NOTE — TELEPHONE ENCOUNTER
The Home Care/Assisted Living/Nursing Facility is calling regarding an established patient.  Has the patient seen Home Care in the past or is currently residing in Assisted Living or Nursing Facility? Yes.     Lesly calling from [facility] requesting the following orders that are within the Home Care, Assisted Living or Nursing Home Eval and Treatment standing order and can be signed as standing order signature required by RN.    Preferred Call Back Number:     Home Care Visits Continuation    Any additional Orders:  Are there any orders requested, not stated above, that are outside of the standing order and must be routed to a licensed practitioner for approval?    No    Writer has verified [employer] will send fax to have orders signed.  Mario QURESHI

## 2022-08-02 DIAGNOSIS — E11.9 TYPE 2 DIABETES MELLITUS WITHOUT COMPLICATION, WITHOUT LONG-TERM CURRENT USE OF INSULIN (H): ICD-10-CM

## 2022-08-04 RX ORDER — METFORMIN HYDROCHLORIDE 750 MG/1
750 TABLET, EXTENDED RELEASE ORAL 2 TIMES DAILY WITH MEALS
Qty: 180 TABLET | Refills: 1 | Status: SHIPPED | OUTPATIENT
Start: 2022-08-04 | End: 2022-10-20

## 2022-08-05 ENCOUNTER — DOCUMENTATION ONLY (OUTPATIENT)
Dept: FAMILY MEDICINE | Facility: CLINIC | Age: 67
End: 2022-08-05

## 2022-08-05 ENCOUNTER — MEDICAL CORRESPONDENCE (OUTPATIENT)
Dept: HEALTH INFORMATION MANAGEMENT | Facility: CLINIC | Age: 67
End: 2022-08-05

## 2022-08-05 DIAGNOSIS — L03.115 CELLULITIS OF RIGHT LOWER EXTREMITY: Primary | ICD-10-CM

## 2022-08-05 DIAGNOSIS — E11.9 TYPE 2 DIABETES MELLITUS WITHOUT COMPLICATION, WITHOUT LONG-TERM CURRENT USE OF INSULIN (H): ICD-10-CM

## 2022-08-05 DIAGNOSIS — I89.0 LYMPHEDEMA OF BOTH LOWER EXTREMITIES: ICD-10-CM

## 2022-08-05 DIAGNOSIS — F11.90 METHADONE USE: ICD-10-CM

## 2022-08-05 DIAGNOSIS — G89.4 CHRONIC PAIN SYNDROME: ICD-10-CM

## 2022-08-05 DIAGNOSIS — S81.801D OPEN WOUND OF RIGHT LOWER EXTREMITY, SUBSEQUENT ENCOUNTER: ICD-10-CM

## 2022-08-05 DIAGNOSIS — E66.01 OBESITY, MORBID, BMI 40.0-49.9 (H): ICD-10-CM

## 2022-08-05 NOTE — LETTER
[facility] [address] [place] [address]  Phone: [phone]  Fax: [phone]    August 5, 2022        Shaheen Sepulveda  [address] [address]          To whom it may concern:    RE: Shaheen Sepulveda    Patient requires Heavy Duty Hospital grade Bed and Group 1 Mattress.        Per Request for documentation:     Patient meets requirements for a Heavy Duty Hospital Grade Bed:  as he has chronic pain and severe decreased mobility due to severe lymphedema in b/l lower extremities.     Patient has difficulty positioning himself in bed due to chronic pain, which is treated with methadone, as well as decreased mobility due to severe lymphedema.     Due to his decreased mobility and lymphedema, he is at increased risk for pressure ulcers, and was recently admitted to the hospital 6/6/2022-6/8/2022 due to R lower extremity cellulitis.      He meets the weight requirements, most recent weight of 411 lbs on 7/19/2022.     Patient meets requirements for a Group I Support Surface: as he has limited mobility due to severe lymphedema, and is currently seen by wound care for lymphedema and resolving cellulitis.     He has altered sensory perception due to severe lymphedema, also noted to have T2DM.     His circulatory status is also compromised as reported by his lymphedema.                                        Please contact me for questions or concerns.      Sincerely,        Radha Sal MD

## 2022-08-05 NOTE — PROGRESS NOTES
8:51 AM    Patient requires Heavy Duty Hospital grade Bed and Group 1 Mattress.      Per Request for documentation:    Patient meets requirements for a Heavy Duty Hospital Grade Bed:  as he has chronic pain and severe decreased mobility due to severe lymphedema in b/l lower extremities.    Patient has difficulty positioning himself in bed due to chronic pain, which is treated with methadone, as well as decreased mobility due to severe lymphedema.    Due to his decreased mobility and lymphedema, he is at increased risk for pressure ulcers, and was recently admitted to the hospital 6/6/2022-6/8/2022 due to R lower extremity cellulitis.     He meets the weight requirements, most recent weight of 411 lbs on 7/19/2022.    Patient meets requirements for a Group I Support Surface: as he has limited mobility due to severe lymphedema, and is currently seen by wound care for lymphedema and resolving cellulitis.    He has altered sensory perception due to severe lymphedema, also noted to have T2DM.    His circulatory status is also compromised as reported by his lymphedema.    Radha Sal MD  Pager # [phone]  [facility]

## 2022-08-09 ENCOUNTER — OFFICE VISIT (OUTPATIENT)
Dept: FAMILY MEDICINE | Facility: CLINIC | Age: 67
End: 2022-08-09
Payer: COMMERCIAL

## 2022-08-09 VITALS
TEMPERATURE: 98 F | HEART RATE: 80 BPM | DIASTOLIC BLOOD PRESSURE: 70 MMHG | HEIGHT: 75 IN | BODY MASS INDEX: 39.17 KG/M2 | OXYGEN SATURATION: 100 % | SYSTOLIC BLOOD PRESSURE: 148 MMHG | RESPIRATION RATE: 20 BRPM | WEIGHT: 315 LBS

## 2022-08-09 DIAGNOSIS — I10 BENIGN ESSENTIAL HYPERTENSION: ICD-10-CM

## 2022-08-09 DIAGNOSIS — I89.0 LYMPHEDEMA OF BOTH LOWER EXTREMITIES: ICD-10-CM

## 2022-08-09 DIAGNOSIS — I99.9: ICD-10-CM

## 2022-08-09 DIAGNOSIS — R59.1 LYMPHADENOPATHY: Primary | ICD-10-CM

## 2022-08-09 DIAGNOSIS — G89.4 CHRONIC PAIN SYNDROME: ICD-10-CM

## 2022-08-09 LAB
ANION GAP SERPL CALCULATED.3IONS-SCNC: 16 MMOL/L (ref 7–15)
BUN SERPL-MCNC: 13.3 MG/DL (ref 8–23)
CALCIUM SERPL-MCNC: 9.2 MG/DL (ref 8.8–10.2)
CHLORIDE SERPL-SCNC: 98 MMOL/L (ref 98–107)
CREAT SERPL-MCNC: 0.97 MG/DL (ref 0.67–1.17)
DEPRECATED HCO3 PLAS-SCNC: 22 MMOL/L (ref 22–29)
GFR SERPL CREATININE-BSD FRML MDRD: 85 ML/MIN/1.73M2
GLUCOSE SERPL-MCNC: 105 MG/DL (ref 70–99)
POTASSIUM SERPL-SCNC: 4.1 MMOL/L (ref 3.4–5.3)
SODIUM SERPL-SCNC: 136 MMOL/L (ref 136–145)

## 2022-08-09 PROCEDURE — 99214 OFFICE O/P EST MOD 30 MIN: CPT | Performed by: STUDENT IN AN ORGANIZED HEALTH CARE EDUCATION/TRAINING PROGRAM

## 2022-08-09 PROCEDURE — 36415 COLL VENOUS BLD VENIPUNCTURE: CPT | Performed by: STUDENT IN AN ORGANIZED HEALTH CARE EDUCATION/TRAINING PROGRAM

## 2022-08-09 PROCEDURE — 80048 BASIC METABOLIC PNL TOTAL CA: CPT | Performed by: STUDENT IN AN ORGANIZED HEALTH CARE EDUCATION/TRAINING PROGRAM

## 2022-08-09 RX ORDER — CHLORTHALIDONE 25 MG/1
12.5 TABLET ORAL DAILY
Qty: 30 TABLET | Refills: 0 | Status: SHIPPED | OUTPATIENT
Start: 2022-08-09 | End: 2022-09-19

## 2022-08-09 NOTE — PROGRESS NOTES
Preceptor Attestation:   Patient seen, evaluated and discussed with the resident Dr. Mendoza and medical student Rob To. I have verified the content of the note, which accurately reflects my assessment of the patient and the plan of care.    Will transition to chlorthalidone from hydrochlorothiazide    Supervising Physician:Benjamin Rosenstein, MD, MA  [facility]

## 2022-08-09 NOTE — PROGRESS NOTES
"  Assessment & Plan     Benign essential hypertension  BP is not in goal range at home and is currently elevated at 155/72, 148/70 on repeat. Home Bps also about half above goal. Patient is taking max dose of losartan;plan is to discontinue hydrochlorothiazide and begin chlorthalidone due to its stronger effect on BP. May need to increase to 25 mg daily, would repeat BMP at next visit. Need to recheck kidney function before starting.  - chlorthalidone (HYGROTON) 25 MG tablet; Take 0.5 tablets (12.5 mg) by mouth daily  - Basic metabolic panel; Future  - Basic metabolic panel    Lymphadenopathy  Bilateral lymph edema still present. Patient is having difficulty ambulating and has to use a cane. Informed patient that insurance has been contacted multiple times for getting a hospital bed and we are still awaiting a reply. Discussed with Shaheen the option of lymph biopsy via referral to IR and he was in agreement.   - IR Referral    Lymphedema  Disease of circulatory system  Chronic Pain Syndrome  Patient is in the process of obtaining heavy duty hospital bed due to his difficulty in positioning and mobility in the setting of his severe lymphedema. His severe lymphedema causes him pain and decreased mobility that cannot be accommodated by an ordinary bed. He is followed by a methadone clinic for his chronic pain. He is 407 lbs, meeting the criteria for a heavy duty bed. For the Group I support surface he has evidence of compromised circulatory status in the setting of his severe lymphedema.       BMI:   Estimated body mass index is 50.87 kg/m  as calculated from the following:    Height as of this encounter: 1.905 m (6' 3\").    Weight as of this encounter: 184.6 kg (407 lb).   Weight management plan: Discussed with patient about lymphedema management.    This patient was seen and discussed with my attending physician.  Rob To, M3       ATTESTATION  I was present with the medical student who participated in the " "service and documentation of the note. I have verified the history and personally performed the physical/mental status exam and medical decision making. I agree with the assessment and plan documented in the note.     Precepted with Dr. Rosenstein.      Radha Sal MD  [facility]    Frank Jamison is a 67 year old, presenting for the following health issues:  Follow Up (HTN)      HPI     Benign Essential Hypertension:  - Previously started hydrochlorothiazide, increased to 25 mg last visit.  - Gave sheet with home blood pressures, still not in target range of <140/90.  - BP elevated today at 155/72 on Losartan 100mg and Hydrochlorothiazide 25mg.  - Does not report an side effects and denies headaches, dizziness, SoB, or chest pain    Lymphedema:  - Has lost 4 lbs in the past 2 weeks  - Is still getting home care to help with this.  - Has noticed slight improvement.  -Awaiting hospital bed, note and letter sent on 8/5    Lymphadenopathy, inguinal  Previously discussed biopsy recommendations, amenable today.    Review of Systems   Constitutional, HEENT, cardiovascular, pulmonary, GI, , musculoskeletal, neuro, skin, endocrine and psych systems are negative, except as otherwise noted.      Objective    BP (!) 148/70   Pulse 80   Temp 98  F (36.7  C)   Resp 20   Ht 1.905 m (6' 3\")   Wt (!) 184.6 kg (407 lb)   SpO2 100%   BMI 50.87 kg/m    Body mass index is 50.87 kg/m .  Physical Exam   GENERAL: sitting in chair with cane, alert and no distress  RESP: lungs clear to auscultation - no rales, rhonchi or wheezes  CV: regular rate and rhythm, normal S1 S2, no S3 or S4, no murmur, click or rub.  MS: bilateral peripheral edema with wraps in place.                .  ..  "

## 2022-08-09 NOTE — PATIENT INSTRUCTIONS
Think about biopsy vs repeating CT in September.    Stop the hydrochlorothiazide, start the chlorthalidone.

## 2022-08-09 NOTE — LETTER
August 10, 2022      Shaheen Sepulveda  [address] [address]        Dear ,    We are writing to inform you of your test results.    Your kidney function continues to look good! We will follow this as we adjust your blood pressure medications.        Resulted Orders   Basic metabolic panel   Result Value Ref Range    Creatinine 0.97 0.67 - 1.17 mg/dL    Sodium 136 136 - 145 mmol/L    Potassium 4.1 3.4 - 5.3 mmol/L    Urea Nitrogen 13.3 8.0 - 23.0 mg/dL    Chloride 98 98 - 107 mmol/L    Carbon Dioxide (CO2) 22 22 - 29 mmol/L    Anion Gap 16 (H) 7 - 15 mmol/L    Glucose 105 (H) 70 - 99 mg/dL    GFR Estimate 85 >60 mL/min/1.73m2      Comment:      Effective December 21, 2021 eGFRcr in adults is calculated using the 2021 CKD-EPI creatinine equation which includes age and gender (Lucie et al., NEJM, DOI: 10.1056/SHOPsh9832564)    Calcium 9.2 8.8 - 10.2 mg/dL       If you have any questions or concerns, please call the clinic at the number listed above.       Sincerely,      Benjamin Rosenstein, MD

## 2022-08-16 ENCOUNTER — OFFICE VISIT (OUTPATIENT)
Dept: VASCULAR SURGERY | Facility: CLINIC | Age: 67
End: 2022-08-16
Attending: REGISTERED NURSE
Payer: COMMERCIAL

## 2022-08-16 VITALS
SYSTOLIC BLOOD PRESSURE: 152 MMHG | RESPIRATION RATE: 26 BRPM | BODY MASS INDEX: 49.62 KG/M2 | HEART RATE: 92 BPM | WEIGHT: 315 LBS | DIASTOLIC BLOOD PRESSURE: 72 MMHG

## 2022-08-16 DIAGNOSIS — I83.019 VENOUS STASIS ULCER OF RIGHT LOWER LEG WITH EDEMA OF RIGHT LOWER LEG (H): ICD-10-CM

## 2022-08-16 DIAGNOSIS — I87.2 VENOUS (PERIPHERAL) INSUFFICIENCY: ICD-10-CM

## 2022-08-16 DIAGNOSIS — I83.892 VENOUS STASIS ULCER OF LEFT LOWER LEG WITH EDEMA OF LEFT LOWER LEG (H): ICD-10-CM

## 2022-08-16 DIAGNOSIS — L97.919 VENOUS STASIS ULCER OF RIGHT LOWER LEG WITH EDEMA OF RIGHT LOWER LEG (H): ICD-10-CM

## 2022-08-16 DIAGNOSIS — L97.929 VENOUS STASIS ULCER OF LEFT LOWER LEG WITH EDEMA OF LEFT LOWER LEG (H): ICD-10-CM

## 2022-08-16 DIAGNOSIS — I89.0 LYMPHEDEMA OF BOTH LOWER EXTREMITIES: Primary | ICD-10-CM

## 2022-08-16 DIAGNOSIS — I83.891 VENOUS STASIS ULCER OF RIGHT LOWER LEG WITH EDEMA OF RIGHT LOWER LEG (H): ICD-10-CM

## 2022-08-16 DIAGNOSIS — I83.029 VENOUS STASIS ULCER OF LEFT LOWER LEG WITH EDEMA OF LEFT LOWER LEG (H): ICD-10-CM

## 2022-08-16 DIAGNOSIS — D36.9 PAPILLOMATOSIS: ICD-10-CM

## 2022-08-16 DIAGNOSIS — R60.0 VENOUS STASIS ULCER OF RIGHT LOWER LEG WITH EDEMA OF RIGHT LOWER LEG (H): ICD-10-CM

## 2022-08-16 DIAGNOSIS — R60.0 VENOUS STASIS ULCER OF LEFT LOWER LEG WITH EDEMA OF LEFT LOWER LEG (H): ICD-10-CM

## 2022-08-16 DIAGNOSIS — I87.333 VENOUS HYPERTENSION, CHRONIC, WITH ULCER AND INFLAMMATION, BILATERAL (H): ICD-10-CM

## 2022-08-16 PROCEDURE — 97598 DBRDMT OPN WND ADDL 20CM/<: CPT | Performed by: REGISTERED NURSE

## 2022-08-16 PROCEDURE — 97597 DBRDMT OPN WND 1ST 20 CM/<: CPT | Performed by: REGISTERED NURSE

## 2022-08-16 ASSESSMENT — PAIN SCALES - GENERAL: PAINLEVEL: MODERATE PAIN (5)

## 2022-08-16 NOTE — PATIENT INSTRUCTIONS
"Wound care supplies should be ordered by your home care agency. Please contact clinic if home care agency is not able to order supplies.     Lymphedema pump daily.   Record blood pressure readings at home.   Call to make appointment to get fitted for diabetic shoes [phone]    Wound Care Instructions    EVERY OTHER DAY and as needed, Cleanse your leg wound(s) with Dakin's Solution 0.125%.    Pat Dry with non-sterile gauze    Apply Lotion to the intact skin surrounding your wound and other dry skin locations. Some good lotions include: Remedy Skin Repair Cream, Sarna, Vanicream or Cetaphil    Apply Amlactin to hard, fibrotic areas on feet    Primary Dressing: Apply viscopaste strips into/onto the wounds    Secondary dressing: Cover with optilock or abd pad according to drainage    Secure with non-sterile roll gauze (4\" x 75\" roll) and tape (1\" roll tape) as needed; avoid adhesive directly on the skin    Compression: tubular compression, short stretch, may add foam if needed for drainage management    It is not ok to get your wound wet in the bath or shower    It is recommended that you do not get your ulcer wet when showering.  Listed below are several ways of keeping it dry when you shower.     1. Wrap it with Press and Seal plastic wrap.  It can be found in the stores where the plastic wraps or tin foil is kept.               2.  Some people take a bath and hang their leg/foot out of the tub.                        3  Put your leg in a plastic bag and tape it on.           4. You can purchase a shower cover for casts at some pharmacies and through the Internet.            5. Take a Bed Bath or wash up at the sink            If for some reason you are not able to get your dressing(s) changed as outlined above (due to illness, lack of supplies, lack of help) please do the following: remove old, soiled dressings; wash the wounds with saline; pat dry; apply ABD pad or other absorbant pad and secure with rolled " gauze; avoid tape directly on your skin; Call the clinic as soon as possible to let us know what the current issues are in receiving wound care [phone].      SEEK MEDICAL CARE IF:  You have an increase in swelling, pain, or redness around the wound.  You have an increase in the amount of pus coming from the wound.  There is a bad smell coming from the wound.  The wound appears to be worsening/enlarging  You have a fever greater than 101.5 F      It is ok to continue current wound care treatment/products for the next 2-3 days until new wound care supplies are ordered and arrive. If longer than this please contact our office at [phone].    If you have a 2 layer or 4 layer compression wrap on these are safe to have on for ONLY 7 days. If for some reason you are not able to get the wrap(s) changed (due to illness; lack of supplies, lack of help, lack of transportation) please do the following: unwrap the old 2 or 4 layer compression wrap; avoid using scissors as you could cut your skin and cause wounds; use tubular compression when available. Call to reschedule your home care or clinic visit appointment as soon as possible.    Please NOTE: if you are 15 minutes late to your clinic appointment you will have to be rescheduled. Please call our clinic as soon as possible if you know you will not be able to get to your appointment at [phone].    If you fail to show up to 3 scheduled clinic appointments you will be dismissed from our clinic.              We want to hear from you!  In the next few weeks, you should receive a call or email to complete a survey about your visit at [facility]. Please help us improve your appointment experience by letting us know how we did today. We strive to make your experience good and value any ways in which we could do better.      We value your input and suggestions.    Thank you for choosing the [facility]!

## 2022-08-16 NOTE — PROGRESS NOTES
"            Follow up Vascular Visit       Date of Service:08/16/22      Chief Complaint: BLE swelling and weeping ulcerations      Pt returns to [facility] with regards to their BLE swelling and weeping ulcerations.  They arrive today alone. They are currently using visopaste, super absorbant pads to the wounds. This is being done by home health. They are using tubular compression, foam wrap, short stretch for compression. They are feeling well today. Denies fevers, chills. No shortness of breath.     Allergies:   Allergies   Allergen Reactions     Lisinopril Swelling     Patient reports \"neck swelling\"  Swelling in throat       Medications:   Current Outpatient Medications:      ammonium lactate (LAC-HYDRIN) 12 % external lotion, Apply topically daily as needed With dressing changes, Disp: , Rfl:      atorvastatin (LIPITOR) 20 MG tablet, Take 1 tablet (20 mg) by mouth daily, Disp: 90 tablet, Rfl: 4     chlorthalidone (HYGROTON) 25 MG tablet, Take 0.5 tablets (12.5 mg) by mouth daily, Disp: 30 tablet, Rfl: 0     hydrOXYzine (ATARAX) 25 MG tablet, Take 0.5-1 tablets (12.5-25 mg) by mouth 3 times daily as needed for anxiety, Disp: 60 tablet, Rfl: 1     losartan (COZAAR) 100 MG tablet, Take 1 tablet (100 mg) by mouth daily, Disp: 90 tablet, Rfl: 3     melatonin 3 MG tablet, Take 1 tablet (3 mg) by mouth nightly as needed for sleep, Disp: 30 tablet, Rfl: 1     metFORMIN (GLUCOPHAGE-XR) 750 MG 24 hr tablet, Take 1 tablet (750 mg) by mouth 2 times daily (with meals), Disp: 180 tablet, Rfl: 1     methadone (DOLOPHINE-INTENSOL) 10 MG/ML (HIGH CONC) solution, Take 100 mg by mouth daily, Disp: , Rfl:      sodium hypochlorite (QUARTER-STRENGTH DAKINS) external solution, Apply topically every 72 hours Use 300mL every 72 hours to wash the bilateral legs and wounds on the legs (Patient taking differently: Apply topically every 72 hours as needed Use 300mL every 72 hours to wash the bilateral legs and wounds on " the legs), Disp: 1000 mL, Rfl: 3    Current Facility-Administered Medications:      lidocaine (XYLOCAINE) 2 % external gel, , Topical, Daily PRN, Lucia Reyes MD, Given at 10/19/21 0828    History:   Past Medical History:   Diagnosis Date     Diabetes (H)      H/O angioedema 6/15/2020    Formatting of this note might be different from the original. Unexplained angioedema twice, discontinue lisinopril for possible connection to it, though he had used it afterwards with no symptoms     History of tobacco use disorder 8/1/2013    Formatting of this note might be different from the original. Added per documetation     Hypertension      Lymphedema of both lower extremities 12/22/2014     Methadone use 5/8/2012     Obstructive sleep apnea 2/2/2015    Formatting of this note might be different from the original. Epic     Pleural mass 7/2/2013     Uncomplicated asthma        Physical Exam:    BP (!) 152/72   Pulse 92   Resp 26   Wt (!) 397 lb (180.1 kg)   BMI 49.62 kg/m      General:  Patient presents to clinic in no apparent distress.  Head: normocephalic atraumatic  Psychiatric:  Alert and oriented x3.   Respiratory: unlabored breathing; no cough  Integumentary:  Skin is uniformly warm, dry and pink.    Extremities: swelling is stable; continues to have mild scaling and crusting on the legs; continues to have several scattered full and partial thickness ulcers about the legs; see measures below; +mild odor; +papillomatosis; +verrucous lesions + fibrosity and scarring of the tissues    Wound #1 Location: R Medial Calf  Size: 4L x 4.5W x 0.1depth.  No sinus tract present, Wound base: papillomatosis  No undermining present. Wound is partial thickness. There is moderate drainage. Periwound: no denudement, erythema, induration, maceration or warmth.    Wound #2 Location: L medial (posterior)  Size: 0L x 0W x 0depth.  No sinus tract present, Wound base: papillomatosis  No undermining present. Wound is healed. There  is no drainage. Periwound: no denudement, erythema, induration, maceration or warmth.  Wound #3 Location: L lateral Leg  Size: 8L x 7W x 0.1depth.  No sinus tract present, Wound base: papillamatosis  No undermining present. Wound is partial thickness. There is moderate drainage. Periwound: no denudement, erythema, induration, maceration or warmth.  Wound #4 Location: R Lateral leg  Size: 15L x 14W x 0.1 depth.  No sinus tract present, Wound base: papillomatosis  No undermining present. Wound is partial thickness. There is heavy drainage. Periwound: no denudement, erythema, induration, maceration or warmth.    Wound Leg Ulceration (Active)   Number of days: 71       VASC Wound Right lower leg lateral (Active)   Pre Size Length 4 06/21/22 0700   Pre Size Width 4 06/21/22 0700   Pre Size Depth 0.1 06/21/22 0700   Pre Total Sq cm 16 06/21/22 0700   Product Used Alginate 10/12/21 0800   Number of days: 328       VASC Wound rt lateral weeping (Active)   Pre Size Length 15 08/16/22 0700   Pre Size Width 14 08/16/22 0700   Pre Size Depth 0.1 08/16/22 0700   Pre Total Sq cm 210 08/16/22 0700   Description weeping areas 08/16/22 0700   Number of days: 301       VASC Wound Lt lateral leg (Active)   Pre Size Length 8 08/16/22 0700   Pre Size Width 7 08/16/22 0700   Pre Size Depth 0.1 08/16/22 0700   Pre Total Sq cm 56 08/16/22 0700   Tunneling weeping areas 08/16/22 0700   Description weeping 11/23/21 0700   Number of days: 266       VASC Wound Rt medial calf (Active)   Pre Size Length 4 08/16/22 0700   Pre Size Width 4.5 08/16/22 0700   Pre Size Depth 0.1 08/16/22 0700   Pre Total Sq cm 18 08/16/22 0700   Description weeping areas 08/16/22 0700   Number of days: 127            Circumferential volume measures:      Circumferential Measures 11/23/2021 1/17/2022 2/14/2022 3/14/2022 7/19/2022   Right just above MTP 28.6 24.4 29.2 29.5 28   Right Ankle 36.4 39.2 40 54 35   Right Widest Calf 58 55.2 60.8 60.5 54.5   Right Thigh Up  10cm - - - - -   Left - just above MTP 28.1 28.2 28.7 29 28.5   Left Ankle 34.3 35.5 37 48.3 35   Left Widest Calf 54 52.3 57.6 62 51   Left Thigh Up 10cm - - - - -   Left Knee to Ankle - - - - -       Labs:    I personally reviewed the following lab results today and those on care everywhere    CRP   Date Value Ref Range Status   04/23/2021 8.5 (H) 0.0 - 0.8 mg/dL Final      No results found for: SED   Last Renal Panel:  Sodium   Date Value Ref Range Status   08/09/2022 136 136 - 145 mmol/L Final   06/10/2021 142.0 133.0 - 144.0 mmol/L Final     Potassium   Date Value Ref Range Status   08/09/2022 4.1 3.4 - 5.3 mmol/L Final   06/08/2022 4.1 3.5 - 5.0 mmol/L Final   06/10/2021 4.4 3.4 - 5.3 mmol/L Final     Chloride   Date Value Ref Range Status   08/09/2022 98 98 - 107 mmol/L Final   06/08/2022 102 98 - 107 mmol/L Final   06/10/2021 100.0 94.0 - 109.0 mmol/L Final     Carbon Dioxide   Date Value Ref Range Status   06/10/2021 33.0 (H) 20.0 - 32.0 mmol/L Final     Carbon Dioxide (CO2)   Date Value Ref Range Status   08/09/2022 22 22 - 29 mmol/L Final   06/08/2022 29 22 - 31 mmol/L Final     Anion Gap   Date Value Ref Range Status   08/09/2022 16 (H) 7 - 15 mmol/L Final   06/08/2022 7 5 - 18 mmol/L Final     Glucose   Date Value Ref Range Status   08/09/2022 105 (H) 70 - 99 mg/dL Final   06/08/2022 82 70 - 125 mg/dL Final   06/10/2021 112.0 (H) 60.0 - 109.0 mg/dL Final     GLUCOSE BY METER POCT   Date Value Ref Range Status   06/08/2022 119 (H) 70 - 99 mg/dL Final     Urea Nitrogen   Date Value Ref Range Status   08/09/2022 13.3 8.0 - 23.0 mg/dL Final   06/08/2022 15 8 - 22 mg/dL Final   06/10/2021 16.0 7.0 - 30.0 mg/dL Final     Creatinine   Date Value Ref Range Status   08/09/2022 0.97 0.67 - 1.17 mg/dL Final   06/10/2021 1.0 0.8 - 1.5 mg/dL Final     GFR Estimate   Date Value Ref Range Status   08/09/2022 85 >60 mL/min/1.73m2 Final     Comment:     Effective December 21, 2021 eGFRcr in adults is calculated using  the 2021 CKD-EPI creatinine equation which includes age and gender (Lucie whiteside al., NE, DOI: 10.1056/LKSHgk3952682)   04/24/2021 >60 >60 mL/min/1.73m2 Final     Calcium   Date Value Ref Range Status   08/09/2022 9.2 8.8 - 10.2 mg/dL Final   06/10/2021 9.5 8.5 - 10.4 mg/dL Final     Albumin   Date Value Ref Range Status   04/24/2021 2.7 (L) 3.5 - 5.0 g/dL Final      Lab Results   Component Value Date    WBC 5.0 07/14/2022     Lab Results   Component Value Date    RBC 4.03 07/14/2022     Lab Results   Component Value Date    HGB 9.5 07/14/2022     Lab Results   Component Value Date    HCT 30.9 07/14/2022     No components found for: MCT  Lab Results   Component Value Date    MCV 77 07/14/2022     Lab Results   Component Value Date    MCH 23.6 07/14/2022     Lab Results   Component Value Date    MCHC 30.7 07/14/2022     Lab Results   Component Value Date    RDW 14.9 07/14/2022     Lab Results   Component Value Date     07/14/2022      Lab Results   Component Value Date    A1C 6.2 07/14/2022    A1C 6.7 04/21/2022    A1C 6.2 04/24/2021    A1C 6.2 04/24/2021      TSH   Date Value Ref Range Status   04/21/2022 2.58 0.30 - 5.00 uIU/mL Final      No results found for: VITDT                Impression:  Encounter Diagnoses   Name Primary?     Lymphedema of both lower extremities Yes     Venous hypertension, chronic, with ulcer and inflammation, bilateral (H)      Venous stasis ulcer of left lower leg with edema of left lower leg (H)      Venous stasis ulcer of right lower leg with edema of right lower leg (H)      Venous (peripheral) insufficiency      Papillomatosis           8/16/22 BLE       8/16/22L lateral leg      8/16/22  R lateral leg      8/16/22 R medial leg            Are any of these wounds new today: No; Location: na    Assessment/Plan:          1. Debridement: After discussion of risk factors and verbal consent was obtained 2% Lidocaine HCL jelly was applied, under clean conditions, the BLE ulceration(s)  were debrided using currette. Devitalized and nonviable tissue, along with any fibrin and slough, was removed to improve granulation tissue formation, stimulate wound healing, decrease overall bacteria load, disrupt biofilm formation and decrease edge senescence.  Total excisional debridement was 284 sq cm from the epidermis/dermis area with a depth of 0.1 cm.   Ulcers were improved afterwards and .  Measures were as noted on the flow sheet.       2.  Wound treatment: wound treatment will include irrigation and dressings to promote autolytic debridement which will include: Cleanse wounds with dakin's solution. Apply viscopaste to weeping areas in strips, cover with optilock or abd pad, gauze roll to be changed every other day.  If for some reason the patient is not able to get their dressing(s) changed as outlined above (due to illness, lack of supplies, lack of help) please do the following: remove old, soiled dressings; wash the wounds with saline; pat dry; apply ABD pad or other absorbant pad and secure with rolled gauze; avoid tape directly on your skin; patient instructed to call the clinic as soon as possible to let us know what the current issues are in receiving wound care. Improved Wounds are improving significantly, smell has also decreased. Pt has been agreeable with PCP to biopsy ilac lymphadenopathy. Awaiting for IR to schedule.           3. Edema: Edema stable. Pt has been having grandson to assist him with the lymphedema pumps. Pt is using lymphedema pumps in between dressing changes. Pt is now down 10 lbs. Continue foam padding, tubular compression, and short stretch. The compression wraps were applied today in clinic.              4. Nutrition: Diabetic Diet           5. Offloading: Pt has not made appointment to be fitted for diabetic shoes. Instructed pt to call to get fitted. This will help offload pressure to the toe pads of the hammertoes.           6. HTN:  Blood pressure elevated today  152/72. Pt reports he has not taken his blood pressure medication this morning. Pt has been keeping a blood pressure record at home and PCP discontinued hydrochlorothiazide and started Chloriadone. Pt has been tolerating well and reports home -140s.     Patient will follow up with me in 4 weeks for reevaluation. They were instructed to call the clinic sooner with any signs or symptoms of infection or any further questions/concerns. Answered all questions.      40 minutes spent on the date of the encounter doing chart review, history and exam, documentation and further activities per the note    Verona Stallworth MS, APRN, AGNP-C, CWCN  [facility]   [phone]        This note was electronically signed by HAYDEE Whatley CNP

## 2022-08-17 PROBLEM — I99.9: Status: ACTIVE | Noted: 2022-08-17

## 2022-08-18 ENCOUNTER — MEDICAL CORRESPONDENCE (OUTPATIENT)
Dept: HEALTH INFORMATION MANAGEMENT | Facility: CLINIC | Age: 67
End: 2022-08-18

## 2022-08-19 ENCOUNTER — TELEPHONE (OUTPATIENT)
Dept: FAMILY MEDICINE | Facility: CLINIC | Age: 67
End: 2022-08-19

## 2022-08-19 ENCOUNTER — TELEPHONE (OUTPATIENT)
Dept: VASCULAR SURGERY | Facility: CLINIC | Age: 67
End: 2022-08-19

## 2022-08-19 DIAGNOSIS — C85.93 LYMPHOMA OF INTRA-ABDOMINAL LYMPH NODES, UNSPECIFIED LYMPHOMA TYPE (H): ICD-10-CM

## 2022-08-19 DIAGNOSIS — R59.1 LYMPHADENOPATHY: Primary | ICD-10-CM

## 2022-08-19 NOTE — TELEPHONE ENCOUNTER
Lesly from [facility] requesting the AVS from the 8/16/22 visit with Verona Stallworth be faxed to her at [phone].       Message sent to Tanya FULTON to send the fax since she is working in clinic at [facility] today.

## 2022-08-19 NOTE — TELEPHONE ENCOUNTER
[facility] phone call message - order or referral request for patient:     Order or referral being requested:     1X for 8 Weeks for room care and assistant for [facility] well care.       Additional Comments:    Caller placed in order. Please call back and advise if needed. Thanks.     OK to leave a message on voice mail? Yes    Primary language: English      needed? No    Call taken on August 19, 2022 at 10:28 AM by Audrey Doan

## 2022-08-19 NOTE — TELEPHONE ENCOUNTER
The Home Care/Assisted Living/Nursing Facility is calling regarding an established patient.  Has the patient seen Home Care in the past or is currently residing in Assisted Living or Nursing Facility? Yes.     Aishwarya calling from Home care requesting the following orders that are within the Home Care, Assisted Living or Nursing Home Eval and Treatment standing order and can be signed as standing order signature required by RN.    Preferred Call Back Number:     Home Care Visits Continuation    Any additional Orders:  Are there any orders requested, not stated above, that are outside of the standing order and must be routed to a licensed practitioner for approval?    No    Writer has verified Requestor will send fax to have orders signed.  Mario QURESHI

## 2022-08-23 ENCOUNTER — OFFICE VISIT (OUTPATIENT)
Dept: FAMILY MEDICINE | Facility: CLINIC | Age: 67
End: 2022-08-23
Payer: COMMERCIAL

## 2022-08-23 VITALS
OXYGEN SATURATION: 97 % | BODY MASS INDEX: 39.17 KG/M2 | WEIGHT: 315 LBS | HEART RATE: 88 BPM | HEIGHT: 75 IN | RESPIRATION RATE: 24 BRPM | SYSTOLIC BLOOD PRESSURE: 122 MMHG | DIASTOLIC BLOOD PRESSURE: 72 MMHG | TEMPERATURE: 98 F

## 2022-08-23 DIAGNOSIS — G47.33 OSA (OBSTRUCTIVE SLEEP APNEA): ICD-10-CM

## 2022-08-23 DIAGNOSIS — G89.4 CHRONIC PAIN SYNDROME: ICD-10-CM

## 2022-08-23 DIAGNOSIS — R59.1 LYMPHADENOPATHY: ICD-10-CM

## 2022-08-23 DIAGNOSIS — G47.00 INSOMNIA, UNSPECIFIED TYPE: ICD-10-CM

## 2022-08-23 DIAGNOSIS — I10 BENIGN ESSENTIAL HYPERTENSION: Primary | ICD-10-CM

## 2022-08-23 DIAGNOSIS — I99.9: ICD-10-CM

## 2022-08-23 DIAGNOSIS — Z87.891 HISTORY OF TOBACCO USE DISORDER: ICD-10-CM

## 2022-08-23 DIAGNOSIS — E66.01 OBESITY, MORBID, BMI 40.0-49.9 (H): ICD-10-CM

## 2022-08-23 DIAGNOSIS — I89.0 LYMPHEDEMA OF BOTH LOWER EXTREMITIES: ICD-10-CM

## 2022-08-23 LAB
ANION GAP SERPL CALCULATED.3IONS-SCNC: 10 MMOL/L (ref 7–15)
BUN SERPL-MCNC: 17.2 MG/DL (ref 8–23)
CALCIUM SERPL-MCNC: 8.8 MG/DL (ref 8.8–10.2)
CHLORIDE SERPL-SCNC: 98 MMOL/L (ref 98–107)
CREAT SERPL-MCNC: 0.98 MG/DL (ref 0.67–1.17)
DEPRECATED HCO3 PLAS-SCNC: 28 MMOL/L (ref 22–29)
GFR SERPL CREATININE-BSD FRML MDRD: 83 ML/MIN/1.73M2
GLUCOSE SERPL-MCNC: 99 MG/DL (ref 70–99)
POTASSIUM SERPL-SCNC: 3.7 MMOL/L (ref 3.4–5.3)
SODIUM SERPL-SCNC: 136 MMOL/L (ref 136–145)

## 2022-08-23 PROCEDURE — 99214 OFFICE O/P EST MOD 30 MIN: CPT | Mod: GC | Performed by: STUDENT IN AN ORGANIZED HEALTH CARE EDUCATION/TRAINING PROGRAM

## 2022-08-23 PROCEDURE — 80048 BASIC METABOLIC PNL TOTAL CA: CPT | Performed by: STUDENT IN AN ORGANIZED HEALTH CARE EDUCATION/TRAINING PROGRAM

## 2022-08-23 PROCEDURE — 36415 COLL VENOUS BLD VENIPUNCTURE: CPT | Performed by: STUDENT IN AN ORGANIZED HEALTH CARE EDUCATION/TRAINING PROGRAM

## 2022-08-23 NOTE — PROGRESS NOTES
Preceptor Attestation:   Patient seen, evaluated and discussed with the resident Dr. Sal. I have verified the content of the note, which accurately reflects my assessment of the patient and the plan of care.    Supervising Physician:Benjamin Rosenstein, MD,MA  [facility]

## 2022-08-23 NOTE — LETTER
August 24, 2022      Shaheen Sepulveda  [address] [facility] [address] [facility] [address]        Dear ,    We are writing to inform you of your test results.    Your recent blood work was normal. The change in blood pressure medications did not affect your kidney function. Please reach out with any questions, or we can discuss further at your next follow up.     Resulted Orders   Basic metabolic panel   Result Value Ref Range    Creatinine 0.98 0.67 - 1.17 mg/dL    Sodium 136 136 - 145 mmol/L    Potassium 3.7 3.4 - 5.3 mmol/L    Urea Nitrogen 17.2 8.0 - 23.0 mg/dL    Chloride 98 98 - 107 mmol/L    Carbon Dioxide (CO2) 28 22 - 29 mmol/L    Anion Gap 10 7 - 15 mmol/L    Glucose 99 70 - 99 mg/dL    GFR Estimate 83 >60 mL/min/1.73m2      Comment:      Effective December 21, 2021 eGFRcr in adults is calculated using the 2021 CKD-EPI creatinine equation which includes age and gender (Lucie et al., NEJM, DOI: 10.1056/PHAWvg9193591)    Calcium 8.8 8.8 - 10.2 mg/dL       If you have any questions or concerns, please call the clinic at the number listed above.       Sincerely,      Radha Sal MD

## 2022-08-23 NOTE — PROGRESS NOTES
Assessment & Plan     Benign essential hypertension  Continue chlorthalidone at 12.5 mg daily. BMP wnl. BP not at goal at clinic, but home readings are wnl. Will continue to monitor closely with f/u in 1 month.  - Basic metabolic panel  - Basic metabolic panel    Disease of circulatory system  Lymphedema of both lower extremities  Chronic pain syndrome  Patient with ongoing difficulty with mobility and pain due to severe lymphedema due to his compromised circulatory system. On methadone, following with methadone clinic for chronic pain. Vascular surgery and home PT following for lymphedema and vascular compromise. Will refer for OT evaluation of ADLs and mobility to further support claim for hospital grade bed.  - Occupational Therapy Referral    Insomnia  DG  Obesity   Ongoing discussion of sleep hygiene, takes melatonin nightly with some relief. Previously diagnosed with sleep medicine with DG, previously prescribed CPAP but does not want to use this. Discussed risks of not using CPAP and ongoing DG, likely contributing to difficulty controlling HTN. Would like to continue to discuss at follow up, but will likely need to reestablish with sleep medicine for evaluation of CPAP prescription.    History of tobacco use disorder  Eligible for lung cancer screening via CT scan, would like to discuss at next visit.    Reactive lymphadenopathy  Still awaiting IR referral and biopsy.    Return in about 4 weeks (around 9/20/2022).    Precepted with Dr. Rosenstein.    Radha Sal MD  [facility]    Frank Jamison is a 67 year old, presenting for the following health issues:  Follow Up (BP)      HPI     Per previous note:  Benign essential hypertension  BP is not in goal range at home and is currently elevated at 155/72, 148/70 on repeat. Home Bps also about half above goal. Patient is taking max dose of losartan;plan is to discontinue hydrochlorothiazide and begin chlorthalidone due  to its stronger effect on BP. May need to increase to 25 mg daily, would repeat BMP at next visit. Need to recheck kidney function before starting.  - chlorthalidone (HYGROTON) 25 MG tablet; Take 0.5 tablets (12.5 mg) by mouth daily  - Basic metabolic panel; Future  - Basic metabolic panel     Lymphadenopathy  Bilateral lymph edema still present. Patient is having difficulty ambulating and has to use a cane. Informed patient that insurance has been contacted multiple times for getting a hospital bed and we are still awaiting a reply. Discussed with Shaheen the option of lymph biopsy via referral to IR and he was in agreement.   - IR Referral     Lymphedema  Disease of circulatory system  Chronic Pain Syndrome  Patient is in the process of obtaining heavy duty hospital bed due to his difficulty in positioning and mobility in the setting of his severe lymphedema. His severe lymphedema causes him pain and decreased mobility that cannot be accommodated by an ordinary bed. He is followed by a methadone clinic for his chronic pain. He is 407 lbs, meeting the criteria for a heavy duty bed. For the Group I support surface he has evidence of compromised circulatory status in the setting of his severe lymphedema.      Benign essential hypertension  Home BP records: 123-140/70-84 --mainly with arm cuff though some with wrist  Taking chlorthalidone, halfing the pill is difficult but is managing and would like to continue.  BP here is 164/79.  Just took chlorthalidone prior to leaving the house.  BMP stable at last visit, again stable today.  Denies any concerns of side effects--no lightheadedness, dizziness.  Continues to wax and wane with fluid retention, up to 407 lb again today.    Insomnia  Obstructive sleep apnea  Bothered by taking a medication at night to sleep.  Take it's right before he goes to bed.  Both trouble staying asleep and trouble falling asleep.  Has had a sleep study done.  Was told to do a CPAP machine but is  "not interested.  STOP BANG is 7, high risk for DG    Tobacco Use History  Smoking, most of adult life, age 18 until about 10 years ago.  A ppd smoking  Would qualify for CT lung cancer screening, would like to discuss further at next appt.    Lymphadenopathy  Awaiting biopsy, needs help scheduling    Circulatory system compromise  Lymphedema  Still awaiting hospital bed due to insurance coverage claims  Up 10 lbs since last visit, high risk for ongoing mobility issues with lymphedema and fluid retention.        Review of Systems   Constitutional, HEENT, cardiovascular, pulmonary, gi and gu systems are negative, except as otherwise noted.      Objective    /72   Pulse 88   Temp 98  F (36.7  C)   Resp 24   Ht 1.905 m (6' 3\")   Wt (!) 184.6 kg (407 lb)   SpO2 97%   BMI 50.87 kg/m    Body mass index is 50.87 kg/m .  Physical Exam   GENERAL: healthy, alert and no distress  RESP: lungs clear to auscultation - no rales, rhonchi or wheezes  CV: regular rate and rhythm, normal S1 S2, no S3 or S4, no murmur, click or rub, no peripheral edema and peripheral pulses strong  MS: significant b/l LE edema with lymphedema wraps in place. Difficulty with ambulation due to edema--uses cane for mobility aide.    Results for orders placed or performed in visit on 08/23/22   Basic metabolic panel     Status: Normal   Result Value Ref Range    Creatinine 0.98 0.67 - 1.17 mg/dL    Sodium 136 136 - 145 mmol/L    Potassium 3.7 3.4 - 5.3 mmol/L    Urea Nitrogen 17.2 8.0 - 23.0 mg/dL    Chloride 98 98 - 107 mmol/L    Carbon Dioxide (CO2) 28 22 - 29 mmol/L    Anion Gap 10 7 - 15 mmol/L    Glucose 99 70 - 99 mg/dL    GFR Estimate 83 >60 mL/min/1.73m2    Calcium 8.8 8.8 - 10.2 mg/dL       ----- Service Performed and Documented by Resident or Fellow ------            .  ..  "

## 2022-09-13 ENCOUNTER — OFFICE VISIT (OUTPATIENT)
Dept: VASCULAR SURGERY | Facility: CLINIC | Age: 67
End: 2022-09-13
Attending: REGISTERED NURSE
Payer: COMMERCIAL

## 2022-09-13 VITALS
WEIGHT: 315 LBS | HEART RATE: 84 BPM | HEIGHT: 75 IN | BODY MASS INDEX: 39.17 KG/M2 | DIASTOLIC BLOOD PRESSURE: 80 MMHG | RESPIRATION RATE: 18 BRPM | SYSTOLIC BLOOD PRESSURE: 160 MMHG | TEMPERATURE: 98.1 F

## 2022-09-13 DIAGNOSIS — R22.43 LOCALIZED SWELLING, MASS AND LUMP, LOWER LIMB, BILATERAL: ICD-10-CM

## 2022-09-13 DIAGNOSIS — I87.333 VENOUS HYPERTENSION, CHRONIC, WITH ULCER AND INFLAMMATION, BILATERAL (H): ICD-10-CM

## 2022-09-13 DIAGNOSIS — E11.42 TYPE 2 DIABETES MELLITUS WITH PERIPHERAL NEUROPATHY (H): ICD-10-CM

## 2022-09-13 DIAGNOSIS — I87.2 VENOUS (PERIPHERAL) INSUFFICIENCY: ICD-10-CM

## 2022-09-13 DIAGNOSIS — I89.0 LYMPHEDEMA OF BOTH LOWER EXTREMITIES: ICD-10-CM

## 2022-09-13 DIAGNOSIS — D36.9 PAPILLOMATOSIS: Primary | ICD-10-CM

## 2022-09-13 DIAGNOSIS — E66.01 OBESITY, MORBID, BMI 40.0-49.9 (H): ICD-10-CM

## 2022-09-13 DIAGNOSIS — L84 PRE-ULCERATIVE CORN OR CALLOUS: ICD-10-CM

## 2022-09-13 DIAGNOSIS — M79.3 LIPODERMATOSCLEROSIS OF BOTH LOWER EXTREMITIES: ICD-10-CM

## 2022-09-13 DIAGNOSIS — M79.89 LEG SWELLING: ICD-10-CM

## 2022-09-13 DIAGNOSIS — I89.0 ELEPHANTIASIS: ICD-10-CM

## 2022-09-13 DIAGNOSIS — L85.9 HYPERKERATOSIS OF SKIN: ICD-10-CM

## 2022-09-13 PROCEDURE — 97597 DBRDMT OPN WND 1ST 20 CM/<: CPT | Performed by: REGISTERED NURSE

## 2022-09-13 PROCEDURE — 97598 DBRDMT OPN WND ADDL 20CM/<: CPT | Performed by: REGISTERED NURSE

## 2022-09-13 PROCEDURE — 11057 PARNG/CUTG B9 HYPRKR LES >4: CPT | Performed by: REGISTERED NURSE

## 2022-09-13 RX ORDER — SODIUM HYPOCHLORITE 1.25 MG/ML
SOLUTION TOPICAL
Qty: 1000 ML | Refills: 3 | Status: SHIPPED | OUTPATIENT
Start: 2022-09-13 | End: 2023-03-10

## 2022-09-13 ASSESSMENT — PAIN SCALES - GENERAL: PAINLEVEL: SEVERE PAIN (6)

## 2022-09-13 NOTE — PATIENT INSTRUCTIONS
"Wound care supplies should be ordered by your home care agency. Please contact clinic if home care agency is not able to order supplies.     Lymphedema pump daily.   Record blood pressure readings at home.   Call to make appointment to get fitted for diabetic shoes [phone]    Wound Care Instructions    EVERY OTHER DAY and as needed, Cleanse your leg wound(s) with Dakin's Solution 0.125%.    Pat Dry with non-sterile gauze    Apply Lotion to the intact skin surrounding your wound and other dry skin locations. Some good lotions include: Remedy Skin Repair Cream, Sarna, Vanicream or Cetaphil    Apply Amlactin to hard, fibrotic areas on feet    Primary Dressing: Apply viscopaste strips into/onto the wounds    Secondary dressing: Cover with optilock or abd pad according to drainage    Secure with non-sterile roll gauze (4\" x 75\" roll) and tape (1\" roll tape) as needed; avoid adhesive directly on the skin    Compression: tubular compression, short stretch, may add foam if needed for drainage management    It is not ok to get your wound wet in the bath or shower    It is recommended that you do not get your ulcer wet when showering.  Listed below are several ways of keeping it dry when you shower.     1. Wrap it with Press and Seal plastic wrap.  It can be found in the stores where the plastic wraps or tin foil is kept.               2.  Some people take a bath and hang their leg/foot out of the tub.                        3  Put your leg in a plastic bag and tape it on.           4. You can purchase a shower cover for casts at some pharmacies and through the Internet.            5. Take a Bed Bath or wash up at the sink            If for some reason you are not able to get your dressing(s) changed as outlined above (due to illness, lack of supplies, lack of help) please do the following: remove old, soiled dressings; wash the wounds with saline; pat dry; apply ABD pad or other absorbant pad and secure with rolled " gauze; avoid tape directly on your skin; Call the clinic as soon as possible to let us know what the current issues are in receiving wound care [phone].      SEEK MEDICAL CARE IF:  You have an increase in swelling, pain, or redness around the wound.  You have an increase in the amount of pus coming from the wound.  There is a bad smell coming from the wound.  The wound appears to be worsening/enlarging  You have a fever greater than 101.5 F      It is ok to continue current wound care treatment/products for the next 2-3 days until new wound care supplies are ordered and arrive. If longer than this please contact our office at [phone].    If you have a 2 layer or 4 layer compression wrap on these are safe to have on for ONLY 7 days. If for some reason you are not able to get the wrap(s) changed (due to illness; lack of supplies, lack of help, lack of transportation) please do the following: unwrap the old 2 or 4 layer compression wrap; avoid using scissors as you could cut your skin and cause wounds; use tubular compression when available. Call to reschedule your home care or clinic visit appointment as soon as possible.    Please NOTE: if you are 15 minutes late to your clinic appointment you will have to be rescheduled. Please call our clinic as soon as possible if you know you will not be able to get to your appointment at [phone].    If you fail to show up to 3 scheduled clinic appointments you will be dismissed from our clinic.              We want to hear from you!  In the next few weeks, you should receive a call or email to complete a survey about your visit at [facility]. Please help us improve your appointment experience by letting us know how we did today. We strive to make your experience good and value any ways in which we could do better.      We value your input and suggestions.    Thank you for choosing the [facility]!

## 2022-09-13 NOTE — PROGRESS NOTES
"            Follow up Vascular Visit       Date of Service:09/13/22      Chief Complaint: BLE swelling and weeping ulcerations      Pt returns to [facility] with regards to their BLE weeping and ulcerations.  They arrive today alone. They are currently using viscopaste, superabsorbant pad  to the wounds. This is being done by home health. They are using tubi short stretch for compression. They are feeling well today. Denies fevers, chills. No shortness of breath. Pt still awaiting lymph node biopsy. Has not been fitted for diabetic shoes yet.    Allergies:   Allergies   Allergen Reactions     Lisinopril Swelling     Patient reports \"neck swelling\"  Swelling in throat       Medications:   Current Outpatient Medications:      ammonium lactate (LAC-HYDRIN) 12 % external lotion, Apply topically daily as needed With dressing changes, Disp: , Rfl:      atorvastatin (LIPITOR) 20 MG tablet, Take 1 tablet (20 mg) by mouth daily, Disp: 90 tablet, Rfl: 4     chlorthalidone (HYGROTON) 25 MG tablet, Take 0.5 tablets (12.5 mg) by mouth daily, Disp: 30 tablet, Rfl: 0     hydrOXYzine (ATARAX) 25 MG tablet, Take 0.5-1 tablets (12.5-25 mg) by mouth 3 times daily as needed for anxiety, Disp: 60 tablet, Rfl: 1     losartan (COZAAR) 100 MG tablet, Take 1 tablet (100 mg) by mouth daily, Disp: 90 tablet, Rfl: 3     melatonin 3 MG tablet, Take 1 tablet (3 mg) by mouth nightly as needed for sleep, Disp: 30 tablet, Rfl: 1     metFORMIN (GLUCOPHAGE-XR) 750 MG 24 hr tablet, Take 1 tablet (750 mg) by mouth 2 times daily (with meals), Disp: 180 tablet, Rfl: 1     methadone (DOLOPHINE-INTENSOL) 10 MG/ML (HIGH CONC) solution, Take 100 mg by mouth daily, Disp: , Rfl:      sodium hypochlorite (QUARTER-STRENGTH DAKINS) external solution, Apply topically every 72 hours Use 300mL every 72 hours to wash the bilateral legs and wounds on the legs, Disp: 1000 mL, Rfl: 3    Current Facility-Administered Medications:      lidocaine " "(XYLOCAINE) 2 % external gel, , Topical, Daily PRN, Lucia Reyes MD, Given at 10/19/21 0828    History:   Past Medical History:   Diagnosis Date     Diabetes (H)      H/O angioedema 6/15/2020    Formatting of this note might be different from the original. Unexplained angioedema twice, discontinue lisinopril for possible connection to it, though he had used it afterwards with no symptoms     History of tobacco use disorder 8/1/2013    Formatting of this note might be different from the original. Added per documetation     Hypertension      Lymphedema of both lower extremities 12/22/2014     Methadone use 5/8/2012     Obstructive sleep apnea 2/2/2015    Formatting of this note might be different from the original. Epic     Pleural mass 7/2/2013     Uncomplicated asthma        Physical Exam:    BP (!) 160/80   Pulse 84   Temp 98.1  F (36.7  C)   Resp 18   Ht 6' 3\" (1.905 m)   Wt (!) 406 lb 12.8 oz (184.5 kg)   BMI 50.85 kg/m      General:  Patient presents to clinic in no apparent distress.  Head: normocephalic atraumatic  Psychiatric:  Alert and oriented x3.   Respiratory: unlabored breathing; no cough  Integumentary:  Skin is uniformly warm, dry and pink.    Extremities: swelling is stable; continues to have mild scaling and crusting on the legs; continues to have several scattered full and partial thickness ulcers about the legs; see measures below; +papillomatosis; +verrucous lesions + fibrosity and scarring of the tissues    Wound #1 Location: R medial calf  Size: 0L x 0W x 0depth.  No sinus tract present, Wound base: healed papillomatosis  No undermining present. Wound is healed. There is no drainage. Periwound: no denudement, erythema, induration, maceration or warmth.    Wound #2 Location: L lateral leg  Size: 1L x 1W x 1depth.  No sinus tract present, Wound base: papillomatosis  No undermining present. Wound is partial thickness. There is moderate drainage. Periwound: no denudement, erythema, " induration, maceration or warmth.    Wound #3 Location: R lateral leg  Size: 14L x 12W x 0.1depth.  No sinus tract present, Wound base: papillomatosis  No undermining present. Wound is partial thickness. There is no drainage. Periwound: no denudement, erythema, induration, maceration or warmth.      Wound Leg Ulceration (Active)   Number of days: 99       VASC Wound Right lower leg lateral (Active)   Pre Size Length 4 06/21/22 0700   Pre Size Width 4 06/21/22 0700   Pre Size Depth 0.1 06/21/22 0700   Pre Total Sq cm 16 06/21/22 0700   Product Used Alginate 10/12/21 0800   Number of days: 356       VASC Wound rt lateral weeping (Active)   Pre Size Length 14 09/13/22 0700   Pre Size Width 10 09/13/22 0700   Pre Size Depth 0.1 09/13/22 0700   Pre Total Sq cm 14 09/13/22 0700   Post Size Length 10 09/13/22 0700   Post Size Width 0.1 09/13/22 0700   Description weeping area 09/13/22 0700   Number of days: 329       VASC Wound Lt lateral leg (Active)   Pre Size Length 1 09/13/22 0700   Pre Size Width 1 09/13/22 0700   Pre Size Depth 0.1 09/13/22 0700   Pre Total Sq cm 56 08/16/22 0700   Post Size Length 1 09/13/22 0700   Post Size Width 1 09/13/22 0700   Post Size Depth 1 09/13/22 0700   Post Total Sq cm 1 09/13/22 0700   Tunneling weeping 09/13/22 0700   Description weeping 11/23/21 0700   Number of days: 294            Circumferential volume measures:      Circumferential Measures 1/17/2022 2/14/2022 3/14/2022 7/19/2022 9/13/2022   Right just above MTP 24.4 29.2 29.5 28 28   Right Ankle 39.2 40 54 35 36   Right Widest Calf 55.2 60.8 60.5 54.5 52.5   Right Thigh Up 10cm - - - - -   Left - just above MTP 28.2 28.7 29 28.5 27   Left Ankle 35.5 37 48.3 35 34   Left Widest Calf 52.3 57.6 62 51 49.3   Left Thigh Up 10cm - - - - -   Left Knee to Ankle - - - - -       Labs:    I personally reviewed the following lab results today and those on care everywhere    CRP   Date Value Ref Range Status   04/23/2021 8.5 (H) 0.0 - 0.8  mg/dL Final      No results found for: SED   Last Renal Panel:  Sodium   Date Value Ref Range Status   08/23/2022 136 136 - 145 mmol/L Final   06/10/2021 142.0 133.0 - 144.0 mmol/L Final     Potassium   Date Value Ref Range Status   08/23/2022 3.7 3.4 - 5.3 mmol/L Final   06/08/2022 4.1 3.5 - 5.0 mmol/L Final   06/10/2021 4.4 3.4 - 5.3 mmol/L Final     Chloride   Date Value Ref Range Status   08/23/2022 98 98 - 107 mmol/L Final   06/08/2022 102 98 - 107 mmol/L Final   06/10/2021 100.0 94.0 - 109.0 mmol/L Final     Carbon Dioxide   Date Value Ref Range Status   06/10/2021 33.0 (H) 20.0 - 32.0 mmol/L Final     Carbon Dioxide (CO2)   Date Value Ref Range Status   08/23/2022 28 22 - 29 mmol/L Final   06/08/2022 29 22 - 31 mmol/L Final     Anion Gap   Date Value Ref Range Status   08/23/2022 10 7 - 15 mmol/L Final   06/08/2022 7 5 - 18 mmol/L Final     Glucose   Date Value Ref Range Status   08/23/2022 99 70 - 99 mg/dL Final   06/08/2022 82 70 - 125 mg/dL Final   06/10/2021 112.0 (H) 60.0 - 109.0 mg/dL Final     GLUCOSE BY METER POCT   Date Value Ref Range Status   06/08/2022 119 (H) 70 - 99 mg/dL Final     Urea Nitrogen   Date Value Ref Range Status   08/23/2022 17.2 8.0 - 23.0 mg/dL Final   06/08/2022 15 8 - 22 mg/dL Final   06/10/2021 16.0 7.0 - 30.0 mg/dL Final     Creatinine   Date Value Ref Range Status   08/23/2022 0.98 0.67 - 1.17 mg/dL Final   06/10/2021 1.0 0.8 - 1.5 mg/dL Final     GFR Estimate   Date Value Ref Range Status   08/23/2022 83 >60 mL/min/1.73m2 Final     Comment:     Effective December 21, 2021 eGFRcr in adults is calculated using the 2021 CKD-EPI creatinine equation which includes age and gender (Lucie et al., NEJ, DOI: 10.1056/JWZGph6355421)   04/24/2021 >60 >60 mL/min/1.73m2 Final     Calcium   Date Value Ref Range Status   08/23/2022 8.8 8.8 - 10.2 mg/dL Final   06/10/2021 9.5 8.5 - 10.4 mg/dL Final     Albumin   Date Value Ref Range Status   04/24/2021 2.7 (L) 3.5 - 5.0 g/dL Final      Lab  Results   Component Value Date    WBC 5.0 07/14/2022     Lab Results   Component Value Date    RBC 4.03 07/14/2022     Lab Results   Component Value Date    HGB 9.5 07/14/2022     Lab Results   Component Value Date    HCT 30.9 07/14/2022     No components found for: MCT  Lab Results   Component Value Date    MCV 77 07/14/2022     Lab Results   Component Value Date    MCH 23.6 07/14/2022     Lab Results   Component Value Date    MCHC 30.7 07/14/2022     Lab Results   Component Value Date    RDW 14.9 07/14/2022     Lab Results   Component Value Date     07/14/2022      Lab Results   Component Value Date    A1C 6.2 07/14/2022    A1C 6.7 04/21/2022    A1C 6.2 04/24/2021    A1C 6.2 04/24/2021      TSH   Date Value Ref Range Status   04/21/2022 2.58 0.30 - 5.00 uIU/mL Final      No results found for: VITDT                Impression:  Encounter Diagnoses   Name Primary?     Venous hypertension, chronic, with ulcer and inflammation, bilateral (H)      Obesity, morbid, BMI 40.0-49.9 (H)      Lymphedema of both lower extremities      Leg swelling      Venous (peripheral) insufficiency      Hyperkeratosis of skin      Elephantiasis      Type 2 diabetes mellitus with peripheral neuropathy (H)      Localized swelling, mass and lump, lower limb, bilateral      Lipodermatosclerosis of both lower extremities      Papillomatosis           9/13/22 R lateral leg       9/13/22 L lateral leg      9/13/22 BLE            Are any of these wounds new today: No; Location: na    Assessment/Plan:          1. Debridement: After discussion of risk factors and verbal consent was obtained 2% Lidocaine HCL jelly was applied, under clean conditions, the BLE ulceration(s) were debrided using currette. Devitalized and nonviable tissue, along with any fibrin and slough, was removed to improve granulation tissue formation, stimulate wound healing, decrease overall bacteria load, disrupt biofilm formation and decrease edge senescence.  Total  excisional debridement was 169 sq cm from the epidermis/dermis area with a depth of 1 cm.   Ulcers were improved afterwards and .  Measures were as noted on the flow sheet.       2.  Wound treatment: wound treatment will include irrigation and dressings to promote autolytic debridement which will include: Cleanse wounds with 0.25% dakin's solution. Apply viscopaste to weeping areas in strips, cover with optilock or abd pad, gauze roll to be changed every other day.  If for some reason the patient is not able to get their dressing(s) changed as outlined above (due to illness, lack of supplies, lack of help) please do the following: remove old, soiled dressings; wash the wounds with saline; pat dry; apply ABD pad or other absorbant pad and secure with rolled gauze; avoid tape directly on your skin; patient instructed to call the clinic as soon as possible to let us know what the current issues are in receiving wound care. Improved Wounds are continuing to improve. Reordered Dakin's solution. Pt still awaiting for IR to schedule biopsy of ilac lymphadenopathy           3. Edema: Edema improved. Pt has been having grandson to assist him with the lymphedema pumps. Pt is using lymphedema pumps in between dressing changes. Continue foam padding, tubular compression, and short stretch. The compression wraps were applied today in clinic.  . The compression wraps were applied today in clinic.     If a 2 layer or 4 layer compression wrap is being used; these are safe to have on for ONLY 7 days. If for some reason the patient is not able to get the wrap(s) changed (due to illness; lack of supplies, lack of help, lack of transportation) please do the following: unwrap the old 2 or 4 layer compression wrap; avoid using scissors as you could cut your skin and cause wounds; use tubular compression when available. Call to reschedule your home care or clinic visit appointment as soon as possible.  Improved            4.  Nutrition: Diabetic Diet           5. Offloading: Pt has not made appointment to be fitted for diabetic shoes. Instructed pt to call to get fitted. This will help offload pressure to the toe pads of the hammertoes. Pt doesn't want to overwhelm daughter with appointments. Encouraged him to schedule appointment on the same day as his wound care appt if able, to reduce trips.    6. HTN:  Blood pressure elevated today 160/80. Pt reports he has not taken his blood pressure medication this morning; he normally takes at 10am. Pt has been keeping a blood pressure record at home and PCP continued Chloriadone. Pt has been tolerating well and reports home SBP as low as 116.    7. Callous: 5 Callous was pared using a #15 blade scalpel; this was done to evaluate for underlying ulceration; reduce pressure; patient comfort; and to reduce risk of future ulceration formation. The skin was intact underneath and patient tolerated well; no complications.       Patient will follow up with me in 4 weeks for reevaluation. They were instructed to call the clinic sooner with any signs or symptoms of infection or any further questions/concerns. Answered all questions.      60 minutes spent on the date of the encounter doing chart review, history and exam, documentation and further activities per the note    Verona Stallworth MS, APRN, AGNP-C, CWCN  [facility]   [phone]        This note was electronically signed by HAYDEE Whatley CNP

## 2022-09-15 ENCOUNTER — TELEPHONE (OUTPATIENT)
Dept: VASCULAR SURGERY | Facility: CLINIC | Age: 67
End: 2022-09-15

## 2022-09-15 NOTE — TELEPHONE ENCOUNTER
Lesly calling from [facility] requesting ordes from the visit with Verona Stallworth earlier this week.  She needs to know if the orders are the same or if they have changed so they can plan for upcoming visits.  She also needs to place an order for supplies, but needs to know if anything has changed.       Fax orders to: [phone]

## 2022-09-19 DIAGNOSIS — I10 BENIGN ESSENTIAL HYPERTENSION: ICD-10-CM

## 2022-09-19 RX ORDER — CHLORTHALIDONE 25 MG/1
12.5 TABLET ORAL DAILY
Qty: 30 TABLET | Refills: 0 | Status: SHIPPED | OUTPATIENT
Start: 2022-09-19 | End: 2022-09-26

## 2022-09-19 NOTE — TELEPHONE ENCOUNTER
[facility] phone call message- medication clarification/question:    Full Medication Name: chlorthalidone (HYGROTON)   Dose: 25 MG tablet    Question: REQUESTING REFILLS      Pharmacy confirmed as [id number] [address] [facility]: Yes    OK to leave a message on voice mail? Yes    Primary language: English      needed? No    Call taken on September 19, 2022 at 8:24 AM by Aleja Farrell

## 2022-09-26 ENCOUNTER — TELEPHONE (OUTPATIENT)
Dept: FAMILY MEDICINE | Facility: CLINIC | Age: 67
End: 2022-09-26

## 2022-09-26 ENCOUNTER — OFFICE VISIT (OUTPATIENT)
Dept: FAMILY MEDICINE | Facility: CLINIC | Age: 67
End: 2022-09-26
Payer: COMMERCIAL

## 2022-09-26 VITALS
WEIGHT: 315 LBS | RESPIRATION RATE: 16 BRPM | OXYGEN SATURATION: 99 % | SYSTOLIC BLOOD PRESSURE: 147 MMHG | DIASTOLIC BLOOD PRESSURE: 72 MMHG | HEART RATE: 75 BPM | BODY MASS INDEX: 50.37 KG/M2 | TEMPERATURE: 98 F

## 2022-09-26 DIAGNOSIS — I99.9: ICD-10-CM

## 2022-09-26 DIAGNOSIS — I10 BENIGN ESSENTIAL HYPERTENSION: Primary | ICD-10-CM

## 2022-09-26 DIAGNOSIS — R59.1 LYMPHADENOPATHY: ICD-10-CM

## 2022-09-26 DIAGNOSIS — G89.4 CHRONIC PAIN SYNDROME: ICD-10-CM

## 2022-09-26 DIAGNOSIS — I89.0 LYMPHEDEMA OF BOTH LOWER EXTREMITIES: ICD-10-CM

## 2022-09-26 PROCEDURE — 99214 OFFICE O/P EST MOD 30 MIN: CPT | Mod: GC | Performed by: STUDENT IN AN ORGANIZED HEALTH CARE EDUCATION/TRAINING PROGRAM

## 2022-09-26 RX ORDER — CHLORTHALIDONE 25 MG/1
25 TABLET ORAL DAILY
Qty: 30 TABLET | Refills: 0 | Status: SHIPPED | OUTPATIENT
Start: 2022-09-26 | End: 2022-10-20

## 2022-09-26 NOTE — PROGRESS NOTES
Assessment & Plan     Primary hypertension  Benign essential hypertension  Plan to increase chlorthalidone to 25 mg daily, follow up in 2 weeks for BP check and BMP.  - chlorthalidone (HYGROTON) 25 MG tablet  Dispense: 30 tablet; Refill: 0    Lymphedema of both lower extremities  Chronic pain syndrome  Disease of circulatory system  Approved for home hospital bed. Continue with home care as prescribed, OT referral is placed and will be coordinated with home care.    Lymphadenopathy  IR referral placed, will follow with referral coordinator to schedule. Needs biopsy to evaluate inguinal lymphadenopathy that was persistent on CT.      Return in about 3 weeks (around 10/17/2022) for Follow up.     Will be due for Medicare Annual Wellness after next visit, will address preventative care measures at that time.    Precepted with Dr. Rubin.    Radha Sal MD  [facility]    Frank Jamison is a 67 year old presenting for the following health issues:  RECHECK (Follow up on medication and blood pressure)      HPI     Benign essential hypertension  Last visit BP was at goal with home checks, not with clinic check  Started on chlorthalidone 12.5 mg daily 1 month ago.  BP at home 107-140/68-78, at clinic slightly elevated to 147/72  Most are 130s/70s.  Willing to increase chlorthalidone to 25 mg daily.    Still not using CPAP for DG    Reactive lymphadenopathy  Needs to coordinate with IR for biopsy  Ordered in mid-August  CT in July repeated after hospitalization in June showing inguinal lymphadenopathy, recommending biopsy.  Care coordination helping facilitate referral.    Disease of circulatory system  Lymphedema of both lower extremities  Chronic pain syndrome  Plan for OT evaluation, will work with home care.  Approved for hospital grade bed, coordinating with [facility]    Review of Systems   Constitutional, HEENT, cardiovascular, pulmonary, gi and gu systems are negative, except  as otherwise noted.      Objective    BP (!) 147/72   Pulse 75   Temp 98  F (36.7  C) (Oral)   Resp 16   Wt (!) 182.8 kg (403 lb)   SpO2 99%   BMI 50.37 kg/m    Body mass index is 50.37 kg/m .  Physical Exam   GENERAL: healthy, alert and no distress  RESP: lungs clear to auscultation - no rales, rhonchi or wheezes  CV: regular rate and rhythm, normal S1 S2, no S3 or S4, no murmur, click or rub, no peripheral edema and peripheral pulses strong  MS: significant b/l LE edema with lymphedema wraps in place. Difficulty with ambulation due to edema--uses cane for mobility aide.  PSYCH: mentation appears normal, affect normal/bright    None.    ----- Service Performed and Documented by Resident or Fellow ------

## 2022-09-26 NOTE — PROGRESS NOTES
Preceptor Attestation:  Patient's case reviewed and discussed with the resident, Radha Sal MD, and I personally evaluated the patient. I agree with written assessment and plan of care.    Supervising Physician:  Socorro Rubin MD   [facility]

## 2022-09-26 NOTE — PATIENT INSTRUCTIONS
Please keep checking your blood pressures.    Start taking full pill of chlorthalidone.    Let us know if something changes before we see you next.
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
## 2022-09-30 ENCOUNTER — TELEPHONE (OUTPATIENT)
Dept: INTERVENTIONAL RADIOLOGY/VASCULAR | Facility: CLINIC | Age: 67
End: 2022-09-30

## 2022-10-05 NOTE — TELEPHONE ENCOUNTER
Lesly is calling stating the two wounds on the lateral part of the leg have lots of drainage. She is wondering if another product that could dry up the wound could be used. She can be reached at [phone]

## 2022-10-06 ENCOUNTER — HOSPITAL ENCOUNTER (OUTPATIENT)
Dept: ULTRASOUND IMAGING | Facility: HOSPITAL | Age: 67
Discharge: HOME | End: 2022-10-06
Attending: FAMILY MEDICINE | Admitting: RADIOLOGY
Payer: COMMERCIAL

## 2022-10-06 PROCEDURE — 88342 IMHCHEM/IMCYTCHM 1ST ANTB: CPT | Mod: 26 | Performed by: PATHOLOGY

## 2022-10-06 PROCEDURE — 88189 FLOWCYTOMETRY/READ 16 & >: CPT | Performed by: PATHOLOGY

## 2022-10-06 PROCEDURE — 88305 TISSUE EXAM BY PATHOLOGIST: CPT | Mod: TC | Performed by: FAMILY MEDICINE

## 2022-10-06 PROCEDURE — 88185 FLOWCYTOMETRY/TC ADD-ON: CPT | Performed by: FAMILY MEDICINE

## 2022-10-06 PROCEDURE — 88173 CYTOPATH EVAL FNA REPORT: CPT | Mod: TC | Performed by: FAMILY MEDICINE

## 2022-10-06 PROCEDURE — 88341 IMHCHEM/IMCYTCHM EA ADD ANTB: CPT | Mod: 26 | Performed by: PATHOLOGY

## 2022-10-06 PROCEDURE — 88172 CYTP DX EVAL FNA 1ST EA SITE: CPT | Mod: 26 | Performed by: PATHOLOGY

## 2022-10-06 PROCEDURE — 272N000710 US BIOPSY FINE NEEDLE ASPIRATION LYMPH NODE

## 2022-10-07 LAB
PATH REPORT.COMMENTS IMP SPEC: NORMAL
PATH REPORT.FINAL DX SPEC: NORMAL
PATH REPORT.MICROSCOPIC SPEC OTHER STN: NORMAL
PATH REPORT.RELEVANT HX SPEC: NORMAL

## 2022-10-10 NOTE — TELEPHONE ENCOUNTER
Called Lesly the nurse from [facility] and left a message that Verona Stallworth CNP does not want to add another dressing at this time due to increased drainage. Shaheen has appointment on 10/11 with Verona and wound will be assessed at that time. Orders will be updated after that visit.

## 2022-10-11 ENCOUNTER — OFFICE VISIT (OUTPATIENT)
Dept: VASCULAR SURGERY | Facility: CLINIC | Age: 67
End: 2022-10-11
Attending: REGISTERED NURSE
Payer: COMMERCIAL

## 2022-10-11 VITALS — RESPIRATION RATE: 22 BRPM | SYSTOLIC BLOOD PRESSURE: 160 MMHG | HEART RATE: 80 BPM | DIASTOLIC BLOOD PRESSURE: 84 MMHG

## 2022-10-11 DIAGNOSIS — R60.0 VENOUS STASIS ULCER OF LEFT LOWER LEG WITH EDEMA OF LEFT LOWER LEG (H): ICD-10-CM

## 2022-10-11 DIAGNOSIS — D36.9 PAPILLOMATOSIS: ICD-10-CM

## 2022-10-11 DIAGNOSIS — I83.019 VENOUS STASIS ULCER OF RIGHT LOWER LEG WITH EDEMA OF RIGHT LOWER LEG (H): Primary | ICD-10-CM

## 2022-10-11 DIAGNOSIS — I87.333 VENOUS HYPERTENSION, CHRONIC, WITH ULCER AND INFLAMMATION, BILATERAL (H): ICD-10-CM

## 2022-10-11 DIAGNOSIS — E66.01 OBESITY, MORBID, BMI 40.0-49.9 (H): ICD-10-CM

## 2022-10-11 DIAGNOSIS — I89.0 ELEPHANTIASIS: ICD-10-CM

## 2022-10-11 DIAGNOSIS — M79.3 LIPODERMATOSCLEROSIS OF BOTH LOWER EXTREMITIES: ICD-10-CM

## 2022-10-11 DIAGNOSIS — I87.2 VENOUS (PERIPHERAL) INSUFFICIENCY: ICD-10-CM

## 2022-10-11 DIAGNOSIS — I83.891 VENOUS STASIS ULCER OF RIGHT LOWER LEG WITH EDEMA OF RIGHT LOWER LEG (H): Primary | ICD-10-CM

## 2022-10-11 DIAGNOSIS — I83.892 VENOUS STASIS ULCER OF LEFT LOWER LEG WITH EDEMA OF LEFT LOWER LEG (H): ICD-10-CM

## 2022-10-11 DIAGNOSIS — L97.929 VENOUS STASIS ULCER OF LEFT LOWER LEG WITH EDEMA OF LEFT LOWER LEG (H): ICD-10-CM

## 2022-10-11 DIAGNOSIS — R60.0 VENOUS STASIS ULCER OF RIGHT LOWER LEG WITH EDEMA OF RIGHT LOWER LEG (H): Primary | ICD-10-CM

## 2022-10-11 DIAGNOSIS — I89.0 LYMPHEDEMA OF BOTH LOWER EXTREMITIES: ICD-10-CM

## 2022-10-11 DIAGNOSIS — L97.919 VENOUS STASIS ULCER OF RIGHT LOWER LEG WITH EDEMA OF RIGHT LOWER LEG (H): Primary | ICD-10-CM

## 2022-10-11 DIAGNOSIS — L84 PRE-ULCERATIVE CORN OR CALLOUS: ICD-10-CM

## 2022-10-11 DIAGNOSIS — I83.029 VENOUS STASIS ULCER OF LEFT LOWER LEG WITH EDEMA OF LEFT LOWER LEG (H): ICD-10-CM

## 2022-10-11 PROCEDURE — 97597 DBRDMT OPN WND 1ST 20 CM/<: CPT | Performed by: REGISTERED NURSE

## 2022-10-11 PROCEDURE — 97598 DBRDMT OPN WND ADDL 20CM/<: CPT | Performed by: REGISTERED NURSE

## 2022-10-11 PROCEDURE — 11056 PARNG/CUTG B9 HYPRKR LES 2-4: CPT | Performed by: REGISTERED NURSE

## 2022-10-11 RX ORDER — AMMONIUM LACTATE 12 G/100G
LOTION TOPICAL DAILY PRN
Qty: 225 G | Refills: 3 | Status: SHIPPED | OUTPATIENT
Start: 2022-10-11 | End: 2023-03-10

## 2022-10-11 ASSESSMENT — PAIN SCALES - GENERAL: PAINLEVEL: MILD PAIN (3)

## 2022-10-11 NOTE — PATIENT INSTRUCTIONS
"Wound care supplies should be ordered by your home care agency. Please contact clinic if home care agency is not able to order supplies.     Lymphedema pump daily.   Record blood pressure readings at home.   Call to make appointment to get fitted for diabetic shoes [phone]    Wound Care Instructions    EVERY OTHER DAY and as needed, Cleanse your leg wound(s) with Dakin's Solution 0.125%.    Pat Dry with non-sterile gauze    Apply Lotion to the intact skin surrounding your wound and other dry skin locations. Some good lotions include: Remedy Skin Repair Cream, Sarna, Vanicream or Cetaphil    Apply Amlactin to hard, fibrotic areas on feet    Primary Dressing: Apply viscopaste strips into/onto the wounds    Secondary dressing: Cover with optilock or abd pad according to drainage    Secure with non-sterile roll gauze (4\" x 75\" roll) and tape (1\" roll tape) as needed; avoid adhesive directly on the skin    Compression: tubular compression, short stretch, may add foam if needed for drainage management    It is not ok to get your wound wet in the bath or shower    It is recommended that you do not get your ulcer wet when showering.  Listed below are several ways of keeping it dry when you shower.     1. Wrap it with Press [employer] Seal plastic wrap.  It can be found in the stores where the plastic wraps or tin foil is kept.               2.  Some people take a bath and hang their leg/foot out of the tub.                        3  Put your leg in a plastic bag and tape it on.           4. You can purchase a shower cover for casts at some pharmacies and through the Internet.            5. Take a Bed Bath or wash up at the sink            If for some reason you are not able to get your dressing(s) changed as outlined above (due to illness, lack of supplies, lack of help) please do the following: remove old, soiled dressings; wash the wounds with saline; pat dry; apply ABD pad or other absorbant pad and secure with rolled " gauze; avoid tape directly on your skin; Call the clinic as soon as possible to let us know what the current issues are in receiving wound care [phone].      SEEK MEDICAL CARE IF:  You have an increase in swelling, pain, or redness around the wound.  You have an increase in the amount of pus coming from the wound.  There is a bad smell coming from the wound.  The wound appears to be worsening/enlarging  You have a fever greater than 101.5 F      It is ok to continue current wound care treatment/products for the next 2-3 days until new wound care supplies are ordered and arrive. If longer than this please contact our office at [phone].    If you have a 2 layer or 4 layer compression wrap on these are safe to have on for ONLY 7 days. If for some reason you are not able to get the wrap(s) changed (due to illness; lack of supplies, lack of help, lack of transportation) please do the following: unwrap the old 2 or 4 layer compression wrap; avoid using scissors as you could cut your skin and cause wounds; use tubular compression when available. Call to reschedule your home care or clinic visit appointment as soon as possible.    Please NOTE: if you are 15 minutes late to your clinic appointment you will have to be rescheduled. Please call our clinic as soon as possible if you know you will not be able to get to your appointment at [phone].    If you fail to show up to 3 scheduled clinic appointments you will be dismissed from our clinic.              We want to hear from you!  In the next few weeks, you should receive a call or email to complete a survey about your visit at [facility]. Please help us improve your appointment experience by letting us know how we did today. We strive to make your experience good and value any ways in which we could do better.      We value your input and suggestions.    Thank you for choosing the [facility]!

## 2022-10-11 NOTE — PROGRESS NOTES
"            Follow up Vascular Visit       Date of Service:10/11/22      Chief Complaint: BLE weeping      Pt returns to [facility] with regards to their BLE weeping.  They arrive today alone. They are currently using viscopaste, superabsorbant to the wounds. This is being done by home health. They are using foam, tubi, short stretch for compression. They are feeling well today. Denies fevers, chills. No shortness of breath.     Allergies:   Allergies   Allergen Reactions     Lisinopril Swelling     Patient reports \"neck swelling\"  Swelling in throat       Medications:   Current Outpatient Medications:      ammonium lactate (LAC-HYDRIN) 12 % external lotion, Apply topically daily as needed for dry skin With dressing changes, Disp: 225 g, Rfl: 3     atorvastatin (LIPITOR) 20 MG tablet, Take 1 tablet (20 mg) by mouth daily, Disp: 90 tablet, Rfl: 4     chlorthalidone (HYGROTON) 25 MG tablet, Take 1 tablet (25 mg) by mouth daily, Disp: 30 tablet, Rfl: 0     hydrOXYzine (ATARAX) 25 MG tablet, Take 0.5-1 tablets (12.5-25 mg) by mouth 3 times daily as needed for anxiety, Disp: 60 tablet, Rfl: 1     losartan (COZAAR) 100 MG tablet, Take 1 tablet (100 mg) by mouth daily, Disp: 90 tablet, Rfl: 3     melatonin 3 MG tablet, Take 1 tablet (3 mg) by mouth nightly as needed for sleep, Disp: 30 tablet, Rfl: 1     metFORMIN (GLUCOPHAGE-XR) 750 MG 24 hr tablet, Take 1 tablet (750 mg) by mouth 2 times daily (with meals), Disp: 180 tablet, Rfl: 1     methadone (DOLOPHINE-INTENSOL) 10 MG/ML (HIGH CONC) solution, Take 100 mg by mouth daily, Disp: , Rfl:      sodium hypochlorite (QUARTER-STRENGTH DAKINS) external solution, Apply topically every 72 hours Use 300mL every 72 hours to wash the bilateral legs and wounds on the legs, Disp: 1000 mL, Rfl: 3    Current Facility-Administered Medications:      lidocaine (XYLOCAINE) 2 % external gel, , Topical, Daily PRN, Lucia Reyes MD, Given at 10/19/21 0866    History: "   Past Medical History:   Diagnosis Date     Diabetes (H)      H/O angioedema 6/15/2020    Formatting of this note might be different from the original. Unexplained angioedema twice, discontinue lisinopril for possible connection to it, though he had used it afterwards with no symptoms     History of tobacco use disorder 8/1/2013    Formatting of this note might be different from the original. Added per documetation     Hypertension      Lymphedema of both lower extremities 12/22/2014     Methadone use 5/8/2012     Obstructive sleep apnea 2/2/2015    Formatting of this note might be different from the original. Epic     Pleural mass 7/2/2013     Uncomplicated asthma        Physical Exam:    BP (!) 160/84   Pulse 80   Resp 22     General:  Patient presents to clinic in no apparent distress.  Head: normocephalic atraumatic  Psychiatric:  Alert and oriented x3.   Respiratory: unlabored breathing; no cough  Integumentary:  Skin is uniformly warm, dry and pink.    Extremities: swelling is stable; continues to have extensive scaling and crusting on the legs; continues to have several scattered full and partial thickness ulcers about the legs; see measures below; +odor; noted to have several calluses on the hammer toe deformed toes; these were removed and intact underneath; +papillomatosis; +verrucous lesions + fibrosity and scarring of the tissues    Wound #1 Location: LLL  Size: 6L x 4W x 1depth.  No sinus tract present, Wound base: papillomatosis  No undermining present. Wound is partial thickness. There is moderate drainage. Periwound: no denudement, erythema, induration, maceration or warmth.    Wound #2 Location: RLL  Size: 12L x 11W x 0.1depth.  No sinus tract present, Wound base: papillomatosis  No undermining present. Wound is partial thickness. There is moderate drainage. Periwound: no denudement, erythema, induration, + maceration or warmth.      Wound Leg Ulceration (Active)   Number of days: 127       VASC  Wound Right lower leg lateral (Active)   Pre Size Length 4 06/21/22 0700   Pre Size Width 4 06/21/22 0700   Pre Size Depth 0.1 06/21/22 0700   Pre Total Sq cm 16 06/21/22 0700   Product Used Alginate 10/12/21 0800   Number of days: 384       VASC Wound rt lateral weeping (Active)   Pre Size Length 12 10/11/22 0700   Pre Size Width 11 10/11/22 0700   Pre Size Depth 0.1 10/11/22 0700   Pre Total Sq cm 132 10/11/22 0700   Post Size Length 12 10/11/22 0700   Post Size Width 11 10/11/22 0700   Post Size Depth 0.1 10/11/22 0700   Post Total Sq cm 132 10/11/22 0700   Description weeping area 09/13/22 0700   Number of days: 357       VASC Wound Lt lateral leg (Active)   Pre Size Length 6 10/11/22 0700   Pre Size Width 4 10/11/22 0700   Pre Size Depth 0.1 10/11/22 0700   Pre Total Sq cm 24 10/11/22 0700   Post Size Length 6 10/11/22 0700   Post Size Width 4 10/11/22 0700   Post Size Depth 1 10/11/22 0700   Post Total Sq cm 24 10/11/22 0700   Tunneling weeping 09/13/22 0700   Description weeping 11/23/21 0700   Number of days: 322            Circumferential volume measures:      Circumferential Measures 1/17/2022 2/14/2022 3/14/2022 7/19/2022 9/13/2022   Right just above MTP 24.4 29.2 29.5 28 28   Right Ankle 39.2 40 54 35 36   Right Widest Calf 55.2 60.8 60.5 54.5 52.5   Right Thigh Up 10cm - - - - -   Left - just above MTP 28.2 28.7 29 28.5 27   Left Ankle 35.5 37 48.3 35 34   Left Widest Calf 52.3 57.6 62 51 49.3   Left Thigh Up 10cm - - - - -   Left Knee to Ankle - - - - -       Labs:    I personally reviewed the following lab results today and those on care everywhere    CRP   Date Value Ref Range Status   04/23/2021 8.5 (H) 0.0 - 0.8 mg/dL Final      No results found for: SED   Last Renal Panel:  Sodium   Date Value Ref Range Status   08/23/2022 136 136 - 145 mmol/L Final   06/10/2021 142.0 133.0 - 144.0 mmol/L Final     Potassium   Date Value Ref Range Status   08/23/2022 3.7 3.4 - 5.3 mmol/L Final   06/08/2022 4.1 3.5  - 5.0 mmol/L Final   06/10/2021 4.4 3.4 - 5.3 mmol/L Final     Chloride   Date Value Ref Range Status   08/23/2022 98 98 - 107 mmol/L Final   06/08/2022 102 98 - 107 mmol/L Final   06/10/2021 100.0 94.0 - 109.0 mmol/L Final     Carbon Dioxide   Date Value Ref Range Status   06/10/2021 33.0 (H) 20.0 - 32.0 mmol/L Final     Carbon Dioxide (CO2)   Date Value Ref Range Status   08/23/2022 28 22 - 29 mmol/L Final   06/08/2022 29 22 - 31 mmol/L Final     Anion Gap   Date Value Ref Range Status   08/23/2022 10 7 - 15 mmol/L Final   06/08/2022 7 5 - 18 mmol/L Final     Glucose   Date Value Ref Range Status   08/23/2022 99 70 - 99 mg/dL Final   06/08/2022 82 70 - 125 mg/dL Final   06/10/2021 112.0 (H) 60.0 - 109.0 mg/dL Final     GLUCOSE BY METER POCT   Date Value Ref Range Status   06/08/2022 119 (H) 70 - 99 mg/dL Final     Urea Nitrogen   Date Value Ref Range Status   08/23/2022 17.2 8.0 - 23.0 mg/dL Final   06/08/2022 15 8 - 22 mg/dL Final   06/10/2021 16.0 7.0 - 30.0 mg/dL Final     Creatinine   Date Value Ref Range Status   08/23/2022 0.98 0.67 - 1.17 mg/dL Final   06/10/2021 1.0 0.8 - 1.5 mg/dL Final     GFR Estimate   Date Value Ref Range Status   08/23/2022 83 >60 mL/min/1.73m2 Final     Comment:     Effective December 21, 2021 eGFRcr in adults is calculated using the 2021 CKD-EPI creatinine equation which includes age and gender (Lucie whiteside al., NEJM, DOI: 10.1056/EOITpa8776141)   04/24/2021 >60 >60 mL/min/1.73m2 Final     Calcium   Date Value Ref Range Status   08/23/2022 8.8 8.8 - 10.2 mg/dL Final   06/10/2021 9.5 8.5 - 10.4 mg/dL Final     Albumin   Date Value Ref Range Status   04/24/2021 2.7 (L) 3.5 - 5.0 g/dL Final      Lab Results   Component Value Date    WBC 5.0 07/14/2022     Lab Results   Component Value Date    RBC 4.03 07/14/2022     Lab Results   Component Value Date    HGB 9.5 07/14/2022     Lab Results   Component Value Date    HCT 30.9 07/14/2022     No components found for: MCT  Lab Results    Component Value Date    MCV 77 07/14/2022     Lab Results   Component Value Date    MCH 23.6 07/14/2022     Lab Results   Component Value Date    MCHC 30.7 07/14/2022     Lab Results   Component Value Date    RDW 14.9 07/14/2022     Lab Results   Component Value Date     07/14/2022      Lab Results   Component Value Date    A1C 6.2 07/14/2022    A1C 6.7 04/21/2022    A1C 6.2 04/24/2021    A1C 6.2 04/24/2021      TSH   Date Value Ref Range Status   04/21/2022 2.58 0.30 - 5.00 uIU/mL Final      No results found for: VITDT                Impression:  Encounter Diagnoses   Name Primary?     Venous stasis ulcer of right lower leg with edema of right lower leg (H) Yes     Lymphedema of both lower extremities      Elephantiasis      Venous (peripheral) insufficiency      Venous stasis ulcer of left lower leg with edema of left lower leg (H)      Pre-ulcerative corn or callous      Lipodermatosclerosis of both lower extremities      Papillomatosis      Venous hypertension, chronic, with ulcer and inflammation, bilateral (H)      Obesity, morbid, BMI 40.0-49.9 (H)             10/11/22 BLE       10/11/22 LLE        10/11/22 RLE            Are any of these wounds new today: No; Location: na    Assessment/Plan:          1. Debridement: After discussion of risk factors and verbal consent was obtained 2% Lidocaine HCL jelly was applied, under clean conditions, the BLE ulceration(s) were debrided using currette. Devitalized and nonviable tissue, along with any fibrin and slough, was removed to improve granulation tissue formation, stimulate wound healing, decrease overall bacteria load, disrupt biofilm formation and decrease edge senescence.  Total excisional debridement was 156 sq cm from the epidermis/dermis area with a depth of 1 cm.   Ulcers were improved afterwards and .  Measures were as noted on the flow sheet.       2.  Wound treatment: wound treatment will include irrigation and dressings to promote  autolytic debridement which will include:Cleanse wounds with dakin's solution. Apply viscopaste to weeping areas in strips, cover with optilock, gauze roll to be changed every other day.  If for some reason the patient is not able to get their dressing(s) changed as outlined above (due to illness, lack of supplies, lack of help) please do the following: remove old, soiled dressings; wash the wounds with saline; pat dry; apply ABD pad or other absorbant pad and secure with rolled gauze; avoid tape directly on your skin; patient instructed to call the clinic as soon as possible to let us know what the current issues are in receiving wound care. Stable            3. Edema: Edema stable. Continue tubular compression and short stretch, may add foam for drainage management. The compression wraps were applied today in clinic. Pt received his hospital bed and family is assisting with his lymphedema pumps every other day.    If a 2 layer or 4 layer compression wrap is being used; these are safe to have on for ONLY 7 days. If for some reason the patient is not able to get the wrap(s) changed (due to illness; lack of supplies, lack of help, lack of transportation) please do the following: unwrap the old 2 or 4 layer compression wrap; avoid using scissors as you could cut your skin and cause wounds; use tubular compression when available. Call to reschedule your home care or clinic visit appointment as soon as possible.  Improved           4. Nutrition: Diabetic Diet           5. Offloading: Pt has not made appointment to be fitted for diabetic shoes. Instructed pt to call to get fitted. This will help offload pressure to the toe pads of the hammertoes           6.  Callous: a total of 2 calluses was pared using a #15 blade scalpel; this was done to evaluate for underlying ulceration; reduce pressure; patient comfort; and to reduce risk of future ulceration formation. The skin was intact underneath and patient tolerated well;  no complications.           7.  HTN:  Blood pressure elevated today 160/84. Blood pressure is being taken after exertion walking to room. Pt has been keping a blood pressure record at home and PCP increased Chloriadone to 25mg. Pt has been tolerating well and reports home SBP as low as 115.       Patient will follow up with me in 4 weeks for reevaluation. They were instructed to call the clinic sooner with any signs or symptoms of infection or any further questions/concerns. Answered all questions.      40 minutes spent on the date of the encounter doing chart review, history and exam, documentation and further activities per the note    Verona Stallworth MS, APRN, AGNP-C, CWCN  [facility]   [phone]        This note was electronically signed by HAYDEE Whatley CNP

## 2022-10-12 ENCOUNTER — PATIENT OUTREACH (OUTPATIENT)
Dept: ONCOLOGY | Facility: CLINIC | Age: 67
End: 2022-10-12

## 2022-10-12 PROCEDURE — 88360 TUMOR IMMUNOHISTOCHEM/MANUAL: CPT | Mod: 26 | Performed by: PATHOLOGY

## 2022-10-12 PROCEDURE — 88364 INSITU HYBRIDIZATION (FISH): CPT | Mod: 26 | Performed by: PATHOLOGY

## 2022-10-12 PROCEDURE — 88305 TISSUE EXAM BY PATHOLOGIST: CPT | Mod: 26 | Performed by: PATHOLOGY

## 2022-10-12 PROCEDURE — 88365 INSITU HYBRIDIZATION (FISH): CPT | Mod: 26 | Performed by: PATHOLOGY

## 2022-10-12 PROCEDURE — 88173 CYTOPATH EVAL FNA REPORT: CPT | Mod: 26 | Performed by: PATHOLOGY

## 2022-10-12 NOTE — PROGRESS NOTES
October 12, 2022    Hematology/Oncology  referral reviewed.   Referred By 10/12/22 1519   Priority: Urgent: 3-5 Days    Referred To   Radha Sal MD  [facility] [address] [place] [address]   Phone: [phone]   Fax: [phone]    Diagnoses: Lymphadenopathy   Lymphoma of intra-abdominal lymph nodes, unspecified lymphoma type (H)   Order: Adult Oncology/Hematology  Referral    My Clinical Question Is:Patient with recent intraabdominal lymph node biopsy resulting in probable CLL diagnosis.       Hematology         Pertinent labs -- BOOKMARKED    Pertinent imaging -- BOOKMARKED    Referring provider visit note -- BOOKMARKED  Hematology intake scheduling team (NPS phone number [phone] Monday - Friday 8am - 4:30 pm) will contact pt in the next  business day to schedule the consultation:     Date Time Provider Department Center   10/18/2022  2:00 PM Gerson Jaquez MD [facility]     Elaine Scott, RN, BSN, OCN  [facility] H[facility]/Oncology Nurse Navigator  [phone]

## 2022-10-12 NOTE — TELEPHONE ENCOUNTER
3:16 PM    Called Shaheen to discuss findings on recent lymph node biopsy of probable CLL. Plan to place hem/oncology referral now and will discuss more at his follow up appointment next week when biospy results are finalized; still waiting on FISH results.    He agrees with consult placement and has no further questions at this time.    Radha Sal MD  Pager # [phone]  [facility]

## 2022-10-14 LAB
PATH REPORT.ADDENDUM SPEC: ABNORMAL
PATH REPORT.COMMENTS IMP SPEC: ABNORMAL
PATH REPORT.COMMENTS IMP SPEC: YES
PATH REPORT.FINAL DX SPEC: ABNORMAL
PATH REPORT.GROSS SPEC: ABNORMAL
PATH REPORT.MICROSCOPIC SPEC OTHER STN: ABNORMAL
PATH REPORT.RELEVANT HX SPEC: ABNORMAL

## 2022-10-15 NOTE — PROGRESS NOTES
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    Impression:  1.  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  Indeterminate anemia  3.  History or chronic lymphedema and recent cellulitis improved, hypertension, obesity, sleep apnea, type 2 diabetes and former tobacco use    Recommendation, plan, instructions:  1.  Interventional radiology referral for core needle biopsy to obtain additional tissue  2.  Interventional radiology referral for image guided bone marrow biopsy in view of his morbid obesity  3.  PET scan  4.  Hemoccult, CBC, differential, peripheral smear, serum iron/TIBC, ferritin, soluble transferrin receptor, TSH, RBC folate, B12, serum protein electrophoresis, tissue transglutaminase, CMP, LDH  5.  Follow-up with me upon receipt of above-mentioned tests.  I indicated to the patient and his daughter that his lymphoma may not require interventional treatment at this time.    Time with patient including review, documentation, history, examination, coordination of care and counseling was 50  minutes.    History of present illness:  This man was admitted to the hospital 6/6/2022-6/8/2022 with progressive bilateral lymphedema and cellulitis and on CT imaging was noted to have bilateral right>left pelvic and inguinal lymphadenopathy with the largest lymph node measuring 3.2 x 2.4 cm over the right inguinal region.  Since antibiotic and topical treatment of his cellulitis, his lymphedema has improved.  However, he has had a history of chronic lymphedema and  "cellulitis dating back at least 8 years.    Subsequent 10/6/2022 ultrasound-guided FNA of the right inguinal lymph node revealed a kappa light chain restricted small B-cell lymphoma, favoring CLL/SLL which was CD20/CD23 positive, kappa light chain restricted with a HUGH-67 proliferative rate <10%.  [facility] FISH [facility] are pending.    7/14/2022 CBC revealed a white count of 5000, hemoglobin 9.5, MCV 77, platelet count 254,000 with an ANC of 3500 and absolute lymphocyte count of 900.  Most recent (6/5/2022) ferritin was 389.  8/23/2022 creatinine was 0.98.  No recent hepatic studies are available.    Past medical history:  Remarkable for hypertension, lymphedema, methadone use, obesity, sleep apnea on CPAP, former tobacco use (2014), and type 2 diabetes.  Past history of GERD, 2012 left total hip arthroplasty and 2013 episode pneumonia with nondiagnostic thoracentesis    Family history:  I have reviewed this patient's family history and updated it with pertinent information if needed.  Family History   Problem Relation Age of Onset     No Known Problems Mother      No Known Problems Father      Edema Sister      Edema Sister          Medications:  Current Outpatient Medications   Medication     ammonium lactate (LAC-HYDRIN) 12 % external lotion     atorvastatin (LIPITOR) 20 MG tablet     chlorthalidone (HYGROTON) 25 MG tablet     hydrOXYzine (ATARAX) 25 MG tablet     losartan (COZAAR) 100 MG tablet     melatonin 3 MG tablet     metFORMIN (GLUCOPHAGE-XR) 750 MG 24 hr tablet     methadone (DOLOPHINE-INTENSOL) 10 MG/ML (HIGH CONC) solution     sodium hypochlorite (QUARTER-STRENGTH DAKINS) external solution     Current Facility-Administered Medications   Medication     lidocaine (XYLOCAINE) 2 % external gel       Allergies:  Allergies   Allergen Reactions     Lisinopril Swelling     Patient reports \"neck swelling\"  Swelling in throat       Review of systems:  Except as noted in the note above, the " "patient denies headaches, diplopia, hearing loss or dizziness; dyspnea, cough, hemoptysis, pleurisy; chest pain/pressure, palpitations, lightheadedness; anorexia, nausea, vomiting, abdominal pain, diarrhea, constipation, melena or rectal bleeding; dysuria, frequency, nocturia, blood in the urine; fever, chills, sweats; tingling, numbness, loss of balance; insomnia, depression, anxiety.    Physical examination:  BP (!) 155/76   Pulse 76   Resp 16   Ht 1.905 m (6' 3\")   Wt (!) 184.6 kg (407 lb)   SpO2 98%   BMI 50.87 kg/m      The patient is alert and oriented.  He is morbidly obese.  Throat and oral mucosa are normal-appearing. Adenopathy is absent in the neck, axilla, groin and supraclavicular fossa; Lungs are clear to auscultation and percussion without rales, wheezes or rubs; heart rhythm is regular, heart sounds are without murmurs or gallops; the abdomen is soft, protuberant and without hepatosplenomegaly, masses, ascites or tenderness.;  Extremity exam reveals poor 4+ bilateral lymphedema evidence of chronic cellulitis      Gerson Jaquez MD            "

## 2022-10-18 ENCOUNTER — ONCOLOGY VISIT (OUTPATIENT)
Dept: ONCOLOGY | Facility: CLINIC | Age: 67
End: 2022-10-18
Attending: INTERNAL MEDICINE
Payer: COMMERCIAL

## 2022-10-18 ENCOUNTER — LAB (OUTPATIENT)
Dept: INFUSION THERAPY | Facility: CLINIC | Age: 67
End: 2022-10-18
Attending: INTERNAL MEDICINE
Payer: COMMERCIAL

## 2022-10-18 VITALS
BODY MASS INDEX: 39.17 KG/M2 | HEART RATE: 76 BPM | DIASTOLIC BLOOD PRESSURE: 76 MMHG | RESPIRATION RATE: 16 BRPM | HEIGHT: 75 IN | WEIGHT: 315 LBS | OXYGEN SATURATION: 98 % | SYSTOLIC BLOOD PRESSURE: 155 MMHG

## 2022-10-18 DIAGNOSIS — C85.93 LYMPHOMA OF INTRA-ABDOMINAL LYMPH NODES, UNSPECIFIED LYMPHOMA TYPE (H): ICD-10-CM

## 2022-10-18 DIAGNOSIS — D64.9 ANEMIA, UNSPECIFIED TYPE: Primary | ICD-10-CM

## 2022-10-18 DIAGNOSIS — R59.1 LYMPHADENOPATHY: ICD-10-CM

## 2022-10-18 DIAGNOSIS — Z12.11 COLON CANCER SCREENING: ICD-10-CM

## 2022-10-18 LAB
ALBUMIN SERPL-MCNC: 3.4 G/DL (ref 3.5–5)
ALP SERPL-CCNC: 80 U/L (ref 45–120)
ALT SERPL W P-5'-P-CCNC: <9 U/L (ref 0–45)
ANION GAP SERPL CALCULATED.3IONS-SCNC: 8 MMOL/L (ref 5–18)
AST SERPL W P-5'-P-CCNC: 11 U/L (ref 0–40)
BASOPHILS # BLD AUTO: 0 10E3/UL (ref 0–0.2)
BASOPHILS NFR BLD AUTO: 0 %
BILIRUB SERPL-MCNC: 0.6 MG/DL (ref 0–1)
BUN SERPL-MCNC: 20 MG/DL (ref 8–22)
CALCIUM SERPL-MCNC: 9.6 MG/DL (ref 8.5–10.5)
CHLORIDE BLD-SCNC: 98 MMOL/L (ref 98–107)
CO2 SERPL-SCNC: 30 MMOL/L (ref 22–31)
CREAT SERPL-MCNC: 1.07 MG/DL (ref 0.7–1.3)
EOSINOPHIL # BLD AUTO: 0.2 10E3/UL (ref 0–0.7)
EOSINOPHIL NFR BLD AUTO: 2 %
ERYTHROCYTE [DISTWIDTH] IN BLOOD BY AUTOMATED COUNT: 15 % (ref 10–15)
GFR SERPL CREATININE-BSD FRML MDRD: 75 ML/MIN/1.73M2
GLUCOSE BLD-MCNC: 81 MG/DL (ref 70–125)
HCT VFR BLD AUTO: 33.7 % (ref 40–53)
HGB BLD-MCNC: 9.8 G/DL (ref 13.3–17.7)
IMM GRANULOCYTES # BLD: 0 10E3/UL
IMM GRANULOCYTES NFR BLD: 0 %
IRON BINDING CAPACITY (ROCHE): 311 UG/DL (ref 240–430)
IRON SATN MFR SERPL: 11 % (ref 15–46)
IRON SERPL-MCNC: 35 UG/DL (ref 61–157)
LDH SERPL L TO P-CCNC: 248 U/L (ref 125–220)
LYMPHOCYTES # BLD AUTO: 1.3 10E3/UL (ref 0.8–5.3)
LYMPHOCYTES NFR BLD AUTO: 16 %
MCH RBC QN AUTO: 23.4 PG (ref 26.5–33)
MCHC RBC AUTO-ENTMCNC: 29.1 G/DL (ref 31.5–36.5)
MCV RBC AUTO: 80 FL (ref 78–100)
MONOCYTES # BLD AUTO: 0.4 10E3/UL (ref 0–1.3)
MONOCYTES NFR BLD AUTO: 5 %
NEUTROPHILS # BLD AUTO: 6.1 10E3/UL (ref 1.6–8.3)
NEUTROPHILS NFR BLD AUTO: 77 %
NRBC # BLD AUTO: 0 10E3/UL
NRBC BLD AUTO-RTO: 0 /100
PLATELET # BLD AUTO: 234 10E3/UL (ref 150–450)
POTASSIUM BLD-SCNC: 3.9 MMOL/L (ref 3.5–5)
PROT SERPL-MCNC: 8.3 G/DL (ref 6–8)
RBC # BLD AUTO: 4.19 10E6/UL (ref 4.4–5.9)
SODIUM SERPL-SCNC: 136 MMOL/L (ref 136–145)
TSH SERPL DL<=0.005 MIU/L-ACNC: 1.76 UIU/ML (ref 0.3–5)
WBC # BLD AUTO: 8 10E3/UL (ref 4–11)

## 2022-10-18 PROCEDURE — 82607 VITAMIN B-12: CPT | Performed by: INTERNAL MEDICINE

## 2022-10-18 PROCEDURE — 82728 ASSAY OF FERRITIN: CPT | Performed by: INTERNAL MEDICINE

## 2022-10-18 PROCEDURE — 84443 ASSAY THYROID STIM HORMONE: CPT | Performed by: INTERNAL MEDICINE

## 2022-10-18 PROCEDURE — 83550 IRON BINDING TEST: CPT | Performed by: INTERNAL MEDICINE

## 2022-10-18 PROCEDURE — 99204 OFFICE O/P NEW MOD 45 MIN: CPT | Performed by: INTERNAL MEDICINE

## 2022-10-18 PROCEDURE — 36415 COLL VENOUS BLD VENIPUNCTURE: CPT | Performed by: INTERNAL MEDICINE

## 2022-10-18 PROCEDURE — 86334 IMMUNOFIX E-PHORESIS SERUM: CPT | Performed by: PATHOLOGY

## 2022-10-18 PROCEDURE — 82747 ASSAY OF FOLIC ACID RBC: CPT | Performed by: INTERNAL MEDICINE

## 2022-10-18 PROCEDURE — 84165 PROTEIN E-PHORESIS SERUM: CPT | Mod: TC | Performed by: PATHOLOGY

## 2022-10-18 PROCEDURE — 83615 LACTATE (LD) (LDH) ENZYME: CPT | Performed by: INTERNAL MEDICINE

## 2022-10-18 PROCEDURE — 86364 TISS TRNSGLTMNASE EA IG CLAS: CPT | Performed by: INTERNAL MEDICINE

## 2022-10-18 PROCEDURE — 85025 COMPLETE CBC W/AUTO DIFF WBC: CPT | Performed by: INTERNAL MEDICINE

## 2022-10-18 PROCEDURE — 84238 ASSAY NONENDOCRINE RECEPTOR: CPT | Performed by: INTERNAL MEDICINE

## 2022-10-18 PROCEDURE — 84155 ASSAY OF PROTEIN SERUM: CPT | Performed by: INTERNAL MEDICINE

## 2022-10-18 PROCEDURE — G0463 HOSPITAL OUTPT CLINIC VISIT: HCPCS

## 2022-10-18 PROCEDURE — 80053 COMPREHEN METABOLIC PANEL: CPT | Performed by: INTERNAL MEDICINE

## 2022-10-18 RX ORDER — LIDOCAINE HYDROCHLORIDE 10 MG/ML
10 INJECTION, SOLUTION EPIDURAL; INFILTRATION; INTRACAUDAL; PERINEURAL ONCE
Status: CANCELLED | OUTPATIENT
Start: 2022-10-18 | End: 2022-10-18

## 2022-10-18 ASSESSMENT — PAIN SCALES - GENERAL: PAINLEVEL: NO PAIN (0)

## 2022-10-18 NOTE — LETTER
10/18/2022         RE: Shaheen Sepulveda  [address]  [facility] [address]        Dear Colleague,    Thank you for referring your patient, Shaheen Sepulveda, to the [facility]. Please see a copy of my visit note below.    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    Impression:  1.  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  Indeterminate anemia  3.  History or chronic lymphedema and recent cellulitis improved, hypertension, obesity, sleep apnea, type 2 diabetes and former tobacco use    Recommendation, plan, instructions:  1.  Interventional radiology referral for core needle biopsy to obtain additional tissue  2.  Interventional radiology referral for image guided bone marrow biopsy in view of his morbid obesity  3.  PET scan  4.  Hemoccult, CBC, differential, peripheral smear, serum iron/TIBC, ferritin, soluble transferrin receptor, TSH, RBC folate, B12, serum protein electrophoresis, tissue transglutaminase, CMP, LDH  5.  Follow-up with me upon receipt of above-mentioned tests.  I indicated to the patient and his daughter that his lymphoma may not require interventional treatment at this time.    Time with patient including review, documentation, history, examination, coordination of care and counseling was 50  minutes.    History of present illness:  This man was admitted to the hospital 6/6/2022-6/8/2022 with progressive bilateral lymphedema and cellulitis and on CT imaging was noted to have bilateral right>left pelvic and inguinal  lymphadenopathy with the largest lymph node measuring 3.2 x 2.4 cm over the right inguinal region.  Since antibiotic and topical treatment of his cellulitis, his lymphedema has improved.  However, he has had a history of chronic lymphedema and cellulitis dating back at least 8 years.    Subsequent 10/6/2022 ultrasound-guided FNA of the right inguinal lymph node revealed a kappa light chain restricted small B-cell lymphoma, favoring CLL/SLL which was CD20/CD23 positive, kappa light chain restricted with a HUGH-67 proliferative rate <10%.  [facility] FISH studies are pending.    7/14/2022 CBC revealed a white count of 5000, hemoglobin 9.5, MCV 77, platelet count 254,000 with an ANC of 3500 and absolute lymphocyte count of 900.  Most recent (6/5/2022) ferritin was 389.  8/23/2022 creatinine was 0.98.  No recent hepatic studies are available.    Past medical history:  Remarkable for hypertension, lymphedema, methadone use, obesity, sleep apnea on CPAP, former tobacco use (2014), and type 2 diabetes.  Past history of GERD, 2012 left total hip arthroplasty and 2013 episode pneumonia with nondiagnostic thoracentesis    Family history:  I have reviewed this patient's family history and updated it with pertinent information if needed.  Family History   Problem Relation Age of Onset     No Known Problems Mother      No Known Problems Father      Edema Sister      Edema Sister          Medications:  Current Outpatient Medications   Medication     ammonium lactate (LAC-HYDRIN) 12 % external lotion     atorvastatin (LIPITOR) 20 MG tablet     chlorthalidone (HYGROTON) 25 MG tablet     hydrOXYzine (ATARAX) 25 MG tablet     losartan (COZAAR) 100 MG tablet     melatonin 3 MG tablet     metFORMIN (GLUCOPHAGE-XR) 750 MG 24 hr tablet     methadone (DOLOPHINE-INTENSOL) 10 MG/ML (HIGH CONC) solution     sodium hypochlorite (QUARTER-STRENGTH DAKINS) external solution     Current Facility-Administered Medications  "  Medication     lidocaine (XYLOCAINE) 2 % external gel       Allergies:  Allergies   Allergen Reactions     Lisinopril Swelling     Patient reports \"neck swelling\"  Swelling in throat       Review of systems:  Except as noted in the note above, the patient denies headaches, diplopia, hearing loss or dizziness; dyspnea, cough, hemoptysis, pleurisy; chest pain/pressure, palpitations, lightheadedness; anorexia, nausea, vomiting, abdominal pain, diarrhea, constipation, melena or rectal bleeding; dysuria, frequency, nocturia, blood in the urine; fever, chills, sweats; tingling, numbness, loss of balance; insomnia, depression, anxiety.    Physical examination:  BP (!) 155/76   Pulse 76   Resp 16   Ht 1.905 m (6' 3\")   Wt (!) 184.6 kg (407 lb)   SpO2 98%   BMI 50.87 kg/m      The patient is alert and oriented.  He is morbidly obese.  Throat and oral mucosa are normal-appearing. Adenopathy is absent in the neck, axilla, groin and supraclavicular fossa; Lungs are clear to auscultation and percussion without rales, wheezes or rubs; heart rhythm is regular, heart sounds are without murmurs or gallops; the abdomen is soft, protuberant and without hepatosplenomegaly, masses, ascites or tenderness.;  Extremity exam reveals poor 4+ bilateral lymphedema evidence of chronic cellulitis      Gerson Jaquez MD              Oncology Rooming Note    October 18, 2022 1:49 PM   Shaheen Sepulveda is a 67 year old male who presents for:    Chief Complaint   Patient presents with     Oncology Clinic Visit     New consult lymphoma     Initial Vitals: BP (!) 155/76   Pulse 76   Resp 16   Ht 1.905 m (6' 3\")   Wt (!) 184.6 kg (407 lb)   SpO2 98%   BMI 50.87 kg/m   Estimated body mass index is 50.87 kg/m  as calculated from the following:    Height as of this encounter: 1.905 m (6' 3\").    Weight as of this encounter: 184.6 kg (407 lb). Body surface area is 3.13 meters squared.  No Pain (0) Comment: Data Unavailable   No LMP for male " patient.  Allergies reviewed: Yes  Medications reviewed: Yes    Medications: Medication refills not needed today.  Pharmacy name entered into [facility]: [id number] [place], [place] - [address]    Clinical concerns: new consult lymphoma      Patito Curry                Again, thank you for allowing me to participate in the care of your patient.        Sincerely,        Gerson Jaquez MD

## 2022-10-18 NOTE — PROGRESS NOTES
"Oncology Rooming Note    October 18, 2022 1:49 PM   Shaheen Sepulveda is a 67 year old male who presents for:    Chief Complaint   Patient presents with     Oncology Clinic Visit     New consult lymphoma     Initial Vitals: BP (!) 155/76   Pulse 76   Resp 16   Ht 1.905 m (6' 3\")   Wt (!) 184.6 kg (407 lb)   SpO2 98%   BMI 50.87 kg/m   Estimated body mass index is 50.87 kg/m  as calculated from the following:    Height as of this encounter: 1.905 m (6' 3\").    Weight as of this encounter: 184.6 kg (407 lb). Body surface area is 3.13 meters squared.  No Pain (0) Comment: Data Unavailable   No LMP for male patient.  Allergies reviewed: Yes  Medications reviewed: Yes    Medications: Medication refills not needed today.  Pharmacy name entered into [facility]: [id number] [place], [place] - [address]    Clinical concerns: new consult lymphoma      Patito Curry            "

## 2022-10-18 NOTE — PATIENT INSTRUCTIONS
Repeat, CORE needle biopsy by interventional radiology  Bone marrow aspirate and biopsy by interventional radiology, (same day?)  Blood tests today  Stool sample  PET scan  Follow-up Dr. Jaquez after all tests are back

## 2022-10-19 ENCOUNTER — HOSPITAL ENCOUNTER (OUTPATIENT)
Dept: PET IMAGING | Facility: HOSPITAL | Age: 67
Discharge: HOME | End: 2022-10-19
Attending: INTERNAL MEDICINE | Admitting: INTERNAL MEDICINE
Payer: COMMERCIAL

## 2022-10-19 DIAGNOSIS — C85.93 LYMPHOMA OF INTRA-ABDOMINAL LYMPH NODES, UNSPECIFIED LYMPHOMA TYPE (H): ICD-10-CM

## 2022-10-19 LAB
FERRITIN SERPL-MCNC: 113 NG/ML (ref 31–409)
GLUCOSE BLDC GLUCOMTR-MCNC: 99 MG/DL (ref 70–99)
TOTAL PROTEIN SERUM FOR ELP: 7.8 G/DL (ref 6.4–8.3)
VIT B12 SERPL-MCNC: 229 PG/ML (ref 232–1245)

## 2022-10-19 PROCEDURE — 343N000001 HC RX 343: Performed by: INTERNAL MEDICINE

## 2022-10-19 PROCEDURE — A9552 F18 FDG: HCPCS | Performed by: INTERNAL MEDICINE

## 2022-10-19 PROCEDURE — 82962 GLUCOSE BLOOD TEST: CPT

## 2022-10-19 PROCEDURE — 78816 PET IMAGE W/CT FULL BODY: CPT | Mod: PI

## 2022-10-19 RX ADMIN — FLUDEOXYGLUCOSE F-18 17.66 MCI.: 500 INJECTION, SOLUTION INTRAVENOUS at 07:16

## 2022-10-20 ENCOUNTER — OFFICE VISIT (OUTPATIENT)
Dept: FAMILY MEDICINE | Facility: CLINIC | Age: 67
End: 2022-10-20
Payer: COMMERCIAL

## 2022-10-20 VITALS
RESPIRATION RATE: 20 BRPM | OXYGEN SATURATION: 94 % | HEIGHT: 75 IN | BODY MASS INDEX: 39.17 KG/M2 | TEMPERATURE: 98.4 F | WEIGHT: 315 LBS | SYSTOLIC BLOOD PRESSURE: 149 MMHG | DIASTOLIC BLOOD PRESSURE: 69 MMHG | HEART RATE: 86 BPM

## 2022-10-20 DIAGNOSIS — Z79.899 ENCOUNTER FOR LONG-TERM (CURRENT) USE OF MEDICATIONS: ICD-10-CM

## 2022-10-20 DIAGNOSIS — I10 BENIGN ESSENTIAL HYPERTENSION: Primary | ICD-10-CM

## 2022-10-20 DIAGNOSIS — C91.10 CLL (CHRONIC LYMPHOCYTIC LEUKEMIA) (H): ICD-10-CM

## 2022-10-20 DIAGNOSIS — E11.9 TYPE 2 DIABETES MELLITUS WITHOUT COMPLICATION, WITHOUT LONG-TERM CURRENT USE OF INSULIN (H): ICD-10-CM

## 2022-10-20 DIAGNOSIS — K21.00 GASTROESOPHAGEAL REFLUX DISEASE WITH ESOPHAGITIS WITHOUT HEMORRHAGE: ICD-10-CM

## 2022-10-20 LAB
ALBUMIN SERPL ELPH-MCNC: 3.5 G/DL (ref 3.7–5.1)
ALPHA1 GLOB SERPL ELPH-MCNC: 0.4 G/DL (ref 0.2–0.4)
ALPHA2 GLOB SERPL ELPH-MCNC: 0.7 G/DL (ref 0.5–0.9)
B-GLOBULIN SERPL ELPH-MCNC: 1.1 G/DL (ref 0.6–1)
FOLATE RBC-MCNC: 413 NG/ML
GAMMA GLOB SERPL ELPH-MCNC: 2.1 G/DL (ref 0.7–1.6)
M PROTEIN SERPL ELPH-MCNC: 0.3 G/DL
PROT PATTERN SERPL ELPH-IMP: ABNORMAL
PROT PATTERN SERPL IFE-IMP: NORMAL
TTG IGA SER-ACNC: 1.9 U/ML
TTG IGG SER-ACNC: 2.6 U/ML

## 2022-10-20 PROCEDURE — 0124A COVID-19,PF,PFIZER BOOSTER BIVALENT: CPT | Performed by: STUDENT IN AN ORGANIZED HEALTH CARE EDUCATION/TRAINING PROGRAM

## 2022-10-20 PROCEDURE — 99214 OFFICE O/P EST MOD 30 MIN: CPT | Mod: 25 | Performed by: STUDENT IN AN ORGANIZED HEALTH CARE EDUCATION/TRAINING PROGRAM

## 2022-10-20 PROCEDURE — 84165 PROTEIN E-PHORESIS SERUM: CPT | Mod: 26

## 2022-10-20 PROCEDURE — 86334 IMMUNOFIX E-PHORESIS SERUM: CPT | Mod: 26

## 2022-10-20 PROCEDURE — 90471 IMMUNIZATION ADMIN: CPT | Performed by: STUDENT IN AN ORGANIZED HEALTH CARE EDUCATION/TRAINING PROGRAM

## 2022-10-20 PROCEDURE — 90662 IIV NO PRSV INCREASED AG IM: CPT | Performed by: STUDENT IN AN ORGANIZED HEALTH CARE EDUCATION/TRAINING PROGRAM

## 2022-10-20 PROCEDURE — 91312 COVID-19,PF,PFIZER BOOSTER BIVALENT: CPT | Performed by: STUDENT IN AN ORGANIZED HEALTH CARE EDUCATION/TRAINING PROGRAM

## 2022-10-20 RX ORDER — CHLORTHALIDONE 25 MG/1
25 TABLET ORAL DAILY
Qty: 90 TABLET | Refills: 3 | Status: SHIPPED | OUTPATIENT
Start: 2022-10-20 | End: 2023-07-26

## 2022-10-20 RX ORDER — FAMOTIDINE 20 MG/1
20 TABLET, FILM COATED ORAL 2 TIMES DAILY
Qty: 90 TABLET | Refills: 1 | Status: SHIPPED | OUTPATIENT
Start: 2022-10-20 | End: 2023-01-27

## 2022-10-20 NOTE — PATIENT INSTRUCTIONS
Please go to a pharmacy when able to get your tetanus shot and shingles vaccination.    Get your annual eye exam when you can!

## 2022-10-20 NOTE — PROGRESS NOTES
Assessment & Plan     Encounter for long-term (current) use of medications  Type 2 diabetes mellitus without complication, without long-term current use of insulin (H)  A1c has been at goal for the last year, will switch to 6-month checks.  Requests returning to 500 mg tablets are severe to take, will resume 500 mg twice daily and metformin as he has remained at goal for his A1c on this dose previously.  We can increase to 1000 mg twice daily as needed.  - metFORMIN (GLUCOPHAGE) 500 MG tablet  Dispense: 180 tablet; Refill: 3    Benign essential hypertension  Blood pressure is at goal, will continue current regimen.  Home blood pressures are always at goal, clinic blood pressure SBP generally is elevated.  Question underlying DG with nonadherence to CPAP versus whitecoat syndrome.  We will continue to monitor closely, but will not make changes in medications at this time.  - chlorthalidone (HYGROTON) 25 MG tablet  Dispense: 90 tablet; Refill: 3    Gastroesophageal reflux disease with esophagitis without hemorrhage  Prescribed as needed Pepcid for reflux.  - famotidine (PEPCID) 20 MG tablet  Dispense: 90 tablet; Refill: 1    CLL  Newly diagnosed, following with [facility].  Encouraged completion of living well.  Answered questions and role of PCP in oncology treatment plan.  Shaheen and family will reach out with further questions and will follow-up closely.       Return in about 6 weeks (around 12/1/2022).     Precepted with Dr. Hooker.    Radha Sal MD  [facility]    Frank Jamison is a 67 year old accompanied by his daughter, presenting for the following health issues:  RECHECK (BP and also wanted to discuss about new dx) and Medication Reconciliation (Needs attention)      HPI     New diagnosis of CLL  Met with Dr. Jaquez, [facility]  Discussed ongoing labs that were ordered, currently all are within normal limits that have resulted  They have questions about his  "previous anemia, and discussed previous work-up which showing ferritin within normal limits, CBC with differential in July was normal except for the anemia  Discussed findings of the prior biopsy, and primary care role in his oncology treatment course  The overall feel encouraged we will continue to follow with [facility]  Discussed goals of care, and completing a living well  Shaheen states his decision maker would be his daughter at this time.    BP  Home readings are 116-129/70s; most are >130  Has increased chlorthalidone since last visit 25 mg daily  Here BP is above goal, 149/69  States he did take his blood pressure medicines before coming to clinic  Question if there is some whitecoat syndrome underlying, versus continued DG contribution as he does not wear his CPAP    Requests flu and COVID vaccinations today.    GERD  Has intermittent trouble with reflux, takes an acids, but this did not help much  States that he has a burning sensation in his throat, option after he eats, will depend on what he eats.    T2DM  A1c has been at goal for over a year  Requests returning to the 500 mg tablets of metformin, was previously at goal with 500 mg twice daily.  States the 750 mg tablets are too big.    Review of Systems   Constitutional, HEENT, cardiovascular, pulmonary, gi and gu systems are negative, except as otherwise noted.      Objective    BP (!) 144/79   Pulse 86   Temp 98.4  F (36.9  C)   Resp 20   Ht 1.905 m (6' 3\")   Wt (!) 175.1 kg (386 lb)   SpO2 94%   BMI 48.25 kg/m    Body mass index is 48.25 kg/m .  Physical Exam   GENERAL: healthy, alert and no distress  RESP: lungs clear to auscultation - no rales, rhonchi or wheezes  CV: regular rate and rhythm, normal S1 S2, no S3 or S4, no murmur, click or rub, no peripheral edema and peripheral pulses strong  MS: no gross musculoskeletal defects noted, no edema  PSYCH: mentation appears normal, affect normal/bright    No results found for any visits " on 10/20/22.   CMP within normal limits taken on 10/18.    ----- Service Performed and Documented by Resident or Fellow ------

## 2022-10-21 LAB — STFR SERPL-MCNC: 3.8 MG/L

## 2022-10-24 NOTE — PROGRESS NOTES
Preceptor Attestation:   Patient seen, evaluated and discussed with the resident. I have verified the content of the note, which accurately reflects my assessment of the patient and the plan of care.  Supervising Physician:Tanya Hooker DO [facility]

## 2022-10-26 ENCOUNTER — TELEPHONE (OUTPATIENT)
Dept: FAMILY MEDICINE | Facility: CLINIC | Age: 67
End: 2022-10-26

## 2022-10-26 PROCEDURE — 82270 OCCULT BLOOD FECES: CPT

## 2022-10-26 NOTE — TELEPHONE ENCOUNTER
The Home Care/Assisted Living/Nursing Facility is calling regarding an established patient.  Has the patient seen Home Care in the past or is currently residing in Assisted Living or Nursing Facility? Yes.     Lesly calling from [facility] requesting the following orders that are within the Home Care, Assisted Living or Nursing Home Eval and Treatment standing order and can be signed as standing order signature required by RN.    Preferred Call Back Number:     Home Care Visits Continuation    Any additional Orders:  Are there any orders requested, not stated above, that are outside of the standing order and must be routed to a licensed practitioner for approval?    No    Writer has verified [employer] will send fax to have orders signed.  Mario QURESHI

## 2022-10-26 NOTE — TELEPHONE ENCOUNTER
[facility] phone call message - order or referral request for patient:     Order or referral being requested: Verbal      Additional Comments:  Skilled Nursing  - 1x a week for 9 weeks     2 PRN'S    OK to leave a message on voice mail? Yes    Primary language: English      needed? No    Call taken on October 26, 2022 at 10:09 AM by Pranav Dias

## 2022-10-31 ENCOUNTER — LAB (OUTPATIENT)
Dept: LAB | Facility: HOSPITAL | Age: 67
End: 2022-10-31
Payer: COMMERCIAL

## 2022-10-31 DIAGNOSIS — Z12.11 COLON CANCER SCREENING: ICD-10-CM

## 2022-10-31 LAB
HEMOCCULT SP1 STL QL: NEGATIVE
HEMOCCULT SP2 STL QL: NEGATIVE
HEMOCCULT SP3 STL QL: NEGATIVE

## 2022-11-04 ENCOUNTER — MEDICAL CORRESPONDENCE (OUTPATIENT)
Dept: HEALTH INFORMATION MANAGEMENT | Facility: CLINIC | Age: 67
End: 2022-11-04

## 2022-11-07 ENCOUNTER — HOSPITAL ENCOUNTER (OUTPATIENT)
Dept: ULTRASOUND IMAGING | Facility: CLINIC | Age: 67
Discharge: HOME | End: 2022-11-07
Attending: INTERNAL MEDICINE | Admitting: RADIOLOGY
Payer: COMMERCIAL

## 2022-11-07 DIAGNOSIS — C85.93 LYMPHOMA OF INTRA-ABDOMINAL LYMPH NODES, UNSPECIFIED LYMPHOMA TYPE (H): ICD-10-CM

## 2022-11-07 PROCEDURE — 272N000710 US BIOPSY CORE LYMPH NODE

## 2022-11-07 PROCEDURE — 38505 NEEDLE BIOPSY LYMPH NODES: CPT

## 2022-11-07 PROCEDURE — 88161 CYTOPATH SMEAR OTHER SOURCE: CPT | Mod: TC | Performed by: INTERNAL MEDICINE

## 2022-11-08 ENCOUNTER — OFFICE VISIT (OUTPATIENT)
Dept: VASCULAR SURGERY | Facility: CLINIC | Age: 67
End: 2022-11-08
Attending: REGISTERED NURSE
Payer: COMMERCIAL

## 2022-11-08 VITALS — DIASTOLIC BLOOD PRESSURE: 68 MMHG | RESPIRATION RATE: 20 BRPM | HEART RATE: 76 BPM | SYSTOLIC BLOOD PRESSURE: 140 MMHG

## 2022-11-08 DIAGNOSIS — R60.0 VENOUS STASIS ULCER OF RIGHT LOWER LEG WITH EDEMA OF RIGHT LOWER LEG (H): Primary | ICD-10-CM

## 2022-11-08 DIAGNOSIS — L97.929 VENOUS STASIS ULCER OF LEFT LOWER LEG WITH EDEMA OF LEFT LOWER LEG (H): ICD-10-CM

## 2022-11-08 DIAGNOSIS — I83.019 VENOUS STASIS ULCER OF RIGHT LOWER LEG WITH EDEMA OF RIGHT LOWER LEG (H): Primary | ICD-10-CM

## 2022-11-08 DIAGNOSIS — I87.2 VENOUS (PERIPHERAL) INSUFFICIENCY: ICD-10-CM

## 2022-11-08 DIAGNOSIS — R60.0 VENOUS STASIS ULCER OF LEFT LOWER LEG WITH EDEMA OF LEFT LOWER LEG (H): ICD-10-CM

## 2022-11-08 DIAGNOSIS — D36.9 PAPILLOMATOSIS: ICD-10-CM

## 2022-11-08 DIAGNOSIS — I83.891 VENOUS STASIS ULCER OF RIGHT LOWER LEG WITH EDEMA OF RIGHT LOWER LEG (H): Primary | ICD-10-CM

## 2022-11-08 DIAGNOSIS — M79.3 LIPODERMATOSCLEROSIS OF BOTH LOWER EXTREMITIES: ICD-10-CM

## 2022-11-08 DIAGNOSIS — L84 PRE-ULCERATIVE CORN OR CALLOUS: ICD-10-CM

## 2022-11-08 DIAGNOSIS — I83.892 VENOUS STASIS ULCER OF LEFT LOWER LEG WITH EDEMA OF LEFT LOWER LEG (H): ICD-10-CM

## 2022-11-08 DIAGNOSIS — L97.919 VENOUS STASIS ULCER OF RIGHT LOWER LEG WITH EDEMA OF RIGHT LOWER LEG (H): Primary | ICD-10-CM

## 2022-11-08 DIAGNOSIS — I89.0 LYMPHEDEMA OF BOTH LOWER EXTREMITIES: ICD-10-CM

## 2022-11-08 DIAGNOSIS — I89.0 ELEPHANTIASIS: ICD-10-CM

## 2022-11-08 DIAGNOSIS — I83.029 VENOUS STASIS ULCER OF LEFT LOWER LEG WITH EDEMA OF LEFT LOWER LEG (H): ICD-10-CM

## 2022-11-08 PROCEDURE — 97598 DBRDMT OPN WND ADDL 20CM/<: CPT | Performed by: REGISTERED NURSE

## 2022-11-08 PROCEDURE — 97597 DBRDMT OPN WND 1ST 20 CM/<: CPT | Performed by: REGISTERED NURSE

## 2022-11-08 ASSESSMENT — PAIN SCALES - GENERAL: PAINLEVEL: MILD PAIN (3)

## 2022-11-08 NOTE — PROGRESS NOTES
"            Follow up Vascular Visit       Date of Service:11/08/22      Chief Complaint: BLE weeping       Pt returns to [facility] with regards to their BLE weeping.  They arrive today alone. They are currently using viscopaste, silvercel, optilcok to the wounds. This is being done by home health. They are using tubi, foam, cast padding, short stretch for compression. They are feeling well today. Denies fevers, chills. No shortness of breath. Per pt developed new wound to L posterior leg. Home health only dressed left leg with optilock. Pt changed the dressing twice since visit and placed silvercel and optilock to wound bed.    Allergies:   Allergies   Allergen Reactions     Lisinopril Swelling     Patient reports \"neck swelling\"  Swelling in throat       Medications:   Current Outpatient Medications:      ammonium lactate (LAC-HYDRIN) 12 % external lotion, Apply topically daily as needed for dry skin With dressing changes, Disp: 225 g, Rfl: 3     atorvastatin (LIPITOR) 20 MG tablet, Take 1 tablet (20 mg) by mouth daily, Disp: 90 tablet, Rfl: 4     chlorthalidone (HYGROTON) 25 MG tablet, Take 1 tablet (25 mg) by mouth daily, Disp: 90 tablet, Rfl: 3     famotidine (PEPCID) 20 MG tablet, Take 1 tablet (20 mg) by mouth 2 times daily, Disp: 90 tablet, Rfl: 1     hydrOXYzine (ATARAX) 25 MG tablet, Take 0.5-1 tablets (12.5-25 mg) by mouth 3 times daily as needed for anxiety, Disp: 60 tablet, Rfl: 1     losartan (COZAAR) 100 MG tablet, Take 1 tablet (100 mg) by mouth daily, Disp: 90 tablet, Rfl: 3     melatonin 3 MG tablet, Take 1 tablet (3 mg) by mouth nightly as needed for sleep, Disp: 30 tablet, Rfl: 1     metFORMIN (GLUCOPHAGE) 500 MG tablet, Take 1 tablet (500 mg) by mouth 2 times daily (with meals), Disp: 180 tablet, Rfl: 3     methadone (DOLOPHINE-INTENSOL) 10 MG/ML (HIGH CONC) solution, Take 100 mg by mouth daily, Disp: , Rfl:      sodium hypochlorite (QUARTER-STRENGTH DAKINS) external solution, " Apply topically every 72 hours Use 300mL every 72 hours to wash the bilateral legs and wounds on the legs, Disp: 1000 mL, Rfl: 3    Current Facility-Administered Medications:      lidocaine (XYLOCAINE) 2 % external gel, , Topical, Daily PRN, Lucia Reyes MD, Given at 10/19/21 0828    History:   Past Medical History:   Diagnosis Date     Diabetes (H)      H/O angioedema 6/15/2020    Formatting of this note might be different from the original. Unexplained angioedema twice, discontinue lisinopril for possible connection to it, though he had used it afterwards with no symptoms     History of tobacco use disorder 8/1/2013    Formatting of this note might be different from the original. Added per documetation     Hypertension      Lymphedema of both lower extremities 12/22/2014     Methadone use 5/8/2012     Obstructive sleep apnea 2/2/2015    Formatting of this note might be different from the original. Epic     Pleural mass 7/2/2013     Uncomplicated asthma        Physical Exam:    BP (!) 140/68   Pulse 76   Resp 20     General:  Patient presents to clinic in no apparent distress.  Head: normocephalic atraumatic  Psychiatric:  Alert and oriented x3.   Respiratory: unlabored breathing; no cough  Integumentary:  Skin is uniformly warm, dry and pink.    Extremities: swelling is stable; continues to have extensive scaling and crusting on the legs; continues to have several scattered full and partial thickness ulcers about the legs; see measures below; noted to have callus on great toe; these were removed and intact underneath; +papillomatosis; +verrucous lesions + fibrosity and scarring of the tissues      Wound #1 Location: L posterior leg  Size: 10L x 10W x 0.2depth.  No sinus tract present, Wound base: papillomatosis  No undermining present. Wound is partial thickness. There is moderate drainage. Periwound: no denudement, erythema, induration, +maceration or warmth.      Wound #2 Location: R lateral leg  Size:  8L x 8W x 0.1depth.  No sinus tract present, Wound base: papillomatosis no undermining present. Wound is partial thickness. There is moderate drainage. Periwound: no denudement, erythema, induration, maceration or warmth.        Wound Leg Ulceration (Active)   Number of days: 155       VASC Wound Right lower leg lateral (Active)   Pre Size Length 4 06/21/22 0700   Pre Size Width 4 06/21/22 0700   Pre Size Depth 0.1 06/21/22 0700   Pre Total Sq cm 16 06/21/22 0700   Product Used Alginate 10/12/21 0800   Number of days: 412       VASC Wound rt lateral weeping (Active)   Pre Size Length 8 11/08/22 0800   Pre Size Width 8 11/08/22 0800   Pre Size Depth 0.1 11/08/22 0800   Pre Total Sq cm 64 11/08/22 0800   Post Size Length 8 11/08/22 0800   Post Size Width 8 11/08/22 0800   Post Size Depth 0.1 11/08/22 0800   Post Total Sq cm 64 11/08/22 0800   Description weeping area 09/13/22 0700   Number of days: 385       VASC Wound Lt lateral leg (Active)   Pre Size Length 2 11/08/22 0800   Pre Size Width 2 11/08/22 0800   Pre Size Depth 0.1 11/08/22 0800   Pre Total Sq cm 4 11/08/22 0800   Post Size Length 6 10/11/22 0700   Post Size Width 4 10/11/22 0700   Post Size Depth 1 10/11/22 0700   Post Total Sq cm 24 10/11/22 0700   Tunneling weeping 09/13/22 0700   Description weeping 11/23/21 0700   Number of days: 350       VASC Wound left posterior leg (Active)   Pre Size Length 10 11/08/22 0800   Pre Size Width 10 11/08/22 0800   Pre Size Depth 0.2 11/08/22 0800   Pre Total Sq cm 100 11/08/22 0800   Number of days: 0            Circumferential volume measures:      Circumferential Measures 1/17/2022 2/14/2022 3/14/2022 7/19/2022 9/13/2022   Right just above MTP 24.4 29.2 29.5 28 28   Right Ankle 39.2 40 54 35 36   Right Widest Calf 55.2 60.8 60.5 54.5 52.5   Right Thigh Up 10cm - - - - -   Left - just above MTP 28.2 28.7 29 28.5 27   Left Ankle 35.5 37 48.3 35 34   Left Widest Calf 52.3 57.6 62 51 49.3   Left Thigh Up 10cm - - - -  -   Left Knee to Ankle - - - - -       Labs:    I personally reviewed the following lab results today and those on care everywhere    CRP   Date Value Ref Range Status   04/23/2021 8.5 (H) 0.0 - 0.8 mg/dL Final      No results found for: SED   Last Renal Panel:  Sodium   Date Value Ref Range Status   10/18/2022 136 136 - 145 mmol/L Final   06/10/2021 142.0 133.0 - 144.0 mmol/L Final     Potassium   Date Value Ref Range Status   10/18/2022 3.9 3.5 - 5.0 mmol/L Final   06/10/2021 4.4 3.4 - 5.3 mmol/L Final     Chloride   Date Value Ref Range Status   10/18/2022 98 98 - 107 mmol/L Final   06/10/2021 100.0 94.0 - 109.0 mmol/L Final     Carbon Dioxide   Date Value Ref Range Status   06/10/2021 33.0 (H) 20.0 - 32.0 mmol/L Final     Carbon Dioxide (CO2)   Date Value Ref Range Status   10/18/2022 30 22 - 31 mmol/L Final     Anion Gap   Date Value Ref Range Status   10/18/2022 8 5 - 18 mmol/L Final     Glucose   Date Value Ref Range Status   10/18/2022 81 70 - 125 mg/dL Final   06/10/2021 112.0 (H) 60.0 - 109.0 mg/dL Final     GLUCOSE BY METER POCT   Date Value Ref Range Status   10/19/2022 99 70 - 99 mg/dL Final     Urea Nitrogen   Date Value Ref Range Status   10/18/2022 20 8 - 22 mg/dL Final   06/10/2021 16.0 7.0 - 30.0 mg/dL Final     Creatinine   Date Value Ref Range Status   10/18/2022 1.07 0.70 - 1.30 mg/dL Final   06/10/2021 1.0 0.8 - 1.5 mg/dL Final     GFR Estimate   Date Value Ref Range Status   10/18/2022 75 >60 mL/min/1.73m2 Final     Comment:     Effective December 21, 2021 eGFRcr in adults is calculated using the 2021 CKD-EPI creatinine equation which includes age and gender (Lucie whiteside al., NEJM, DOI: 10.1056/KVONcp4240530)   04/24/2021 >60 >60 mL/min/1.73m2 Final     Calcium   Date Value Ref Range Status   10/18/2022 9.6 8.5 - 10.5 mg/dL Final   06/10/2021 9.5 8.5 - 10.4 mg/dL Final     Albumin   Date Value Ref Range Status   10/18/2022 3.4 (L) 3.5 - 5.0 g/dL Final      Lab Results   Component Value Date     WBC 8.0 10/18/2022     Lab Results   Component Value Date    RBC 4.19 10/18/2022     Lab Results   Component Value Date    HGB 9.8 10/18/2022     Lab Results   Component Value Date    HCT 33.7 10/18/2022     No components found for: MCT  Lab Results   Component Value Date    MCV 80 10/18/2022     Lab Results   Component Value Date    MCH 23.4 10/18/2022     Lab Results   Component Value Date    MCHC 29.1 10/18/2022     Lab Results   Component Value Date    RDW 15.0 10/18/2022     Lab Results   Component Value Date     10/18/2022      Lab Results   Component Value Date    A1C 6.2 07/14/2022    A1C 6.7 04/21/2022    A1C 6.2 04/24/2021    A1C 6.2 04/24/2021      TSH   Date Value Ref Range Status   10/18/2022 1.76 0.30 - 5.00 uIU/mL Final      No results found for: VITDT                Impression:  Encounter Diagnoses   Name Primary?     Venous stasis ulcer of right lower leg with edema of right lower leg (H) Yes     Elephantiasis      Lymphedema of both lower extremities      Venous (peripheral) insufficiency      Venous stasis ulcer of left lower leg with edema of left lower leg (H)      Pre-ulcerative corn or callous      Lipodermatosclerosis of both lower extremities      Papillomatosis             11/8/22 RLL       11/8/22 L posterior leg            Are any of these wounds new today: No; Location: na    Assessment/Plan:          1. Debridement: After discussion of risk factors and verbal consent was obtained 2% Lidocaine HCL jelly was applied, under clean conditions, the BLE ulceration(s) were debrided using currette. Devitalized and nonviable tissue, along with any fibrin and slough, was removed to improve granulation tissue formation, stimulate wound healing, decrease overall bacteria load, disrupt biofilm formation and decrease edge senescence.  Total excisional debridement was 164 sq cm from the epidermis/dermis area with a depth of 0.1 cm.   Ulcers were improved afterwards and .  Measures were as  noted on the flow sheet.       2.  Wound treatment: wound treatment will include irrigation and dressings to promote autolytic debridement which will include: Cleanse with  Dakin's solution. Apply viscopaste to weepig areas in strips. Cover with optilock, gauze roll to be changed every other dayIf for some reason the patient is not able to get their dressing(s) changed as outlined above (due to illness, lack of supplies, lack of help) please do the following: remove old, soiled dressings; wash the wounds with saline; pat dry; apply ABD pad or other absorbant pad and secure with rolled gauze; avoid tape directly on your skin; patient instructed to call the clinic as soon as possible to let us know what the current issues are in receiving wound care. Stable            3. Edema: Edema stable. Continue tubular compression and short stretch, may add foam for drainage management. The compression wraps were applied today in clinic. Continue lymphedema pumps every other day    If a 2 layer or 4 layer compression wrap is being used; these are safe to have on for ONLY 7 days. If for some reason the patient is not able to get the wrap(s) changed (due to illness; lack of supplies, lack of help, lack of transportation) please do the following: unwrap the old 2 or 4 layer compression wrap; avoid using scissors as you could cut your skin and cause wounds; use tubular compression when available. Call to reschedule your home care or clinic visit appointment as soon as possible.  Stable           4. Nutrition: Diabetic Diet           5. Offloading: Pt has not made appointment to be fitted for diabetic shoes. Instructed pt to call to get fitted. This will help offload pressure to the toe pads of the hammertoes    6.  Callous: a total of 1 calluses was pared using a #15 blade scalpel; this was done to evaluate for underlying ulceration; reduce pressure; patient comfort; and to reduce risk of future ulceration formation. The skin was  intact underneath and patient tolerated well; no complications.     Patient will follow up with me in 4 weeks for reevaluation. They were instructed to call the clinic sooner with any signs or symptoms of infection or any further questions/concerns. Answered all questions.      40 minutes spent on the date of the encounter doing chart review, history and exam, documentation and further activities per the note    Verona Stallworth MS, APRN, AGNP-C, CWCN  [facility]   [phone]        This note was electronically signed by HAYDEE Whatley CNP

## 2022-11-08 NOTE — PATIENT INSTRUCTIONS
"Wound care supplies should be ordered by your home care agency. Please contact clinic if home care agency is not able to order supplies.     Lymphedema pump daily.   Record blood pressure readings at home.   Call to make appointment to get fitted for diabetic shoes [phone]    Wound Care Instructions    EVERY OTHER DAY and as needed, Cleanse your leg wound(s) with Dakin's Solution 0.125%.    Pat Dry with non-sterile gauze    Apply Lotion to the intact skin surrounding your wound and other dry skin locations. Some good lotions include: Remedy Skin Repair Cream, Sarna, Vanicream or Cetaphil    Apply Amlactin to hard, fibrotic areas on feet    Primary Dressing: Apply viscopaste strips into/onto the wounds    Secondary dressing: Cover with optilock or abd pad according to drainage    Secure with non-sterile roll gauze (4\" x 75\" roll) and tape (1\" roll tape) as needed; avoid adhesive directly on the skin    Compression: tubular compression, short stretch, may add foam if needed for drainage management    It is not ok to get your wound wet in the bath or shower    It is recommended that you do not get your ulcer wet when showering.  Listed below are several ways of keeping it dry when you shower.     1. Wrap it with Press and Seal plastic wrap.  It can be found in the stores where the plastic wraps or tin foil is kept.               2.  Some people take a bath and hang their leg/foot out of the tub.                        3  Put your leg in a plastic bag and tape it on.           4. You can purchase a shower cover for casts at some pharmacies and through the Internet.            5. Take a Bed Bath or wash up at the sink            If for some reason you are not able to get your dressing(s) changed as outlined above (due to illness, lack of supplies, lack of help) please do the following: remove old, soiled dressings; wash the wounds with saline; pat dry; apply ABD pad or other absorbant pad and secure with rolled " gauze; avoid tape directly on your skin; Call the clinic as soon as possible to let us know what the current issues are in receiving wound care [phone].      SEEK MEDICAL CARE IF:  You have an increase in swelling, pain, or redness around the wound.  You have an increase in the amount of pus coming from the wound.  There is a bad smell coming from the wound.  The wound appears to be worsening/enlarging  You have a fever greater than 101.5 F      It is ok to continue current wound care treatment/products for the next 2-3 days until new wound care supplies are ordered and arrive. If longer than this please contact our office at [phone].    If you have a 2 layer or 4 layer compression wrap on these are safe to have on for ONLY 7 days. If for some reason you are not able to get the wrap(s) changed (due to illness; lack of supplies, lack of help, lack of transportation) please do the following: unwrap the old 2 or 4 layer compression wrap; avoid using scissors as you could cut your skin and cause wounds; use tubular compression when available. Call to reschedule your home care or clinic visit appointment as soon as possible.    Please NOTE: if you are 15 minutes late to your clinic appointment you will have to be rescheduled. Please call our clinic as soon as possible if you know you will not be able to get to your appointment at [phone].    If you fail to show up to 3 scheduled clinic appointments you will be dismissed from our clinic.              We want to hear from you!  In the next few weeks, you should receive a call or email to complete a survey about your visit at [facility]. Please help us improve your appointment experience by letting us know how we did today. We strive to make your experience good and value any ways in which we could do better.      We value your input and suggestions.    Thank you for choosing the [facility]!

## 2022-11-10 ENCOUNTER — TELEPHONE (OUTPATIENT)
Dept: VASCULAR SURGERY | Facility: CLINIC | Age: 67
End: 2022-11-10

## 2022-11-10 ENCOUNTER — TELEPHONE (OUTPATIENT)
Dept: FAMILY MEDICINE | Facility: CLINIC | Age: 67
End: 2022-11-10

## 2022-11-10 ENCOUNTER — PROCEDURE ONLY VISIT (OUTPATIENT)
Dept: INFUSION THERAPY | Facility: CLINIC | Age: 67
End: 2022-11-10
Attending: INTERNAL MEDICINE
Payer: COMMERCIAL

## 2022-11-10 VITALS
RESPIRATION RATE: 20 BRPM | DIASTOLIC BLOOD PRESSURE: 61 MMHG | OXYGEN SATURATION: 96 % | TEMPERATURE: 98.1 F | HEART RATE: 65 BPM | SYSTOLIC BLOOD PRESSURE: 128 MMHG

## 2022-11-10 DIAGNOSIS — D64.9 ANEMIA, UNSPECIFIED TYPE: ICD-10-CM

## 2022-11-10 DIAGNOSIS — C85.93 LYMPHOMA OF INTRA-ABDOMINAL LYMPH NODES, UNSPECIFIED LYMPHOMA TYPE (H): ICD-10-CM

## 2022-11-10 LAB
BASOPHILS # BLD AUTO: 0 10E3/UL (ref 0–0.2)
BASOPHILS NFR BLD AUTO: 0 %
EOSINOPHIL # BLD AUTO: 0.2 10E3/UL (ref 0–0.7)
EOSINOPHIL NFR BLD AUTO: 4 %
ERYTHROCYTE [DISTWIDTH] IN BLOOD BY AUTOMATED COUNT: 15.2 % (ref 10–15)
HCT VFR BLD AUTO: 31.6 % (ref 40–53)
HGB BLD-MCNC: 9.4 G/DL (ref 13.3–17.7)
IMM GRANULOCYTES # BLD: 0 10E3/UL
IMM GRANULOCYTES NFR BLD: 0 %
LYMPHOCYTES # BLD AUTO: 1.2 10E3/UL (ref 0.8–5.3)
LYMPHOCYTES NFR BLD AUTO: 21 %
MCH RBC QN AUTO: 23.2 PG (ref 26.5–33)
MCHC RBC AUTO-ENTMCNC: 29.7 G/DL (ref 31.5–36.5)
MCV RBC AUTO: 78 FL (ref 78–100)
MONOCYTES # BLD AUTO: 0.4 10E3/UL (ref 0–1.3)
MONOCYTES NFR BLD AUTO: 7 %
NEUTROPHILS # BLD AUTO: 3.9 10E3/UL (ref 1.6–8.3)
NEUTROPHILS NFR BLD AUTO: 68 %
NRBC # BLD AUTO: 0 10E3/UL
NRBC BLD AUTO-RTO: 0 /100
PLATELET # BLD AUTO: 267 10E3/UL (ref 150–450)
RBC # BLD AUTO: 4.05 10E6/UL (ref 4.4–5.9)
WBC # BLD AUTO: 5.8 10E3/UL (ref 4–11)

## 2022-11-10 PROCEDURE — 36415 COLL VENOUS BLD VENIPUNCTURE: CPT

## 2022-11-10 PROCEDURE — 85025 COMPLETE CBC W/AUTO DIFF WBC: CPT

## 2022-11-10 RX ORDER — LIDOCAINE HYDROCHLORIDE 10 MG/ML
10 INJECTION, SOLUTION EPIDURAL; INFILTRATION; INTRACAUDAL; PERINEURAL ONCE
Status: ACTIVE | OUTPATIENT
Start: 2022-11-10

## 2022-11-10 ASSESSMENT — PAIN SCALES - GENERAL
PAINLEVEL: NO PAIN (0)
PAINLEVEL: NO PAIN (0)

## 2022-11-10 NOTE — TELEPHONE ENCOUNTER
[facility] phone call message- general phone call:    Reason for call: [facility] called stating patient is having a bone marrow biopsy done today and has requested to not have a nurse aid visit today therefor they will then visit him next Monday instead. Call and advise if needed.     Return call needed: NO    OK to leave a message on voice mail? Yes    Primary language: English      needed? No    Call taken on November 10, 2022 at 9:25 AM by Aleja Farrell

## 2022-11-10 NOTE — PROGRESS NOTES
Shaheen presented to clinic today for a unilaeral bone marrow biopsy  Consent for procedure was reviewed with Dr. Bradley and  signed. No premedication  was administered. Education was reviewed about the biopsy procedure and symptoms post biopsy to monitor. Educational handout was provided and contact information highlighted for any questions or concerns that may arise.  Bone marrow biopsy was attempted but was unsuccessful today. No dressing was applied by Dr. Bradley. Site was inspected post procedure and no bleeding was noted. Shaheen will be rescheduled for imaging guided biopsy with IR.  Shaheen and his daughter, Giovana, were instructed that IR will call them to set up the biopsy. Once scheduled, Shaheen will need to be scheduled for a follow up one week post biopsy to review results, per Dr. Jaquez. Giovana has contact information for IR  and was instructed to follow up with them regarding scheduling if she does not hear from them in the next couple days. They also have our clinic number and will contact our office to arrange future follow up with Dr. Jaquez once the bone marrow biopsy is scheduled. Our office will also continue to monitor. Shaheen left clinic via wheel chair and daughter provided transportation home/Yoli Magaña RN

## 2022-11-10 NOTE — TELEPHONE ENCOUNTER
Lesly is calling from [facility] requesting updated orders for the apt on Tuesday. Please fax to [phone] she can also be reached at [phone]

## 2022-11-19 ENCOUNTER — HEALTH MAINTENANCE LETTER (OUTPATIENT)
Age: 67
End: 2022-11-19

## 2022-11-22 DIAGNOSIS — I10 BENIGN ESSENTIAL HYPERTENSION: ICD-10-CM

## 2022-11-29 RX ORDER — LOSARTAN POTASSIUM 100 MG/1
TABLET ORAL
Qty: 90 TABLET | Refills: 0 | Status: SHIPPED | OUTPATIENT
Start: 2022-11-29 | End: 2023-02-20

## 2022-11-29 NOTE — TELEPHONE ENCOUNTER
[facility] phone call message- medication clarification/question:    Full Medication Name: losartan (COZAAR) 100 MG tablet    Question: Patient called stating pharmacy had contact us a week ago regarding this medication refill and has not heard back from provider. Has been passed the 3 business days - if able to fill please send prescription to pharmacy. Thank you    Pharmacy confirmed as [id number] [address]: Yes    OK to leave a message on voice mail? Yes    Primary language: English      needed? No    Call taken on November 29, 2022 at 10:19 AM by Pranav Dias

## 2022-12-02 ENCOUNTER — MYC MEDICAL ADVICE (OUTPATIENT)
Dept: INTERVENTIONAL RADIOLOGY/VASCULAR | Facility: CLINIC | Age: 67
End: 2022-12-02

## 2022-12-04 NOTE — PROGRESS NOTES
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    Impression:  1.  Very likely, low-grade, B-cell lymphoma favoring small lymphocytic lymphoma(SLL) although an indolent B-cell lymphoma-NOS is also possible.  2.  Vitamin B12 deficiency  3.  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  Indeterminate microcytic anemia possibly due to anemia of chronic disease from recent cellulitis    Recommendation, plan, instructions:  1.  Vitamin B12 1000 mcg IM weekly for 4 weeks followed by 1000 mcg daily by mouth for life  2.  Reviewed at length in detail with the patient the natural history, general management and prognosis of indolent malignant B-cell lymphoma.  Recommend observation without intervention at this time.    3.  His anemia can be related to a number of factors including anemia of chronic disease from his cellulitis which is improving, B12 deficiency, and possible myelodysplasia although all of his bone marrow studies are not back including his MDS focused NGS.  I will call him back separately on this when it becomes available.  4.  Follow-up with Dr. Evette Sanford in 3 months with CBC, CMP, erythropoietin level and B12 one week before appointment    Time with patient including review, documentation, history, examination, coordination of care and counseling was 30 minutes.    History of present illness:  Preliminary 12/7/2022 bone marrow biopsy revealed a foci of a kappa light chain restricted clonal B cells with FISH, chromosomes and MDS focused NGS studies pending.  Flow cytometry was negative.    11/7/2022 right inguinal lymph node core biopsy revealed clonal B-cell population with  absent MYD88 and BCL6 mutations.      A 10/19/2022 PET scan revealed mildly gluco-avid bilateral axillary, right external iliac, and bilateral inguinal lymph nodes suspicious for a inflammatory process or a low-grade lymphoproliferative disorder.      Original 10/6/2022 right inguinal FNA had revealed a kappa restricted lymphoproliferative disorder favoring SLL/CLL with a KI-67 proliferative rate<10%.  CD43 demonstrate equivocal coexpression on B cells with subsequent [facility] negative FISH and flow cytometry.    Blood tests revealed a vitamin B12 level of 229, a 0.3 g/dL IgG kappa monoclonal protein, LDH of 248(<220), hemoglobin 9.8, MCV 80, white count 8000, count 234,000 with a normal differential with a iron of 35, TIBC 311, saturation 11%, with unremarkable soluble transferrin receptor, ferritin, TSH, RBC folate, TTG, CMP and soluble transferrin receptor.  Hemoccult x3 were negative.    Past medical history:  Remarkable for hypertension, lymphedema, methadone use, obesity, sleep apnea on CPAP, former tobacco use (2014), and type 2 diabetes.  Past history of GERD, 2012 left total hip arthroplasty and 2013 episode pneumonia with nondiagnostic thoracentesis     Family history:  I have reviewed this patient's family history and updated it with pertinent information if needed.  Family History   Problem Relation Age of Onset     No Known Problems Mother      No Known Problems Father      Edema Sister      Edema Sister          Medications:  Current Outpatient Medications   Medication     ammonium lactate (LAC-HYDRIN) 12 % external lotion     atorvastatin (LIPITOR) 20 MG tablet     chlorthalidone (HYGROTON) 25 MG tablet     cyanocobalamin (VITAMIN B-12) 1000 MCG tablet     famotidine (PEPCID) 20 MG tablet     hydrOXYzine (ATARAX) 25 MG tablet     losartan (COZAAR) 100 MG tablet     melatonin 3 MG tablet     metFORMIN (GLUCOPHAGE) 500 MG tablet     methadone (DOLOPHINE-INTENSOL) 10 MG/ML (HIGH CONC) solution     naproxen  "sodium 220 MG capsule     sodium hypochlorite (QUARTER-STRENGTH DAKINS) external solution     Current Facility-Administered Medications   Medication     cyanocobalamin injection 1,000 mcg     lidocaine (PF) (XYLOCAINE) 1 % injection 10 mL     lidocaine (XYLOCAINE) 2 % external gel       Allergies:  Allergies   Allergen Reactions     Lisinopril Swelling     Patient reports \"neck swelling\"  Swelling in throat       Review of systems:  His bilateral right greater than left cellulitis has improved but he is being followed actively by home care and wound care.    Physical examination:  /72   Pulse 76   Ht 1.905 m (6' 3\")   Wt (!) 183.2 kg (403 lb 12.8 oz)   SpO2 96%   BMI 50.47 kg/m      He is alert and oriented.    Gerson Jaquez MD  "

## 2022-12-05 PROCEDURE — 88312 SPECIAL STAINS GROUP 1: CPT | Mod: 26 | Performed by: PATHOLOGY

## 2022-12-05 PROCEDURE — 88342 IMHCHEM/IMCYTCHM 1ST ANTB: CPT | Mod: 26 | Performed by: PATHOLOGY

## 2022-12-05 PROCEDURE — 88305 TISSUE EXAM BY PATHOLOGIST: CPT | Mod: 26 | Performed by: PATHOLOGY

## 2022-12-05 PROCEDURE — 88341 IMHCHEM/IMCYTCHM EA ADD ANTB: CPT | Mod: 26 | Performed by: PATHOLOGY

## 2022-12-06 ENCOUNTER — OFFICE VISIT (OUTPATIENT)
Dept: VASCULAR SURGERY | Facility: CLINIC | Age: 67
End: 2022-12-06
Attending: NURSE PRACTITIONER
Payer: COMMERCIAL

## 2022-12-06 VITALS
TEMPERATURE: 98.1 F | BODY MASS INDEX: 50.03 KG/M2 | RESPIRATION RATE: 22 BRPM | HEART RATE: 76 BPM | WEIGHT: 315 LBS | SYSTOLIC BLOOD PRESSURE: 154 MMHG | OXYGEN SATURATION: 97 % | DIASTOLIC BLOOD PRESSURE: 68 MMHG

## 2022-12-06 DIAGNOSIS — I83.892 VENOUS STASIS ULCER OF LEFT LOWER LEG WITH EDEMA OF LEFT LOWER LEG (H): ICD-10-CM

## 2022-12-06 DIAGNOSIS — R60.0 VENOUS STASIS ULCER OF LEFT LOWER LEG WITH EDEMA OF LEFT LOWER LEG (H): ICD-10-CM

## 2022-12-06 DIAGNOSIS — I83.029 VENOUS STASIS ULCER OF LEFT LOWER LEG WITH EDEMA OF LEFT LOWER LEG (H): ICD-10-CM

## 2022-12-06 DIAGNOSIS — R60.0 VENOUS STASIS ULCER OF RIGHT LOWER LEG WITH EDEMA OF RIGHT LOWER LEG (H): ICD-10-CM

## 2022-12-06 DIAGNOSIS — B35.1 ONYCHOMYCOSIS: ICD-10-CM

## 2022-12-06 DIAGNOSIS — I89.0 ELEPHANTIASIS: Primary | ICD-10-CM

## 2022-12-06 DIAGNOSIS — I87.333 VENOUS HYPERTENSION, CHRONIC, WITH ULCER AND INFLAMMATION, BILATERAL (H): ICD-10-CM

## 2022-12-06 DIAGNOSIS — I83.891 VENOUS STASIS ULCER OF RIGHT LOWER LEG WITH EDEMA OF RIGHT LOWER LEG (H): ICD-10-CM

## 2022-12-06 DIAGNOSIS — I87.2 VENOUS (PERIPHERAL) INSUFFICIENCY: ICD-10-CM

## 2022-12-06 DIAGNOSIS — M79.3 LIPODERMATOSCLEROSIS OF BOTH LOWER EXTREMITIES: ICD-10-CM

## 2022-12-06 DIAGNOSIS — L84 PRE-ULCERATIVE CORN OR CALLOUS: ICD-10-CM

## 2022-12-06 DIAGNOSIS — D36.9 PAPILLOMATOSIS: ICD-10-CM

## 2022-12-06 DIAGNOSIS — I89.0 LYMPHEDEMA OF BOTH LOWER EXTREMITIES: ICD-10-CM

## 2022-12-06 DIAGNOSIS — M79.89 LEG SWELLING: ICD-10-CM

## 2022-12-06 DIAGNOSIS — L97.929 VENOUS STASIS ULCER OF LEFT LOWER LEG WITH EDEMA OF LEFT LOWER LEG (H): ICD-10-CM

## 2022-12-06 DIAGNOSIS — L97.919 VENOUS STASIS ULCER OF RIGHT LOWER LEG WITH EDEMA OF RIGHT LOWER LEG (H): ICD-10-CM

## 2022-12-06 DIAGNOSIS — I83.019 VENOUS STASIS ULCER OF RIGHT LOWER LEG WITH EDEMA OF RIGHT LOWER LEG (H): ICD-10-CM

## 2022-12-06 PROCEDURE — 97597 DBRDMT OPN WND 1ST 20 CM/<: CPT | Performed by: NURSE PRACTITIONER

## 2022-12-06 PROCEDURE — 11721 DEBRIDE NAIL 6 OR MORE: CPT | Performed by: NURSE PRACTITIONER

## 2022-12-06 PROCEDURE — 97598 DBRDMT OPN WND ADDL 20CM/<: CPT | Performed by: NURSE PRACTITIONER

## 2022-12-06 ASSESSMENT — PAIN SCALES - GENERAL: PAINLEVEL: MODERATE PAIN (4)

## 2022-12-06 NOTE — PATIENT INSTRUCTIONS
"Wound care supplies were not ordered or needed today. Home care will order all your supplies            Wound Care Instructions    Every 1-2 days home care or your family will , Cleanse your bilateral legs and wound(s) with Dilute hibiclens 30cc in 500cc NS.    Then do a light wash of Dakin's solution    Pat Dry with non-sterile gauze    Apply Lotion to the intact skin surrounding your wound and other dry skin locations. Some good lotions include: Remedy Skin Repair Cream, Sarna, Vanicream or Cetaphil    Apply Ammonium Lac Hydrin lotion to the thick scaling crusting areas    Primary Dressing: Apply silvercel into/onto the wounds    Secondary dressing: Cover with viscopaste    ABDs    Secure with non-sterile roll gauze (4\" x 75\" roll) and tape (1\" roll tape) as needed; avoid adhesive directly on the skin    Compression: tubular compression; foam; short stretch    Elevation of the legs    Use lymphedema pump twice per day    It is not ok to get your wound wet in the bath or shower      If for some reason you are not able to get your dressing(s) changed as outlined above (due to illness, lack of supplies, lack of help) please do the following: remove old, soiled dressings; wash the wounds with saline; pat dry; apply ABD pad or other absorbant pad and secure with rolled gauze; avoid tape directly on your skin; Call the clinic as soon as possible to let us know what the current issues are in receiving wound care [phone].      SEEK MEDICAL CARE IF:  You have an increase in swelling, pain, or redness around the wound.  You have an increase in the amount of pus coming from the wound.  There is a bad smell coming from the wound.  The wound appears to be worsening/enlarging  You have a fever greater than 101.5 F      It is ok to continue current wound care treatment/products for the next 2-3 days until new wound care supplies are ordered and arrive. If longer than this please contact our office at [phone].    If you " have a 2 layer or 4 layer compression wrap on these are safe to have on for ONLY 7 days. If for some reason you are not able to get the wrap(s) changed (due to illness; lack of supplies, lack of help, lack of transportation) please do the following: unwrap the old 2 or 4 layer compression wrap; avoid using scissors as you could cut your skin and cause wounds; use tubular compression when available. Call to reschedule your home care or clinic visit appointment as soon as possible.    Please NOTE: if you are 15 minutes late to your clinic appointment you will have to be rescheduled. Please call our clinic as soon as possible if you know you will not be able to get to your appointment at [phone].    If you fail to show up to 3 scheduled clinic appointments you will be dismissed from our clinic.              We want to hear from you!  In the next few weeks, you should receive a call or email to complete a survey about your visit at [facility]. Please help us improve your appointment experience by letting us know how we did today. We strive to make your experience good and value any ways in which we could do better.      We value your input and suggestions.    Thank you for choosing the [facility]!      It is recommended that you do not get your ulcer wet when showering.  Listed below are several ways of keeping it dry when you shower.     1. Wrap it with Press and Seal plastic wrap.  It can be found in the stores where the plastic wraps or tin foil is kept.               2.  Some people take a bath and hang their leg/foot out of the tub.                        3  Put your leg in a plastic bag and tape it on.           4. You can purchase a shower cover for casts at some pharmacies and through the Internet.            5. Take a Bed Bath or wash up at the sink

## 2022-12-06 NOTE — PROGRESS NOTES
"            Follow up Vascular Visit       Date of Service:12/06/22      Chief Complaint: BLE swelling; weeping      Pt returns to [facility] with regards to their BLE swelling and weeping. Pt was previously seeing Verona Basim PEEWEE I will be assuming care of the patient in the interim.  They arrive today alone he was able to walk to the exam room with a cane. They are currently using viscopaste, silvercel; optilock to the wounds. This is being done by home care 1-2 times per week. They are using tubular compression; foam; cast padding; short stretch bilaterally for compression. They are feeling well today. Denies fevers, chills. No shortness of breath. He is scheduled for bone marrow biopsy tomorrow in IR to further evaluate for small B-cell lymphoma; he is also being managed by Dr. Gerson Jaquez. Remarkable for hypertension, lymphedema, methadone use, obesity, sleep apnea on CPAP, former tobacco use (2014), and type 2 diabetes.  Past history of GERD, 2012 left total hip arthroplasty and 2013 episode pneumonia with nondiagnostic thoracentesis    Allergies:   Allergies   Allergen Reactions     Lisinopril Swelling     Patient reports \"neck swelling\"  Swelling in throat       Medications:   Current Outpatient Medications:      ammonium lactate (LAC-HYDRIN) 12 % external lotion, Apply topically daily as needed for dry skin With dressing changes, Disp: 225 g, Rfl: 3     atorvastatin (LIPITOR) 20 MG tablet, Take 1 tablet (20 mg) by mouth daily, Disp: 90 tablet, Rfl: 4     chlorthalidone (HYGROTON) 25 MG tablet, Take 1 tablet (25 mg) by mouth daily, Disp: 90 tablet, Rfl: 3     famotidine (PEPCID) 20 MG tablet, Take 1 tablet (20 mg) by mouth 2 times daily, Disp: 90 tablet, Rfl: 1     hydrOXYzine (ATARAX) 25 MG tablet, Take 0.5-1 tablets (12.5-25 mg) by mouth 3 times daily as needed for anxiety, Disp: 60 tablet, Rfl: 1     losartan (COZAAR) 100 MG tablet, TAKE ONE TABLET BY MOUTH ONE TIME DAILY, Disp: 90 " tablet, Rfl: 0     melatonin 3 MG tablet, Take 1 tablet (3 mg) by mouth nightly as needed for sleep, Disp: 30 tablet, Rfl: 1     metFORMIN (GLUCOPHAGE) 500 MG tablet, Take 1 tablet (500 mg) by mouth 2 times daily (with meals), Disp: 180 tablet, Rfl: 3     methadone (DOLOPHINE-INTENSOL) 10 MG/ML (HIGH CONC) solution, Take 100 mg by mouth daily, Disp: , Rfl:      sodium hypochlorite (QUARTER-STRENGTH DAKINS) external solution, Apply topically every 72 hours Use 300mL every 72 hours to wash the bilateral legs and wounds on the legs, Disp: 1000 mL, Rfl: 3    Current Facility-Administered Medications:      lidocaine (PF) (XYLOCAINE) 1 % injection 10 mL, 10 mL, Intradermal, Once, Gerson Jaquez MD     lidocaine (XYLOCAINE) 2 % external gel, , Topical, Daily PRN, Lucia Reyes MD, Given at 10/19/21 0828    History:   Past Medical History:   Diagnosis Date     Diabetes (H)      H/O angioedema 6/15/2020    Formatting of this note might be different from the original. Unexplained angioedema twice, discontinue lisinopril for possible connection to it, though he had used it afterwards with no symptoms     History of tobacco use disorder 8/1/2013    Formatting of this note might be different from the original. Added per documetation     Hypertension      Lymphedema of both lower extremities 12/22/2014     Methadone use 5/8/2012     Obstructive sleep apnea 2/2/2015    Formatting of this note might be different from the original. Epic     Pleural mass 7/2/2013     Uncomplicated asthma        Physical Exam:    BP (!) 154/68   Pulse 76   Temp 98.1  F (36.7  C)   Resp 22   Wt (!) 400 lb 4.8 oz (181.6 kg)   SpO2 97%   BMI 50.03 kg/m      General:  Patient presents to clinic in no apparent distress.  Head: normocephalic atraumatic  Psychiatric:  Alert and oriented x3.   Respiratory: unlabored breathing; no cough  Integumentary:  Skin is uniformly warm, dry and pink.    Wound #1 Location: right lateral leg  Size: 10L x 10W  x 0.1depth.  No sinus tract present, Wound base: red; weeping; sloughing tissue  noundermining present. Wound is full thickness. There is heavy drainage. Periwound: no denudement, erythema, induration, maceration or warmth.      Wound #2 Location:left posterior leg  Size: 10L x 10W x 0.1depth.  No sinus tract present, Wound base: red; weeping; sloughing tissue  noundermining present. Wound is full thickness. There is heavy drainage. Periwound: no denudement, erythema, induration, maceration or warmth.      Swelling is stable to improved; see measures below; extensive hyperkeratosis on the legs and feet; +papilamatosis; extensive weeping    Nails 1-5 bilaterally were thickened; elongated, and discolored        Wound Leg Ulceration (Active)   Number of days: 183       VASC Wound Right lower leg lateral (Active)   Pre Size Length 4 06/21/22 0700   Pre Size Width 4 06/21/22 0700   Pre Size Depth 0.1 06/21/22 0700   Pre Total Sq cm 16 06/21/22 0700   Product Used Alginate 10/12/21 0800   Number of days: 440       VASC Wound rt lateral weeping (Active)   Pre Size Length 10 12/06/22 0700   Pre Size Width 10 12/06/22 0700   Pre Size Depth 0.1 12/06/22 0700   Pre Total Sq cm 100 12/06/22 0700   Post Size Length 8 11/08/22 0800   Post Size Width 8 11/08/22 0800   Post Size Depth 0.1 11/08/22 0800   Post Total Sq cm 64 11/08/22 0800   Description weeping 12/06/22 0700   Number of days: 413       VASC Wound Lt lateral leg (Active)   Pre Size Length 1 12/06/22 0700   Pre Size Width 1 12/06/22 0700   Pre Size Depth 0.1 11/08/22 0800   Pre Total Sq cm 4 11/08/22 0800   Post Size Length 6 10/11/22 0700   Post Size Width 4 10/11/22 0700   Post Size Depth 1 10/11/22 0700   Post Total Sq cm 24 10/11/22 0700   Tunneling weeping 09/13/22 0700   Description weeping 11/23/21 0700   Number of days: 378       VASC Wound left posterior leg (Active)   Pre Size Length 10 12/06/22 0700   Pre Size Width 10 12/06/22 0700   Pre Size Depth 0.1  12/06/22 0700   Pre Total Sq cm 100 12/06/22 0700   Description weeping 12/06/22 0700   Number of days: 28       VASC Wound Right medial shin (Active)   Number of days: 0       VASC Wound Right medial shin (Active)   Number of days: 0            Circumferential volume measures:      Circumferential Measures 1/17/2022 2/14/2022 3/14/2022 7/19/2022 9/13/2022   Right just above MTP 24.4 29.2 29.5 28 28   Right Ankle 39.2 40 54 35 36   Right Widest Calf 55.2 60.8 60.5 54.5 52.5   Right Thigh Up 10cm - - - - -   Left - just above MTP 28.2 28.7 29 28.5 27   Left Ankle 35.5 37 48.3 35 34   Left Widest Calf 52.3 57.6 62 51 49.3   Left Thigh Up 10cm - - - - -   Left Knee to Ankle - - - - -       Labs:    I personally reviewed the following lab results today and those on care everywhere    CRP   Date Value Ref Range Status   04/23/2021 8.5 (H) 0.0 - 0.8 mg/dL Final      No results found for: SED   Last Renal Panel:  Sodium   Date Value Ref Range Status   10/18/2022 136 136 - 145 mmol/L Final   06/10/2021 142.0 133.0 - 144.0 mmol/L Final     Potassium   Date Value Ref Range Status   10/18/2022 3.9 3.5 - 5.0 mmol/L Final   06/10/2021 4.4 3.4 - 5.3 mmol/L Final     Chloride   Date Value Ref Range Status   10/18/2022 98 98 - 107 mmol/L Final   06/10/2021 100.0 94.0 - 109.0 mmol/L Final     Carbon Dioxide   Date Value Ref Range Status   06/10/2021 33.0 (H) 20.0 - 32.0 mmol/L Final     Carbon Dioxide (CO2)   Date Value Ref Range Status   10/18/2022 30 22 - 31 mmol/L Final     Anion Gap   Date Value Ref Range Status   10/18/2022 8 5 - 18 mmol/L Final     Glucose   Date Value Ref Range Status   10/18/2022 81 70 - 125 mg/dL Final   06/10/2021 112.0 (H) 60.0 - 109.0 mg/dL Final     GLUCOSE BY METER POCT   Date Value Ref Range Status   10/19/2022 99 70 - 99 mg/dL Final     Urea Nitrogen   Date Value Ref Range Status   10/18/2022 20 8 - 22 mg/dL Final   06/10/2021 16.0 7.0 - 30.0 mg/dL Final     Creatinine   Date Value Ref Range Status    10/18/2022 1.07 0.70 - 1.30 mg/dL Final   06/10/2021 1.0 0.8 - 1.5 mg/dL Final     GFR Estimate   Date Value Ref Range Status   10/18/2022 75 >60 mL/min/1.73m2 Final     Comment:     Effective December 21, 2021 eGFRcr in adults is calculated using the 2021 CKD-EPI creatinine equation which includes age and gender (Lucie whiteside al., [place], DOI: 10.[facility]6/HJUHdx0906843)   04/24/2021 >60 >60 mL/min/1.73m2 Final     Calcium   Date Value Ref Range Status   10/18/2022 9.6 8.5 - 10.5 mg/dL Final   06/10/2021 9.5 8.5 - 10.4 mg/dL Final     Albumin   Date Value Ref Range Status   10/18/2022 3.4 (L) 3.5 - 5.0 g/dL Final      Lab Results   Component Value Date    WBC 5.8 11/10/2022     Lab Results   Component Value Date    RBC 4.05 11/10/2022     Lab Results   Component Value Date    HGB 9.4 11/10/2022     Lab Results   Component Value Date    HCT 31.6 11/10/2022     No components found for: MCT  Lab Results   Component Value Date    MCV 78 11/10/2022     Lab Results   Component Value Date    MCH 23.2 11/10/2022     Lab Results   Component Value Date    MCHC 29.7 11/10/2022     Lab Results   Component Value Date    RDW 15.2 11/10/2022     Lab Results   Component Value Date     11/10/2022      Lab Results   Component Value Date    A1C 6.2 07/14/2022    A1C 6.7 04/21/2022    A1C 6.2 04/24/2021    A1C 6.2 04/24/2021      TSH   Date Value Ref Range Status   10/18/2022 1.76 0.30 - 5.00 uIU/mL Final      No results found for: VITDT                Impression:  Encounter Diagnoses   Name Primary?     Elephantiasis Yes     Lymphedema of both lower extremities      Venous (peripheral) insufficiency      Venous stasis ulcer of left lower leg with edema of left lower leg (H)      Venous stasis ulcer of right lower leg with edema of right lower leg (H)      Pre-ulcerative corn or callous      Lipodermatosclerosis of both lower extremities      Papillomatosis      Venous hypertension, chronic, with ulcer and inflammation, bilateral (H)       BMI 50.0-59.9, adult (H)      Leg swelling                                            Are any of these wounds new today: No; Location: na    Assessment/Plan:          1. Debridement: After discussion of risk factors and verbal consent was obtained 2% Lidocaine HCL jelly was applied, under clean conditions, the BLE ulceration(s) were debrided using currette. Devitalized and nonviable tissue, along with any fibrin and slough, was removed to improve granulation tissue formation, stimulate wound healing, decrease overall bacteria load, disrupt biofilm formation and decrease edge senescence.  Total excisional debridement was 201 sq cm from the epidermis/dermis area with a depth of 0-0.1 cm.   Ulcers were improved afterwards and .  Measures were unchanged after debridement.       2.  Wound treatment: wound treatment will include irrigation and dressings to promote autolytic debridement which will include:will continue with home care; continue with wound care every 1-2 days either by family or home care; wash with dilute hibiclens; then Dakin's wash; viscopaste; ABD; rolled gauze;  If for some reason the patient is not able to get their dressing(s) changed as outlined above (due to illness, lack of supplies, lack of help) please do the following: remove old, soiled dressings; wash the wounds with saline; pat dry; apply ABD pad or other absorbant pad and secure with rolled gauze; avoid tape directly on your skin; patient instructed to call the clinic as soon as possible to let us know what the current issues are in receiving wound care. Stable            3. Edema: continue with lymphedema pump; continue with lymphedema wraps; elevation. The compression wraps were applied today in clinic.     If a 2 layer or 4 layer compression wrap is being used; these are safe to have on for ONLY 7 days. If for some reason the patient is not able to get the wrap(s) changed (due to illness; lack of supplies, lack of help, lack of  transportation) please do the following: unwrap the old 2 or 4 layer compression wrap; avoid using scissors as you could cut your skin and cause wounds; use tubular compression when available. Call to reschedule your home care or clinic visit appointment as soon as possible.  Stable            4. Nutrition: focus on weight loss; low sodium; noted to have low iron and B vitamins; will wait for plan to be put in place with oncology before addressing           5. Offloading: na           6. Nails: nails 1-5 bilaterally were debrided and filed smooth; tolerated well, no complications.        Patient will follow up with me in 4 weeks for reevaluation. They were instructed to call the clinic sooner with any signs or symptoms of infection or any further questions/concerns. Answered all questions.          Kim Fuentes DNP, RN, CNP, CWOCN, CFCN, CLT  [facility]   [phone]        This note was electronically signed by Kim Fuentes NP

## 2022-12-07 ENCOUNTER — HOSPITAL ENCOUNTER (OUTPATIENT)
Dept: CT IMAGING | Facility: CLINIC | Age: 67
Discharge: HOME | End: 2022-12-07
Attending: INTERNAL MEDICINE | Admitting: INTERNAL MEDICINE
Payer: COMMERCIAL

## 2022-12-07 VITALS
HEART RATE: 70 BPM | WEIGHT: 315 LBS | BODY MASS INDEX: 39.17 KG/M2 | OXYGEN SATURATION: 96 % | HEIGHT: 75 IN | SYSTOLIC BLOOD PRESSURE: 133 MMHG | TEMPERATURE: 98.6 F | RESPIRATION RATE: 16 BRPM | DIASTOLIC BLOOD PRESSURE: 76 MMHG

## 2022-12-07 DIAGNOSIS — D64.9 ANEMIA, UNSPECIFIED TYPE: ICD-10-CM

## 2022-12-07 DIAGNOSIS — C85.93 LYMPHOMA OF INTRA-ABDOMINAL LYMPH NODES, UNSPECIFIED LYMPHOMA TYPE (H): ICD-10-CM

## 2022-12-07 LAB
GLUCOSE BLDC GLUCOMTR-MCNC: 117 MG/DL (ref 70–99)
INTERPRETATION: NORMAL
LAB DIRECTOR COMMENTS: NORMAL
LAB DIRECTOR DISCLAIMER: NORMAL
LAB DIRECTOR INTERPRETATION: NORMAL
LAB DIRECTOR METHODOLOGY: NORMAL
LAB DIRECTOR RESULTS: NORMAL
SIGNIFICANT RESULTS: NORMAL
SPECIMEN DESCRIPTION: NORMAL
SPECIMEN DESCRIPTION: NORMAL
TEST DETAILS, MDL: NORMAL

## 2022-12-07 PROCEDURE — 20225 BONE BIOPSY TROCAR/NDL DEEP: CPT

## 2022-12-07 PROCEDURE — 88237 TISSUE CULTURE BONE MARROW: CPT

## 2022-12-07 PROCEDURE — 88311 DECALCIFY TISSUE: CPT | Mod: TC

## 2022-12-07 PROCEDURE — 88275 CYTOGENETICS 100-300: CPT | Mod: XU

## 2022-12-07 PROCEDURE — 81450 HL NEO GSAP 5-50DNA/DNA&RNA: CPT

## 2022-12-07 PROCEDURE — 88313 SPECIAL STAINS GROUP 2: CPT | Mod: TC

## 2022-12-07 PROCEDURE — G0452 MOLECULAR PATHOLOGY INTERPR: HCPCS | Mod: 26 | Performed by: PATHOLOGY

## 2022-12-07 PROCEDURE — 250N000009 HC RX 250: Performed by: RADIOLOGY

## 2022-12-07 PROCEDURE — 88264 CHROMOSOME ANALYSIS 20-25: CPT | Mod: XU

## 2022-12-07 PROCEDURE — 250N000011 HC RX IP 250 OP 636: Performed by: RADIOLOGY

## 2022-12-07 PROCEDURE — 82962 GLUCOSE BLOOD TEST: CPT

## 2022-12-07 PROCEDURE — 88291 CYTO/MOLECULAR REPORT: CPT | Performed by: MEDICAL GENETICS

## 2022-12-07 RX ORDER — COVID-19 ANTIGEN TEST
220 KIT MISCELLANEOUS 2 TIMES DAILY PRN
COMMUNITY
End: 2023-07-20

## 2022-12-07 RX ORDER — LIDOCAINE 40 MG/G
CREAM TOPICAL
Status: DISCONTINUED | OUTPATIENT
Start: 2022-12-07 | End: 2022-12-08 | Stop reason: HOSPADM

## 2022-12-07 RX ORDER — FLUMAZENIL 0.1 MG/ML
0.2 INJECTION, SOLUTION INTRAVENOUS
Status: DISCONTINUED | OUTPATIENT
Start: 2022-12-07 | End: 2022-12-08 | Stop reason: HOSPADM

## 2022-12-07 RX ORDER — NALOXONE HYDROCHLORIDE 1 MG/ML
0.2 INJECTION INTRAMUSCULAR; INTRAVENOUS; SUBCUTANEOUS
Status: DISCONTINUED | OUTPATIENT
Start: 2022-12-07 | End: 2022-12-08 | Stop reason: HOSPADM

## 2022-12-07 RX ORDER — NALOXONE HYDROCHLORIDE 1 MG/ML
0.4 INJECTION INTRAMUSCULAR; INTRAVENOUS; SUBCUTANEOUS
Status: DISCONTINUED | OUTPATIENT
Start: 2022-12-07 | End: 2022-12-08 | Stop reason: HOSPADM

## 2022-12-07 RX ORDER — FENTANYL CITRATE 50 UG/ML
25-50 INJECTION, SOLUTION INTRAMUSCULAR; INTRAVENOUS EVERY 5 MIN PRN
Status: DISCONTINUED | OUTPATIENT
Start: 2022-12-07 | End: 2022-12-08 | Stop reason: HOSPADM

## 2022-12-07 RX ORDER — ACETAMINOPHEN 325 MG/1
650 TABLET ORAL EVERY 4 HOURS PRN
Status: DISCONTINUED | OUTPATIENT
Start: 2022-12-07 | End: 2022-12-08 | Stop reason: HOSPADM

## 2022-12-07 RX ADMIN — MIDAZOLAM HYDROCHLORIDE 1 MG: 1 INJECTION, SOLUTION INTRAMUSCULAR; INTRAVENOUS at 11:03

## 2022-12-07 RX ADMIN — FENTANYL CITRATE 50 MCG: 50 INJECTION, SOLUTION INTRAMUSCULAR; INTRAVENOUS at 10:21

## 2022-12-07 RX ADMIN — LIDOCAINE HYDROCHLORIDE 10 ML: 10 INJECTION, SOLUTION EPIDURAL; INFILTRATION; INTRACAUDAL; PERINEURAL at 10:15

## 2022-12-07 RX ADMIN — MIDAZOLAM HYDROCHLORIDE 1 MG: 1 INJECTION, SOLUTION INTRAMUSCULAR; INTRAVENOUS at 10:13

## 2022-12-07 RX ADMIN — MIDAZOLAM HYDROCHLORIDE 1 MG: 1 INJECTION, SOLUTION INTRAMUSCULAR; INTRAVENOUS at 10:21

## 2022-12-07 RX ADMIN — FENTANYL CITRATE 50 MCG: 50 INJECTION, SOLUTION INTRAMUSCULAR; INTRAVENOUS at 11:02

## 2022-12-07 RX ADMIN — FENTANYL CITRATE 50 MCG: 50 INJECTION, SOLUTION INTRAMUSCULAR; INTRAVENOUS at 10:14

## 2022-12-07 NOTE — DISCHARGE INSTRUCTIONS
1. You are required to have someone accompany you home. Do not drive or operate machinery today as the medication may cause sleepiness.    2. Rest today and avoid strenuous activity or heavy lifting for 48 hours. Over-activity may produce dizziness and or nausea.    3. You should follow your normal diet. Drink plenty of fluids. No alcoholic beverages for 24 hours. *(Alcohol may interact with the medications you received today)    4. Leave bandage on today, you may remove tomorrow.    5. You may shower tomorrow. Do not soak in a bath tub, hot tub, or swim until the site is completely healed and the skin glue is off. Keep the site clean and dry.    ADDITIONAL INSTRUCTIONS    When to call your Doctor:     1. Watch your biopsy site for signs of infection, increase pain, redness, swelling, or any drainage and or fever or chills.    2. On-going nausea, vomiting, or un-usual increase in pain.    3. If you experience any of the above or sudden weakness, dizziness, abdominal pain, flank pain or a temperature above 100.0 degree F for more than 24 hours, call your Doctor.    4. Sudden on-set of shortness of breath - call 911 or go to the emergency room.

## 2022-12-07 NOTE — PROGRESS NOTES
Patient Name: Shaheen Sepulveda  Medical Record Number: [id number]  Today's Date: 12/7/2022    Procedure: Image Guided Bone Marrow Biopsy with moderate sedation  Proceduralist: Dr. Jarrett Rodriguez  Pathology present: Yes, hematology and Bone Marrow tech    Procedure Start: 1010  Procedure end: 1045  Sedation medications administered: Fentanyl 150 mcg, Versed 3 mg     Report given to: KIERA Pop [facility]  : n/a    Other Notes: Pt arrived to CT room #1 from [facility] Room #13. Consent reviewed. Pt denies any questions or concerns regarding procedure. Pt positioned prone and monitored per protocol. Dr. Rodriguez collected bone marrow samples and delivered to bone marrow tech for on-site evaluation. Pt tolerated procedure without any noted complications. Pt transferred back to IR Pre / Post #4.

## 2022-12-07 NOTE — PRE-PROCEDURE
GENERAL PRE-PROCEDURE:     Verbal consent obtained?: Yes    Written consent obtained?: Yes    Risks and benefits: Risks, benefits and alternatives were discussed    Consent given by:  Patient  Patient states understanding of procedure being performed: Yes    Patient's understanding of procedure matches consent: Yes    Procedure consent matches procedure scheduled: Yes    Expected level of sedation:  Moderate  Appropriately NPO:  Yes  ASA Class:  2  Mallampati  :  Grade 2- soft palate, base of uvula, tonsillar pillars, and portion of posterior pharyngeal wall visible  Lungs:  Lungs clear with good breath sounds bilaterally  Heart:  Normal heart sounds and rate  History & Physical reviewed:  History and physical reviewed and no updates needed  Statement of review:  I have reviewed the lab findings, diagnostic data, medications, and the plan for sedation

## 2022-12-07 NOTE — IP AVS SNAPSHOT
[facility]  [address] [address]  Phone: [phone]  Fax: [phone]                                    After Visit Summary   12/7/2022    Shaheen Sepulveda   MRN: [id number]           After Visit Summary Signature Page    I have received my discharge instructions, and my questions have been answered. I have discussed any challenges I see with this plan with the nurse or doctor.    ..........................................................................................................................................  Patient/Patient Representative Signature      ..........................................................................................................................................  Patient Representative Print Name and Relationship to Patient    ..................................................               ................................................  Date                                   Time    ..........................................................................................................................................  Reviewed by Signature/Title    ...................................................              ..............................................  Date                                               Time          22EPIC Rev 08/18

## 2022-12-08 LAB
PATH REPORT.ADDENDUM SPEC: NORMAL
PATH REPORT.ADDENDUM SPEC: NORMAL
PATH REPORT.COMMENTS IMP SPEC: NORMAL
PATH REPORT.FINAL DX SPEC: NORMAL
PATH REPORT.GROSS SPEC: NORMAL
PATH REPORT.MICROSCOPIC SPEC OTHER STN: NORMAL
PATH REPORT.RELEVANT HX SPEC: NORMAL
PHOTO IMAGE: NORMAL

## 2022-12-09 PROCEDURE — 88184 FLOWCYTOMETRY/ TC 1 MARKER: CPT | Performed by: PATHOLOGY

## 2022-12-09 PROCEDURE — 88185 FLOWCYTOMETRY/TC ADD-ON: CPT | Performed by: INTERNAL MEDICINE

## 2022-12-09 PROCEDURE — 88184 FLOWCYTOMETRY/ TC 1 MARKER: CPT | Performed by: INTERNAL MEDICINE

## 2022-12-09 PROCEDURE — 88189 FLOWCYTOMETRY/READ 16 & >: CPT | Mod: GC | Performed by: PATHOLOGY

## 2022-12-14 ENCOUNTER — LAB (OUTPATIENT)
Dept: INFUSION THERAPY | Facility: CLINIC | Age: 67
End: 2022-12-14
Attending: INTERNAL MEDICINE
Payer: COMMERCIAL

## 2022-12-14 ENCOUNTER — ONCOLOGY VISIT (OUTPATIENT)
Dept: ONCOLOGY | Facility: CLINIC | Age: 67
End: 2022-12-14
Attending: INTERNAL MEDICINE
Payer: COMMERCIAL

## 2022-12-14 VITALS
HEART RATE: 76 BPM | BODY MASS INDEX: 39.17 KG/M2 | SYSTOLIC BLOOD PRESSURE: 139 MMHG | HEIGHT: 75 IN | WEIGHT: 315 LBS | OXYGEN SATURATION: 96 % | DIASTOLIC BLOOD PRESSURE: 72 MMHG

## 2022-12-14 DIAGNOSIS — E53.8 VITAMIN B12 DEFICIENCY (NON ANEMIC): Primary | ICD-10-CM

## 2022-12-14 DIAGNOSIS — D64.9 ANEMIA, UNSPECIFIED TYPE: ICD-10-CM

## 2022-12-14 PROCEDURE — 88364 INSITU HYBRIDIZATION (FISH): CPT | Mod: 26 | Performed by: PATHOLOGY

## 2022-12-14 PROCEDURE — 85097 BONE MARROW INTERPRETATION: CPT | Performed by: PATHOLOGY

## 2022-12-14 PROCEDURE — 96372 THER/PROPH/DIAG INJ SC/IM: CPT | Performed by: INTERNAL MEDICINE

## 2022-12-14 PROCEDURE — G0463 HOSPITAL OUTPT CLINIC VISIT: HCPCS | Mod: 25

## 2022-12-14 PROCEDURE — 88313 SPECIAL STAINS GROUP 2: CPT | Mod: 26 | Performed by: PATHOLOGY

## 2022-12-14 PROCEDURE — 99214 OFFICE O/P EST MOD 30 MIN: CPT | Performed by: INTERNAL MEDICINE

## 2022-12-14 PROCEDURE — 88365 INSITU HYBRIDIZATION (FISH): CPT | Mod: 26 | Performed by: PATHOLOGY

## 2022-12-14 PROCEDURE — 88311 DECALCIFY TISSUE: CPT | Mod: 26 | Performed by: PATHOLOGY

## 2022-12-14 PROCEDURE — 88341 IMHCHEM/IMCYTCHM EA ADD ANTB: CPT | Mod: 26 | Performed by: PATHOLOGY

## 2022-12-14 PROCEDURE — 85060 BLOOD SMEAR INTERPRETATION: CPT | Performed by: PATHOLOGY

## 2022-12-14 PROCEDURE — 88342 IMHCHEM/IMCYTCHM 1ST ANTB: CPT | Mod: 26 | Performed by: PATHOLOGY

## 2022-12-14 PROCEDURE — G0463 HOSPITAL OUTPT CLINIC VISIT: HCPCS

## 2022-12-14 PROCEDURE — 88305 TISSUE EXAM BY PATHOLOGIST: CPT | Mod: 26 | Performed by: PATHOLOGY

## 2022-12-14 PROCEDURE — 250N000011 HC RX IP 250 OP 636: Performed by: INTERNAL MEDICINE

## 2022-12-14 RX ORDER — EPINEPHRINE 1 MG/ML
0.3 INJECTION, SOLUTION INTRAMUSCULAR; SUBCUTANEOUS EVERY 5 MIN PRN
Status: CANCELLED | OUTPATIENT
Start: 2022-12-15

## 2022-12-14 RX ORDER — ALBUTEROL SULFATE 90 UG/1
1-2 AEROSOL, METERED RESPIRATORY (INHALATION)
Status: CANCELLED
Start: 2022-12-21

## 2022-12-14 RX ORDER — DIPHENHYDRAMINE HYDROCHLORIDE 50 MG/ML
50 INJECTION INTRAMUSCULAR; INTRAVENOUS
Status: CANCELLED
Start: 2022-12-15

## 2022-12-14 RX ORDER — CYANOCOBALAMIN 1000 UG/ML
1000 INJECTION, SOLUTION INTRAMUSCULAR; SUBCUTANEOUS ONCE
Status: CANCELLED
Start: 2022-12-21 | End: 2022-12-21

## 2022-12-14 RX ORDER — EPINEPHRINE 1 MG/ML
0.3 INJECTION, SOLUTION INTRAMUSCULAR; SUBCUTANEOUS EVERY 5 MIN PRN
Status: CANCELLED | OUTPATIENT
Start: 2022-12-21

## 2022-12-14 RX ORDER — DIPHENHYDRAMINE HYDROCHLORIDE 50 MG/ML
50 INJECTION INTRAMUSCULAR; INTRAVENOUS
Status: CANCELLED
Start: 2022-12-21

## 2022-12-14 RX ORDER — MEPERIDINE HYDROCHLORIDE 50 MG/ML
25 INJECTION INTRAMUSCULAR; INTRAVENOUS; SUBCUTANEOUS EVERY 30 MIN PRN
Status: CANCELLED | OUTPATIENT
Start: 2022-12-15

## 2022-12-14 RX ORDER — LANOLIN ALCOHOL/MO/W.PET/CERES
1000 CREAM (GRAM) TOPICAL DAILY
Qty: 90 TABLET | Refills: 3 | Status: SHIPPED | OUTPATIENT
Start: 2022-12-14 | End: 2023-07-20

## 2022-12-14 RX ORDER — CYANOCOBALAMIN 1000 UG/ML
1000 INJECTION, SOLUTION INTRAMUSCULAR; SUBCUTANEOUS ONCE
Status: COMPLETED | OUTPATIENT
Start: 2022-12-14 | End: 2022-12-14

## 2022-12-14 RX ORDER — METHYLPREDNISOLONE SODIUM SUCCINATE 125 MG/2ML
125 INJECTION, POWDER, LYOPHILIZED, FOR SOLUTION INTRAMUSCULAR; INTRAVENOUS
Status: CANCELLED
Start: 2022-12-21

## 2022-12-14 RX ORDER — ALBUTEROL SULFATE 90 UG/1
1-2 AEROSOL, METERED RESPIRATORY (INHALATION)
Status: CANCELLED
Start: 2022-12-15

## 2022-12-14 RX ORDER — MEPERIDINE HYDROCHLORIDE 50 MG/ML
25 INJECTION INTRAMUSCULAR; INTRAVENOUS; SUBCUTANEOUS EVERY 30 MIN PRN
Status: CANCELLED | OUTPATIENT
Start: 2022-12-21

## 2022-12-14 RX ORDER — METHYLPREDNISOLONE SODIUM SUCCINATE 125 MG/2ML
125 INJECTION, POWDER, LYOPHILIZED, FOR SOLUTION INTRAMUSCULAR; INTRAVENOUS
Status: CANCELLED
Start: 2022-12-15

## 2022-12-14 RX ORDER — ALBUTEROL SULFATE 0.83 MG/ML
2.5 SOLUTION RESPIRATORY (INHALATION)
Status: CANCELLED | OUTPATIENT
Start: 2022-12-15

## 2022-12-14 RX ORDER — ALBUTEROL SULFATE 0.83 MG/ML
2.5 SOLUTION RESPIRATORY (INHALATION)
Status: CANCELLED | OUTPATIENT
Start: 2022-12-21

## 2022-12-14 RX ADMIN — CYANOCOBALAMIN 1000 MCG: 1000 INJECTION INTRAMUSCULAR; SUBCUTANEOUS at 09:06

## 2022-12-14 ASSESSMENT — PAIN SCALES - GENERAL: PAINLEVEL: NO PAIN (0)

## 2022-12-14 NOTE — LETTER
12/14/2022         RE: Shaheen Sepulveda  [address]  [facility] [address]        Dear Colleague,    Thank you for referring your patient, Shaheen Sepulveda, to the [facility]. Please see a copy of my visit note below.    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    Impression:  1.  Very likely, low-grade, B-cell lymphoma favoring small lymphocytic lymphoma(SLL) although an indolent B-cell lymphoma-NOS is also possible.  2.  Vitamin B12 deficiency  3.  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  Indeterminate microcytic anemia possibly due to anemia of chronic disease from recent cellulitis    Recommendation, plan, instructions:  1.  Vitamin B12 1000 mcg IM weekly for 4 weeks followed by 1000 mcg daily by mouth for life  2.  Reviewed at length in detail with the patient the natural history, general management and prognosis of indolent malignant B-cell lymphoma.  Recommend observation without intervention at this time.    3.  His anemia can be related to a number of factors including anemia of chronic disease from his cellulitis which is improving, B12 deficiency, and possible myelodysplasia although all of his bone marrow studies are not back including his MDS focused NGS.  I will call him back separately on this when it becomes available.  4.  Follow-up with Dr. Evette Sanford in 3 months with CBC, CMP, erythropoietin level and B12 one week before appointment    Time with patient including review, documentation, history, examination, coordination of care and counseling was 30 minutes.    History of present illness:  Preliminary 12/7/2022 bone marrow biopsy  revealed a foci of a kappa light chain restricted clonal B cells with FISH, chromosomes and MDS focused NGS studies pending.  Flow cytometry was negative.    11/7/2022 right inguinal lymph node core biopsy revealed clonal B-cell population with absent MYD88 and BCL6 mutations.      A 10/19/2022 PET scan revealed mildly gluco-avid bilateral axillary, right external iliac, and bilateral inguinal lymph nodes suspicious for a inflammatory process or a low-grade lymphoproliferative disorder.      Original 10/6/2022 right inguinal FNA had revealed a kappa restricted lymphoproliferative disorder favoring SLL/CLL with a KI-67 proliferative rate<10%.  CD43 demonstrate equivocal coexpression on B cells with subsequent [facility] negative FISH and flow cytometry.    Blood tests revealed a vitamin B12 level of 229, a 0.3 g/dL IgG kappa monoclonal protein, LDH of 248(<220), hemoglobin 9.8, MCV 80, white count 8000, count 234,000 with a normal differential with a iron of 35, TIBC 311, saturation 11%, with unremarkable soluble transferrin receptor, ferritin, TSH, RBC folate, TTG, CMP and soluble transferrin receptor.  Hemoccult x3 were negative.    Past medical history:  Remarkable for hypertension, lymphedema, methadone use, obesity, sleep apnea on CPAP, former tobacco use (2014), and type 2 diabetes.  Past history of GERD, 2012 left total hip arthroplasty and 2013 episode pneumonia with nondiagnostic thoracentesis     Family history:  I have reviewed this patient's family history and updated it with pertinent information if needed.  Family History   Problem Relation Age of Onset     No Known Problems Mother      No Known Problems Father      Edema Sister      Edema Sister          Medications:  Current Outpatient Medications   Medication     ammonium lactate (LAC-HYDRIN) 12 % external lotion     atorvastatin (LIPITOR) 20 MG tablet     chlorthalidone (HYGROTON) 25 MG tablet     cyanocobalamin (VITAMIN B-12) 1000 MCG tablet      "famotidine (PEPCID) 20 MG tablet     hydrOXYzine (ATARAX) 25 MG tablet     losartan (COZAAR) 100 MG tablet     melatonin 3 MG tablet     metFORMIN (GLUCOPHAGE) 500 MG tablet     methadone (DOLOPHINE-INTENSOL) 10 MG/ML (HIGH CONC) solution     naproxen sodium 220 MG capsule     sodium hypochlorite (QUARTER-STRENGTH DAKINS) external solution     Current Facility-Administered Medications   Medication     cyanocobalamin injection 1,000 mcg     lidocaine (PF) (XYLOCAINE) 1 % injection 10 mL     lidocaine (XYLOCAINE) 2 % external gel       Allergies:  Allergies   Allergen Reactions     Lisinopril Swelling     Patient reports \"neck swelling\"  Swelling in throat       Review of systems:  His bilateral right greater than left cellulitis has improved but he is being followed actively by home care and wound care.    Physical examination:  /72   Pulse 76   Ht 1.905 m (6' 3\")   Wt (!) 183.2 kg (403 lb 12.8 oz)   SpO2 96%   BMI 50.47 kg/m      He is alert and oriented.    Gerson Jaquez MD    Oncology Rooming Note    December 14, 2022 8:31 AM   Shaheen Sepulveda is a 67 year old male who presents for:    Chief Complaint   Patient presents with     Hematology     Lymphedema of both lower extremities     Initial Vitals: BP (!) 146/80 (BP Location: Left arm, Patient Position: Sitting, Cuff Size: Adult Large)   Pulse 76   Ht 1.905 m (6' 3\")   Wt (!) 183.2 kg (403 lb 12.8 oz)   SpO2 96%   BMI 50.47 kg/m   Estimated body mass index is 50.47 kg/m  as calculated from the following:    Height as of this encounter: 1.905 m (6' 3\").    Weight as of this encounter: 183.2 kg (403 lb 12.8 oz). Body surface area is 3.11 meters squared.  No Pain (0) Comment: Data Unavailable   No LMP for male patient.  Allergies reviewed: Yes  Medications reviewed: Yes    Medications: Medication refills not needed today.  Pharmacy name entered into [email or web address]: [id number] [place], [place] - [address]    Clinical concerns: follow up to " [facility]      Elaine Juarez MA                Again, thank you for allowing me to participate in the care of your patient.        Sincerely,        Gerson Jaquez MD

## 2022-12-14 NOTE — PROGRESS NOTES
"Oncology Rooming Note    December 14, 2022 8:31 AM   Shaheen Sepulveda is a 67 year old male who presents for:    Chief Complaint   Patient presents with     Hematology     Lymphedema of both lower extremities     Initial Vitals: BP (!) 146/80 (BP Location: Left arm, Patient Position: Sitting, Cuff Size: Adult Large)   Pulse 76   Ht 1.905 m (6' 3\")   Wt (!) 183.2 kg (403 lb 12.8 oz)   SpO2 96%   BMI 50.47 kg/m   Estimated body mass index is 50.47 kg/m  as calculated from the following:    Height as of this encounter: 1.905 m (6' 3\").    Weight as of this encounter: 183.2 kg (403 lb 12.8 oz). Body surface area is 3.11 meters squared.  No Pain (0) Comment: Data Unavailable   No LMP for male patient.  Allergies reviewed: Yes  Medications reviewed: Yes    Medications: Medication refills not needed today.  Pharmacy name entered into [employer]: [id number] [place], [place] - [address]    Clinical concerns: follow up to [facility]      Elaine Juarez MA            "

## 2022-12-15 ENCOUNTER — OFFICE VISIT (OUTPATIENT)
Dept: FAMILY MEDICINE | Facility: CLINIC | Age: 67
End: 2022-12-15
Payer: COMMERCIAL

## 2022-12-15 VITALS
HEIGHT: 75 IN | SYSTOLIC BLOOD PRESSURE: 160 MMHG | DIASTOLIC BLOOD PRESSURE: 78 MMHG | HEART RATE: 94 BPM | WEIGHT: 315 LBS | RESPIRATION RATE: 20 BRPM | OXYGEN SATURATION: 95 % | TEMPERATURE: 97.9 F | BODY MASS INDEX: 39.17 KG/M2

## 2022-12-15 DIAGNOSIS — I10 BENIGN ESSENTIAL HYPERTENSION: ICD-10-CM

## 2022-12-15 DIAGNOSIS — E11.9 TYPE 2 DIABETES MELLITUS WITHOUT COMPLICATION, WITHOUT LONG-TERM CURRENT USE OF INSULIN (H): Primary | ICD-10-CM

## 2022-12-15 DIAGNOSIS — G47.33 OSA (OBSTRUCTIVE SLEEP APNEA): ICD-10-CM

## 2022-12-15 PROCEDURE — 90677 PCV20 VACCINE IM: CPT | Performed by: STUDENT IN AN ORGANIZED HEALTH CARE EDUCATION/TRAINING PROGRAM

## 2022-12-15 PROCEDURE — 90471 IMMUNIZATION ADMIN: CPT | Performed by: STUDENT IN AN ORGANIZED HEALTH CARE EDUCATION/TRAINING PROGRAM

## 2022-12-15 PROCEDURE — 99214 OFFICE O/P EST MOD 30 MIN: CPT | Mod: 25 | Performed by: STUDENT IN AN ORGANIZED HEALTH CARE EDUCATION/TRAINING PROGRAM

## 2022-12-15 RX ORDER — SPIRONOLACTONE 25 MG/1
25 TABLET ORAL DAILY
Qty: 30 TABLET | Refills: 1 | Status: SHIPPED | OUTPATIENT
Start: 2022-12-15 | End: 2023-02-16

## 2022-12-15 NOTE — PROGRESS NOTES
Assessment & Plan     Type 2 diabetes mellitus without complication, without long-term current use of insulin (H)  - A1c at goal (6.2% on 7/14/22). Will perform diabetic foot exam with monofilament testing at next visit. Foot exams done serially with vascular clinic but no monofilament exams documented.    DG (obstructive sleep apnea)  Discussed that additional sleep study is unnecessary to obtain new CPAP. Patient offered referral for sleep evaluation for fitting of new CPAP. Suspect this is contributing to difficult to treat HTN.   - Adult Sleep Eval & Management Referral    Benign essential hypertension  Blood pressure not consistently optimally controlled with reading of 160/78 in office today. Chlorthalidone and Losartan currently at maximal dose, so recommend adding spironolactone; was previously on calcium channel blocker but in the setting of severe lymphedema, will trial spironolactone. Will reassess blood pressure at next visit, he will need repeat BMP.  - spironolactone (ALDACTONE) 25 MG tablet  Dispense: 30 tablet; Refill: 1      Return in about 4 weeks (around 1/12/2023) for Follow up.    Kiran York, Medical Student  [employer]  12/15/2022 11:07 AM     Precepted with Dr. Leal.    I was present with the medical student who participated in the service and in the documentation of this note. I have verified the history and personally performed the physical exam and medical decision making, and have verified the content of the note, which accurately reflects my assessment of the patient and the plan of care.     Radha Sal MD  [facility]    Frank Jamison is a 67 year old presenting for the following health issues:  RECHECK (BP and stills elevated, have not taken his BP meds yet)      HPI   Shaheen Sepulveda is a 67-year-old male with history of T2DM, chronic hypertension, chronic lymphedema, and recent diagnosis of CML presenting  "for evaluation of hypertension, lymphedema, and diabetes. His BP this morning was 160/78, but he notes that he frequently measures his BP at home and his systolic is typically <130 and diastolic is <80. He has been taking his chlorthalidone and losartan every day at 10 am as prescribed. He has a diagnosis of sleep apnea, for which he used to use a CPAP. He has not used one in over three years and is concerned that he will require another sleep study if he wants to get a new CPAP.    His chronic lymphedema has been improving. He describes his leg swelling going down with use of edema wraps and a pressure device. Further, he says that his chronic leg wounds have been healing nicely. He has been visiting vascular clinic every month where he has had his feet examined for ulcers or other lesions, but he has not had a monofilament test recently.     He had an oncology visit yesterday (12/14/22) for his recent CML diagnosis, and he received news that his cancer is not aggressive and is unlikely to shorten his life expectancy. He felt quite reassured by this news. However, his oncologist told him that it is imperative to get his BP under control and his legs fully healed.    He endorses decreased sensation in legs/feet. Denies headache, congestion, changes in vision, changes in hearing, ear pain, wounds on bottom of feet, numbness, tingling, hematochezia, diarrhea, constipation, dysuria, hematuria, urinary frequency, decreased mood.    Review of Systems   Constitutional, HEENT, cardiovascular, pulmonary, GI, , musculoskeletal, neuro, skin, endocrine and psych systems are negative, except as otherwise noted.      Objective    BP (!) 160/78   Pulse 94   Temp 97.9  F (36.6  C)   Resp 20   Ht 1.905 m (6' 3\")   Wt (!) 181.9 kg (401 lb)   SpO2 95%   BMI 50.12 kg/m    Body mass index is 50.12 kg/m .  Physical Exam   GENERAL: healthy, alert and no distress  EYES: Eyes grossly normal to inspection, conjunctivae and sclerae " normal  NECK: no adenopathy, no asymmetry, masses, or scars  ABDOMEN: soft, nontender, no hepatosplenomegaly, no masses  MS: extensive edema of legs bilaterally, no gross musculoskeletal defects noted  NEURO: mentation intact and speech normal  PSYCH: mentation appears normal, affect normal/bright     CMP pending from onc visit on 12/14.    ----- Service Performed and Documented by Resident or Fellow ------

## 2022-12-19 ENCOUNTER — TELEPHONE (OUTPATIENT)
Dept: FAMILY MEDICINE | Facility: CLINIC | Age: 67
End: 2022-12-19

## 2022-12-19 NOTE — TELEPHONE ENCOUNTER
The Home Care/Assisted Living/Nursing Facility is calling regarding an established patient.  Has the patient seen Home Care in the past or is currently residing in Assisted Living or Nursing Facility? Yes.     Lesly calling from [facility] requesting the following orders that are within the Home Care, Assisted Living or Nursing Home Eval and Treatment standing order and can be signed as standing order signature required by RN.    Preferred Call Back Number: [phone]    Home Care Visits Continuation and PT    Any additional Orders:  Are there any orders requested, not stated above, that are outside of the standing order and must be routed to a licensed practitioner for approval?    No    Writer has verified [employer] will send fax to have orders signed. Encounter will be routed to inform Dr Sal of the completed action and to expect a fax to follow for signature. No further intervention is needed at this time. Nanette QURESHI

## 2022-12-19 NOTE — TELEPHONE ENCOUNTER
[facility] phone call message - order or referral request for patient:     Order or referral being requested: Verbal orders for skilled nursing  1 x a week for 8 weeks, 1 PRN, PT to eval and treat second week of care.       Additional Comments:     OK to leave a message on voice mail? Yes    Primary language: English      needed? No    Call taken on December 19, 2022 at 9:05 AM by Aleja Farrell

## 2022-12-20 ENCOUNTER — MEDICAL CORRESPONDENCE (OUTPATIENT)
Dept: HEALTH INFORMATION MANAGEMENT | Facility: CLINIC | Age: 67
End: 2022-12-20

## 2022-12-21 ENCOUNTER — INFUSION THERAPY VISIT (OUTPATIENT)
Dept: INFUSION THERAPY | Facility: CLINIC | Age: 67
End: 2022-12-21
Attending: INTERNAL MEDICINE
Payer: COMMERCIAL

## 2022-12-21 VITALS
SYSTOLIC BLOOD PRESSURE: 137 MMHG | OXYGEN SATURATION: 94 % | DIASTOLIC BLOOD PRESSURE: 72 MMHG | RESPIRATION RATE: 18 BRPM | HEART RATE: 73 BPM

## 2022-12-21 DIAGNOSIS — E53.8 VITAMIN B12 DEFICIENCY (NON ANEMIC): Primary | ICD-10-CM

## 2022-12-21 PROCEDURE — 96372 THER/PROPH/DIAG INJ SC/IM: CPT | Performed by: INTERNAL MEDICINE

## 2022-12-21 PROCEDURE — 250N000011 HC RX IP 250 OP 636: Performed by: INTERNAL MEDICINE

## 2022-12-21 RX ORDER — METHYLPREDNISOLONE SODIUM SUCCINATE 125 MG/2ML
125 INJECTION, POWDER, LYOPHILIZED, FOR SOLUTION INTRAMUSCULAR; INTRAVENOUS
Status: CANCELLED
Start: 2022-12-28

## 2022-12-21 RX ORDER — ALBUTEROL SULFATE 90 UG/1
1-2 AEROSOL, METERED RESPIRATORY (INHALATION)
Status: CANCELLED
Start: 2022-12-28

## 2022-12-21 RX ORDER — MEPERIDINE HYDROCHLORIDE 50 MG/ML
25 INJECTION INTRAMUSCULAR; INTRAVENOUS; SUBCUTANEOUS EVERY 30 MIN PRN
Status: CANCELLED | OUTPATIENT
Start: 2022-12-28

## 2022-12-21 RX ORDER — ALBUTEROL SULFATE 0.83 MG/ML
2.5 SOLUTION RESPIRATORY (INHALATION)
Status: CANCELLED | OUTPATIENT
Start: 2022-12-28

## 2022-12-21 RX ORDER — EPINEPHRINE 1 MG/ML
0.3 INJECTION, SOLUTION INTRAMUSCULAR; SUBCUTANEOUS EVERY 5 MIN PRN
Status: CANCELLED | OUTPATIENT
Start: 2022-12-28

## 2022-12-21 RX ORDER — CYANOCOBALAMIN 1000 UG/ML
1000 INJECTION, SOLUTION INTRAMUSCULAR; SUBCUTANEOUS ONCE
Status: CANCELLED
Start: 2022-12-28 | End: 2022-12-28

## 2022-12-21 RX ORDER — CYANOCOBALAMIN 1000 UG/ML
1000 INJECTION, SOLUTION INTRAMUSCULAR; SUBCUTANEOUS ONCE
Status: COMPLETED | OUTPATIENT
Start: 2022-12-21 | End: 2022-12-21

## 2022-12-21 RX ORDER — DIPHENHYDRAMINE HYDROCHLORIDE 50 MG/ML
50 INJECTION INTRAMUSCULAR; INTRAVENOUS
Status: CANCELLED
Start: 2022-12-28

## 2022-12-21 RX ADMIN — CYANOCOBALAMIN 1000 MCG: 1000 INJECTION INTRAMUSCULAR; SUBCUTANEOUS at 08:03

## 2022-12-21 NOTE — PROGRESS NOTES
Shaheen was here today for injection x 1 to right deltoid.  Pt tolerated injection well.  Pt remained in his wheelchair.      Marian Guillaume CMA

## 2022-12-28 ENCOUNTER — INFUSION THERAPY VISIT (OUTPATIENT)
Dept: INFUSION THERAPY | Facility: CLINIC | Age: 67
End: 2022-12-28
Attending: INTERNAL MEDICINE
Payer: COMMERCIAL

## 2022-12-28 DIAGNOSIS — E53.8 VITAMIN B12 DEFICIENCY (NON ANEMIC): Primary | ICD-10-CM

## 2022-12-28 PROCEDURE — 96372 THER/PROPH/DIAG INJ SC/IM: CPT | Performed by: INTERNAL MEDICINE

## 2022-12-28 PROCEDURE — 250N000011 HC RX IP 250 OP 636: Performed by: INTERNAL MEDICINE

## 2022-12-28 RX ORDER — CYANOCOBALAMIN 1000 UG/ML
1000 INJECTION, SOLUTION INTRAMUSCULAR; SUBCUTANEOUS ONCE
Status: COMPLETED | OUTPATIENT
Start: 2022-12-28 | End: 2022-12-28

## 2022-12-28 RX ORDER — ALBUTEROL SULFATE 90 UG/1
1-2 AEROSOL, METERED RESPIRATORY (INHALATION)
Status: CANCELLED
Start: 2023-01-04

## 2022-12-28 RX ORDER — METHYLPREDNISOLONE SODIUM SUCCINATE 125 MG/2ML
125 INJECTION, POWDER, LYOPHILIZED, FOR SOLUTION INTRAMUSCULAR; INTRAVENOUS
Status: CANCELLED
Start: 2023-01-04

## 2022-12-28 RX ORDER — DIPHENHYDRAMINE HYDROCHLORIDE 50 MG/ML
50 INJECTION INTRAMUSCULAR; INTRAVENOUS
Status: CANCELLED
Start: 2023-01-04

## 2022-12-28 RX ORDER — ALBUTEROL SULFATE 0.83 MG/ML
2.5 SOLUTION RESPIRATORY (INHALATION)
Status: CANCELLED | OUTPATIENT
Start: 2023-01-04

## 2022-12-28 RX ORDER — EPINEPHRINE 1 MG/ML
0.3 INJECTION, SOLUTION INTRAMUSCULAR; SUBCUTANEOUS EVERY 5 MIN PRN
Status: CANCELLED | OUTPATIENT
Start: 2023-01-04

## 2022-12-28 RX ORDER — MEPERIDINE HYDROCHLORIDE 50 MG/ML
25 INJECTION INTRAMUSCULAR; INTRAVENOUS; SUBCUTANEOUS EVERY 30 MIN PRN
Status: CANCELLED | OUTPATIENT
Start: 2023-01-04

## 2022-12-28 RX ORDER — CYANOCOBALAMIN 1000 UG/ML
1000 INJECTION, SOLUTION INTRAMUSCULAR; SUBCUTANEOUS ONCE
Status: CANCELLED
Start: 2023-01-04 | End: 2023-01-04

## 2022-12-28 RX ADMIN — CYANOCOBALAMIN 1000 MCG: 1000 INJECTION INTRAMUSCULAR; SUBCUTANEOUS at 13:37

## 2022-12-28 NOTE — PROGRESS NOTES
Infusion Nursing Note:  Shaheen Sepulveda presents today for Injection. B-12.    Patient seen by provider today: No   present during visit today: Not Applicable.       Intravenous Access:  No Intravenous access/labs at this visit.  Post Infusion Assessment:  Patient tolerated injection without incident.     Discharge Plan:   Patient discharged in stable condition accompanied by: daughter.      Brian Herman, HALI                    [phone]

## 2023-01-03 ENCOUNTER — OFFICE VISIT (OUTPATIENT)
Dept: VASCULAR SURGERY | Facility: CLINIC | Age: 68
End: 2023-01-03
Attending: NURSE PRACTITIONER
Payer: COMMERCIAL

## 2023-01-03 VITALS
SYSTOLIC BLOOD PRESSURE: 140 MMHG | BODY MASS INDEX: 39.17 KG/M2 | HEIGHT: 75 IN | TEMPERATURE: 97.8 F | DIASTOLIC BLOOD PRESSURE: 78 MMHG | HEART RATE: 101 BPM | OXYGEN SATURATION: 92 % | RESPIRATION RATE: 18 BRPM | WEIGHT: 315 LBS

## 2023-01-03 DIAGNOSIS — R60.0 VENOUS STASIS ULCER OF RIGHT LOWER LEG WITH EDEMA OF RIGHT LOWER LEG (H): ICD-10-CM

## 2023-01-03 DIAGNOSIS — I83.892 VENOUS STASIS ULCER OF LEFT LOWER LEG WITH EDEMA OF LEFT LOWER LEG (H): ICD-10-CM

## 2023-01-03 DIAGNOSIS — L97.929 VENOUS STASIS ULCER OF LEFT LOWER LEG WITH EDEMA OF LEFT LOWER LEG (H): ICD-10-CM

## 2023-01-03 DIAGNOSIS — I83.029 VENOUS STASIS ULCER OF LEFT LOWER LEG WITH EDEMA OF LEFT LOWER LEG (H): ICD-10-CM

## 2023-01-03 DIAGNOSIS — M79.3 LIPODERMATOSCLEROSIS OF BOTH LOWER EXTREMITIES: ICD-10-CM

## 2023-01-03 DIAGNOSIS — I89.0 LYMPHEDEMA OF BOTH LOWER EXTREMITIES: Primary | ICD-10-CM

## 2023-01-03 DIAGNOSIS — I87.2 VENOUS (PERIPHERAL) INSUFFICIENCY: ICD-10-CM

## 2023-01-03 DIAGNOSIS — I83.891 VENOUS STASIS ULCER OF RIGHT LOWER LEG WITH EDEMA OF RIGHT LOWER LEG (H): ICD-10-CM

## 2023-01-03 DIAGNOSIS — R60.0 VENOUS STASIS ULCER OF LEFT LOWER LEG WITH EDEMA OF LEFT LOWER LEG (H): ICD-10-CM

## 2023-01-03 DIAGNOSIS — I87.333 VENOUS HYPERTENSION, CHRONIC, WITH ULCER AND INFLAMMATION, BILATERAL (H): ICD-10-CM

## 2023-01-03 DIAGNOSIS — I89.0 ELEPHANTIASIS: ICD-10-CM

## 2023-01-03 DIAGNOSIS — I83.019 VENOUS STASIS ULCER OF RIGHT LOWER LEG WITH EDEMA OF RIGHT LOWER LEG (H): ICD-10-CM

## 2023-01-03 DIAGNOSIS — L97.919 VENOUS STASIS ULCER OF RIGHT LOWER LEG WITH EDEMA OF RIGHT LOWER LEG (H): ICD-10-CM

## 2023-01-03 DIAGNOSIS — D36.9 PAPILLOMATOSIS: ICD-10-CM

## 2023-01-03 PROCEDURE — 11042 DBRDMT SUBQ TIS 1ST 20SQCM/<: CPT | Performed by: NURSE PRACTITIONER

## 2023-01-03 PROCEDURE — 11045 DBRDMT SUBQ TISS EACH ADDL: CPT | Performed by: NURSE PRACTITIONER

## 2023-01-03 ASSESSMENT — PAIN SCALES - GENERAL: PAINLEVEL: NO PAIN (0)

## 2023-01-03 NOTE — PROGRESS NOTES
"            Follow up Vascular Visit       Date of Service:01/03/23      Chief Complaint: BLE swelling; lymphedema; BLE weeping and ulcerations      Pt returns to [facility] with regards to their BLE swelling; lymphedema; BLE weeping and ulcerations.  They arrive today alone. Was able to walk to the room with a cane.  They are currently using viscopaste; super absorbant pads; dakins solution; ammonium lac hydrin lotion to the skin and wounds. This is being done by home care once per week; and family the other days; being changed every other day; running out of supplies. They are using lymphedema wraps including foam layer for compression. They are feeling well today. Denies fevers, chills. No shortness of breath. He is receiving B12 infusions. He was recently seen in PCP clinic; A1c is at goal at 6.2% done 7/2022; he was referred to sleep medicine for sleep evaluation and needs for new cpap; bp is not at goal; spironolactone added to regimen. Recently found to have B-cell lymphoma. He is following with oncology; they are opting to observe his condition at this time and no intervention recommended.     Allergies:   Allergies   Allergen Reactions     Lisinopril Swelling     Patient reports \"neck swelling\"  Swelling in throat       Medications:   Current Outpatient Medications:      ammonium lactate (LAC-HYDRIN) 12 % external lotion, Apply topically daily as needed for dry skin With dressing changes, Disp: 225 g, Rfl: 3     atorvastatin (LIPITOR) 20 MG tablet, Take 1 tablet (20 mg) by mouth daily, Disp: 90 tablet, Rfl: 4     chlorthalidone (HYGROTON) 25 MG tablet, Take 1 tablet (25 mg) by mouth daily, Disp: 90 tablet, Rfl: 3     cyanocobalamin (VITAMIN B-12) 1000 MCG tablet, Take 1 tablet (1,000 mcg) by mouth daily for 360 days, Disp: 90 tablet, Rfl: 3     famotidine (PEPCID) 20 MG tablet, Take 1 tablet (20 mg) by mouth 2 times daily, Disp: 90 tablet, Rfl: 1     hydrOXYzine (ATARAX) 25 MG tablet, Take " 0.5-1 tablets (12.5-25 mg) by mouth 3 times daily as needed for anxiety, Disp: 60 tablet, Rfl: 1     losartan (COZAAR) 100 MG tablet, TAKE ONE TABLET BY MOUTH ONE TIME DAILY, Disp: 90 tablet, Rfl: 0     melatonin 3 MG tablet, Take 1 tablet (3 mg) by mouth nightly as needed for sleep, Disp: 30 tablet, Rfl: 1     metFORMIN (GLUCOPHAGE) 500 MG tablet, Take 1 tablet (500 mg) by mouth 2 times daily (with meals), Disp: 180 tablet, Rfl: 3     methadone (DOLOPHINE-INTENSOL) 10 MG/ML (HIGH CONC) solution, Take 100 mg by mouth daily, Disp: , Rfl:      naproxen sodium 220 MG capsule, Take 220 mg by mouth 2 times daily as needed, Disp: , Rfl:      sodium hypochlorite (QUARTER-STRENGTH DAKINS) external solution, Apply topically every 72 hours Use 300mL every 72 hours to wash the bilateral legs and wounds on the legs, Disp: 1000 mL, Rfl: 3     spironolactone (ALDACTONE) 25 MG tablet, Take 1 tablet (25 mg) by mouth daily, Disp: 30 tablet, Rfl: 1    Current Facility-Administered Medications:      lidocaine (PF) (XYLOCAINE) 1 % injection 10 mL, 10 mL, Intradermal, Once, Gerson Jaquez MD     lidocaine (XYLOCAINE) 2 % external gel, , Topical, Daily PRN, Lucia Reyes MD, Given at 10/19/21 0828    History:   Past Medical History:   Diagnosis Date     COPD (chronic obstructive pulmonary disease) (H)      Diabetes (H)      H/O angioedema 06/15/2020    Formatting of this note might be different from the original. Unexplained angioedema twice, discontinue lisinopril for possible connection to it, though he had used it afterwards with no symptoms     History of tobacco use disorder 08/01/2013    Formatting of this note might be different from the original. Added per documetation     Hypertension      Lymphedema of both lower extremities 12/22/2014     Methadone use 05/08/2012     Obstructive sleep apnea 02/02/2015    Formatting of this note might be different from the original. Epic     Pleural mass 07/02/2013     Uncomplicated  "asthma        Physical Exam:    BP (!) 140/78   Pulse 101   Temp 97.8  F (36.6  C)   Resp 18   Ht 6' 3\" (1.905 m)   Wt (!) 404 lb 6.4 oz (183.4 kg)   SpO2 92%   BMI 50.55 kg/m      General:  Patient presents to clinic in no apparent distress.  Head: normocephalic atraumatic  Psychiatric:  Alert and oriented x3.   Respiratory: unlabored breathing; no cough  Integumentary:  Skin is uniformly warm, dry and pink.    Wound #1 Location: right lateral leg  Size: 10L x 10W x 0.1depth.  No sinus tract present, Wound base: irregular; pink; red; granulation tissue; surrounding maceration  noundermining present. Wound is full thickness. There is heavy serosang drainage. Periwound:+ denudement, no erythema, no induration, + maceration or no warmth.      Wound #2 Location: left lateral leg  Size: 1L x 1W x 0.1depth.  No sinus tract present, Wound base: red; viabe  No underminingpresent. Wound is full thickness. There is moderate drainage. Periwound: no denudement, erythema, induration, maceration or warmth.      Extensive scaling on the legs    Fibrosis and scarring present    Swelling stable to improved      Wound Leg Ulceration (Active)   Number of days: 211       VASC Wound Right lower leg lateral (Active)   Pre Size Length 4 06/21/22 0700   Pre Size Width 4 06/21/22 0700   Pre Size Depth 0.1 06/21/22 0700   Pre Total Sq cm 16 06/21/22 0700   Product Used Alginate 10/12/21 0800   Number of days: 468       VASC Wound rt lateral weeping (Active)   Pre Size Length 10 12/06/22 0700   Pre Size Width 10 12/06/22 0700   Pre Size Depth 0.1 12/06/22 0700   Pre Total Sq cm 100 12/06/22 0700   Post Size Length 8 11/08/22 0800   Post Size Width 8 11/08/22 0800   Post Size Depth 0.1 11/08/22 0800   Post Total Sq cm 64 11/08/22 0800   Description weeping 01/03/23 0801   Number of days: 441       VASC Wound Lt lateral leg (Active)   Pre Size Length 1 01/03/23 0801   Pre Size Width 1 01/03/23 0801   Pre Size Depth 0.1 01/03/23 0801 "   Pre Total Sq cm 1 01/03/23 0801   Post Size Length 6 10/11/22 0700   Post Size Width 4 10/11/22 0700   Post Size Depth 1 10/11/22 0700   Post Total Sq cm 24 10/11/22 0700   Tunneling weeping 09/13/22 0700   Description weeping 12/06/22 0700   Number of days: 406       VASC Wound left posterior leg (Active)   Pre Size Length 10 01/03/23 0801   Pre Size Width 10 01/03/23 0801   Pre Size Depth 0.1 01/03/23 0801   Pre Total Sq cm 100 01/03/23 0801   Description weeping 12/06/22 0700   Number of days: 56       VASC Wound Right medial shin (Active)   Number of days: 28       VASC Wound Right medial shin (Active)   Pre Size Length 2.1 12/06/22 0700   Pre Size Width 2 12/06/22 0700   Pre Size Depth 0.1 12/06/22 0700   Pre Total Sq cm 4.2 12/06/22 0700   Post Size Length 0 01/03/23 0801   Post Size Width 0 01/03/23 0801   Post Size Depth 0 01/03/23 0801   Post Total Sq cm 0 01/03/23 0801   Description scab 01/03/23 0801   Number of days: 28            Circumferential volume measures:      Circumferential Measures 3/14/2022 7/19/2022 9/13/2022 12/6/2022 1/3/2023   Right just above MTP 29.5 28 28 27.9 27.5   Right Ankle 54 35 36 35.1 25.1   Right Widest Calf 60.5 54.5 52.5 50.7 50.7   Right Thigh Up 10cm - - - - -   Left - just above MTP 29 28.5 27 26.9 27   Left Ankle 48.3 35 34 32.2 23.5   Left Widest Calf 62 51 49.3 49.8 48.6   Left Thigh Up 10cm - - - - -   Left Knee to Ankle - - - - -       Labs:    I personally reviewed the following lab results today and those on care everywhere    CRP   Date Value Ref Range Status   04/23/2021 8.5 (H) 0.0 - 0.8 mg/dL Final      No results found for: SED   Last Renal Panel:  Sodium   Date Value Ref Range Status   10/18/2022 136 136 - 145 mmol/L Final   06/10/2021 142.0 133.0 - 144.0 mmol/L Final     Potassium   Date Value Ref Range Status   10/18/2022 3.9 3.5 - 5.0 mmol/L Final   06/10/2021 4.4 3.4 - 5.3 mmol/L Final     Chloride   Date Value Ref Range Status   10/18/2022 98 98 - 107  mmol/L Final   06/10/2021 100.0 94.0 - 109.0 mmol/L Final     Carbon Dioxide   Date Value Ref Range Status   06/10/2021 33.0 (H) 20.0 - 32.0 mmol/L Final     Carbon Dioxide (CO2)   Date Value Ref Range Status   10/18/2022 30 22 - 31 mmol/L Final     Anion Gap   Date Value Ref Range Status   10/18/2022 8 5 - 18 mmol/L Final     Glucose   Date Value Ref Range Status   10/18/2022 81 70 - 125 mg/dL Final   06/10/2021 112.0 (H) 60.0 - 109.0 mg/dL Final     GLUCOSE BY METER POCT   Date Value Ref Range Status   12/07/2022 117 (H) 70 - 99 mg/dL Final     Urea Nitrogen   Date Value Ref Range Status   10/18/2022 20 8 - 22 mg/dL Final   06/10/2021 16.0 7.0 - 30.0 mg/dL Final     Creatinine   Date Value Ref Range Status   10/18/2022 1.07 0.70 - 1.30 mg/dL Final   06/10/2021 1.0 0.8 - 1.5 mg/dL Final     GFR Estimate   Date Value Ref Range Status   10/18/2022 75 >60 mL/min/1.73m2 Final     Comment:     Effective December 21, 2021 eGFRcr in adults is calculated using the 2021 CKD-EPI creatinine equation which includes age and gender (Lucie whiteside al., [place], DOI: 10.1056/FDOQvz8492031)   04/24/2021 >60 >60 mL/min/1.73m2 Final     Calcium   Date Value Ref Range Status   10/18/2022 9.6 8.5 - 10.5 mg/dL Final   06/10/2021 9.5 8.5 - 10.4 mg/dL Final     Albumin   Date Value Ref Range Status   10/18/2022 3.4 (L) 3.5 - 5.0 g/dL Final      Lab Results   Component Value Date    WBC 5.8 11/10/2022     Lab Results   Component Value Date    RBC 4.05 11/10/2022     Lab Results   Component Value Date    HGB 9.4 11/10/2022     Lab Results   Component Value Date    HCT 31.6 11/10/2022     No components found for: MCT  Lab Results   Component Value Date    MCV 78 11/10/2022     Lab Results   Component Value Date    MCH 23.2 11/10/2022     Lab Results   Component Value Date    MCHC 29.7 11/10/2022     Lab Results   Component Value Date    RDW 15.2 11/10/2022     Lab Results   Component Value Date     11/10/2022      Lab Results   Component  Value Date    A1C 6.2 07/14/2022    A1C 6.7 04/21/2022    A1C 6.2 04/24/2021    A1C 6.2 04/24/2021      TSH   Date Value Ref Range Status   10/18/2022 1.76 0.30 - 5.00 uIU/mL Final      No results found for: VITDT                Impression:  Encounter Diagnoses   Name Primary?     Lymphedema of both lower extremities Yes     Venous hypertension, chronic, with ulcer and inflammation, bilateral (H)      Elephantiasis      Venous (peripheral) insufficiency      Venous stasis ulcer of left lower leg with edema of left lower leg (H)      Venous stasis ulcer of right lower leg with edema of right lower leg (H)      Lipodermatosclerosis of both lower extremities      Papillomatosis      BMI 50.0-59.9, adult (H)                    Are any of these wounds new today: No; Location: na    Assessment/Plan:          1. Debridement: After discussion of risk factors and verbal consent was obtained 2% Lidocaine HCL jelly was applied, under clean conditions, the BLE ulceration(s) were debrided using currette. Devitalized and nonviable tissue, along with any fibrin and slough, was removed to improve granulation tissue formation, stimulate wound healing, decrease overall bacteria load, disrupt biofilm formation and decrease edge senescence.  Total excisional debridement was 101 sq cm from the epidermis/dermis area and into the subcutaneous tissue with a depth of 0.1 cm.   Ulcers were improved afterwards and .  Measures were unchanged after debridement.       2.  Wound treatment: wound treatment will include irrigation and dressings to promote autolytic debridement which will include:will continue with Dakin's solution; ammonium lac hydrin to the scaling; regular lotion to the healthy skin; viscopaste or silvercel to the weeping and ulcers; ABD or super absorbant pads; rolled gauze; change every other day either by family or home care.    If for some reason the patient is not able to get their dressing(s) changed as outlined  above (due to illness, lack of supplies, lack of help) please do the following: remove old, soiled dressings; wash the wounds with saline; pat dry; apply ABD pad or other absorbant pad and secure with rolled gauze; avoid tape directly on your skin; patient instructed to call the clinic as soon as possible to let us know what the current issues are in receiving wound care. Stable            3. Edema: will transition the left leg into velcro wrap with double tubular compression underneath; wear 24 hours per day; remove for skin care only; continue with lymphedema wraps to the right leg; elevate the legs; low sodium diet; lymphedema pump BID. The compression wraps were applied today in clinic.     If a 2 layer or 4 layer compression wrap is being used; these are safe to have on for ONLY 7 days. If for some reason the patient is not able to get the wrap(s) changed (due to illness; lack of supplies, lack of help, lack of transportation) please do the following: unwrap the old 2 or 4 layer compression wrap; avoid using scissors as you could cut your skin and cause wounds; use tubular compression when available. Call to reschedule your home care or clinic visit appointment as soon as possible.  Stable            4. Nutrition: focus on weight loss and low sodium diet           5. Offloading: na     Patient will follow up with me in 4 weeks for reevaluation. They were instructed to call the clinic sooner with any signs or symptoms of infection or any further questions/concerns. Answered all questions.          Kim Fuentes DNP, RN, CNP, CWOCN, CFCN, CLT  [facility]   [phone]        This note was electronically signed by Kim Fuentes NP

## 2023-01-03 NOTE — NURSING NOTE
"Compression Applied to Right  Short Stretch     Compression Applied to Left  Velcro\", Compression Stockings      "

## 2023-01-03 NOTE — PATIENT INSTRUCTIONS
"Wound care supplies were not ordered or needed today. Home care will order all your supplies    Continue on all blood pressure medications as prescribed    Focus on weight loss and low sodium diet  Keep sodium intake 2.5-4 grams per day    Keep appt with Sleep medicine; they are concerned that your BP is not being controlled due to uncontrolled sleep apnea        Wound Care Instructions    Every other day home care or your family will , Cleanse your bilateral legs and wound(s) with Dilute hibiclens 30cc in 500cc NS.    Then do a light wash of Dakin's solution    Pat Dry with non-sterile gauze    Apply Lotion to the intact skin surrounding your wound and other dry skin locations. Some good lotions include: Remedy Skin Repair Cream, Sarna, Vanicream or Cetaphil    Apply Ammonium Lac Hydrin lotion to the thick scaling crusting areas    Primary Dressing: Cover with viscopaste or ok to use silvercel if you are out of viscopaste    ABDs or super  absorbant pads    Secure with non-sterile roll gauze (4\" x 75\" roll) and tape (1\" roll tape) as needed; avoid adhesive directly on the skin    Compression: tubular compression; foam; short stretch on the right and double tubular compression and velcro wrap on the left    Elevation of the legs    Use lymphedema pump twice per day    It is not ok to get your wound wet in the bath or shower      If for some reason you are not able to get your dressing(s) changed as outlined above (due to illness, lack of supplies, lack of help) please do the following: remove old, soiled dressings; wash the wounds with saline; pat dry; apply ABD pad or other absorbant pad and secure with rolled gauze; avoid tape directly on your skin; Call the clinic as soon as possible to let us know what the current issues are in receiving wound care [phone].      SEEK MEDICAL CARE IF:  You have an increase in swelling, pain, or redness around the wound.  You have an increase in the amount of pus coming from " the wound.  There is a bad smell coming from the wound.  The wound appears to be worsening/enlarging  You have a fever greater than 101.5 F      It is ok to continue current wound care treatment/products for the next 2-3 days until new wound care supplies are ordered and arrive. If longer than this please contact our office at [phone].    If you have a 2 layer or 4 layer compression wrap on these are safe to have on for ONLY 7 days. If for some reason you are not able to get the wrap(s) changed (due to illness; lack of supplies, lack of help, lack of transportation) please do the following: unwrap the old 2 or 4 layer compression wrap; avoid using scissors as you could cut your skin and cause wounds; use tubular compression when available. Call to reschedule your home care or clinic visit appointment as soon as possible.    Please NOTE: if you are 15 minutes late to your clinic appointment you will have to be rescheduled. Please call our clinic as soon as possible if you know you will not be able to get to your appointment at [phone].    If you fail to show up to 3 scheduled clinic appointments you will be dismissed from our clinic.              We want to hear from you!  In the next few weeks, you should receive a call or email to complete a survey about your visit at [facility]. Please help us improve your appointment experience by letting us know how we did today. We strive to make your experience good and value any ways in which we could do better.      We value your input and suggestions.    Thank you for choosing the [facility]!      It is recommended that you do not get your ulcer wet when showering.  Listed below are several ways of keeping it dry when you shower.     1. Wrap it with Press and Seal plastic wrap.  It can be found in the stores where the plastic wraps or tin foil is kept.               2.  Some people take a bath and hang their leg/foot out of  the tub.                        3  Put your leg in a plastic bag and tape it on.           4. You can purchase a shower cover for casts at some pharmacies and through the Internet.            5. Take a Bed Bath or wash up at the sink

## 2023-01-04 ENCOUNTER — INFUSION THERAPY VISIT (OUTPATIENT)
Dept: INFUSION THERAPY | Facility: CLINIC | Age: 68
End: 2023-01-04
Attending: INTERNAL MEDICINE
Payer: COMMERCIAL

## 2023-01-04 DIAGNOSIS — E53.8 VITAMIN B12 DEFICIENCY (NON ANEMIC): Primary | ICD-10-CM

## 2023-01-04 PROCEDURE — 96372 THER/PROPH/DIAG INJ SC/IM: CPT | Performed by: INTERNAL MEDICINE

## 2023-01-04 PROCEDURE — 250N000011 HC RX IP 250 OP 636: Performed by: INTERNAL MEDICINE

## 2023-01-04 RX ORDER — EPINEPHRINE 1 MG/ML
0.3 INJECTION, SOLUTION INTRAMUSCULAR; SUBCUTANEOUS EVERY 5 MIN PRN
Status: DISCONTINUED | OUTPATIENT
Start: 2023-01-04 | End: 2023-01-04 | Stop reason: HOSPADM

## 2023-01-04 RX ORDER — METHYLPREDNISOLONE SODIUM SUCCINATE 125 MG/2ML
125 INJECTION, POWDER, LYOPHILIZED, FOR SOLUTION INTRAMUSCULAR; INTRAVENOUS
Status: CANCELLED
Start: 2023-01-11

## 2023-01-04 RX ORDER — DIPHENHYDRAMINE HYDROCHLORIDE 50 MG/ML
50 INJECTION INTRAMUSCULAR; INTRAVENOUS
Status: CANCELLED
Start: 2023-01-11

## 2023-01-04 RX ORDER — ALBUTEROL SULFATE 90 UG/1
1-2 AEROSOL, METERED RESPIRATORY (INHALATION)
Status: DISCONTINUED | OUTPATIENT
Start: 2023-01-04 | End: 2023-01-04 | Stop reason: HOSPADM

## 2023-01-04 RX ORDER — CYANOCOBALAMIN 1000 UG/ML
1000 INJECTION, SOLUTION INTRAMUSCULAR; SUBCUTANEOUS ONCE
Status: COMPLETED | OUTPATIENT
Start: 2023-01-04 | End: 2023-01-04

## 2023-01-04 RX ORDER — DIPHENHYDRAMINE HYDROCHLORIDE 50 MG/ML
50 INJECTION INTRAMUSCULAR; INTRAVENOUS
Status: DISCONTINUED | OUTPATIENT
Start: 2023-01-04 | End: 2023-01-04 | Stop reason: HOSPADM

## 2023-01-04 RX ORDER — METHYLPREDNISOLONE SODIUM SUCCINATE 125 MG/2ML
125 INJECTION, POWDER, LYOPHILIZED, FOR SOLUTION INTRAMUSCULAR; INTRAVENOUS
Status: DISCONTINUED | OUTPATIENT
Start: 2023-01-04 | End: 2023-01-04 | Stop reason: HOSPADM

## 2023-01-04 RX ORDER — ALBUTEROL SULFATE 90 UG/1
1-2 AEROSOL, METERED RESPIRATORY (INHALATION)
Status: CANCELLED
Start: 2023-01-11

## 2023-01-04 RX ORDER — ALBUTEROL SULFATE 0.83 MG/ML
2.5 SOLUTION RESPIRATORY (INHALATION)
Status: CANCELLED | OUTPATIENT
Start: 2023-01-11

## 2023-01-04 RX ORDER — MEPERIDINE HYDROCHLORIDE 50 MG/ML
25 INJECTION INTRAMUSCULAR; INTRAVENOUS; SUBCUTANEOUS EVERY 30 MIN PRN
Status: DISCONTINUED | OUTPATIENT
Start: 2023-01-04 | End: 2023-01-04 | Stop reason: HOSPADM

## 2023-01-04 RX ORDER — MEPERIDINE HYDROCHLORIDE 50 MG/ML
25 INJECTION INTRAMUSCULAR; INTRAVENOUS; SUBCUTANEOUS EVERY 30 MIN PRN
Status: CANCELLED | OUTPATIENT
Start: 2023-01-11

## 2023-01-04 RX ORDER — CYANOCOBALAMIN 1000 UG/ML
1000 INJECTION, SOLUTION INTRAMUSCULAR; SUBCUTANEOUS ONCE
Status: CANCELLED
Start: 2023-01-11 | End: 2023-01-11

## 2023-01-04 RX ORDER — EPINEPHRINE 1 MG/ML
0.3 INJECTION, SOLUTION INTRAMUSCULAR; SUBCUTANEOUS EVERY 5 MIN PRN
Status: CANCELLED | OUTPATIENT
Start: 2023-01-11

## 2023-01-04 RX ORDER — ALBUTEROL SULFATE 0.83 MG/ML
2.5 SOLUTION RESPIRATORY (INHALATION)
Status: DISCONTINUED | OUTPATIENT
Start: 2023-01-04 | End: 2023-01-04 | Stop reason: HOSPADM

## 2023-01-04 RX ADMIN — CYANOCOBALAMIN 1000 MCG: 1000 INJECTION INTRAMUSCULAR; SUBCUTANEOUS at 08:36

## 2023-01-04 NOTE — PROGRESS NOTES
Shaheen was here today for injection x 1 to left deltoid.  Pt tolerated injection well.       Marian Guillaume, CMA

## 2023-01-05 ENCOUNTER — TELEPHONE (OUTPATIENT)
Dept: FAMILY MEDICINE | Facility: CLINIC | Age: 68
End: 2023-01-05

## 2023-01-05 NOTE — TELEPHONE ENCOUNTER
[facility] phone call message - order or referral request for patient:     Order or referral being requested:     Cynthia called requesting for late start of pt eval per patient request. Pt eval is scheduled for 1/13/2023.     OK to leave a message on voice mail? Yes    Primary language: English      needed? No    Call taken on January 5, 2023 at 9:13 AM by Pranav Dias

## 2023-01-05 NOTE — TELEPHONE ENCOUNTER
Late start for homecare requires PCP approval prior to RN giving verbal order. Routing to PCP for review. Mario QURESHI

## 2023-01-05 NOTE — TELEPHONE ENCOUNTER
Writer received approval from PCP via staff message. Writer called Cynthia from PT and left detailed VM on confidential VM with verbal orders to delay start of care until 01/13/2023. Mario QURESHI

## 2023-01-06 LAB
CULTURE HARVEST COMPLETE DATE: NORMAL
CULTURE HARVEST COMPLETE DATE: NORMAL

## 2023-01-08 LAB
INTERPRETATION: NORMAL
ISCN: NORMAL
METHODS: NORMAL

## 2023-01-10 ENCOUNTER — MEDICAL CORRESPONDENCE (OUTPATIENT)
Dept: HEALTH INFORMATION MANAGEMENT | Facility: CLINIC | Age: 68
End: 2023-01-10

## 2023-01-10 DIAGNOSIS — K21.00 GASTROESOPHAGEAL REFLUX DISEASE WITH ESOPHAGITIS WITHOUT HEMORRHAGE: ICD-10-CM

## 2023-01-16 ENCOUNTER — OFFICE VISIT (OUTPATIENT)
Dept: FAMILY MEDICINE | Facility: CLINIC | Age: 68
End: 2023-01-16
Payer: COMMERCIAL

## 2023-01-16 VITALS
SYSTOLIC BLOOD PRESSURE: 154 MMHG | RESPIRATION RATE: 22 BRPM | DIASTOLIC BLOOD PRESSURE: 74 MMHG | HEIGHT: 75 IN | BODY MASS INDEX: 39.17 KG/M2 | OXYGEN SATURATION: 97 % | WEIGHT: 315 LBS | HEART RATE: 81 BPM

## 2023-01-16 DIAGNOSIS — I10 PRIMARY HYPERTENSION: Primary | ICD-10-CM

## 2023-01-16 LAB
ANION GAP SERPL CALCULATED.3IONS-SCNC: 13 MMOL/L (ref 7–15)
BUN SERPL-MCNC: 20.8 MG/DL (ref 8–23)
CALCIUM SERPL-MCNC: 9.3 MG/DL (ref 8.8–10.2)
CHLORIDE SERPL-SCNC: 99 MMOL/L (ref 98–107)
CREAT SERPL-MCNC: 1.11 MG/DL (ref 0.67–1.17)
DEPRECATED HCO3 PLAS-SCNC: 24 MMOL/L (ref 22–29)
GFR SERPL CREATININE-BSD FRML MDRD: 72 ML/MIN/1.73M2
GLUCOSE SERPL-MCNC: 150 MG/DL (ref 70–99)
POTASSIUM SERPL-SCNC: 4.1 MMOL/L (ref 3.4–5.3)
SODIUM SERPL-SCNC: 136 MMOL/L (ref 136–145)

## 2023-01-16 PROCEDURE — 80048 BASIC METABOLIC PNL TOTAL CA: CPT

## 2023-01-16 PROCEDURE — 36415 COLL VENOUS BLD VENIPUNCTURE: CPT

## 2023-01-16 PROCEDURE — 99214 OFFICE O/P EST MOD 30 MIN: CPT | Mod: GC

## 2023-01-16 NOTE — PROGRESS NOTES
Faculty Supervision of Residents   I have examined this patient and the medical care has been evaluated and discussed with the resident. See resident note outlining our discussion.      Alessia Calderon MD

## 2023-01-16 NOTE — PROGRESS NOTES
"  Assessment & Plan     Primary hypertension  Patient with long standing hypertension on max dose chlorthalidone and losartan. Spironolactone added at last visit. BP elevated here today however at most recent vascular visit SBP was 140 and home BP are within goal (see below). Will not adjust medications at this time. Will check potassium and follow-up with PCP in a month  - Basic metabolic panel  - f/u in a month  - continue home BP monitoring       Return in about 4 weeks (around 2/13/2023).    Eileen Mendoza MD  [facility]    Frank Jamison is a 67 year old, presenting for the following health issues:  Follow Up (BP)      HPI     Hypertension Follow-up      Do you check your blood pressure regularly outside of the clinic? Yes     Are you following a low salt diet? Trying, making a conscious effort not to add more salt to foods.    Are your blood pressures ever more than 140 on the top number (systolic) OR more   than 90 on the bottom number (diastolic), for example 140/90? Yes    Patient took all BP medications this morning. He denies issues taking his medications. At home they are around 110-130 systolic and 60-70 diastolic. No headache or dizziness.     BP checks at home with arm and wrist cuff:  12/30:131/67 arm, 127/68 wrist  1/2: 124/66 arm, 110/61 wrist  1/3: 124/75 arm, 116/72 wrist  1/9: 115/67 wrist  1/11: 118/67  1/14: 121/69    Review of Systems   Constitutional, HEENT, cardiovascular, pulmonary, gi and gu systems are negative, except as otherwise noted.      Objective    BP (!) 154/74   Pulse 81   Resp 22   Ht 1.905 m (6' 3\")   Wt (!) 180.5 kg (398 lb)   SpO2 97%   BMI 49.75 kg/m    Body mass index is 49.75 kg/m .  Physical Exam   GENERAL: Healthy, alert and no distress  RESP:  Lungs clear throughout. No wheeze or crackles.   CV: Heart RRR. No murmur  Abdomen: Soft, nontender, nondistended.  MSK: No gross deformity. Normal tone.  NEURO:   Mentation and speech " appropriate for age.  PSYCH: affect normal/bright, judgement and insight intact, normal speech and appearance well-groomed.

## 2023-01-17 ENCOUNTER — TELEPHONE (OUTPATIENT)
Dept: VASCULAR SURGERY | Facility: CLINIC | Age: 68
End: 2023-01-17
Payer: COMMERCIAL

## 2023-01-17 NOTE — TELEPHONE ENCOUNTER
Talked to nurse to do same wound care orders and reviewed the orders from 1/3 and refaxed to [facility]

## 2023-01-17 NOTE — TELEPHONE ENCOUNTER
Caller: [facility]     Provider: PEEWEE Fuentes    Detailed reason for call: Lesly is calling stating Shaheen has a new wound on his leg, she is wondering if they should use something other than viscopaste? She states this is what they are using for the other wounds.  She is going to email us a photo      Best phone number to contact: [phone]    Best time to contact: anytime    Ok to leave a detailed message: Yes    Ok to speak to authorized person if needed: No      (Noted to patient if reason is related to wound or incision, to please send a photo via email or [facility].)

## 2023-01-17 NOTE — TELEPHONE ENCOUNTER
Received via email on 23 at 12:48pm:    I m here with Shaheen Sepulveda    51  He has a new wound.  We are currently using visco paste for his other wounds.  Please advise on tc of new wound, see picture below   Thanks ,            OA 2.5x3.7  Boggy gray ginette wound, at risk for opening as well.  Thanks!    Charley Harman RN  [facility]  [facility]   [phone]  Fax [phone]

## 2023-01-23 NOTE — TELEPHONE ENCOUNTER
[facility] phone call message- patient requesting a refill:    Full Medication Name: famotidine (PEPCID) 20 MG tablet      Pharmacy confirmed as   [id number] [place], [place] - [id number] [facility] [address]  Phone: [phone] Fax: [phone]  : Yes    Additional Comments: Patient calling in regards to medication above. Patient still has not yet received medication, it has been about 10-12 days and patient really needs medication per patient. Please call and advise, if needed. Thank you     OK to leave a message on voice mail? Yes    Primary language: English      needed? No    Call taken on January 23, 2023 at 9:55 AM by Diomedes Bah

## 2023-01-26 ENCOUNTER — MEDICAL CORRESPONDENCE (OUTPATIENT)
Dept: HEALTH INFORMATION MANAGEMENT | Facility: CLINIC | Age: 68
End: 2023-01-26
Payer: COMMERCIAL

## 2023-01-27 LAB
PATH REPORT.ADDENDUM SPEC: NORMAL
PATH REPORT.COMMENTS IMP SPEC: NORMAL
PATH REPORT.FINAL DX SPEC: NORMAL
PATH REPORT.MICROSCOPIC SPEC OTHER STN: NORMAL
PATH REPORT.RELEVANT HX SPEC: NORMAL

## 2023-01-27 RX ORDER — FAMOTIDINE 20 MG/1
TABLET, FILM COATED ORAL
Qty: 90 TABLET | Refills: 0 | Status: SHIPPED | OUTPATIENT
Start: 2023-01-27 | End: 2023-03-13

## 2023-02-02 DIAGNOSIS — I10 BENIGN ESSENTIAL HYPERTENSION: ICD-10-CM

## 2023-02-07 ENCOUNTER — OFFICE VISIT (OUTPATIENT)
Dept: VASCULAR SURGERY | Facility: CLINIC | Age: 68
End: 2023-02-07
Attending: NURSE PRACTITIONER
Payer: COMMERCIAL

## 2023-02-07 VITALS
OXYGEN SATURATION: 91 % | RESPIRATION RATE: 20 BRPM | WEIGHT: 315 LBS | SYSTOLIC BLOOD PRESSURE: 148 MMHG | TEMPERATURE: 98.2 F | BODY MASS INDEX: 49.81 KG/M2 | DIASTOLIC BLOOD PRESSURE: 78 MMHG | HEART RATE: 90 BPM

## 2023-02-07 DIAGNOSIS — I89.0 ELEPHANTIASIS: ICD-10-CM

## 2023-02-07 DIAGNOSIS — I83.891 VENOUS STASIS ULCER OF RIGHT LOWER LEG WITH EDEMA OF RIGHT LOWER LEG (H): ICD-10-CM

## 2023-02-07 DIAGNOSIS — I83.029 VENOUS STASIS ULCER OF LEFT LOWER LEG WITH EDEMA OF LEFT LOWER LEG (H): ICD-10-CM

## 2023-02-07 DIAGNOSIS — I83.019 VENOUS STASIS ULCER OF RIGHT LOWER LEG WITH EDEMA OF RIGHT LOWER LEG (H): ICD-10-CM

## 2023-02-07 DIAGNOSIS — D36.9 PAPILLOMATOSIS: ICD-10-CM

## 2023-02-07 DIAGNOSIS — I87.333 VENOUS HYPERTENSION, CHRONIC, WITH ULCER AND INFLAMMATION, BILATERAL (H): ICD-10-CM

## 2023-02-07 DIAGNOSIS — I89.0 LYMPHEDEMA OF BOTH LOWER EXTREMITIES: Primary | ICD-10-CM

## 2023-02-07 DIAGNOSIS — L97.919 VENOUS STASIS ULCER OF RIGHT LOWER LEG WITH EDEMA OF RIGHT LOWER LEG (H): ICD-10-CM

## 2023-02-07 DIAGNOSIS — I87.2 VENOUS (PERIPHERAL) INSUFFICIENCY: ICD-10-CM

## 2023-02-07 DIAGNOSIS — R60.0 VENOUS STASIS ULCER OF LEFT LOWER LEG WITH EDEMA OF LEFT LOWER LEG (H): ICD-10-CM

## 2023-02-07 DIAGNOSIS — R60.0 VENOUS STASIS ULCER OF RIGHT LOWER LEG WITH EDEMA OF RIGHT LOWER LEG (H): ICD-10-CM

## 2023-02-07 DIAGNOSIS — M79.3 LIPODERMATOSCLEROSIS OF BOTH LOWER EXTREMITIES: ICD-10-CM

## 2023-02-07 DIAGNOSIS — I83.892 VENOUS STASIS ULCER OF LEFT LOWER LEG WITH EDEMA OF LEFT LOWER LEG (H): ICD-10-CM

## 2023-02-07 DIAGNOSIS — L97.929 VENOUS STASIS ULCER OF LEFT LOWER LEG WITH EDEMA OF LEFT LOWER LEG (H): ICD-10-CM

## 2023-02-07 PROCEDURE — 11042 DBRDMT SUBQ TIS 1ST 20SQCM/<: CPT | Performed by: NURSE PRACTITIONER

## 2023-02-07 PROCEDURE — 87077 CULTURE AEROBIC IDENTIFY: CPT | Performed by: NURSE PRACTITIONER

## 2023-02-07 PROCEDURE — G0463 HOSPITAL OUTPT CLINIC VISIT: HCPCS | Mod: 25 | Performed by: NURSE PRACTITIONER

## 2023-02-07 PROCEDURE — 87205 SMEAR GRAM STAIN: CPT | Performed by: NURSE PRACTITIONER

## 2023-02-07 PROCEDURE — 11045 DBRDMT SUBQ TISS EACH ADDL: CPT | Performed by: NURSE PRACTITIONER

## 2023-02-07 PROCEDURE — 99214 OFFICE O/P EST MOD 30 MIN: CPT | Mod: 25 | Performed by: NURSE PRACTITIONER

## 2023-02-07 ASSESSMENT — PAIN SCALES - GENERAL: PAINLEVEL: NO PAIN (0)

## 2023-02-07 NOTE — PROGRESS NOTES
"            Follow up Vascular Visit       Date of Service:02/07/23      Chief Complaint: BLE swelling; lymphedema; BLE weeping with ulcers      Pt returns to [facility] with regards to their BLE swelling; lymphedema; BLE weeping with ulcers.  They arrive today alone. They are currently using Dakins solution; viscopaste; super absorbant pads; rolled gauze to the wounds. This is being done by home care once per week and family other days of the week; being changed every other day. They are using lymphedema wraps with foam layer for compression. We attempted to transition the left leg into velcro wrap last visit and he developed a wound after 1 week of use and resumed the lymphedema wraps. They are feeling well today. Denies fevers, chills. No shortness of breath. He is receiving B12 infusions. He was recently seen in PCP clinic; A1c is at goal at 6.2% done 7/2022; he was referred to sleep medicine for sleep evaluation and needs for new cpap; bp is not at goal; spironolactone added to regimen. Recently found to have B-cell lymphoma. He is following with oncology; they are considering adding oral medication; will see later this month. He arrives with a new wound today on the left leg he was not aware about this; when it started or how it began.     Allergies:   Allergies   Allergen Reactions     Lisinopril Swelling     Patient reports \"neck swelling\"  Swelling in throat       Medications:   Current Outpatient Medications:      ammonium lactate (LAC-HYDRIN) 12 % external lotion, Apply topically daily as needed for dry skin With dressing changes, Disp: 225 g, Rfl: 3     atorvastatin (LIPITOR) 20 MG tablet, Take 1 tablet (20 mg) by mouth daily, Disp: 90 tablet, Rfl: 4     chlorthalidone (HYGROTON) 25 MG tablet, Take 1 tablet (25 mg) by mouth daily, Disp: 90 tablet, Rfl: 3     cyanocobalamin (VITAMIN B-12) 1000 MCG tablet, Take 1 tablet (1,000 mcg) by mouth daily for 360 days, Disp: 90 tablet, Rfl: 3     " famotidine (PEPCID) 20 MG tablet, TAKE ONE TABLET BY MOUTH TWICE DAILY, Disp: 90 tablet, Rfl: 0     hydrOXYzine (ATARAX) 25 MG tablet, Take 0.5-1 tablets (12.5-25 mg) by mouth 3 times daily as needed for anxiety, Disp: 60 tablet, Rfl: 1     losartan (COZAAR) 100 MG tablet, TAKE ONE TABLET BY MOUTH ONE TIME DAILY, Disp: 90 tablet, Rfl: 0     melatonin 3 MG tablet, Take 1 tablet (3 mg) by mouth nightly as needed for sleep, Disp: 30 tablet, Rfl: 1     metFORMIN (GLUCOPHAGE) 500 MG tablet, Take 1 tablet (500 mg) by mouth 2 times daily (with meals), Disp: 180 tablet, Rfl: 3     methadone (DOLOPHINE-INTENSOL) 10 MG/ML (HIGH CONC) solution, Take 100 mg by mouth daily, Disp: , Rfl:      naproxen sodium 220 MG capsule, Take 220 mg by mouth 2 times daily as needed, Disp: , Rfl:      sodium hypochlorite (QUARTER-STRENGTH DAKINS) external solution, Apply topically every 72 hours Use 300mL every 72 hours to wash the bilateral legs and wounds on the legs, Disp: 1000 mL, Rfl: 3     spironolactone (ALDACTONE) 25 MG tablet, Take 1 tablet (25 mg) by mouth daily, Disp: 30 tablet, Rfl: 1    Current Facility-Administered Medications:      lidocaine (PF) (XYLOCAINE) 1 % injection 10 mL, 10 mL, Intradermal, Once, Gerson Jaquez MD     lidocaine (XYLOCAINE) 2 % external gel, , Topical, Daily PRN, Lucia Reyes MD, Given at 10/19/21 0828    History:   Past Medical History:   Diagnosis Date     COPD (chronic obstructive pulmonary disease) (H)      Diabetes (H)      H/O angioedema 06/15/2020    Formatting of this note might be different from the original. Unexplained angioedema twice, discontinue lisinopril for possible connection to it, though he had used it afterwards with no symptoms     History of tobacco use disorder 08/01/2013    Formatting of this note might be different from the original. Added per documetation     Hypertension      Lymphedema of both lower extremities 12/22/2014     Methadone use 05/08/2012     Obstructive  sleep apnea 02/02/2015    Formatting of this note might be different from the original. Epic     Pleural mass 07/02/2013     Uncomplicated asthma        Physical Exam:    BP (!) 148/78   Pulse 90   Temp 98.2  F (36.8  C)   Resp 20   Wt (!) 398 lb 8 oz (180.8 kg)   SpO2 91%   BMI 49.81 kg/m      General:  Patient presents to clinic in no apparent distress.  Head: normocephalic atraumatic  Psychiatric:  Alert and oriented x3.   Respiratory: unlabored breathing; no cough  Integumentary:  Skin is uniformly warm, dry and pink.    Wound #1 Location: right lateral leg  Size: 14L x 11W x 0.1-0.2depth.  No sinus tract present, Wound base: within this 14x11 area there is scattered weeping and maceration with sloughing tissue  No undermining present. Wound is full and partial thickness. There is moderate drainage. Periwound: no denudement, erythema, induration, maceration or warmth.      Wound #2 Location: left lateral leg  Size: 1.8x1x0.1depth.  No sinus tract present, Wound base: slough; No undermining present. Wound is full and partial thickness. There is moderate drainage. Periwound: no denudement, erythema, induration, maceration or warmth.      Wound #3 Location: left lateral posterior leg  Size: [id number].  No sinus tract present, Wound base: within this 10x10 area there is scattered weeping and maceration with sloughing tissue  No undermining present. Wound is full and partial thickness. There is moderate drainage. Periwound: no denudement, erythema, induration, maceration or warmth.      Wound #4 Location: left lateral leg proximal  Size:0.5x0.2x0.9depth.  No sinus tract present, Wound base: slough; No undermining present. Wound is full thickness. There is moderate drainage. Periwound: no denudement, erythema, induration, maceration or warmth.        Wound Leg Ulceration (Active)   Number of days: 246       VASC Wound Right lower leg lateral (Active)   Pre Size Length 4 06/21/22 0700   Pre Size Width 4  06/21/22 0700   Pre Size Depth 0.1 06/21/22 0700   Pre Total Sq cm 16 06/21/22 0700   Product Used Alginate 10/12/21 0800   Number of days: 503       VASC Wound rt lateral weeping (Active)   Pre Size Length 14 02/07/23 0700   Pre Size Width 11 02/07/23 0700   Pre Size Depth 0.2 02/07/23 0700   Pre Total Sq cm 154 02/07/23 0700   Post Size Length 8 11/08/22 0800   Post Size Width 8 11/08/22 0800   Post Size Depth 0.1 11/08/22 0800   Post Total Sq cm 64 11/08/22 0800   Description macerated 02/07/23 0700   Number of days: 476       VASC Wound Lt lateral leg (Active)   Pre Size Length 1.8 02/07/23 0700   Pre Size Width 1 02/07/23 0700   Pre Size Depth 0.1 02/07/23 0700   Pre Total Sq cm 1.8 02/07/23 0700   Post Size Length 6 10/11/22 0700   Post Size Width 4 10/11/22 0700   Post Size Depth 1 10/11/22 0700   Post Total Sq cm 24 10/11/22 0700   Tunneling weeping 09/13/22 0700   Description weeping 12/06/22 0700   Number of days: 441       VASC Wound left posterior leg (Active)   Pre Size Length 10 02/07/23 0700   Pre Size Width 10 02/07/23 0700   Pre Size Depth 0.1 02/07/23 0700   Pre Total Sq cm 100 02/07/23 0700   Description weeping 12/06/22 0700   Number of days: 91       VASC Wound Right medial shin (Active)   Number of days: 63       VASC Wound Right medial shin (Active)   Pre Size Length 2.1 12/06/22 0700   Pre Size Width 2 12/06/22 0700   Pre Size Depth 0.1 12/06/22 0700   Pre Total Sq cm 4.2 12/06/22 0700   Post Size Length 0 01/03/23 0801   Post Size Width 0 01/03/23 0801   Post Size Depth 0 01/03/23 0801   Post Total Sq cm 0 01/03/23 0801   Description scab 01/03/23 0801   Number of days: 63       VASC Wound Left lateral leg proximal (Active)   Pre Size Length 0.5 02/07/23 0700   Pre Size Width 0.2 02/07/23 0700   Pre Size Depth 0.9 02/07/23 0700   Pre Total Sq cm 0.1 02/07/23 0700   Number of days: 0            Circumferential volume measures:      Circumferential Measures 7/19/2022 9/13/2022 12/6/2022  1/3/2023 2/7/2023   Right just above MTP 28 28 27.9 27.5 27.6   Right Ankle 35 36 35.1 25.1 33.9   Right Widest Calf 54.5 52.5 50.7 50.7 50.5   Right Thigh Up 10cm - - - - -   Left - just above MTP 28.5 27 26.9 27 27.3   Left Ankle 35 34 32.2 23.5 31.6   Left Widest Calf 51 49.3 49.8 48.6 49.2   Left Thigh Up 10cm - - - - -   Left Knee to Ankle - - - - -       Labs:    I personally reviewed the following lab results today and those on care everywhere    CRP   Date Value Ref Range Status   04/23/2021 8.5 (H) 0.0 - 0.8 mg/dL Final      No results found for: SED   Last Renal Panel:  Sodium   Date Value Ref Range Status   01/16/2023 136 136 - 145 mmol/L Final   06/10/2021 142.0 133.0 - 144.0 mmol/L Final     Potassium   Date Value Ref Range Status   01/16/2023 4.1 3.4 - 5.3 mmol/L Final   10/18/2022 3.9 3.5 - 5.0 mmol/L Final   06/10/2021 4.4 3.4 - 5.3 mmol/L Final     Chloride   Date Value Ref Range Status   01/16/2023 99 98 - 107 mmol/L Final   10/18/2022 98 98 - 107 mmol/L Final   06/10/2021 100.0 94.0 - 109.0 mmol/L Final     Carbon Dioxide   Date Value Ref Range Status   06/10/2021 33.0 (H) 20.0 - 32.0 mmol/L Final     Carbon Dioxide (CO2)   Date Value Ref Range Status   01/16/2023 24 22 - 29 mmol/L Final   10/18/2022 30 22 - 31 mmol/L Final     Anion Gap   Date Value Ref Range Status   01/16/2023 13 7 - 15 mmol/L Final   10/18/2022 8 5 - 18 mmol/L Final     Glucose   Date Value Ref Range Status   01/16/2023 150 (H) 70 - 99 mg/dL Final   10/18/2022 81 70 - 125 mg/dL Final   06/10/2021 112.0 (H) 60.0 - 109.0 mg/dL Final     GLUCOSE BY METER POCT   Date Value Ref Range Status   12/07/2022 117 (H) 70 - 99 mg/dL Final     Urea Nitrogen   Date Value Ref Range Status   01/16/2023 20.8 8.0 - 23.0 mg/dL Final   10/18/2022 20 8 - 22 mg/dL Final   06/10/2021 16.0 7.0 - 30.0 mg/dL Final     Creatinine   Date Value Ref Range Status   01/16/2023 1.11 0.67 - 1.17 mg/dL Final   06/10/2021 1.0 0.8 - 1.5 mg/dL Final     GFR  Estimate   Date Value Ref Range Status   01/16/2023 72 >60 mL/min/1.73m2 Final     Comment:     Effective December 21, 2021 eGFRcr in adults is calculated using the 2021 CKD-EPI creatinine equation which includes age and gender (Lucie whiteside al., [place], DOI: 10.1056/ZPMZbm8372614)   04/24/2021 >60 >60 mL/min/1.73m2 Final     Calcium   Date Value Ref Range Status   01/16/2023 9.3 8.8 - 10.2 mg/dL Final   06/10/2021 9.5 8.5 - 10.4 mg/dL Final     Albumin   Date Value Ref Range Status   10/18/2022 3.4 (L) 3.5 - 5.0 g/dL Final      Lab Results   Component Value Date    WBC 5.8 11/10/2022     Lab Results   Component Value Date    RBC 4.05 11/10/2022     Lab Results   Component Value Date    HGB 9.4 11/10/2022     Lab Results   Component Value Date    HCT 31.6 11/10/2022     No components found for: MCT  Lab Results   Component Value Date    MCV 78 11/10/2022     Lab Results   Component Value Date    MCH 23.2 11/10/2022     Lab Results   Component Value Date    MCHC 29.7 11/10/2022     Lab Results   Component Value Date    RDW 15.2 11/10/2022     Lab Results   Component Value Date     11/10/2022      Lab Results   Component Value Date    A1C 6.2 07/14/2022    A1C 6.7 04/21/2022    A1C 6.2 04/24/2021    A1C 6.2 04/24/2021      TSH   Date Value Ref Range Status   10/18/2022 1.76 0.30 - 5.00 uIU/mL Final      No results found for: VITDT                Impression:  Encounter Diagnoses   Name Primary?     Lymphedema of both lower extremities Yes     Venous hypertension, chronic, with ulcer and inflammation, bilateral (H)      Elephantiasis      Venous (peripheral) insufficiency      Venous stasis ulcer of left lower leg with edema of left lower leg (H)      Venous stasis ulcer of right lower leg with edema of right lower leg (H)      Lipodermatosclerosis of both lower extremities      BMI 50.0-59.9, adult (H)      Papillomatosis                                    Are any of these wounds new today: Yes; Location: left lateral  proximal leg    Assessment/Plan:          1. Debridement: After discussion of risk factors and verbal consent was obtained 2% Lidocaine HCL jelly was applied, under clean conditions, the BLE ulceration(s) were debrided using currette. Devitalized and nonviable tissue, along with any fibrin and slough, was removed to improve granulation tissue formation, stimulate wound healing, decrease overall bacteria load, disrupt biofilm formation and decrease edge senescence.  Total excisional debridement was 255.9 sq cm from the epidermis/dermis area and into the subcutaneous tissue with a depth of 0.1-0.9 cm.   Ulcers were improved afterwards and .  Measures were unchanged after debridement.       2.  Wound treatment: wound treatment will include irrigation and dressings to promote autolytic debridement which will include:wounds are worse; will obtain a culture; will await sensitivities before prescribing antibiotics; will continue to wash with hibiclens, then Dakin's, am lactin to scaling areas; applied triad paste to the periwound on the right leg, silvercel; ABD; rolled gauze; change every other day; continue home care one day per week; family to assist on their off days.      If for some reason the patient is not able to get their dressing(s) changed as outlined above (due to illness, lack of supplies, lack of help) please do the following: remove old, soiled dressings; wash the wounds with saline; pat dry; apply ABD pad or other absorbant pad and secure with rolled gauze; avoid tape directly on your skin; patient instructed to call the clinic as soon as possible to let us know what the current issues are in receiving wound care. Worsened            3. Edema: lymphedema wraps bilaterally; elevation; lymphedema pump. The compression wraps were applied today in clinic.     If a 2 layer or 4 layer compression wrap is being used; these are safe to have on for ONLY 7 days. If for some reason the patient is not able to  get the wrap(s) changed (due to illness; lack of supplies, lack of help, lack of transportation) please do the following: unwrap the old 2 or 4 layer compression wrap; avoid using scissors as you could cut your skin and cause wounds; use tubular compression when available. Call to reschedule your home care or clinic visit appointment as soon as possible.  Stable            4. Nutrition: focus on weight loss; low sodium diet           5. Offloading: na           6. DG: scheduled for sleep study later this month          7. Lymphoma: following with oncology; possibly starting oral regimen     Patient will follow up with me in 4 weeks for reevaluation. They were instructed to call the clinic sooner with any signs or symptoms of infection or any further questions/concerns. Answered all questions.          Kim Fuentes DNP, RN, CNP, CWOCN, CFCN, CLT  [facility]   [phone]        This note was electronically signed by Kim Fuentes NP

## 2023-02-07 NOTE — PATIENT INSTRUCTIONS
"Wound care supplies were not ordered or needed today. Home care will order all your supplies    Continue on all blood pressure medications as prescribed    Focus on weight loss and low sodium diet  Keep sodium intake 2.5-4 grams per day    Keep appt with Sleep medicine; they are concerned that your BP is not being controlled due to uncontrolled sleep apnea  A culture was done today this will take 4-5 days to get results we will call you with these and next steps      Wound Care Instructions    Every other day home care or your family will , Cleanse your bilateral legs and wound(s) with Dilute hibiclens 30cc in 500cc NS.    Then do a light wash of Dakin's solution    Pat Dry with non-sterile gauze    Apply Lotion to the intact skin surrounding your wound and other dry skin locations. Some good lotions include: Remedy Skin Repair Cream, Sarna, Vanicream or Cetaphil    Apply Ammonium Lac Hydrin lotion to the thick scaling crusting areas    Triad paste to the periwound skin on the right leg to protect from all the drainage    Primary Dressing: Silvercel to all the wounds    ABDs or super  absorbant pads    Secure with non-sterile roll gauze (4\" x 75\" roll) and tape (1\" roll tape) as needed; avoid adhesive directly on the skin    Compression: tubular compression; foam; short stretch bilaterally    Elevation of the legs    Use lymphedema pump twice per day    It is not ok to get your wound wet in the bath or shower      If for some reason you are not able to get your dressing(s) changed as outlined above (due to illness, lack of supplies, lack of help) please do the following: remove old, soiled dressings; wash the wounds with saline; pat dry; apply ABD pad or other absorbant pad and secure with rolled gauze; avoid tape directly on your skin; Call the clinic as soon as possible to let us know what the current issues are in receiving wound care [phone].      SEEK MEDICAL CARE IF:  You have an increase in swelling, " pain, or redness around the wound.  You have an increase in the amount of pus coming from the wound.  There is a bad smell coming from the wound.  The wound appears to be worsening/enlarging  You have a fever greater than 101.5 F      It is ok to continue current wound care treatment/products for the next 2-3 days until new wound care supplies are ordered and arrive. If longer than this please contact our office at [phone].    If you have a 2 layer or 4 layer compression wrap on these are safe to have on for ONLY 7 days. If for some reason you are not able to get the wrap(s) changed (due to illness; lack of supplies, lack of help, lack of transportation) please do the following: unwrap the old 2 or 4 layer compression wrap; avoid using scissors as you could cut your skin and cause wounds; use tubular compression when available. Call to reschedule your home care or clinic visit appointment as soon as possible.    Please NOTE: if you are 15 minutes late to your clinic appointment you will have to be rescheduled. Please call our clinic as soon as possible if you know you will not be able to get to your appointment at [phone].    If you fail to show up to 3 scheduled clinic appointments you will be dismissed from our clinic.              We want to hear from you!  In the next few weeks, you should receive a call or email to complete a survey about your visit at [facility]. Please help us improve your appointment experience by letting us know how we did today. We strive to make your experience good and value any ways in which we could do better.      We value your input and suggestions.    Thank you for choosing the [facility]!      It is recommended that you do not get your ulcer wet when showering.  Listed below are several ways of keeping it dry when you shower.     1. Wrap it with Press and Seal plastic wrap.  It can be found in the stores where the plastic wraps or  tin foil is kept.               2.  Some people take a bath and hang their leg/foot out of the tub.                        3  Put your leg in a plastic bag and tape it on.           4. You can purchase a shower cover for casts at some pharmacies and through the Internet.            5. Take a Bed Bath or wash up at the sink

## 2023-02-10 ENCOUNTER — TELEPHONE (OUTPATIENT)
Dept: VASCULAR SURGERY | Facility: CLINIC | Age: 68
End: 2023-02-10
Payer: COMMERCIAL

## 2023-02-10 LAB
BACTERIA WND CULT: ABNORMAL
GRAM STAIN RESULT: ABNORMAL

## 2023-02-10 RX ORDER — PENICILLIN V POTASSIUM 500 MG/1
500 TABLET, FILM COATED ORAL 3 TIMES DAILY
Qty: 30 TABLET | Refills: 0 | Status: SHIPPED | OUTPATIENT
Start: 2023-02-10 | End: 2023-02-20

## 2023-02-10 RX ORDER — GENTAMICIN SULFATE 1 MG/G
OINTMENT TOPICAL DAILY
Qty: 30 G | Refills: 3 | Status: SHIPPED | OUTPATIENT
Start: 2023-02-10 | End: 2023-07-26

## 2023-02-10 RX ORDER — LEVOFLOXACIN 500 MG/1
500 TABLET, FILM COATED ORAL DAILY
Qty: 10 TABLET | Refills: 0 | Status: SHIPPED | OUTPATIENT
Start: 2023-02-10 | End: 2023-02-20

## 2023-02-10 NOTE — TELEPHONE ENCOUNTER
Pt updated on all results below. Instructions and frequency given to the patient and spelling of each. He will add the gentamycin to his dressing changes. Writer suggested probiotic, he wrote down the name but stated that he has not had past issues with diarrhea and antibiotics.

## 2023-02-10 NOTE — TELEPHONE ENCOUNTER
Della was updated that the frequency is every other day for dressing change. She had no further questions.

## 2023-02-10 NOTE — TELEPHONE ENCOUNTER
Della with [facility] requesting a call back to clarify the frequency of use for the gentamicin. PH: [phone]

## 2023-02-10 NOTE — TELEPHONE ENCOUNTER
----- Message from Kim Fuentes NP sent at 2/10/2023 10:20 AM CST -----  Team - please call patient and let him know that his culture grew out x4 organsims; I have sent in 3 prescriptions    Will add topical gentamycin ointment to his regimen every other day; apply to any open areas    Will take penicillin 500mg TID for 10 days take with foot    Will take Levaquin 500mg daily for 10 days; take on empty stomach    Patient can also  some probiotics such as Culturelle to help prevent Antibiotic associated diarrhea. They can take this 1 hour before or 2 hours after taking their antibiotic. Should not be taken at the same time as they can cancel out the effects.

## 2023-02-16 RX ORDER — SPIRONOLACTONE 25 MG/1
TABLET ORAL
Qty: 30 TABLET | Refills: 0 | Status: SHIPPED | OUTPATIENT
Start: 2023-02-16 | End: 2023-03-13

## 2023-02-16 NOTE — TELEPHONE ENCOUNTER
Patient call asking for medication. Sending over to preceptor budd, due too its been more then one week since we have respond.

## 2023-02-17 ENCOUNTER — TELEPHONE (OUTPATIENT)
Dept: FAMILY MEDICINE | Facility: CLINIC | Age: 68
End: 2023-02-17

## 2023-02-17 NOTE — TELEPHONE ENCOUNTER
The Home Care/Assisted Living/Nursing Facility is calling regarding an established patient.  Has the patient seen Home Care in the past or is currently residing in Assisted Living or Nursing Facility? Yes.     Lesly calling from [facility] requesting the following orders that are within the Home Care, Assisted Living or Nursing Home Eval and Treatment standing order and can be signed as standing order signature required by RN.    Preferred Call Back Number: [phone]    Home Care visit orders as noted below.  Verbal approval given via detailed message left on identified voicemail for Lesly.    Any additional Orders:  Are there any orders requested, not stated above, that are outside of the standing order and must be routed to a licensed practitioner for approval?    No    Writer has verified Requestor will send fax to have orders signed. Encounter will be routed to inform PCP,Dr Sal of the completed action and to expect a fax to follow for signature. Thank you. Nanette QURESHI

## 2023-02-17 NOTE — TELEPHONE ENCOUNTER
[facility] phone call message - order or referral request for patient:     Order or referral being requested: verbal    Skilled nursing   1x a week for 8 weeks    3 prn's  For wound care    OK to leave a message on voice mail? Yes    Primary language: English      needed? No    Call taken on February 17, 2023 at 11:31 AM by Pranav Dias

## 2023-02-20 ENCOUNTER — OFFICE VISIT (OUTPATIENT)
Dept: FAMILY MEDICINE | Facility: CLINIC | Age: 68
End: 2023-02-20
Payer: COMMERCIAL

## 2023-02-20 VITALS
BODY MASS INDEX: 39.17 KG/M2 | HEIGHT: 75 IN | OXYGEN SATURATION: 94 % | HEART RATE: 86 BPM | DIASTOLIC BLOOD PRESSURE: 72 MMHG | TEMPERATURE: 98 F | WEIGHT: 315 LBS | RESPIRATION RATE: 22 BRPM | SYSTOLIC BLOOD PRESSURE: 142 MMHG

## 2023-02-20 DIAGNOSIS — E11.42 TYPE 2 DIABETES MELLITUS WITH PERIPHERAL NEUROPATHY (H): ICD-10-CM

## 2023-02-20 DIAGNOSIS — F11.91 OPIOID USE DISORDER IN REMISSION: ICD-10-CM

## 2023-02-20 DIAGNOSIS — I10 BENIGN ESSENTIAL HYPERTENSION: Primary | ICD-10-CM

## 2023-02-20 DIAGNOSIS — C85.93 LYMPHOMA OF INTRA-ABDOMINAL LYMPH NODES, UNSPECIFIED LYMPHOMA TYPE (H): ICD-10-CM

## 2023-02-20 PROBLEM — F11.21 OPIOID USE DISORDER, MODERATE, IN SUSTAINED REMISSION, ON MAINTENANCE THERAPY (H): Status: RESOLVED | Noted: 2022-04-21 | Resolved: 2023-02-20

## 2023-02-20 PROBLEM — E66.01 MORBID OBESITY (H): Status: ACTIVE | Noted: 2023-02-20

## 2023-02-20 LAB — HBA1C MFR BLD: 6.8 % (ref 0–5.6)

## 2023-02-20 PROCEDURE — 83036 HEMOGLOBIN GLYCOSYLATED A1C: CPT | Performed by: STUDENT IN AN ORGANIZED HEALTH CARE EDUCATION/TRAINING PROGRAM

## 2023-02-20 PROCEDURE — 36415 COLL VENOUS BLD VENIPUNCTURE: CPT | Performed by: STUDENT IN AN ORGANIZED HEALTH CARE EDUCATION/TRAINING PROGRAM

## 2023-02-20 PROCEDURE — 99214 OFFICE O/P EST MOD 30 MIN: CPT | Mod: GC | Performed by: STUDENT IN AN ORGANIZED HEALTH CARE EDUCATION/TRAINING PROGRAM

## 2023-02-20 RX ORDER — LOSARTAN POTASSIUM 100 MG/1
100 TABLET ORAL DAILY
Qty: 90 TABLET | Refills: 0 | Status: SHIPPED | OUTPATIENT
Start: 2023-02-20 | End: 2023-05-23

## 2023-02-20 NOTE — LETTER
[facility] [address] [place] [address]  Phone: [phone]  Fax: [phone]    February 20, 2023        Shaheen Sepulveda  [address] [address]          To whom it may concern:    RE: Shaheen Sepulveda    Was seen for biopsies in December of 2022 and received fentanyl for anesthetic. We are sending him with the copy of the report from the procedure.    Shaheen is interested in possibly weaning off of methadone and transitioning to Suboxone. We would be able to transition his care to our clinic should he transition to Suboxone, and we would love to partner with you if you feel this is an appropriate transition.     Please contact me for questions or concerns.      Sincerely,        Radha Sal MD

## 2023-02-20 NOTE — PROGRESS NOTES
Faculty Supervision of Residents   I have examined this patient and the medical care has been evaluated and discussed with the resident. See resident note outlining our discussion.      Alessia Calderon MD     monthly or less

## 2023-02-20 NOTE — PROGRESS NOTES
"  Assessment & Plan     Benign essential hypertension  Home BP in range: SBP <140 and DBP <80. No headache, vision changes. He feels losartan, chlorthalidone, and spironolactone are working well. No side effects.  - Continue all current medications: losartan 100 mg, spironolactone 25 mg, chlorthalidone 25 mg    Type 2 diabetes mellitus with peripheral neuropathy (H)  Will check HbA1c today and plan for close follow up in 2 weeks. A1c at goal! Due for diabetic foot exam at next visit.  - Hb A1c  - Follow up in 2 weeks    Opioid use disorder in remission  Shaheen has been going to a methadone clinic since 2005. Feels his opioid use disorder is completely controlled. 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 This has caused issues with his clinic so I provided hard copies of his procedure note and a letter of support that he can provide his clinic. He is interested in switching to suboxone, we discussed that this would be a long process and that we would have to work closely with his clinic but that he could receive suboxone here.  - Provided documentation to give his methadone clinic  - Continue discussion of transitioning to suboxone    Lymphoma of intra-abdominal lymph nodes, unspecified lymphoma type (H)  Stable at this time, CLL diagnosis, following with oncology.      BMI:   Estimated body mass index is 49.75 kg/m  as calculated from the following:    Height as of this encounter: 1.905 m (6' 3\").    Weight as of this encounter: 180.5 kg (398 lb).     FUTURE APPOINTMENTS:       - Follow-up visit in 2 weeks to discuss diabetes and follow up on methadone     Precepted with Dr. Calderon.    Binh Bruno  MS3, [employer]    I was present with the medical student who participated in the service and in the documentation of this note. I have verified the history and personally performed the physical exam and medical decision making, and have verified the content of the note, which " "accurately reflects my assessment of the patient and the plan of care.     Radha Sal MD  [facility]    Frank Jamison is a 67 year old accompanied by his sister, presenting for the following health issues:  RECHECK (BP)      HPI   Wanting to transition off methadone. He doesn't like his methadone clinic and recently he was accused of using fentanyl. He had a biopsy for new diagnosis of CLL and received fentanyl but was not told about this. He has been frustrated for a long time with the treatment at this clinic. He also feels his opioid use disorder is well controlled and he does not feel a need to use.     Brought in home BP readings. All SBPs <140 and DBP <80. Checked at different time points throughout the day. Feels short of breath when walking up stairs.     Not wearing CPAP machine, doesn't have a machine at this time. Has an appointment tomorrow with sleep medicine team.       Review of Systems   Constitutional, HEENT, cardiovascular, pulmonary, gi and gu systems are negative, except as otherwise noted.      Objective    BP (!) 142/72   Pulse 86   Temp 98  F (36.7  C)   Resp 22   Ht 1.905 m (6' 3\")   Wt (!) 180.5 kg (398 lb)   SpO2 94%   BMI 49.75 kg/m    Body mass index is 49.75 kg/m .  Physical Exam   GENERAL: healthy, alert and no distress  RESP: lungs clear to auscultation - no rales, rhonchi or wheezes  CV: regular rate and rhythm, normal S1 S2, no S3 or S4, no murmur, click or rub, no peripheral edema and peripheral pulses strong  MS: no gross musculoskeletal defects noted, lymphedema bilaterally, wrapped    Results for orders placed or performed in visit on 02/20/23 (from the past 24 hour(s))   HEMOGLOBIN A1C   Result Value Ref Range    Hemoglobin A1C 6.8 (H) 0.0 - 5.6 %       ----- Services Performed by a MEDICAL STUDENT in Presence of RESIDENT/FELLOW Physician-------            "

## 2023-02-21 ENCOUNTER — OFFICE VISIT (OUTPATIENT)
Dept: SLEEP MEDICINE | Facility: CLINIC | Age: 68
End: 2023-02-21
Payer: COMMERCIAL

## 2023-02-21 VITALS
OXYGEN SATURATION: 98 % | SYSTOLIC BLOOD PRESSURE: 140 MMHG | DIASTOLIC BLOOD PRESSURE: 70 MMHG | HEIGHT: 72 IN | BODY MASS INDEX: 42.66 KG/M2 | WEIGHT: 315 LBS | HEART RATE: 89 BPM

## 2023-02-21 DIAGNOSIS — G47.33 OBSTRUCTIVE SLEEP APNEA: Primary | ICD-10-CM

## 2023-02-21 DIAGNOSIS — E11.42 TYPE 2 DIABETES MELLITUS WITH PERIPHERAL NEUROPATHY (H): ICD-10-CM

## 2023-02-21 DIAGNOSIS — G47.33 OSA (OBSTRUCTIVE SLEEP APNEA): ICD-10-CM

## 2023-02-21 PROCEDURE — 99205 OFFICE O/P NEW HI 60 MIN: CPT | Performed by: NURSE PRACTITIONER

## 2023-02-21 RX ORDER — ZOLPIDEM TARTRATE 10 MG/1
TABLET ORAL
Qty: 1 TABLET | Refills: 0 | Status: SHIPPED | OUTPATIENT
Start: 2023-02-21

## 2023-02-21 ASSESSMENT — SLEEP AND FATIGUE QUESTIONNAIRES
HOW LIKELY ARE YOU TO NOD OFF OR FALL ASLEEP WHEN YOU ARE A PASSENGER IN A CAR FOR AN HOUR WITHOUT A BREAK: HIGH CHANCE OF DOZING
HOW LIKELY ARE YOU TO NOD OFF OR FALL ASLEEP WHILE SITTING AND TALKING TO SOMEONE: WOULD NEVER DOZE
HOW LIKELY ARE YOU TO NOD OFF OR FALL ASLEEP WHILE SITTING AND READING: WOULD NEVER DOZE
HOW LIKELY ARE YOU TO NOD OFF OR FALL ASLEEP WHILE SITTING INACTIVE IN A PUBLIC PLACE: SLIGHT CHANCE OF DOZING
HOW LIKELY ARE YOU TO NOD OFF OR FALL ASLEEP WHILE SITTING QUIETLY AFTER LUNCH WITHOUT ALCOHOL: SLIGHT CHANCE OF DOZING
HOW LIKELY ARE YOU TO NOD OFF OR FALL ASLEEP WHILE LYING DOWN TO REST IN THE AFTERNOON WHEN CIRCUMSTANCES PERMIT: HIGH CHANCE OF DOZING
HOW LIKELY ARE YOU TO NOD OFF OR FALL ASLEEP WHILE WATCHING TV: MODERATE CHANCE OF DOZING
HOW LIKELY ARE YOU TO NOD OFF OR FALL ASLEEP IN A CAR, WHILE STOPPED FOR A FEW MINUTES IN TRAFFIC: WOULD NEVER DOZE

## 2023-02-21 NOTE — NURSING NOTE
"Chief Complaint   Patient presents with     Consult     Waking up at night, former CPAP user x 13-14 years ago, stop breathing in sleep        Initial BP (!) 173/81   Pulse 89   Ht 1.82 m (5' 11.65\")   Wt (!) 178.8 kg (394 lb 2 oz)   SpO2 98%   BMI 53.97 kg/m   Estimated body mass index is 53.97 kg/m  as calculated from the following:    Height as of this encounter: 1.82 m (5' 11.65\").    Weight as of this encounter: 178.8 kg (394 lb 2 oz).    Medication Reconciliation: complete    Neck circumference: 41 centimeters.    DME:     JAZIEL Sena  [facility]      "

## 2023-02-21 NOTE — PROGRESS NOTES
Outpatient Sleep Medicine Consultation:      Name: Shaheen Sepulveda MRN# [id number]   Age: 67 year old YOB: 1955     Date of Consultation: 2023  Consultation is requested by: Radha Sal MD  [address] [place] [id number] Radha Sal  Primary care provider: Radha Sal       Reason for Sleep Consult:     Shaheen Sepulveda is sent by Radha Sal for a sleep consultation regarding would like to begin CPAP therapy again    Patient s Reason for visit  Shaheen Sepulveda main reason for visit: waking up at night  Patient states problem(s) started: 14 years ago;recently got worst 2 months ago  Shaheen Sepulveda's goals for this visit: better sleep           Assessment and Plan:     Summary Sleep Diagnoses:  Obstructive Sleep Apnea  Insomnia  Type 2 diabetes with peripheral neuropathy  Asthma  COPD  Pleural mass  Hypertension  Class III Obesity    Comorbid Diagnoses:  Chronic pain syndrome  Methadone use  Osteoarthritis  History of tobacco use disorder      Summary Recommendations:  Orders Placed This Encounter   Procedures     Comprehensive Sleep Study   1.  Recommend patient undergo a formal in laboratory polysomnographic study.  Patient is interested in reinitiating CPAP therapy.  Unfortunately it has been several years since he has used his CPAP largely due to his insurance lapsing, so he needs to requalify all over again.  2.  Recommend patient return to clinic approximately 2 weeks following the completion of his sleep study to discuss results, and to further develop this plan of care  3.  Recommend patient optimize sleep hygiene practices to minimize sleep disturbance  4.  Recognize that patient does not drive a car      Summary Counselin.  Reviewed pathophysiology of central sleep apnea as well as obstructive sleep apnea.  2.  Discussed sequela of untreated sleep apnea in the context of his medical diagnoses, in particular cardiovascular disease  3.  Discussed the process  and provided encouragement to complete a formal in laboratory polysomnographic study  4.  Discussed and encouraged return to CPAP therapy  5.  Discussed optimizing sleep hygiene practices to minimize sleep deprivation    Medical Decision-making:   Educational materials provided in his after visit instructions    Total time spent reviewing medical records, history and physical examination, review of previous testing and interpretation as well as documentation on this date: 60 minutes    CC: Radha Sal          History of Present Illness:     Shaheen Sepulveda is a very pleasant 67-year-old -American gentleman who was seen in the sleep medicine clinic upon the advice of his medical provider who he had seen in clinic regarding his difficult to control blood pressure.   Patient was previously on CPAP therapy while patient within [facility].  He unfortunately ceased using CPAP around [id number] when he had medical insurance difficulties.  Patient at that time had been suffering from multiple health challenges including cardiovascular and pulmonary issues.  Those issues have continued to plague him and he presents today with blood pressure that is very difficult to control.  He is presently being treated with losartan, chlorthalidone, and spironolactone and today's blood pressure is 140/70.  Patient's BMI calculated from a measured height of 5 foot 11.65 inches, and a weight of 394 pounds, 2 ounces is calculated at 53.97 kg/m  , and increase from 49.75 kg/m , last calculated 2/20/2023.  His type 2 diabetes mellitus is being closely monitored by his primary provider.   In addition, patient is receiving long-term methadone treatment, as a result of prior heroin use.  This complicates his sleep.  However this is not something new to his regimen.  Patient has difficulty with sleep maintenance resulting in short spurts of sleep. He has multiple sleep awakenings, however has poor sleep hygiene practices which likely  impact that.  In addition, he suffers from excessive daytime sleepiness often taking at least a daily unintentional nap.  He does not find his sleep to be restorative.  Patient looks to improve his health from a sleep aspect and is able to discuss how sleep apnea affects those 2 measures of his medical health.  Primarily he is interested in feeling better overall.  He does not pose any barriers to repeating his sleep study, which is required to go back onto treatment after having stopped therapy.    Patient is not opposed to beginning PAP therapy immediately after completion of sleep study should that be warranted, rather than delaying until return appointment.      Past Sleep Evaluations:  12/18/2014   Baseline:  1. This study was performed in order to evaluate for DG  2. Hypopnea Definition: Rule VIII.4.A  3. The total sleep time was 378.4 minutes. The sleep latency was 5.4 minutes. The REM sleep latency was 55 minutes which was reduced. Sleep efficiency was 87.8 %, which is decreased. The sleep architecture was fragmented with many sleep stage changes and arousals.  4. Sleep architecture: Stage N1 5.5%, Stage N2 66.1% , Stage N3 13.5%, Stage R 14.9%,  5. Snoring reported as intermittent;moderately loud.  6. Respiratory Events: Obstructive Apneas: 1, Central Apneas: 7, Mixed Apneas: 0, Hypopneas: 102, RERAs: 0, were noted for an RDI of: 17.4 and an AHI of: 17.4. The REM RDI was: 27.7. The lowest O2 saturation was: 86% and the total sleep time SpO2 </= 88% was 2.7 minutes.  7. Positional data: Lateral AHI 16.1, Lateral RDI 16.1, Lateral TST 76.8 %, Supine AHI 21.9, Supine RDI 21.9, Supine TST 23.2 %.  8. This study indicates moderately severe obstructive sleep apnea.    Other  1. EEG: No epileptiform activity was noted during this recording.  2. EMG: Frequent periodic limb movements were noted, PLM index was: 43.8, PLMAI was: 4.  3. EKG: No significant cardiac arrhythmias were noted.  4. TCM: Mean 52.1 mmHg, Max  65.1 mmHg    A note for a titration study was unable to be located, however by an examination of medical records, subsequent notes reveal patient had been successfully treated with a set pressure of 7 cm H2O.    SLEEP-WAKE SCHEDULE:     Work/School Days: Patient goes to school/work: No   Usually gets into bed at between 9:00 to 10:00 pm  Takes patient about no issues falling asleep to fall asleep  Has trouble falling asleep 0 nights per week  Wakes up in the middle of the night 4-5 times a night times.  Wakes up due to External stimuli (bed partner, pets, noise, etc);Use the bathroom  He has trouble falling back asleep no issues falling back to sleep times a week.   It usually takes right away to get back to sleep  Patient is usually up at 6:00 am  Uses alarm: No    Weekends/Non-work Days/All Other Days:  Usually gets into bed at 11:00 pm   Takes patient about no issues of falling asleep to fall asleep  Patient is usually up at 6:00 am  Uses alarm: No    Sleep Need  Patient gets  4 to 5 hrs sleep on average   Patient thinks he needs about 6-7 hrs sleep    Shaheen Sepulveda prefers to sleep in this position(s): Back;Head Elevated   Patient states they do the following activities in bed: Read;Eat;Watch TV;Use phone, computer, or tablet    Naps  Patient takes a purposeful nap rarely times a week and naps are usually 1hr to 1.5hrs in duration  He feels better after a nap: Yes  He dozes off unintentionally everyday days per week  Patient has had a driving accident or near-miss due to sleepiness/drowsiness: No      SLEEP DISRUPTIONS:    Breathing/Snoring  Patient snores:No  Other people complain about his snoring:    Patient has been told he stops breathing in his sleep:Yes  He has issues with the following: Morning mouth dryness;Getting up to urinate more than once    Movement:  Patient gets pain, discomfort, with an urge to move:  Yes  It happens when he is resting:  Yes  It happens more at night:  No  Patient has been told  he kicks his legs at night:  No     Behaviors in Sleep:  Shaheen Sepulveda has experienced the following behaviors while sleeping:    He has experienced sudden muscle weakness during the day: No      Is there anything else you would like your sleep provider to know: former CPAP user      CAFFEINE AND OTHER SUBSTANCES:    Patient consumes caffeinated beverages per day:  1 cup coffee a day  Last caffeine use is usually: 10 am  List of any prescribed or over the counter stimulants that patient takes:    List of any prescribed or over the counter sleep medication patient takes: melatonin  List of previous sleep medications that patient has tried: none  Patient drinks alcohol to help them sleep: No  Patient drinks alcohol near bedtime: No    Family History:  Patient has a family member been diagnosed with a sleep disorder: No            SCALES:    EPWORTH SLEEPINESS SCALE      Virgil Sleepiness Scale ( JOSE Dumont  [id number]<br>ESS - [place]/English - Final version - 21 Nov 07 - [facility].) 2/21/2023   Sitting and reading Would never doze   Watching TV Moderate chance of dozing   Sitting, inactive in a public place (e.g. a theatre or a meeting) Slight chance of dozing   As a passenger in a car for an hour without a break High chance of dozing   Lying down to rest in the afternoon when circumstances permit High chance of dozing   Sitting and talking to someone Would never doze   Sitting quietly after a lunch without alcohol Slight chance of dozing   In a car, while stopped for a few minutes in traffic Would never doze   Virgil Score (MC) 10   Virgil Score (Sleep) 10         INSOMNIA SEVERITY INDEX (ELAINE)      Insomnia Severity Index (ELAINE) 2/21/2023   Difficulty falling asleep 0   Difficulty staying asleep 3   Problems waking up too early 3   How SATISFIED/DISSATISFIED are you with your CURRENT sleep pattern? 4   How NOTICEABLE to others do you think your sleep problem is in terms of impairing the quality of your  life? 0   How WORRIED/DISTRESSED are you about your current sleep problem? 3   To what extent do you consider your sleep problem to INTERFERE with your daily functioning (e.g. daytime fatigue, mood, ability to function at work/daily chores, concentration, memory, mood, etc.) CURRENTLY? 3   ELAINE Total Score 16       Guidelines for Scoring/Interpretation:  Total score categories:  0-7 = No clinically significant insomnia   8-14 = Subthreshold insomnia   15-21 = Clinical insomnia (moderate severity)  22-28 = Clinical insomnia (severe)  Used via courtesy of www.[employer].va.gov with permission from Gerardo Ha PhD., [facility]      STOP BANG N/A    STOP BANG Questionnaire (  2008, the American Society of Anesthesiologists, Inc. [employer] 2/21/2023   1. Snoring - Do you snore loudly (louder than talking or loud enough to be heard through closed doors)? No   2. Tired - Do you often feel tired, fatigued, or sleepy during daytime? Yes   3. Observed - Has anyone observed you stop breathing during your sleep? Yes   4. Blood pressure - Do you have or are you being treated for high blood pressure? Yes   5. BMI - BMI more than 35 kg/m2? Yes   6. Age - Age over 50 yr old? Yes   7. Neck circumference - Neck circumference greater than 40 cm? Yes   8. Gender - Gender male? Yes   STOP BANG Score (MC): 6 (High risk of DG)   Neck Cir (cm) Clinic: 41   B/P Clinic: 173/81   BMI Clinic: 53.97         GAD7    KENDRA-7  7/14/2022   1. Feeling nervous, anxious, or on edge 2   2. Not being able to stop or control worrying 0   3. Worrying too much about different things 0   4. Trouble relaxing 1   5. Being so restless that it is hard to sit still 1   6. Becoming easily annoyed or irritable 0   7. Feeling afraid, as if something awful might happen 0   KENDRA-7 Total Score 4   If you checked any problems, how difficult have they made it for you to do your work, take care of things at home, or get along with other  "people? -         CAGE-AID    No flowsheet data found.    CAGE-AID reprinted with permission from the [facility] Journal, CHE Calderon. and EDWIN Emery, \"Conjoint screening questionnaires for alcohol and drug abuse\" [facility] Journal 94: 135-140, 1995.      PATIENT HEALTH QUESTIONNAIRE-9 (PHQ - 9)    PHQ-9 ([employer]) 6/14/2022   1.  Little interest or pleasure in doing things 1   2.  Feeling down, depressed, or hopeless 1   3.  Trouble falling or staying asleep, or sleeping too much 1   4.  Feeling tired or having little energy 1   5.  Poor appetite or overeating 1   6.  Feeling bad about yourself 1   7.  Trouble concentrating 1   8.  Moving slowly or restless 1   9.  Suicidal or self-harm thoughts 1   PHQ-9 Total Score 9       Developed by Marietta Longoria, Nancy Kelly, Terrence Tarango and colleagues, with an educational carlos from [employer] No permission required to reproduce, translate, display or distribute.        Allergies:    Allergies   Allergen Reactions     Lisinopril Swelling     Patient reports \"neck swelling\"  Swelling in throat       Medications:    Current Outpatient Medications   Medication Sig Dispense Refill     ammonium lactate (LAC-HYDRIN) 12 % external lotion Apply topically daily as needed for dry skin With dressing changes 225 g 3     atorvastatin (LIPITOR) 20 MG tablet Take 1 tablet (20 mg) by mouth daily 90 tablet 4     chlorthalidone (HYGROTON) 25 MG tablet Take 1 tablet (25 mg) by mouth daily 90 tablet 3     cyanocobalamin (VITAMIN B-12) 1000 MCG tablet Take 1 tablet (1,000 mcg) by mouth daily for 360 days 90 tablet 3     famotidine (PEPCID) 20 MG tablet TAKE ONE TABLET BY MOUTH TWICE DAILY 90 tablet 0     gentamicin (GARAMYCIN) 0.1 % external ointment Apply topically daily 30 g 3     hydrOXYzine (ATARAX) 25 MG tablet Take 0.5-1 tablets (12.5-25 mg) by mouth 3 times daily as needed for anxiety 60 tablet 1     losartan (COZAAR) 100 MG tablet Take 1 tablet (100 mg) by " mouth daily 90 tablet 0     melatonin 3 MG tablet Take 1 tablet (3 mg) by mouth nightly as needed for sleep 30 tablet 1     metFORMIN (GLUCOPHAGE) 500 MG tablet Take 1 tablet (500 mg) by mouth 2 times daily (with meals) 180 tablet 3     methadone (DOLOPHINE-INTENSOL) 10 MG/ML (HIGH CONC) solution Take 100 mg by mouth daily       naproxen sodium 220 MG capsule Take 220 mg by mouth 2 times daily as needed       sodium hypochlorite (QUARTER-STRENGTH DAKINS) external solution Apply topically every 72 hours Use 300mL every 72 hours to wash the bilateral legs and wounds on the legs 1000 mL 3     spironolactone (ALDACTONE) 25 MG tablet TAKE ONE TABLET BY MOUTH ONE TIME DAILY 30 tablet 0       Problem List:  Patient Active Problem List    Diagnosis Date Noted     Lymphoma of intra-abdominal lymph nodes, unspecified lymphoma type (H) 02/20/2023     Priority: Medium     Morbid obesity (H) 02/20/2023     Priority: Medium     Vitamin B12 deficiency (non anemic) 12/14/2022     Priority: Medium     Disease of circulatory system 08/17/2022     Priority: Medium     Chronic pain syndrome 08/05/2022     Priority: Medium     Cellulitis of right lower extremity 06/06/2022     Priority: Medium     Type 2 diabetes mellitus without complication, without long-term current use of insulin (H) 04/21/2022     Priority: Medium     Open wound of lower limb 09/22/2021     Priority: Medium     Lymphedema 05/07/2021     Priority: Medium     Hypervolemia 04/23/2021     Priority: Medium     H/O angioedema 06/15/2020     Priority: Medium     Formatting of this note might be different from the original.  Unexplained angioedema twice, discontinue lisinopril for possible connection to it, though he had used it afterwards with no symptoms       Obstructive sleep apnea 02/02/2015     Priority: Medium     Formatting of this note might be different from the original.  Epic       Lymphedema of both lower extremities 12/22/2014     Priority: Medium     History  of tobacco use disorder 08/01/2013     Priority: Medium     Formatting of this note might be different from the original.  Added per documetation       Pleural mass 07/02/2013     Priority: Medium     Hip joint replacement status 06/05/2012     Priority: Medium     Hypertension 05/08/2012     Priority: Medium     Methadone use 05/08/2012     Priority: Medium     Osteoarthritis of hip 03/23/2012     Priority: Medium     Osteoarthritis of knee 03/23/2012     Priority: Medium        Past Medical/Surgical History:  Past Medical History:   Diagnosis Date     COPD (chronic obstructive pulmonary disease) (H)      Diabetes (H)      H/O angioedema 06/15/2020    Formatting of this note might be different from the original. Unexplained angioedema twice, discontinue lisinopril for possible connection to it, though he had used it afterwards with no symptoms     History of tobacco use disorder 08/01/2013    Formatting of this note might be different from the original. Added per documetation     Hypertension      Lymphedema of both lower extremities 12/22/2014     Methadone use 05/08/2012     Obstructive sleep apnea 02/02/2015    Formatting of this note might be different from the original. Epic     Pleural mass 07/02/2013     Uncomplicated asthma      Past Surgical History:   Procedure Laterality Date     JOINT REPLACEMENT         Social History:  Social History     Socioeconomic History     Marital status: Single     Spouse name: Not on file     Number of children: Not on file     Years of education: Not on file     Highest education level: Not on file   Occupational History     Not on file   Tobacco Use     Smoking status: Former     Smokeless tobacco: Former   Vaping Use     Vaping Use: Never used   Substance and Sexual Activity     Alcohol use: Not Currently     Drug use: Not Currently     Sexual activity: Not Currently   Other Topics Concern     Parent/sibling w/ CABG, MI or angioplasty before 65F 55M? Not Asked   Social  "History Narrative     Not on file     Social Determinants of Health     Financial Resource Strain: Not on file   Food Insecurity: Not on file   Transportation Needs: Not on file   Physical Activity: Not on file   Stress: Not on file   Social Connections: Not on file   Intimate Partner Violence: Not on file   Housing Stability: Not on file       Family History:  Family History   Problem Relation Age of Onset     No Known Problems Mother      No Known Problems Father      Edema Sister      Edema Sister        Review of Systems:  A complete review of systems reviewed by me is negative with the exeption of what has been mentioned in the history of present illness.  In the last TWO WEEKS have you experienced any of the following symptoms?  Fevers: No  Night Sweats: No  Weight Gain: No  Pain at Night: No  Double Vision: No  Changes in Vision: No  Difficulty Breathing through Nose: No  Sore Throat in Morning: No  Dry Mouth in the Morning: Yes  Shortness of Breath Lying Flat: No  Shortness of Breath With Activity: Yes  Awakening with Shortness of Breath: No  Increased Cough: No  Heart Racing at Night: No  Swelling in Feet or Legs: Yes  Diarrhea at Night: No  Heartburn at Night: No  Urinating More than Once at Night: Yes  Losing Control of Urine at Night: No  Joint Pains at Night: No  Headaches in Morning: No  Weakness in Arms or Legs: No  Depressed Mood: No  Anxiety: No     Physical Examination:  Vitals: BP (!) 173/81   Pulse 89   Ht 1.82 m (5' 11.65\")   Wt (!) 178.8 kg (394 lb 2 oz)   SpO2 98%   BMI 53.97 kg/m    BMI= Body mass index is 53.97 kg/m .    Neck Cir (cm): 41 cm      Physical Exam  Vitals and nursing note reviewed.   Constitutional:       General: He is not in acute distress.     Appearance: He is obese. He is chronically ill-appearing.   HENT:      Head: Normocephalic and atraumatic.      Right Ear: External ear normal.      Left Ear: External ear normal.      Nose: Nose normal.      Mouth/Throat:      " Mouth: Mucous membranes are moist.      Pharynx: Oropharynx is clear.   Eyes:      Conjunctiva/sclera: Conjunctivae normal.   Neck:      Thyroid: Thyroid normal.   Cardiovascular:      Rate and Rhythm: Normal rate. Rhythm irregular.      Pulses: Normal pulses.      Heart sounds: Murmur heard.   Pulmonary:      Effort: Pulmonary effort is normal.      Breath sounds: Wheezing present. No rales.   Chest:      Chest wall: No tenderness.   Musculoskeletal:      Cervical back: Normal range of motion and neck supple.      Right lower leg: Edema present.      Left lower leg: Edema present.   Skin:     Capillary Refill: Capillary refill takes less than 2 seconds.   Neurological:      General: No focal deficit present.      Mental Status: He is alert and oriented to person, place, and time.   Psychiatric:         Mood and Affect: Mood normal.         Behavior: Behavior normal.         Thought Content: Thought content normal.         Judgment: Judgment normal.     Physical Exam   Nursing note and vitals reviewed.  Constitutional: No distress. He appears chronically ill.   HENT:   Head: Normocephalic and atraumatic.   Right Ear: External ear normal.   Left Ear: External ear normal.   Nose: Nose normal.   Mouth/Throat: Mucous membranes are moist. Oropharynx is clear.   Eyes: Conjunctivae are normal.   Neck: Thyroid normal.   Cardiovascular: Normal rate and normal pulses. An irregular rhythm present.   Murmur heard.  Pulmonary/Chest: Effort normal. He has wheezes. He has no rales. He exhibits no tenderness.   Musculoskeletal:      Cervical back: Normal range of motion and neck supple.      Right lower leg: Edema present.      Left lower leg: Edema present.   Neurological: He is alert and oriented to person, place, and time.   Skin: Capillary refill takes less than 2 seconds.   Psychiatric: His behavior is normal. Mood, judgment and thought content normal.            Data: All pertinent previous laboratory data reviewed     Recent  Labs   Lab Test 01/16/23  0811 12/07/22  0921 10/19/22  0713 10/18/22  1528     --   --  136   POTASSIUM 4.1  --   --  3.9   CHLORIDE 99  --   --  98   CO2 24  --   --  30   ANIONGAP 13  --   --  8   * 117*   < > 81   BUN 20.8  --   --  20   CR 1.11  --   --  1.07   NARCISA 9.3  --   --  9.6    < > = values in this interval not displayed.       Recent Labs   Lab Test 11/10/22  0957   WBC 5.8   RBC 4.05*   HGB 9.4*   HCT 31.6*   MCV 78   MCH 23.2*   MCHC 29.7*   RDW 15.2*          Recent Labs   Lab Test 10/18/22  1528   PROTTOTAL 8.3*   ALBUMIN 3.4*   BILITOTAL 0.6   ALKPHOS 80   AST 11   ALT <9       TSH (uIU/mL)   Date Value   10/18/2022 1.76   04/21/2022 2.58       No results found for: UAMP, UBARB, BENZODIAZEUR, UCANN, UCOC, OPIT, UPCP    Iron Sat Index   Date/Time Value Ref Range Status   10/18/2022 03:28 PM 11 (L) 15 - 46 % Final     Ferritin   Date/Time Value Ref Range Status   10/18/2022 03:28  31 - 409 ng/mL Final       No results found for: PH, PHARTERIAL, PO2, [id number], SAT, PCO2, HCO3, BASEEXCESS, MYRNA, BEB    @LABRCNTIPR(phv:4,pco2v:4,po2v:4,hco3v:4,ana paula:4,o2per:4)@    Echocardiology:   Reading Physician Reading Date Result Priority   Ismael Perez MD  [phone] 4/24/2021 STAT   Provider, Historical 4/24/2021      Narrative & Impression    Left ventricular ejection fraction is normal. The calculated left ventricular ejection fraction is 65%.    Normal left ventricular cavity size and wall thickness.    Normal right ventricular size and systolic function.    No hemodynamically significant valvular heart abnormalities.    No previous study for comparison.         Chest x-ray: No results found for this or any previous visit from the past 365 days.      Chest CT: No results found for this or any previous visit from the past 365 days.      PFT: Most Recent [facility] Pulmonary Function Testing    No results found for: 20001  No results found for: 20002  No results found for: 20003  No  results found for: 20015  No results found for: 20016  No results found for: 20027  No results found for: 20028  No results found for: 20029  No results found for: 20079  No results found for: 20080  No results found for: 20081  No results found for: 20335  No results found for: 20105  No results found for: 20053  No results found for: 20054  No results found for: 20055      HAYDEE Dalton CNP 2/21/2023   Sleep Medicine    This note was written with the assistance of the Dragon voice-dictation technology software. The final document, although reviewed, may contain errors. For corrections, please contact the office.

## 2023-02-21 NOTE — NURSING NOTE
Sleep study and return visit has been scheduled. AVS and sleep study packet/letter given to patient.       Domenic Farrell [facility]

## 2023-02-22 ENCOUNTER — MEDICAL CORRESPONDENCE (OUTPATIENT)
Dept: HEALTH INFORMATION MANAGEMENT | Facility: CLINIC | Age: 68
End: 2023-02-22
Payer: COMMERCIAL

## 2023-02-22 DIAGNOSIS — Z53.9 DIAGNOSIS NOT YET DEFINED: Primary | ICD-10-CM

## 2023-02-22 PROCEDURE — G0180 MD CERTIFICATION HHA PATIENT: HCPCS | Performed by: STUDENT IN AN ORGANIZED HEALTH CARE EDUCATION/TRAINING PROGRAM

## 2023-02-27 NOTE — PATIENT INSTRUCTIONS
"      MY TREATMENT INFORMATION FOR SLEEP APNEA-  Shaheen Sepulveda    DOCTOR : HAYDEE Dalton CNP    Am I having a sleep study at a sleep center?  --->Due to normal delays, you will be contacted within 2-4 weeks to schedule    Am I having a home sleep study?  --->Watch the video for the device you are using:    -/drop off device-   https://www.[employer].com/watch?v=yGGFBdELGhk    -Disposable device sent out require phone/computer application-   https://www.[employer].com/watch?v=BCce_vbiwxE      Frequently asked questions:  1. What is Obstructive Sleep Apnea (DG)? DG is the most common type of sleep apnea. Apnea means, \"without breath.\"  Apnea is most often caused by narrowing or collapse of the upper airway as muscles relax during sleep.   Almost everyone has occasional apneas. Most people with sleep apnea have had brief interruptions at night frequently for many years.  The severity of sleep apnea is related to how frequent and severe the events are.   2. What are the consequences of DG? Symptoms include: feeling sleepy during the day, snoring loudly, gasping or stopping of breathing, trouble sleeping, and occasionally morning headaches or heartburn at night.  Sleepiness can be serious and even increase the risk of falling asleep while driving. Other health consequences may include development of high blood pressure and other cardiovascular disease in persons who are susceptible. Untreated DG  can contribute to heart disease, stroke and diabetes.   3. What are the treatment options? In most situations, sleep apnea is a lifelong disease that must be managed with daily therapy. Medications are not effective for sleep apnea and surgery is generally not considered until other therapies have been tried. Your treatment is your choice . Continuous Positive Airway (CPAP) works right away and is the therapy that is effective in nearly everyone. An oral device to hold your jaw forward is usually the next most " reliable option. Other options include postioning devices (to keep you off your back), weight loss, and surgery including a tongue pacing device. There is more detail about some of these options below.  4. Are my sleep studies covered by insurance? Although we will request verification of coverage, we advise you also check in advance of the study to ensure there is coverage.    Important tips for those choosing CPAP and similar devices   Know your equipment:  CPAP is continuous positive airway pressure that prevents obstructive sleep apnea by keeping the throat from collapsing while you are sleeping. In most cases, the device is  smart  and can slowly self-adjusts if your throat collapses and keeps a record every day of how well you are treated-this information is available to you and your care team.  BPAP is bilevel positive airway pressure that keeps your throat open and also assists each breath with a pressure boost to maintain adequate breathing.  Special kinds of BPAP are used in patients who have inadequate breathing from lung or heart disease. In most cases, the device is  smart  and can slowly self-adjusts to assist breathing. Like CPAP, the device keeps a record of how well you are treated.  Your mask is your connection to the device. You get to choose what feels most comfortable and the staff will help to make sure if fits. Here: are some examples of the different masks that are available:       Key points to remember on your journey with sleep apnea:  Sleep study.  PAP devices often need to be adjusted during a sleep study to show that they are effective and adjusted right.  Good tips to remember: Try wearing just the mask during a quiet time during the day so your body adapts to wearing it. A humidifier is recommended for comfort in most cases to prevent drying of your nose and throat. Allergy medication from your provider may help you if you are having nasal congestion.  Getting settled-in. It takes  more than one night for most of us to get used to wearing a mask. Try wearing just the mask during a quiet time during the day so your body adapts to wearing it. A humidifier is recommended for comfort in most cases. Our team will work with you carefully on the first day and will be in contact within 4 days and again at 2 and 4 weeks for advice and remote device adjustments. Your therapy is evaluated by the device each day.   Use it every night. The more you are able to sleep naturally for 7-8 hours, the more likely you will have good sleep and to prevent health risks or symptoms from sleep apnea. Even if you use it 4 hours it helps. Occasionally all of us are unable to use a medical therapy, in sleep apnea, it is not dangerous to miss one night.   Communicate. Call our skilled team on the number provided on the first day if your visit for problems that make it difficult to wear the device. Over 2 out of 3 patients can learn to wear the device long-term with help from our team. Remember to call our team or your sleep providers if you are unable to wear the device as we may have other solutions for those who cannot adapt to mask CPAP therapy. It is recommended that you sleep your sleep provider within the first 3 months and yearly after that if you are not having problems.   Use it for your health. We encourage use of CPAP masks during daytime quiet periods to allow your face and brain to adapt to the sensation of CPAP so that it will be a more natural sensation to awaken to at night or during naps. This can be very useful during the first few weeks or months of adapting to CPAP though it does not help medically to wear CPAP during wakefulness and  should not be used as a strategy just to meet guidelines.  Take care of your equipment. Make sure you clean your mask and tubing using directions every day and that your filter and mask are replaced as recommended or if they are not working.     Your BMI is Body mass index  is 53.97 kg/m .    What is BMI?  Body mass index (BMI) is one way to tell whether you are at a healthy weight, overweight, or obese. It measures your weight in relation to your height.  A BMI of 18.5 to 24.9 is in the healthy range. A person with a BMI of 25 to 29.9 is considered overweight, and someone with a BMI of 30 or greater is considered obese.  Another way to find out if you are at risk for health problems caused by overweight and obesity is to measure your waist. If you are a woman and your waist is more than 35 inches, or if you are a man and your waist is more than 40 inches, your risk of disease may be higher.  More than two-thirds of American adults are considered overweight or obese. Being overweight or obese increases the risk for further weight gain.  Excess weight may lead to heart disease and diabetes. Creating and following plans for healthy eating and physical activity may help you improve your health.    Methods for maintaining or losing weight.  Weight control is part of healthy lifestyle and includes exercise, emotional health, and healthy eating habits.  Careful eating habits lifelong is the mainstay of weight control.  Though there are significant health benefits from weight loss, long-term weight loss with diet alone may be very difficult to achieve- studies show long-term success with dietary management in less than 10% of people. Attaining a healthy weight may be especially difficult to achieve in those with severe obesity. In some cases, medications, devices and surgical management might be considered.    What can you do?  If you are overweight or obese and are interested in methods for weight loss, you should discuss this with your provider. In addition, we recommend that you review healthy life styles and methods for weight loss available through the National Institutes of Health patient information sites:   http://win.niddk.nih.gov/publications/index.htm           Remember to Drive  Safe... Drive Alive     Sleep health profoundly affects your health, mood, and your safety.  Thirty three percent of the population (one in three of us) is not getting enough sleep and many have a sleep disorder. Not getting enough sleep or having an untreated / undertreated sleep condition may make us sleepy without even knowing it. In fact, our driving could be dramatically impaired due to our sleep health. As your provider, here are some things I would like you to know about driving:     Here are some warning signs for impairment and dangerous drowsy driving:              -Having been awake more than 16 hours               -Looking tired               -Eyelid drooping              -Head nodding (it could be too late at this point)              -Driving for more than 30 minutes     Some things you could do to make the driving safer if you are experiencing some drowsiness:              -Stop driving and rest              -Call for transportation              -Make sure your sleep disorder is adequately treated     Some things that have been shown NOT to work when experiencing drowsiness while driving:              -Turning on the radio              -Opening windows              -Eating any  distracting  /  entertaining  foods (e.g., sunflower seeds, candy, or any other)              -Talking on the phone      Your decision may not only impact your life, but also the life of others. Please, remember to drive safe for yourself and all of us.

## 2023-03-07 ENCOUNTER — LAB (OUTPATIENT)
Dept: INFUSION THERAPY | Facility: CLINIC | Age: 68
End: 2023-03-07
Attending: INTERNAL MEDICINE
Payer: COMMERCIAL

## 2023-03-07 DIAGNOSIS — E53.8 VITAMIN B12 DEFICIENCY (NON ANEMIC): ICD-10-CM

## 2023-03-07 DIAGNOSIS — D64.9 ANEMIA, UNSPECIFIED TYPE: ICD-10-CM

## 2023-03-07 LAB
ALBUMIN SERPL-MCNC: 3.2 G/DL (ref 3.5–5)
ALP SERPL-CCNC: 79 U/L (ref 45–120)
ALT SERPL W P-5'-P-CCNC: <9 U/L (ref 0–45)
ANION GAP SERPL CALCULATED.3IONS-SCNC: 6 MMOL/L (ref 5–18)
AST SERPL W P-5'-P-CCNC: 13 U/L (ref 0–40)
BASOPHILS # BLD AUTO: 0 10E3/UL (ref 0–0.2)
BASOPHILS NFR BLD AUTO: 0 %
BILIRUB SERPL-MCNC: 0.5 MG/DL (ref 0–1)
BUN SERPL-MCNC: 18 MG/DL (ref 8–22)
CALCIUM SERPL-MCNC: 9 MG/DL (ref 8.5–10.5)
CHLORIDE BLD-SCNC: 102 MMOL/L (ref 98–107)
CO2 SERPL-SCNC: 28 MMOL/L (ref 22–31)
CREAT SERPL-MCNC: 1.08 MG/DL (ref 0.7–1.3)
EOSINOPHIL # BLD AUTO: 0.3 10E3/UL (ref 0–0.7)
EOSINOPHIL NFR BLD AUTO: 4 %
ERYTHROCYTE [DISTWIDTH] IN BLOOD BY AUTOMATED COUNT: 14.9 % (ref 10–15)
GFR SERPL CREATININE-BSD FRML MDRD: 74 ML/MIN/1.73M2
GLUCOSE BLD-MCNC: 126 MG/DL (ref 70–125)
HCT VFR BLD AUTO: 32.8 % (ref 40–53)
HGB BLD-MCNC: 9.6 G/DL (ref 13.3–17.7)
IMM GRANULOCYTES # BLD: 0 10E3/UL
IMM GRANULOCYTES NFR BLD: 0 %
LYMPHOCYTES # BLD AUTO: 1.1 10E3/UL (ref 0.8–5.3)
LYMPHOCYTES NFR BLD AUTO: 18 %
MCH RBC QN AUTO: 23.1 PG (ref 26.5–33)
MCHC RBC AUTO-ENTMCNC: 29.3 G/DL (ref 31.5–36.5)
MCV RBC AUTO: 79 FL (ref 78–100)
MONOCYTES # BLD AUTO: 0.4 10E3/UL (ref 0–1.3)
MONOCYTES NFR BLD AUTO: 6 %
NEUTROPHILS # BLD AUTO: 4.5 10E3/UL (ref 1.6–8.3)
NEUTROPHILS NFR BLD AUTO: 72 %
NRBC # BLD AUTO: 0 10E3/UL
NRBC BLD AUTO-RTO: 0 /100
PLATELET # BLD AUTO: 230 10E3/UL (ref 150–450)
POTASSIUM BLD-SCNC: 3.9 MMOL/L (ref 3.5–5)
PROT SERPL-MCNC: 7.9 G/DL (ref 6–8)
RBC # BLD AUTO: 4.15 10E6/UL (ref 4.4–5.9)
SODIUM SERPL-SCNC: 136 MMOL/L (ref 136–145)
VIT B12 SERPL-MCNC: 1621 PG/ML (ref 232–1245)
WBC # BLD AUTO: 6.3 10E3/UL (ref 4–11)

## 2023-03-07 PROCEDURE — 82668 ASSAY OF ERYTHROPOIETIN: CPT

## 2023-03-07 PROCEDURE — 85025 COMPLETE CBC W/AUTO DIFF WBC: CPT

## 2023-03-07 PROCEDURE — 80053 COMPREHEN METABOLIC PANEL: CPT

## 2023-03-07 PROCEDURE — 82607 VITAMIN B-12: CPT

## 2023-03-07 PROCEDURE — 36415 COLL VENOUS BLD VENIPUNCTURE: CPT

## 2023-03-08 LAB — EPO SERPL-ACNC: 17 MU/ML

## 2023-03-10 ENCOUNTER — OFFICE VISIT (OUTPATIENT)
Dept: VASCULAR SURGERY | Facility: CLINIC | Age: 68
End: 2023-03-10
Attending: NURSE PRACTITIONER
Payer: COMMERCIAL

## 2023-03-10 VITALS
WEIGHT: 315 LBS | HEART RATE: 88 BPM | DIASTOLIC BLOOD PRESSURE: 90 MMHG | OXYGEN SATURATION: 95 % | BODY MASS INDEX: 55.9 KG/M2 | SYSTOLIC BLOOD PRESSURE: 172 MMHG

## 2023-03-10 DIAGNOSIS — I87.2 VENOUS (PERIPHERAL) INSUFFICIENCY: ICD-10-CM

## 2023-03-10 DIAGNOSIS — R60.0 VENOUS STASIS ULCER OF RIGHT LOWER LEG WITH EDEMA OF RIGHT LOWER LEG (H): ICD-10-CM

## 2023-03-10 DIAGNOSIS — I83.892 VENOUS STASIS ULCER OF LEFT LOWER LEG WITH EDEMA OF LEFT LOWER LEG (H): ICD-10-CM

## 2023-03-10 DIAGNOSIS — I83.891 VENOUS STASIS ULCER OF RIGHT LOWER LEG WITH EDEMA OF RIGHT LOWER LEG (H): ICD-10-CM

## 2023-03-10 DIAGNOSIS — I89.0 LYMPHEDEMA OF BOTH LOWER EXTREMITIES: Primary | ICD-10-CM

## 2023-03-10 DIAGNOSIS — E11.42 TYPE 2 DIABETES MELLITUS WITH PERIPHERAL NEUROPATHY (H): ICD-10-CM

## 2023-03-10 DIAGNOSIS — I83.019 VENOUS STASIS ULCER OF RIGHT LOWER LEG WITH EDEMA OF RIGHT LOWER LEG (H): ICD-10-CM

## 2023-03-10 DIAGNOSIS — I89.0 ELEPHANTIASIS: ICD-10-CM

## 2023-03-10 DIAGNOSIS — L97.929 VENOUS STASIS ULCER OF LEFT LOWER LEG WITH EDEMA OF LEFT LOWER LEG (H): ICD-10-CM

## 2023-03-10 DIAGNOSIS — R22.43 LOCALIZED SWELLING, MASS AND LUMP, LOWER LIMB, BILATERAL: ICD-10-CM

## 2023-03-10 DIAGNOSIS — E66.01 OBESITY, MORBID, BMI 40.0-49.9 (H): ICD-10-CM

## 2023-03-10 DIAGNOSIS — I83.029 VENOUS STASIS ULCER OF LEFT LOWER LEG WITH EDEMA OF LEFT LOWER LEG (H): ICD-10-CM

## 2023-03-10 DIAGNOSIS — R60.0 VENOUS STASIS ULCER OF LEFT LOWER LEG WITH EDEMA OF LEFT LOWER LEG (H): ICD-10-CM

## 2023-03-10 DIAGNOSIS — M79.89 LEG SWELLING: ICD-10-CM

## 2023-03-10 DIAGNOSIS — I87.333 VENOUS HYPERTENSION, CHRONIC, WITH ULCER AND INFLAMMATION, BILATERAL (H): ICD-10-CM

## 2023-03-10 DIAGNOSIS — D36.9 PAPILLOMATOSIS: ICD-10-CM

## 2023-03-10 DIAGNOSIS — M79.3 LIPODERMATOSCLEROSIS OF BOTH LOWER EXTREMITIES: ICD-10-CM

## 2023-03-10 DIAGNOSIS — L85.9 HYPERKERATOSIS OF SKIN: ICD-10-CM

## 2023-03-10 DIAGNOSIS — L97.919 VENOUS STASIS ULCER OF RIGHT LOWER LEG WITH EDEMA OF RIGHT LOWER LEG (H): ICD-10-CM

## 2023-03-10 PROCEDURE — 11042 DBRDMT SUBQ TIS 1ST 20SQCM/<: CPT | Performed by: NURSE PRACTITIONER

## 2023-03-10 PROCEDURE — 11045 DBRDMT SUBQ TISS EACH ADDL: CPT | Performed by: NURSE PRACTITIONER

## 2023-03-10 RX ORDER — AMMONIUM LACTATE 12 G/100G
LOTION TOPICAL DAILY PRN
Qty: 225 G | Refills: 3 | Status: SHIPPED | OUTPATIENT
Start: 2023-03-10 | End: 2023-07-21

## 2023-03-10 RX ORDER — SODIUM HYPOCHLORITE 1.25 MG/ML
SOLUTION TOPICAL
Qty: 1000 ML | Refills: 3 | Status: SHIPPED | OUTPATIENT
Start: 2023-03-10 | End: 2023-07-21

## 2023-03-10 ASSESSMENT — PAIN SCALES - GENERAL: PAINLEVEL: NO PAIN (0)

## 2023-03-10 NOTE — PROGRESS NOTES
"            Follow up Vascular Visit       Date of Service:03/10/23      Chief Complaint: BLE swelling; BLE ulcers      Pt returns to [facility] with regards to their BLE swelling; BLe ulcers.  They arrive today alone. They are currently using silvercel; gauze to the wounds. This is being done by home care or family members every other day. They are using tubular compression and short stretch for compression. They are feeling well today. Denies fevers, chills. No shortness of breath. Recently met with sleep medicine and is scheduled for sleep study June. Also has upcoming appt with oncology to go over recent labs and next steps. Last visit we did a culture this was polymicrobial treated with PCN, Levaquin tolerated well.     Allergies:   Allergies   Allergen Reactions     Lisinopril Swelling     Patient reports \"neck swelling\"  Swelling in throat       Medications:   Current Outpatient Medications:      ammonium lactate (LAC-HYDRIN) 12 % external lotion, Apply topically daily as needed for dry skin With dressing changes, Disp: 225 g, Rfl: 3     atorvastatin (LIPITOR) 20 MG tablet, Take 1 tablet (20 mg) by mouth daily, Disp: 90 tablet, Rfl: 4     chlorthalidone (HYGROTON) 25 MG tablet, Take 1 tablet (25 mg) by mouth daily, Disp: 90 tablet, Rfl: 3     cyanocobalamin (VITAMIN B-12) 1000 MCG tablet, Take 1 tablet (1,000 mcg) by mouth daily for 360 days, Disp: 90 tablet, Rfl: 3     famotidine (PEPCID) 20 MG tablet, TAKE ONE TABLET BY MOUTH TWICE DAILY, Disp: 90 tablet, Rfl: 0     gentamicin (GARAMYCIN) 0.1 % external ointment, Apply topically daily, Disp: 30 g, Rfl: 3     hydrOXYzine (ATARAX) 25 MG tablet, Take 0.5-1 tablets (12.5-25 mg) by mouth 3 times daily as needed for anxiety, Disp: 60 tablet, Rfl: 1     losartan (COZAAR) 100 MG tablet, Take 1 tablet (100 mg) by mouth daily, Disp: 90 tablet, Rfl: 0     melatonin 3 MG tablet, Take 1 tablet (3 mg) by mouth nightly as needed for sleep, Disp: 30 tablet, " Rfl: 1     metFORMIN (GLUCOPHAGE) 500 MG tablet, Take 1 tablet (500 mg) by mouth 2 times daily (with meals), Disp: 180 tablet, Rfl: 3     methadone (DOLOPHINE-INTENSOL) 10 MG/ML (HIGH CONC) solution, Take 100 mg by mouth daily, Disp: , Rfl:      naproxen sodium 220 MG capsule, Take 220 mg by mouth 2 times daily as needed, Disp: , Rfl:      sodium hypochlorite (QUARTER-STRENGTH DAKINS) external solution, Apply topically every 72 hours Use 300mL every 72 hours to wash the bilateral legs and wounds on the legs, Disp: 1000 mL, Rfl: 3     spironolactone (ALDACTONE) 25 MG tablet, TAKE ONE TABLET BY MOUTH ONE TIME DAILY, Disp: 30 tablet, Rfl: 0     zolpidem (AMBIEN) 10 MG tablet, Take tablet by mouth 15 minutes prior to sleep, for Sleep Study, Disp: 1 tablet, Rfl: 0    Current Facility-Administered Medications:      lidocaine (PF) (XYLOCAINE) 1 % injection 10 mL, 10 mL, Intradermal, Once, Gerson Jaquez MD     lidocaine (XYLOCAINE) 2 % external gel, , Topical, Daily PRN, Lucia Reyes MD, Given at 10/19/21 0828    History:   Past Medical History:   Diagnosis Date     COPD (chronic obstructive pulmonary disease) (H)      Diabetes (H)      H/O angioedema 06/15/2020    Formatting of this note might be different from the original. Unexplained angioedema twice, discontinue lisinopril for possible connection to it, though he had used it afterwards with no symptoms     History of tobacco use disorder 08/01/2013    Formatting of this note might be different from the original. Added per documetation     Hypertension      Lymphedema of both lower extremities 12/22/2014     Methadone use 05/08/2012     Obstructive sleep apnea 02/02/2015    Formatting of this note might be different from the original. Epic     Pleural mass 07/02/2013     Uncomplicated asthma        Physical Exam:    BP (!) 172/90   Pulse 88   Wt (!) 408 lb 3.2 oz (185.2 kg)   SpO2 95%   BMI 55.90 kg/m      General:  Patient presents to clinic in no  apparent distress.  Head: normocephalic atraumatic  Psychiatric:  Alert and oriented x3.   Respiratory: unlabored breathing; no cough  Integumentary:  Skin is uniformly warm, dry and pink.    Ulcer #1 Location: right lateral leg  Size: 12L x 8W x 0.1depth.  No sinus tract present, Wound base: sloughing macerated tissue  noundermining present. Ulcer is full thickness. There is heavy drainage. Periwound: no denudement, erythema, induration, maceration or warmth.      Ulcer#2 Location: left lateral leg  Size: 0.8L x 1W x 0.1depth.  No sinus tract present, Wound base: red; surrounding maceration  nounderminingpresent. Wound is full thickness. There is moderate drainage. Periwound: no denudement, erythema, induration, maceration or warmth.      Ulcer#3 Location: left posterior eg  Size: 0.5x0.6 surrounding maceration  nounderminingpresent. Wound is full thickness. There is moderate drainage. Periwound: no denudement, erythema, induration, maceration or warmth.      Ulcer#4 Location: left lateral leg proximal  Size: 2.5x3x0.4x 0.1depth.  No sinus tract present, Wound base: red; surrounding maceration  nounderminingpresent. Wound is full thickness. There is moderate drainage. Periwound: no denudement, erythema, induration, maceration or warmth.        Wound Leg Ulceration (Active)   Number of days: 277       VASC Wound Right lower leg lateral (Active)   Pre Size Length 4 06/21/22 0700   Pre Size Width 4 06/21/22 0700   Pre Size Depth 0.1 06/21/22 0700   Pre Total Sq cm 16 06/21/22 0700   Product Used Alginate 10/12/21 0800   Number of days: 534       VASC Wound rt lateral weeping (Active)   Pre Size Length 12 03/10/23 0700   Pre Size Width 8 03/10/23 0700   Pre Size Depth 0.2 03/10/23 0700   Pre Total Sq cm 154 02/07/23 0700   Post Size Length 8 11/08/22 0800   Post Size Width 8 11/08/22 0800   Post Size Depth 0.1 11/08/22 0800   Post Total Sq cm 64 11/08/22 0800   Description macerated 02/07/23 0700   Number of days: 507        VASC Wound Lt lateral leg (Active)   Pre Size Length 0.8 03/10/23 0700   Pre Size Width 1 03/10/23 0700   Pre Size Depth 0.1 03/10/23 0700   Pre Total Sq cm 1.8 02/07/23 0700   Post Size Length 6 10/11/22 0700   Post Size Width 4 10/11/22 0700   Post Size Depth 1 10/11/22 0700   Post Total Sq cm 24 10/11/22 0700   Tunneling weeping 09/13/22 0700   Description weeping 12/06/22 0700   Number of days: 472       VASC Wound left posterior leg (Active)   Pre Size Length 0.5 03/10/23 0700   Pre Size Width 0.6 03/10/23 0700   Pre Size Depth 0.1 03/10/23 0700   Pre Total Sq cm 100 02/07/23 0700   Description weeping 12/06/22 0700   Number of days: 122       VASC Wound Right medial shin (Active)   Number of days: 94       VASC Wound Right medial shin (Active)   Pre Size Length 2.1 12/06/22 0700   Pre Size Width 2 12/06/22 0700   Pre Size Depth 0.1 12/06/22 0700   Pre Total Sq cm 4.2 12/06/22 0700   Post Size Length 0 01/03/23 0801   Post Size Width 0 01/03/23 0801   Post Size Depth 0 01/03/23 0801   Post Total Sq cm 0 01/03/23 0801   Description scab 01/03/23 0801   Number of days: 94       VASC Wound Left lateral leg proximal (Active)   Pre Size Length 2.5 03/10/23 0700   Pre Size Width 0.3 03/10/23 0700   Pre Size Depth 0.4 03/10/23 0700   Pre Total Sq cm 0.1 02/07/23 0700   Number of days: 31       VASC Wound Left medial leg (Active)   Pre Size Length 3 02/07/23 0700   Pre Size Width 5 02/07/23 0700   Pre Size Depth 0.2 02/07/23 0700   Pre Total Sq cm 15 02/07/23 0700   Number of days: 31            Circumferential volume measures:      Circumferential Measures 9/13/2022 12/6/2022 1/3/2023 2/7/2023 3/10/2023   Right just above MTP 28 27.9 27.5 27.6 28.5   Right Ankle 36 35.1 25.1 33.9 35   Right Widest Calf 52.5 50.7 50.7 50.5 51.6   Right Thigh Up 10cm - - - - -   Left - just above MTP 27 26.9 27 27.3 27   Left Ankle 34 32.2 23.5 31.6 33   Left Widest Calf 49.3 49.8 48.6 49.2 49   Left Thigh Up 10cm - - - - .   Left  Knee to Ankle - - - - -       Labs:    I personally reviewed the following lab results today and those on care everywhere    CRP   Date Value Ref Range Status   04/23/2021 8.5 (H) 0.0 - 0.8 mg/dL Final      No results found for: SED   Last Renal Panel:  Sodium   Date Value Ref Range Status   03/07/2023 136 136 - 145 mmol/L Final   06/10/2021 142.0 133.0 - 144.0 mmol/L Final     Potassium   Date Value Ref Range Status   03/07/2023 3.9 3.5 - 5.0 mmol/L Final   06/10/2021 4.4 3.4 - 5.3 mmol/L Final     Chloride   Date Value Ref Range Status   03/07/2023 102 98 - 107 mmol/L Final   06/10/2021 100.0 94.0 - 109.0 mmol/L Final     Carbon Dioxide   Date Value Ref Range Status   06/10/2021 33.0 (H) 20.0 - 32.0 mmol/L Final     Carbon Dioxide (CO2)   Date Value Ref Range Status   03/07/2023 28 22 - 31 mmol/L Final     Anion Gap   Date Value Ref Range Status   03/07/2023 6 5 - 18 mmol/L Final     Glucose   Date Value Ref Range Status   03/07/2023 126 (H) 70 - 125 mg/dL Final   06/10/2021 112.0 (H) 60.0 - 109.0 mg/dL Final     Urea Nitrogen   Date Value Ref Range Status   03/07/2023 18 8 - 22 mg/dL Final   06/10/2021 16.0 7.0 - 30.0 mg/dL Final     Creatinine   Date Value Ref Range Status   03/07/2023 1.08 0.70 - 1.30 mg/dL Final   06/10/2021 1.0 0.8 - 1.5 mg/dL Final     GFR Estimate   Date Value Ref Range Status   03/07/2023 74 >60 mL/min/1.73m2 Final     Comment:     eGFR calculated using 2021 CKD-EPI equation.   04/24/2021 >60 >60 mL/min/1.73m2 Final     Calcium   Date Value Ref Range Status   03/07/2023 9.0 8.5 - 10.5 mg/dL Final   06/10/2021 9.5 8.5 - 10.4 mg/dL Final     Albumin   Date Value Ref Range Status   03/07/2023 3.2 (L) 3.5 - 5.0 g/dL Final      Lab Results   Component Value Date    WBC 6.3 03/07/2023     Lab Results   Component Value Date    RBC 4.15 03/07/2023     Lab Results   Component Value Date    HGB 9.6 03/07/2023     Lab Results   Component Value Date    HCT 32.8 03/07/2023     No components found for:  MCT  Lab Results   Component Value Date    MCV 79 03/07/2023     Lab Results   Component Value Date    MCH 23.1 03/07/2023     Lab Results   Component Value Date    MCHC 29.3 03/07/2023     Lab Results   Component Value Date    RDW 14.9 03/07/2023     Lab Results   Component Value Date     03/07/2023      Lab Results   Component Value Date    A1C 6.8 02/20/2023    A1C 6.2 07/14/2022    A1C 6.7 04/21/2022    A1C 6.2 04/24/2021    A1C 6.2 04/24/2021      TSH   Date Value Ref Range Status   10/18/2022 1.76 0.30 - 5.00 uIU/mL Final      No results found for: VITDT                Impression:  Encounter Diagnoses   Name Primary?     Lymphedema of both lower extremities Yes     Venous hypertension, chronic, with ulcer and inflammation, bilateral (H)      Elephantiasis      Venous (peripheral) insufficiency      Venous stasis ulcer of left lower leg with edema of left lower leg (H)      Venous stasis ulcer of right lower leg with edema of right lower leg (H)      Lipodermatosclerosis of both lower extremities      BMI 50.0-59.9, adult (H)      Papillomatosis      Obesity, morbid, BMI 40.0-49.9 (H)      Leg swelling      Hyperkeratosis of skin      Type 2 diabetes mellitus with peripheral neuropathy (H)      Localized swelling, mass and lump, lower limb, bilateral                                Are any of these ulcers new today: No; Location: na    Assessment/Plan:          1. Debridement: After discussion of risk factors and verbal consent was obtained 2% Lidocaine HCL jelly was applied, under clean conditions, the BLE ulceration(s) were debrided using currette. Devitalized and nonviable tissue, along with any fibrin and slough, was removed to improve granulation tissue formation, stimulate wound healing, decrease overall bacteria load, disrupt biofilm formation and decrease edge senescence.  Total excisional debridement was 97.85 sq cm from the epidermis/dermis area and into the subcutaneous tissue with a depth of  0.1-0.4 cm.   Ulcers were improved afterwards and .  Measures were unchanged after debridement.       2.  Ulcer treatment: ulcer treatment will include irrigation and dressings to promote autolytic debridement which will include:will continue dressing changes every other day; by family; wash initially with dilute hibiclens; then wash wound with dakins solution to reduce risk of infeciton; then silvercel; ABD; rolled gauze. If for some reason the patient is not able to get their dressing(s) changed as outlined above (due to illness, lack of supplies, lack of help) please do the following: remove old, soiled dressings; wash the ulcers with saline; pat dry; apply ABD pad or other absorbant pad and secure with rolled gauze; avoid tape directly on your skin; patient instructed to call the clinic as soon as possible to let us know what the current issues are in receiving ulcer care. Stable            3. Edema: continue lymphedema pump; elevation; low sodium diet; tubular compression; foam; short stretch. The compression wraps were applied today in clinic.     If a 2 layer or 4 layer compression wrap is being used; these are safe to have on for ONLY 7 days. If for some reason the patient is not able to get the wrap(s) changed (due to illness; lack of supplies, lack of help, lack of transportation) please do the following: unwrap the old 2 or 4 layer compression wrap; avoid using scissors as you could cut your skin and cause ulcers; use tubular compression when available. Call to reschedule your home care or clinic visit appointment as soon as possible.  Stable            4. Nutrition: focus on weight loss and low sodium diet           5. Offloading: na           6. DG: had consultation with sleep medicine; scheduled for sleep study June 2023           7. Lymphoma: working with oncology; has upcoming appt next week     Patient will follow up with me in 4 weeks for reevaluation. They were instructed to call the  clinic sooner with any signs or symptoms of infection or any further questions/concerns. Answered all questions.          Kim Fuentes DNP, RN, CNP, CWOCN, CFCN, CLT  [facility]   [phone]        This note was electronically signed by Kim Fuentes NP

## 2023-03-10 NOTE — PATIENT INSTRUCTIONS
"Wound care supplies were not ordered or needed today. Home care will order all your supplies    Continue on all blood pressure medications as prescribed    Focus on weight loss and low sodium diet  Keep sodium intake 2.5-4 grams per day    Keep appt with Sleep medicine; they are concerned that your BP is not being controlled due to uncontrolled sleep apnea  A culture was done today this will take 4-5 days to get results we will call you with these and next steps      Wound Care Instructions    Every other day home care or your family will , Cleanse your bilateral legs and wound(s) with Dilute hibiclens 30cc in 500cc NS.    Then do a light wash of Dakin's solution    Pat Dry with non-sterile gauze    Apply Lotion to the intact skin surrounding your wound and other dry skin locations. Some good lotions include: Remedy Skin Repair Cream, Sarna, Vanicream or Cetaphil    Apply Ammonium Lac Hydrin lotion to the thick scaling crusting areas    Triad paste to the periwound skin on the right leg to protect from all the drainage    Primary Dressing: Silvercel to all the wounds    ABDs or super  absorbant pads    Secure with non-sterile roll gauze (4\" x 75\" roll) and tape (1\" roll tape) as needed; avoid adhesive directly on the skin    Compression: tubular compression; foam; short stretch bilaterally    Elevation of the legs    Use lymphedema pump twice per day    It is not ok to get your wound wet in the bath or shower      If for some reason you are not able to get your dressing(s) changed as outlined above (due to illness, lack of supplies, lack of help) please do the following: remove old, soiled dressings; wash the wounds with saline; pat dry; apply ABD pad or other absorbant pad and secure with rolled gauze; avoid tape directly on your skin; Call the clinic as soon as possible to let us know what the current issues are in receiving wound care [phone].      SEEK MEDICAL CARE IF:  You have an increase in swelling, " pain, or redness around the wound.  You have an increase in the amount of pus coming from the wound.  There is a bad smell coming from the wound.  The wound appears to be worsening/enlarging  You have a fever greater than 101.5 F      It is ok to continue current wound care treatment/products for the next 2-3 days until new wound care supplies are ordered and arrive. If longer than this please contact our office at [phone].    If you have a 2 layer or 4 layer compression wrap on these are safe to have on for ONLY 7 days. If for some reason you are not able to get the wrap(s) changed (due to illness; lack of supplies, lack of help, lack of transportation) please do the following: unwrap the old 2 or 4 layer compression wrap; avoid using scissors as you could cut your skin and cause wounds; use tubular compression when available. Call to reschedule your home care or clinic visit appointment as soon as possible.    Please NOTE: if you are 15 minutes late to your clinic appointment you will have to be rescheduled. Please call our clinic as soon as possible if you know you will not be able to get to your appointment at [phone].    If you fail to show up to 3 scheduled clinic appointments you will be dismissed from our clinic.              We want to hear from you!  In the next few weeks, you should receive a call or email to complete a survey about your visit at [facility]. Please help us improve your appointment experience by letting us know how we did today. We strive to make your experience good and value any ways in which we could do better.      We value your input and suggestions.    Thank you for choosing the [facility]!      It is recommended that you do not get your ulcer wet when showering.  Listed below are several ways of keeping it dry when you shower.     1. Wrap it with Press and Seal plastic wrap.  It can be found in the stores where the plastic wraps or  tin foil is kept.               2.  Some people take a bath and hang their leg/foot out of the tub.                        3  Put your leg in a plastic bag and tape it on.           4. You can purchase a shower cover for casts at some pharmacies and through the Internet.            5. Take a Bed Bath or wash up at the sink

## 2023-03-13 ENCOUNTER — OFFICE VISIT (OUTPATIENT)
Dept: FAMILY MEDICINE | Facility: CLINIC | Age: 68
End: 2023-03-13
Payer: COMMERCIAL

## 2023-03-13 VITALS
TEMPERATURE: 98 F | WEIGHT: 315 LBS | HEART RATE: 89 BPM | OXYGEN SATURATION: 95 % | DIASTOLIC BLOOD PRESSURE: 71 MMHG | RESPIRATION RATE: 20 BRPM | SYSTOLIC BLOOD PRESSURE: 144 MMHG | BODY MASS INDEX: 39.17 KG/M2 | HEIGHT: 75 IN

## 2023-03-13 DIAGNOSIS — I10 BENIGN ESSENTIAL HYPERTENSION: Primary | ICD-10-CM

## 2023-03-13 DIAGNOSIS — K21.00 GASTROESOPHAGEAL REFLUX DISEASE WITH ESOPHAGITIS WITHOUT HEMORRHAGE: ICD-10-CM

## 2023-03-13 DIAGNOSIS — E11.42 TYPE 2 DIABETES MELLITUS WITH PERIPHERAL NEUROPATHY (H): ICD-10-CM

## 2023-03-13 PROCEDURE — 99214 OFFICE O/P EST MOD 30 MIN: CPT | Mod: GC | Performed by: STUDENT IN AN ORGANIZED HEALTH CARE EDUCATION/TRAINING PROGRAM

## 2023-03-13 RX ORDER — SPIRONOLACTONE 25 MG/1
25 TABLET ORAL DAILY
Qty: 90 TABLET | Refills: 3 | Status: SHIPPED | OUTPATIENT
Start: 2023-03-13 | End: 2023-07-20

## 2023-03-13 RX ORDER — FAMOTIDINE 20 MG/1
20 TABLET, FILM COATED ORAL 2 TIMES DAILY
Qty: 180 TABLET | Refills: 3 | Status: SHIPPED | OUTPATIENT
Start: 2023-03-13 | End: 2023-07-20

## 2023-03-13 NOTE — PATIENT INSTRUCTIONS
Try to get in to get your TDAP and Shingles vaccines--you need to go to the pharmacy.    Follow up with your eye doctor for your annual exam!

## 2023-03-13 NOTE — PROGRESS NOTES
"  Assessment & Plan     Benign essential hypertension  BP at goal, will continue with spirolactone, chlorthalidone and losartan.   - spironolactone (ALDACTONE) 25 MG tablet  Dispense: 90 tablet; Refill: 3    Type 2 diabetes mellitus with peripheral neuropathy (H)  Hgb A1c at goal, continue metformin 500 mg BID.    Gastroesophageal reflux disease with esophagitis without hemorrhage  Requesting refill of pepcid.   - famotidine (PEPCID) 20 MG tablet  Dispense: 180 tablet; Refill: 3      Return in about 3 months (around 6/13/2023) for Routine preventive.    Precepted with Dr. Luis E Sal MD  [facility]    Frank Jamison is a 67 year old, presenting for the following health issues:  Recheck Medication      HPI     Everything got cleared up at the methadone clinic  The question was around his dosing of fentanyl for biopsy procedure.   Had discussed transitioning with suboxone.   Would like to stick with the methadone clinic at this time.  Discussed maybe weaning with methadone clinic--encouraged to discuss with providers there.    BP at home 120s-133/63-72  At goal, on spironolactone, chlorthalidone and losartan  No side effects of light headed, dizziness, no syncope/presyncope.    Sleep medicine  Will plan to redo the sleep study, planning to do that on April 12th.  CPAP--he doesn't have it any more so he needs a new fitting/new settings.    Follows with oncology tomorrow.      Review of Systems   Constitutional, HEENT, cardiovascular, pulmonary, gi and gu systems are negative, except as otherwise noted.      Objective    BP (!) 144/71   Pulse 89   Temp 98  F (36.7  C)   Resp 20   Ht 1.905 m (6' 3\")   Wt (!) 180.5 kg (398 lb)   SpO2 95%   BMI 49.75 kg/m    Body mass index is 49.75 kg/m .  Physical Exam   GENERAL: healthy, alert and no distress  EYES: Eyes grossly normal to inspection, PERRL and conjunctivae and sclerae normal  RESP: normal work of breathing, no " audible wheezes.  MS: significant b/l lower extremity edema with lymphedema wraps in place.  PSYCH: mentation appears normal, affect normal/bright    Reviewed recent CMP--wnl, obtained at oncology clinic.    ----- Service Performed and Documented by Resident or Fellow ------

## 2023-03-14 ENCOUNTER — ONCOLOGY VISIT (OUTPATIENT)
Dept: ONCOLOGY | Facility: CLINIC | Age: 68
End: 2023-03-14
Attending: INTERNAL MEDICINE
Payer: COMMERCIAL

## 2023-03-14 VITALS
HEIGHT: 75 IN | WEIGHT: 315 LBS | OXYGEN SATURATION: 96 % | SYSTOLIC BLOOD PRESSURE: 144 MMHG | HEART RATE: 76 BPM | BODY MASS INDEX: 39.17 KG/M2 | DIASTOLIC BLOOD PRESSURE: 69 MMHG

## 2023-03-14 DIAGNOSIS — D47.2 MGUS (MONOCLONAL GAMMOPATHY OF UNKNOWN SIGNIFICANCE): Primary | ICD-10-CM

## 2023-03-14 DIAGNOSIS — D63.8 ANEMIA, CHRONIC DISEASE: ICD-10-CM

## 2023-03-14 PROCEDURE — 99215 OFFICE O/P EST HI 40 MIN: CPT | Performed by: INTERNAL MEDICINE

## 2023-03-14 PROCEDURE — G0463 HOSPITAL OUTPT CLINIC VISIT: HCPCS | Performed by: INTERNAL MEDICINE

## 2023-03-14 ASSESSMENT — PAIN SCALES - GENERAL: PAINLEVEL: NO PAIN (0)

## 2023-03-14 NOTE — PROGRESS NOTES
[facility] Hematology and Oncology Progress Note    Patient: Shaheen Sepulveda  MRN: [id number]  Date of Service: Mar 14, 2023         Reason for Visit    Chief Complaint   Patient presents with     Oncology Clinic Visit     Lymphoma of intra-abdominal lymph nodes, unspecified lymphoma type       Assessment and Plan     Cancer Staging   No matching staging information was found for the patient.    ECOG Performance    1 - Can't do physically strenuous work, but fully ambyulatory and can do light sedentary work     Pain  Pain Score: No Pain (0)    #.  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  Chronic bilateral lower extremity lymphedema   #.  Vitamin B12 deficiency   #.  IgG kappa monoclonal gammopathy of undetermined significance of 0.3 g/dL   #.  Chronic mild to moderate anemia, consistent with anemia of chronic inflammation      I reviewed previous work-up and notes by .  Reportedly, he is clinically stable without any new health issue.  He has persistent bilateral lower lower extremity lymphedema.  No palpable lymphadenopathy on exam.  I reviewed his labs.  He has persistent normocytic anemia, otherwise unremarkable CBC.  CMP is stable.  EPO level is 17.  Vitamin B12 level is elevated.  No clinical signs and symptoms suggestive of plasma cell disease progression.    Plan:  -Continue vitamin B12 supplementation as current, 1000 mcg daily by mouth.  -Recommended continue clinical surveillance for lymphadenopathy  -Recommended follow-up MGUS in about 3 months with labs prior.      Encounter Diagnoses:    Problem List Items Addressed This Visit    None  Visit Diagnoses     MGUS (monoclonal gammopathy of unknown significance)    -  Primary    Relevant Orders    CBC with Platelets & Differential    Comprehensive metabolic panel    Protein electrophoresis    Immunoglobulins A G and M    Protein Immunofixation Serum    Kappa and lambda light  chain    Anemia, chronic disease        Relevant Orders    CBC with Platelets & Differential    Comprehensive metabolic panel    Protein electrophoresis    Immunoglobulins A G and M    Protein Immunofixation Serum    Kappa and lambda light chain           CC: Radha Sal MD   ______________________________________________________________________________  Diagnosis  10/[id number] for progressive bilateral lymphedema and cellulitis and found to have bilateral right greater than sign left pelvic inguinal lymphadenopathy with largest lymph node measuring 3.2 x 2.4 cm over the right inguinal region.    10/6/2022-right inguinal FNA showeda kappa restricted lymphoproliferative disorder favoring SLL/CLL with a KI-67 proliferative rate<10%.  CD43 demonstrate equivocal coexpression on B cells with subsequent [facility] negative FISH and flow cytometry.   -Mildly low B12 at 229.   -IgG kappa monoclonal protein of 0.3 g/dL.   -   -Hemoglobin 9.8, MCV 80, WBC 8, platelet 234 with normal differential.   -Iron study was consistent with anemia of chronic disease.  Hemoccult x3 negative.    10/19/2022-PET scan showed mildly FDG avid bilateral axillary, right external iliac and bilateral inguinal lymph nodes suspicious for inflammatory process versus low-grade lymphoproliferative disease.    11/7/2022- right inguinal lymph node core biopsy revealed clonal B-cell population with absent MYD-88 and BCL6 mutations.    12/7/2022 bone marrow biopsy revealed normocellular marrow with mixed lymphoplasmacytic infiltrate (10-20%).  Possibly decreased iron stores.  Cytogenetic analysis was normal.  NGS panel for MDS was negative.    Treatment to date  12/2022-initiated vitamin B12 1000 mcg IM weekly x4 followed by 1000 mcg daily by mouth.    History of Present Illness    Mr. Shaheen Sepulveda presented today accompanied by her daughter for follow-up anemia.  He reported that his health remains the same.  He has been taking vitamin B12 as  "directed.  He does not have any new bone pain.  His energy level is low but stable.    Review of systems  Apart from describing in HPI, the remainder of comprehensive ROS was negative.    Past History    Past Medical History:   Diagnosis Date     COPD (chronic obstructive pulmonary disease) (H)      Diabetes (H)      H/O angioedema 06/15/2020    Formatting of this note might be different from the original. Unexplained angioedema twice, discontinue lisinopril for possible connection to it, though he had used it afterwards with no symptoms     History of tobacco use disorder 08/01/2013    Formatting of this note might be different from the original. Added per documetation     Hypertension      Lymphedema of both lower extremities 12/22/2014     Methadone use 05/08/2012     Obstructive sleep apnea 02/02/2015    Formatting of this note might be different from the original. Epic     Pleural mass 07/02/2013     Uncomplicated asthma        Past Surgical History:   Procedure Laterality Date     JOINT REPLACEMENT         Physical Exam    BP (!) 144/69 (BP Location: Left arm, Patient Position: Sitting, Cuff Size: Adult Large)   Pulse 76   Ht 1.905 m (6' 3\")   Wt (!) 180.5 kg (398 lb)   SpO2 96%   BMI 49.75 kg/m      General: alert, awake, not in acute distress.  Morbidly obese male.  Came in a wheelchair.  HEENT: Head: Normal, normocephalic, atraumatic.  Eye: Normal external eye, conjunctiva, lids cornea, ELISEO.  Nose: Normal external nose, mucus membranes and septum.  Pharynx: Normal buccal mucosa. Normal pharynx.  Neck / Thyroid: Supple, no masses, nodes, nodules or enlargement.  Lymphatics: No abnormally enlarged lymph nodes.  Chest: Normal chest wall and respirations. Clear to auscultation.  Abdomen: abdomen is soft without significant tenderness, masses, organomegaly or guarding  Extremities: normal strength, tone, and muscle mass  Skin: normal. no rash or abnormalities  CNS: non focal.    Lab Results    No results " found for this or any previous visit (from the past 168 hour(s)).    Imaging    No results found.    40 minutes spent on the date of the encounter doing chart review, history and exam, documentation and further activities as noted above.    Signed by: Jamila Penaloza MD

## 2023-03-14 NOTE — PROGRESS NOTES
"Oncology Rooming Note    March 14, 2023 9:36 AM   Shaheen Sepulveda is a 67 year old male who presents for:    Chief Complaint   Patient presents with     Oncology Clinic Visit     Lymphoma of intra-abdominal lymph nodes, unspecified lymphoma type     Initial Vitals: BP (!) 144/69 (BP Location: Left arm, Patient Position: Sitting, Cuff Size: Adult Large)   Pulse 76   Ht 1.905 m (6' 3\")   Wt (!) 180.5 kg (398 lb)   SpO2 96%   BMI 49.75 kg/m   Estimated body mass index is 49.75 kg/m  as calculated from the following:    Height as of this encounter: 1.905 m (6' 3\").    Weight as of this encounter: 180.5 kg (398 lb). Body surface area is 3.09 meters squared.  No Pain (0) Comment: Data Unavailable   No LMP for male patient.  Allergies reviewed: Yes  Medications reviewed: Yes    Medications: Medication refills not needed today.  Pharmacy name entered into [employer]: [id number] [place], [place] - [address]    Clinical concerns: follow up       Elaine Juarez MA            "

## 2023-03-14 NOTE — LETTER
3/14/2023         RE: Shaheen Sepulveda  [address]  [place] [address]        Dear Colleague,    Thank you for referring your patient, Shaheen Sepulveda, to the [facility] [place]. Please see a copy of my visit note below.    [facility] Hematology and Oncology Progress Note    Patient: Shaheen Sepulveda  MRN: [id number]  Date of Service: Mar 14, 2023         Reason for Visit    Chief Complaint   Patient presents with     Oncology Clinic Visit     Lymphoma of intra-abdominal lymph nodes, unspecified lymphoma type       Assessment and Plan     Cancer Staging   No matching staging information was found for the patient.    ECOG Performance    1 - Can't do physically strenuous work, but fully ambyulatory and can do light sedentary work     Pain  Pain Score: No Pain (0)    #.  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  Chronic bilateral lower extremity lymphedema   #.  Vitamin B12 deficiency   #.  IgG kappa monoclonal gammopathy of undetermined significance of 0.3 g/dL   #.  Chronic mild to moderate anemia, consistent with anemia of chronic inflammation      I reviewed previous work-up and notes by .  Reportedly, he is clinically stable without any new health issue.  He has persistent bilateral lower lower extremity lymphedema.  No palpable lymphadenopathy on exam.  I reviewed his labs.  He has persistent normocytic anemia, otherwise unremarkable CBC.  CMP is stable.  EPO level is 17.  Vitamin B12 level is elevated.  No clinical signs and symptoms suggestive of plasma cell disease progression.    Plan:  -Continue vitamin B12 supplementation as current, 1000 mcg daily by mouth.  -Recommended continue clinical surveillance for lymphadenopathy  -Recommended follow-up MGUS in about 3 months with labs prior.      Encounter Diagnoses:    Problem List Items Addressed This Visit    None  Visit Diagnoses     MGUS (monoclonal  gammopathy of unknown significance)    -  Primary    Relevant Orders    CBC with Platelets & Differential    Comprehensive metabolic panel    Protein electrophoresis    Immunoglobulins A G and M    Protein Immunofixation Serum    Kappa and lambda light chain    Anemia, chronic disease        Relevant Orders    CBC with Platelets & Differential    Comprehensive metabolic panel    Protein electrophoresis    Immunoglobulins A G and M    Protein Immunofixation Serum    Kappa and lambda light chain           CC: Radha Sal MD   ______________________________________________________________________________  Diagnosis  10/4184-[id number]-mu for progressive bilateral lymphedema and cellulitis and found to have bilateral right greater than sign left pelvic inguinal lymphadenopathy with largest lymph node measuring 3.2 x 2.4 cm over the right inguinal region.    10/6/2022-right inguinal FNA showeda kappa restricted lymphoproliferative disorder favoring SLL/CLL with a KI-67 proliferative rate<10%.  CD43 demonstrate equivocal coexpression on B cells [facility] flow [facility].   -Mildly low B12 at 229.   -IgG kappa monoclonal protein of 0.3 g/dL.   -   -Hemoglobin 9.8, MCV 80, WBC 8, platelet 234 with normal differential.   -Iron study was consistent with anemia of chronic disease.  Hemoccult x3 negative.    10/19/2022-PET scan showed mildly FDG avid bilateral axillary, right external iliac and bilateral inguinal lymph nodes suspicious for inflammatory process versus low-grade lymphoproliferative disease.    11/7/2022- right inguinal lymph node core biopsy revealed clonal B-cell population with absent MYD-88 and BCL6 mutations.    12/7/2022 bone marrow biopsy revealed normocellular marrow with mixed lymphoplasmacytic infiltrate (10-20%).  Possibly decreased iron stores.  Cytogenetic analysis was normal.  NGS panel for MDS was negative.    Treatment to date  12/2022-initiated vitamin B12  "1000 mcg IM weekly x4 followed by 1000 mcg daily by mouth.    History of Present Illness    Mr. Shaheen Sepulveda presented today accompanied by her daughter for follow-up anemia.  He reported that his health remains the same.  He has been taking vitamin B12 as directed.  He does not have any new bone pain.  His energy level is low but stable.    Review of systems  Apart from describing in HPI, the remainder of comprehensive ROS was negative.    Past History    Past Medical History:   Diagnosis Date     COPD (chronic obstructive pulmonary disease) (H)      Diabetes (H)      H/O angioedema 06/15/2020    Formatting of this note might be different from the original. Unexplained angioedema twice, discontinue lisinopril for possible connection to it, though he had used it afterwards with no symptoms     History of tobacco use disorder 08/01/2013    Formatting of this note might be different from the original. Added per documetation     Hypertension      Lymphedema of both lower extremities 12/22/2014     Methadone use 05/08/2012     Obstructive sleep apnea 02/02/2015    Formatting of this note might be different from the original. Epic     Pleural mass 07/02/2013     Uncomplicated asthma        Past Surgical History:   Procedure Laterality Date     JOINT REPLACEMENT         Physical Exam    BP (!) 144/69 (BP Location: Left arm, Patient Position: Sitting, Cuff Size: Adult Large)   Pulse 76   Ht 1.905 m (6' 3\")   Wt (!) 180.5 kg (398 lb)   SpO2 96%   BMI 49.75 kg/m      General: alert, awake, not in acute distress.  Morbidly obese male.  Came in a wheelchair.  HEENT: Head: Normal, normocephalic, atraumatic.  Eye: Normal external eye, conjunctiva, lids cornea, ELISEO.  Nose: Normal external nose, mucus membranes and septum.  Pharynx: Normal buccal mucosa. Normal pharynx.  Neck / Thyroid: Supple, no masses, nodes, nodules or enlargement.  Lymphatics: No abnormally enlarged lymph nodes.  Chest: Normal chest wall and " "respirations. Clear to auscultation.  Abdomen: abdomen is soft without significant tenderness, masses, organomegaly or guarding  Extremities: normal strength, tone, and muscle mass  Skin: normal. no rash or abnormalities  CNS: non focal.    Lab Results    No results found for this or any previous visit (from the past 168 hour(s)).    Imaging    No results found.    40 minutes spent on the date of the encounter doing chart review, history and exam, documentation and further activities as noted above.    Signed by: Jamila Penaloza MD          Oncology Rooming Note    March 14, 2023 9:36 AM   Shaheen Sepulveda is a 67 year old male who presents for:    Chief Complaint   Patient presents with     Oncology Clinic Visit     Lymphoma of intra-abdominal lymph nodes, unspecified lymphoma type     Initial Vitals: BP (!) 144/69 (BP Location: Left arm, Patient Position: Sitting, Cuff Size: Adult Large)   Pulse 76   Ht 1.905 m (6' 3\")   Wt (!) 180.5 kg (398 lb)   SpO2 96%   BMI 49.75 kg/m   Estimated body mass index is 49.75 kg/m  as calculated from the following:    Height as of this encounter: 1.905 m (6' 3\").    Weight as of this encounter: 180.5 kg (398 lb). Body surface area is 3.09 meters squared.  No Pain (0) Comment: Data Unavailable   No LMP for male patient.  Allergies reviewed: Yes  Medications reviewed: Yes    Medications: Medication refills not needed today.  Pharmacy name entered into [employer]: [id number] [place], [place] - [address] [facility]    Clinical concerns: follow up       Elaine Juarez MA                Again, thank you for allowing me to participate in the care of your patient.        Sincerely,        Jamila Penaloza MD    "

## 2023-04-18 ENCOUNTER — OFFICE VISIT (OUTPATIENT)
Dept: VASCULAR SURGERY | Facility: CLINIC | Age: 68
End: 2023-04-18
Attending: NURSE PRACTITIONER
Payer: COMMERCIAL

## 2023-04-18 VITALS
HEART RATE: 120 BPM | RESPIRATION RATE: 22 BRPM | SYSTOLIC BLOOD PRESSURE: 142 MMHG | OXYGEN SATURATION: 94 % | TEMPERATURE: 97.2 F | DIASTOLIC BLOOD PRESSURE: 72 MMHG

## 2023-04-18 DIAGNOSIS — I83.029 VENOUS STASIS ULCER OF LEFT LOWER LEG WITH EDEMA OF LEFT LOWER LEG (H): ICD-10-CM

## 2023-04-18 DIAGNOSIS — E66.01 OBESITY, MORBID, BMI 40.0-49.9 (H): ICD-10-CM

## 2023-04-18 DIAGNOSIS — M79.3 LIPODERMATOSCLEROSIS OF BOTH LOWER EXTREMITIES: ICD-10-CM

## 2023-04-18 DIAGNOSIS — I87.333 VENOUS HYPERTENSION, CHRONIC, WITH ULCER AND INFLAMMATION, BILATERAL (H): ICD-10-CM

## 2023-04-18 DIAGNOSIS — L97.919 VENOUS STASIS ULCER OF RIGHT LOWER LEG WITH EDEMA OF RIGHT LOWER LEG (H): ICD-10-CM

## 2023-04-18 DIAGNOSIS — R60.0 VENOUS STASIS ULCER OF LEFT LOWER LEG WITH EDEMA OF LEFT LOWER LEG (H): ICD-10-CM

## 2023-04-18 DIAGNOSIS — I83.019 VENOUS STASIS ULCER OF RIGHT LOWER LEG WITH EDEMA OF RIGHT LOWER LEG (H): ICD-10-CM

## 2023-04-18 DIAGNOSIS — I83.892 VENOUS STASIS ULCER OF LEFT LOWER LEG WITH EDEMA OF LEFT LOWER LEG (H): ICD-10-CM

## 2023-04-18 DIAGNOSIS — L97.929 VENOUS STASIS ULCER OF LEFT LOWER LEG WITH EDEMA OF LEFT LOWER LEG (H): ICD-10-CM

## 2023-04-18 DIAGNOSIS — I89.0 LYMPHEDEMA OF BOTH LOWER EXTREMITIES: Primary | ICD-10-CM

## 2023-04-18 DIAGNOSIS — I87.2 VENOUS (PERIPHERAL) INSUFFICIENCY: ICD-10-CM

## 2023-04-18 DIAGNOSIS — D36.9 PAPILLOMATOSIS: ICD-10-CM

## 2023-04-18 DIAGNOSIS — R60.0 VENOUS STASIS ULCER OF RIGHT LOWER LEG WITH EDEMA OF RIGHT LOWER LEG (H): ICD-10-CM

## 2023-04-18 DIAGNOSIS — I83.891 VENOUS STASIS ULCER OF RIGHT LOWER LEG WITH EDEMA OF RIGHT LOWER LEG (H): ICD-10-CM

## 2023-04-18 DIAGNOSIS — I89.0 ELEPHANTIASIS: ICD-10-CM

## 2023-04-18 PROCEDURE — 11042 DBRDMT SUBQ TIS 1ST 20SQCM/<: CPT | Performed by: NURSE PRACTITIONER

## 2023-04-18 PROCEDURE — 11045 DBRDMT SUBQ TISS EACH ADDL: CPT | Performed by: NURSE PRACTITIONER

## 2023-04-18 ASSESSMENT — PAIN SCALES - GENERAL: PAINLEVEL: MILD PAIN (3)

## 2023-04-18 NOTE — PROGRESS NOTES
"            Follow up Vascular Visit       Date of Service:04/18/23      Chief Complaint: BLE swelling and ulcers      Pt returns to [facility] with regards to their BLE swelling and ulcers.  They arrive today alone. They are currently using silvercel; super absorbant pad or ABD to the wounds. Having significant drainage on the right leg.  This is being done by home care or family. They are using tubular compression; foam and short stretch for compression. They are feeling well today. Denies fevers, chills. No shortness of breath. Has upcoming sleep study scheduled.     Allergies:   Allergies   Allergen Reactions     Lisinopril Swelling     Patient reports \"neck swelling\"  Swelling in throat       Medications:   Current Outpatient Medications:      ammonium lactate (LAC-HYDRIN) 12 % external lotion, Apply topically daily as needed for dry skin With dressing changes, Disp: 225 g, Rfl: 3     atorvastatin (LIPITOR) 20 MG tablet, Take 1 tablet (20 mg) by mouth daily, Disp: 90 tablet, Rfl: 4     chlorthalidone (HYGROTON) 25 MG tablet, Take 1 tablet (25 mg) by mouth daily, Disp: 90 tablet, Rfl: 3     cyanocobalamin (VITAMIN B-12) 1000 MCG tablet, Take 1 tablet (1,000 mcg) by mouth daily for 360 days, Disp: 90 tablet, Rfl: 3     famotidine (PEPCID) 20 MG tablet, Take 1 tablet (20 mg) by mouth 2 times daily, Disp: 180 tablet, Rfl: 3     gentamicin (GARAMYCIN) 0.1 % external ointment, Apply topically daily, Disp: 30 g, Rfl: 3     hydrOXYzine (ATARAX) 25 MG tablet, Take 0.5-1 tablets (12.5-25 mg) by mouth 3 times daily as needed for anxiety, Disp: 60 tablet, Rfl: 1     losartan (COZAAR) 100 MG tablet, Take 1 tablet (100 mg) by mouth daily, Disp: 90 tablet, Rfl: 0     melatonin 3 MG tablet, Take 1 tablet (3 mg) by mouth nightly as needed for sleep, Disp: 30 tablet, Rfl: 1     metFORMIN (GLUCOPHAGE) 500 MG tablet, Take 1 tablet (500 mg) by mouth 2 times daily (with meals), Disp: 180 tablet, Rfl: 3     methadone " (DOLOPHINE-INTENSOL) 10 MG/ML (HIGH CONC) solution, Take 100 mg by mouth daily, Disp: , Rfl:      naproxen sodium 220 MG capsule, Take 220 mg by mouth 2 times daily as needed, Disp: , Rfl:      sodium hypochlorite (QUARTER-STRENGTH DAKINS) external solution, Apply topically every 72 hours Use 300mL every 72 hours to wash the bilateral legs and wounds on the legs, Disp: 1000 mL, Rfl: 3     spironolactone (ALDACTONE) 25 MG tablet, Take 1 tablet (25 mg) by mouth daily, Disp: 90 tablet, Rfl: 3     zolpidem (AMBIEN) 10 MG tablet, Take tablet by mouth 15 minutes prior to sleep, for Sleep Study, Disp: 1 tablet, Rfl: 0    Current Facility-Administered Medications:      lidocaine (PF) (XYLOCAINE) 1 % injection 10 mL, 10 mL, Intradermal, Once, Gerson Jaquez MD     lidocaine (XYLOCAINE) 2 % external gel, , Topical, Daily PRN, Lucia Reyes MD, Given at 10/19/21 0828    History:   Past Medical History:   Diagnosis Date     COPD (chronic obstructive pulmonary disease) (H)      Diabetes (H)      H/O angioedema 06/15/2020    Formatting of this note might be different from the original. Unexplained angioedema twice, discontinue lisinopril for possible connection to it, though he had used it afterwards with no symptoms     History of tobacco use disorder 08/01/2013    Formatting of this note might be different from the original. Added per documetation     Hypertension      Lymphedema of both lower extremities 12/22/2014     Methadone use 05/08/2012     Obstructive sleep apnea 02/02/2015    Formatting of this note might be different from the original. Epic     Pleural mass 07/02/2013     Uncomplicated asthma        Physical Exam:    BP (!) 142/72   Pulse 120   Temp 97.2  F (36.2  C)   Resp 22   SpO2 94%     General:  Patient presents to clinic in no apparent distress.  Head: normocephalic atraumatic  Psychiatric:  Alert and oriented x3.   Respiratory: unlabored breathing; no cough  Integumentary:  Skin is uniformly warm,  dry and pink.    Ulcer #1 Location: right lateral leg  Size: 12L x 14W x 0.1depth.  No sinus tract present, Wound base: sloughing macerated tissue  No undermining present. Ulcer is full thickness. There is heavy drainage. Periwound: no denudement, erythema, induration, maceration or warmth.      Ulcer #2 Location: left posterior leg Size: 5x5x 0.1depth.  No sinus tract present, Wound base: sloughing macerated tissue  No undermining present. Ulcer is full thickness. There is moderate drainage. Periwound: no denudement, erythema, induration, maceration or warmth.     Wound Leg Ulceration (Active)   Number of days: 316       VASC Wound Right lower leg lateral (Active)   Pre Size Length 4 06/21/22 0700   Pre Size Width 4 06/21/22 0700   Pre Size Depth 0.1 06/21/22 0700   Pre Total Sq cm 16 06/21/22 0700   Product Used Alginate 10/12/21 0800   Number of days: 573       VASC Wound rt lateral weeping (Active)   Pre Size Length 12 04/18/23 0700   Pre Size Width 14 04/18/23 0700   Pre Size Depth 0.1 04/18/23 0700   Pre Total Sq cm 168 04/18/23 0700   Post Size Length 8 11/08/22 0800   Post Size Width 8 11/08/22 0800   Post Size Depth 0.1 11/08/22 0800   Post Total Sq cm 64 11/08/22 0800   Description weeping 04/18/23 0700   Number of days: 546       VASC Wound Lt lateral leg (Active)   Pre Size Length 0.8 03/10/23 0700   Pre Size Width 1 03/10/23 0700   Pre Size Depth 0.1 03/10/23 0700   Pre Total Sq cm 1.8 02/07/23 0700   Post Size Length 6 10/11/22 0700   Post Size Width 4 10/11/22 0700   Post Size Depth 1 10/11/22 0700   Post Total Sq cm 24 10/11/22 0700   Tunneling weeping 09/13/22 0700   Description weeping 12/06/22 0700   Number of days: 511       VASC Wound left posterior leg (Active)   Pre Size Length 5 04/18/23 0700   Pre Size Width 5 04/18/23 0700   Pre Size Depth 0.1 04/18/23 0700   Pre Total Sq cm 25 04/18/23 0700   Description scattered weeping 04/18/23 0700   Number of days: 161       VASC Wound Right medial  shin (Active)   Number of days: 133       VASC Wound Right medial shin (Active)   Pre Size Length 2.1 12/06/22 0700   Pre Size Width 2 12/06/22 0700   Pre Size Depth 0.1 12/06/22 0700   Pre Total Sq cm 4.2 12/06/22 0700   Post Size Length 0 01/03/23 0801   Post Size Width 0 01/03/23 0801   Post Size Depth 0 01/03/23 0801   Post Total Sq cm 0 01/03/23 0801   Description scab 01/03/23 0801   Number of days: 133       VASC Wound Left lateral leg proximal (Active)   Pre Size Length 2.5 03/10/23 0700   Pre Size Width 0.3 03/10/23 0700   Pre Size Depth 0.4 03/10/23 0700   Pre Total Sq cm 0.1 02/07/23 0700   Number of days: 70       VASC Wound Left medial leg (Active)   Pre Size Length 3 02/07/23 0700   Pre Size Width 5 02/07/23 0700   Pre Size Depth 0.2 02/07/23 0700   Pre Total Sq cm 15 02/07/23 0700   Number of days: 70            Circumferential volume measures:          12/6/2022     7:00 AM 1/3/2023     8:00 AM 2/7/2023     7:00 AM 3/10/2023     8:00 AM 4/18/2023     7:00 AM   Circumferential Measures   Right just above MTP 27.9 27.5 27.6 28.5 27.5   Right Ankle 35.1 25.1 33.9 35 35   Right Widest Calf 50.7 50.7 50.5 51.6 51.3   Left - just above MTP 26.9 27 27.3 27 27.6   Left Ankle 32.2 23.5 31.6 33 33.5   Left Widest Calf 49.8 48.6 49.2 49 50   Left Thigh Up 10cm    .        Labs:    I personally reviewed the following lab results today and those on care everywhere    CRP   Date Value Ref Range Status   04/23/2021 8.5 (H) 0.0 - 0.8 mg/dL Final      No results found for: SED   Last Renal Panel:  Sodium   Date Value Ref Range Status   03/07/2023 136 136 - 145 mmol/L Final   06/10/2021 142.0 133.0 - 144.0 mmol/L Final     Potassium   Date Value Ref Range Status   03/07/2023 3.9 3.5 - 5.0 mmol/L Final   06/10/2021 4.4 3.4 - 5.3 mmol/L Final     Chloride   Date Value Ref Range Status   03/07/2023 102 98 - 107 mmol/L Final   06/10/2021 100.0 94.0 - 109.0 mmol/L Final     Carbon Dioxide   Date Value Ref Range Status    06/10/2021 33.0 (H) 20.0 - 32.0 mmol/L Final     Carbon Dioxide (CO2)   Date Value Ref Range Status   03/07/2023 28 22 - 31 mmol/L Final     Anion Gap   Date Value Ref Range Status   03/07/2023 6 5 - 18 mmol/L Final     Glucose   Date Value Ref Range Status   03/07/2023 126 (H) 70 - 125 mg/dL Final   06/10/2021 112.0 (H) 60.0 - 109.0 mg/dL Final     Urea Nitrogen   Date Value Ref Range Status   03/07/2023 18 8 - 22 mg/dL Final   06/10/2021 16.0 7.0 - 30.0 mg/dL Final     Creatinine   Date Value Ref Range Status   03/07/2023 1.08 0.70 - 1.30 mg/dL Final   06/10/2021 1.0 0.8 - 1.5 mg/dL Final     GFR Estimate   Date Value Ref Range Status   03/07/2023 74 >60 mL/min/1.73m2 Final     Comment:     eGFR calculated using 2021 CKD-EPI equation.   04/24/2021 >60 >60 mL/min/1.73m2 Final     Calcium   Date Value Ref Range Status   03/07/2023 9.0 8.5 - 10.5 mg/dL Final   06/10/2021 9.5 8.5 - 10.4 mg/dL Final     Albumin   Date Value Ref Range Status   03/07/2023 3.2 (L) 3.5 - 5.0 g/dL Final      Lab Results   Component Value Date    WBC 6.3 03/07/2023     Lab Results   Component Value Date    RBC 4.15 03/07/2023     Lab Results   Component Value Date    HGB 9.6 03/07/2023     Lab Results   Component Value Date    HCT 32.8 03/07/2023     No components found for: MCT  Lab Results   Component Value Date    MCV 79 03/07/2023     Lab Results   Component Value Date    MCH 23.1 03/07/2023     Lab Results   Component Value Date    MCHC 29.3 03/07/2023     Lab Results   Component Value Date    RDW 14.9 03/07/2023     Lab Results   Component Value Date     03/07/2023      Lab Results   Component Value Date    A1C 6.8 02/20/2023    A1C 6.2 07/14/2022    A1C 6.7 04/21/2022    A1C 6.2 04/24/2021    A1C 6.2 04/24/2021      TSH   Date Value Ref Range Status   10/18/2022 1.76 0.30 - 5.00 uIU/mL Final      No results found for: VITDT                Impression:  Encounter Diagnoses   Name Primary?     Lymphedema of both lower  extremities Yes     Venous hypertension, chronic, with ulcer and inflammation, bilateral (H)      Elephantiasis      Venous (peripheral) insufficiency      Venous stasis ulcer of right lower leg with edema of right lower leg (H)      BMI 50.0-59.9, adult (H)      Lipodermatosclerosis of both lower extremities      Papillomatosis      Obesity, morbid, BMI 40.0-49.9 (H)      Venous stasis ulcer of left lower leg with edema of left lower leg (H)                                Are any of these ulcers new today: No; Location: na    Assessment/Plan:          1. Debridement: After discussion of risk factors and verbal consent was obtained 2% Lidocaine HCL jelly was applied, under clean conditions, the BLE ulceration(s) were debrided using currette. Devitalized and nonviable tissue, along with any fibrin and slough, was removed to improve granulation tissue formation, stimulate wound healing, decrease overall bacteria load, disrupt biofilm formation and decrease edge senescence.  Total excisional debridement was 193 sq cm from the epidermis/dermis area and into the subcutaneous tissue with a depth of 0.1 cm.   Ulcers were improved afterwards and .  Measures were unchanged after debridement.       2.  Ulcer treatment: ulcer treatment will include irrigation and dressings to promote autolytic debridement which will include:will continue to have pt, family and home care change the dressings every other day due to drainage amounts; will use dilute hibiclens to first wash the legs and then dakin's solution to the right lateral leg area to reduce bacterial load; then silvercel to any open areas; super absorbant pads or abd; rolled gauze; to secure; home care is ordering supplies.      If for some reason the patient is not able to get their dressing(s) changed as outlined above (due to illness, lack of supplies, lack of help) please do the following: remove old, soiled dressings; wash the ulcers with saline; pat dry; apply  ABD pad or other absorbant pad and secure with rolled gauze; avoid tape directly on your skin; patient instructed to call the clinic as soon as possible to let us know what the current issues are in receiving ulcer care. Stable            3. Edema: will add a high density foam to the right lateral leg; then continue to use tubular compression; foam roll; short stretch; elevation; lymphedema pumps. The compression wraps were applied today in clinic.     If a 2 layer or 4 layer compression wrap is being used; these are safe to have on for ONLY 7 days. If for some reason the patient is not able to get the wrap(s) changed (due to illness; lack of supplies, lack of help, lack of transportation) please do the following: unwrap the old 2 or 4 layer compression wrap; avoid using scissors as you could cut your skin and cause ulcers; use tubular compression when available. Call to reschedule your home care or clinic visit appointment as soon as possible.  Stable            4. Nutrition: focus on low sodium; high protein; weight loss; diabetes control           5. Offloading: has good diabetic shoes     Patient will follow up with me in 4 weeks for reevaluation. They were instructed to call the clinic sooner with any signs or symptoms of infection or any further questions/concerns. Answered all questions.          Kim Fuentes DNP, RN, CNP, CWOCN, CFCN, CLT  [facility]   [phone]        This note was electronically signed by Kim Fuentes NP

## 2023-04-21 ENCOUNTER — TELEPHONE (OUTPATIENT)
Dept: FAMILY MEDICINE | Facility: CLINIC | Age: 68
End: 2023-04-21

## 2023-04-21 NOTE — TELEPHONE ENCOUNTER
The Home Care/Assisted Living/Nursing Facility is calling regarding an established patient.  Has the patient seen Home Care in the past or is currently residing in Assisted Living or Nursing Facility? Yes.     Lesly calling from [facility] requesting the following orders that are within the Home Care, Assisted Living or Nursing Home Eval and Treatment standing order and can be signed as standing order signature required by RN.    Preferred Call Back Number:     Home Care Visits Continuation    Any additional Orders:  Are there any orders requested, not stated above, that are outside of the standing order and must be routed to a licensed practitioner for approval?    No    Writer has verified [employer] will send fax to have orders signed.

## 2023-04-21 NOTE — TELEPHONE ENCOUNTER
[facility] phone call message - order or referral request for patient:     Order or referral being requested:   Request skill nurse visit for 1 time a week for 9 weeks  2 PRN for wound care per [facility]       Additional Comments: Please give a call to advise.     OK to leave a message on voice mail? Yes     Primary language: English      needed? No    Call taken on April 21, 2023 at 2:14 PM by Audrey Doan

## 2023-04-26 ENCOUNTER — MEDICAL CORRESPONDENCE (OUTPATIENT)
Dept: HEALTH INFORMATION MANAGEMENT | Facility: CLINIC | Age: 68
End: 2023-04-26
Payer: COMMERCIAL

## 2023-05-08 DIAGNOSIS — F41.9 ANXIETY: ICD-10-CM

## 2023-05-08 DIAGNOSIS — Z53.9 DIAGNOSIS NOT YET DEFINED: Primary | ICD-10-CM

## 2023-05-08 RX ORDER — HYDROXYZINE HYDROCHLORIDE 25 MG/1
12.5-25 TABLET, FILM COATED ORAL 3 TIMES DAILY PRN
Qty: 60 TABLET | Refills: 3 | Status: SHIPPED | OUTPATIENT
Start: 2023-05-08 | End: 2023-07-26

## 2023-05-16 ENCOUNTER — OFFICE VISIT (OUTPATIENT)
Dept: VASCULAR SURGERY | Facility: CLINIC | Age: 68
End: 2023-05-16
Attending: NURSE PRACTITIONER
Payer: COMMERCIAL

## 2023-05-16 VITALS
TEMPERATURE: 98.3 F | DIASTOLIC BLOOD PRESSURE: 70 MMHG | OXYGEN SATURATION: 98 % | HEIGHT: 75 IN | BODY MASS INDEX: 39.17 KG/M2 | SYSTOLIC BLOOD PRESSURE: 138 MMHG | RESPIRATION RATE: 20 BRPM | HEART RATE: 81 BPM | WEIGHT: 315 LBS

## 2023-05-16 DIAGNOSIS — I87.333 VENOUS HYPERTENSION, CHRONIC, WITH ULCER AND INFLAMMATION, BILATERAL (H): ICD-10-CM

## 2023-05-16 DIAGNOSIS — M79.3 LIPODERMATOSCLEROSIS OF BOTH LOWER EXTREMITIES: ICD-10-CM

## 2023-05-16 DIAGNOSIS — D36.9 PAPILLOMATOSIS: ICD-10-CM

## 2023-05-16 DIAGNOSIS — E66.01 OBESITY, MORBID, BMI 40.0-49.9 (H): ICD-10-CM

## 2023-05-16 DIAGNOSIS — R60.0 VENOUS STASIS ULCER OF RIGHT LOWER LEG WITH EDEMA OF RIGHT LOWER LEG (H): ICD-10-CM

## 2023-05-16 DIAGNOSIS — L97.919 VENOUS STASIS ULCER OF RIGHT LOWER LEG WITH EDEMA OF RIGHT LOWER LEG (H): ICD-10-CM

## 2023-05-16 DIAGNOSIS — I89.0 LYMPHEDEMA OF BOTH LOWER EXTREMITIES: Primary | ICD-10-CM

## 2023-05-16 DIAGNOSIS — I89.0 ELEPHANTIASIS: ICD-10-CM

## 2023-05-16 DIAGNOSIS — I87.2 VENOUS (PERIPHERAL) INSUFFICIENCY: ICD-10-CM

## 2023-05-16 DIAGNOSIS — I83.019 VENOUS STASIS ULCER OF RIGHT LOWER LEG WITH EDEMA OF RIGHT LOWER LEG (H): ICD-10-CM

## 2023-05-16 DIAGNOSIS — I83.891 VENOUS STASIS ULCER OF RIGHT LOWER LEG WITH EDEMA OF RIGHT LOWER LEG (H): ICD-10-CM

## 2023-05-16 PROCEDURE — 11045 DBRDMT SUBQ TISS EACH ADDL: CPT | Performed by: NURSE PRACTITIONER

## 2023-05-16 PROCEDURE — 11042 DBRDMT SUBQ TIS 1ST 20SQCM/<: CPT | Performed by: NURSE PRACTITIONER

## 2023-05-16 ASSESSMENT — PAIN SCALES - GENERAL: PAINLEVEL: SEVERE PAIN (7)

## 2023-05-16 NOTE — PATIENT INSTRUCTIONS
"PLEASE CALL SCHEDULING TO SCHEDULE YOUR CT SCAN AT  [phone]. PLEASE GET THIS DONE BEFORE YOUR NEXT FOLLOW UP.      Wound care supplies were not ordered or needed today. Home care will order all your supplies    Continue on all blood pressure medications as prescribed    Focus on weight loss and low sodium diet  Keep sodium intake 2.5-4 grams per day    Keep appt with Sleep medicine; they are concerned that your BP is not being controlled due to uncontrolled sleep apnea        Wound Care Instructions    Every other day home care or your family will , Cleanse your bilateral legs and wound(s) with Dilute hibiclens 30cc in 500cc NS.    Then do a light wash of Dakin's solution; focus on the right lateral leg weeping area    Pat Dry with non-sterile gauze    Apply Lotion to the intact skin surrounding your wound and other dry skin locations. Some good lotions include: Remedy Skin Repair Cream, Sarna, Vanicream or Cetaphil    Apply Ammonium Lac Hydrin lotion to the thick scaling crusting areas    Triad paste to the periwound skin on the right leg to protect from all the drainage    Primary Dressing: Silvercel to all the wounds    ABDs or super  absorbant pads    Secure with non-sterile roll gauze (4\" x 75\" roll) and tape (1\" roll tape) as needed; avoid adhesive directly on the skin    Compression: high density gray foam to the right lateral leg area secure with rolled gauze; then tubular compression; foam; short stretch bilaterally    Elevation of the legs    Use lymphedema pump twice per day    It is not ok to get your wound wet in the bath or shower      If for some reason you are not able to get your dressing(s) changed as outlined above (due to illness, lack of supplies, lack of help) please do the following: remove old, soiled dressings; wash the wounds with saline; pat dry; apply ABD pad or other absorbant pad and secure with rolled gauze; avoid tape directly on your skin; Call the clinic as soon as possible to " let us know what the current issues are in receiving wound care [phone].      SEEK MEDICAL CARE IF:  You have an increase in swelling, pain, or redness around the wound.  You have an increase in the amount of pus coming from the wound.  There is a bad smell coming from the wound.  The wound appears to be worsening/enlarging  You have a fever greater than 101.5 F      It is ok to continue current wound care treatment/products for the next 2-3 days until new wound care supplies are ordered and arrive. If longer than this please contact our office at [phone].    If you have a 2 layer or 4 layer compression wrap on these are safe to have on for ONLY 7 days. If for some reason you are not able to get the wrap(s) changed (due to illness; lack of supplies, lack of help, lack of transportation) please do the following: unwrap the old 2 or 4 layer compression wrap; avoid using scissors as you could cut your skin and cause wounds; use tubular compression when available. Call to reschedule your home care or clinic visit appointment as soon as possible.    Please NOTE: if you are 15 minutes late to your clinic appointment you will have to be rescheduled. Please call our clinic as soon as possible if you know you will not be able to get to your appointment at [phone].    If you fail to show up to 3 scheduled clinic appointments you will be dismissed from our clinic.              We want to hear from you!  In the next few weeks, you should receive a call or email to complete a survey about your visit at [facility]. Please help us improve your appointment experience by letting us know how we did today. We strive to make your experience good and value any ways in which we could do better.      We value your input and suggestions.    Thank you for choosing the [facility]!      It is recommended that you do not get your ulcer wet when showering.  Listed below are several ways of  keeping it dry when you shower.     1. Wrap it with Press [employer] Seal plastic wrap.  It can be found in the stores where the plastic wraps or tin foil is kept.               2.  Some people take a bath and hang their leg/foot out of the tub.                        3  Put your leg in a plastic bag and tape it on.           4. You can purchase a shower cover for casts at some pharmacies and through the Internet.            5. Take a Bed Bath or wash up at the sink

## 2023-05-16 NOTE — PROGRESS NOTES
"            Follow up Vascular Visit       Date of Service:05/16/23      Chief Complaint: BLE severe lymphedema and venous stasis ulcers      Pt returns to [facility] with regards to their BLE severe lymphedema and venous stasis ulcers.  They arrive today alone. They are currently using dakin's wash; silvercel; ABD to the wounds. This is being done by home care and family. They are using tubular compression and short stretch for compression. They are feeling well today. Denies fevers, chills. No shortness of breath. They are using lac hydrin lotion to the thickened scaling areas. We have attempted to transition him into velcro wraps and this led to worsening weeping and ulcers. He has a lymphedema pump he was told to use twice per day he is occassionally doing this. He is following with oncology for lymphoma MGUS; they are continuing to monitor this; follow up again in 1 month with labs prior. He is having issues controlling his bp; there is concern that it is due to his sleep apnea; he is scheduled with sleep medicine 8/17 and 8/28; his bp is noted to be wnl today. His weight is up 8 pounds over the past 2 months.     Allergies:   Allergies   Allergen Reactions     Lisinopril Swelling     Patient reports \"neck swelling\"  Swelling in throat       Medications:   Current Outpatient Medications:      ammonium lactate (LAC-HYDRIN) 12 % external lotion, Apply topically daily as needed for dry skin With dressing changes, Disp: 225 g, Rfl: 3     atorvastatin (LIPITOR) 20 MG tablet, Take 1 tablet (20 mg) by mouth daily, Disp: 90 tablet, Rfl: 4     chlorthalidone (HYGROTON) 25 MG tablet, Take 1 tablet (25 mg) by mouth daily, Disp: 90 tablet, Rfl: 3     cyanocobalamin (VITAMIN B-12) 1000 MCG tablet, Take 1 tablet (1,000 mcg) by mouth daily for 360 days, Disp: 90 tablet, Rfl: 3     famotidine (PEPCID) 20 MG tablet, Take 1 tablet (20 mg) by mouth 2 times daily, Disp: 180 tablet, Rfl: 3     gentamicin (GARAMYCIN) " 0.1 % external ointment, Apply topically daily, Disp: 30 g, Rfl: 3     hydrOXYzine (ATARAX) 25 MG tablet, Take 0.5-1 tablets (12.5-25 mg) by mouth 3 times daily as needed for anxiety, Disp: 60 tablet, Rfl: 3     losartan (COZAAR) 100 MG tablet, Take 1 tablet (100 mg) by mouth daily, Disp: 90 tablet, Rfl: 0     melatonin 3 MG tablet, Take 1 tablet (3 mg) by mouth nightly as needed for sleep, Disp: 30 tablet, Rfl: 1     metFORMIN (GLUCOPHAGE) 500 MG tablet, Take 1 tablet (500 mg) by mouth 2 times daily (with meals), Disp: 180 tablet, Rfl: 3     methadone (DOLOPHINE-INTENSOL) 10 MG/ML (HIGH CONC) solution, Take 100 mg by mouth daily, Disp: , Rfl:      naproxen sodium 220 MG capsule, Take 220 mg by mouth 2 times daily as needed, Disp: , Rfl:      sodium hypochlorite (QUARTER-STRENGTH DAKINS) external solution, Apply topically every 72 hours Use 300mL every 72 hours to wash the bilateral legs and wounds on the legs, Disp: 1000 mL, Rfl: 3     spironolactone (ALDACTONE) 25 MG tablet, Take 1 tablet (25 mg) by mouth daily, Disp: 90 tablet, Rfl: 3     zolpidem (AMBIEN) 10 MG tablet, Take tablet by mouth 15 minutes prior to sleep, for Sleep Study (Patient not taking: Reported on 5/16/2023), Disp: 1 tablet, Rfl: 0    Current Facility-Administered Medications:      lidocaine (PF) (XYLOCAINE) 1 % injection 10 mL, 10 mL, Intradermal, Once, Gerson Jaquez MD     lidocaine (XYLOCAINE) 2 % external gel, , Topical, Daily PRN, Lucia Reyes MD, Given at 10/19/21 0828    History:   Past Medical History:   Diagnosis Date     COPD (chronic obstructive pulmonary disease) (H)      Diabetes (H)      H/O angioedema 06/15/2020    Formatting of this note might be different from the original. Unexplained angioedema twice, discontinue lisinopril for possible connection to it, though he had used it afterwards with no symptoms     History of tobacco use disorder 08/01/2013    Formatting of this note might be different from the original.  "Added per documetation     Hypertension      Lymphedema of both lower extremities 12/22/2014     Methadone use 05/08/2012     Obstructive sleep apnea 02/02/2015    Formatting of this note might be different from the original. Epic     Pleural mass 07/02/2013     Uncomplicated asthma        Physical Exam:    /70   Pulse 81   Temp 98.3  F (36.8  C) (Oral)   Resp 20   Ht 6' 3\" (1.905 m)   Wt (!) 416 lb (188.7 kg)   SpO2 98%   BMI 52.00 kg/m      General:  Patient presents to clinic in no apparent distress.  Head: normocephalic atraumatic  Psychiatric:  Alert and oriented x3.   Respiratory: unlabored breathing; no cough  Integumentary:  Skin is uniformly warm, dry and pink.    Ulcer #1 Location: right lateral calf  Size: 8L x 14W x 0.1depth.  No sinus tract present, Wound base: irregular; macerated surrounding tissue red wound bed  noundermining present. Ulcer is full thickness. There is heavy drainage. Periwound: no denudement, erythema, induration, maceration or warmth.      Wound Leg Ulceration (Active)   Number of days: 344       VASC Wound rt lateral weeping (Active)   Pre Size Length 8 05/16/23 0800   Pre Size Width 14 05/16/23 0800   Pre Size Depth 0.1 05/16/23 0800   Pre Total Sq cm 112 05/16/23 0800   Post Size Length 8 11/08/22 0800   Post Size Width 8 11/08/22 0800   Post Size Depth 0.1 11/08/22 0800   Post Total Sq cm 64 11/08/22 0800   Description scattered/weeping 05/16/23 0800   Number of days: 574       VASC Wound left posterior leg (Active)   Pre Size Length 5 04/18/23 0700   Pre Size Width 5 04/18/23 0700   Pre Size Depth 0.1 04/18/23 0700   Pre Total Sq cm 25 04/18/23 0700   Description scattered weeping 04/18/23 0700   Number of days: 189       VASC Wound Right medial shin (Active)   Number of days: 161            Circumferential volume measures:          1/3/2023     8:00 AM 2/7/2023     7:00 AM 3/10/2023     8:00 AM 4/18/2023     7:00 AM 5/16/2023     8:00 AM   Circumferential Measures "   Right just above MTP 27.5 27.6 28.5 27.5 25   Right Ankle 25.1 33.9 35 35 33   Right Widest Calf 50.7 50.5 51.6 51.3 57.5   Left - just above MTP 27 27.3 27 27.6 24   Left Ankle 23.5 31.6 33 33.5 32   Left Widest Calf 48.6 49.2 49 50 52   Left Thigh Up 10cm   .         Labs:    I personally reviewed the following lab results today and those on care everywhere    CRP   Date Value Ref Range Status   04/23/2021 8.5 (H) 0.0 - 0.8 mg/dL Final      No results found for: SED   Last Renal Panel:  Sodium   Date Value Ref Range Status   03/07/2023 136 136 - 145 mmol/L Final   06/10/2021 142.0 133.0 - 144.0 mmol/L Final     Potassium   Date Value Ref Range Status   03/07/2023 3.9 3.5 - 5.0 mmol/L Final   06/10/2021 4.4 3.4 - 5.3 mmol/L Final     Chloride   Date Value Ref Range Status   03/07/2023 102 98 - 107 mmol/L Final   06/10/2021 100.0 94.0 - 109.0 mmol/L Final     Carbon Dioxide   Date Value Ref Range Status   06/10/2021 33.0 (H) 20.0 - 32.0 mmol/L Final     Carbon Dioxide (CO2)   Date Value Ref Range Status   03/07/2023 28 22 - 31 mmol/L Final     Anion Gap   Date Value Ref Range Status   03/07/2023 6 5 - 18 mmol/L Final     Glucose   Date Value Ref Range Status   03/07/2023 126 (H) 70 - 125 mg/dL Final   06/10/2021 112.0 (H) 60.0 - 109.0 mg/dL Final     Urea Nitrogen   Date Value Ref Range Status   03/07/2023 18 8 - 22 mg/dL Final   06/10/2021 16.0 7.0 - 30.0 mg/dL Final     Creatinine   Date Value Ref Range Status   03/07/2023 1.08 0.70 - 1.30 mg/dL Final   06/10/2021 1.0 0.8 - 1.5 mg/dL Final     GFR Estimate   Date Value Ref Range Status   03/07/2023 74 >60 mL/min/1.73m2 Final     Comment:     eGFR calculated using 2021 CKD-EPI equation.   04/24/2021 >60 >60 mL/min/1.73m2 Final     Calcium   Date Value Ref Range Status   03/07/2023 9.0 8.5 - 10.5 mg/dL Final   06/10/2021 9.5 8.5 - 10.4 mg/dL Final     Albumin   Date Value Ref Range Status   03/07/2023 3.2 (L) 3.5 - 5.0 g/dL Final      Lab Results   Component Value  Date    WBC 6.3 03/07/2023     Lab Results   Component Value Date    RBC 4.15 03/07/2023     Lab Results   Component Value Date    HGB 9.6 03/07/2023     Lab Results   Component Value Date    HCT 32.8 03/07/2023     No components found for: MCT  Lab Results   Component Value Date    MCV 79 03/07/2023     Lab Results   Component Value Date    MCH 23.1 03/07/2023     Lab Results   Component Value Date    MCHC 29.3 03/07/2023     Lab Results   Component Value Date    RDW 14.9 03/07/2023     Lab Results   Component Value Date     03/07/2023      Lab Results   Component Value Date    A1C 6.8 02/20/2023    A1C 6.2 07/14/2022    A1C 6.7 04/21/2022    A1C 6.2 04/24/2021    A1C 6.2 04/24/2021      TSH   Date Value Ref Range Status   10/18/2022 1.76 0.30 - 5.00 uIU/mL Final      No results found for: VITDT                Impression:  Encounter Diagnoses   Name Primary?     Lymphedema of both lower extremities Yes     Venous hypertension, chronic, with ulcer and inflammation, bilateral (H)      Elephantiasis      Venous (peripheral) insufficiency      Venous stasis ulcer of right lower leg with edema of right lower leg (H)      BMI 50.0-59.9, adult (H)      Lipodermatosclerosis of both lower extremities      Papillomatosis      Obesity, morbid, BMI 40.0-49.9 (H)                            Are any of these ulcers new today: No; Location: na    Assessment/Plan:          1. Debridement: Nursing staff removed the old dressing and cleanse the wound(s) with specified solution. After discussion of risk factors and verbal consent was obtained 2% Lidocaine HCL jelly was applied, under clean conditions, the right lateral leg ulceration(s) were debrided using currette. Devitalized and nonviable tissue, along with any fibrin and slough, was removed to improve granulation tissue formation, stimulate wound healing, decrease overall bacteria load, disrupt biofilm formation and decrease edge senescence.  Total excisional debridement  was 112 sq cm from the epidermis/dermis area and into the subcutaneous tissue with a depth of 0.1 cm.   Ulcers were improved afterwards and .  Measures were unchanged after debridement.       2.  Ulcer treatment: ulcer treatment will include irrigation and dressings to promote autolytic debridement which will include:will obtain CT of the right tib/fib to evaluate for underlying abscess vs lymphangiosarcoma. Will continue home care; cleanse the legs first with dilutehibiclens then apply dakin's solution to the right lateral leg; allow to soak for 5 minutes; then ammonium lac hydrin to the scaling; lotion to the rest of the leg; silvercel to the wound; super absorbant pad; rolled gauze; change every other day.  If for some reason the patient is not able to get their dressing(s) changed as outlined above (due to illness, lack of supplies, lack of help) please do the following: remove old, soiled dressings; wash the ulcers with saline; pat dry; apply ABD pad or other absorbant pad and secure with rolled gauze; avoid tape directly on your skin; patient instructed to call the clinic as soon as possible to let us know what the current issues are in receiving ulcer care. Stable            3. Edema: continue elevation; lymphedema wraps; encouraged him to use his pumps twice per day. The compression wraps were applied today in clinic.     If a 2 layer or 4 layer compression wrap is being used; these are safe to have on for ONLY 7 days. If for some reason the patient is not able to get the wrap(s) changed (due to illness; lack of supplies, lack of help, lack of transportation) please do the following: unwrap the old 2 or 4 layer compression wrap; avoid using scissors as you could cut your skin and cause ulcers; use tubular compression when available. Call to reschedule your home care or clinic visit appointment as soon as possible.  Stable            4. Nutrition: focus on weight loss; low sodium diet           5.  Offloading: na          6. DG: sleep study scheduled in August 7. MGUS: follow up with Dr. Penaloza with oncology in 1 months     Patient will follow up with me in 4 weeks for reevaluation. They were instructed to call the clinic sooner with any signs or symptoms of infection or any further questions/concerns. Answered all questions.          Kim Fuentes DNP, RN, CNP, CWOCN, CFCN, CLT  [facility]   [phone]        This note was electronically signed by Kim Fuentes, CACHORRO

## 2023-05-22 DIAGNOSIS — I10 BENIGN ESSENTIAL HYPERTENSION: ICD-10-CM

## 2023-05-24 RX ORDER — LOSARTAN POTASSIUM 100 MG/1
TABLET ORAL
Qty: 90 TABLET | Refills: 3 | Status: SHIPPED | OUTPATIENT
Start: 2023-05-24 | End: 2023-07-26

## 2023-06-01 ENCOUNTER — TELEPHONE (OUTPATIENT)
Dept: VASCULAR SURGERY | Facility: CLINIC | Age: 68
End: 2023-06-01
Payer: COMMERCIAL

## 2023-06-01 NOTE — TELEPHONE ENCOUNTER
Caller: Patient    Provider: PEEWEE Fuentes    Detailed reason for call: Patient requests refill of gentamicin ointment. Uses [facility] in [place].     Best phone number to contact: [phone]    Best time to contact: any    Ok to leave a detailed message: Yes    Ok to speak to authorized person if needed: No      (Noted to patient if reason is related to wound or incision, to please send a photo via email or [employer].)

## 2023-06-01 NOTE — TELEPHONE ENCOUNTER
"Spoke with patient.  It does not indicate in his wound care instructions to be using gentamycin.  He states he did not know this as he does not ready the \"things you give me\".  Not refilling at this time.  Patient states understanding.   "

## 2023-06-02 ENCOUNTER — MEDICAL CORRESPONDENCE (OUTPATIENT)
Dept: HEALTH INFORMATION MANAGEMENT | Facility: CLINIC | Age: 68
End: 2023-06-02

## 2023-06-07 ENCOUNTER — LAB (OUTPATIENT)
Dept: INFUSION THERAPY | Facility: CLINIC | Age: 68
End: 2023-06-07
Attending: INTERNAL MEDICINE
Payer: COMMERCIAL

## 2023-06-07 DIAGNOSIS — D63.8 ANEMIA, CHRONIC DISEASE: ICD-10-CM

## 2023-06-07 DIAGNOSIS — D47.2 MGUS (MONOCLONAL GAMMOPATHY OF UNKNOWN SIGNIFICANCE): ICD-10-CM

## 2023-06-07 LAB
ALBUMIN SERPL-MCNC: 3.3 G/DL (ref 3.5–5)
ALP SERPL-CCNC: 76 U/L (ref 45–120)
ALT SERPL W P-5'-P-CCNC: <9 U/L (ref 0–45)
ANION GAP SERPL CALCULATED.3IONS-SCNC: 8 MMOL/L (ref 5–18)
AST SERPL W P-5'-P-CCNC: 13 U/L (ref 0–40)
BASOPHILS # BLD AUTO: 0 10E3/UL (ref 0–0.2)
BASOPHILS NFR BLD AUTO: 0 %
BILIRUB SERPL-MCNC: 0.6 MG/DL (ref 0–1)
BUN SERPL-MCNC: 17 MG/DL (ref 8–22)
CALCIUM SERPL-MCNC: 9 MG/DL (ref 8.5–10.5)
CHLORIDE BLD-SCNC: 102 MMOL/L (ref 98–107)
CO2 SERPL-SCNC: 28 MMOL/L (ref 22–31)
CREAT SERPL-MCNC: 1.07 MG/DL (ref 0.7–1.3)
EOSINOPHIL # BLD AUTO: 0.3 10E3/UL (ref 0–0.7)
EOSINOPHIL NFR BLD AUTO: 5 %
ERYTHROCYTE [DISTWIDTH] IN BLOOD BY AUTOMATED COUNT: 14.7 % (ref 10–15)
GFR SERPL CREATININE-BSD FRML MDRD: 75 ML/MIN/1.73M2
GLUCOSE BLD-MCNC: 115 MG/DL (ref 70–125)
HCT VFR BLD AUTO: 31.7 % (ref 40–53)
HGB BLD-MCNC: 9.5 G/DL (ref 13.3–17.7)
IGA SERPL-MCNC: 363 MG/DL (ref 84–499)
IGG SERPL-MCNC: 2056 MG/DL (ref 610–1616)
IGM SERPL-MCNC: 40 MG/DL (ref 35–242)
IMM GRANULOCYTES # BLD: 0 10E3/UL
IMM GRANULOCYTES NFR BLD: 0 %
KAPPA LC FREE SER-MCNC: 7.46 MG/DL (ref 0.33–1.94)
KAPPA LC FREE/LAMBDA FREE SER NEPH: 2.08 {RATIO} (ref 0.26–1.65)
LAMBDA LC FREE SERPL-MCNC: 3.59 MG/DL (ref 0.57–2.63)
LYMPHOCYTES # BLD AUTO: 1.2 10E3/UL (ref 0.8–5.3)
LYMPHOCYTES NFR BLD AUTO: 20 %
MCH RBC QN AUTO: 24.2 PG (ref 26.5–33)
MCHC RBC AUTO-ENTMCNC: 30 G/DL (ref 31.5–36.5)
MCV RBC AUTO: 81 FL (ref 78–100)
MONOCYTES # BLD AUTO: 0.5 10E3/UL (ref 0–1.3)
MONOCYTES NFR BLD AUTO: 8 %
NEUTROPHILS # BLD AUTO: 4.1 10E3/UL (ref 1.6–8.3)
NEUTROPHILS NFR BLD AUTO: 67 %
NRBC # BLD AUTO: 0 10E3/UL
NRBC BLD AUTO-RTO: 0 /100
PLATELET # BLD AUTO: 212 10E3/UL (ref 150–450)
POTASSIUM BLD-SCNC: 4.2 MMOL/L (ref 3.5–5)
PROT SERPL-MCNC: 7.9 G/DL (ref 6–8)
RBC # BLD AUTO: 3.93 10E6/UL (ref 4.4–5.9)
SODIUM SERPL-SCNC: 138 MMOL/L (ref 136–145)
TOTAL PROTEIN SERUM FOR ELP: 7.6 G/DL (ref 6.4–8.3)
WBC # BLD AUTO: 6.1 10E3/UL (ref 4–11)

## 2023-06-07 PROCEDURE — 84165 PROTEIN E-PHORESIS SERUM: CPT | Mod: TC | Performed by: PATHOLOGY

## 2023-06-07 PROCEDURE — 36415 COLL VENOUS BLD VENIPUNCTURE: CPT

## 2023-06-07 PROCEDURE — 83521 IG LIGHT CHAINS FREE EACH: CPT

## 2023-06-07 PROCEDURE — 84155 ASSAY OF PROTEIN SERUM: CPT

## 2023-06-07 PROCEDURE — 85025 COMPLETE CBC W/AUTO DIFF WBC: CPT

## 2023-06-07 PROCEDURE — 80053 COMPREHEN METABOLIC PANEL: CPT

## 2023-06-07 PROCEDURE — 82784 ASSAY IGA/IGD/IGG/IGM EACH: CPT

## 2023-06-07 PROCEDURE — 86334 IMMUNOFIX E-PHORESIS SERUM: CPT | Performed by: PATHOLOGY

## 2023-06-09 LAB
ALBUMIN SERPL ELPH-MCNC: 3.5 G/DL (ref 3.7–5.1)
ALPHA1 GLOB SERPL ELPH-MCNC: 0.4 G/DL (ref 0.2–0.4)
ALPHA2 GLOB SERPL ELPH-MCNC: 0.7 G/DL (ref 0.5–0.9)
B-GLOBULIN SERPL ELPH-MCNC: 1 G/DL (ref 0.6–1)
GAMMA GLOB SERPL ELPH-MCNC: 1.9 G/DL (ref 0.7–1.6)
M PROTEIN SERPL ELPH-MCNC: 0 G/DL
PROT PATTERN SERPL ELPH-IMP: ABNORMAL
PROT PATTERN SERPL IFE-IMP: NORMAL

## 2023-06-09 PROCEDURE — 84165 PROTEIN E-PHORESIS SERUM: CPT | Mod: 26

## 2023-06-09 PROCEDURE — 86334 IMMUNOFIX E-PHORESIS SERUM: CPT | Mod: 26

## 2023-06-14 ENCOUNTER — ONCOLOGY VISIT (OUTPATIENT)
Dept: ONCOLOGY | Facility: CLINIC | Age: 68
End: 2023-06-14
Attending: INTERNAL MEDICINE
Payer: COMMERCIAL

## 2023-06-14 VITALS
OXYGEN SATURATION: 94 % | TEMPERATURE: 98.4 F | HEART RATE: 80 BPM | DIASTOLIC BLOOD PRESSURE: 76 MMHG | SYSTOLIC BLOOD PRESSURE: 143 MMHG | RESPIRATION RATE: 20 BRPM

## 2023-06-14 DIAGNOSIS — D63.8 ANEMIA, CHRONIC DISEASE: Primary | ICD-10-CM

## 2023-06-14 DIAGNOSIS — D47.2 MGUS (MONOCLONAL GAMMOPATHY OF UNKNOWN SIGNIFICANCE): ICD-10-CM

## 2023-06-14 DIAGNOSIS — E53.8 VITAMIN B12 DEFICIENCY (NON ANEMIC): ICD-10-CM

## 2023-06-14 PROCEDURE — 99215 OFFICE O/P EST HI 40 MIN: CPT | Performed by: INTERNAL MEDICINE

## 2023-06-14 PROCEDURE — G0463 HOSPITAL OUTPT CLINIC VISIT: HCPCS | Performed by: INTERNAL MEDICINE

## 2023-06-14 ASSESSMENT — PAIN SCALES - GENERAL: PAINLEVEL: NO PAIN (0)

## 2023-06-14 NOTE — PROGRESS NOTES
"Oncology Rooming Note    June 14, 2023 10:02 AM   Shaheen Sepulveda is a 67 year old male who presents for:    Chief Complaint   Patient presents with     Oncology Clinic Visit     R/t Lymphoma of intra-abdominal lymph nodes, unspecified lymphoma type (H)     Initial Vitals: There were no vitals taken for this visit. Estimated body mass index is 52 kg/m  as calculated from the following:    Height as of 5/16/23: 1.905 m (6' 3\").    Weight as of 5/16/23: 188.7 kg (416 lb). There is no height or weight on file to calculate BSA.  No Pain (0) Comment: Data Unavailable   No LMP for male patient.  Allergies reviewed: Yes  Medications reviewed: Yes    Medications: Medication refills not needed today.  Pharmacy name entered into [employer]: [id number] [place], [place] - [address]    Clinical concerns: Needs weight after visit.        Nicol Kelly RN              "

## 2023-06-14 NOTE — PROGRESS NOTES
[facility] Progress Note    Patient: Shaheen Sepulveda  MRN: [id number]  Date of Service: Jun 14, 2023         Reason for Visit    Chief Complaint   Patient presents with     Oncology Clinic Visit     R/t Lymphoma of intra-abdominal lymph nodes, unspecified lymphoma type (H)       Assessment and Plan     Cancer Staging   No matching staging information was found for the patient.    ECOG Performance    1 - Can't do physically strenuous work, but fully ambyulatory and can do light sedentary work     Pain  Pain Score: No Pain (0)    #.  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  Chronic bilateral lower extremity lymphedema   #.  Chronic normocytic anemia with some degree of iron deficiency and vitamin B12 deficiency.  #.  Vitamin B12 deficiency, replaced.  #.  IgG kappa monoclonal gammopathy of undetermined significance of 0.3 g/dL in 10/2022, isolated finding  #.  Chronic mild to moderate anemia, consistent with anemia of chronic inflammation      Clinically very stable.  No palpable adenopathy on exam.  I reviewed his labs.  He has mild persistent normocytic anemia.  Electrolytes, renal and liver functions are unremarkable.   Clinically it is suspicious for reactive lymphadenopathy.  I reviewed the vascular surgery clinic notes about chronic lymphedema of bilateral lower extremities.   I reviewed the monoclonal protein and there is no identifiable monoclonal protein.  In fact it is polyclonal gammopathy consistent with reactive physiology.  Because of the conflicting results between October 2022 and now, I recommended to recheck plasma protein panel in 6 months to determine the etiology.    Plan:  -Continue vitamin B12 supplementation as current, 1000 mcg daily by mouth.  -Recommended continue clinical surveillance for lymphadenopathy  -Recommended follow-up MGUS in about 6 months with labs prior.  -To recheck vitamin B12,  iron studies and ferritin in 6 months.  -He is advised to call me sooner with any concerns regarding palpable/swollen glands, profound fatigue, unintentional weight loss.    Encounter Diagnoses:    Problem List Items Addressed This Visit     Vitamin B12 deficiency (non anemic)    Relevant Orders    Vitamin B12   Other Visit Diagnoses     Anemia, chronic disease    -  Primary    Relevant Orders    Ferritin    Iron & Iron Binding Capacity    MGUS (monoclonal gammopathy of unknown significance)        Relevant Orders    CBC with Platelets & Differential    Comprehensive metabolic panel    Protein electrophoresis    Immunoglobulins A G and M    Protein Immunofixation Serum    Kappa and lambda light chain           CC: Rdaha Sal MD   ______________________________________________________________________________  Diagnosis  [id number] for progressive bilateral lymphedema and cellulitis and found to have bilateral right greater than sign left pelvic inguinal lymphadenopathy with largest lymph node measuring 3.2 x 2.4 cm over the right inguinal region.    10/6/2022-right inguinal FNA showeda kappa restricted lymphoproliferative disorder favoring SLL/CLL with a KI-67 proliferative rate<10%.  CD43 demonstrate equivocal coexpression [facility] B cells [facility].   -Mildly low B12 at 229.   -IgG kappa monoclonal protein of 0.3 g/dL.   -   -Hemoglobin 9.8, MCV 80, WBC 8, platelet 234 with normal differential.   -Iron study was consistent with anemia of chronic disease.  Hemoccult x3 negative.    10/19/2022-PET scan showed mildly FDG avid bilateral axillary, right external iliac and bilateral inguinal lymph nodes suspicious for inflammatory process versus low-grade lymphoproliferative disease.    11/7/2022- right inguinal lymph node core biopsy revealed clonal B-cell population with absent MYD-88 and BCL6 mutations.    12/7/2022 bone marrow biopsy revealed normocellular  marrow with mixed lymphoplasmacytic infiltrate (10-20%).  Possibly decreased iron stores.  Cytogenetic analysis was normal.  NGS panel for MDS was negative.    Treatment to date  12/2022-initiated vitamin B12 1000 mcg IM weekly x4 followed by 1000 mcg daily by mouth.    History of Present Illness    Mr. Shaheen Sepulveda presented today accompanied by her daughter for follow-up anemia and abnormal SPEP.  He reported that he is scheduled to have CT scan of the right leg due to pain.  He did not notice any palpable masses in his groin.  Overall, he said his health remains the same.  He has been taking vitamin B12 as directed.  He does not have any new bone pain.  His energy level is low but stable.    Review of systems  Apart from describing in HPI, the remainder of comprehensive ROS was negative.    Past History    Past Medical History:   Diagnosis Date     COPD (chronic obstructive pulmonary disease) (H)      Diabetes (H)      H/O angioedema 06/15/2020    Formatting of this note might be different from the original. Unexplained angioedema twice, discontinue lisinopril for possible connection to it, though he had used it afterwards with no symptoms     History of tobacco use disorder 08/01/2013    Formatting of this note might be different from the original. Added per documetation     Hypertension      Lymphedema of both lower extremities 12/22/2014     Methadone use 05/08/2012     Obstructive sleep apnea 02/02/2015    Formatting of this note might be different from the original. Epic     Pleural mass 07/02/2013     Uncomplicated asthma        Past Surgical History:   Procedure Laterality Date     JOINT REPLACEMENT         Physical Exam    BP (!) 143/76   Pulse 80   Temp 98.4  F (36.9  C)   Resp 20   SpO2 94%     General: alert, awake, not in acute distress.  Came in a wheelchair.  HEENT: Head: Normal, normocephalic, atraumatic.  Eye: Normal external eye, conjunctiva, lids cornea, ELISEO.  Pharynx: Normal buccal mucosa.  Normal pharynx.  Neck / Thyroid: Supple, no masses, nodes, nodules or enlargement.  Lymphatics: No abnormally enlarged lymph nodes.  Chest: Normal chest wall and respirations. Clear to auscultation.  Heart: S1 S2 RRR.   Abdomen: abdomen is soft without significant tenderness, masses, organomegaly or guarding  Extremities: Bilateral lower extremity lymphedema.    Skin: normal. no rash or abnormalities  CNS: non focal.    Lab Results    No results found for this or any previous visit (from the past 168 hour(s)).    Imaging    No results found.    40 minutes spent on the date of the encounter doing chart review, history and exam, documentation, communication of the treatment plan with the care team and further activities as noted above.    Signed by: Jamila Peanloza MD

## 2023-06-14 NOTE — LETTER
"    6/14/2023         RE: Shaheen Sepulveda  [address] [address]        Dear Colleague,    Thank you for referring your patient, Shaheen Sepulveda, to the [facility]. Please see a copy of my visit note below.    Oncology Rooming Note    June 14, 2023 10:02 AM   Shaheen Sepulveda is a 67 year old male who presents for:    Chief Complaint   Patient presents with     Oncology Clinic Visit     R/t Lymphoma of intra-abdominal lymph nodes, unspecified lymphoma type (H)     Initial Vitals: There were no vitals taken for this visit. Estimated body mass index is 52 kg/m  as calculated from the following:    Height as of 5/16/23: 1.905 m (6' 3\").    Weight as of 5/16/23: 188.7 kg (416 lb). There is no height or weight on file to calculate BSA.  No Pain (0) Comment: Data Unavailable   No LMP for male patient.  Allergies reviewed: Yes  Medications reviewed: Yes    Medications: Medication refills not needed today.  Pharmacy name entered into [employer]: [id number] [place], [place] - [address]    Clinical concerns: Needs weight after visit.        Nicol Kelly RN                [facility] Hematology and Oncology Progress Note    Patient: Shaheen Sepulveda  MRN: [id number]  Date of Service: Jun 14, 2023         Reason for Visit    Chief Complaint   Patient presents with     Oncology Clinic Visit     R/t Lymphoma of intra-abdominal lymph nodes, unspecified lymphoma type (H)       Assessment and Plan     Cancer Staging   No matching staging information was found for the patient.    ECOG Performance    1 - Can't do physically strenuous work, but fully ambyulatory and can do light sedentary work     Pain  Pain Score: No Pain (0)    #.  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  Chronic bilateral lower extremity lymphedema   #.  Chronic normocytic anemia with some degree of iron deficiency and vitamin B12 " deficiency.  #.  Vitamin B12 deficiency, replaced.  #.  IgG kappa monoclonal gammopathy of undetermined significance of 0.3 g/dL in 10/2022, isolated finding  #.  Chronic mild to moderate anemia, consistent with anemia of chronic inflammation      Clinically very stable.  No palpable adenopathy on exam.  I reviewed his labs.  He has mild persistent normocytic anemia.  Electrolytes, renal and liver functions are unremarkable.   Clinically it is suspicious for reactive lymphadenopathy.  I reviewed the vascular surgery clinic notes about chronic lymphedema of bilateral lower extremities.   I reviewed the monoclonal protein and there is no identifiable monoclonal protein.  In fact it is polyclonal gammopathy consistent with reactive physiology.  Because of the conflicting results between October 2022 and now, I recommended to recheck plasma protein panel in 6 months to determine the etiology.    Plan:  -Continue vitamin B12 supplementation as current, 1000 mcg daily by mouth.  -Recommended continue clinical surveillance for lymphadenopathy  -Recommended follow-up MGUS in about 6 months with labs prior.  -To recheck vitamin B12, iron studies and ferritin in 6 months.  -He is advised to call me sooner with any concerns regarding palpable/swollen glands, profound fatigue, unintentional weight loss.    Encounter Diagnoses:    Problem List Items Addressed This Visit     Vitamin B12 deficiency (non anemic)    Relevant Orders    Vitamin B12   Other Visit Diagnoses     Anemia, chronic disease    -  Primary    Relevant Orders    Ferritin    Iron & Iron Binding Capacity    MGUS (monoclonal gammopathy of unknown significance)        Relevant Orders    CBC with Platelets & Differential    Comprehensive metabolic panel    Protein electrophoresis    Immunoglobulins A G and M    Protein Immunofixation Serum    Kappa and lambda light chain           CC: Radha Sal MD    ______________________________________________________________________________  Diagnosis  [id number] for progressive bilateral lymphedema and cellulitis and found to have bilateral right greater than sign left pelvic inguinal lymphadenopathy with largest lymph node measuring 3.2 x 2.4 cm over the right inguinal region.    10/6/2022-right inguinal FNA showeda kappa restricted lymphoproliferative disorder favoring SLL/CLL with a KI-67 proliferative rate<10%.  CD43 demonstrate equivocal coexpression on B cells [facility].   -Mildly low B12 at 229.   -IgG kappa monoclonal protein of 0.3 g/dL.   -   -Hemoglobin 9.8, MCV 80, WBC 8, platelet 234 with normal differential.   -Iron study was consistent with anemia of chronic disease.  Hemoccult x3 negative.    10/19/2022-PET scan showed mildly FDG avid bilateral axillary, right external iliac and bilateral inguinal lymph nodes suspicious for inflammatory process versus low-grade lymphoproliferative disease.    11/7/2022- right inguinal lymph node core biopsy revealed clonal B-cell population with absent MYD-88 and BCL6 mutations.    12/7/2022 bone marrow biopsy revealed normocellular marrow with mixed lymphoplasmacytic infiltrate (10-20%).  Possibly decreased iron stores.  Cytogenetic analysis was normal.  NGS panel for MDS was negative.    Treatment to date  12/2022-initiated vitamin B12 1000 mcg IM weekly x4 followed by 1000 mcg daily by mouth.    History of Present Illness    Mr. Shaheen Sepulveda presented today accompanied by her daughter for follow-up anemia and abnormal SPEP.  He reported that he is scheduled to have CT scan of the right leg due to pain.  He did not notice any palpable masses in his groin.  Overall, he said his health remains the same.  He has been taking vitamin B12 as directed.  He does not have any new bone pain.  His energy level is low but stable.    Review of systems  Apart from describing in  HPI, the remainder of comprehensive ROS was negative.    Past History    Past Medical History:   Diagnosis Date     COPD (chronic obstructive pulmonary disease) (H)      Diabetes (H)      H/O angioedema 06/15/2020    Formatting of this note might be different from the original. Unexplained angioedema twice, discontinue lisinopril for possible connection to it, though he had used it afterwards with no symptoms     History of tobacco use disorder 08/01/2013    Formatting of this note might be different from the original. Added per documetation     Hypertension      Lymphedema of both lower extremities 12/22/2014     Methadone use 05/08/2012     Obstructive sleep apnea 02/02/2015    Formatting of this note might be different from the original. Epic     Pleural mass 07/02/2013     Uncomplicated asthma        Past Surgical History:   Procedure Laterality Date     JOINT REPLACEMENT         Physical Exam    BP (!) 143/76   Pulse 80   Temp 98.4  F (36.9  C)   Resp 20   SpO2 94%     General: alert, awake, not in acute distress.  Came in a wheelchair.  HEENT: Head: Normal, normocephalic, atraumatic.  Eye: Normal external eye, conjunctiva, lids cornea, ELISEO.  Pharynx: Normal buccal mucosa. Normal pharynx.  Neck / Thyroid: Supple, no masses, nodes, nodules or enlargement.  Lymphatics: No abnormally enlarged lymph nodes.  Chest: Normal chest wall and respirations. Clear to auscultation.  Heart: S1 S2 RRR.   Abdomen: abdomen is soft without significant tenderness, masses, organomegaly or guarding  Extremities: Bilateral lower extremity lymphedema.    Skin: normal. no rash or abnormalities  CNS: non focal.    Lab Results    No results found for this or any previous visit (from the past 168 hour(s)).    Imaging    No results found.    40 minutes spent on the date of the encounter doing chart review, history and exam, documentation, communication of the treatment plan with the care team and further activities as noted  above.    Signed by: Jamila Penaloza MD          Again, thank you for allowing me to participate in the care of your patient.        Sincerely,        Jamila Penaloza MD

## 2023-06-15 ENCOUNTER — TELEPHONE (OUTPATIENT)
Dept: FAMILY MEDICINE | Facility: CLINIC | Age: 68
End: 2023-06-15

## 2023-06-15 ENCOUNTER — HOSPITAL ENCOUNTER (OUTPATIENT)
Dept: CT IMAGING | Facility: HOSPITAL | Age: 68
Discharge: HOME | End: 2023-06-15
Attending: NURSE PRACTITIONER | Admitting: NURSE PRACTITIONER
Payer: COMMERCIAL

## 2023-06-15 DIAGNOSIS — I83.019 VENOUS STASIS ULCER OF RIGHT LOWER LEG WITH EDEMA OF RIGHT LOWER LEG (H): ICD-10-CM

## 2023-06-15 DIAGNOSIS — I83.891 VENOUS STASIS ULCER OF RIGHT LOWER LEG WITH EDEMA OF RIGHT LOWER LEG (H): ICD-10-CM

## 2023-06-15 DIAGNOSIS — I89.0 LYMPHEDEMA OF BOTH LOWER EXTREMITIES: ICD-10-CM

## 2023-06-15 DIAGNOSIS — I87.333 VENOUS HYPERTENSION, CHRONIC, WITH ULCER AND INFLAMMATION, BILATERAL (H): ICD-10-CM

## 2023-06-15 DIAGNOSIS — E66.01 OBESITY, MORBID, BMI 40.0-49.9 (H): ICD-10-CM

## 2023-06-15 DIAGNOSIS — L97.919 VENOUS STASIS ULCER OF RIGHT LOWER LEG WITH EDEMA OF RIGHT LOWER LEG (H): ICD-10-CM

## 2023-06-15 DIAGNOSIS — M79.3 LIPODERMATOSCLEROSIS OF BOTH LOWER EXTREMITIES: ICD-10-CM

## 2023-06-15 DIAGNOSIS — I87.2 VENOUS (PERIPHERAL) INSUFFICIENCY: ICD-10-CM

## 2023-06-15 DIAGNOSIS — D36.9 PAPILLOMATOSIS: ICD-10-CM

## 2023-06-15 DIAGNOSIS — R60.0 VENOUS STASIS ULCER OF RIGHT LOWER LEG WITH EDEMA OF RIGHT LOWER LEG (H): ICD-10-CM

## 2023-06-15 DIAGNOSIS — I89.0 ELEPHANTIASIS: ICD-10-CM

## 2023-06-15 PROCEDURE — 73700 CT LOWER EXTREMITY W/O DYE: CPT | Mod: RT

## 2023-06-15 NOTE — TELEPHONE ENCOUNTER
Home Care is calling regarding an established patient with [facility].       Requesting orders from: Radha Sal  Provider is following patient: Yes  Is this a 60-day recertification request?  Yes    Orders Requested    Skilled Nursing  Request for recertification   Frequency:  1 x a week x 9 weeks and 2 PRN      Verbal orders given.  Home Care will send orders for provider to sign.  Confirmed ok to leave a detailed message with call back.  Contact information confirmed and updated as needed.  Verbal approval left in detailed on identified voice mail for Lesly. No further intervention is needed at this time. Will route to inform Dr Sal of the completed action and to expect a fax to follow for signature.    Anusha Ruelas RN

## 2023-06-15 NOTE — TELEPHONE ENCOUNTER
[facility] phone call message - order or referral request for patient:     Order or referral being requested:     Skill nursing fo 1x for 9 weeks    2 PRN     Continue wound care to his lower extremity  [facility] order from [facility]      Additional Comments:       Please call back and advise. Thank you.     OK to leave a message on voice mail? Yes    Primary language: English      needed? No    Call taken on Marguerite 15, 2023 at 2:26 PM by Audrey Doan

## 2023-06-27 ENCOUNTER — MEDICAL CORRESPONDENCE (OUTPATIENT)
Dept: HEALTH INFORMATION MANAGEMENT | Facility: CLINIC | Age: 68
End: 2023-06-27

## 2023-06-27 DIAGNOSIS — Z53.9 DIAGNOSIS NOT YET DEFINED: Primary | ICD-10-CM

## 2023-06-28 ENCOUNTER — OFFICE VISIT (OUTPATIENT)
Dept: VASCULAR SURGERY | Facility: CLINIC | Age: 68
End: 2023-06-28
Attending: NURSE PRACTITIONER
Payer: COMMERCIAL

## 2023-06-28 VITALS
OXYGEN SATURATION: 96 % | SYSTOLIC BLOOD PRESSURE: 130 MMHG | DIASTOLIC BLOOD PRESSURE: 70 MMHG | TEMPERATURE: 99.1 F | RESPIRATION RATE: 18 BRPM | HEART RATE: 86 BPM

## 2023-06-28 DIAGNOSIS — I83.891 VENOUS STASIS ULCER OF RIGHT LOWER LEG WITH EDEMA OF RIGHT LOWER LEG (H): ICD-10-CM

## 2023-06-28 DIAGNOSIS — R60.0 VENOUS STASIS ULCER OF LEFT LOWER LEG WITH EDEMA OF LEFT LOWER LEG (H): ICD-10-CM

## 2023-06-28 DIAGNOSIS — D36.9 PAPILLOMATOSIS: ICD-10-CM

## 2023-06-28 DIAGNOSIS — I89.0 ELEPHANTIASIS: ICD-10-CM

## 2023-06-28 DIAGNOSIS — R60.0 VENOUS STASIS ULCER OF RIGHT LOWER LEG WITH EDEMA OF RIGHT LOWER LEG (H): ICD-10-CM

## 2023-06-28 DIAGNOSIS — I83.019 VENOUS STASIS ULCER OF RIGHT LOWER LEG WITH EDEMA OF RIGHT LOWER LEG (H): ICD-10-CM

## 2023-06-28 DIAGNOSIS — I87.2 VENOUS (PERIPHERAL) INSUFFICIENCY: ICD-10-CM

## 2023-06-28 DIAGNOSIS — M79.89 LEG SWELLING: ICD-10-CM

## 2023-06-28 DIAGNOSIS — I87.333 VENOUS HYPERTENSION, CHRONIC, WITH ULCER AND INFLAMMATION, BILATERAL (H): ICD-10-CM

## 2023-06-28 DIAGNOSIS — L97.929 VENOUS STASIS ULCER OF LEFT LOWER LEG WITH EDEMA OF LEFT LOWER LEG (H): ICD-10-CM

## 2023-06-28 DIAGNOSIS — I83.892 VENOUS STASIS ULCER OF LEFT LOWER LEG WITH EDEMA OF LEFT LOWER LEG (H): ICD-10-CM

## 2023-06-28 DIAGNOSIS — M79.3 LIPODERMATOSCLEROSIS OF BOTH LOWER EXTREMITIES: ICD-10-CM

## 2023-06-28 DIAGNOSIS — L97.919 VENOUS STASIS ULCER OF RIGHT LOWER LEG WITH EDEMA OF RIGHT LOWER LEG (H): ICD-10-CM

## 2023-06-28 DIAGNOSIS — I89.0 LYMPHEDEMA OF BOTH LOWER EXTREMITIES: Primary | ICD-10-CM

## 2023-06-28 DIAGNOSIS — E66.01 OBESITY, MORBID, BMI 40.0-49.9 (H): ICD-10-CM

## 2023-06-28 DIAGNOSIS — I83.029 VENOUS STASIS ULCER OF LEFT LOWER LEG WITH EDEMA OF LEFT LOWER LEG (H): ICD-10-CM

## 2023-06-28 PROCEDURE — 11045 DBRDMT SUBQ TISS EACH ADDL: CPT | Performed by: NURSE PRACTITIONER

## 2023-06-28 PROCEDURE — 11042 DBRDMT SUBQ TIS 1ST 20SQCM/<: CPT | Performed by: NURSE PRACTITIONER

## 2023-06-28 ASSESSMENT — PAIN SCALES - GENERAL: PAINLEVEL: NO PAIN (0)

## 2023-06-28 NOTE — PROGRESS NOTES
"            Follow up Vascular Visit       Date of Service:06/28/23      Chief Complaint: BLE severe lyphedema; venous stasis ulcers      Pt returns to [facility] with regards to their BLE severe lymphedema; venous stasis ulcers.  They arrive today alone. They are currently using dakin's solution; silvercel; super absorbant pads to the wounds. This is being done by home care or family every other day. They are using tubular compression; foam; lymphedema wraps for compression. They are feeling well today. Denies fevers, chills. No shortness of breath. He had CT done of the leg without contrast due to renal issues; and this was negative for abscess or sarcoma. He is following with Dr. Penaloza from oncology for low grad B cell lymphoma; they are just clinically monitoring at this time. He recently moved and was unable to use his lymphedema pump for 10 days. He then developed a wound to the left leg.   Allergies:   Allergies   Allergen Reactions     Lisinopril Swelling     Patient reports \"neck swelling\"  Swelling in throat       Medications:   Current Outpatient Medications:      ammonium lactate (LAC-HYDRIN) 12 % external lotion, Apply topically daily as needed for dry skin With dressing changes, Disp: 225 g, Rfl: 3     atorvastatin (LIPITOR) 20 MG tablet, Take 1 tablet (20 mg) by mouth daily, Disp: 90 tablet, Rfl: 4     chlorthalidone (HYGROTON) 25 MG tablet, Take 1 tablet (25 mg) by mouth daily, Disp: 90 tablet, Rfl: 3     cyanocobalamin (VITAMIN B-12) 1000 MCG tablet, Take 1 tablet (1,000 mcg) by mouth daily for 360 days, Disp: 90 tablet, Rfl: 3     famotidine (PEPCID) 20 MG tablet, Take 1 tablet (20 mg) by mouth 2 times daily, Disp: 180 tablet, Rfl: 3     gentamicin (GARAMYCIN) 0.1 % external ointment, Apply topically daily, Disp: 30 g, Rfl: 3     hydrOXYzine (ATARAX) 25 MG tablet, Take 0.5-1 tablets (12.5-25 mg) by mouth 3 times daily as needed for anxiety, Disp: 60 tablet, Rfl: 3     losartan " (COZAAR) 100 MG tablet, TAKE ONE TABLET BY MOUTH ONE TIME DAILY, Disp: 90 tablet, Rfl: 3     melatonin 3 MG tablet, Take 1 tablet (3 mg) by mouth nightly as needed for sleep, Disp: 30 tablet, Rfl: 1     metFORMIN (GLUCOPHAGE) 500 MG tablet, Take 1 tablet (500 mg) by mouth 2 times daily (with meals), Disp: 180 tablet, Rfl: 3     methadone (DOLOPHINE-INTENSOL) 10 MG/ML (HIGH CONC) solution, Take 100 mg by mouth daily, Disp: , Rfl:      naproxen sodium 220 MG capsule, Take 220 mg by mouth 2 times daily as needed, Disp: , Rfl:      sodium hypochlorite (QUARTER-STRENGTH DAKINS) external solution, Apply topically every 72 hours Use 300mL every 72 hours to wash the bilateral legs and wounds on the legs, Disp: 1000 mL, Rfl: 3     spironolactone (ALDACTONE) 25 MG tablet, Take 1 tablet (25 mg) by mouth daily, Disp: 90 tablet, Rfl: 3     zolpidem (AMBIEN) 10 MG tablet, Take tablet by mouth 15 minutes prior to sleep, for Sleep Study, Disp: 1 tablet, Rfl: 0    Current Facility-Administered Medications:      lidocaine (PF) (XYLOCAINE) 1 % injection 10 mL, 10 mL, Intradermal, Once, Gerson Jaquez MD     lidocaine (XYLOCAINE) 2 % external gel, , Topical, Daily PRN, Lucia Reyes MD, Given at 10/19/21 0828    History:   Past Medical History:   Diagnosis Date     COPD (chronic obstructive pulmonary disease) (H)      Diabetes (H)      H/O angioedema 06/15/2020    Formatting of this note might be different from the original. Unexplained angioedema twice, discontinue lisinopril for possible connection to it, though he had used it afterwards with no symptoms     History of tobacco use disorder 08/01/2013    Formatting of this note might be different from the original. Added per documetation     Hypertension      Lymphedema of both lower extremities 12/22/2014     Methadone use 05/08/2012     Obstructive sleep apnea 02/02/2015    Formatting of this note might be different from the original. Epic     Pleural mass 07/02/2013      Uncomplicated asthma        Physical Exam:    /70   Pulse 86   Temp 99.1  F (37.3  C)   Resp 18   SpO2 96%     General:  Patient presents to clinic in no apparent distress.  Head: normocephalic atraumatic  Psychiatric:  Alert and oriented x3.   Respiratory: unlabored breathing; no cough  Integumentary:  Skin is uniformly warm, dry and pink.    Ulcer #1 Location: right lateral leg  Size: 10L x 10W x 0.1depth.  No sinus tract present, Wound base: sloughing macerated tissues  noundermining present. Ulcer is partial thickness. There is heavy drainage. Periwound: no denudement, erythema, induration, maceration or warmth.      Wound #2 Location: left lateral leg  Size: 3L x 1.5W x 0.5depth.  No sinus tract present, Wound base:  50% slough  nounderminingpresent. Wound is ful thickness. There is moderate drainage. Periwound: no denudement, erythema, induration, maceration or warmth.        Wound Leg Ulceration (Active)   Number of days: 387       VASC Wound rt lateral weeping (Active)   Pre Size Length 8 05/16/23 0800   Pre Size Width 14 05/16/23 0800   Pre Size Depth 0.1 05/16/23 0800   Pre Total Sq cm 112 05/16/23 0800   Post Size Length 8 11/08/22 0800   Post Size Width 8 11/08/22 0800   Post Size Depth 0.1 11/08/22 0800   Post Total Sq cm 64 11/08/22 0800   Description scattered/weeping 05/16/23 0800   Number of days: 617       VASC Wound left posterior leg (Active)   Pre Size Length 5 04/18/23 0700   Pre Size Width 5 04/18/23 0700   Pre Size Depth 0.1 04/18/23 0700   Pre Total Sq cm 25 04/18/23 0700   Description scattered weeping 04/18/23 0700   Number of days: 232       VASC Wound Right medial shin (Active)   Number of days: 204            Circumferential volume measures:          2/7/2023     7:00 AM 3/10/2023     8:00 AM 4/18/2023     7:00 AM 5/16/2023     8:00 AM 6/28/2023     7:00 AM   Circumferential Measures   Right just above MTP 27.6 28.5 27.5 25 25   Right Ankle 33.9 35 35 33 33   Right Widest Calf  50.5 51.6 51.3 57.5 53   Left - just above MTP 27.3 27 27.6 24 23.5   Left Ankle 31.6 33 33.5 32 35   Left Widest Calf 49.2 49 50 52 53   Left Thigh Up 10cm  .          Labs:    I personally reviewed the following lab results today and those on care everywhere    CRP   Date Value Ref Range Status   04/23/2021 8.5 (H) 0.0 - 0.8 mg/dL Final      No results found for: SED   Last Renal Panel:  Sodium   Date Value Ref Range Status   06/07/2023 138 136 - 145 mmol/L Final   06/10/2021 142.0 133.0 - 144.0 mmol/L Final     Potassium   Date Value Ref Range Status   06/07/2023 4.2 3.5 - 5.0 mmol/L Final   06/10/2021 4.4 3.4 - 5.3 mmol/L Final     Chloride   Date Value Ref Range Status   06/07/2023 102 98 - 107 mmol/L Final   06/10/2021 100.0 94.0 - 109.0 mmol/L Final     Carbon Dioxide   Date Value Ref Range Status   06/10/2021 33.0 (H) 20.0 - 32.0 mmol/L Final     Carbon Dioxide (CO2)   Date Value Ref Range Status   06/07/2023 28 22 - 31 mmol/L Final     Anion Gap   Date Value Ref Range Status   06/07/2023 8 5 - 18 mmol/L Final     Glucose   Date Value Ref Range Status   06/07/2023 115 70 - 125 mg/dL Final   06/10/2021 112.0 (H) 60.0 - 109.0 mg/dL Final     Urea Nitrogen   Date Value Ref Range Status   06/07/2023 17 8 - 22 mg/dL Final   06/10/2021 16.0 7.0 - 30.0 mg/dL Final     Creatinine   Date Value Ref Range Status   06/07/2023 1.07 0.70 - 1.30 mg/dL Final   06/10/2021 1.0 0.8 - 1.5 mg/dL Final     GFR Estimate   Date Value Ref Range Status   06/07/2023 75 >60 mL/min/1.73m2 Final     Comment:     eGFR calculated using 2021 CKD-EPI equation.   04/24/2021 >60 >60 mL/min/1.73m2 Final     Calcium   Date Value Ref Range Status   06/07/2023 9.0 8.5 - 10.5 mg/dL Final   06/10/2021 9.5 8.5 - 10.4 mg/dL Final     Albumin   Date Value Ref Range Status   06/07/2023 3.3 (L) 3.5 - 5.0 g/dL Final      Lab Results   Component Value Date    WBC 6.1 06/07/2023     Lab Results   Component Value Date    RBC 3.93 06/07/2023     Lab Results    Component Value Date    HGB 9.5 06/07/2023     Lab Results   Component Value Date    HCT 31.7 06/07/2023     No components found for: MCT  Lab Results   Component Value Date    MCV 81 06/07/2023     Lab Results   Component Value Date    MCH 24.2 06/07/2023     Lab Results   Component Value Date    MCHC 30.0 06/07/2023     Lab Results   Component Value Date    RDW 14.7 06/07/2023     Lab Results   Component Value Date     06/07/2023      Lab Results   Component Value Date    A1C 6.8 02/20/2023    A1C 6.2 07/14/2022    A1C 6.7 04/21/2022    A1C 6.2 04/24/2021    A1C 6.2 04/24/2021      TSH   Date Value Ref Range Status   10/18/2022 1.76 0.30 - 5.00 uIU/mL Final      No results found for: VITDT                Impression:  Encounter Diagnoses   Name Primary?     Lymphedema of both lower extremities Yes     Venous hypertension, chronic, with ulcer and inflammation, bilateral (H)      Elephantiasis      Venous (peripheral) insufficiency      Venous stasis ulcer of right lower leg with edema of right lower leg (H)      BMI 50.0-59.9, adult (H)      Lipodermatosclerosis of both lower extremities      Papillomatosis      Obesity, morbid, BMI 40.0-49.9 (H)      Venous stasis ulcer of left lower leg with edema of left lower leg (H)      Leg swelling                            Are any of these ulcers new today: No; Location: na    Assessment/Plan:          1. Debridement: Nursing staff removed the old dressing and cleanse the wound(s) with specified solution. After discussion of risk factors and verbal consent was obtained 2% Lidocaine HCL jelly was applied, under clean conditions, the BLE ulceration(s) were debrided using currette. Devitalized and nonviable tissue, along with any fibrin and slough, was removed to improve granulation tissue formation, stimulate wound healing, decrease overall bacteria load, disrupt biofilm formation and decrease edge senescence.  Total excisional debridement was 104.5 sq cm from the  epidermis/dermis area and into the subcutaneous tissue with a depth of 0.5 cm.   Ulcers were improved afterwards and .  Measures were unchanged after debridement.       2.  Ulcer treatment: ulcer treatment will include irrigation and dressings to promote autolytic debridement which will include:continue to wash with Dakin's solution to reduce risk of cellulitis and bacterial load; use urea based lotion to scaling areas; will try drawtex pad to the right lateral leg and see if this will help with the weeping; silvercel to the LLE ulcer; ABD; rolled gauze; change every other day by family or home care.  If for some reason the patient is not able to get their dressing(s) changed as outlined above (due to illness, lack of supplies, lack of help) please do the following: remove old, soiled dressings; wash the ulcers with saline; pat dry; apply ABD pad or other absorbant pad and secure with rolled gauze; avoid tape directly on your skin; patient instructed to call the clinic as soon as possible to let us know what the current issues are in receiving ulcer care. Stable            3. Edema: continue high density foam to the right lateral legs; continue lymphedema wraps; continue lymphedema pump. The compression wraps were applied today in clinic.     If a 2 layer or 4 layer compression wrap is being used; these are safe to have on for ONLY 7 days. If for some reason the patient is not able to get the wrap(s) changed (due to illness; lack of supplies, lack of help, lack of transportation) please do the following: unwrap the old 2 or 4 layer compression wrap; avoid using scissors as you could cut your skin and cause ulcers; use tubular compression when available. Call to reschedule your home care or clinic visit appointment as soon as possible.  Stable            4. Nutrition: continue to work on weight loss; low sodium diet           5. Offloading: na     Patient will follow up with me in 4 weeks for reevaluation.  They were instructed to call the clinic sooner with any signs or symptoms of infection or any further questions/concerns. Answered all questions.          Kim Fuentes DNP, RN, CNP, CWOCN, CFCN, CLT  [facility]   [phone]        This note was electronically signed by Kim Fuentes NP

## 2023-06-28 NOTE — PATIENT INSTRUCTIONS
"  Wound care supplies were not ordered or needed today. Home care will order all your supplies    Continue on all blood pressure medications as prescribed    Focus on weight loss and low sodium diet  Keep sodium intake 2.5-4 grams per day    Keep appt with Sleep medicine; they are concerned that your BP is not being controlled due to uncontrolled sleep apnea        Wound Care Instructions    Every other day home care or your family will , Cleanse your bilateral legs and wound(s) with Dilute hibiclens 30cc in 500cc NS.    Then do a light wash of Dakin's solution; focus on the right lateral leg weeping area    Pat Dry with non-sterile gauze    Apply Lotion to the intact skin surrounding your wound and other dry skin locations. Some good lotions include: Remedy Skin Repair Cream, Sarna, Vanicream or Cetaphil    Apply Ammonium Lac Hydrin lotion to the thick scaling crusting areas    Triad paste to the periwound skin on the right leg to protect from all the drainage    Primary Dressing: Silvercel to all the wounds; try the samples of Drawtex on the right lateral leg wound and see if this will \"draw\" out the fluid in the tissues    ABDs or super absorbant pads    Secure with non-sterile roll gauze (4\" x 75\" roll) and tape (1\" roll tape) as needed; avoid adhesive directly on the skin    Compression: high density gray foam to the right lateral leg area secure with rolled gauze; then tubular compression; foam; short stretch bilaterally    Elevation of the legs    Use lymphedema pump twice per day    It is not ok to get your wound wet in the bath or shower      If for some reason you are not able to get your dressing(s) changed as outlined above (due to illness, lack of supplies, lack of help) please do the following: remove old, soiled dressings; wash the wounds with saline; pat dry; apply ABD pad or other absorbant pad and secure with rolled gauze; avoid tape directly on your skin; Call the clinic as soon as possible to let " us know what the current issues are in receiving wound care [phone].      SEEK MEDICAL CARE IF:  You have an increase in swelling, pain, or redness around the wound.  You have an increase in the amount of pus coming from the wound.  There is a bad smell coming from the wound.  The wound appears to be worsening/enlarging  You have a fever greater than 101.5 F      It is ok to continue current wound care treatment/products for the next 2-3 days until new wound care supplies are ordered and arrive. If longer than this please contact our office at [phone].    If you have a 2 layer or 4 layer compression wrap on these are safe to have on for ONLY 7 days. If for some reason you are not able to get the wrap(s) changed (due to illness; lack of supplies, lack of help, lack of transportation) please do the following: unwrap the old 2 or 4 layer compression wrap; avoid using scissors as you could cut your skin and cause wounds; use tubular compression when available. Call to reschedule your home care or clinic visit appointment as soon as possible.    Please NOTE: if you are 15 minutes late to your clinic appointment you will have to be rescheduled. Please call our clinic as soon as possible if you know you will not be able to get to your appointment at [phone].    If you fail to show up to 3 scheduled clinic appointments you will be dismissed from our clinic.              We want to hear from you!  In the next few weeks, you should receive a call or email to complete a survey about your visit at [facility]. Please help us improve your appointment experience by letting us know how we did today. We strive to make your experience good and value any ways in which we could do better.      We value your input and suggestions.    Thank you for choosing the [facility]!      It is recommended that you do not get your ulcer wet when showering.  Listed below are several ways of  keeping it dry when you shower.     1. Wrap it with Press and Seal plastic wrap.  It can be found in the stores where the plastic wraps or tin foil is kept.               2.  Some people take a bath and hang their leg/foot out of the tub.                        3  Put your leg in a plastic bag and tape it on.           4. You can purchase a shower cover for casts at some pharmacies and through the Internet.            5. Take a Bed Bath or wash up at the sink

## 2023-07-11 ENCOUNTER — TELEPHONE (OUTPATIENT)
Dept: VASCULAR SURGERY | Facility: CLINIC | Age: 68
End: 2023-07-11
Payer: COMMERCIAL

## 2023-07-11 NOTE — TELEPHONE ENCOUNTER
Caller: Shaheen    Provider: PEEWEE Fuentes    Detailed reason for call: He has used up the sample of Drawtex. He feels it is working very well. Should he go back to treating with leona now that he has finished his sample?    Best phone number to contact: [phone]    Best time to contact: any    Ok to leave a detailed message: Yes

## 2023-07-20 ENCOUNTER — TELEPHONE (OUTPATIENT)
Dept: FAMILY MEDICINE | Facility: CLINIC | Age: 68
End: 2023-07-20
Payer: COMMERCIAL

## 2023-07-20 DIAGNOSIS — R22.43 LOCALIZED SWELLING, MASS AND LUMP, LOWER LIMB, BILATERAL: ICD-10-CM

## 2023-07-20 DIAGNOSIS — K21.00 GASTROESOPHAGEAL REFLUX DISEASE WITH ESOPHAGITIS WITHOUT HEMORRHAGE: ICD-10-CM

## 2023-07-20 DIAGNOSIS — I87.2 VENOUS (PERIPHERAL) INSUFFICIENCY: ICD-10-CM

## 2023-07-20 DIAGNOSIS — E11.9 TYPE 2 DIABETES MELLITUS WITHOUT COMPLICATION, WITHOUT LONG-TERM CURRENT USE OF INSULIN (H): ICD-10-CM

## 2023-07-20 DIAGNOSIS — D36.9 PAPILLOMATOSIS: ICD-10-CM

## 2023-07-20 DIAGNOSIS — E11.42 TYPE 2 DIABETES MELLITUS WITH PERIPHERAL NEUROPATHY (H): ICD-10-CM

## 2023-07-20 DIAGNOSIS — I83.019 VENOUS STASIS ULCER OF RIGHT LOWER LEG WITH EDEMA OF RIGHT LOWER LEG (H): ICD-10-CM

## 2023-07-20 DIAGNOSIS — E53.8 VITAMIN B12 DEFICIENCY (NON ANEMIC): ICD-10-CM

## 2023-07-20 DIAGNOSIS — R60.0 VENOUS STASIS ULCER OF LEFT LOWER LEG WITH EDEMA OF LEFT LOWER LEG (H): ICD-10-CM

## 2023-07-20 DIAGNOSIS — I83.892 VENOUS STASIS ULCER OF LEFT LOWER LEG WITH EDEMA OF LEFT LOWER LEG (H): ICD-10-CM

## 2023-07-20 DIAGNOSIS — E66.01 OBESITY, MORBID, BMI 40.0-49.9 (H): ICD-10-CM

## 2023-07-20 DIAGNOSIS — L85.9 HYPERKERATOSIS OF SKIN: ICD-10-CM

## 2023-07-20 DIAGNOSIS — F41.9 ANXIETY: ICD-10-CM

## 2023-07-20 DIAGNOSIS — L97.929 VENOUS STASIS ULCER OF LEFT LOWER LEG WITH EDEMA OF LEFT LOWER LEG (H): ICD-10-CM

## 2023-07-20 DIAGNOSIS — I89.0 LYMPHEDEMA OF BOTH LOWER EXTREMITIES: ICD-10-CM

## 2023-07-20 DIAGNOSIS — I87.333 VENOUS HYPERTENSION, CHRONIC, WITH ULCER AND INFLAMMATION, BILATERAL (H): ICD-10-CM

## 2023-07-20 DIAGNOSIS — I83.891 VENOUS STASIS ULCER OF RIGHT LOWER LEG WITH EDEMA OF RIGHT LOWER LEG (H): ICD-10-CM

## 2023-07-20 DIAGNOSIS — R60.0 VENOUS STASIS ULCER OF RIGHT LOWER LEG WITH EDEMA OF RIGHT LOWER LEG (H): ICD-10-CM

## 2023-07-20 DIAGNOSIS — G47.00 INSOMNIA, UNSPECIFIED TYPE: ICD-10-CM

## 2023-07-20 DIAGNOSIS — I10 BENIGN ESSENTIAL HYPERTENSION: ICD-10-CM

## 2023-07-20 DIAGNOSIS — M79.3 LIPODERMATOSCLEROSIS OF BOTH LOWER EXTREMITIES: ICD-10-CM

## 2023-07-20 DIAGNOSIS — G47.33 OSA (OBSTRUCTIVE SLEEP APNEA): ICD-10-CM

## 2023-07-20 DIAGNOSIS — L97.919 VENOUS STASIS ULCER OF RIGHT LOWER LEG WITH EDEMA OF RIGHT LOWER LEG (H): ICD-10-CM

## 2023-07-20 DIAGNOSIS — I89.0 ELEPHANTIASIS: ICD-10-CM

## 2023-07-20 DIAGNOSIS — M79.89 LEG SWELLING: ICD-10-CM

## 2023-07-20 DIAGNOSIS — I83.029 VENOUS STASIS ULCER OF LEFT LOWER LEG WITH EDEMA OF LEFT LOWER LEG (H): ICD-10-CM

## 2023-07-20 RX ORDER — METHADONE HYDROCHLORIDE 10 MG/ML
100 CONCENTRATE ORAL DAILY
Status: CANCELLED | OUTPATIENT
Start: 2023-07-20

## 2023-07-20 RX ORDER — ZOLPIDEM TARTRATE 10 MG/1
TABLET ORAL
Qty: 1 TABLET | Status: CANCELLED | OUTPATIENT
Start: 2023-07-20

## 2023-07-20 RX ORDER — AMMONIUM LACTATE 12 G/100G
LOTION TOPICAL DAILY PRN
Qty: 225 G | Status: CANCELLED | OUTPATIENT
Start: 2023-07-20

## 2023-07-20 NOTE — TELEPHONE ENCOUNTER
[facility] phone call message- medication clarification/question:    Full Medication Name: All medications     Question: Patient called stating he is changing over to mail order - would like all medications sent over to the new mail order pharmacy. Fax #: [phone], if able to please send prescription thank you.    [facility]arm[facility] [address] - [address] [facility] AT PORTAL TO [facility] SITES: Yes    OK to leave a message on voice mail? Yes    Primary language: English      needed? No    Call taken on July 20, 2023 at 10:31 AM by Pranav Dias

## 2023-07-21 ENCOUNTER — TELEPHONE (OUTPATIENT)
Dept: VASCULAR SURGERY | Facility: CLINIC | Age: 68
End: 2023-07-21
Payer: COMMERCIAL

## 2023-07-21 DIAGNOSIS — I87.333 VENOUS HYPERTENSION, CHRONIC, WITH ULCER AND INFLAMMATION, BILATERAL (H): ICD-10-CM

## 2023-07-21 DIAGNOSIS — M79.89 LEG SWELLING: ICD-10-CM

## 2023-07-21 DIAGNOSIS — E66.01 OBESITY, MORBID, BMI 40.0-49.9 (H): ICD-10-CM

## 2023-07-21 DIAGNOSIS — L85.9 HYPERKERATOSIS OF SKIN: ICD-10-CM

## 2023-07-21 DIAGNOSIS — I89.0 LYMPHEDEMA OF BOTH LOWER EXTREMITIES: ICD-10-CM

## 2023-07-21 DIAGNOSIS — E11.42 TYPE 2 DIABETES MELLITUS WITH PERIPHERAL NEUROPATHY (H): ICD-10-CM

## 2023-07-21 DIAGNOSIS — R22.43 LOCALIZED SWELLING, MASS AND LUMP, LOWER LIMB, BILATERAL: ICD-10-CM

## 2023-07-21 DIAGNOSIS — M79.3 LIPODERMATOSCLEROSIS OF BOTH LOWER EXTREMITIES: ICD-10-CM

## 2023-07-21 DIAGNOSIS — D36.9 PAPILLOMATOSIS: ICD-10-CM

## 2023-07-21 DIAGNOSIS — I87.2 VENOUS (PERIPHERAL) INSUFFICIENCY: ICD-10-CM

## 2023-07-21 DIAGNOSIS — I89.0 ELEPHANTIASIS: ICD-10-CM

## 2023-07-21 RX ORDER — SODIUM HYPOCHLORITE 1.25 MG/ML
SOLUTION TOPICAL
Qty: 1000 ML | Refills: 3 | Status: SHIPPED | OUTPATIENT
Start: 2023-07-21

## 2023-07-21 RX ORDER — AMMONIUM LACTATE 12 G/100G
LOTION TOPICAL DAILY PRN
Qty: 225 G | Refills: 3 | Status: SHIPPED | OUTPATIENT
Start: 2023-07-21 | End: 2024-04-10

## 2023-07-21 NOTE — TELEPHONE ENCOUNTER
Caller: Patient    Provider: PEEWEE Fuentes    Detailed reason for call: Patient has a new mail order pharmacy: [facility]. Requesting Rx for Dakins solution and ammonium lactate lotion. If needed, FAX: [phone]    Best phone number to contact: [phone]    Best time to contact: Any    Ok to leave a detailed message: Yes    Ok to speak to authorized person if needed: No      (Noted to patient if reason is related to wound or incision, to please send a photo via email or [employer].)

## 2023-07-21 NOTE — TELEPHONE ENCOUNTER
Lotion and dakin's solution reordered to patient's preferred pharmacy.  Left voicemail for patient with update.

## 2023-07-26 DIAGNOSIS — Z53.9 DIAGNOSIS NOT YET DEFINED: Primary | ICD-10-CM

## 2023-07-26 RX ORDER — LANOLIN ALCOHOL/MO/W.PET/CERES
3 CREAM (GRAM) TOPICAL
Qty: 30 TABLET | Refills: 1 | Status: SHIPPED | OUTPATIENT
Start: 2023-07-26

## 2023-07-26 RX ORDER — GENTAMICIN SULFATE 1 MG/G
OINTMENT TOPICAL DAILY
Qty: 30 G | Refills: 3 | Status: SHIPPED | OUTPATIENT
Start: 2023-07-26

## 2023-07-26 RX ORDER — CHLORTHALIDONE 25 MG/1
25 TABLET ORAL DAILY
Qty: 90 TABLET | Refills: 3 | Status: SHIPPED | OUTPATIENT
Start: 2023-07-26 | End: 2024-06-27

## 2023-07-26 RX ORDER — SODIUM HYPOCHLORITE 1.25 MG/ML
SOLUTION TOPICAL
Qty: 1000 ML | Refills: 3 | Status: SHIPPED | OUTPATIENT
Start: 2023-07-26 | End: 2023-10-24

## 2023-07-26 RX ORDER — FAMOTIDINE 20 MG/1
20 TABLET, FILM COATED ORAL 2 TIMES DAILY
Qty: 180 TABLET | Refills: 0 | Status: SHIPPED | OUTPATIENT
Start: 2023-07-26 | End: 2023-10-24

## 2023-07-26 RX ORDER — SPIRONOLACTONE 25 MG/1
25 TABLET ORAL DAILY
Qty: 90 TABLET | Refills: 0 | Status: SHIPPED | OUTPATIENT
Start: 2023-07-26 | End: 2023-11-10

## 2023-07-26 RX ORDER — LOSARTAN POTASSIUM 100 MG/1
100 TABLET ORAL DAILY
Qty: 90 TABLET | Refills: 3 | Status: SHIPPED | OUTPATIENT
Start: 2023-07-26 | End: 2024-06-27

## 2023-07-26 RX ORDER — COVID-19 ANTIGEN TEST
220 KIT MISCELLANEOUS 2 TIMES DAILY PRN
Qty: 120 CAPSULE | Refills: 1 | Status: SHIPPED | OUTPATIENT
Start: 2023-07-26 | End: 2023-09-24

## 2023-07-26 RX ORDER — HYDROXYZINE HYDROCHLORIDE 25 MG/1
12.5-25 TABLET, FILM COATED ORAL 3 TIMES DAILY PRN
Qty: 60 TABLET | Refills: 3 | Status: SHIPPED | OUTPATIENT
Start: 2023-07-26

## 2023-07-26 RX ORDER — ATORVASTATIN CALCIUM 20 MG/1
20 TABLET, FILM COATED ORAL DAILY
Qty: 90 TABLET | Refills: 0 | Status: SHIPPED | OUTPATIENT
Start: 2023-07-26 | End: 2023-10-17

## 2023-07-26 RX ORDER — LANOLIN ALCOHOL/MO/W.PET/CERES
1000 CREAM (GRAM) TOPICAL DAILY
Qty: 90 TABLET | Refills: 0 | Status: SHIPPED | OUTPATIENT
Start: 2023-07-26 | End: 2023-10-24

## 2023-08-02 ENCOUNTER — OFFICE VISIT (OUTPATIENT)
Dept: VASCULAR SURGERY | Facility: CLINIC | Age: 68
End: 2023-08-02
Attending: SPECIALIST
Payer: COMMERCIAL

## 2023-08-02 VITALS
SYSTOLIC BLOOD PRESSURE: 127 MMHG | DIASTOLIC BLOOD PRESSURE: 74 MMHG | HEART RATE: 85 BPM | WEIGHT: 315 LBS | OXYGEN SATURATION: 95 % | BODY MASS INDEX: 51.12 KG/M2 | RESPIRATION RATE: 18 BRPM | TEMPERATURE: 98.2 F

## 2023-08-02 DIAGNOSIS — L85.9 HYPERKERATOSIS OF SKIN: ICD-10-CM

## 2023-08-02 DIAGNOSIS — E11.42 TYPE 2 DIABETES MELLITUS WITH PERIPHERAL NEUROPATHY (H): ICD-10-CM

## 2023-08-02 DIAGNOSIS — I89.0 LYMPHEDEMA OF BOTH LOWER EXTREMITIES: Primary | ICD-10-CM

## 2023-08-02 DIAGNOSIS — M79.3 LIPODERMATOSCLEROSIS OF BOTH LOWER EXTREMITIES: ICD-10-CM

## 2023-08-02 DIAGNOSIS — E66.01 OBESITY, MORBID, BMI 40.0-49.9 (H): ICD-10-CM

## 2023-08-02 DIAGNOSIS — D36.9 PAPILLOMATOSIS: ICD-10-CM

## 2023-08-02 DIAGNOSIS — I87.2 VENOUS (PERIPHERAL) INSUFFICIENCY: ICD-10-CM

## 2023-08-02 DIAGNOSIS — I87.333 VENOUS HYPERTENSION, CHRONIC, WITH ULCER AND INFLAMMATION, BILATERAL (H): ICD-10-CM

## 2023-08-02 DIAGNOSIS — I89.0 ELEPHANTIASIS: ICD-10-CM

## 2023-08-02 PROCEDURE — 97597 DBRDMT OPN WND 1ST 20 CM/<: CPT | Performed by: NURSE PRACTITIONER

## 2023-08-02 PROCEDURE — 97598 DBRDMT OPN WND ADDL 20CM/<: CPT | Performed by: NURSE PRACTITIONER

## 2023-08-02 PROCEDURE — 11042 DBRDMT SUBQ TIS 1ST 20SQCM/<: CPT | Performed by: NURSE PRACTITIONER

## 2023-08-02 ASSESSMENT — PAIN SCALES - GENERAL: PAINLEVEL: NO PAIN (0)

## 2023-08-02 NOTE — PATIENT INSTRUCTIONS
"  Wound care supplies were not ordered or needed today. Home care will order all your supplies    Continue on all blood pressure medications as prescribed    Focus on weight loss and low sodium diet  Keep sodium intake 2.5-4 grams per day    Keep appt with Sleep medicine; they are concerned that your BP is not being controlled due to uncontrolled sleep apnea      To mix Dakin's solution use 500mL bottle of saline and then add 1/2 teaspoon of bleach        Wound Care Instructions    Every other day home care or your family will , Cleanse your bilateral legs and wound(s) with Dilute hibiclens 30cc in 500cc NS.    Then do a light wash of Dakin's solution; focus on the right lateral leg weeping area    Pat Dry with non-sterile gauze    Apply Lotion to the intact skin surrounding your wound and other dry skin locations. Some good lotions include: Remedy Skin Repair Cream, Sarna, Vanicream or Cetaphil    Apply Ammonium Lac Hydrin lotion to the thick scaling crusting areas    Triad paste to the periwound skin on the right leg to protect from all the drainage    Primary Dressing: Silvercel to all the wounds; try the samples of Drawtex on the right lateral leg wound and see if this will \"draw\" out the fluid in the tissues    ABDs or super absorbant pads    Secure with non-sterile roll gauze (4\" x 75\" roll) and tape (1\" roll tape) as needed; avoid adhesive directly on the skin    Compression: high density gray foam to the right lateral leg area secure with rolled gauze; then tubular compression; foam; short stretch bilaterally    Elevation of the legs    Use lymphedema pump twice per day    It is not ok to get your wound wet in the bath or shower      If for some reason you are not able to get your dressing(s) changed as outlined above (due to illness, lack of supplies, lack of help) please do the following: remove old, soiled dressings; wash the wounds with saline; pat dry; apply ABD pad or other absorbant pad and secure " with rolled gauze; avoid tape directly on your skin; Call the clinic as soon as possible to let us know what the current issues are in receiving wound care [phone].      SEEK MEDICAL CARE IF:  You have an increase in swelling, pain, or redness around the wound.  You have an increase in the amount of pus coming from the wound.  There is a bad smell coming from the wound.  The wound appears to be worsening/enlarging  You have a fever greater than 101.5 F      It is ok to continue current wound care treatment/products for the next 2-3 days until new wound care supplies are ordered and arrive. If longer than this please contact our office at [phone].    If you have a 2 layer or 4 layer compression wrap on these are safe to have on for ONLY 7 days. If for some reason you are not able to get the wrap(s) changed (due to illness; lack of supplies, lack of help, lack of transportation) please do the following: unwrap the old 2 or 4 layer compression wrap; avoid using scissors as you could cut your skin and cause wounds; use tubular compression when available. Call to reschedule your home care or clinic visit appointment as soon as possible.    Please NOTE: if you are 15 minutes late to your clinic appointment you will have to be rescheduled. Please call our clinic as soon as possible if you know you will not be able to get to your appointment at [phone].    If you fail to show up to 3 scheduled clinic appointments you will be dismissed from our clinic.              We want to hear from you!  In the next few weeks, you should receive a call or email to complete a survey about your visit at [facility]. Please help us improve your appointment experience by letting us know how we did today. We strive to make your experience good and value any ways in which we could do better.      We value your input and suggestions.    Thank you for choosing the [facility]!      It is  recommended that you do not get your ulcer wet when showering.  Listed below are several ways of keeping it dry when you shower.     1. Wrap it with Press and Seal plastic wrap.  It can be found in the stores where the plastic wraps or tin foil is kept.               2.  Some people take a bath and hang their leg/foot out of the tub.                        3  Put your leg in a plastic bag and tape it on.           4. You can purchase a shower cover for casts at some pharmacies and through the Internet.            5. Take a Bed Bath or wash up at the sink

## 2023-08-02 NOTE — PROGRESS NOTES
"            Follow up Vascular Visit       Date of Service:08/02/23      Chief Complaint: BLE lymphedema and BLE ulcers      Pt returns to [facility] with regards to their BLE lymphedema and BLE ulcers.  They arrive today alone; he tried to walk to the room with a cane however he developed shortness of breath and had to use a w/c the rest of the way. They are currently using silvercel ABD rolled gauze to the wounds. This is being done by home care and family every other day. They are using tubular compression and short stretch; lymphedema pumps for compression. They are feeling well today. Denies fevers, chills. No shortness of breath. Recent CT of tib/fib was negative for osteomyelitis or abscess. 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 Takes chlorthalidone and spironolactone for diuretics. He states his breathing is at baseline today.    Allergies:   Allergies   Allergen Reactions    Lisinopril Swelling     Patient reports \"neck swelling\"  Swelling in throat       Medications:   Current Outpatient Medications:     ammonium lactate (LAC-HYDRIN) 12 % external lotion, Apply topically daily as needed for dry skin With dressing changes, Disp: 225 g, Rfl: 3    atorvastatin (LIPITOR) 20 MG tablet, Take 1 tablet (20 mg) by mouth daily for 90 days, Disp: 90 tablet, Rfl: 0    chlorthalidone (HYGROTON) 25 MG tablet, Take 1 tablet (25 mg) by mouth daily, Disp: 90 tablet, Rfl: 3    cyanocobalamin (VITAMIN B-12) 1000 MCG tablet, Take 1 tablet (1,000 mcg) by mouth daily for 90 days, Disp: 90 tablet, Rfl: 0    famotidine (PEPCID) 20 MG tablet, Take 1 tablet (20 mg) by mouth 2 times daily for 90 days, Disp: 180 tablet, Rfl: 0    gentamicin (GARAMYCIN) 0.1 % external ointment, Apply topically daily, Disp: 30 g, Rfl: 3    hydrOXYzine (ATARAX) 25 MG tablet, Take 0.5-1 tablets (12.5-25 mg) by mouth 3 times daily as needed for anxiety, Disp: 60 tablet, Rfl: 3    losartan " (COZAAR) 100 MG tablet, Take 1 tablet (100 mg) by mouth daily, Disp: 90 tablet, Rfl: 3    melatonin 3 MG tablet, Take 1 tablet (3 mg) by mouth nightly as needed for sleep, Disp: 30 tablet, Rfl: 1    metFORMIN (GLUCOPHAGE) 500 MG tablet, Take 1 tablet (500 mg) by mouth 2 times daily (with meals) for 90 days, Disp: 180 tablet, Rfl: 0    methadone (DOLOPHINE-INTENSOL) 10 MG/ML (HIGH CONC) solution, Take 100 mg by mouth daily, Disp: , Rfl:     naproxen sodium 220 MG capsule, Take 220 mg by mouth 2 times daily as needed (pain), Disp: 120 capsule, Rfl: 1    sodium hypochlorite (QUARTER-STRENGTH DAKINS) external solution, Apply topically every 72 hours for 90 days Use 300mL every 72 hours to wash the bilateral legs and wounds on the legs, Disp: 1000 mL, Rfl: 3    sodium hypochlorite (QUARTER-STRENGTH DAKINS) external solution, Apply topically every 72 hours Use 300mL every 72 hours to wash the bilateral legs and wounds on the legs, Disp: 1000 mL, Rfl: 3    spironolactone (ALDACTONE) 25 MG tablet, Take 1 tablet (25 mg) by mouth daily for 90 days, Disp: 90 tablet, Rfl: 0    zolpidem (AMBIEN) 10 MG tablet, Take tablet by mouth 15 minutes prior to sleep, for Sleep Study, Disp: 1 tablet, Rfl: 0    Current Facility-Administered Medications:     lidocaine (PF) (XYLOCAINE) 1 % injection 10 mL, 10 mL, Intradermal, Once, Gerson Jaquez MD    lidocaine (XYLOCAINE) 2 % external gel, , Topical, Daily PRN, Lucia Reyes MD, Given at 10/19/21 0828    History:   Past Medical History:   Diagnosis Date    COPD (chronic obstructive pulmonary disease) (H)     Diabetes (H)     H/O angioedema 06/15/2020    Formatting of this note might be different from the original. Unexplained angioedema twice, discontinue lisinopril for possible connection to it, though he had used it afterwards with no symptoms    History of tobacco use disorder 08/01/2013    Formatting of this note might be different from the original. Added per documetation     Hypertension     Lymphedema of both lower extremities 12/22/2014    Methadone use 05/08/2012    Obstructive sleep apnea 02/02/2015    Formatting of this note might be different from the original. Epic    Pleural mass 07/02/2013    Uncomplicated asthma        Physical Exam:    /74   Pulse 85   Temp 98.2  F (36.8  C)   Resp 18   Wt (!) 409 lb (185.5 kg)   SpO2 95%   BMI 51.12 kg/m      General:  Patient presents to clinic in no apparent distress.  Head: normocephalic atraumatic  Psychiatric:  Alert and oriented x3.   Respiratory: unlabored breathing; no cough  Integumentary:  Skin is uniformly warm, dry and pink.    Ulcer #1 Location: right lateral leg  Size: 10L x 10W x 0.1depth.  no sinus tract present, Wound base: macerated; weeping; sloughing tissue  no undermining present. Ulcer is partial  thickness. There is heavy drainage. Periwound: no denudement, erythema, induration, maceration or warmth.      Ulcer 2 Location: left lateral leg  Size: 3L x 1W x 1depth.  No sinus tract present, Wound base: red  no undermining present. Wound is full thickness. There is heavy drainage. Periwound: no denudement, erythema, induration, maceration or warmth.        Wound Leg Ulceration (Active)   Number of days: 422       VASC Wound rt lateral weeping (Active)   Pre Size Length 10 06/28/23 0800   Pre Size Width 10 06/28/23 0800   Pre Size Depth 0.1 06/28/23 0800   Pre Total Sq cm 100 06/28/23 0800   Post Size Length 8 11/08/22 0800   Post Size Width 8 11/08/22 0800   Post Size Depth 0.1 11/08/22 0800   Post Total Sq cm 64 11/08/22 0800   Description scattered/weeping 05/16/23 0800   Number of days: 652       VASC Wound left posterior leg (Active)   Pre Size Length 3 08/02/23 0700   Pre Size Width 1 08/02/23 0700   Pre Size Depth 1 08/02/23 0700   Pre Total Sq cm 3 08/02/23 0700   Post Size Length 3 06/28/23 0800   Post Size Width 1.5 06/28/23 0800   Post Size Depth 0.5 06/28/23 0800   Post Total Sq cm 4.5 06/28/23 0800    Description scattered weeping 04/18/23 0700   Number of days: 267       VASC Wound Right medial shin (Active)   Number of days: 239            Circumferential volume measures:          3/10/2023     8:00 AM 4/18/2023     7:00 AM 5/16/2023     8:00 AM 6/28/2023     7:00 AM 8/2/2023     7:00 AM   Circumferential Measures   Right just above MTP 28.5 27.5 25 25 27   Right Ankle 35 35 33 33 35   Right Widest Calf 51.6 51.3 57.5 53 57   Left - just above MTP 27 27.6 24 23.5 27   Left Ankle 33 33.5 32 35 35   Left Widest Calf 49 50 52 53 51   Left Thigh Up 10cm .           Labs:    I personally reviewed the following lab results today and those on care everywhere    CRP   Date Value Ref Range Status   04/23/2021 8.5 (H) 0.0 - 0.8 mg/dL Final      No results found for: SED   Last Renal Panel:  Sodium   Date Value Ref Range Status   06/07/2023 138 136 - 145 mmol/L Final   06/10/2021 142.0 133.0 - 144.0 mmol/L Final     Potassium   Date Value Ref Range Status   06/07/2023 4.2 3.5 - 5.0 mmol/L Final   06/10/2021 4.4 3.4 - 5.3 mmol/L Final     Chloride   Date Value Ref Range Status   06/07/2023 102 98 - 107 mmol/L Final   06/10/2021 100.0 94.0 - 109.0 mmol/L Final     Carbon Dioxide   Date Value Ref Range Status   06/10/2021 33.0 (H) 20.0 - 32.0 mmol/L Final     Carbon Dioxide (CO2)   Date Value Ref Range Status   06/07/2023 28 22 - 31 mmol/L Final     Anion Gap   Date Value Ref Range Status   06/07/2023 8 5 - 18 mmol/L Final     Glucose   Date Value Ref Range Status   06/07/2023 115 70 - 125 mg/dL Final   06/10/2021 112.0 (H) 60.0 - 109.0 mg/dL Final     Urea Nitrogen   Date Value Ref Range Status   06/07/2023 17 8 - 22 mg/dL Final   06/10/2021 16.0 7.0 - 30.0 mg/dL Final     Creatinine   Date Value Ref Range Status   06/07/2023 1.07 0.70 - 1.30 mg/dL Final   06/10/2021 1.0 0.8 - 1.5 mg/dL Final     GFR Estimate   Date Value Ref Range Status   06/07/2023 75 >60 mL/min/1.73m2 Final     Comment:     eGFR calculated using 2021  CKD-EPI equation.   04/24/2021 >60 >60 mL/min/1.73m2 Final     Calcium   Date Value Ref Range Status   06/07/2023 9.0 8.5 - 10.5 mg/dL Final   06/10/2021 9.5 8.5 - 10.4 mg/dL Final     Albumin   Date Value Ref Range Status   06/07/2023 3.3 (L) 3.5 - 5.0 g/dL Final      Lab Results   Component Value Date    WBC 6.1 06/07/2023     Lab Results   Component Value Date    RBC 3.93 06/07/2023     Lab Results   Component Value Date    HGB 9.5 06/07/2023     Lab Results   Component Value Date    HCT 31.7 06/07/2023     No components found for: MCT  Lab Results   Component Value Date    MCV 81 06/07/2023     Lab Results   Component Value Date    MCH 24.2 06/07/2023     Lab Results   Component Value Date    MCHC 30.0 06/07/2023     Lab Results   Component Value Date    RDW 14.7 06/07/2023     Lab Results   Component Value Date     06/07/2023      Lab Results   Component Value Date    A1C 6.8 02/20/2023    A1C 6.2 07/14/2022    A1C 6.7 04/21/2022    A1C 6.2 04/24/2021    A1C 6.2 04/24/2021      TSH   Date Value Ref Range Status   10/18/2022 1.76 0.30 - 5.00 uIU/mL Final      No results found for: VITDT                Impression:  Encounter Diagnoses   Name Primary?    Lymphedema of both lower extremities Yes    Venous hypertension, chronic, with ulcer and inflammation, bilateral (H)     Elephantiasis     Venous (peripheral) insufficiency     Lipodermatosclerosis of both lower extremities     Papillomatosis     Obesity, morbid, BMI 40.0-49.9 (H)     Type 2 diabetes mellitus with peripheral neuropathy (H)     Hyperkeratosis of skin                    Are any of these ulcers new today: No; Location: na    Assessment/Plan:          1. Debridement: Nursing staff removed the old dressing and cleanse the wound(s) with specified solution. After discussion of risk factors and verbal consent was obtained 2% Lidocaine HCL jelly was applied, under clean conditions, the right lateral leg ulceration(s) were debrided using currette.  Devitalized and nonviable tissue, along with any fibrin and slough, was removed to improve granulation tissue formation, stimulate wound healing, decrease overall bacteria load, disrupt biofilm formation and decrease edge senescence.  Total excisional debridement was 100 sq cm from the epidermis/dermis area with a depth of 0.1 cm.   Ulcers were improved afterwards and .  Measures were unchanged after debridement.    Nursing staff removed the old dressing and cleanse the wound(s) with specified solution. After discussion of risk factors and verbal consent was obtained 2% Lidocaine HCL jelly was applied, under clean conditions, the left lateral leg ulceration(s) were debrided using currette. Devitalized and nonviable tissue, along with any fibrin and slough, was removed to improve granulation tissue formation, stimulate wound healing, decrease overall bacteria load, disrupt biofilm formation and decrease edge senescence.  Total excisional debridement was 3 sq cm from the epidermis/dermis area and down into the subcutaneous tissue with a depth of 1cm.   Ulcers were improved afterwards and .  Measures were unchanged after debridement.         2.  Ulcer treatment: ulcer treatment will include irrigation and dressings to promote autolytic debridement which will include:will continue palliative management of the wounds and legs; using first hibiclens to wash the legs; then dakin's solution to reduce risk of recurrent infections/cellulitis; will apply silvercel; ABD or super absorbant pads; rolled gauze; change every other day with home care or family. If for some reason the patient is not able to get their dressing(s) changed as outlined above (due to illness, lack of supplies, lack of help) please do the following: remove old, soiled dressings; wash the ulcers with saline; pat dry; apply ABD pad or other absorbant pad and secure with rolled gauze; avoid tape directly on your skin; patient instructed to call  the clinic as soon as possible to let us know what the current issues are in receiving ulcer care. Stable            3. Edema: will continue with elevation; tubular compression; foam; short stretch; diuretics; lymphedema pump; pt admits to not using this daily; encouraged daily use. The compression wraps were applied today in clinic.     If a 2 layer or 4 layer compression wrap is being used; these are safe to have on for ONLY 7 days. If for some reason the patient is not able to get the wrap(s) changed (due to illness; lack of supplies, lack of help, lack of transportation) please do the following: unwrap the old 2 or 4 layer compression wrap; avoid using scissors as you could cut your skin and cause ulcers; use tubular compression when available. Call to reschedule your home care or clinic visit appointment as soon as possible.  Stable            4. Nutrition: focus on weight loss; low sodium diet           5. Offloading: na           6. Shortness of breath: pt reports this is at baseline; discussed referral to cardiology; he does not feel this is warranted and does not want this referral at this time; educated pt on the importance of reporting worsening s/sx he was in agreement. Continue on diuretics as prescribed.           7. DG: has sleep study scheduled next month     Patient will follow up with me in 4 weeks for reevaluation. They were instructed to call the clinic sooner with any signs or symptoms of infection or any further questions/concerns. Answered all questions.          Kim Fuentes DNP, RN, CNP, CWOCN, CFCN, CLT  [facility]   [phone]        This note was electronically signed by Kim Fuentes NP

## 2023-08-02 NOTE — PROGRESS NOTES
Compression Applied to Bilateral  Tubigrip Size J/k  foam padding ankle to knee and short stretch

## 2023-08-04 DIAGNOSIS — Z53.9 DIAGNOSIS NOT YET DEFINED: Primary | ICD-10-CM

## 2023-08-07 ENCOUNTER — TELEPHONE (OUTPATIENT)
Dept: FAMILY MEDICINE | Facility: CLINIC | Age: 68
End: 2023-08-07
Payer: COMMERCIAL

## 2023-08-07 NOTE — TELEPHONE ENCOUNTER
Prior Authorization needed on:  8/7/23    Medication:  Hydroxyzine Dose:  25mg    Pharmacy confirmed as   [facility] [address] [facility] Portal to Registered [facility]  [place] [address]  Phone: [phone] Fax: [phone]  : Yes    Insurance Name:  [facility] care rufino  Insurance Phone: [phone]  Insurance Patient ID: [id number]    Alternatives Suggested:      Miguelina Villarreal MA August 7, 2023 at 8:46 AM

## 2023-08-09 NOTE — TELEPHONE ENCOUNTER
Prior Authorization Approval    Medication: HYDROXYZINE HCL 25 MG PO TABS  Authorization Effective Date: 1/1/2023  Authorization Expiration Date: 12/31/2023  Approved Dose/Quantity: 90/30ds   Reference #: [id number]   Insurance Company: [employer] - Phone [phone] Fax [phone]  Which Pharmacy is filling the prescription: [facility] - [address] - [facility] AT [facility] TO [facility]  Pharmacy Notified: Yes  Patient Notified: Yes

## 2023-08-09 NOTE — TELEPHONE ENCOUNTER
PA Initiation    Medication: HYDROXYZINE HCL 25 MG PO TABS  Insurance Company: [employer] - Phone [phone] Fax [phone]  Pharmacy Filling the Rx: [facility] - [address] - [facility] AT [facility]AL TO [facility]  Filling Pharmacy Phone: [phone]  Filling Pharmacy Fax: [phone]  Start Date: 8/9/2023

## 2023-08-16 ENCOUNTER — TELEPHONE (OUTPATIENT)
Dept: FAMILY MEDICINE | Facility: CLINIC | Age: 68
End: 2023-08-16

## 2023-08-16 NOTE — TELEPHONE ENCOUNTER
Called Hany and provided verbal order for those requested. Confirmed Hany is from [facility]. Cindi QURESHI

## 2023-08-16 NOTE — TELEPHONE ENCOUNTER
[facility] phone call message- general phone call:    Reason for call: Hany calling from [facility] calling to give verbal orders.   Skilled nursing once a week for 9 weeks for wound care   Caller stated nothing further, please call and advise thank you.     Return call needed: Yes    OK to leave a message on voice mail? Yes    Primary language: English      needed? No    Call taken on August 16, 2023 at 3:16 PM by Diomedes Bah

## 2023-08-17 ENCOUNTER — THERAPY VISIT (OUTPATIENT)
Dept: SLEEP MEDICINE | Facility: CLINIC | Age: 68
End: 2023-08-17
Payer: COMMERCIAL

## 2023-08-17 DIAGNOSIS — G47.33 OBSTRUCTIVE SLEEP APNEA: ICD-10-CM

## 2023-08-17 PROCEDURE — 95810 POLYSOM 6/> YRS 4/> PARAM: CPT | Performed by: INTERNAL MEDICINE

## 2023-08-18 NOTE — PROGRESS NOTES
Diagnostic PSG completed per provider order.  Patient did not meet criteria for PAP therapy.    transcutaneous C02 monitoring

## 2023-08-18 NOTE — PATIENT INSTRUCTIONS
[place] [facility]    1. Your sleep study will be reviewed by a sleep physician within the next few days.     2. Please follow up in the sleep clinic as scheduled, or, make an appointment with your sleep provider to be seen within two weeks to discuss the results of the sleep study.    3. If you have any questions or problems with your treatment plan, please contact your sleep clinic provider at [phone] to further manage your condition.    4. Please review your attached medication list, and, at your follow-up appointment advise your sleep clinic provider about any changes.    5. Go to [email or web address] for more information about your sleep problems.    SEBAS AGUIRRE, RPSGT  August 18, 2023

## 2023-08-25 DIAGNOSIS — Z53.9 DIAGNOSIS NOT YET DEFINED: Primary | ICD-10-CM

## 2023-08-25 LAB — SLPCOMP: NORMAL

## 2023-08-25 PROCEDURE — 99207 PR MD RECERTIFICATION HHA PT: CPT

## 2023-08-28 ENCOUNTER — OFFICE VISIT (OUTPATIENT)
Dept: SLEEP MEDICINE | Facility: CLINIC | Age: 68
End: 2023-08-28
Payer: COMMERCIAL

## 2023-08-28 ENCOUNTER — TELEPHONE (OUTPATIENT)
Dept: SLEEP MEDICINE | Facility: CLINIC | Age: 68
End: 2023-08-28

## 2023-08-28 VITALS
BODY MASS INDEX: 50.38 KG/M2 | OXYGEN SATURATION: 98 % | WEIGHT: 315 LBS | DIASTOLIC BLOOD PRESSURE: 68 MMHG | HEART RATE: 85 BPM | SYSTOLIC BLOOD PRESSURE: 140 MMHG

## 2023-08-28 DIAGNOSIS — G47.33 OSA (OBSTRUCTIVE SLEEP APNEA): Primary | ICD-10-CM

## 2023-08-28 DIAGNOSIS — R06.89 HYPOVENTILATION: ICD-10-CM

## 2023-08-28 PROCEDURE — 99215 OFFICE O/P EST HI 40 MIN: CPT | Performed by: NURSE PRACTITIONER

## 2023-08-28 NOTE — NURSING NOTE
"Chief Complaint   Patient presents with    Study Results       Initial BP (!) 152/72   Pulse 85   Wt (!) 182.8 kg (403 lb 1 oz)   SpO2 98%   BMI 50.38 kg/m   Estimated body mass index is 50.38 kg/m  as calculated from the following:    Height as of 5/16/23: 1.905 m (6' 3\").    Weight as of this encounter: 182.8 kg (403 lb 1 oz).    Medication Reconciliation: complete    Neck circumference:     DME:     JAZIEL Sena  [facility]      "
[place] [facility] scheduling contact number given to patient. AVS handout given to patient.     Domen[facility]    
Jadyn Brito PA-C

## 2023-08-28 NOTE — TELEPHONE ENCOUNTER
Reason for Call:  Other call back    Detailed comments: [employer] called and needs patient to get a new cpap machine from Felicity Wiley CNP at [facility]     States that needs to be send to [facility] cpap co.      Fax Number:  [phone]    If questions, please call [employer] at [phone]     Reference Number:  [id number]    Please also call patient back to notify completed.    Thank you.    Phone Number Patient can be reached at: Other phone number:  [phone] *    Best Time: any    Can we leave a detailed message on this number? YES    Call taken on 8/28/2023 at 12:39 PM by Talia Hudson

## 2023-08-28 NOTE — PATIENT INSTRUCTIONS
Your Body mass index is 50.38 kg/m .  Weight management is a personal decision.  If you are interested in exploring weight loss strategies, the following discussion covers the approaches that may be successful. Body mass index (BMI) is one way to tell whether you are at a healthy weight, overweight, or obese. It measures your weight in relation to your height.  A BMI of 18.5 to 24.9 is in the healthy range. A person with a BMI of 25 to 29.9 is considered overweight, and someone with a BMI of 30 or greater is considered obese. More than two-thirds of American adults are considered overweight or obese.  Being overweight or obese increases the risk for further weight gain. Excess weight may lead to heart disease and diabetes.  Creating and following plans for healthy eating and physical activity may help you improve your health.  Weight control is part of healthy lifestyle and includes exercise, emotional health, and healthy eating habits. Careful eating habits lifelong are the mainstay of weight control. Though there are significant health benefits from weight loss, long-term weight loss with diet alone may be very difficult to achieve- studies show long-term success with dietary management in less than 10% of people. Attaining a healthy weight may be especially difficult to achieve in those with severe obesity. In some cases, medications, devices and surgical management might be considered.  What can you do?  If you are overweight or obese and are interested in methods for weight loss, you should discuss this with your provider.   Consider reducing daily calorie intake by 500 calories.   Keep a food journal.   Avoiding skipping meals, consider cutting portions instead.    Diet combined with exercise helps maintain muscle while optimizing fat loss. Strength training is particularly important for building and maintaining muscle mass. Exercise helps reduce stress, increase energy, and improves fitness. Increasing  exercise without diet control, however, may not burn enough calories to loose weight.     Start walking three days a week 10-20 minutes at a time  Work towards walking thirty minutes five days a week   Eventually, increase the speed of your walking for 1-2 minutes at time    In addition, we recommend that you review healthy lifestyles and methods for weight loss available through the National Institutes of Health patient information sites:  http://win.niddk.nih.gov/publications/index.htm    And look into health and wellness programs that may be available through your health insurance provider, employer, local community center, or [employer].      Shaheen to follow up with Primary Care provider regarding elevated blood pressure.

## 2023-08-28 NOTE — PROGRESS NOTES
"Chief Complaint   Patient presents with    Study Results       Shaheen Sepulveda is a 68 year old male who returns to [facility] for review of sleep testing results. He presented with symptoms suggestive of obstructive sleep apnea.    Estimated body mass index is 51.12 kg/m  as calculated from the following:    Height as of 5/16/23: 1.905 m (6' 3\").    Weight as of 8/2/23: 185.5 kg (409 lb).  Total score - [place]: 10 (2/21/2023  9:17 AM)  Total Score: 7 (2/21/2023  9:18 AM)    Polysomnography - Test date 8/17/2023    Sleep latency 6.0 minutes with Zolpidem.  REM achieved with latency of 199.5 minutes.  Sleep efficiency 75.1%. Total sleep time 388.5 minutes.    Sleep architecture:  Stage 1, 8.5% (5%), stage 2, 55.2% (45-55%), stage 3, 29.0% (15-20%), stage REM, 7.3% (20-25%).  AHI was 15.0, without desaturations. RDI 15.1.  REM AHI 44.2, consistent with severe REM DG.       ? Respiratory rate and pattern - was notable for slow respiratory rate 8-12.   ? Sustained Sleep Associated Hypoventilation - Transcutaneous carbon dioxide monitoring was used and significant hypoventilation was present with a maximum change from 47 to 60 mmHg and 85 minutes at or greater than 55 mmHg.   ? Sleep Associated Hypoxemia - (Greater than 5 minutes O2 sat at or below 88%) was not present. Baseline oxygen saturation was 92.7%. Lowest oxygen saturation was 81.0%. Time spent less than or equal to 88% was 2.6 minutes. Time spent less than or equal to 89% was 10.2 minutes.      Shaheen Sepulveda reports that he slept Good .     Results were reviewed in detail today with Shaheen and a copy given to him for his records.    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     Priority: Medium    Morbid " obesity (H) 02/20/2023     Priority: Medium    Vitamin B12 deficiency (non anemic) 12/14/2022     Priority: Medium    Disease of circulatory system 08/17/2022     Priority: Medium    Chronic pain syndrome 08/05/2022     Priority: Medium    Cellulitis of right lower extremity 06/06/2022     Priority: Medium    Type 2 diabetes mellitus with peripheral neuropathy (H)      Priority: Medium    Open wound of lower limb 09/22/2021     Priority: Medium    Lymphedema 05/07/2021     Priority: Medium    Hypervolemia 04/23/2021     Priority: Medium    H/O angioedema 06/15/2020     Priority: Medium     Formatting of this note might be different from the original.  Unexplained angioedema twice, discontinue lisinopril for possible connection to it, though he had used it afterwards with no symptoms      Obstructive sleep apnea 02/02/2015     Priority: Medium     Formatting of this note might be different from the original.  Epic      Lymphedema of both lower extremities 12/22/2014     Priority: Medium    History of tobacco use disorder 08/01/2013     Priority: Medium     Formatting of this note might be different from the original.  Added per documetation      Pleural mass 07/02/2013     Priority: Medium    Hip joint replacement status 06/05/2012     Priority: Medium    Hypertension 05/08/2012     Priority: Medium    Methadone use 05/08/2012     Priority: Medium    Osteoarthritis of hip 03/23/2012     Priority: Medium    Osteoarthritis of knee 03/23/2012     Priority: Medium        BP (!) 140/68   Pulse 85   Wt (!) 182.8 kg (403 lb 1 oz)   SpO2 98%   BMI 50.38 kg/m      Impression/Plan:  The major findings are hypoventilation with inappropriately slow respiratory rate and moderate obstructive sleep apnea    Moderate Obstructive Sleep Apnea.   Sleep associated hypoxemia was not present.     1.  Treatment options discussed today including auto-CPAP at 5-15 cmH2O or oral appliance therapy.  Elected treatment with auto-CPAP at 5-15  cmH2O.  Instructed patient to use his auto titrate CPAP minimum of 4 hours each day, 70% of the time.  Optimally patient should use his auto titrate CPAP 100% of his sleep time in order to gain maximum benefit.  2.  Sleep Therapy Management  3.  Weight management to BMI of 30.0  4.  Referral was made for patient to see Pulmonology Medicine Services  5.  Order was given for patient to obtain AGB'S   6.  Order was given for patient to have a pulmonary function test conducted  7.  Recommend patient optimize his sleep schedule as well as his sleep hygiene practices in order to mitigate any further sleep disruption  8.  He will follow up with me in about 8 week(s).     Forty minutes spent with patient, all of which were spent face-to-face counseling, consulting, coordinating plan of care.      HAYDEE Dalton CNP    CC:  Ban Orr,

## 2023-08-31 NOTE — TELEPHONE ENCOUNTER
Order faxed to number provided. Unable to get through to rep on phone number provided to notify fax was sent.     Ivania MARTÍNEZ RN  [facility]

## 2023-09-05 ENCOUNTER — OFFICE VISIT (OUTPATIENT)
Dept: VASCULAR SURGERY | Facility: CLINIC | Age: 68
End: 2023-09-05
Attending: NURSE PRACTITIONER
Payer: COMMERCIAL

## 2023-09-05 VITALS
OXYGEN SATURATION: 95 % | WEIGHT: 315 LBS | SYSTOLIC BLOOD PRESSURE: 125 MMHG | DIASTOLIC BLOOD PRESSURE: 71 MMHG | BODY MASS INDEX: 50.55 KG/M2 | HEART RATE: 80 BPM | TEMPERATURE: 98 F

## 2023-09-05 DIAGNOSIS — E11.42 TYPE 2 DIABETES MELLITUS WITH PERIPHERAL NEUROPATHY (H): ICD-10-CM

## 2023-09-05 DIAGNOSIS — D36.9 PAPILLOMATOSIS: ICD-10-CM

## 2023-09-05 DIAGNOSIS — I87.333 VENOUS HYPERTENSION, CHRONIC, WITH ULCER AND INFLAMMATION, BILATERAL (H): ICD-10-CM

## 2023-09-05 DIAGNOSIS — E66.01 OBESITY, MORBID, BMI 40.0-49.9 (H): ICD-10-CM

## 2023-09-05 DIAGNOSIS — L85.9 HYPERKERATOSIS OF SKIN: ICD-10-CM

## 2023-09-05 DIAGNOSIS — I87.2 VENOUS (PERIPHERAL) INSUFFICIENCY: ICD-10-CM

## 2023-09-05 DIAGNOSIS — M79.89 LEG SWELLING: ICD-10-CM

## 2023-09-05 DIAGNOSIS — M79.3 LIPODERMATOSCLEROSIS OF BOTH LOWER EXTREMITIES: ICD-10-CM

## 2023-09-05 DIAGNOSIS — I89.0 LYMPHEDEMA OF BOTH LOWER EXTREMITIES: Primary | ICD-10-CM

## 2023-09-05 DIAGNOSIS — I89.0 ELEPHANTIASIS: ICD-10-CM

## 2023-09-05 PROCEDURE — 11042 DBRDMT SUBQ TIS 1ST 20SQCM/<: CPT | Performed by: NURSE PRACTITIONER

## 2023-09-05 PROCEDURE — 11045 DBRDMT SUBQ TISS EACH ADDL: CPT | Performed by: NURSE PRACTITIONER

## 2023-09-05 ASSESSMENT — PAIN SCALES - GENERAL: PAINLEVEL: NO PAIN (0)

## 2023-09-05 NOTE — PROGRESS NOTES
Compression Applied to Bilateral  Short Stretch

## 2023-09-05 NOTE — PATIENT INSTRUCTIONS
"  Wound care supplies were not ordered or needed today. Home care will order all your supplies    Continue on all blood pressure medications as prescribed    Focus on weight loss and low sodium diet  Keep sodium intake 2.5-4 grams per day    Keep appt with Sleep medicine; they are concerned that your BP is not being controlled due to uncontrolled sleep apnea      To mix Dakin's solution use 500mL bottle of saline and then add 1/2 teaspoon of bleach        Wound Care Instructions    Every other day home care or your family will , Cleanse your bilateral legs and wound(s) with Dilute hibiclens 30cc in 500cc NS.    Then do a light wash of Dakin's solution; focus on the right lateral leg weeping area    Pat Dry with non-sterile gauze    Apply Lotion to the intact skin surrounding your wound and other dry skin locations. Some good lotions include: Remedy Skin Repair Cream, Sarna, Vanicream or Cetaphil    Apply Ammonium Lac Hydrin lotion to the thick scaling crusting areas    Triad paste to the periwound skin on the right leg to protect from all the drainage    Primary Dressing: Silvercel to all the wounds; try the samples of Drawtex on the right lateral leg wound and see if this will \"draw\" out the fluid in the tissues    ABDs or super absorbant pads    Secure with non-sterile roll gauze (4\" x 75\" roll) and tape (1\" roll tape) as needed; avoid adhesive directly on the skin    Compression: high density gray foam to the right lateral leg area secure with rolled gauze; then tubular compression; foam; short stretch bilaterally    Elevation of the legs    Use lymphedema pump twice per day    It is not ok to get your wound wet in the bath or shower      If for some reason you are not able to get your dressing(s) changed as outlined above (due to illness, lack of supplies, lack of help) please do the following: remove old, soiled dressings; wash the wounds with saline; pat dry; apply ABD pad or other absorbant pad and secure " with rolled gauze; avoid tape directly on your skin; Call the clinic as soon as possible to let us know what the current issues are in receiving wound care [phone].      SEEK MEDICAL CARE IF:  You have an increase in swelling, pain, or redness around the wound.  You have an increase in the amount of pus coming from the wound.  There is a bad smell coming from the wound.  The wound appears to be worsening/enlarging  You have a fever greater than 101.5 F      It is ok to continue current wound care treatment/products for the next 2-3 days until new wound care supplies are ordered and arrive. If longer than this please contact our office at [phone].    If you have a 2 layer or 4 layer compression wrap on these are safe to have on for ONLY 7 days. If for some reason you are not able to get the wrap(s) changed (due to illness; lack of supplies, lack of help, lack of transportation) please do the following: unwrap the old 2 or 4 layer compression wrap; avoid using scissors as you could cut your skin and cause wounds; use tubular compression when available. Call to reschedule your home care or clinic visit appointment as soon as possible.    Please NOTE: if you are 15 minutes late to your clinic appointment you will have to be rescheduled. Please call our clinic as soon as possible if you know you will not be able to get to your appointment at [phone].    If you fail to show up to 3 scheduled clinic appointments you will be dismissed from our clinic.              We want to hear from you!  In the next few weeks, you should receive a call or email to complete a survey about your visit at [facility]. Please help us improve your appointment experience by letting us know how we did today. We strive to make your experience good and value any ways in which we could do better.      We value your input and suggestions.    Thank you for choosing the [facility]!      It is  recommended that you do not get your ulcer wet when showering.  Listed below are several ways of keeping it dry when you shower.     1. Wrap it with Press and Seal plastic wrap.  It can be found in the stores where the plastic wraps or tin foil is kept.               2.  Some people take a bath and hang their leg/foot out of the tub.                        3  Put your leg in a plastic bag and tape it on.           4. You can purchase a shower cover for casts at some pharmacies and through the Internet.            5. Take a Bed Bath or wash up at the sink

## 2023-09-05 NOTE — PROGRESS NOTES
"            Follow up Vascular Visit       Date of Service:09/05/23      Chief Complaint: BLE swelling and ulcers      Pt returns to [facility] with regards to their BLE swelling and ulcers.  They arrive today alone. They are currently using dakin's solution; am lactin to scaling; silvercel; super absorbant pads to the wounds. This is being done by home care or family every other day. They are using tubular compression; high density foam; short stretch for compression. They are feeling well today. Denies fevers, chills. No shortness of breath. He is inconsistent with using his lymphedema pumps. He had sleep study done and demonstrated hypoventilation with slow RR and moderate DG. It was recommended that he use CPAP; he has not gotten this yet on order. .     Allergies:   Allergies   Allergen Reactions    Lisinopril Swelling     Patient reports \"neck swelling\"  Swelling in throat       Medications:   Current Outpatient Medications:     ammonium lactate (LAC-HYDRIN) 12 % external lotion, Apply topically daily as needed for dry skin With dressing changes, Disp: 225 g, Rfl: 3    atorvastatin (LIPITOR) 20 MG tablet, Take 1 tablet (20 mg) by mouth daily for 90 days, Disp: 90 tablet, Rfl: 0    chlorthalidone (HYGROTON) 25 MG tablet, Take 1 tablet (25 mg) by mouth daily, Disp: 90 tablet, Rfl: 3    cyanocobalamin (VITAMIN B-12) 1000 MCG tablet, Take 1 tablet (1,000 mcg) by mouth daily for 90 days, Disp: 90 tablet, Rfl: 0    famotidine (PEPCID) 20 MG tablet, Take 1 tablet (20 mg) by mouth 2 times daily for 90 days, Disp: 180 tablet, Rfl: 0    gentamicin (GARAMYCIN) 0.1 % external ointment, Apply topically daily, Disp: 30 g, Rfl: 3    hydrOXYzine (ATARAX) 25 MG tablet, Take 0.5-1 tablets (12.5-25 mg) by mouth 3 times daily as needed for anxiety, Disp: 60 tablet, Rfl: 3    losartan (COZAAR) 100 MG tablet, Take 1 tablet (100 mg) by mouth daily, Disp: 90 tablet, Rfl: 3    melatonin 3 MG tablet, Take 1 tablet (3 " mg) by mouth nightly as needed for sleep, Disp: 30 tablet, Rfl: 1    metFORMIN (GLUCOPHAGE) 500 MG tablet, Take 1 tablet (500 mg) by mouth 2 times daily (with meals) for 90 days, Disp: 180 tablet, Rfl: 0    methadone (DOLOPHINE-INTENSOL) 10 MG/ML (HIGH CONC) solution, Take 100 mg by mouth daily, Disp: , Rfl:     naproxen sodium 220 MG capsule, Take 220 mg by mouth 2 times daily as needed (pain), Disp: 120 capsule, Rfl: 1    sodium hypochlorite (QUARTER-STRENGTH DAKINS) external solution, Apply topically every 72 hours for 90 days Use 300mL every 72 hours to wash the bilateral legs and wounds on the legs, Disp: 1000 mL, Rfl: 3    sodium hypochlorite (QUARTER-STRENGTH DAKINS) external solution, Apply topically every 72 hours Use 300mL every 72 hours to wash the bilateral legs and wounds on the legs, Disp: 1000 mL, Rfl: 3    spironolactone (ALDACTONE) 25 MG tablet, Take 1 tablet (25 mg) by mouth daily for 90 days, Disp: 90 tablet, Rfl: 0    zolpidem (AMBIEN) 10 MG tablet, Take tablet by mouth 15 minutes prior to sleep, for Sleep Study, Disp: 1 tablet, Rfl: 0    Current Facility-Administered Medications:     lidocaine (PF) (XYLOCAINE) 1 % injection 10 mL, 10 mL, Intradermal, Once, Gerson Jaquez MD    lidocaine (XYLOCAINE) 2 % external gel, , Topical, Daily PRN, Lucia Reyes MD, Given at 10/19/21 0828    History:   Past Medical History:   Diagnosis Date    COPD (chronic obstructive pulmonary disease) (H)     Diabetes (H)     H/O angioedema 06/15/2020    Formatting of this note might be different from the original. Unexplained angioedema twice, discontinue lisinopril for possible connection to it, though he had used it afterwards with no symptoms    History of tobacco use disorder 08/01/2013    Formatting of this note might be different from the original. Added per documetation    Hypertension     Lymphedema of both lower extremities 12/22/2014    Methadone use 05/08/2012    Obstructive sleep apnea 02/02/2015     Formatting of this note might be different from the original. Epic    Pleural mass 07/02/2013    Uncomplicated asthma        Physical Exam:    /71   Pulse 80   Temp 98  F (36.7  C)   Wt (!) 404 lb 6.4 oz (183.4 kg)   SpO2 95%   BMI 50.55 kg/m      General:  Patient presents to clinic in no apparent distress.  Head: normocephalic atraumatic  Psychiatric:  Alert and oriented x3.   Respiratory: unlabored breathing; no cough  Integumentary:  Skin is uniformly warm, dry and pink.    Ulcer #1 Location: right leg  Size: 8L x 10W x 0.1depth.  no sinus tract present, Wound base: weeping; macerated; slough tissue; red underneath  no undermining present. Ulcer is full thickness. There is heavy drainage. Periwound: no denudement, erythema, induration, maceration or warmth.      Ulcer #2 Location: left lateral leg  Size: [id number].  no sinus tract present, Wound base: red   no undermining present. Ulcer is full thickness. There is heavy drainage. Periwound: no denudement, erythema, induration, maceration or warmth.      Wound Leg Ulceration (Active)   Number of days: 456       VASC Wound rt lateral weeping (Active)   Pre Size Length 8 09/05/23 0700   Pre Size Width 1 09/05/23 0700   Pre Size Depth 0.8 09/05/23 0700   Pre Total Sq cm 8 09/05/23 0700   Post Size Length 10 08/02/23 0700   Post Size Width 10 08/02/23 0700   Post Size Depth 0.1 08/02/23 0700   Post Total Sq cm 100 08/02/23 0700   Description weeping 09/05/23 0700   Number of days: 686       VASC Wound left posterior leg (Active)   Pre Size Length 3.5 09/05/23 0700   Pre Size Width 1 09/05/23 0700   Pre Size Depth 1 09/05/23 0700   Pre Total Sq cm 3.5 09/05/23 0700   Post Size Length 3 06/28/23 0800   Post Size Width 1.5 06/28/23 0800   Post Size Depth 0.5 06/28/23 0800   Post Total Sq cm 4.5 06/28/23 0800   Description scattered weeping 04/18/23 0700   Number of days: 301       VASC Wound Right medial shin (Active)   Number of days: 273             Circumferential volume measures:          4/18/2023     7:00 AM 5/16/2023     8:00 AM 6/28/2023     7:00 AM 8/2/2023     7:00 AM 9/5/2023     8:00 AM   Circumferential Measures   Right just above MTP 27.5 25 25 27 29   Right Ankle 35 33 33 35 38   Right Widest Calf 51.3 57.5 53 57 54   Left - just above MTP 27.6 24 23.5 27 28   Left Ankle 33.5 32 35 35 36   Left Widest Calf 50 52 53 51 48       Labs:    I personally reviewed the following lab results today and those on care everywhere    CRP   Date Value Ref Range Status   04/23/2021 8.5 (H) 0.0 - 0.8 mg/dL Final      No results found for: SED   Last Renal Panel:  Sodium   Date Value Ref Range Status   06/07/2023 138 136 - 145 mmol/L Final   06/10/2021 142.0 133.0 - 144.0 mmol/L Final     Potassium   Date Value Ref Range Status   06/07/2023 4.2 3.5 - 5.0 mmol/L Final   06/10/2021 4.4 3.4 - 5.3 mmol/L Final     Chloride   Date Value Ref Range Status   06/07/2023 102 98 - 107 mmol/L Final   06/10/2021 100.0 94.0 - 109.0 mmol/L Final     Carbon Dioxide   Date Value Ref Range Status   06/10/2021 33.0 (H) 20.0 - 32.0 mmol/L Final     Carbon Dioxide (CO2)   Date Value Ref Range Status   06/07/2023 28 22 - 31 mmol/L Final     Anion Gap   Date Value Ref Range Status   06/07/2023 8 5 - 18 mmol/L Final     Glucose   Date Value Ref Range Status   06/07/2023 115 70 - 125 mg/dL Final   06/10/2021 112.0 (H) 60.0 - 109.0 mg/dL Final     Urea Nitrogen   Date Value Ref Range Status   06/07/2023 17 8 - 22 mg/dL Final   06/10/2021 16.0 7.0 - 30.0 mg/dL Final     Creatinine   Date Value Ref Range Status   06/07/2023 1.07 0.70 - 1.30 mg/dL Final   06/10/2021 1.0 0.8 - 1.5 mg/dL Final     GFR Estimate   Date Value Ref Range Status   06/07/2023 75 >60 mL/min/1.73m2 Final     Comment:     eGFR calculated using 2021 CKD-EPI equation.   04/24/2021 >60 >60 mL/min/1.73m2 Final     Calcium   Date Value Ref Range Status   06/07/2023 9.0 8.5 - 10.5 mg/dL Final   06/10/2021 9.5 8.5 - 10.4  mg/dL Final     Albumin   Date Value Ref Range Status   06/07/2023 3.3 (L) 3.5 - 5.0 g/dL Final      Lab Results   Component Value Date    WBC 6.1 06/07/2023     Lab Results   Component Value Date    RBC 3.93 06/07/2023     Lab Results   Component Value Date    HGB 9.5 06/07/2023     Lab Results   Component Value Date    HCT 31.7 06/07/2023     No components found for: MCT  Lab Results   Component Value Date    MCV 81 06/07/2023     Lab Results   Component Value Date    MCH 24.2 06/07/2023     Lab Results   Component Value Date    MCHC 30.0 06/07/2023     Lab Results   Component Value Date    RDW 14.7 06/07/2023     Lab Results   Component Value Date     06/07/2023      Lab Results   Component Value Date    A1C 6.8 02/20/2023    A1C 6.2 07/14/2022    A1C 6.7 04/21/2022    A1C 6.2 04/24/2021    A1C 6.2 04/24/2021      TSH   Date Value Ref Range Status   10/18/2022 1.76 0.30 - 5.00 uIU/mL Final      No results found for: VITDT                Impression:  Encounter Diagnoses   Name Primary?    Lymphedema of both lower extremities Yes    Venous hypertension, chronic, with ulcer and inflammation, bilateral (H)     Elephantiasis     Venous (peripheral) insufficiency     Lipodermatosclerosis of both lower extremities     Papillomatosis     Obesity, morbid, BMI 40.0-49.9 (H)     Type 2 diabetes mellitus with peripheral neuropathy (H)     Hyperkeratosis of skin     Leg swelling                    Are any of these ulcers new today: No; Location: na    Assessment/Plan:          1. Debridement: Nursing staff removed the old dressing and cleanse the wound(s) with specified solution. After discussion of risk factors and verbal consent was obtained 2% Lidocaine HCL jelly was applied, under clean conditions, the BLE ulceration(s) were debrided using currette. Devitalized and nonviable tissue, along with any fibrin and slough, was removed to improve granulation tissue formation, stimulate wound healing, decrease overall  bacteria load, disrupt biofilm formation and decrease edge senescence.  Total excisional debridement was 83.5 sq cm from the epidermis/dermis area and into the subcutaneous tissue with a depth of 0.1-1 cm.   Ulcers were improved afterwards and .  Measures were unchanged after debridement.       2.  Ulcer treatment: ulcer treatment will include irrigation and dressings to promote autolytic debridement which will include:will continue palliative management of his swelling and wounds; will use dilute hibiclens to first wash; then dakin's soak; then silvercel; gauze; rolled gauze; change every other day by family or home care.  If for some reason the patient is not able to get their dressing(s) changed as outlined above (due to illness, lack of supplies, lack of help) please do the following: remove old, soiled dressings; wash the ulcers with saline; pat dry; apply ABD pad or other absorbant pad and secure with rolled gauze; avoid tape directly on your skin; patient instructed to call the clinic as soon as possible to let us know what the current issues are in receiving ulcer care. Stable            3. Edema: will continue elevation; lymphedema wraps; encouraged lymphedema pumps  . The compression wraps were applied today in clinic.     If a 2 layer or 4 layer compression wrap is being used; these are safe to have on for ONLY 7 days. If for some reason the patient is not able to get the wrap(s) changed (due to illness; lack of supplies, lack of help, lack of transportation) please do the following: unwrap the old 2 or 4 layer compression wrap; avoid using scissors as you could cut your skin and cause ulcers; use tubular compression when available. Call to reschedule your home care or clinic visit appointment as soon as possible.             4. Nutrition: without significant weight loss this will be a continued and progressive issue; low sodium diabetic diet           5. Offloading: na     Patient will follow up  with me in 4 weeks for reevaluation. They were instructed to call the clinic sooner with any signs or symptoms of infection or any further questions/concerns. Answered all questions.          Kim Fuentes DNP, RN, CNP, CWOCN, CFCN, CLT  [facility]   [phone]        This note was electronically signed by Kim Fuentes NP

## 2023-09-10 ENCOUNTER — HEALTH MAINTENANCE LETTER (OUTPATIENT)
Age: 68
End: 2023-09-10

## 2023-09-13 ENCOUNTER — OFFICE VISIT (OUTPATIENT)
Dept: NURSING | Facility: CLINIC | Age: 68
End: 2023-09-13
Attending: NURSE PRACTITIONER
Payer: COMMERCIAL

## 2023-09-13 VITALS — WEIGHT: 315 LBS | BODY MASS INDEX: 50.87 KG/M2 | OXYGEN SATURATION: 96 % | HEART RATE: 72 BPM

## 2023-09-13 DIAGNOSIS — R06.89 HYPOVENTILATION: ICD-10-CM

## 2023-09-13 LAB
DLCOUNC-%PRED-PRE: 89 %
DLCOUNC-PRE: 24.91 ML/MIN/MMHG
DLCOUNC-PRED: 27.77 ML/MIN/MMHG
ERV-%PRED-PRE: 65 %
ERV-PRE: 0.93 L
ERV-PRED: 1.41 L
EXPTIME-PRE: 9.14 SEC
FEF2575-%PRED-POST: 42 %
FEF2575-%PRED-PRE: 30 %
FEF2575-POST: 1.04 L/SEC
FEF2575-PRE: 0.75 L/SEC
FEF2575-PRED: 2.46 L/SEC
FEFMAX-%PRED-PRE: 74 %
FEFMAX-PRE: 6.6 L/SEC
FEFMAX-PRED: 8.92 L/SEC
FEV1-%PRED-PRE: 63 %
FEV1-PRE: 2.05 L
FEV1FEV6-PRE: 60 %
FEV1FEV6-PRED: 78 %
FEV1FVC-PRE: 55 %
FEV1FVC-PRED: 76 %
FEV1SVC-PRE: 50 %
FEV1SVC-PRED: 65 %
FIFMAX-PRE: 4.99 L/SEC
FVC-%PRED-PRE: 86 %
FVC-PRE: 3.7 L
FVC-PRED: 4.25 L
IC-%PRED-PRE: 89 %
IC-PRE: 3.21 L
IC-PRED: 3.57 L
MEP-PRE: 142 CMH2O
MIP-PRE: -131 CMH2O
VA-%PRED-PRE: 83 %
VA-PRE: 5.86 L
VC-%PRED-PRE: 83 %
VC-PRE: 4.13 L
VC-PRED: 4.98 L

## 2023-09-13 PROCEDURE — 94060 EVALUATION OF WHEEZING: CPT | Performed by: INTERNAL MEDICINE

## 2023-09-13 PROCEDURE — 94729 DIFFUSING CAPACITY: CPT | Performed by: INTERNAL MEDICINE

## 2023-09-13 NOTE — PROGRESS NOTES
PFT Lab Note: Pre/post paige and DLCO done per Felicity HUBBARD CNP. MIP/MEP added per protocol due to hypoventilation hx. Unable to do plethysmography due to patient's size.

## 2023-09-15 NOTE — TELEPHONE ENCOUNTER
Complete sleep study and consult faxed to [facility], [phone].    Ivania MARTÍNEZ RN  [facility]

## 2023-10-03 ENCOUNTER — OFFICE VISIT (OUTPATIENT)
Dept: VASCULAR SURGERY | Facility: CLINIC | Age: 68
End: 2023-10-03
Attending: NURSE PRACTITIONER
Payer: COMMERCIAL

## 2023-10-03 VITALS
SYSTOLIC BLOOD PRESSURE: 142 MMHG | WEIGHT: 315 LBS | HEART RATE: 81 BPM | DIASTOLIC BLOOD PRESSURE: 73 MMHG | BODY MASS INDEX: 49.87 KG/M2 | TEMPERATURE: 98 F | OXYGEN SATURATION: 98 % | RESPIRATION RATE: 18 BRPM

## 2023-10-03 DIAGNOSIS — I83.019 VENOUS STASIS ULCER OF RIGHT LOWER LEG WITH EDEMA OF RIGHT LOWER LEG (H): Primary | ICD-10-CM

## 2023-10-03 DIAGNOSIS — E66.01 OBESITY, MORBID, BMI 40.0-49.9 (H): ICD-10-CM

## 2023-10-03 DIAGNOSIS — I83.891 VENOUS STASIS ULCER OF RIGHT LOWER LEG WITH EDEMA OF RIGHT LOWER LEG (H): Primary | ICD-10-CM

## 2023-10-03 DIAGNOSIS — L97.919 VENOUS STASIS ULCER OF RIGHT LOWER LEG WITH EDEMA OF RIGHT LOWER LEG (H): Primary | ICD-10-CM

## 2023-10-03 DIAGNOSIS — I83.892 VENOUS STASIS ULCER OF LEFT LOWER LEG WITH EDEMA OF LEFT LOWER LEG (H): ICD-10-CM

## 2023-10-03 DIAGNOSIS — I83.029 VENOUS STASIS ULCER OF LEFT LOWER LEG WITH EDEMA OF LEFT LOWER LEG (H): ICD-10-CM

## 2023-10-03 DIAGNOSIS — I87.2 VENOUS (PERIPHERAL) INSUFFICIENCY: ICD-10-CM

## 2023-10-03 DIAGNOSIS — R60.0 VENOUS STASIS ULCER OF LEFT LOWER LEG WITH EDEMA OF LEFT LOWER LEG (H): ICD-10-CM

## 2023-10-03 DIAGNOSIS — M79.3 LIPODERMATOSCLEROSIS OF BOTH LOWER EXTREMITIES: ICD-10-CM

## 2023-10-03 DIAGNOSIS — R60.0 VENOUS STASIS ULCER OF RIGHT LOWER LEG WITH EDEMA OF RIGHT LOWER LEG (H): Primary | ICD-10-CM

## 2023-10-03 DIAGNOSIS — D36.9 PAPILLOMATOSIS: ICD-10-CM

## 2023-10-03 DIAGNOSIS — I89.0 LYMPHEDEMA OF BOTH LOWER EXTREMITIES: ICD-10-CM

## 2023-10-03 DIAGNOSIS — E11.42 TYPE 2 DIABETES MELLITUS WITH PERIPHERAL NEUROPATHY (H): ICD-10-CM

## 2023-10-03 DIAGNOSIS — L97.929 VENOUS STASIS ULCER OF LEFT LOWER LEG WITH EDEMA OF LEFT LOWER LEG (H): ICD-10-CM

## 2023-10-03 DIAGNOSIS — I87.333 VENOUS HYPERTENSION, CHRONIC, WITH ULCER AND INFLAMMATION, BILATERAL (H): ICD-10-CM

## 2023-10-03 DIAGNOSIS — I89.0 ELEPHANTIASIS: ICD-10-CM

## 2023-10-03 PROCEDURE — 11042 DBRDMT SUBQ TIS 1ST 20SQCM/<: CPT | Performed by: NURSE PRACTITIONER

## 2023-10-03 PROCEDURE — 11045 DBRDMT SUBQ TISS EACH ADDL: CPT | Performed by: NURSE PRACTITIONER

## 2023-10-03 NOTE — PROGRESS NOTES
"            Follow up Vascular Visit       Date of Service:10/03/23      Chief Complaint: BLE severe lymphedema with BLE ulcers      Pt returns to [facility] with regards to their BLE severe lymphedema with BLE ulcers.  They arrive today alone; walked to the room with a walker. They are currently using dilute hibiclens; then dakin's wash; then silvercel; ABD; rolled gauze to the wounds. This is being done by home care and family every other day. They are using tubular compression; foam; short stretch for compression. They are feeling well today. Denies fevers, chills. No shortness of breath. He is not using his lymphedema pump consistently. He is working on getting cpap machine. He follows with Dr. Penaloza with Oncology for lymphoma; this is just being monitored for now; will see again in December. He is supposed to make appt for PFTs and pulmonology this has not been scheduled yet.     Allergies:   Allergies   Allergen Reactions    Lisinopril Swelling     Patient reports \"neck swelling\"  Swelling in throat       Medications:   Current Outpatient Medications:     ammonium lactate (LAC-HYDRIN) 12 % external lotion, Apply topically daily as needed for dry skin With dressing changes, Disp: 225 g, Rfl: 3    atorvastatin (LIPITOR) 20 MG tablet, Take 1 tablet (20 mg) by mouth daily for 90 days, Disp: 90 tablet, Rfl: 0    chlorthalidone (HYGROTON) 25 MG tablet, Take 1 tablet (25 mg) by mouth daily, Disp: 90 tablet, Rfl: 3    cyanocobalamin (VITAMIN B-12) 1000 MCG tablet, Take 1 tablet (1,000 mcg) by mouth daily for 90 days, Disp: 90 tablet, Rfl: 0    famotidine (PEPCID) 20 MG tablet, Take 1 tablet (20 mg) by mouth 2 times daily for 90 days, Disp: 180 tablet, Rfl: 0    gentamicin (GARAMYCIN) 0.1 % external ointment, Apply topically daily, Disp: 30 g, Rfl: 3    hydrOXYzine (ATARAX) 25 MG tablet, Take 0.5-1 tablets (12.5-25 mg) by mouth 3 times daily as needed for anxiety, Disp: 60 tablet, Rfl: 3    losartan " (COZAAR) 100 MG tablet, Take 1 tablet (100 mg) by mouth daily, Disp: 90 tablet, Rfl: 3    melatonin 3 MG tablet, Take 1 tablet (3 mg) by mouth nightly as needed for sleep, Disp: 30 tablet, Rfl: 1    metFORMIN (GLUCOPHAGE) 500 MG tablet, Take 1 tablet (500 mg) by mouth 2 times daily (with meals) for 90 days, Disp: 180 tablet, Rfl: 0    methadone (DOLOPHINE-INTENSOL) 10 MG/ML (HIGH CONC) solution, Take 100 mg by mouth daily, Disp: , Rfl:     sodium hypochlorite (QUARTER-STRENGTH DAKINS) external solution, Apply topically every 72 hours for 90 days Use 300mL every 72 hours to wash the bilateral legs and wounds on the legs, Disp: 1000 mL, Rfl: 3    sodium hypochlorite (QUARTER-STRENGTH DAKINS) external solution, Apply topically every 72 hours Use 300mL every 72 hours to wash the bilateral legs and wounds on the legs, Disp: 1000 mL, Rfl: 3    spironolactone (ALDACTONE) 25 MG tablet, Take 1 tablet (25 mg) by mouth daily for 90 days, Disp: 90 tablet, Rfl: 0    zolpidem (AMBIEN) 10 MG tablet, Take tablet by mouth 15 minutes prior to sleep, for Sleep Study, Disp: 1 tablet, Rfl: 0    Current Facility-Administered Medications:     lidocaine (PF) (XYLOCAINE) 1 % injection 10 mL, 10 mL, Intradermal, Once, Gerson Jaquez MD    lidocaine (XYLOCAINE) 2 % external gel, , Topical, Daily PRN, Lucia Reyes MD, Given at 10/19/21 0828    History:   Past Medical History:   Diagnosis Date    COPD (chronic obstructive pulmonary disease) (H)     Diabetes (H)     H/O angioedema 06/15/2020    Formatting of this note might be different from the original. Unexplained angioedema twice, discontinue lisinopril for possible connection to it, though he had used it afterwards with no symptoms    History of tobacco use disorder 08/01/2013    Formatting of this note might be different from the original. Added per documetation    Hypertension     Lymphedema of both lower extremities 12/22/2014    Methadone use 05/08/2012    Obstructive sleep  apnea 02/02/2015    Formatting of this note might be different from the original. Epic    Pleural mass 07/02/2013    Uncomplicated asthma        Physical Exam:    Wt (!) 399 lb (181 kg)   BMI 49.87 kg/m      General:  Patient presents to clinic in no apparent distress.  Head: normocephalic atraumatic  Psychiatric:  Alert and oriented x3.   Respiratory: unlabored breathing; no cough  Integumentary:  Skin is uniformly warm, dry and pink.    Ulcer #1 Location: right lateral leg  Size: 8L x 10W x 0.1depth.  no sinus tract present, Wound base: macerated sloughing tissue  no undermining present. Ulcer is full and partial thickness. There is heavy drainage. Periwound: no denudement, erythema, induration, maceration or warmth.      Ulcer #2 Location: left  lateral leg  Size: 4x0.5x0.5cm depth.  no sinus tract present, Wound base: red; 30% slough  no undermining present. Ulcer is full and partial thickness. There is heavy drainage. Periwound: no denudement, erythema, induration, maceration or warmth.      Mild scaling      Wound Leg Ulceration (Active)   Number of days: 484       VASC Wound rt lateral weeping (Active)   Pre Size Length 8 10/03/23 0700   Pre Size Width 10 10/03/23 0700   Pre Size Depth 0.1 10/03/23 0700   Pre Total Sq cm 80 10/03/23 0700   Post Size Length 10 08/02/23 0700   Post Size Width 10 08/02/23 0700   Post Size Depth 0.1 08/02/23 0700   Post Total Sq cm 100 08/02/23 0700   Description weeping 09/05/23 0700   Number of days: 714       VASC Wound left posterior leg (Active)   Pre Size Length 4 10/03/23 0700   Pre Size Width 0.5 10/03/23 0700   Pre Size Depth 0.5 10/03/23 0700   Pre Total Sq cm 2 10/03/23 0700   Post Size Length 3 06/28/23 0800   Post Size Width 1.5 06/28/23 0800   Post Size Depth 0.5 06/28/23 0800   Post Total Sq cm 4.5 06/28/23 0800   Description scattered weeping 04/18/23 0700   Number of days: 329       VASC Wound Right medial shin (Active)   Number of days: 301             Circumferential volume measures:          4/18/2023     7:00 AM 5/16/2023     8:00 AM 6/28/2023     7:00 AM 8/2/2023     7:00 AM 9/5/2023     8:00 AM   Circumferential Measures   Right just above MTP 27.5 25 25 27 29   Right Ankle 35 33 33 35 38   Right Widest Calf 51.3 57.5 53 57 54   Left - just above MTP 27.6 24 23.5 27 28   Left Ankle 33.5 32 35 35 36   Left Widest Calf 50 52 53 51 48       Labs:    I personally reviewed the following lab results today and those on care everywhere    CRP   Date Value Ref Range Status   04/23/2021 8.5 (H) 0.0 - 0.8 mg/dL Final      No results found for: SED   Last Renal Panel:  Sodium   Date Value Ref Range Status   06/07/2023 138 136 - 145 mmol/L Final   06/10/2021 142.0 133.0 - 144.0 mmol/L Final     Potassium   Date Value Ref Range Status   06/07/2023 4.2 3.5 - 5.0 mmol/L Final   06/10/2021 4.4 3.4 - 5.3 mmol/L Final     Chloride   Date Value Ref Range Status   06/07/2023 102 98 - 107 mmol/L Final   06/10/2021 100.0 94.0 - 109.0 mmol/L Final     Carbon Dioxide   Date Value Ref Range Status   06/10/2021 33.0 (H) 20.0 - 32.0 mmol/L Final     Carbon Dioxide (CO2)   Date Value Ref Range Status   06/07/2023 28 22 - 31 mmol/L Final     Anion Gap   Date Value Ref Range Status   06/07/2023 8 5 - 18 mmol/L Final     Glucose   Date Value Ref Range Status   06/07/2023 115 70 - 125 mg/dL Final   06/10/2021 112.0 (H) 60.0 - 109.0 mg/dL Final     Urea Nitrogen   Date Value Ref Range Status   06/07/2023 17 8 - 22 mg/dL Final   06/10/2021 16.0 7.0 - 30.0 mg/dL Final     Creatinine   Date Value Ref Range Status   06/07/2023 1.07 0.70 - 1.30 mg/dL Final   06/10/2021 1.0 0.8 - 1.5 mg/dL Final     GFR Estimate   Date Value Ref Range Status   06/07/2023 75 >60 mL/min/1.73m2 Final     Comment:     eGFR calculated using 2021 CKD-EPI equation.   04/24/2021 >60 >60 mL/min/1.73m2 Final     Calcium   Date Value Ref Range Status   06/07/2023 9.0 8.5 - 10.5 mg/dL Final   06/10/2021 9.5 8.5 - 10.4  mg/dL Final     Albumin   Date Value Ref Range Status   06/07/2023 3.3 (L) 3.5 - 5.0 g/dL Final      Lab Results   Component Value Date    WBC 6.1 06/07/2023     Lab Results   Component Value Date    RBC 3.93 06/07/2023     Lab Results   Component Value Date    HGB 9.5 06/07/2023     Lab Results   Component Value Date    HCT 31.7 06/07/2023     No components found for: MCT  Lab Results   Component Value Date    MCV 81 06/07/2023     Lab Results   Component Value Date    MCH 24.2 06/07/2023     Lab Results   Component Value Date    MCHC 30.0 06/07/2023     Lab Results   Component Value Date    RDW 14.7 06/07/2023     Lab Results   Component Value Date     06/07/2023      Lab Results   Component Value Date    A1C 6.8 02/20/2023    A1C 6.2 07/14/2022    A1C 6.7 04/21/2022    A1C 6.2 04/24/2021    A1C 6.2 04/24/2021      TSH   Date Value Ref Range Status   10/18/2022 1.76 0.30 - 5.00 uIU/mL Final      No results found for: VITDT                Impression:  Encounter Diagnoses   Name Primary?    Venous stasis ulcer of right lower leg with edema of right lower leg (H) Yes    Lymphedema of both lower extremities     Venous hypertension, chronic, with ulcer and inflammation, bilateral (H)     Elephantiasis     Venous (peripheral) insufficiency     Lipodermatosclerosis of both lower extremities     Papillomatosis     Obesity, morbid, BMI 40.0-49.9 (H)     Type 2 diabetes mellitus with peripheral neuropathy (H)     Venous stasis ulcer of left lower leg with edema of left lower leg (H)                    Are any of these ulcers new today: No; Location: na    Assessment/Plan:          1. Debridement: Nursing staff removed the old dressing and cleanse the wound(s) with specified solution. After discussion of risk factors and verbal consent was obtained 2% Lidocaine HCL jelly was applied, under clean conditions, the BLE ulceration(s) were debrided using currette. Devitalized and nonviable tissue, along with any fibrin and  slough, was removed to improve granulation tissue formation, stimulate wound healing, decrease overall bacteria load, disrupt biofilm formation and decrease edge senescence.  Total excisional debridement was 82 sq cm from the epidermis/dermis area and into the subcutaneous tissue with a depth of 0.1-0.5 cm.   Ulcers were improved afterwards and .  Measures were unchanged after debridement.       2.  Ulcer treatment: ulcer treatment will include irrigation and dressings to promote autolytic debridement which will include:will continue palliative management of the wounds and swelling; using first dilute hibiclens; then dakins solution (to help reduce odor and risk for infection); then am lactin to any scaling; then silvercel to the wounds; cover with ABD or super absorbant pads; change every other day If for some reason the patient is not able to get their dressing(s) changed as outlined above (due to illness, lack of supplies, lack of help) please do the following: remove old, soiled dressings; wash the ulcers with saline; pat dry; apply ABD pad or other absorbant pad and secure with rolled gauze; avoid tape directly on your skin; patient instructed to call the clinic as soon as possible to let us know what the current issues are in receiving ulcer care. Stable            3. Edema: will continue with lymphedema wraps; encouraged elevation and lymphedema pump bid. The compression wraps were applied today in clinic.     If a 2 layer or 4 layer compression wrap is being used; these are safe to have on for ONLY 7 days. If for some reason the patient is not able to get the wrap(s) changed (due to illness; lack of supplies, lack of help, lack of transportation) please do the following: unwrap the old 2 or 4 layer compression wrap; avoid using scissors as you could cut your skin and cause ulcers; use tubular compression when available. Call to reschedule your home care or clinic visit appointment as soon as  possible.  Stable            4. Nutrition: focus on weight loss; low sodium diet           5. Offloading: na          6. Pulmonology: reminded pt to make appt he states he already did his PFTs; provided him phone number for scheduling of pulmonology clinic in [place]          7. DG: still awaiting cpap machine should be arriving shortly; educated pt on the importance of using this machine consistently to help with healing.     Patient will follow up with me in 4 weeks for reevaluation. They were instructed to call the clinic sooner with any signs or symptoms of infection or any further questions/concerns. Answered all questions.          Kim Fuentes DNP, RN, CNP, CWOCN, CFCN, CLT  [facility]   [phone]        This note was electronically signed by Kim Fuentes NP

## 2023-10-03 NOTE — PATIENT INSTRUCTIONS
"Please call [phone] to schedule a pulmonary consult at a location that would be convenient for you       Wound care supplies were not ordered or needed today. Home care will order all your supplies    Continue on all blood pressure medications as prescribed    Focus on weight loss and low sodium diet  Keep sodium intake 2.5-4 grams per day        To mix Dakin's solution use 500mL bottle of saline and then add 1/2 teaspoon of bleach        Wound Care Instructions    Every other day home care or your family will , Cleanse your bilateral legs and wound(s) with Dilute hibiclens 30cc in 500cc NS.    Then do a light wash of Dakin's solution; focus on the right lateral leg weeping area    Pat Dry with non-sterile gauze    Apply Lotion to the intact skin surrounding your wound and other dry skin locations. Some good lotions include: Remedy Skin Repair Cream, Sarna, Vanicream or Cetaphil    Apply Ammonium Lac Hydrin lotion to the thick scaling crusting areas    Triad paste to the periwound skin on the right leg to protect from all the drainage    Primary Dressing: Silvercel to all the wounds; try the samples of Drawtex on the right lateral leg wound and see if this will \"draw\" out the fluid in the tissues    ABDs or super absorbant pads    Secure with non-sterile roll gauze (4\" x 75\" roll) and tape (1\" roll tape) as needed; avoid adhesive directly on the skin    Compression: high density gray foam to the right lateral leg area secure with rolled gauze; then tubular compression; foam; short stretch bilaterally    Elevation of the legs    Use lymphedema pump twice per day    It is not ok to get your wound wet in the bath or shower      If for some reason you are not able to get your dressing(s) changed as outlined above (due to illness, lack of supplies, lack of help) please do the following: remove old, soiled dressings; wash the wounds with saline; pat dry; apply ABD pad or other absorbant pad and secure with rolled " gauze; avoid tape directly on your skin; Call the clinic as soon as possible to let us know what the current issues are in receiving wound care [phone].      SEEK MEDICAL CARE IF:  You have an increase in swelling, pain, or redness around the wound.  You have an increase in the amount of pus coming from the wound.  There is a bad smell coming from the wound.  The wound appears to be worsening/enlarging  You have a fever greater than 101.5 F      It is ok to continue current wound care treatment/products for the next 2-3 days until new wound care supplies are ordered and arrive. If longer than this please contact our office at [phone].    If you have a 2 layer or 4 layer compression wrap on these are safe to have on for ONLY 7 days. If for some reason you are not able to get the wrap(s) changed (due to illness; lack of supplies, lack of help, lack of transportation) please do the following: unwrap the old 2 or 4 layer compression wrap; avoid using scissors as you could cut your skin and cause wounds; use tubular compression when available. Call to reschedule your home care or clinic visit appointment as soon as possible.    Please NOTE: if you are 15 minutes late to your clinic appointment you will have to be rescheduled. Please call our clinic as soon as possible if you know you will not be able to get to your appointment at [phone].    If you fail to show up to 3 scheduled clinic appointments you will be dismissed from our clinic.              We want to hear from you!  In the next few weeks, you should receive a call or email to complete a survey about your visit at [facility]. Please help us improve your appointment experience by letting us know how we did today. We strive to make your experience good and value any ways in which we could do better.      We value your input and suggestions.    Thank you for choosing the [facility]!      It is recommended that  you do not get your ulcer wet when showering.  Listed below are several ways of keeping it dry when you shower.     1. Wrap it with Press and Seal plastic wrap.  It can be found in the stores where the plastic wraps or tin foil is kept.               2.  Some people take a bath and hang their leg/foot out of the tub.                        3  Put your leg in a plastic bag and tape it on.           4. You can purchase a shower cover for casts at some pharmacies and through the Internet.            5. Take a Bed Bath or wash up at the sink

## 2023-10-17 DIAGNOSIS — E11.9 TYPE 2 DIABETES MELLITUS WITHOUT COMPLICATION, WITHOUT LONG-TERM CURRENT USE OF INSULIN (H): ICD-10-CM

## 2023-10-17 NOTE — TELEPHONE ENCOUNTER
[facility] phone call message- medication clarification/question:    Full Medication Name: atorvastatin (LIPITOR) 20 MG tablet     Question: Patient called requesting for refill, if able to fill please send to pharmacy thank you.    [facility] - [facility] AT PORTAL TO REGISTERED [facility] SITES: Yes    OK to leave a message on voice mail? Yes    Primary language: English      needed? No    Call taken on October 17, 2023 at 1:04 PM by Pranav Dias

## 2023-10-18 RX ORDER — ATORVASTATIN CALCIUM 20 MG/1
20 TABLET, FILM COATED ORAL DAILY
Qty: 90 TABLET | Refills: 3 | Status: SHIPPED | OUTPATIENT
Start: 2023-10-18 | End: 2024-08-05

## 2023-10-20 DIAGNOSIS — Z53.9 DIAGNOSIS NOT YET DEFINED: Primary | ICD-10-CM

## 2023-10-20 PROCEDURE — G0179 MD RECERTIFICATION HHA PT: HCPCS | Performed by: FAMILY MEDICINE

## 2023-11-07 ENCOUNTER — OFFICE VISIT (OUTPATIENT)
Dept: VASCULAR SURGERY | Facility: CLINIC | Age: 68
End: 2023-11-07
Attending: NURSE PRACTITIONER
Payer: COMMERCIAL

## 2023-11-07 VITALS
HEART RATE: 76 BPM | OXYGEN SATURATION: 98 % | SYSTOLIC BLOOD PRESSURE: 141 MMHG | BODY MASS INDEX: 49.82 KG/M2 | RESPIRATION RATE: 20 BRPM | TEMPERATURE: 98 F | WEIGHT: 315 LBS | DIASTOLIC BLOOD PRESSURE: 73 MMHG

## 2023-11-07 DIAGNOSIS — M79.3 LIPODERMATOSCLEROSIS OF BOTH LOWER EXTREMITIES: ICD-10-CM

## 2023-11-07 DIAGNOSIS — E66.01 OBESITY, MORBID, BMI 40.0-49.9 (H): ICD-10-CM

## 2023-11-07 DIAGNOSIS — E11.42 TYPE 2 DIABETES MELLITUS WITH PERIPHERAL NEUROPATHY (H): ICD-10-CM

## 2023-11-07 DIAGNOSIS — D36.9 PAPILLOMATOSIS: ICD-10-CM

## 2023-11-07 DIAGNOSIS — I87.2 VENOUS (PERIPHERAL) INSUFFICIENCY: ICD-10-CM

## 2023-11-07 DIAGNOSIS — B35.1 ONYCHOMYCOSIS: ICD-10-CM

## 2023-11-07 DIAGNOSIS — I89.0 LYMPHEDEMA OF BOTH LOWER EXTREMITIES: Primary | ICD-10-CM

## 2023-11-07 DIAGNOSIS — I89.0 ELEPHANTIASIS: ICD-10-CM

## 2023-11-07 DIAGNOSIS — I87.333 VENOUS HYPERTENSION, CHRONIC, WITH ULCER AND INFLAMMATION, BILATERAL (H): ICD-10-CM

## 2023-11-07 PROCEDURE — 11042 DBRDMT SUBQ TIS 1ST 20SQCM/<: CPT | Performed by: NURSE PRACTITIONER

## 2023-11-07 PROCEDURE — 11721 DEBRIDE NAIL 6 OR MORE: CPT | Mod: Q9 | Performed by: NURSE PRACTITIONER

## 2023-11-07 PROCEDURE — 11045 DBRDMT SUBQ TISS EACH ADDL: CPT | Performed by: NURSE PRACTITIONER

## 2023-11-07 ASSESSMENT — PAIN SCALES - GENERAL: PAINLEVEL: NO PAIN (0)

## 2023-11-07 NOTE — PATIENT INSTRUCTIONS
"Please call [phone] to schedule a pulmonary consult at a location that would be convenient for you       Wound care supplies were not ordered or needed today. Home care will order all your supplies    Continue on all blood pressure medications as prescribed    Focus on weight loss and low sodium diet  Keep sodium intake 2.5-4 grams per day        To mix Dakin's solution use 500mL bottle of saline and then add 1/2 teaspoon of bleach        Wound Care Instructions    Every other day home care or your family will , Cleanse your bilateral legs and wound(s) with Dilute hibiclens 30cc in 500cc NS.    Then do a light wash of Dakin's solution; focus on the right lateral leg weeping area    Pat Dry with non-sterile gauze    Apply Lotion to the intact skin surrounding your wound and other dry skin locations. Some good lotions include: Remedy Skin Repair Cream, Sarna, Vanicream or Cetaphil    Apply Ammonium Lac Hydrin lotion to the thick scaling crusting areas    Triad paste to the periwound skin on the right leg to protect from all the drainage    Primary Dressing: Silvercel to all the wounds; try the samples of Drawtex on the right lateral leg wound and see if this will \"draw\" out the fluid in the tissues    ABDs or super absorbant pads    Secure with non-sterile roll gauze (4\" x 75\" roll) and tape (1\" roll tape) as needed; avoid adhesive directly on the skin    Compression: high density gray foam to the right lateral leg area secure with rolled gauze; then tubular compression; foam; short stretch bilaterally    Elevation of the legs    Use lymphedema pump twice per day    It is not ok to get your wound wet in the bath or shower      If for some reason you are not able to get your dressing(s) changed as outlined above (due to illness, lack of supplies, lack of help) please do the following: remove old, soiled dressings; wash the wounds with saline; pat dry; apply ABD pad or other absorbant pad and secure with rolled " gauze; avoid tape directly on your skin; Call the clinic as soon as possible to let us know what the current issues are in receiving wound care [phone].      SEEK MEDICAL CARE IF:  You have an increase in swelling, pain, or redness around the wound.  You have an increase in the amount of pus coming from the wound.  There is a bad smell coming from the wound.  The wound appears to be worsening/enlarging  You have a fever greater than 101.5 F      It is ok to continue current wound care treatment/products for the next 2-3 days until new wound care supplies are ordered and arrive. If longer than this please contact our office at [phone].    If you have a 2 layer or 4 layer compression wrap on these are safe to have on for ONLY 7 days. If for some reason you are not able to get the wrap(s) changed (due to illness; lack of supplies, lack of help, lack of transportation) please do the following: unwrap the old 2 or 4 layer compression wrap; avoid using scissors as you could cut your skin and cause wounds; use tubular compression when available. Call to reschedule your home care or clinic visit appointment as soon as possible.    Please NOTE: if you are 15 minutes late to your clinic appointment you will have to be rescheduled. Please call our clinic as soon as possible if you know you will not be able to get to your appointment at [phone].    If you fail to show up to 3 scheduled clinic appointments you will be dismissed from our clinic.              We want to hear from you!  In the next few weeks, you should receive a call or email to complete a survey about your visit at [facility]. Please help us improve your appointment experience by letting us know how we did today. We strive to make your experience good and value any ways in which we could do better.      We value your input and suggestions.    Thank you for choosing the [facility]!      It is recommended that  you do not get your ulcer wet when showering.  Listed below are several ways of keeping it dry when you shower.     1. Wrap it with Press and Seal plastic wrap.  It can be found in the stores where the plastic wraps or tin foil is kept.               2.  Some people take a bath and hang their leg/foot out of the tub.                        3  Put your leg in a plastic bag and tape it on.           4. You can purchase a shower cover for casts at some pharmacies and through the Internet.            5. Take a Bed Bath or wash up at the sink

## 2023-11-07 NOTE — LETTER
"Patient:     Shaheen Sepulveda MRN:  [id number]   [address]  [place] [address] :  1955  Sex:  M   Phone: [phone]        [employer] # SUBSCRIBER ID   Primary:    ANTOINETTE [id number]            Allergies   Allergen Reactions    Lisinopril Swelling       Patient reports \"neck swelling\"  Swelling in throat      Order Date:  2023  Wound care       (Order ID: [id number])     Diagnosis:     Priority:  Routine Expiration Date:   Interval:   Count:    Comments: Please call [phone] to schedule a pulmonary consult at a location that would be convenient for you         Wound care supplies were not ordered or needed today. Home care will order all your supplies     Continue on all blood pressure medications as prescribed     Focus on weight loss and low sodium diet  Keep sodium intake 2.5-4 grams per day           To mix Dakin's solution use 500mL bottle of saline and then add 1/2 teaspoon of bleach           Wound Care Instructions     Every other day home care or your family will , Cleanse your bilateral legs and wound(s) with Dilute hibiclens 30cc in 500cc NS.     Then do a light wash of Dakin's solution; focus on the right lateral leg weeping area     Pat Dry with non-sterile gauze     Apply Lotion to the intact skin surrounding your wound and other dry skin locations. Some good lotions include: Remedy Skin Repair Cream, Sarna, Vanicream or Cetaphil     Apply Ammonium Lac Hydrin lotion to the thick scaling crusting areas     Triad paste to the periwound skin on the right leg to protect from all the drainage     Primary Dressing: Silvercel to all the wounds; try the samples of Drawtex on the right lateral leg wound and see if this will \"draw\" out the fluid in the tissues     ABDs or super absorbant pads     Secure with non-sterile roll gauze (4\" x 75\" roll) and tape (1\" roll tape) as needed; avoid adhesive directly on the skin     Compression: high density gray foam " to the right lateral leg area secure with rolled gauze; then tubular compression; foam; short stretch bilaterally     Elevation of the legs     Use lymphedema pump twice per day     It is not ok to get your wound wet in the bath or shower        If for some reason you are not able to get your dressing(s) changed as outlined above (due to illness, lack of supplies, lack of help) please do the following: remove old, soiled dressings; wash the wounds with saline; pat dry; apply ABD pad or other absorbant pad and secure with rolled gauze; avoid tape directly on your skin; Call the clinic as soon as possible to let us know what the current issues are in receiving wound care [phone].        SEEK MEDICAL CARE IF:  ?             You have an increase in swelling, pain, or redness around the wound.  ?             You have an increase in the amount of pus coming from the wound.  ?             There is a bad smell coming from the wound.  ?             The wound appears to be worsening/enlarging  ?             You have a fever greater than 101.5 F        It is ok to continue current wound care treatment/products for the next 2-3 days until new wound care supplies are ordered and arrive. If longer than this please contact our office at [phone].     If you have a 2 layer or 4 layer compression wrap on these are safe to have on for ONLY 7 days. If for some reason you are not able to get the wrap(s) changed (due to illness; lack of supplies, lack of help, lack of transportation) please do the following: unwrap the old 2 or 4 layer compression wrap; avoid using scissors as you could cut your skin and cause wounds; use tubular compression when available. Call to reschedule your home care or clinic visit appointment as soon as possible.     Please NOTE: if you are 15 minutes late to your clinic appointment you will have to be rescheduled. Please call our clinic as soon as possible if you know you will not be able to get to  your appointment at [phone].     If you fail to show up to 3 scheduled clinic appointments you will be dismissed from our clinic.                    We want to hear from you!  In the next few weeks, you should receive a call or email to complete a survey about your visit at [facility]. Please help us improve your appointment experience by letting us know how we did today. We strive to make your experience good and value any ways in which we could do better.       We value your input and suggestions.     Thank you for choosing the [facility]!        It is recommended that you do not get your ulcer wet when showering.  Listed below are several ways of keeping it dry when you shower.      1. Wrap it with Press and Seal plastic wrap.  It can be found in the stores where the plastic wraps or tin foil is kept.                    2.  Some people take a bath and hang their leg/foot out of the tub.                              3  Put your leg in a plastic bag and tape it on.              4. You can purchase a shower cover for casts at some pharmacies and through the Internet.                          5. Take a Bed Bath or wash up at the sink     Ordered by: Harini Moscoso LPN  Authorized by:  Kim Fuentes NP   (NPI: [id number])

## 2023-11-07 NOTE — PROGRESS NOTES
"            Follow up Vascular Visit       Date of Service:11/07/23      Chief Complaint: BLE severe lymphedema; venous stasis ulcers      Pt returns to [facility] with regards to their BLE severe lymphedema and venous stasis ulcers.  They arrive today alone; used a walker to get to the exam room. They are currently using Dakin's solution to wash the legs; tehn ammonium lac hydrin lotion to the scaling areas; silvercel to the wounds; ABD; rolled gauze to the wounds. This is being done by home care or family every other day. They are using lymphedema wraps for compression. They are feeling well today. Denies fevers, chills. No shortness of breath. He has a lymphedema pump but does not use this consistently.   He now has cpap machine; hard to get used to but feels he is getting better sleep. He follows with Dr. Penaloza with Oncology for lymphoma; this is just being monitored for now; will see again in December. He is supposed to make appt for PFTs and pulmonology this has not been scheduled yet.        Allergies:   Allergies   Allergen Reactions    Lisinopril Swelling     Patient reports \"neck swelling\"  Swelling in throat       Medications:   Current Outpatient Medications:     ammonium lactate (LAC-HYDRIN) 12 % external lotion, Apply topically daily as needed for dry skin With dressing changes, Disp: 225 g, Rfl: 3    atorvastatin (LIPITOR) 20 MG tablet, Take 1 tablet (20 mg) by mouth daily, Disp: 90 tablet, Rfl: 3    chlorthalidone (HYGROTON) 25 MG tablet, Take 1 tablet (25 mg) by mouth daily, Disp: 90 tablet, Rfl: 3    gentamicin (GARAMYCIN) 0.1 % external ointment, Apply topically daily, Disp: 30 g, Rfl: 3    hydrOXYzine (ATARAX) 25 MG tablet, Take 0.5-1 tablets (12.5-25 mg) by mouth 3 times daily as needed for anxiety, Disp: 60 tablet, Rfl: 3    losartan (COZAAR) 100 MG tablet, Take 1 tablet (100 mg) by mouth daily, Disp: 90 tablet, Rfl: 3    melatonin 3 MG tablet, Take 1 tablet (3 mg) by mouth " nightly as needed for sleep, Disp: 30 tablet, Rfl: 1    methadone (DOLOPHINE-INTENSOL) 10 MG/ML (HIGH CONC) solution, Take 100 mg by mouth daily, Disp: , Rfl:     sodium hypochlorite (QUARTER-STRENGTH DAKINS) external solution, Apply topically every 72 hours Use 300mL every 72 hours to wash the bilateral legs and wounds on the legs, Disp: 1000 mL, Rfl: 3    zolpidem (AMBIEN) 10 MG tablet, Take tablet by mouth 15 minutes prior to sleep, for Sleep Study, Disp: 1 tablet, Rfl: 0    metFORMIN (GLUCOPHAGE) 500 MG tablet, Take 1 tablet (500 mg) by mouth 2 times daily (with meals) for 90 days, Disp: 180 tablet, Rfl: 0    spironolactone (ALDACTONE) 25 MG tablet, Take 1 tablet (25 mg) by mouth daily for 90 days, Disp: 90 tablet, Rfl: 0    Current Facility-Administered Medications:     lidocaine (PF) (XYLOCAINE) 1 % injection 10 mL, 10 mL, Intradermal, Once, Gerson Jaquez MD    lidocaine (XYLOCAINE) 2 % external gel, , Topical, Daily PRN, Lucia Reyes MD, Given at 10/19/21 0828    History:   Past Medical History:   Diagnosis Date    COPD (chronic obstructive pulmonary disease) (H)     Diabetes (H)     H/O angioedema 06/15/2020    Formatting of this note might be different from the original. Unexplained angioedema twice, discontinue lisinopril for possible connection to it, though he had used it afterwards with no symptoms    History of tobacco use disorder 08/01/2013    Formatting of this note might be different from the original. Added per documetation    Hypertension     Lymphedema of both lower extremities 12/22/2014    Methadone use 05/08/2012    Obstructive sleep apnea 02/02/2015    Formatting of this note might be different from the original. Epic    Pleural mass 07/02/2013    Uncomplicated asthma        Physical Exam:    BP (!) 141/73   Pulse 76   Temp 98  F (36.7  C)   Resp 20   Wt (!) 398 lb 9.6 oz (180.8 kg)   SpO2 98%   BMI 49.82 kg/m      General:  Patient presents to clinic in no apparent  distress.  Head: normocephalic atraumatic  Psychiatric:  Alert and oriented x3.   Respiratory: unlabored breathing; no cough  Integumentary:  Skin is uniformly warm, dry and pink.    Ulcer #1 Location: right lateral leg  Size: 14L x 14W x 0.1depth.  no sinus tract present, Wound base: macerated; weeping; sloughing tissue  no undermining present. Ulcer is full thickness. There is heavy drainage. Periwound: no denudement, erythema, induration, maceration or warmth.      Ulcer #2 Location: left lateral leg  Size: 10x10 x 0.1depth.  no sinus tract present, Wound base: macerated; weeping; sloughing tissue; there is any area in a skin fold with surrounding papillomatosis which is ulcerated within the crevice  no undermining present. Ulcer is full thickness. There is heavy drainage. Periwound: no denudement, erythema, induration, maceration or warmth.        Wound Leg Ulceration (Active)   Number of days: 519       VASC Wound rt lateral weeping (Active)   Pre Size Length 14 11/07/23 0700   Pre Size Width 14 11/07/23 0700   Pre Size Depth 0.1 11/07/23 0700   Pre Total Sq cm 196 11/07/23 0700   Post Size Length 10 08/02/23 0700   Post Size Width 10 08/02/23 0700   Post Size Depth 0.1 08/02/23 0700   Post Total Sq cm 100 08/02/23 0700   Description weeping 09/05/23 0700   Number of days: 749       VASC Wound left posterior leg (Active)   Pre Size Length 4 10/03/23 0700   Pre Size Width 0.5 10/03/23 0700   Pre Size Depth 0.5 10/03/23 0700   Pre Total Sq cm 2 10/03/23 0700   Post Size Length 3 06/28/23 0800   Post Size Width 1.5 06/28/23 0800   Post Size Depth 0.5 06/28/23 0800   Post Total Sq cm 4.5 06/28/23 0800   Description scattered weeping 04/18/23 0700   Number of days: 364       VASC Wound Right medial shin (Active)   Number of days: 336            Circumferential volume measures:          6/28/2023     7:00 AM 8/2/2023     7:00 AM 9/5/2023     8:00 AM 10/3/2023     8:00 AM 11/7/2023     7:00 AM   Circumferential  "Measures   Right just above MTP 25 27 29 28 28   Right Ankle 33 35 38 34 36   Right Widest Calf 53 57 54 54 54   Left - just above MTP 23.5 27 28 27 27   Left Ankle 35 35 36 35 33   Left Widest Calf 53 51 48 52 52       Labs:    I personally reviewed the following lab results today and those on care everywhere    CRP   Date Value Ref Range Status   04/23/2021 8.5 (H) 0.0 - 0.8 mg/dL Final      No results found for: \"SED\"   Last Renal Panel:  Sodium   Date Value Ref Range Status   06/07/2023 138 136 - 145 mmol/L Final   06/10/2021 142.0 133.0 - 144.0 mmol/L Final     Potassium   Date Value Ref Range Status   06/07/2023 4.2 3.5 - 5.0 mmol/L Final   06/10/2021 4.4 3.4 - 5.3 mmol/L Final     Chloride   Date Value Ref Range Status   06/07/2023 102 98 - 107 mmol/L Final   06/10/2021 100.0 94.0 - 109.0 mmol/L Final     Carbon Dioxide   Date Value Ref Range Status   06/10/2021 33.0 (H) 20.0 - 32.0 mmol/L Final     Carbon Dioxide (CO2)   Date Value Ref Range Status   06/07/2023 28 22 - 31 mmol/L Final     Anion Gap   Date Value Ref Range Status   06/07/2023 8 5 - 18 mmol/L Final     Glucose   Date Value Ref Range Status   06/07/2023 115 70 - 125 mg/dL Final   06/10/2021 112.0 (H) 60.0 - 109.0 mg/dL Final     Urea Nitrogen   Date Value Ref Range Status   06/07/2023 17 8 - 22 mg/dL Final   06/10/2021 16.0 7.0 - 30.0 mg/dL Final     Creatinine   Date Value Ref Range Status   06/07/2023 1.07 0.70 - 1.30 mg/dL Final   06/10/2021 1.0 0.8 - 1.5 mg/dL Final     GFR Estimate   Date Value Ref Range Status   06/07/2023 75 >60 mL/min/1.73m2 Final     Comment:     eGFR calculated using 2021 CKD-EPI equation.   04/24/2021 >60 >60 mL/min/1.73m2 Final     Calcium   Date Value Ref Range Status   06/07/2023 9.0 8.5 - 10.5 mg/dL Final   06/10/2021 9.5 8.5 - 10.4 mg/dL Final     Albumin   Date Value Ref Range Status   06/07/2023 3.3 (L) 3.5 - 5.0 g/dL Final      Lab Results   Component Value Date    WBC 6.1 06/07/2023     Lab Results " "  Component Value Date    RBC 3.93 06/07/2023     Lab Results   Component Value Date    HGB 9.5 06/07/2023     Lab Results   Component Value Date    HCT 31.7 06/07/2023     No components found for: \"MCT\"  Lab Results   Component Value Date    MCV 81 06/07/2023     Lab Results   Component Value Date    MCH 24.2 06/07/2023     Lab Results   Component Value Date    MCHC 30.0 06/07/2023     Lab Results   Component Value Date    RDW 14.7 06/07/2023     Lab Results   Component Value Date     06/07/2023      Lab Results   Component Value Date    A1C 6.8 02/20/2023    A1C 6.2 07/14/2022    A1C 6.7 04/21/2022    A1C 6.2 04/24/2021    A1C 6.2 04/24/2021      TSH   Date Value Ref Range Status   10/18/2022 1.76 0.30 - 5.00 uIU/mL Final      No results found for: \"VITDT\"                Impression:  Encounter Diagnoses   Name Primary?    Lymphedema of both lower extremities Yes    Venous hypertension, chronic, with ulcer and inflammation, bilateral (H)     Elephantiasis     Venous (peripheral) insufficiency     Lipodermatosclerosis of both lower extremities     Papillomatosis     Obesity, morbid, BMI 40.0-49.9 (H)     Type 2 diabetes mellitus with peripheral neuropathy (H)                              Are any of these ulcers new today: No; Location: na    Assessment/Plan:          1. Debridement: Nursing staff removed the old dressing and cleanse the wound(s) with specified solution. After discussion of risk factors and verbal consent was obtained 2% Lidocaine HCL jelly was applied, under clean conditions, the BLE ulceration(s) were debrided using currette. Devitalized and nonviable tissue, along with any fibrin and slough, was removed to improve granulation tissue formation, stimulate wound healing, decrease overall bacteria load, disrupt biofilm formation and decrease edge senescence.  Total excisional debridement was 296 sq cm from the epidermis/dermis area and into the subcutaneous tissue with a depth of 0.1 cm.   " Ulcers were improved afterwards and .  Measures were unchanged after debridement.       2.  Ulcer treatment: ulcer treatment will include irrigation and dressings to promote autolytic debridement which will include:will continue palliative management of the wounds; with dakins solution; am lactin lotion; silvercel; ABD; rolled gauze; home care and family to change every other day.  If for some reason the patient is not able to get their dressing(s) changed as outlined above (due to illness, lack of supplies, lack of help) please do the following: remove old, soiled dressings; wash the ulcers with saline; pat dry; apply ABD pad or other absorbant pad and secure with rolled gauze; avoid tape directly on your skin; patient instructed to call the clinic as soon as possible to let us know what the current issues are in receiving ulcer care. Stable            3. Edema: continue with lymphedema wraps; re-wrap every other day; encouraged elevation; encouraged more consistent use of the lymphedema pumps. The compression wraps were applied today in clinic.     If a 2 layer or 4 layer compression wrap is being used; these are safe to have on for ONLY 7 days. If for some reason the patient is not able to get the wrap(s) changed (due to illness; lack of supplies, lack of help, lack of transportation) please do the following: unwrap the old 2 or 4 layer compression wrap; avoid using scissors as you could cut your skin and cause ulcers; use tubular compression when available. Call to reschedule your home care or clinic visit appointment as soon as possible.  Stable            4. Nutrition: focus on weight loss; increase protein intake; monitor blood surgars           5. Offloading: na          6. Wound Etiology: venous stasis ulcers          7. Nails: nails 1-5 bilaterally were debrided and filed smooth; tolerated well, no complications.           8. Lymphoma: continue to follow with Dr. Penaloza has follow up next month        Patient will follow up with me in 4 weeks for reevaluation. They were instructed to call the clinic sooner with any signs or symptoms of infection or any further questions/concerns. Answered all questions.          Kim Fuentes DNP, RN, CNP, CWOCN, CFCN, CLT  [facility]   [phone]        This note was electronically signed by Kim Fuentes NP

## 2023-11-07 NOTE — PROGRESS NOTES
Compression Applied to Bilateral  Tubigrip Size JK   Compression Applied to Bilateral  Short Stretch

## 2023-11-10 DIAGNOSIS — I10 BENIGN ESSENTIAL HYPERTENSION: ICD-10-CM

## 2023-11-13 RX ORDER — SPIRONOLACTONE 25 MG/1
25 TABLET ORAL DAILY
Qty: 90 TABLET | Refills: 2 | Status: SHIPPED | OUTPATIENT
Start: 2023-11-13 | End: 2024-06-13

## 2023-12-04 ENCOUNTER — MEDICAL CORRESPONDENCE (OUTPATIENT)
Dept: HEALTH INFORMATION MANAGEMENT | Facility: CLINIC | Age: 68
End: 2023-12-04
Payer: COMMERCIAL

## 2023-12-06 ENCOUNTER — OFFICE VISIT (OUTPATIENT)
Dept: VASCULAR SURGERY | Facility: CLINIC | Age: 68
End: 2023-12-06
Attending: NURSE PRACTITIONER
Payer: COMMERCIAL

## 2023-12-06 VITALS
BODY MASS INDEX: 50.25 KG/M2 | HEART RATE: 85 BPM | RESPIRATION RATE: 18 BRPM | TEMPERATURE: 97.7 F | SYSTOLIC BLOOD PRESSURE: 126 MMHG | DIASTOLIC BLOOD PRESSURE: 67 MMHG | WEIGHT: 315 LBS | OXYGEN SATURATION: 98 %

## 2023-12-06 DIAGNOSIS — L97.929 VENOUS STASIS ULCER OF LEFT LOWER LEG WITH EDEMA OF LEFT LOWER LEG (H): ICD-10-CM

## 2023-12-06 DIAGNOSIS — I83.892 VENOUS STASIS ULCER OF LEFT LOWER LEG WITH EDEMA OF LEFT LOWER LEG (H): ICD-10-CM

## 2023-12-06 DIAGNOSIS — R60.0 VENOUS STASIS ULCER OF RIGHT LOWER LEG WITH EDEMA OF RIGHT LOWER LEG (H): Primary | ICD-10-CM

## 2023-12-06 DIAGNOSIS — I89.0 ELEPHANTIASIS: ICD-10-CM

## 2023-12-06 DIAGNOSIS — I83.891 VENOUS STASIS ULCER OF RIGHT LOWER LEG WITH EDEMA OF RIGHT LOWER LEG (H): Primary | ICD-10-CM

## 2023-12-06 DIAGNOSIS — R60.0 VENOUS STASIS ULCER OF LEFT LOWER LEG WITH EDEMA OF LEFT LOWER LEG (H): ICD-10-CM

## 2023-12-06 DIAGNOSIS — I87.333 VENOUS HYPERTENSION, CHRONIC, WITH ULCER AND INFLAMMATION, BILATERAL (H): ICD-10-CM

## 2023-12-06 DIAGNOSIS — E66.01 OBESITY, MORBID, BMI 40.0-49.9 (H): ICD-10-CM

## 2023-12-06 DIAGNOSIS — M79.3 LIPODERMATOSCLEROSIS OF BOTH LOWER EXTREMITIES: ICD-10-CM

## 2023-12-06 DIAGNOSIS — I87.2 VENOUS (PERIPHERAL) INSUFFICIENCY: ICD-10-CM

## 2023-12-06 DIAGNOSIS — E11.42 TYPE 2 DIABETES MELLITUS WITH PERIPHERAL NEUROPATHY (H): ICD-10-CM

## 2023-12-06 DIAGNOSIS — I89.0 LYMPHEDEMA OF BOTH LOWER EXTREMITIES: ICD-10-CM

## 2023-12-06 DIAGNOSIS — D36.9 PAPILLOMATOSIS: ICD-10-CM

## 2023-12-06 DIAGNOSIS — L97.919 VENOUS STASIS ULCER OF RIGHT LOWER LEG WITH EDEMA OF RIGHT LOWER LEG (H): Primary | ICD-10-CM

## 2023-12-06 DIAGNOSIS — I83.019 VENOUS STASIS ULCER OF RIGHT LOWER LEG WITH EDEMA OF RIGHT LOWER LEG (H): Primary | ICD-10-CM

## 2023-12-06 DIAGNOSIS — I83.029 VENOUS STASIS ULCER OF LEFT LOWER LEG WITH EDEMA OF LEFT LOWER LEG (H): ICD-10-CM

## 2023-12-06 PROCEDURE — 11045 DBRDMT SUBQ TISS EACH ADDL: CPT | Performed by: NURSE PRACTITIONER

## 2023-12-06 PROCEDURE — 11042 DBRDMT SUBQ TIS 1ST 20SQCM/<: CPT | Performed by: NURSE PRACTITIONER

## 2023-12-06 ASSESSMENT — PAIN SCALES - GENERAL: PAINLEVEL: NO PAIN (0)

## 2023-12-06 NOTE — PROGRESS NOTES
Compression Applied to Bilateral  Short Stretch lymphedema wraps

## 2023-12-06 NOTE — PATIENT INSTRUCTIONS
"Continue on all blood pressure medications as prescribed    Focus on weight loss and low sodium diet  Keep sodium intake 2.5-4 grams per day        To mix Dakin's solution use 500mL bottle of saline and then add 1/2 teaspoon of bleach        Wound Care Instructions    Every day your family will , Cleanse your bilateral legs and wound(s) with Dilute hibiclens 30cc in 500cc NS.    Then do a light wash of Dakin's solution; focus on the right lateral leg weeping area    Pat Dry with non-sterile gauze    Apply Lotion to the intact skin surrounding your wound and other dry skin locations. Some good lotions include: Remedy Skin Repair Cream, Sarna, Vanicream or Cetaphil    Apply Triad paste thick over all the weeping skin    Apply Ammonium Lac Hydrin lotion to the thick scaling crusting areas    Triad paste to the periwound skin on the right leg to protect from all the drainage    Primary Dressing: Silvercel to all the wounds; try the samples of Drawtex on the right lateral leg wound and see if this will \"draw\" out the fluid in the tissues    ABDs or super absorbant pads    Secure with non-sterile roll gauze (4\" x 75\" roll) and tape (1\" roll tape) as needed; avoid adhesive directly on the skin    Compression:  tubular compression; foam; short stretch bilaterally    Elevation of the legs    Use lymphedema pump twice per day    It is not ok to get your wound wet in the bath or shower    You need to elevate your legs throughout the day    Get a new recliner chair or get into your bed; try to get the legs above your heart      If for some reason you are not able to get your dressing(s) changed as outlined above (due to illness, lack of supplies, lack of help) please do the following: remove old, soiled dressings; wash the wounds with saline; pat dry; apply ABD pad or other absorbant pad and secure with rolled gauze; avoid tape directly on your skin; Call the clinic as soon as possible to let us know what the current issues are " in receiving wound care [phone].      SEEK MEDICAL CARE IF:  You have an increase in swelling, pain, or redness around the wound.  You have an increase in the amount of pus coming from the wound.  There is a bad smell coming from the wound.  The wound appears to be worsening/enlarging  You have a fever greater than 101.5 F      It is ok to continue current wound care treatment/products for the next 2-3 days until new wound care supplies are ordered and arrive. If longer than this please contact our office at [phone].    If you have a 2 layer or 4 layer compression wrap on these are safe to have on for ONLY 7 days. If for some reason you are not able to get the wrap(s) changed (due to illness; lack of supplies, lack of help, lack of transportation) please do the following: unwrap the old 2 or 4 layer compression wrap; avoid using scissors as you could cut your skin and cause wounds; use tubular compression when available. Call to reschedule your home care or clinic visit appointment as soon as possible.    Please NOTE: if you are 15 minutes late to your clinic appointment you will have to be rescheduled. Please call our clinic as soon as possible if you know you will not be able to get to your appointment at [phone].    If you fail to show up to 3 scheduled clinic appointments you will be dismissed from our clinic.              We want to hear from you!  In the next few weeks, you should receive a call or email to complete a survey about your visit at [facility]. Please help us improve your appointment experience by letting us know how we did today. We strive to make your experience good and value any ways in which we could do better.      We value your input and suggestions.    Thank you for choosing the [facility]!      It is recommended that you do not get your ulcer wet when showering.  Listed below are several ways of keeping it dry when you shower.     1.  Wrap it with Press and Seal plastic wrap.  It can be found in the stores where the plastic wraps or tin foil is kept.               2.  Some people take a bath and hang their leg/foot out of the tub.                        3  Put your leg in a plastic bag and tape it on.           4. You can purchase a shower cover for casts at some pharmacies and through the Internet.            5. Take a Bed Bath or wash up at the sink

## 2023-12-06 NOTE — PROGRESS NOTES
"            Follow up Vascular Visit       Date of Service:12/06/23      Chief Complaint: BLE venous stasis ulcers and lymphedema      Pt returns to [facility] with regards to their BLE venous stasis ulcers and lymphedema.  They arrive today alone. They are currently using dakin's wash; silvercel; abd; rolled gauze to the wounds. This is being done by his family every other day. They are using tubular compression and short stretch for compression. They are feeling well today. Denies fevers, chills. No shortness of breath. He has lymphedema pump and is using inconsistently. He now has cpap machine; hard to get used to but feels he is getting better sleep. He follows with Dr. Penaloza with Oncology for lymphoma; this is just being monitored for now; will see again in December 14. He has appt with pulmonology 12/27. He is having more drainage from the legs. States he no longer has a recliner chair and is not elevating his legs.     Allergies:   Allergies   Allergen Reactions    Lisinopril Swelling     Patient reports \"neck swelling\"  Swelling in throat       Medications:   Current Outpatient Medications:     ammonium lactate (LAC-HYDRIN) 12 % external lotion, Apply topically daily as needed for dry skin With dressing changes, Disp: 225 g, Rfl: 3    atorvastatin (LIPITOR) 20 MG tablet, Take 1 tablet (20 mg) by mouth daily, Disp: 90 tablet, Rfl: 3    chlorthalidone (HYGROTON) 25 MG tablet, Take 1 tablet (25 mg) by mouth daily, Disp: 90 tablet, Rfl: 3    gentamicin (GARAMYCIN) 0.1 % external ointment, Apply topically daily, Disp: 30 g, Rfl: 3    hydrOXYzine (ATARAX) 25 MG tablet, Take 0.5-1 tablets (12.5-25 mg) by mouth 3 times daily as needed for anxiety, Disp: 60 tablet, Rfl: 3    losartan (COZAAR) 100 MG tablet, Take 1 tablet (100 mg) by mouth daily, Disp: 90 tablet, Rfl: 3    melatonin 3 MG tablet, Take 1 tablet (3 mg) by mouth nightly as needed for sleep, Disp: 30 tablet, Rfl: 1    methadone " (DOLOPHINE-INTENSOL) 10 MG/ML (HIGH CONC) solution, Take 100 mg by mouth daily, Disp: , Rfl:     sodium hypochlorite (QUARTER-STRENGTH DAKINS) external solution, Apply topically every 72 hours Use 300mL every 72 hours to wash the bilateral legs and wounds on the legs, Disp: 1000 mL, Rfl: 3    spironolactone (ALDACTONE) 25 MG tablet, Take 1 tablet (25 mg) by mouth daily, Disp: 90 tablet, Rfl: 2    zolpidem (AMBIEN) 10 MG tablet, Take tablet by mouth 15 minutes prior to sleep, for Sleep Study, Disp: 1 tablet, Rfl: 0    metFORMIN (GLUCOPHAGE) 500 MG tablet, Take 1 tablet (500 mg) by mouth 2 times daily (with meals) for 90 days, Disp: 180 tablet, Rfl: 0    Current Facility-Administered Medications:     lidocaine (PF) (XYLOCAINE) 1 % injection 10 mL, 10 mL, Intradermal, Once, Gerson Jaquez MD    lidocaine (XYLOCAINE) 2 % external gel, , Topical, Daily PRN, Lucia Reyes MD, Given at 10/19/21 0828    History:   Past Medical History:   Diagnosis Date    COPD (chronic obstructive pulmonary disease) (H)     Diabetes (H)     H/O angioedema 06/15/2020    Formatting of this note might be different from the original. Unexplained angioedema twice, discontinue lisinopril for possible connection to it, though he had used it afterwards with no symptoms    History of tobacco use disorder 08/01/2013    Formatting of this note might be different from the original. Added per documetation    Hypertension     Lymphedema of both lower extremities 12/22/2014    Methadone use 05/08/2012    Obstructive sleep apnea 02/02/2015    Formatting of this note might be different from the original. Epic    Pleural mass 07/02/2013    Uncomplicated asthma        Physical Exam:    /67   Pulse 85   Temp 97.7  F (36.5  C)   Resp 18   Wt (!) 402 lb (182.3 kg)   SpO2 98%   BMI 50.25 kg/m      General:  Patient presents to clinic in no apparent distress.  Head: normocephalic atraumatic  Psychiatric:  Alert and oriented x3.   Respiratory:  unlabored breathing; no cough  Integumentary:  Skin is uniformly warm, dry and pink.    Ulcer #1 Location: right lateral leg  Size: 14L x 14W x 0.1depth.  No sinus tract present, Wound base: macerated weeping; sloughing tissue  no undermining present. Ulcer is full thickness. There is heavy drainage. Periwound: no denudement, erythema, induration, maceration or warmth.      Ulcer #2 Location: left lateral leg  Size: 13L x 13W x 0.1depth.  No sinus tract present, Wound base: macerated weeping; sloughing tissue  no undermining present. Ulcer is full thickness. There is heavy drainage. Periwound: no denudement, erythema, induration, maceration or warmth.      Wound Leg Ulceration (Active)   Number of days: 548       VASC Wound rt lateral weeping (Active)   Pre Size Length 14 11/07/23 0700   Pre Size Width 14 11/07/23 0700   Pre Size Depth 0.1 11/07/23 0700   Pre Total Sq cm 196 11/07/23 0700   Post Size Length 10 08/02/23 0700   Post Size Width 10 08/02/23 0700   Post Size Depth 0.1 08/02/23 0700   Post Total Sq cm 100 08/02/23 0700   Description weeping 09/05/23 0700   Number of days: 778       VASC Wound left posterior leg (Active)   Pre Size Length 13 12/06/23 0800   Pre Size Width 13 12/06/23 0800   Pre Size Depth 0.1 12/06/23 0800   Pre Total Sq cm 169 12/06/23 0800   Post Size Length 3 06/28/23 0800   Post Size Width 1.5 06/28/23 0800   Post Size Depth 0.5 06/28/23 0800   Post Total Sq cm 4.5 06/28/23 0800   Description scattered weeping 04/18/23 0700   Number of days: 393       VASC Wound Right medial shin (Active)   Number of days: 365            Circumferential volume measures:          8/2/2023     7:00 AM 9/5/2023     8:00 AM 10/3/2023     8:00 AM 11/7/2023     7:00 AM 12/6/2023     8:00 AM   Circumferential Measures   Right just above MTP 27 29 28 28 25.5   Right Ankle 35 38 34 36 36   Right Widest Calf 57 54 54 54 54   Left - just above MTP 27 28 27 27 25.5   Left Ankle 35 36 35 33 31.2   Left Widest Calf  "51 48 52 52 53       Labs:    I personally reviewed the following lab results today and those on care everywhere    CRP   Date Value Ref Range Status   04/23/2021 8.5 (H) 0.0 - 0.8 mg/dL Final      No results found for: \"SED\"   Last Renal Panel:  Sodium   Date Value Ref Range Status   06/07/2023 138 136 - 145 mmol/L Final   06/10/2021 142.0 133.0 - 144.0 mmol/L Final     Potassium   Date Value Ref Range Status   06/07/2023 4.2 3.5 - 5.0 mmol/L Final   06/10/2021 4.4 3.4 - 5.3 mmol/L Final     Chloride   Date Value Ref Range Status   06/07/2023 102 98 - 107 mmol/L Final   06/10/2021 100.0 94.0 - 109.0 mmol/L Final     Carbon Dioxide   Date Value Ref Range Status   06/10/2021 33.0 (H) 20.0 - 32.0 mmol/L Final     Carbon Dioxide (CO2)   Date Value Ref Range Status   06/07/2023 28 22 - 31 mmol/L Final     Anion Gap   Date Value Ref Range Status   06/07/2023 8 5 - 18 mmol/L Final     Glucose   Date Value Ref Range Status   06/07/2023 115 70 - 125 mg/dL Final   06/10/2021 112.0 (H) 60.0 - 109.0 mg/dL Final     Urea Nitrogen   Date Value Ref Range Status   06/07/2023 17 8 - 22 mg/dL Final   06/10/2021 16.0 7.0 - 30.0 mg/dL Final     Creatinine   Date Value Ref Range Status   06/07/2023 1.07 0.70 - 1.30 mg/dL Final   06/10/2021 1.0 0.8 - 1.5 mg/dL Final     GFR Estimate   Date Value Ref Range Status   06/07/2023 75 >60 mL/min/1.73m2 Final     Comment:     eGFR calculated using 2021 CKD-EPI equation.   04/24/2021 >60 >60 mL/min/1.73m2 Final     Calcium   Date Value Ref Range Status   06/07/2023 9.0 8.5 - 10.5 mg/dL Final   06/10/2021 9.5 8.5 - 10.4 mg/dL Final     Albumin   Date Value Ref Range Status   06/07/2023 3.3 (L) 3.5 - 5.0 g/dL Final      Lab Results   Component Value Date    WBC 6.1 06/07/2023     Lab Results   Component Value Date    RBC 3.93 06/07/2023     Lab Results   Component Value Date    HGB 9.5 06/07/2023     Lab Results   Component Value Date    HCT 31.7 06/07/2023     No components found for: \"MCT\"  Lab " "Results   Component Value Date    MCV 81 06/07/2023     Lab Results   Component Value Date    MCH 24.2 06/07/2023     Lab Results   Component Value Date    MCHC 30.0 06/07/2023     Lab Results   Component Value Date    RDW 14.7 06/07/2023     Lab Results   Component Value Date     06/07/2023      Lab Results   Component Value Date    A1C 6.8 02/20/2023    A1C 6.2 07/14/2022    A1C 6.7 04/21/2022    A1C 6.2 04/24/2021    A1C 6.2 04/24/2021      TSH   Date Value Ref Range Status   10/18/2022 1.76 0.30 - 5.00 uIU/mL Final      No results found for: \"VITDT\"                Impression:  Encounter Diagnoses   Name Primary?    Venous stasis ulcer of right lower leg with edema of right lower leg (H) Yes    Lymphedema of both lower extremities     Venous hypertension, chronic, with ulcer and inflammation, bilateral (H)     Elephantiasis     Venous (peripheral) insufficiency     Lipodermatosclerosis of both lower extremities     Papillomatosis     Obesity, morbid, BMI 40.0-49.9 (H)     Type 2 diabetes mellitus with peripheral neuropathy (H)     Venous stasis ulcer of left lower leg with edema of left lower leg (H)                    Are any of these ulcers new today: No; Location: na    Assessment/Plan:          1. Debridement: Nursing staff removed the old dressing and cleanse the wound(s) with specified solution. After discussion of risk factors and verbal consent was obtained 2% Lidocaine HCL jelly was applied, under clean conditions, the BLE ulceration(s) were debrided using currette. Devitalized and nonviable tissue, along with any fibrin and slough, was removed to improve granulation tissue formation, stimulate wound healing, decrease overall bacteria load, disrupt biofilm formation and decrease edge senescence.  Total excisional debridement was 365 sq cm from the epidermis/dermis area and into the subcutaneous tissue with a depth of 0.1 cm.   Ulcers were improved afterwards and .  Measures were unchanged " after debridement.       2.  Ulcer treatment: ulcer treatment will include irrigation and dressings to promote autolytic debridement which will include:will continue with palliative management of the wounds; goal is to prevent infection and hospitalization; will continue to first wash with dilute hibiclens; then dakin's soak; triad paste to all the weeping;then silvercel; ABD or super absorbant pads; rolled gauze; change daily now due to drainage amounts. If for some reason the patient is not able to get their dressing(s) changed as outlined above (due to illness, lack of supplies, lack of help) please do the following: remove old, soiled dressings; wash the ulcers with saline; pat dry; apply ABD pad or other absorbant pad and secure with rolled gauze; avoid tape directly on your skin; patient instructed to call the clinic as soon as possible to let us know what the current issues are in receiving ulcer care. Stable            3. Edema: he is not elevating most likely why he is having increase in drainage; no longer has recliner chair; encouraged him to get new chair or stay in bed with legs up as high as he can tolerate; tubular compression with short stretch; encouraged consistent use of lymphedema pumps. The compression wraps were applied today in clinic.     If a 2 layer or 4 layer compression wrap is being used; these are safe to have on for ONLY 7 days. If for some reason the patient is not able to get the wrap(s) changed (due to illness; lack of supplies, lack of help, lack of transportation) please do the following: unwrap the old 2 or 4 layer compression wrap; avoid using scissors as you could cut your skin and cause ulcers; use tubular compression when available. Call to reschedule your home care or clinic visit appointment as soon as possible.  Stable            4. Nutrition: focus on weight loss and diabetes control;without significant weight loss this will be an ongoing and progressive issue.            5. Offloading: na          6. Wound Etiology: venous stasis     Patient will follow up with me in 4 weeks for reevaluation. They were instructed to call the clinic sooner with any signs or symptoms of infection or any further questions/concerns. Answered all questions.          Kim Fuentes DNP, RN, CNP, CWOCN, CFCN, CLT  [facility]   [phone]        This note was electronically signed by Kim Fuentes NP

## 2023-12-07 ENCOUNTER — LAB (OUTPATIENT)
Dept: INFUSION THERAPY | Facility: CLINIC | Age: 68
End: 2023-12-07
Attending: INTERNAL MEDICINE
Payer: COMMERCIAL

## 2023-12-07 DIAGNOSIS — D47.2 MGUS (MONOCLONAL GAMMOPATHY OF UNKNOWN SIGNIFICANCE): ICD-10-CM

## 2023-12-07 DIAGNOSIS — E53.8 VITAMIN B12 DEFICIENCY (NON ANEMIC): ICD-10-CM

## 2023-12-07 DIAGNOSIS — D63.8 ANEMIA, CHRONIC DISEASE: ICD-10-CM

## 2023-12-07 LAB
ALBUMIN SERPL BCG-MCNC: 3.6 G/DL (ref 3.5–5.2)
ALP SERPL-CCNC: 77 U/L (ref 40–150)
ALT SERPL W P-5'-P-CCNC: <5 U/L (ref 0–70)
ANION GAP SERPL CALCULATED.3IONS-SCNC: 7 MMOL/L (ref 7–15)
AST SERPL W P-5'-P-CCNC: 13 U/L (ref 0–45)
BASOPHILS # BLD AUTO: 0 10E3/UL (ref 0–0.2)
BASOPHILS NFR BLD AUTO: 0 %
BILIRUB SERPL-MCNC: 0.4 MG/DL
BUN SERPL-MCNC: 15 MG/DL (ref 8–23)
CALCIUM SERPL-MCNC: 9.3 MG/DL (ref 8.8–10.2)
CHLORIDE SERPL-SCNC: 99 MMOL/L (ref 98–107)
CREAT SERPL-MCNC: 1.04 MG/DL (ref 0.67–1.17)
DEPRECATED HCO3 PLAS-SCNC: 29 MMOL/L (ref 22–29)
EGFRCR SERPLBLD CKD-EPI 2021: 77 ML/MIN/1.73M2
EOSINOPHIL # BLD AUTO: 0.3 10E3/UL (ref 0–0.7)
EOSINOPHIL NFR BLD AUTO: 5 %
ERYTHROCYTE [DISTWIDTH] IN BLOOD BY AUTOMATED COUNT: 15.4 % (ref 10–15)
FERRITIN SERPL-MCNC: 93 NG/ML (ref 31–409)
GLUCOSE SERPL-MCNC: 141 MG/DL (ref 70–99)
HCT VFR BLD AUTO: 30.9 % (ref 40–53)
HGB BLD-MCNC: 9.1 G/DL (ref 13.3–17.7)
IMM GRANULOCYTES # BLD: 0 10E3/UL
IMM GRANULOCYTES NFR BLD: 0 %
IRON BINDING CAPACITY (ROCHE): 269 UG/DL (ref 240–430)
IRON SATN MFR SERPL: 15 % (ref 15–46)
IRON SERPL-MCNC: 40 UG/DL (ref 61–157)
LYMPHOCYTES # BLD AUTO: 0.9 10E3/UL (ref 0.8–5.3)
LYMPHOCYTES NFR BLD AUTO: 15 %
MCH RBC QN AUTO: 23 PG (ref 26.5–33)
MCHC RBC AUTO-ENTMCNC: 29.4 G/DL (ref 31.5–36.5)
MCV RBC AUTO: 78 FL (ref 78–100)
MONOCYTES # BLD AUTO: 0.5 10E3/UL (ref 0–1.3)
MONOCYTES NFR BLD AUTO: 8 %
NEUTROPHILS # BLD AUTO: 4.3 10E3/UL (ref 1.6–8.3)
NEUTROPHILS NFR BLD AUTO: 72 %
NRBC # BLD AUTO: 0 10E3/UL
NRBC BLD AUTO-RTO: 0 /100
PLATELET # BLD AUTO: 221 10E3/UL (ref 150–450)
POTASSIUM SERPL-SCNC: 4 MMOL/L (ref 3.4–5.3)
PROT SERPL-MCNC: 7.7 G/DL (ref 6.4–8.3)
RBC # BLD AUTO: 3.96 10E6/UL (ref 4.4–5.9)
SODIUM SERPL-SCNC: 135 MMOL/L (ref 135–145)
TOTAL PROTEIN SERUM FOR ELP: 7.1 G/DL (ref 6.4–8.3)
VIT B12 SERPL-MCNC: 2229 PG/ML (ref 232–1245)
WBC # BLD AUTO: 6 10E3/UL (ref 4–11)

## 2023-12-07 PROCEDURE — 83521 IG LIGHT CHAINS FREE EACH: CPT

## 2023-12-07 PROCEDURE — 85025 COMPLETE CBC W/AUTO DIFF WBC: CPT

## 2023-12-07 PROCEDURE — 84165 PROTEIN E-PHORESIS SERUM: CPT | Mod: TC | Performed by: PATHOLOGY

## 2023-12-07 PROCEDURE — 82607 VITAMIN B-12: CPT

## 2023-12-07 PROCEDURE — 80053 COMPREHEN METABOLIC PANEL: CPT

## 2023-12-07 PROCEDURE — 83550 IRON BINDING TEST: CPT

## 2023-12-07 PROCEDURE — 82784 ASSAY IGA/IGD/IGG/IGM EACH: CPT

## 2023-12-07 PROCEDURE — 84155 ASSAY OF PROTEIN SERUM: CPT | Mod: 91

## 2023-12-07 PROCEDURE — 86334 IMMUNOFIX E-PHORESIS SERUM: CPT | Performed by: PATHOLOGY

## 2023-12-07 PROCEDURE — 82728 ASSAY OF FERRITIN: CPT

## 2023-12-07 PROCEDURE — 36415 COLL VENOUS BLD VENIPUNCTURE: CPT

## 2023-12-08 LAB
IGA SERPL-MCNC: 356 MG/DL (ref 84–499)
IGG SERPL-MCNC: 2012 MG/DL (ref 610–1616)
IGM SERPL-MCNC: 44 MG/DL (ref 35–242)
KAPPA LC FREE SER-MCNC: 8.52 MG/DL (ref 0.33–1.94)
KAPPA LC FREE/LAMBDA FREE SER NEPH: 2.11 {RATIO} (ref 0.26–1.65)
LAMBDA LC FREE SERPL-MCNC: 4.04 MG/DL (ref 0.57–2.63)

## 2023-12-11 LAB
ALBUMIN SERPL ELPH-MCNC: 3 G/DL (ref 3.7–5.1)
ALPHA1 GLOB SERPL ELPH-MCNC: 0.4 G/DL (ref 0.2–0.4)
ALPHA2 GLOB SERPL ELPH-MCNC: 0.7 G/DL (ref 0.5–0.9)
B-GLOBULIN SERPL ELPH-MCNC: 1 G/DL (ref 0.6–1)
GAMMA GLOB SERPL ELPH-MCNC: 1.9 G/DL (ref 0.7–1.6)
M PROTEIN SERPL ELPH-MCNC: 0.2 G/DL
PROT PATTERN SERPL ELPH-IMP: ABNORMAL

## 2023-12-11 PROCEDURE — 84165 PROTEIN E-PHORESIS SERUM: CPT | Mod: 26 | Performed by: PATHOLOGY

## 2023-12-12 LAB — PROT PATTERN SERPL IFE-IMP: NORMAL

## 2023-12-12 PROCEDURE — 86334 IMMUNOFIX E-PHORESIS SERUM: CPT | Mod: 26 | Performed by: PATHOLOGY

## 2023-12-13 ENCOUNTER — TELEPHONE (OUTPATIENT)
Dept: SLEEP MEDICINE | Facility: CLINIC | Age: 68
End: 2023-12-13
Payer: COMMERCIAL

## 2023-12-13 NOTE — TELEPHONE ENCOUNTER
[facility] visit [facility] per request      Star Lai MA  [facility]

## 2023-12-14 ENCOUNTER — ONCOLOGY VISIT (OUTPATIENT)
Dept: ONCOLOGY | Facility: CLINIC | Age: 68
End: 2023-12-14
Payer: COMMERCIAL

## 2023-12-14 VITALS
HEART RATE: 81 BPM | OXYGEN SATURATION: 97 % | SYSTOLIC BLOOD PRESSURE: 150 MMHG | RESPIRATION RATE: 18 BRPM | WEIGHT: 315 LBS | BODY MASS INDEX: 39.17 KG/M2 | HEIGHT: 75 IN | DIASTOLIC BLOOD PRESSURE: 59 MMHG

## 2023-12-14 DIAGNOSIS — D47.2 MGUS (MONOCLONAL GAMMOPATHY OF UNKNOWN SIGNIFICANCE): Primary | ICD-10-CM

## 2023-12-14 DIAGNOSIS — E53.8 VITAMIN B12 DEFICIENCY (NON ANEMIC): ICD-10-CM

## 2023-12-14 PROCEDURE — G0463 HOSPITAL OUTPT CLINIC VISIT: HCPCS | Performed by: INTERNAL MEDICINE

## 2023-12-14 PROCEDURE — 99214 OFFICE O/P EST MOD 30 MIN: CPT | Performed by: INTERNAL MEDICINE

## 2023-12-14 ASSESSMENT — PAIN SCALES - GENERAL: PAINLEVEL: NO PAIN (0)

## 2023-12-14 NOTE — LETTER
"    12/14/2023         RE: Shaheen Sepulveda  [address]  [place] [address]        Dear Colleague,    Thank you for referring your patient, Shaheen Sepulveda, to the [facility]. Please see a copy of my visit note below.    Oncology Rooming Note    December 14, 2023 1:21 PM   Shaheen Sepulveda is a 68 year old male who presents for:    Chief Complaint   Patient presents with     Oncology Clinic Visit     Lymphoma of intra-abdominal lymph nodes, unspecified lymphoma type      Initial Vitals: BP (!) 150/59   Pulse 81   Resp 18   Ht 1.905 m (6' 3\")   Wt (!) 180.5 kg (398 lb)   SpO2 97%   BMI 49.75 kg/m   Estimated body mass index is 49.75 kg/m  as calculated from the following:    Height as of this encounter: 1.905 m (6' 3\").    Weight as of this encounter: 180.5 kg (398 lb). Body surface area is 3.09 meters squared.  No Pain (0) Comment: Data Unavailable   No LMP for male patient.  Allergies reviewed: Yes  Medications reviewed: Yes    Medications: Medication refills not needed today.  Pharmacy name entered into [employer]: [facility] [address] - ONE [facility] AT PORTAL TO REGISTERED [facility] SITES    Clinical concerns:  RETURN CCSL - 6 mo f/up, labs       Rubi Sexton MA                [facility] Progress Note    Patient: Shaheen Sepulveda  MRN: [id number]  Date of Service: Dec 14, 2023         Reason for Visit    Chief Complaint   Patient presents with     Oncology Clinic Visit     Lymphoma of intra-abdominal lymph nodes, unspecified lymphoma type        Assessment and Plan     Cancer Staging   No matching staging information was found for the patient.      ECOG Performance    1 - Can't do physically strenuous work, but fully ambyulatory and can do light sedentary work     Pain  Pain Score: No Pain (0)    #.  Probable low-grade B-cell neoplasm of no further specific characterization versus reactive lymphadenopathy  #.  Chronic " bilateral lower extremity lymphedema   #.  Chronic normocytic anemia with some degree of iron deficiency and vitamin B12 deficiency.  #.  Vitamin B12 deficiency, replaced. Now appears over supplemented.  #.  IgG kappa monoclonal gammopathy of undetermined significance of 0.3 g/dL in 10/2022, isolated finding  #.  Chronic mild to moderate anemia, consistent with anemia of chronic inflammation      He is clinically stable.  No B symptoms.  I reviewed his labs.  His labs showed normal WBC with normal differential and normal platelet counts.  Hemoglobin has remained mild to moderately low around 9 g/dL with normocytic in nature.  His vitamin B12 level was markedly elevated now at over 2000.  Iron and ferritin appear sufficient at lower end of normal of iron saturation index of 15% and ferritin of 93 (probably higher than actual value given his chronic inflammatory condition).     I reviewed the myeloma panel and it showed stable monoclonal peak of 0.2 g/dL with elevated IgG of over 2000, slightly elevated kappa and lambda ratio of 2.11.  No myeloma defining illness.    Plan:  -Recommended to decrease vitamin B12 to 3 times a week.    -Recommended follow-up MGUS in 1 year with labs prior.  -He is advised to call me sooner with any concerns regarding palpable/swollen glands, profound fatigue, unintentional weight loss.    Encounter Diagnoses:    Problem List Items Addressed This Visit       Vitamin B12 deficiency (non anemic)    Relevant Orders    Vitamin B12     Other Visit Diagnoses       MGUS (monoclonal gammopathy of unknown significance)    -  Primary    Relevant Orders    CBC with Platelets & Differential    Comprehensive metabolic panel    Protein electrophoresis    Immunoglobulins A G and M    Protein Immunofixation Serum    Kappa and lambda light chain             CC: Radha Sal MD   ______________________________________________________________________________  Diagnosis  10/2055-[id number] for  progressive bilateral lymphedema and cellulitis and found to have bilateral right greater than sign left pelvic inguinal lymphadenopathy with largest lymph node measuring 3.2 x 2.4 cm over the right inguinal region.    10/6/2022-right inguinal FNA showeda kappa restricted lymphoproliferative disorder favoring SLL/CLL with a KI-67 proliferative rate<10%.  CD43 demonstrate equivocal coexpression on B cells [facility] flow [facility].   -Mildly low B12 at 229.   -IgG kappa monoclonal protein of 0.3 g/dL.   -   -Hemoglobin 9.8, MCV 80, WBC 8, platelet 234 with normal differential.   -Iron study was consistent with anemia of chronic disease.  Hemoccult x3 negative.    10/19/2022-PET scan showed mildly FDG avid bilateral axillary, right external iliac and bilateral inguinal lymph nodes suspicious for inflammatory process versus low-grade lymphoproliferative disease.    11/7/2022- right inguinal lymph node core biopsy revealed clonal B-cell population with absent MYD-88 and BCL6 mutations.    12/7/2022 bone marrow biopsy revealed normocellular marrow with mixed lymphoplasmacytic infiltrate (10-20%).  Possibly decreased iron stores.  Cytogenetic analysis was normal.  NGS panel for MDS was negative.    Treatment to date  12/2022-initiated vitamin B12 1000 mcg IM weekly x4 followed by 1000 mcg daily by mouth.    History of Present Illness    Mr. Shaheen Sepulveda presented today accompanied by her daughter for follow-up and vitamin B12 management.  He does not have any health concerns today.  He continues to follow with vascular surgery regarding venous stasis ulcer of lower extremity.      He takes vitamin B-12 1000 mcg daily.      He does not have any bone pain.  No unintentional weight loss.  Appetite is good.    Review of systems  Apart from describing in HPI, the remainder of comprehensive ROS was negative.    Past History    Past Medical History:   Diagnosis Date     COPD (chronic obstructive  "pulmonary disease) (H)      Diabetes (H)      H/O angioedema 06/15/2020    Formatting of this note might be different from the original. Unexplained angioedema twice, discontinue lisinopril for possible connection to it, though he had used it afterwards with no symptoms     History of tobacco use disorder 08/01/2013    Formatting of this note might be different from the original. Added per documetation     Hypertension      Lymphedema of both lower extremities 12/22/2014     Methadone use 05/08/2012     Obstructive sleep apnea 02/02/2015    Formatting of this note might be different from the original. Epic     Pleural mass 07/02/2013     Uncomplicated asthma        Past Surgical History:   Procedure Laterality Date     JOINT REPLACEMENT         Physical Exam    BP (!) 150/59   Pulse 81   Resp 18   Ht 1.905 m (6' 3\")   Wt (!) 180.5 kg (398 lb)   SpO2 97%   BMI 49.75 kg/m      General: alert, awake, not in acute distress  HEENT: Head: Normal, normocephalic, atraumatic.  Eye: Normal external eye, conjunctiva, lids cornea, ELISEO.  Pharynx: Normal buccal mucosa. Normal pharynx.  Neck / Thyroid: Supple, no masses, nodes, nodules or enlargement.  Lymphatics: No abnormally enlarged lymph nodes.  Abdomen: abdomen is soft without significant tenderness, masses, organomegaly or guarding  Extremities: normal strength, tone, and muscle mass  Skin: normal. no rash or abnormalities.  Lymphedema of the bilateral lower extremities.  CNS: non focal.    Lab Results    No results found for this or any previous visit (from the past 168 hour(s)).    Imaging    No results found.    30 minutes spent on the date of the encounter doing chart review, history and exam, documentation, communication of the treatment plan with the care team and further activities as noted above.    Signed by: Jamila Penaloza MD        Again, thank you for allowing me to participate in the care of your patient.        Sincerely,        Jamila Penaloza MD  "

## 2023-12-14 NOTE — PROGRESS NOTES
"Oncology Rooming Note    December 14, 2023 1:21 PM   Shaheen Sepulveda is a 68 year old male who presents for:    Chief Complaint   Patient presents with    Oncology Clinic Visit     Lymphoma of intra-abdominal lymph nodes, unspecified lymphoma type      Initial Vitals: BP (!) 150/59   Pulse 81   Resp 18   Ht 1.905 m (6' 3\")   Wt (!) 180.5 kg (398 lb)   SpO2 97%   BMI 49.75 kg/m   Estimated body mass index is 49.75 kg/m  as calculated from the following:    Height as of this encounter: 1.905 m (6' 3\").    Weight as of this encounter: 180.5 kg (398 lb). Body surface area is 3.09 meters squared.  No Pain (0) Comment: Data Unavailable   No LMP for male patient.  Allergies reviewed: Yes  Medications reviewed: Yes    Medications: Medication refills not needed today.  Pharmacy name entered into EPIC: [facility] - [address] - ONE [facility] AT PORTAL TO REGISTERED [facility] SITES    Clinical concerns:  RETURN CCSL - 6 mo f/up, labs       Rubi Sexton MA              "

## 2023-12-18 DIAGNOSIS — R06.89 HYPOVENTILATION: Primary | ICD-10-CM

## 2023-12-19 NOTE — PROGRESS NOTES
[facility] Progress Note    Patient: Shaheen Sepulveda  MRN: [id number]  Date of Service: Dec 14, 2023         Reason for Visit    Chief Complaint   Patient presents with    Oncology Clinic Visit     Lymphoma of intra-abdominal lymph nodes, unspecified lymphoma type        Assessment and Plan     Cancer Staging   No matching staging information was found for the patient.      ECOG Performance    1 - Can't do physically strenuous work, but fully ambyulatory and can do light sedentary work     Pain  Pain Score: No Pain (0)    #.  Probable low-grade B-cell neoplasm of no further specific characterization versus reactive lymphadenopathy  #.  Chronic bilateral lower extremity lymphedema   #.  Chronic normocytic anemia with some degree of iron deficiency and vitamin B12 deficiency.  #.  Vitamin B12 deficiency, replaced. Now appears over supplemented.  #.  IgG kappa monoclonal gammopathy of undetermined significance of 0.3 g/dL in 10/2022, isolated finding  #.  Chronic mild to moderate anemia, consistent with anemia of chronic inflammation      He is clinically stable.  No B symptoms.  I reviewed his labs.  His labs showed normal WBC with normal differential and normal platelet counts.  Hemoglobin has remained mild to moderately low around 9 g/dL with normocytic in nature.  His vitamin B12 level was markedly elevated now at over 2000.  Iron and ferritin appear sufficient at lower end of normal of iron saturation index of 15% and ferritin of 93 (probably higher than actual value given his chronic inflammatory condition).     I reviewed the myeloma panel and it showed stable monoclonal peak of 0.2 g/dL with elevated IgG of over 2000, slightly elevated kappa and lambda ratio of 2.11.  No myeloma defining illness.    Plan:  -Recommended to decrease vitamin B12 to 3 times a week.    -Recommended follow-up MGUS in 1 year with labs prior.  -He is advised to call me sooner with any concerns regarding  palpable/swollen glands, profound fatigue, unintentional weight loss.    Encounter Diagnoses:    Problem List Items Addressed This Visit       Vitamin B12 deficiency (non anemic)    Relevant Orders    Vitamin B12     Other Visit Diagnoses       MGUS (monoclonal gammopathy of unknown significance)    -  Primary    Relevant Orders    CBC with Platelets & Differential    Comprehensive metabolic panel    Protein electrophoresis    Immunoglobulins A G and M    Protein Immunofixation Serum    Kappa and lambda light chain             CC: Radha Sal MD   ______________________________________________________________________________  Diagnosis  10/1998-iyvrly-pe for progressive bilateral lymphedema and cellulitis and found to have bilateral right greater than sign left pelvic inguinal lymphadenopathy with largest lymph node measuring 3.2 x 2.4 cm over the right inguinal region.    10/6/2022-right inguinal FNA showeda kappa restricted lymphoproliferative disorder favoring SLL/CLL with a KI-67 proliferative rate<10%.  CD43 demonstrate equivocal coexpression on B cells with subsequent [facility] negative FISH and flow cytometry.   -Mildly low B12 at 229.   -IgG kappa monoclonal protein of 0.3 g/dL.   -   -Hemoglobin 9.8, MCV 80, WBC 8, platelet 234 with normal differential.   -Iron study was consistent with anemia of chronic disease.  Hemoccult x3 negative.    10/19/2022-PET scan showed mildly FDG avid bilateral axillary, right external iliac and bilateral inguinal lymph nodes suspicious for inflammatory process versus low-grade lymphoproliferative disease.    11/7/2022- right inguinal lymph node core biopsy revealed clonal B-cell population with absent MYD-88 and BCL6 mutations.    12/7/2022 bone marrow biopsy revealed normocellular marrow with mixed lymphoplasmacytic infiltrate (10-20%).  Possibly decreased iron stores.  Cytogenetic analysis was normal.  NGS panel for MDS was negative.    Treatment to  "date  12/2022-initiated vitamin B12 1000 mcg IM weekly x4 followed by 1000 mcg daily by mouth.    History of Present Illness    Mr. Shaheen Sepulveda presented today accompanied by her daughter for follow-up and vitamin B12 management.  He does not have any health concerns today.  He continues to follow with vascular surgery regarding venous stasis ulcer of lower extremity.      He takes vitamin B-12 1000 mcg daily.      He does not have any bone pain.  No unintentional weight loss.  Appetite is good.    Review of systems  Apart from describing in HPI, the remainder of comprehensive ROS was negative.    Past History    Past Medical History:   Diagnosis Date    COPD (chronic obstructive pulmonary disease) (H)     Diabetes (H)     H/O angioedema 06/15/2020    Formatting of this note might be different from the original. Unexplained angioedema twice, discontinue lisinopril for possible connection to it, though he had used it afterwards with no symptoms    History of tobacco use disorder 08/01/2013    Formatting of this note might be different from the original. Added per documetation    Hypertension     Lymphedema of both lower extremities 12/22/2014    Methadone use 05/08/2012    Obstructive sleep apnea 02/02/2015    Formatting of this note might be different from the original. Epic    Pleural mass 07/02/2013    Uncomplicated asthma        Past Surgical History:   Procedure Laterality Date    JOINT REPLACEMENT         Physical Exam    BP (!) 150/59   Pulse 81   Resp 18   Ht 1.905 m (6' 3\")   Wt (!) 180.5 kg (398 lb)   SpO2 97%   BMI 49.75 kg/m      General: alert, awake, not in acute distress  HEENT: Head: Normal, normocephalic, atraumatic.  Eye: Normal external eye, conjunctiva, lids cornea, ELISEO.  Pharynx: Normal buccal mucosa. Normal pharynx.  Neck / Thyroid: Supple, no masses, nodes, nodules or enlargement.  Lymphatics: No abnormally enlarged lymph nodes.  Abdomen: abdomen is soft without significant tenderness, " masses, organomegaly or guarding  Extremities: normal strength, tone, and muscle mass  Skin: normal. no rash or abnormalities.  Lymphedema of the bilateral lower extremities.  CNS: non focal.    Lab Results    No results found for this or any previous visit (from the past 168 hour(s)).    Imaging    No results found.    30 minutes spent on the date of the encounter doing chart review, history and exam, documentation, communication of the treatment plan with the care team and further activities as noted above.    Signed by: Jamila Penaloza MD

## 2023-12-27 ENCOUNTER — OFFICE VISIT (OUTPATIENT)
Dept: PULMONOLOGY | Facility: CLINIC | Age: 68
End: 2023-12-27
Attending: NURSE PRACTITIONER
Payer: COMMERCIAL

## 2023-12-27 ENCOUNTER — ANCILLARY PROCEDURE (OUTPATIENT)
Dept: GENERAL RADIOLOGY | Facility: CLINIC | Age: 68
End: 2023-12-27
Attending: INTERNAL MEDICINE
Payer: COMMERCIAL

## 2023-12-27 VITALS
SYSTOLIC BLOOD PRESSURE: 144 MMHG | HEIGHT: 75 IN | OXYGEN SATURATION: 97 % | WEIGHT: 315 LBS | BODY MASS INDEX: 39.17 KG/M2 | DIASTOLIC BLOOD PRESSURE: 69 MMHG | RESPIRATION RATE: 16 BRPM | HEART RATE: 81 BPM

## 2023-12-27 DIAGNOSIS — R06.89 HYPOVENTILATION: ICD-10-CM

## 2023-12-27 PROCEDURE — 99205 OFFICE O/P NEW HI 60 MIN: CPT | Performed by: INTERNAL MEDICINE

## 2023-12-27 PROCEDURE — 71046 X-RAY EXAM CHEST 2 VIEWS: CPT | Performed by: RADIOLOGY

## 2023-12-27 RX ORDER — FAMOTIDINE 20 MG/1
20 TABLET, FILM COATED ORAL 2 TIMES DAILY
COMMUNITY
End: 2024-06-13

## 2023-12-27 ASSESSMENT — PAIN SCALES - GENERAL: PAINLEVEL: NO PAIN (0)

## 2023-12-27 NOTE — PROGRESS NOTES
"Pulmonary Clinic New Patient Consult  Reason for Consult:  History of Present Illness        Shaheen is a  and has retired since 2015.   Review of Systems:  10 of 14 systems reviewed and are negative unless otherwise stated in HPI.    Past Medical History:   Diagnosis Date    COPD (chronic obstructive pulmonary disease) (H)     Diabetes (H)     H/O angioedema 06/15/2020    Formatting of this note might be different from the original. Unexplained angioedema twice, discontinue lisinopril for possible connection to it, though he had used it afterwards with no symptoms    History of tobacco use disorder 08/01/2013    Formatting of this note might be different from the original. Added per documetation    Hypertension     Lymphedema of both lower extremities 12/22/2014    Methadone use 05/08/2012    Obstructive sleep apnea 02/02/2015    Formatting of this note might be different from the original. Epic    Pleural mass 07/02/2013    Uncomplicated asthma        Past Surgical History:   Procedure Laterality Date    JOINT REPLACEMENT         Family History   Problem Relation Age of Onset    No Known Problems Mother     No Known Problems Father     Edema Sister     Edema Sister        Social History     Socioeconomic History    Marital status: Single     Spouse name: None    Number of children: None    Years of education: None    Highest education level: None   Tobacco Use    Smoking status: Former    Smokeless tobacco: Former   Vaping Use    Vaping Use: Never used   Substance and Sexual Activity    Alcohol use: Not Currently    Drug use: Not Currently    Sexual activity: Not Currently         Allergies   Allergen Reactions    Lisinopril Swelling     Patient reports \"neck swelling\"  Swelling in throat         Current Outpatient Medications:     ammonium lactate (LAC-HYDRIN) 12 % external lotion, Apply topically daily as needed for dry skin With dressing changes, Disp: 225 g, Rfl: 3    atorvastatin (LIPITOR) 20 MG tablet, " "Take 1 tablet (20 mg) by mouth daily, Disp: 90 tablet, Rfl: 3    chlorthalidone (HYGROTON) 25 MG tablet, Take 1 tablet (25 mg) by mouth daily, Disp: 90 tablet, Rfl: 3    famotidine (PEPCID) 20 MG tablet, Take 20 mg by mouth 2 times daily, Disp: , Rfl:     gentamicin (GARAMYCIN) 0.1 % external ointment, Apply topically daily, Disp: 30 g, Rfl: 3    hydrOXYzine (ATARAX) 25 MG tablet, Take 0.5-1 tablets (12.5-25 mg) by mouth 3 times daily as needed for anxiety, Disp: 60 tablet, Rfl: 3    losartan (COZAAR) 100 MG tablet, Take 1 tablet (100 mg) by mouth daily, Disp: 90 tablet, Rfl: 3    melatonin 3 MG tablet, Take 1 tablet (3 mg) by mouth nightly as needed for sleep, Disp: 30 tablet, Rfl: 1    methadone (DOLOPHINE-INTENSOL) 10 MG/ML (HIGH CONC) solution, Take 100 mg by mouth daily, Disp: , Rfl:     sodium hypochlorite (QUARTER-STRENGTH DAKINS) external solution, Apply topically every 72 hours Use 300mL every 72 hours to wash the bilateral legs and wounds on the legs, Disp: 1000 mL, Rfl: 3    spironolactone (ALDACTONE) 25 MG tablet, Take 1 tablet (25 mg) by mouth daily, Disp: 90 tablet, Rfl: 2    metFORMIN (GLUCOPHAGE) 500 MG tablet, Take 1 tablet (500 mg) by mouth 2 times daily (with meals) for 90 days, Disp: 180 tablet, Rfl: 0    zolpidem (AMBIEN) 10 MG tablet, Take tablet by mouth 15 minutes prior to sleep, for Sleep Study (Patient not taking: Reported on 12/14/2023), Disp: 1 tablet, Rfl: 0    Current Facility-Administered Medications:     lidocaine (PF) (XYLOCAINE) 1 % injection 10 mL, 10 mL, Intradermal, Once, Gerson Jaquez MD    lidocaine (XYLOCAINE) 2 % external gel, , Topical, Daily PRN, Lucia Reyes MD, Given at 10/19/21 0828      Physical Exam:  BP (!) 144/69   Pulse 81   Resp 16   Ht 1.905 m (6' 3\")   Wt (!) 180.5 kg (398 lb)   SpO2 97%   BMI 49.75 kg/m    GENERAL: Well developed, well nourished, alert, and in no apparent distress.  HEENT: Normocephalic, atraumatic. PERRL, EOMI. Oral mucosa is " moist. No perioral cyanosis.  NECK: supple, no masses, no thyromegaly.  RESP:  Normal respiratory effort.  CTAB.  No rales, wheezes, rhonchi.  No cyanosis or clubbing.  CV: Normal S1, S2, regular rhythm, normal rate. No murmur.  No LE edema.   ABDOMEN:  Soft, non-tender, non-distended.   SKIN: warm and dry. No rash.  NEURO: AAOx3.  Normal gait.  Fluent speech.  PSYCH: mentation appears normal.       Results:  PFTs:  Imaging (personally reviewed in clinic today):      Assessment and Plan:   Shaheen Sepulveda is a 68 year old male with a history of     Questions and concerns were answered to the patient's satisfaction.  he was provided with my contact information should new questions or concerns arise in the interim.  he should return to clinic in # months with   Up to date on Pneumovax ( ), Prevnar ( )    I spent 40 minutes on the date of the encounter doing chart review, history and exam, documentation and further coordination as noted above exclusive of time interpreting PFT, Chest Xray, CT Chest.  Devon Vaughn MD  Pulmonary, Critical Care and Sleep Medicine  [facility]  Office: [phone]      The above note was dictated using voice recognition software and may include typographical errors. Please contact the author for any clarifications.

## 2023-12-27 NOTE — PROGRESS NOTES
"Shaheen Sepulveda's goals for this visit include: Consult  He requests these members of his care team be copied on today's visit information: PCP    PCP: Ban Orr    Referring Provider:  Felicity Wick, HAYDEE CNP  [address] [place] [address]    BP (!) 144/69   Pulse 81   Resp 16   Ht 1.905 m (6' 3\")   Wt (!) 180.5 kg (398 lb)   SpO2 97%   BMI 49.75 kg/m      Do you need any medication refills at today's visit? N    Roger Dolan LPN  Pulmonary Medicine:  [facility]  Phone: [phone] Fax: [phone]      "

## 2023-12-27 NOTE — PROGRESS NOTES
Pulmonary Clinic New Patient Consult  Reason for Consult: Sleep disordered breathing, chronic hypoventilation  History of Present Illness  I had the pleasure of seeing Shaheen Sepulveda, who is a very pleasant 68-year-old gentleman who was referred here from the sleep medicine clinic for evaluation of chronic hypoventilation.  To briefly review, he was diagnosed several years ago with COPD and doesn't use any inhalers. He was also diagnosed with DG and was previously on CPAP therapy while patient within [facility].  He unfortunately ceased using CPAP around [id number] when he had medical insurance difficulties.  Patient at that time had been suffering from multiple health challenges including cardiovascular and pulmonary issues. He appeared to have gained tremendous amount of weight throughout the years.  More recently, he was seen at the sleep medicine clinic with Felicity Wick CNP for evaluation of resistant hypertension in setting of untreated DG. He appeared to have witnessed apneas and frequent nocturnal awakenings. He underwent an overnight sleep study which showed moderate DG with mild hypoxemia but with significant hypoventilation. Although he was prescribed a CPAP, he has been unable to tolerate it due to mask intolerance.   Otherwise, he is usually sedentary at baseline for a multitude of reasons including pulmonary, cardiac, musculoskeletal and obesity. He is SOB at baseline but denies any significant cough, wheezing or chest pain. He does have leg swellings at baseline and denies any orthopnea.     Review of Systems:  10 of 14 systems reviewed and are negative unless otherwise stated in HPI.    Past Medical History:   Diagnosis Date    COPD (chronic obstructive pulmonary disease) (H)     Diabetes (H)     H/O angioedema 06/15/2020    Formatting of this note might be different from the original. Unexplained angioedema twice, discontinue lisinopril for possible connection to it, though he had used it  "afterwards with no symptoms    History of tobacco use disorder 08/01/2013    Formatting of this note might be different from the original. Added per documetation    Hypertension     Lymphedema of both lower extremities 12/22/2014    Methadone use 05/08/2012    Obstructive sleep apnea 02/02/2015    Formatting of this note might be different from the original. Epic    Pleural mass 07/02/2013    Uncomplicated asthma        Past Surgical History:   Procedure Laterality Date    JOINT REPLACEMENT         Family History   Problem Relation Age of Onset    No Known Problems Mother     No Known Problems Father     Edema Sister     Edema Sister        Social History     Socioeconomic History    Marital status: Single     Spouse name: None    Number of children: None    Years of education: None    Highest education level: None   Tobacco Use    Smoking status: Former    Smokeless tobacco: Former   Vaping Use    Vaping Use: Never used   Substance and Sexual Activity    Alcohol use: Not Currently    Drug use: Not Currently    Sexual activity: Not Currently         Allergies   Allergen Reactions    Lisinopril Swelling     Patient reports \"neck swelling\"  Swelling in throat         Current Outpatient Medications:     ammonium lactate (LAC-HYDRIN) 12 % external lotion, Apply topically daily as needed for dry skin With dressing changes, Disp: 225 g, Rfl: 3    atorvastatin (LIPITOR) 20 MG tablet, Take 1 tablet (20 mg) by mouth daily, Disp: 90 tablet, Rfl: 3    chlorthalidone (HYGROTON) 25 MG tablet, Take 1 tablet (25 mg) by mouth daily, Disp: 90 tablet, Rfl: 3    famotidine (PEPCID) 20 MG tablet, Take 20 mg by mouth 2 times daily, Disp: , Rfl:     gentamicin (GARAMYCIN) 0.1 % external ointment, Apply topically daily, Disp: 30 g, Rfl: 3    hydrOXYzine (ATARAX) 25 MG tablet, Take 0.5-1 tablets (12.5-25 mg) by mouth 3 times daily as needed for anxiety, Disp: 60 tablet, Rfl: 3    losartan (COZAAR) 100 MG tablet, Take 1 tablet (100 mg) by " "mouth daily, Disp: 90 tablet, Rfl: 3    melatonin 3 MG tablet, Take 1 tablet (3 mg) by mouth nightly as needed for sleep, Disp: 30 tablet, Rfl: 1    methadone (DOLOPHINE-INTENSOL) 10 MG/ML (HIGH CONC) solution, Take 100 mg by mouth daily, Disp: , Rfl:     sodium hypochlorite (QUARTER-STRENGTH DAKINS) external solution, Apply topically every 72 hours Use 300mL every 72 hours to wash the bilateral legs and wounds on the legs, Disp: 1000 mL, Rfl: 3    spironolactone (ALDACTONE) 25 MG tablet, Take 1 tablet (25 mg) by mouth daily, Disp: 90 tablet, Rfl: 2    metFORMIN (GLUCOPHAGE) 500 MG tablet, Take 1 tablet (500 mg) by mouth 2 times daily (with meals) for 90 days, Disp: 180 tablet, Rfl: 0    zolpidem (AMBIEN) 10 MG tablet, Take tablet by mouth 15 minutes prior to sleep, for Sleep Study (Patient not taking: Reported on 12/14/2023), Disp: 1 tablet, Rfl: 0    Current Facility-Administered Medications:     lidocaine (PF) (XYLOCAINE) 1 % injection 10 mL, 10 mL, Intradermal, Once, Gerson Jaquez MD    lidocaine (XYLOCAINE) 2 % external gel, , Topical, Daily PRN, Lucia Reyes MD, Given at 10/19/21 0828      Physical Exam:  BP (!) 144/69   Pulse 81   Resp 16   Ht 1.905 m (6' 3\")   Wt (!) 180.5 kg (398 lb)   SpO2 97%   BMI 49.75 kg/m    GENERAL: Well developed, well nourished, alert, and in no apparent distress.  HEENT: Normocephalic, atraumatic. PERRL, EOMI. Oral mucosa is moist. No perioral cyanosis.  NECK: supple, no masses, no thyromegaly.  RESP:  Normal respiratory effort.  CTAB.  No rales, wheezes, rhonchi.  No cyanosis or clubbing.  CV: Normal S1, S2, regular rhythm, normal rate. No murmur.  No LE edema.   ABDOMEN:  Soft, non-tender, non-distended.   SKIN: warm and dry. No rash.  NEURO: AAOx3.  Normal gait.  Fluent speech.  PSYCH: mentation appears normal.       Results:  PFTs: Reviewed and discussed with patient- mild obstruction with preserved diffusion  Most Recent [facility] Pulmonary Function " "Testing    FVC-Pred   Date Value Ref Range Status   09/13/2023 4.25 L      FVC-Pre   Date Value Ref Range Status   09/13/2023 3.70 L      FVC-%Pred-Pre   Date Value Ref Range Status   09/13/2023 86 %      FEV1-Pre   Date Value Ref Range Status   09/13/2023 2.05 L      FEV1-%Pred-Pre   Date Value Ref Range Status   09/13/2023 63 %      FEV1FVC-Pred   Date Value Ref Range Status   09/13/2023 76 %      FEV1FVC-Pre   Date Value Ref Range Status   09/13/2023 55 %      No results found for: \"20029\"  FEFMax-Pred   Date Value Ref Range Status   09/13/2023 8.92 L/sec      FEFMax-Pre   Date Value Ref Range Status   09/13/2023 6.60 L/sec      FEFMax-%Pred-Pre   Date Value Ref Range Status   09/13/2023 74 %      ExpTime-Pre   Date Value Ref Range Status   09/13/2023 9.14 sec      FIFMax-Pre   Date Value Ref Range Status   09/13/2023 4.99 L/sec      FEV1FEV6-Pred   Date Value Ref Range Status   09/13/2023 78 %      FEV1FEV6-Pre   Date Value Ref Range Status   09/13/2023 60 %      No results found for: \"20055\"   Imaging (personally reviewed in clinic today):  XRAY:  Chest 2 views:  Personally reviewed by me - no airspace, soft tissue, cardiac or bony abnormalities.     Polysomnography - Test date 8/17/2023  Sleep latency 6.0 minutes with Zolpidem.  REM achieved with latency of 199.5 minutes.  Sleep efficiency 75.1%. Total sleep time 388.5 minutes.   Sleep architecture:  Stage 1, 8.5% (5%), stage 2, 55.2% (45-55%), stage 3, 29.0% (15-20%), stage REM, 7.3% (20-25%).  AHI was 15.0, without desaturations. RDI 15.1.  REM AHI 44.2, consistent with severe REM DG.      Respiratory rate and pattern - was notable for slow respiratory rate 8-12.   Sustained Sleep Associated Hypoventilation - Transcutaneous carbon dioxide monitoring was used and significant hypoventilation was present with a maximum change from 47 to 60 mmHg and 85 minutes at or greater than 55 mmHg.   Sleep Associated Hypoxemia - (Greater than 5 minutes O2 sat at or below " "88%) was not present. Baseline oxygen saturation was 92.7%. Lowest oxygen saturation was 81.0%. Time spent less than or equal to 88% was 2.6 minutes. Time spent less than or equal to 89% was 10.2 minutes.  Assessment and Plan:   Chronic hypercapnic respiratory failure/moderate DG/Chronic hypoventilation syndrome/COPD/Chronic Opioid  Complicated respiratory physiology involving DG, COPD, methadone use and obesity. He was seen by sleep and sleep study showed moderate DG with significant sleep hypoventilation and mild hypoxemia. He did have an inappropriately low RR likely due to methadone use. His PFTs are in keeping with COPD given the level of obstruction with preserved diffusion.  Given that he doesn't have the phenotype of severe DG (moderate DG) as well as concomitant COPD and very likely respiratory depression from methadone use, he is better served with a BI-level PAP, preferably an  device with a back up rate. It will be difficult for him to qualify for this off the bat and we may have to start him on an E470 device- BI-Level PAP S.  To start the process, he will need an awake ABG.  Overall, he is very hesitant with any changes today. As a matter of fact, he does not want to use any PAP device at this time, as he states that he is now \"sleeping better\" even without the CPAP. He also declined any inhalers for his COPD.  I did tell him to reach out if he was open to starting Bi-Level PAP   Consider modification of methadone dosing or ancillary/alternative therapy  COPD  As above and he declines any treatments.  Morbid Obesity  Declines referral to weight loss management at this time and will like to think about it  Questions and concerns were answered to the patient's satisfaction.  he was provided with my contact information should new questions or concerns arise in the interim.  he should return to clinic in 6 months  Up to date on vaccination  I spent 60 minutes on the date of the encounter doing " chart review, history and exam, documentation and further coordination as noted above exclusive of time interpreting PFT, Chest Xray, CT Chest.  Devon Vaughn MD  Pulmonary, Critical Care and Sleep Medicine  [facility]  Pager: [phone]        The above note was dictated using voice recognition software and may include typographical errors. Please contact the author for any clarifications.

## 2024-01-08 DIAGNOSIS — E11.9 TYPE 2 DIABETES MELLITUS WITHOUT COMPLICATION, WITHOUT LONG-TERM CURRENT USE OF INSULIN (H): ICD-10-CM

## 2024-01-26 ENCOUNTER — OFFICE VISIT (OUTPATIENT)
Dept: VASCULAR SURGERY | Facility: CLINIC | Age: 69
End: 2024-01-26
Attending: NURSE PRACTITIONER
Payer: COMMERCIAL

## 2024-01-26 VITALS
HEART RATE: 79 BPM | DIASTOLIC BLOOD PRESSURE: 77 MMHG | SYSTOLIC BLOOD PRESSURE: 155 MMHG | RESPIRATION RATE: 18 BRPM | TEMPERATURE: 98.2 F | WEIGHT: 315 LBS | OXYGEN SATURATION: 96 % | BODY MASS INDEX: 46 KG/M2

## 2024-01-26 DIAGNOSIS — R60.0 VENOUS STASIS ULCER OF RIGHT LOWER LEG WITH EDEMA OF RIGHT LOWER LEG (H): ICD-10-CM

## 2024-01-26 DIAGNOSIS — D36.9 PAPILLOMATOSIS: ICD-10-CM

## 2024-01-26 DIAGNOSIS — E66.01 OBESITY, MORBID, BMI 40.0-49.9 (H): ICD-10-CM

## 2024-01-26 DIAGNOSIS — M79.3 LIPODERMATOSCLEROSIS OF BOTH LOWER EXTREMITIES: ICD-10-CM

## 2024-01-26 DIAGNOSIS — I83.891 VENOUS STASIS ULCER OF RIGHT LOWER LEG WITH EDEMA OF RIGHT LOWER LEG (H): ICD-10-CM

## 2024-01-26 DIAGNOSIS — I89.0 ELEPHANTIASIS: ICD-10-CM

## 2024-01-26 DIAGNOSIS — I87.2 VENOUS (PERIPHERAL) INSUFFICIENCY: ICD-10-CM

## 2024-01-26 DIAGNOSIS — I83.892 VENOUS STASIS ULCER OF LEFT LOWER LEG WITH EDEMA OF LEFT LOWER LEG (H): ICD-10-CM

## 2024-01-26 DIAGNOSIS — L97.919 VENOUS STASIS ULCER OF RIGHT LOWER LEG WITH EDEMA OF RIGHT LOWER LEG (H): ICD-10-CM

## 2024-01-26 DIAGNOSIS — I83.029 VENOUS STASIS ULCER OF LEFT LOWER LEG WITH EDEMA OF LEFT LOWER LEG (H): ICD-10-CM

## 2024-01-26 DIAGNOSIS — I83.019 VENOUS STASIS ULCER OF RIGHT LOWER LEG WITH EDEMA OF RIGHT LOWER LEG (H): ICD-10-CM

## 2024-01-26 DIAGNOSIS — I89.0 LYMPHEDEMA OF BOTH LOWER EXTREMITIES: Primary | ICD-10-CM

## 2024-01-26 DIAGNOSIS — I87.333 VENOUS HYPERTENSION, CHRONIC, WITH ULCER AND INFLAMMATION, BILATERAL (H): ICD-10-CM

## 2024-01-26 DIAGNOSIS — R60.0 VENOUS STASIS ULCER OF LEFT LOWER LEG WITH EDEMA OF LEFT LOWER LEG (H): ICD-10-CM

## 2024-01-26 DIAGNOSIS — L97.929 VENOUS STASIS ULCER OF LEFT LOWER LEG WITH EDEMA OF LEFT LOWER LEG (H): ICD-10-CM

## 2024-01-26 DIAGNOSIS — E11.42 TYPE 2 DIABETES MELLITUS WITH PERIPHERAL NEUROPATHY (H): ICD-10-CM

## 2024-01-26 PROCEDURE — 11042 DBRDMT SUBQ TIS 1ST 20SQCM/<: CPT | Performed by: NURSE PRACTITIONER

## 2024-01-26 ASSESSMENT — PAIN SCALES - GENERAL: PAINLEVEL: NO PAIN (0)

## 2024-01-26 NOTE — PATIENT INSTRUCTIONS
"Continue on all blood pressure medications as prescribed    Focus on weight loss and low sodium diet  Keep sodium intake 2.5-4 grams per day        To mix Dakin's solution use 500mL bottle of saline and then add 1/2 teaspoon of bleach        Wound Care Instructions    Every day your family will , Cleanse your bilateral legs and wound(s) with Dilute hibiclens 30cc in 500cc NS.    Then do a light wash of Dakin's solution; focus on the right lateral leg weeping area    Pat Dry with non-sterile gauze    Apply Lotion to the intact skin surrounding your wound and other dry skin locations. Some good lotions include: Remedy Skin Repair Cream, Sarna, Vanicream or Cetaphil    Apply Triad paste thick over all the weeping skin    Apply Ammonium Lac Hydrin lotion to the thick scaling crusting areas    Triad paste to the periwound skin on the right leg to protect from all the drainage    Primary Dressing: Silvercel to all the wounds    ABDs or super absorbant pads    Secure with non-sterile roll gauze (4\" x 75\" roll) and tape (1\" roll tape) as needed; avoid adhesive directly on the skin    Compression:  tubular compression; foam; short stretch bilaterally    Elevation of the legs    Use lymphedema pump twice per day    It is not ok to get your wound wet in the bath or shower    You need to elevate your legs throughout the day    Get a new recliner chair or get into your bed; try to get the legs above your heart      If for some reason you are not able to get your dressing(s) changed as outlined above (due to illness, lack of supplies, lack of help) please do the following: remove old, soiled dressings; wash the wounds with saline; pat dry; apply ABD pad or other absorbant pad and secure with rolled gauze; avoid tape directly on your skin; Call the clinic as soon as possible to let us know what the current issues are in receiving wound care [phone].      SEEK MEDICAL CARE IF:  You have an increase in swelling, pain, or " redness around the wound.  You have an increase in the amount of pus coming from the wound.  There is a bad smell coming from the wound.  The wound appears to be worsening/enlarging  You have a fever greater than 101.5 F      It is ok to continue current wound care treatment/products for the next 2-3 days until new wound care supplies are ordered and arrive. If longer than this please contact our office at [phone].    If you have a 2 layer or 4 layer compression wrap on these are safe to have on for ONLY 7 days. If for some reason you are not able to get the wrap(s) changed (due to illness; lack of supplies, lack of help, lack of transportation) please do the following: unwrap the old 2 or 4 layer compression wrap; avoid using scissors as you could cut your skin and cause wounds; use tubular compression when available. Call to reschedule your home care or clinic visit appointment as soon as possible.    Please NOTE: if you are 15 minutes late to your clinic appointment you will have to be rescheduled. Please call our clinic as soon as possible if you know you will not be able to get to your appointment at [phone].    If you fail to show up to 3 scheduled clinic appointments you will be dismissed from our clinic.              We want to hear from you!  In the next few weeks, you should receive a call or email to complete a survey about your visit at [facility]. Please help us improve your appointment experience by letting us know how we did today. We strive to make your experience good and value any ways in which we could do better.      We value your input and suggestions.    Thank you for choosing the [facility]!      It is recommended that you do not get your ulcer wet when showering.  Listed below are several ways of keeping it dry when you shower.     1. Wrap it with Press and Seal plastic wrap.  It can be found in the stores where the plastic wraps or tin foil is  kept.               2.  Some people take a bath and hang their leg/foot out of the tub.                        3  Put your leg in a plastic bag and tape it on.           4. You can purchase a shower cover for casts at some pharmacies and through the Internet.            5. Take a Bed Bath or wash up at the sink

## 2024-01-26 NOTE — PROGRESS NOTES
"            Follow up Vascular Visit       Date of Service:01/26/24      Chief Complaint: BLE swelling and BLE ulcers      Pt returns to [facility] with regards to their BLE swelling and ulcers.  They arrive today alone. They are currently using dilute hibiclens first to wash; then dakins soak; then silvercel; ABD; rolled gauze to the wounds. This is being done by family every 1-2 days; no longer with home care. They are using tubular compression; short stretch for compression. They are feeling well today. Denies fevers, chills. No shortness of breath. Using lymphedema pumps a few times per week. He now has cpap machine; he is not able to use due to mask intolerance. He follows with Dr. Penaloza with Oncology for lymphoma; this is just being monitored for now; last saw December 14 continues to be clinically stable; they decreased the vitamin B12; will see again in 1 year. He saw pulmonology 12/27 Complicated respiratory physiology involving DG, COPD, methadone use and obesity it was recommended that he try bi-level PAP machine and modification of methadone dosing; pt does not want to do this at this time. He is having more drainage from the legs. States he no longer has a recliner chair and is not elevating his legs.  He has lost 30 pounds since our last visit states he has changed his diet and cut out a lot of the \"junk foods\". He was not seen last month due to being ill.     Allergies:   Allergies   Allergen Reactions    Lisinopril Swelling     Patient reports \"neck swelling\"  Swelling in throat       Medications:   Current Outpatient Medications:     ammonium lactate (LAC-HYDRIN) 12 % external lotion, Apply topically daily as needed for dry skin With dressing changes, Disp: 225 g, Rfl: 3    atorvastatin (LIPITOR) 20 MG tablet, Take 1 tablet (20 mg) by mouth daily, Disp: 90 tablet, Rfl: 3    chlorthalidone (HYGROTON) 25 MG tablet, Take 1 tablet (25 mg) by mouth daily, Disp: 90 tablet, Rfl: 3    " famotidine (PEPCID) 20 MG tablet, Take 20 mg by mouth 2 times daily, Disp: , Rfl:     gentamicin (GARAMYCIN) 0.1 % external ointment, Apply topically daily, Disp: 30 g, Rfl: 3    hydrOXYzine (ATARAX) 25 MG tablet, Take 0.5-1 tablets (12.5-25 mg) by mouth 3 times daily as needed for anxiety, Disp: 60 tablet, Rfl: 3    losartan (COZAAR) 100 MG tablet, Take 1 tablet (100 mg) by mouth daily, Disp: 90 tablet, Rfl: 3    melatonin 3 MG tablet, Take 1 tablet (3 mg) by mouth nightly as needed for sleep, Disp: 30 tablet, Rfl: 1    metFORMIN (GLUCOPHAGE) 500 MG tablet, Take 1 tablet (500 mg) by mouth 2 times daily (with meals), Disp: 180 tablet, Rfl: 3    methadone (DOLOPHINE-INTENSOL) 10 MG/ML (HIGH CONC) solution, Take 100 mg by mouth daily, Disp: , Rfl:     sodium hypochlorite (QUARTER-STRENGTH DAKINS) external solution, Apply topically every 72 hours Use 300mL every 72 hours to wash the bilateral legs and wounds on the legs, Disp: 1000 mL, Rfl: 3    spironolactone (ALDACTONE) 25 MG tablet, Take 1 tablet (25 mg) by mouth daily, Disp: 90 tablet, Rfl: 2    zolpidem (AMBIEN) 10 MG tablet, Take tablet by mouth 15 minutes prior to sleep, for Sleep Study, Disp: 1 tablet, Rfl: 0    Current Facility-Administered Medications:     lidocaine (PF) (XYLOCAINE) 1 % injection 10 mL, 10 mL, Intradermal, Once, Gerson Jaquez MD    lidocaine (XYLOCAINE) 2 % external gel, , Topical, Daily PRN, Lucia Reyes MD, Given at 10/19/21 0828    History:   Past Medical History:   Diagnosis Date    COPD (chronic obstructive pulmonary disease) (H)     Diabetes (H)     H/O angioedema 06/15/2020    Formatting of this note might be different from the original. Unexplained angioedema twice, discontinue lisinopril for possible connection to it, though he had used it afterwards with no symptoms    History of tobacco use disorder 08/01/2013    Formatting of this note might be different from the original. Added per documetation    Hypertension      Lymphedema of both lower extremities 12/22/2014    Methadone use 05/08/2012    Obstructive sleep apnea 02/02/2015    Formatting of this note might be different from the original. Epic    Pleural mass 07/02/2013    Uncomplicated asthma        Physical Exam:    BP (!) 155/77   Pulse 79   Temp 98.2  F (36.8  C)   Resp 18   Wt (!) 368 lb (166.9 kg)   SpO2 96%   BMI 46.00 kg/m      General:  Patient presents to clinic in no apparent distress.  Head: normocephalic atraumatic  Psychiatric:  Alert and oriented x3.   Respiratory: unlabored breathing; no cough  Integumentary:  Skin is uniformly warm, dry and pink.    Ulcer #1 Location: RLE  Size: 16L x 16W x 0.1depth.  no sinus tract present, Wound base: pink wound bed; active weeping of serous fluid  no undermining present. Ulcer is full thickness. There is heavy drainage. Periwound: no denudement, erythema, induration, maceration or warmth.      Ulcer #2 Location: LLE  Size: 13 x 10x0.1depth.  no sinus tract present, Wound base: pink wound bed; active weeping of serous fluid  no undermining present. Ulcer is full thickness. There is heavy drainage. Periwound: no denudement, erythema, induration, maceration or warmth.      Wound Leg Ulceration (Active)   Number of days: 599       VASC Wound rt lateral weeping (Active)   Pre Size Length 16 01/26/24 0700   Pre Size Width 16 01/26/24 0700   Pre Size Depth 0.1 01/26/24 0700   Pre Total Sq cm 256 01/26/24 0700   Post Size Length 10 08/02/23 0700   Post Size Width 10 08/02/23 0700   Post Size Depth 0.1 08/02/23 0700   Post Total Sq cm 100 08/02/23 0700   Description weeping 09/05/23 0700   Number of days: 829       VASC Wound left posterior leg (Active)   Pre Size Length 13 01/26/24 0700   Pre Size Width 2 01/26/24 0700   Pre Size Depth 0.3 01/26/24 0700   Pre Total Sq cm 26 01/26/24 0700   Post Size Length 3 06/28/23 0800   Post Size Width 1.5 06/28/23 0800   Post Size Depth 0.5 06/28/23 0800   Post Total Sq cm 4.5 06/28/23  "0800   Description weeping 01/26/24 0700   Number of days: 444       VASC Wound Right medial shin (Active)   Number of days: 416            Circumferential volume measures:          9/5/2023     8:00 AM 10/3/2023     8:00 AM 11/7/2023     7:00 AM 12/6/2023     8:00 AM 1/26/2024     7:00 AM   Circumferential Measures   Right just above MTP 29 28 28 25.5 27   Right Ankle 38 34 36 36 33.5   Right Widest Calf 54 54 54 54 53   Left - just above MTP 28 27 27 25.5 27   Left Ankle 36 35 33 31.2 32   Left Widest Calf 48 52 52 53 52       Labs:    I personally reviewed the following lab results today and those on care everywhere    CRP   Date Value Ref Range Status   04/23/2021 8.5 (H) 0.0 - 0.8 mg/dL Final      No results found for: \"SED\"   Last Renal Panel:  Sodium   Date Value Ref Range Status   12/07/2023 135 135 - 145 mmol/L Final     Comment:     Reference intervals for this test were updated on 09/26/2023 to more accurately reflect our healthy population. There may be differences in the flagging of prior results with similar values performed with this method. Interpretation of those prior results can be made in the context of the updated reference intervals.    06/10/2021 142.0 133.0 - 144.0 mmol/L Final     Potassium   Date Value Ref Range Status   12/07/2023 4.0 3.4 - 5.3 mmol/L Final   06/07/2023 4.2 3.5 - 5.0 mmol/L Final   06/10/2021 4.4 3.4 - 5.3 mmol/L Final     Chloride   Date Value Ref Range Status   12/07/2023 99 98 - 107 mmol/L Final   06/07/2023 102 98 - 107 mmol/L Final   06/10/2021 100.0 94.0 - 109.0 mmol/L Final     Carbon Dioxide   Date Value Ref Range Status   06/10/2021 33.0 (H) 20.0 - 32.0 mmol/L Final     Carbon Dioxide (CO2)   Date Value Ref Range Status   12/07/2023 29 22 - 29 mmol/L Final   06/07/2023 28 22 - 31 mmol/L Final     Anion Gap   Date Value Ref Range Status   12/07/2023 7 7 - 15 mmol/L Final   06/07/2023 8 5 - 18 mmol/L Final     Glucose   Date Value Ref Range Status   12/07/2023 141 " "(H) 70 - 99 mg/dL Final   06/07/2023 115 70 - 125 mg/dL Final   06/10/2021 112.0 (H) 60.0 - 109.0 mg/dL Final     Urea Nitrogen   Date Value Ref Range Status   12/07/2023 15.0 8.0 - 23.0 mg/dL Final   06/07/2023 17 8 - 22 mg/dL Final   06/10/2021 16.0 7.0 - 30.0 mg/dL Final     Creatinine   Date Value Ref Range Status   12/07/2023 1.04 0.67 - 1.17 mg/dL Final   06/10/2021 1.0 0.8 - 1.5 mg/dL Final     GFR Estimate   Date Value Ref Range Status   12/07/2023 77 >60 mL/min/1.73m2 Final   04/24/2021 >60 >60 mL/min/1.73m2 Final     Calcium   Date Value Ref Range Status   12/07/2023 9.3 8.8 - 10.2 mg/dL Final   06/10/2021 9.5 8.5 - 10.4 mg/dL Final     Albumin   Date Value Ref Range Status   12/07/2023 3.6 3.5 - 5.2 g/dL Final   06/07/2023 3.3 (L) 3.5 - 5.0 g/dL Final      Lab Results   Component Value Date    WBC 6.0 12/07/2023     Lab Results   Component Value Date    RBC 3.96 12/07/2023     Lab Results   Component Value Date    HGB 9.1 12/07/2023     Lab Results   Component Value Date    HCT 30.9 12/07/2023     No components found for: \"MCT\"  Lab Results   Component Value Date    MCV 78 12/07/2023     Lab Results   Component Value Date    MCH 23.0 12/07/2023     Lab Results   Component Value Date    MCHC 29.4 12/07/2023     Lab Results   Component Value Date    RDW 15.4 12/07/2023     Lab Results   Component Value Date     12/07/2023      Lab Results   Component Value Date    A1C 6.8 02/20/2023    A1C 6.2 07/14/2022    A1C 6.7 04/21/2022    A1C 6.2 04/24/2021    A1C 6.2 04/24/2021      TSH   Date Value Ref Range Status   10/18/2022 1.76 0.30 - 5.00 uIU/mL Final      No results found for: \"VITDT\"                Impression:  Encounter Diagnoses   Name Primary?    Lymphedema of both lower extremities Yes    Venous hypertension, chronic, with ulcer and inflammation, bilateral (H)     Elephantiasis     Venous (peripheral) insufficiency     Lipodermatosclerosis of both lower extremities     Papillomatosis     Obesity, " morbid, BMI 40.0-49.9 (H)     Venous stasis ulcer of right lower leg with edema of right lower leg (H)     Venous stasis ulcer of left lower leg with edema of left lower leg (H)     Type 2 diabetes mellitus with peripheral neuropathy (H)                              Are any of these ulcers new today: No; Location: na    Assessment/Plan:          1. Debridement: Nursing staff removed the old dressing and cleanse the wound(s) with specified solution. After discussion of risk factors and verbal consent was obtained 2% Lidocaine HCL jelly was applied, under clean conditions, the BLE ulceration(s) were debrided using currette. Devitalized and nonviable tissue, along with any fibrin and slough, was removed to improve granulation tissue formation, stimulate wound healing, decrease overall bacteria load, disrupt biofilm formation and decrease edge senescence.  Total excisional debridement was 20 sq cm from the epidermis/dermis area and into the subcutaneous tissue with a depth of 0.1 cm.   Ulcers were improved afterwards and .  Measures were as noted on the flow sheet.       2.  Ulcer treatment: ulcer treatment will include irrigation and dressings to promote autolytic debridement which will include:will continue palliative management of the wounds; using first dilute hibiclens to wash then; dakin's solution to reduce risk of infection; then triad paste to the periwound; then silvercel; then ABD; secure with rolled gauze. Change every 1-2 days by family.  If for some reason the patient is not able to get their dressing(s) changed as outlined above (due to illness, lack of supplies, lack of help) please do the following: remove old, soiled dressings; wash the ulcers with saline; pat dry; apply ABD pad or other absorbant pad and secure with rolled gauze; avoid tape directly on your skin; patient instructed to call the clinic as soon as possible to let us know what the current issues are in receiving ulcer care. Stable             3. Edema: I encouraged him to get a recliner; this would help signficantly with the weeping; will continue with tubular ocmpression and short stretch; continue lymphedema pump ideally this would be done twice per day. The compression wraps were applied today in clinic.     If a 2 layer or 4 layer compression wrap is being used; these are safe to have on for ONLY 7 days. If for some reason the patient is not able to get the wrap(s) changed (due to illness; lack of supplies, lack of help, lack of transportation) please do the following: unwrap the old 2 or 4 layer compression wrap; avoid using scissors as you could cut your skin and cause ulcers; use tubular compression when available. Call to reschedule your home care or clinic visit appointment as soon as possible.  Stable            4. Nutrition: continue to work on diet and weight loss           5. Offloading: na          6. Wound Etiology: venous stasis ulcers          7. Methadone: we spoke about recommendations of pulmonology to alter the methadone dosing due to slowed breathing; I encouraged pt to continue processing, he is in the precontemplative stage of change; considering weaning off the methadone; I have seen significant improvement in patient's wounds when they get off this medications; provided support         8. DG: we spoke again about the recommendations of sleep medicine they want him to use bipap; pt does not want to do this and feels he is getting better sleep now with no interventions         9. MGUS: continue to follow with oncology; next visit in 1 year; we spoke about his B12 levels and how dosing has been decreased     Patient will follow up with me in 4 weeks for reevaluation. They were instructed to call the clinic sooner with any signs or symptoms of infection or any further questions/concerns. Answered all questions.          Kim Fuentes DNP, RN, CNP, CWOCN, CFCN, CLT  [facility]   [phone]        This  note was electronically signed by Kim Fuentes NP

## 2024-01-28 ENCOUNTER — HEALTH MAINTENANCE LETTER (OUTPATIENT)
Age: 69
End: 2024-01-28

## 2024-02-28 ENCOUNTER — OFFICE VISIT (OUTPATIENT)
Dept: VASCULAR SURGERY | Facility: CLINIC | Age: 69
End: 2024-02-28
Attending: NURSE PRACTITIONER
Payer: COMMERCIAL

## 2024-02-28 VITALS
HEART RATE: 95 BPM | DIASTOLIC BLOOD PRESSURE: 70 MMHG | OXYGEN SATURATION: 98 % | TEMPERATURE: 97.8 F | SYSTOLIC BLOOD PRESSURE: 138 MMHG | RESPIRATION RATE: 18 BRPM

## 2024-02-28 DIAGNOSIS — I83.891 VENOUS STASIS ULCER OF RIGHT LOWER LEG WITH EDEMA OF RIGHT LOWER LEG (H): ICD-10-CM

## 2024-02-28 DIAGNOSIS — I87.2 VENOUS (PERIPHERAL) INSUFFICIENCY: ICD-10-CM

## 2024-02-28 DIAGNOSIS — I89.0 LYMPHEDEMA OF BOTH LOWER EXTREMITIES: Primary | ICD-10-CM

## 2024-02-28 DIAGNOSIS — R60.0 VENOUS STASIS ULCER OF LEFT LOWER LEG WITH EDEMA OF LEFT LOWER LEG (H): ICD-10-CM

## 2024-02-28 DIAGNOSIS — L97.929 VENOUS STASIS ULCER OF LEFT LOWER LEG WITH EDEMA OF LEFT LOWER LEG (H): ICD-10-CM

## 2024-02-28 DIAGNOSIS — I83.892 VENOUS STASIS ULCER OF LEFT LOWER LEG WITH EDEMA OF LEFT LOWER LEG (H): ICD-10-CM

## 2024-02-28 DIAGNOSIS — M79.3 LIPODERMATOSCLEROSIS OF BOTH LOWER EXTREMITIES: ICD-10-CM

## 2024-02-28 DIAGNOSIS — I83.029 VENOUS STASIS ULCER OF LEFT LOWER LEG WITH EDEMA OF LEFT LOWER LEG (H): ICD-10-CM

## 2024-02-28 DIAGNOSIS — L97.919 VENOUS STASIS ULCER OF RIGHT LOWER LEG WITH EDEMA OF RIGHT LOWER LEG (H): ICD-10-CM

## 2024-02-28 DIAGNOSIS — E11.42 TYPE 2 DIABETES MELLITUS WITH PERIPHERAL NEUROPATHY (H): ICD-10-CM

## 2024-02-28 DIAGNOSIS — I87.333 VENOUS HYPERTENSION, CHRONIC, WITH ULCER AND INFLAMMATION, BILATERAL (H): ICD-10-CM

## 2024-02-28 DIAGNOSIS — R60.0 VENOUS STASIS ULCER OF RIGHT LOWER LEG WITH EDEMA OF RIGHT LOWER LEG (H): ICD-10-CM

## 2024-02-28 DIAGNOSIS — I89.0 ELEPHANTIASIS: ICD-10-CM

## 2024-02-28 DIAGNOSIS — E66.01 OBESITY, MORBID, BMI 40.0-49.9 (H): ICD-10-CM

## 2024-02-28 DIAGNOSIS — D36.9 PAPILLOMATOSIS: ICD-10-CM

## 2024-02-28 DIAGNOSIS — I83.019 VENOUS STASIS ULCER OF RIGHT LOWER LEG WITH EDEMA OF RIGHT LOWER LEG (H): ICD-10-CM

## 2024-02-28 PROCEDURE — 11045 DBRDMT SUBQ TISS EACH ADDL: CPT | Performed by: NURSE PRACTITIONER

## 2024-02-28 PROCEDURE — 11042 DBRDMT SUBQ TIS 1ST 20SQCM/<: CPT | Performed by: NURSE PRACTITIONER

## 2024-02-28 ASSESSMENT — PAIN SCALES - GENERAL: PAINLEVEL: NO PAIN (0)

## 2024-02-28 NOTE — PATIENT INSTRUCTIONS
"Continue on all blood pressure medications as prescribed    Focus on weight loss and low sodium diet  Keep sodium intake 2.5-4 grams per day        To mix Dakin's solution use 500mL bottle of saline and then add 1/2 teaspoon of bleach        Wound Care Instructions    Every day your family will , Cleanse your bilateral legs and wound(s) with Dilute hibiclens 30cc in 500cc NS.    Then do a light wash of Dakin's solution; focus on the right lateral leg weeping area    Pat Dry with non-sterile gauze    Apply Lotion to the intact skin surrounding your wound and other dry skin locations. Some good lotions include: Remedy Skin Repair Cream, Sarna, Vanicream or Cetaphil    Apply Triad paste thick over all the weeping skin    Apply Ammonium Lac Hydrin lotion to the thick scaling crusting areas    Triad paste to the periwound skin on the right leg to protect from all the drainage    Primary Dressing: Silvercel to all the wounds    ABDs or super absorbant pads    Secure with non-sterile roll gauze (4\" x 75\" roll) and tape (1\" roll tape) as needed; avoid adhesive directly on the skin    Compression:  tubular compression; foam; short stretch bilaterally    Elevation of the legs    Use lymphedema pump twice per day    It is not ok to get your wound wet in the bath or shower    You need to elevate your legs throughout the day    Get a new recliner chair or get into your bed; try to get the legs above your heart      If for some reason you are not able to get your dressing(s) changed as outlined above (due to illness, lack of supplies, lack of help) please do the following: remove old, soiled dressings; wash the wounds with saline; pat dry; apply ABD pad or other absorbant pad and secure with rolled gauze; avoid tape directly on your skin; Call the clinic as soon as possible to let us know what the current issues are in receiving wound care [phone].      SEEK MEDICAL CARE IF:  You have an increase in swelling, pain, or " redness around the wound.  You have an increase in the amount of pus coming from the wound.  There is a bad smell coming from the wound.  The wound appears to be worsening/enlarging  You have a fever greater than 101.5 F      It is ok to continue current wound care treatment/products for the next 2-3 days until new wound care supplies are ordered and arrive. If longer than this please contact our office at [phone].    If you have a 2 layer or 4 layer compression wrap on these are safe to have on for ONLY 7 days. If for some reason you are not able to get the wrap(s) changed (due to illness; lack of supplies, lack of help, lack of transportation) please do the following: unwrap the old 2 or 4 layer compression wrap; avoid using scissors as you could cut your skin and cause wounds; use tubular compression when available. Call to reschedule your home care or clinic visit appointment as soon as possible.    Please NOTE: if you are 15 minutes late to your clinic appointment you will have to be rescheduled. Please call our clinic as soon as possible if you know you will not be able to get to your appointment at [phone].    If you fail to show up to 3 scheduled clinic appointments you will be dismissed from our clinic.              We want to hear from you!  In the next few weeks, you should receive a call or email to complete a survey about your visit at [facility]. Please help us improve your appointment experience by letting us know how we did today. We strive to make your experience good and value any ways in which we could do better.      We value your input and suggestions.    Thank you for choosing the [facility]!      It is recommended that you do not get your ulcer wet when showering.  Listed below are several ways of keeping it dry when you shower.     1. Wrap it with Press and Seal plastic wrap.  It can be found in the stores where the plastic wraps or tin foil is  kept.               2.  Some people take a bath and hang their leg/foot out of the tub.                        3  Put your leg in a plastic bag and tape it on.           4. You can purchase a shower cover for casts at some pharmacies and through the Internet.            5. Take a Bed Bath or wash up at the sink

## 2024-02-28 NOTE — PROGRESS NOTES
"            Follow up Vascular Visit       Date of Service:02/28/24      Chief Complaint: BLE swelling and ulcers      Pt returns to [facility] with regards to their BLE swelling and ulcers.  They arrive today alone; he used a cane to get to the room. They are currently using silvercel; ABD; rolled gauze to the wounds. This is being done by family. They are using lymphedema wraps for compression. They are feeling well today. Denies fevers, chills. No shortness of breath.     Allergies:   Allergies   Allergen Reactions    Lisinopril Swelling     Patient reports \"neck swelling\"  Swelling in throat       Medications:   Current Outpatient Medications:     ammonium lactate (LAC-HYDRIN) 12 % external lotion, Apply topically daily as needed for dry skin With dressing changes, Disp: 225 g, Rfl: 3    atorvastatin (LIPITOR) 20 MG tablet, Take 1 tablet (20 mg) by mouth daily, Disp: 90 tablet, Rfl: 3    chlorthalidone (HYGROTON) 25 MG tablet, Take 1 tablet (25 mg) by mouth daily, Disp: 90 tablet, Rfl: 3    famotidine (PEPCID) 20 MG tablet, Take 20 mg by mouth 2 times daily, Disp: , Rfl:     gentamicin (GARAMYCIN) 0.1 % external ointment, Apply topically daily, Disp: 30 g, Rfl: 3    hydrOXYzine (ATARAX) 25 MG tablet, Take 0.5-1 tablets (12.5-25 mg) by mouth 3 times daily as needed for anxiety, Disp: 60 tablet, Rfl: 3    losartan (COZAAR) 100 MG tablet, Take 1 tablet (100 mg) by mouth daily, Disp: 90 tablet, Rfl: 3    melatonin 3 MG tablet, Take 1 tablet (3 mg) by mouth nightly as needed for sleep, Disp: 30 tablet, Rfl: 1    metFORMIN (GLUCOPHAGE) 500 MG tablet, Take 1 tablet (500 mg) by mouth 2 times daily (with meals), Disp: 180 tablet, Rfl: 3    methadone (DOLOPHINE-INTENSOL) 10 MG/ML (HIGH CONC) solution, Take 100 mg by mouth daily, Disp: , Rfl:     sodium hypochlorite (QUARTER-STRENGTH DAKINS) external solution, Apply topically every 72 hours Use 300mL every 72 hours to wash the bilateral legs and wounds on " the legs, Disp: 1000 mL, Rfl: 3    spironolactone (ALDACTONE) 25 MG tablet, Take 1 tablet (25 mg) by mouth daily, Disp: 90 tablet, Rfl: 2    zolpidem (AMBIEN) 10 MG tablet, Take tablet by mouth 15 minutes prior to sleep, for Sleep Study, Disp: 1 tablet, Rfl: 0    Current Facility-Administered Medications:     lidocaine (PF) (XYLOCAINE) 1 % injection 10 mL, 10 mL, Intradermal, Once, Gerson Jaquez MD    lidocaine (XYLOCAINE) 2 % external gel, , Topical, Daily PRN, Lucia Reyes MD, Given at 10/19/21 0828    History:   Past Medical History:   Diagnosis Date    COPD (chronic obstructive pulmonary disease) (H)     Diabetes (H)     H/O angioedema 06/15/2020    Formatting of this note might be different from the original. Unexplained angioedema twice, discontinue lisinopril for possible connection to it, though he had used it afterwards with no symptoms    History of tobacco use disorder 08/01/2013    Formatting of this note might be different from the original. Added per documetation    Hypertension     Lymphedema of both lower extremities 12/22/2014    Methadone use 05/08/2012    Obstructive sleep apnea 02/02/2015    Formatting of this note might be different from the original. Epic    Pleural mass 07/02/2013    Uncomplicated asthma        Physical Exam:    /70   Pulse 95   Temp 97.8  F (36.6  C)   Resp 18   SpO2 98%     General:  Patient presents to clinic in no apparent distress.  Head: normocephalic atraumatic  Psychiatric:  Alert and oriented x3.   Respiratory: unlabored breathing; no cough  Integumentary:  Skin is uniformly warm, dry and pink.    Ulcer #1 Location: left posterior leg  Size: 17L x 17W x 0.1depth.  no sinus tract present, Wound base: pink; slimy wound bed  no undermining present. Ulcer is full thickness. There is heavy drainage. Periwound: no denudement, erythema, induration, maceration or warmth.      Ulcer #2 Location:right lateral leg  Size: 17L x 15W x 0.1depth.  no sinus tract  "present, Wound base: pink; slimy wound bed  no undermining present. Ulcer is full thickness. There is heavy drainage. Periwound: no denudement, erythema, induration, maceration or warmth.     Wound Leg Ulceration (Active)   Number of days: 632       VASC Wound rt lateral weeping (Active)   Pre Size Length 17 02/28/24 1300   Pre Size Width 15 02/28/24 1300   Pre Size Depth 0.1 02/28/24 1300   Pre Total Sq cm 255 02/28/24 1300   Post Size Length 10 08/02/23 0700   Post Size Width 10 08/02/23 0700   Post Size Depth 0.1 08/02/23 0700   Post Total Sq cm 100 08/02/23 0700   Description weeping 09/05/23 0700   Number of days: 862       VASC Wound left posterior leg (Active)   Pre Size Length 17 02/28/24 1300   Pre Size Width 17 02/28/24 1300   Pre Size Depth 0.1 02/28/24 1300   Pre Total Sq cm 289 02/28/24 1300   Post Size Length 13 01/26/24 0700   Post Size Width 10 01/26/24 0700   Post Size Depth 0.1 01/26/24 0700   Post Total Sq cm 130 01/26/24 0700   Description weeping 01/26/24 0700   Number of days: 477       VASC Wound Right medial shin (Active)   Number of days: 449       VASC Wound Right upper lateral skin etar (Active)   Pre Size Length 3.5 02/28/24 1300   Pre Size Width 0.2 02/28/24 1300   Pre Size Depth 0.1 02/28/24 1300   Pre Total Sq cm 0.7 02/28/24 1300   Number of days: 0            Circumferential volume measures:          10/3/2023     8:00 AM 11/7/2023     7:00 AM 12/6/2023     8:00 AM 1/26/2024     7:00 AM 2/28/2024     1:00 PM   Circumferential Measures   Right just above MTP 28 28 25.5 27 27.3   Right Ankle 34 36 36 33.5 34   Right Widest Calf 54 54 54 53 53.5   Left - just above MTP 27 27 25.5 27 27   Left Ankle 35 33 31.2 32 38   Left Widest Calf 52 52 53 52 48       Labs:    I personally reviewed the following lab results today and those on care everywhere    CRP   Date Value Ref Range Status   04/23/2021 8.5 (H) 0.0 - 0.8 mg/dL Final      No results found for: \"SED\"   Last Renal Panel:  Sodium "   Date Value Ref Range Status   12/07/2023 135 135 - 145 mmol/L Final     Comment:     Reference intervals for this test were updated on 09/26/2023 to more accurately reflect our healthy population. There may be differences in the flagging of prior results with similar values performed with this method. Interpretation of those prior results can be made in the context of the updated reference intervals.    06/10/2021 142.0 133.0 - 144.0 mmol/L Final     Potassium   Date Value Ref Range Status   12/07/2023 4.0 3.4 - 5.3 mmol/L Final   06/07/2023 4.2 3.5 - 5.0 mmol/L Final   06/10/2021 4.4 3.4 - 5.3 mmol/L Final     Chloride   Date Value Ref Range Status   12/07/2023 99 98 - 107 mmol/L Final   06/07/2023 102 98 - 107 mmol/L Final   06/10/2021 100.0 94.0 - 109.0 mmol/L Final     Carbon Dioxide   Date Value Ref Range Status   06/10/2021 33.0 (H) 20.0 - 32.0 mmol/L Final     Carbon Dioxide (CO2)   Date Value Ref Range Status   12/07/2023 29 22 - 29 mmol/L Final   06/07/2023 28 22 - 31 mmol/L Final     Anion Gap   Date Value Ref Range Status   12/07/2023 7 7 - 15 mmol/L Final   06/07/2023 8 5 - 18 mmol/L Final     Glucose   Date Value Ref Range Status   12/07/2023 141 (H) 70 - 99 mg/dL Final   06/07/2023 115 70 - 125 mg/dL Final   06/10/2021 112.0 (H) 60.0 - 109.0 mg/dL Final     Urea Nitrogen   Date Value Ref Range Status   12/07/2023 15.0 8.0 - 23.0 mg/dL Final   06/07/2023 17 8 - 22 mg/dL Final   06/10/2021 16.0 7.0 - 30.0 mg/dL Final     Creatinine   Date Value Ref Range Status   12/07/2023 1.04 0.67 - 1.17 mg/dL Final   06/10/2021 1.0 0.8 - 1.5 mg/dL Final     GFR Estimate   Date Value Ref Range Status   12/07/2023 77 >60 mL/min/1.73m2 Final   04/24/2021 >60 >60 mL/min/1.73m2 Final     Calcium   Date Value Ref Range Status   12/07/2023 9.3 8.8 - 10.2 mg/dL Final   06/10/2021 9.5 8.5 - 10.4 mg/dL Final     Albumin   Date Value Ref Range Status   12/07/2023 3.6 3.5 - 5.2 g/dL Final   06/07/2023 3.3 (L) 3.5 - 5.0 g/dL  "Final      Lab Results   Component Value Date    WBC 6.0 12/07/2023     Lab Results   Component Value Date    RBC 3.96 12/07/2023     Lab Results   Component Value Date    HGB 9.1 12/07/2023     Lab Results   Component Value Date    HCT 30.9 12/07/2023     No components found for: \"MCT\"  Lab Results   Component Value Date    MCV 78 12/07/2023     Lab Results   Component Value Date    MCH 23.0 12/07/2023     Lab Results   Component Value Date    MCHC 29.4 12/07/2023     Lab Results   Component Value Date    RDW 15.4 12/07/2023     Lab Results   Component Value Date     12/07/2023      Lab Results   Component Value Date    A1C 6.8 02/20/2023    A1C 6.2 07/14/2022    A1C 6.7 04/21/2022    A1C 6.2 04/24/2021    A1C 6.2 04/24/2021      TSH   Date Value Ref Range Status   10/18/2022 1.76 0.30 - 5.00 uIU/mL Final      No results found for: \"VITDT\"                Impression:  Encounter Diagnoses   Name Primary?    Lymphedema of both lower extremities Yes    Venous hypertension, chronic, with ulcer and inflammation, bilateral (H)     Elephantiasis     Venous (peripheral) insufficiency     Lipodermatosclerosis of both lower extremities     Papillomatosis     Obesity, morbid, BMI 40.0-49.9 (H)     Venous stasis ulcer of right lower leg with edema of right lower leg (H)     Venous stasis ulcer of left lower leg with edema of left lower leg (H)     Type 2 diabetes mellitus with peripheral neuropathy (H)                              Are any of these ulcers new today: No; Location: na    Assessment/Plan:          1. Debridement: Nursing staff removed the old dressing and cleanse the wound(s) with specified solution. After discussion of risk factors and verbal consent was obtained 2% Lidocaine HCL jelly was applied, under clean conditions, the BLE ulceration(s) were debrided using currette. Devitalized and nonviable tissue, along with any fibrin and slough, was removed to improve granulation tissue formation, stimulate wound " healing, decrease overall bacteria load, disrupt biofilm formation and decrease edge senescence.  Total excisional debridement was 544 sq cm from the epidermis/dermis area and into the subcutaneous tissue with a depth of 0.1 cm.   Ulcers were improved afterwards and .  Measures were unchanged after debridement.       2.  Ulcer treatment: ulcer treatment will include irrigation and dressings to promote autolytic debridement which will include:will continue with palliative management of the legs; goals are to prevent hospitalization; will use dilute hibiclens; then dakins soak; then silvercel; abd; rolled gauze; change daily due to drainage  If for some reason the patient is not able to get their dressing(s) changed as outlined above (due to illness, lack of supplies, lack of help) please do the following: remove old, soiled dressings; wash the ulcers with saline; pat dry; apply ABD pad or other absorbant pad and secure with rolled gauze; avoid tape directly on your skin; patient instructed to call the clinic as soon as possible to let us know what the current issues are in receiving ulcer care. Stable            3. Edema: needs to elevate; has not been doing this since he moved several months ago; needs to use lymphedema pump has not been doing so consistently will continue with lyphedema wraps  . The compression wraps were applied today in clinic.     If a 2 layer or 4 layer compression wrap is being used; these are safe to have on for ONLY 7 days. If for some reason the patient is not able to get the wrap(s) changed (due to illness; lack of supplies, lack of help, lack of transportation) please do the following: unwrap the old 2 or 4 layer compression wrap; avoid using scissors as you could cut your skin and cause ulcers; use tubular compression when available. Call to reschedule your home care or clinic visit appointment as soon as possible.             4. Nutrition: focus on weight los           5.  Offloading: na          6. Wound Etiology: venous stasis; severe lymphedema     Patient will follow up with me in 4-6 weeks for reevaluation. They were instructed to call the clinic sooner with any signs or symptoms of infection or any further questions/concerns. Answered all questions.          Kim Fuentes DNP, RN, CNP, CWOCN, CFCN, CLT  [facility]   [phone]        This note was electronically signed by Kim Fuentes, CACHORRO

## 2024-04-07 ENCOUNTER — HEALTH MAINTENANCE LETTER (OUTPATIENT)
Age: 69
End: 2024-04-07

## 2024-04-10 ENCOUNTER — OFFICE VISIT (OUTPATIENT)
Dept: VASCULAR SURGERY | Facility: CLINIC | Age: 69
End: 2024-04-10
Attending: NURSE PRACTITIONER
Payer: COMMERCIAL

## 2024-04-10 VITALS
WEIGHT: 315 LBS | HEART RATE: 92 BPM | BODY MASS INDEX: 46.25 KG/M2 | RESPIRATION RATE: 18 BRPM | DIASTOLIC BLOOD PRESSURE: 72 MMHG | OXYGEN SATURATION: 98 % | SYSTOLIC BLOOD PRESSURE: 138 MMHG | TEMPERATURE: 98 F

## 2024-04-10 DIAGNOSIS — I89.0 LYMPHEDEMA OF BOTH LOWER EXTREMITIES: Primary | ICD-10-CM

## 2024-04-10 DIAGNOSIS — I89.0 ELEPHANTIASIS: ICD-10-CM

## 2024-04-10 DIAGNOSIS — L97.919 VENOUS STASIS ULCER OF RIGHT LOWER LEG WITH EDEMA OF RIGHT LOWER LEG (H): ICD-10-CM

## 2024-04-10 DIAGNOSIS — D36.9 PAPILLOMATOSIS: ICD-10-CM

## 2024-04-10 DIAGNOSIS — I83.892 VENOUS STASIS ULCER OF LEFT LOWER LEG WITH EDEMA OF LEFT LOWER LEG (H): ICD-10-CM

## 2024-04-10 DIAGNOSIS — I87.333 VENOUS HYPERTENSION, CHRONIC, WITH ULCER AND INFLAMMATION, BILATERAL (H): ICD-10-CM

## 2024-04-10 DIAGNOSIS — R60.0 VENOUS STASIS ULCER OF RIGHT LOWER LEG WITH EDEMA OF RIGHT LOWER LEG (H): ICD-10-CM

## 2024-04-10 DIAGNOSIS — M79.3 LIPODERMATOSCLEROSIS OF BOTH LOWER EXTREMITIES: ICD-10-CM

## 2024-04-10 DIAGNOSIS — I87.2 VENOUS (PERIPHERAL) INSUFFICIENCY: ICD-10-CM

## 2024-04-10 DIAGNOSIS — I83.019 VENOUS STASIS ULCER OF RIGHT LOWER LEG WITH EDEMA OF RIGHT LOWER LEG (H): ICD-10-CM

## 2024-04-10 DIAGNOSIS — I83.891 VENOUS STASIS ULCER OF RIGHT LOWER LEG WITH EDEMA OF RIGHT LOWER LEG (H): ICD-10-CM

## 2024-04-10 DIAGNOSIS — E11.42 TYPE 2 DIABETES MELLITUS WITH PERIPHERAL NEUROPATHY (H): ICD-10-CM

## 2024-04-10 DIAGNOSIS — R60.0 VENOUS STASIS ULCER OF LEFT LOWER LEG WITH EDEMA OF LEFT LOWER LEG (H): ICD-10-CM

## 2024-04-10 DIAGNOSIS — L97.929 VENOUS STASIS ULCER OF LEFT LOWER LEG WITH EDEMA OF LEFT LOWER LEG (H): ICD-10-CM

## 2024-04-10 DIAGNOSIS — E66.01 OBESITY, MORBID, BMI 40.0-49.9 (H): ICD-10-CM

## 2024-04-10 DIAGNOSIS — I83.029 VENOUS STASIS ULCER OF LEFT LOWER LEG WITH EDEMA OF LEFT LOWER LEG (H): ICD-10-CM

## 2024-04-10 PROCEDURE — 11042 DBRDMT SUBQ TIS 1ST 20SQCM/<: CPT | Performed by: NURSE PRACTITIONER

## 2024-04-10 PROCEDURE — 11045 DBRDMT SUBQ TISS EACH ADDL: CPT | Performed by: NURSE PRACTITIONER

## 2024-04-10 RX ORDER — AMMONIUM LACTATE 12 G/100G
LOTION TOPICAL DAILY PRN
Qty: 225 G | Refills: 3 | Status: SHIPPED | OUTPATIENT
Start: 2024-04-10

## 2024-04-10 ASSESSMENT — PAIN SCALES - GENERAL: PAINLEVEL: NO PAIN (0)

## 2024-04-10 NOTE — PROGRESS NOTES
"            Follow up Vascular Visit       Date of Service:04/10/24      Chief Complaint: BLE venous stasis ulcers      Pt returns to [facility] with regards to their BLE venous stasis ulcers.  They arrive today alone. They are currently using dakin's; silvercel; ABD; rolled gauze to the wounds. This is being done by family every other day. They are using tubular compression and short stretch for compression. They are feeling well today. Denies fevers, chills. No shortness of breath. He admits to not elevating and not using lymphedema pumps as recommended.    Allergies:   Allergies   Allergen Reactions    Lisinopril Swelling     Patient reports \"neck swelling\"  Swelling in throat       Medications:   Current Outpatient Medications:     ammonium lactate (LAC-HYDRIN) 12 % external lotion, Apply topically daily as needed for dry skin With dressing changes, Disp: 225 g, Rfl: 3    atorvastatin (LIPITOR) 20 MG tablet, Take 1 tablet (20 mg) by mouth daily, Disp: 90 tablet, Rfl: 3    chlorthalidone (HYGROTON) 25 MG tablet, Take 1 tablet (25 mg) by mouth daily, Disp: 90 tablet, Rfl: 3    famotidine (PEPCID) 20 MG tablet, Take 20 mg by mouth 2 times daily, Disp: , Rfl:     gentamicin (GARAMYCIN) 0.1 % external ointment, Apply topically daily, Disp: 30 g, Rfl: 3    hydrOXYzine (ATARAX) 25 MG tablet, Take 0.5-1 tablets (12.5-25 mg) by mouth 3 times daily as needed for anxiety, Disp: 60 tablet, Rfl: 3    losartan (COZAAR) 100 MG tablet, Take 1 tablet (100 mg) by mouth daily, Disp: 90 tablet, Rfl: 3    melatonin 3 MG tablet, Take 1 tablet (3 mg) by mouth nightly as needed for sleep, Disp: 30 tablet, Rfl: 1    metFORMIN (GLUCOPHAGE) 500 MG tablet, Take 1 tablet (500 mg) by mouth 2 times daily (with meals), Disp: 180 tablet, Rfl: 3    methadone (DOLOPHINE-INTENSOL) 10 MG/ML (HIGH CONC) solution, Take 100 mg by mouth daily, Disp: , Rfl:     sodium hypochlorite (QUARTER-STRENGTH DAKINS) external solution, Apply " topically every 72 hours Use 300mL every 72 hours to wash the bilateral legs and wounds on the legs, Disp: 1000 mL, Rfl: 3    spironolactone (ALDACTONE) 25 MG tablet, Take 1 tablet (25 mg) by mouth daily, Disp: 90 tablet, Rfl: 2    zolpidem (AMBIEN) 10 MG tablet, Take tablet by mouth 15 minutes prior to sleep, for Sleep Study, Disp: 1 tablet, Rfl: 0    Current Facility-Administered Medications:     lidocaine (PF) (XYLOCAINE) 1 % injection 10 mL, 10 mL, Intradermal, Once, Gerson Jaquez MD    lidocaine (XYLOCAINE) 2 % external gel, , Topical, Daily PRN, Lucia Reyes MD, Given at 10/19/21 0828    History:   Past Medical History:   Diagnosis Date    COPD (chronic obstructive pulmonary disease) (H)     Diabetes (H)     H/O angioedema 06/15/2020    Formatting of this note might be different from the original. Unexplained angioedema twice, discontinue lisinopril for possible connection to it, though he had used it afterwards with no symptoms    History of tobacco use disorder 08/01/2013    Formatting of this note might be different from the original. Added per documetation    Hypertension     Lymphedema of both lower extremities 12/22/2014    Methadone use 05/08/2012    Obstructive sleep apnea 02/02/2015    Formatting of this note might be different from the original. Epic    Pleural mass 07/02/2013    Uncomplicated asthma        Physical Exam:    /72   Pulse 92   Temp 98  F (36.7  C)   Resp 18   Wt (!) 370 lb (167.8 kg)   SpO2 98%   BMI 46.25 kg/m      General:  Patient presents to clinic in no apparent distress.  Head: normocephalic atraumatic  Psychiatric:  Alert and oriented x3.   Respiratory: unlabored breathing; no cough  Integumentary:  Skin is uniformly warm, dry and pink.    Ulcer #1 Location: right lateral leg  Size: 17L x 36W x 0.1depth.  no sinus tract present, Wound base: sloughing tissue  no undermining present. Ulcer is full thickness. There is heavy drainage. Periwound: no denudement,  erythema, induration, maceration or warmth.      Ulcer #2 Location: left  lateral leg  Size: 11x30 x 0.1depth.  no sinus tract present, Wound base: sloughing tissue  no undermining present. Ulcer is full thickness. There is heavy drainage. Periwound: no denudement, erythema, induration, maceration or warmth.      Wound Leg Ulceration (Active)   Number of days: 674       VASC Wound rt lateral weeping (Active)   Pre Size Length 17 04/10/24 1200   Pre Size Width 36 04/10/24 1200   Pre Size Depth 0.1 04/10/24 1200   Pre Total Sq cm 255 02/28/24 1300   Post Size Length 10 08/02/23 0700   Post Size Width 10 08/02/23 0700   Post Size Depth 0.1 08/02/23 0700   Post Total Sq cm 100 08/02/23 0700   Description circuferential 04/10/24 1200   Number of days: 904       VASC Wound left posterior leg (Active)   Pre Size Length 17 02/28/24 1300   Pre Size Width 17 02/28/24 1300   Pre Size Depth 0.1 02/28/24 1300   Pre Total Sq cm 289 02/28/24 1300   Post Size Length 13 01/26/24 0700   Post Size Width 10 01/26/24 0700   Post Size Depth 0.1 01/26/24 0700   Post Total Sq cm 130 01/26/24 0700   Description weeping 01/26/24 0700   Number of days: 519       VASC Wound Right medial shin (Active)   Number of days: 491       VASC Wound Right upper lateral skin etar (Active)   Pre Size Length 3.5 02/28/24 1300   Pre Size Width 0.2 02/28/24 1300   Pre Size Depth 0.1 02/28/24 1300   Pre Total Sq cm 0.7 02/28/24 1300   Number of days: 42            Circumferential volume measures:          11/7/2023     7:00 AM 12/6/2023     8:00 AM 1/26/2024     7:00 AM 2/28/2024     1:00 PM 4/10/2024    12:00 PM   Circumferential Measures   Right just above MTP 28 25.5 27 27.3 28.4   Right Ankle 36 36 33.5 34 35   Right Widest Calf 54 54 53 53.5 53.7   Left - just above MTP 27 25.5 27 27 28.2   Left Ankle 33 31.2 32 38 34.7   Left Widest Calf 52 53 52 48 47.5       Labs:    I personally reviewed the following lab results today and those on care  "everywhere    CRP   Date Value Ref Range Status   04/23/2021 8.5 (H) 0.0 - 0.8 mg/dL Final      No results found for: \"SED\"   Last Renal Panel:  Sodium   Date Value Ref Range Status   12/07/2023 135 135 - 145 mmol/L Final     Comment:     Reference intervals for this test were updated on 09/26/2023 to more accurately reflect our healthy population. There may be differences in the flagging of prior results with similar values performed with this method. Interpretation of those prior results can be made in the context of the updated reference intervals.    06/10/2021 142.0 133.0 - 144.0 mmol/L Final     Potassium   Date Value Ref Range Status   12/07/2023 4.0 3.4 - 5.3 mmol/L Final   06/07/2023 4.2 3.5 - 5.0 mmol/L Final   06/10/2021 4.4 3.4 - 5.3 mmol/L Final     Chloride   Date Value Ref Range Status   12/07/2023 99 98 - 107 mmol/L Final   06/07/2023 102 98 - 107 mmol/L Final   06/10/2021 100.0 94.0 - 109.0 mmol/L Final     Carbon Dioxide   Date Value Ref Range Status   06/10/2021 33.0 (H) 20.0 - 32.0 mmol/L Final     Carbon Dioxide (CO2)   Date Value Ref Range Status   12/07/2023 29 22 - 29 mmol/L Final   06/07/2023 28 22 - 31 mmol/L Final     Anion Gap   Date Value Ref Range Status   12/07/2023 7 7 - 15 mmol/L Final   06/07/2023 8 5 - 18 mmol/L Final     Glucose   Date Value Ref Range Status   12/07/2023 141 (H) 70 - 99 mg/dL Final   06/07/2023 115 70 - 125 mg/dL Final   06/10/2021 112.0 (H) 60.0 - 109.0 mg/dL Final     Urea Nitrogen   Date Value Ref Range Status   12/07/2023 15.0 8.0 - 23.0 mg/dL Final   06/07/2023 17 8 - 22 mg/dL Final   06/10/2021 16.0 7.0 - 30.0 mg/dL Final     Creatinine   Date Value Ref Range Status   12/07/2023 1.04 0.67 - 1.17 mg/dL Final   06/10/2021 1.0 0.8 - 1.5 mg/dL Final     GFR Estimate   Date Value Ref Range Status   12/07/2023 77 >60 mL/min/1.73m2 Final   04/24/2021 >60 >60 mL/min/1.73m2 Final     Calcium   Date Value Ref Range Status   12/07/2023 9.3 8.8 - 10.2 mg/dL Final " "  06/10/2021 9.5 8.5 - 10.4 mg/dL Final     Albumin   Date Value Ref Range Status   12/07/2023 3.6 3.5 - 5.2 g/dL Final   06/07/2023 3.3 (L) 3.5 - 5.0 g/dL Final      Lab Results   Component Value Date    WBC 6.0 12/07/2023     Lab Results   Component Value Date    RBC 3.96 12/07/2023     Lab Results   Component Value Date    HGB 9.1 12/07/2023     Lab Results   Component Value Date    HCT 30.9 12/07/2023     No components found for: \"MCT\"  Lab Results   Component Value Date    MCV 78 12/07/2023     Lab Results   Component Value Date    MCH 23.0 12/07/2023     Lab Results   Component Value Date    MCHC 29.4 12/07/2023     Lab Results   Component Value Date    RDW 15.4 12/07/2023     Lab Results   Component Value Date     12/07/2023      Lab Results   Component Value Date    A1C 6.8 02/20/2023    A1C 6.2 07/14/2022    A1C 6.7 04/21/2022    A1C 6.2 04/24/2021    A1C 6.2 04/24/2021      TSH   Date Value Ref Range Status   10/18/2022 1.76 0.30 - 5.00 uIU/mL Final      No results found for: \"VITDT\"                Impression:  No diagnosis found.                      Are any of these ulcers new today: No; Location: na    Assessment/Plan:          1. Debridement: Nursing staff removed the old dressing and cleanse the wound(s) with specified solution. After discussion of risk factors and verbal consent was obtained 2% Lidocaine HCL jelly was applied, under clean conditions, the BLE ulceration(s) were debrided using currette. Devitalized and nonviable tissue, along with any fibrin and slough, was removed to improve granulation tissue formation, stimulate wound healing, decrease overall bacteria load, disrupt biofilm formation and decrease edge senescence.  Total excisional debridement was 942 sq cm from the epidermis/dermis area and into the subcutaneous tissue with a depth of 0.1 cm.   Ulcers were improved afterwards and .  Measures were unchanged after debridement.       2.  Ulcer treatment: ulcer treatment " will include irrigation and dressings to promote autolytic debridement which will include:continue to first wash with dilute hibiclens; then dakin's soak; then silvercel; ABD; rolled gauze; apply triad paste to periwound; lotion to intact regular skin and ammonium lac hydrin to the thickened skin areas. Needs to change daily due to drainage amounts; however he states that insurance will not pay for enough dressings and he cannot afford oop If for some reason the patient is not able to get their dressing(s) changed as outlined above (due to illness, lack of supplies, lack of help) please do the following: remove old, soiled dressings; wash the ulcers with saline; pat dry; apply ABD pad or other absorbant pad and secure with rolled gauze; avoid tape directly on your skin; patient instructed to call the clinic as soon as possible to let us know what the current issues are in receiving ulcer care. Stable            3. Edema: he is no elevating; not using the lympedema pump; reminded again to use the pump twice per day and needs to elevate day and night; will apply tubular compression and short stretch. The compression wraps were applied today in clinic.     If a 2 layer or 4 layer compression wrap is being used; these are safe to have on for ONLY 7 days. If for some reason the patient is not able to get the wrap(s) changed (due to illness; lack of supplies, lack of help, lack of transportation) please do the following: unwrap the old 2 or 4 layer compression wrap; avoid using scissors as you could cut your skin and cause ulcers; use tubular compression when available. Call to reschedule your home care or clinic visit appointment as soon as possible.  Stable            4. Nutrition: focus on weight loss           5. Offloading: na          6. Wound Etiology: venous stasis     Patient will follow up with me in 4 weeks for reevaluation. They were instructed to call the clinic sooner with any signs or symptoms of infection  or any further questions/concerns. Answered all questions.          Kim Fuentes DNP, RN, CNP, CWOCN, CFCN, CLT  [facility]   [phone]        This note was electronically signed by Kim Fuentes NP

## 2024-04-10 NOTE — PATIENT INSTRUCTIONS
"Continue on all blood pressure medications as prescribed    Focus on weight loss and low sodium diet  Keep sodium intake 2.5-4 grams per day        To mix Dakin's solution use 500mL bottle of saline and then add 1/2 teaspoon of bleach        Wound Care Instructions    Every day your family will , Cleanse your bilateral legs and wound(s) with Dilute hibiclens 30cc in 500cc NS.    Then do a light wash of Dakin's solution; focus on the right lateral leg weeping area    Pat Dry with non-sterile gauze    Apply Lotion to the intact skin surrounding your wound and other dry skin locations. Some good lotions include: Remedy Skin Repair Cream, Sarna, Vanicream or Cetaphil    Apply Triad paste thick over all the weeping skin    Apply Ammonium Lac Hydrin lotion to the thick scaling crusting areas    Triad paste to the periwound skin on the right leg to protect from all the drainage    Primary Dressing: Silvercel to all the wounds    ABDs or super absorbant pads    Secure with non-sterile roll gauze (4\" x 75\" roll) and tape (1\" roll tape) as needed; avoid adhesive directly on the skin    Compression:  tubular compression; foam; short stretch bilaterally    Elevation of the legs    Use lymphedema pump twice per day    It is not ok to get your wound wet in the bath or shower    You need to elevate your legs throughout the day    Get a new recliner chair or get into your bed; try to get the legs above your heart      If for some reason you are not able to get your dressing(s) changed as outlined above (due to illness, lack of supplies, lack of help) please do the following: remove old, soiled dressings; wash the wounds with saline; pat dry; apply ABD pad or other absorbant pad and secure with rolled gauze; avoid tape directly on your skin; Call the clinic as soon as possible to let us know what the current issues are in receiving wound care [phone].      SEEK MEDICAL CARE IF:  You have an increase in swelling, pain, or " redness around the wound.  You have an increase in the amount of pus coming from the wound.  There is a bad smell coming from the wound.  The wound appears to be worsening/enlarging  You have a fever greater than 101.5 F      It is ok to continue current wound care treatment/products for the next 2-3 days until new wound care supplies are ordered and arrive. If longer than this please contact our office at [phone].    If you have a 2 layer or 4 layer compression wrap on these are safe to have on for ONLY 7 days. If for some reason you are not able to get the wrap(s) changed (due to illness; lack of supplies, lack of help, lack of transportation) please do the following: unwrap the old 2 or 4 layer compression wrap; avoid using scissors as you could cut your skin and cause wounds; use tubular compression when available. Call to reschedule your home care or clinic visit appointment as soon as possible.    Please NOTE: if you are 15 minutes late to your clinic appointment you will have to be rescheduled. Please call our clinic as soon as possible if you know you will not be able to get to your appointment at [phone].    If you fail to show up to 3 scheduled clinic appointments you will be dismissed from our clinic.              We want to hear from you!  In the next few weeks, you should receive a call or email to complete a survey about your visit at [facility]. Please help us improve your appointment experience by letting us know how we did today. We strive to make your experience good and value any ways in which we could do better.      We value your input and suggestions.    Thank you for choosing the [facility]!      It is recommended that you do not get your ulcer wet when showering.  Listed below are several ways of keeping it dry when you shower.     1. Wrap it with Press and Seal plastic wrap.  It can be found in the stores where the plastic wraps or tin foil is  kept.               2.  Some people take a bath and hang their leg/foot out of the tub.                        3  Put your leg in a plastic bag and tape it on.           4. You can purchase a shower cover for casts at some pharmacies and through the Internet.            5. Take a Bed Bath or wash up at the sink

## 2024-05-08 ENCOUNTER — OFFICE VISIT (OUTPATIENT)
Dept: VASCULAR SURGERY | Facility: CLINIC | Age: 69
End: 2024-05-08
Attending: NURSE PRACTITIONER
Payer: COMMERCIAL

## 2024-05-08 VITALS
TEMPERATURE: 98.3 F | OXYGEN SATURATION: 97 % | HEART RATE: 86 BPM | WEIGHT: 315 LBS | SYSTOLIC BLOOD PRESSURE: 136 MMHG | DIASTOLIC BLOOD PRESSURE: 69 MMHG | BODY MASS INDEX: 46.87 KG/M2

## 2024-05-08 DIAGNOSIS — R60.0 VENOUS STASIS ULCER OF RIGHT LOWER LEG WITH EDEMA OF RIGHT LOWER LEG (H): ICD-10-CM

## 2024-05-08 DIAGNOSIS — I89.0 ELEPHANTIASIS: ICD-10-CM

## 2024-05-08 DIAGNOSIS — I87.2 VENOUS (PERIPHERAL) INSUFFICIENCY: ICD-10-CM

## 2024-05-08 DIAGNOSIS — I87.333 VENOUS HYPERTENSION, CHRONIC, WITH ULCER AND INFLAMMATION, BILATERAL (H): ICD-10-CM

## 2024-05-08 DIAGNOSIS — I83.019 VENOUS STASIS ULCER OF RIGHT LOWER LEG WITH EDEMA OF RIGHT LOWER LEG (H): ICD-10-CM

## 2024-05-08 DIAGNOSIS — E11.42 TYPE 2 DIABETES MELLITUS WITH PERIPHERAL NEUROPATHY (H): ICD-10-CM

## 2024-05-08 DIAGNOSIS — E66.01 OBESITY, MORBID, BMI 40.0-49.9 (H): ICD-10-CM

## 2024-05-08 DIAGNOSIS — L97.919 VENOUS STASIS ULCER OF RIGHT LOWER LEG WITH EDEMA OF RIGHT LOWER LEG (H): ICD-10-CM

## 2024-05-08 DIAGNOSIS — D36.9 PAPILLOMATOSIS: ICD-10-CM

## 2024-05-08 DIAGNOSIS — I83.891 VENOUS STASIS ULCER OF RIGHT LOWER LEG WITH EDEMA OF RIGHT LOWER LEG (H): ICD-10-CM

## 2024-05-08 DIAGNOSIS — M79.3 LIPODERMATOSCLEROSIS OF BOTH LOWER EXTREMITIES: ICD-10-CM

## 2024-05-08 DIAGNOSIS — I89.0 LYMPHEDEMA OF BOTH LOWER EXTREMITIES: Primary | ICD-10-CM

## 2024-05-08 PROCEDURE — 11045 DBRDMT SUBQ TISS EACH ADDL: CPT | Performed by: NURSE PRACTITIONER

## 2024-05-08 PROCEDURE — 99213 OFFICE O/P EST LOW 20 MIN: CPT | Mod: 25 | Performed by: NURSE PRACTITIONER

## 2024-05-08 PROCEDURE — 11042 DBRDMT SUBQ TIS 1ST 20SQCM/<: CPT | Performed by: NURSE PRACTITIONER

## 2024-05-08 PROCEDURE — G0463 HOSPITAL OUTPT CLINIC VISIT: HCPCS | Mod: 25 | Performed by: NURSE PRACTITIONER

## 2024-05-08 RX ORDER — METRONIDAZOLE 10 MG/G
GEL TOPICAL DAILY
Qty: 60 G | Refills: 4 | Status: SHIPPED | OUTPATIENT
Start: 2024-05-08 | End: 2024-06-28

## 2024-05-08 ASSESSMENT — PAIN SCALES - GENERAL: PAINLEVEL: MODERATE PAIN (4)

## 2024-05-08 NOTE — PROGRESS NOTES
"            Follow up Vascular Visit       Date of Service:05/08/24      Chief Complaint: BLE swelling and ulcers      Pt returns to [facility] with regards to their BLE swelling and ulcer.  They arrive today alone. They are currently using silvercel; ABD; rolled gauze to the wounds. This is being done by family every other day; he does not get enough supplies to do this daily. He is not using his lymphedema pump as recommended. He has not reduced his methadone as recommended by pulmonology. He is elevating during the day. He has not seen pcp in a long time; last A1c was done 2/2023. He does not follow up with oncology until 12/24. They are using lymphedema wraps for compression. They are feeling well today. Denies fevers, chills. No shortness of breath.     Allergies:   Allergies   Allergen Reactions    Lisinopril Swelling     Patient reports \"neck swelling\"  Swelling in throat       Medications:   Current Outpatient Medications:     ammonium lactate (LAC-HYDRIN) 12 % external lotion, Apply topically daily as needed for dry skin With dressing changes, Disp: 225 g, Rfl: 3    atorvastatin (LIPITOR) 20 MG tablet, Take 1 tablet (20 mg) by mouth daily, Disp: 90 tablet, Rfl: 3    chlorthalidone (HYGROTON) 25 MG tablet, Take 1 tablet (25 mg) by mouth daily, Disp: 90 tablet, Rfl: 3    famotidine (PEPCID) 20 MG tablet, Take 20 mg by mouth 2 times daily, Disp: , Rfl:     gentamicin (GARAMYCIN) 0.1 % external ointment, Apply topically daily, Disp: 30 g, Rfl: 3    hydrOXYzine (ATARAX) 25 MG tablet, Take 0.5-1 tablets (12.5-25 mg) by mouth 3 times daily as needed for anxiety, Disp: 60 tablet, Rfl: 3    losartan (COZAAR) 100 MG tablet, Take 1 tablet (100 mg) by mouth daily, Disp: 90 tablet, Rfl: 3    melatonin 3 MG tablet, Take 1 tablet (3 mg) by mouth nightly as needed for sleep, Disp: 30 tablet, Rfl: 1    metFORMIN (GLUCOPHAGE) 500 MG tablet, Take 1 tablet (500 mg) by mouth 2 times daily (with meals), Disp: 180 " tablet, Rfl: 3    methadone (DOLOPHINE-INTENSOL) 10 MG/ML (HIGH CONC) solution, Take 100 mg by mouth daily, Disp: , Rfl:     sodium hypochlorite (QUARTER-STRENGTH DAKINS) external solution, Apply topically every 72 hours Use 300mL every 72 hours to wash the bilateral legs and wounds on the legs, Disp: 1000 mL, Rfl: 3    spironolactone (ALDACTONE) 25 MG tablet, Take 1 tablet (25 mg) by mouth daily, Disp: 90 tablet, Rfl: 2    zolpidem (AMBIEN) 10 MG tablet, Take tablet by mouth 15 minutes prior to sleep, for Sleep Study, Disp: 1 tablet, Rfl: 0    Current Facility-Administered Medications:     lidocaine (PF) (XYLOCAINE) 1 % injection 10 mL, 10 mL, Intradermal, Once, Gerson Jaquez MD    lidocaine (XYLOCAINE) 2 % external gel, , Topical, Daily PRN, Lucia Reyes MD, Given at 10/19/21 0828    History:   Past Medical History:   Diagnosis Date    COPD (chronic obstructive pulmonary disease) (H)     Diabetes (H)     H/O angioedema 06/15/2020    Formatting of this note might be different from the original. Unexplained angioedema twice, discontinue lisinopril for possible connection to it, though he had used it afterwards with no symptoms    History of tobacco use disorder 08/01/2013    Formatting of this note might be different from the original. Added per documetation    Hypertension     Lymphedema of both lower extremities 12/22/2014    Methadone use 05/08/2012    Obstructive sleep apnea 02/02/2015    Formatting of this note might be different from the original. Epic    Pleural mass 07/02/2013    Uncomplicated asthma        Physical Exam:    /69   Pulse 86   Temp 98.3  F (36.8  C)   Wt (!) 375 lb (170.1 kg)   SpO2 97%   BMI 46.87 kg/m      General:  Patient presents to clinic in no apparent distress.  Head: normocephalic atraumatic  Psychiatric:  Alert and oriented x3.   Respiratory: unlabored breathing; no cough  Integumentary:  Skin is uniformly warm, dry and pink.    Ulcer #1 Location: right lateral  leg  Size: 14L x 54W x 0.1depth.  no sinus tract present, Wound base: slough; macerated; tissue  no undermining present. Ulcer is full thickness. There is heavy drainage. Periwound: no denudement, erythema, induration, maceration or warmth.  +odor    Ulcer #2 Location: left lateral leg  Size: 19x10 x 0.1depth. one area with 0.8cm depth; otherwise superficial  no sinus tract present, Wound base: slough; macerated; tissue  no undermining present. Ulcer is full thickness. There is heavy drainage. Periwound: no denudement, erythema, induration, maceration or warmth.  +odor    Wound Leg Ulceration (Active)   Number of days: 702       VASC Wound rt lateral weeping (Active)   Pre Size Length 14 05/08/24 1200   Pre Size Width 54 05/08/24 1200   Pre Size Depth 0.5 05/08/24 1200   Pre Total Sq cm 756 05/08/24 1200   Post Size Length 10 08/02/23 0700   Post Size Width 10 08/02/23 0700   Post Size Depth 0.1 08/02/23 0700   Post Total Sq cm 100 08/02/23 0700   Description circuferential 05/08/24 1200   Number of days: 932       VASC Wound left posterior leg (Active)   Pre Size Length 19 05/08/24 1200   Pre Size Width 10 05/08/24 1200   Pre Size Depth 0.8 05/08/24 1200   Pre Total Sq cm 190 05/08/24 1200   Post Size Length 13 01/26/24 0700   Post Size Width 10 01/26/24 0700   Post Size Depth 0.1 01/26/24 0700   Post Total Sq cm 130 01/26/24 0700   Description weeping 01/26/24 0700   Number of days: 547       VASC Wound Right medial shin (Active)   Number of days: 519       VASC Wound Right upper lateral skin etar (Active)   Pre Size Length 3.5 02/28/24 1300   Pre Size Width 0.2 02/28/24 1300   Pre Size Depth 0.1 02/28/24 1300   Pre Total Sq cm 0.7 02/28/24 1300   Number of days: 70            Circumferential volume measures:          12/6/2023     8:00 AM 1/26/2024     7:00 AM 2/28/2024     1:00 PM 4/10/2024    12:00 PM 5/8/2024    12:00 PM   Circumferential Measures   Right just above MTP 25.5 27 27.3 28.4 29   Right Ankle 36  "33.5 34 35 38   Right Widest Calf 54 53 53.5 53.7 64   Left - just above MTP 25.5 27 27 28.2 29   Left Ankle 31.2 32 38 34.7 38   Left Widest Calf 53 52 48 47.5 57       Labs:    I personally reviewed the following lab results today and those on care everywhere    CRP   Date Value Ref Range Status   04/23/2021 8.5 (H) 0.0 - 0.8 mg/dL Final      No results found for: \"SED\"   Last Renal Panel:  Sodium   Date Value Ref Range Status   12/07/2023 135 135 - 145 mmol/L Final     Comment:     Reference intervals for this test were updated on 09/26/2023 to more accurately reflect our healthy population. There may be differences in the flagging of prior results with similar values performed with this method. Interpretation of those prior results can be made in the context of the updated reference intervals.    06/10/2021 142.0 133.0 - 144.0 mmol/L Final     Potassium   Date Value Ref Range Status   12/07/2023 4.0 3.4 - 5.3 mmol/L Final   06/07/2023 4.2 3.5 - 5.0 mmol/L Final   06/10/2021 4.4 3.4 - 5.3 mmol/L Final     Chloride   Date Value Ref Range Status   12/07/2023 99 98 - 107 mmol/L Final   06/07/2023 102 98 - 107 mmol/L Final   06/10/2021 100.0 94.0 - 109.0 mmol/L Final     Carbon Dioxide   Date Value Ref Range Status   06/10/2021 33.0 (H) 20.0 - 32.0 mmol/L Final     Carbon Dioxide (CO2)   Date Value Ref Range Status   12/07/2023 29 22 - 29 mmol/L Final   06/07/2023 28 22 - 31 mmol/L Final     Anion Gap   Date Value Ref Range Status   12/07/2023 7 7 - 15 mmol/L Final   06/07/2023 8 5 - 18 mmol/L Final     Glucose   Date Value Ref Range Status   12/07/2023 141 (H) 70 - 99 mg/dL Final   06/07/2023 115 70 - 125 mg/dL Final   06/10/2021 112.0 (H) 60.0 - 109.0 mg/dL Final     Urea Nitrogen   Date Value Ref Range Status   12/07/2023 15.0 8.0 - 23.0 mg/dL Final   06/07/2023 17 8 - 22 mg/dL Final   06/10/2021 16.0 7.0 - 30.0 mg/dL Final     Creatinine   Date Value Ref Range Status   12/07/2023 1.04 0.67 - 1.17 mg/dL Final " "  06/10/2021 1.0 0.8 - 1.5 mg/dL Final     GFR Estimate   Date Value Ref Range Status   12/07/2023 77 >60 mL/min/1.73m2 Final   04/24/2021 >60 >60 mL/min/1.73m2 Final     Calcium   Date Value Ref Range Status   12/07/2023 9.3 8.8 - 10.2 mg/dL Final   06/10/2021 9.5 8.5 - 10.4 mg/dL Final     Albumin   Date Value Ref Range Status   12/07/2023 3.6 3.5 - 5.2 g/dL Final   06/07/2023 3.3 (L) 3.5 - 5.0 g/dL Final      Lab Results   Component Value Date    WBC 6.0 12/07/2023     Lab Results   Component Value Date    RBC 3.96 12/07/2023     Lab Results   Component Value Date    HGB 9.1 12/07/2023     Lab Results   Component Value Date    HCT 30.9 12/07/2023     No components found for: \"MCT\"  Lab Results   Component Value Date    MCV 78 12/07/2023     Lab Results   Component Value Date    MCH 23.0 12/07/2023     Lab Results   Component Value Date    MCHC 29.4 12/07/2023     Lab Results   Component Value Date    RDW 15.4 12/07/2023     Lab Results   Component Value Date     12/07/2023      Lab Results   Component Value Date    A1C 6.8 02/20/2023    A1C 6.2 07/14/2022    A1C 6.7 04/21/2022    A1C 6.2 04/24/2021    A1C 6.2 04/24/2021      TSH   Date Value Ref Range Status   10/18/2022 1.76 0.30 - 5.00 uIU/mL Final      No results found for: \"VITDT\"                Impression:  Encounter Diagnoses   Name Primary?    Lymphedema of both lower extremities Yes    Venous hypertension, chronic, with ulcer and inflammation, bilateral (H)     Elephantiasis     Venous (peripheral) insufficiency     Lipodermatosclerosis of both lower extremities     Papillomatosis     Obesity, morbid, BMI 40.0-49.9 (H)     Venous stasis ulcer of right lower leg with edema of right lower leg (H)     Type 2 diabetes mellitus with peripheral neuropathy (H)                          Are any of these ulcers new today: No; Location: na    Assessment/Plan:          1. Debridement: Nursing staff removed the old dressing and cleanse the wound(s) with " specified solution. After discussion of risk factors and verbal consent was obtained 2% Lidocaine HCL jelly was applied, under clean conditions, the BLE ulceration(s) were debrided using currette. Devitalized and nonviable tissue, along with any fibrin and slough, was removed to improve granulation tissue formation, stimulate wound healing, decrease overall bacteria load, disrupt biofilm formation and decrease edge senescence.  Total excisional debridement was 946 sq cm from the epidermis/dermis area and into the subcutaneous tissue with a depth of 0.1-0.8 cm.   Ulcers were improved afterwards and .  Measures were unchanged after debridement.       2.  Ulcer treatment: ulcer treatment will include irrigation and dressings to promote autolytic debridement which will include:wounds are worse; will add metrogel to help with the odor; will contact [facility] and see if we can get additional supplies so he can change daily; will continue with dilute hibiclens; dakins wash; triad paste; silvercel; aBD; rolled gauze. If for some reason the patient is not able to get their dressing(s) changed as outlined above (due to illness, lack of supplies, lack of help) please do the following: remove old, soiled dressings; wash the ulcers with saline; pat dry; apply ABD pad or other absorbant pad and secure with rolled gauze; avoid tape directly on your skin; patient instructed to call the clinic as soon as possible to let us know what the current issues are in receiving ulcer care.            3. Edema: needs to use pumps twice per day; told the patient how serious his wounds are; at risk for amputation; needs to elevate and wear lymphedema wraps. The compression wraps were applied today in clinic.     If a 2 layer or 4 layer compression wrap is being used; these are safe to have on for ONLY 7 days. If for some reason the patient is not able to get the wrap(s) changed (due to illness; lack of supplies, lack of help, lack of  transportation) please do the following: unwrap the old 2 or 4 layer compression wrap; avoid using scissors as you could cut your skin and cause ulcers; use tubular compression when available. Call to reschedule your home care or clinic visit appointment as soon as possible.  Stable            4. Nutrition: needs to lose weight; focus on protein; he has not had a diabetes check in over a year; encouraged him to schedule with pcp for updated a1c           5. Offloading: na          6. Wound Etiology: venous stasis     Patient will follow up with me in 4 weeks for reevaluation. They were instructed to call the clinic sooner with any signs or symptoms of infection or any further questions/concerns. Answered all questions.          Kim Fuentes DNP, RN, CNP, CWOCN, CFCN, CLT  [facility]   [phone]        This note was electronically signed by Kim Fuentes NP

## 2024-05-08 NOTE — LETTER
[facility]  [address]  [place], [place] [address]  [phone]      Fax [phone]    [facility]           Fax: [phone]            Customer Service: [phone]        Account #: [id number]    Wound Dressing Rx and Order Form  Order Status: re-order  Verbal: Audrey  Date: May 8, 2024     Shaheen Sepulveda  Gender: male  : 1955  [address]  [facility] [address]  [phone] (home)     Medical Record: [id number]  Primary Care Provider: Ban Orr      ICD-10-CM    1. Lymphedema of both lower extremities  I89.0 metroNIDAZOLE (METROGEL) 1 % external gel     DEBRIDE SKIN/SUBQ TISSUE     AR DEBRIDEMENT,SUB-Q TISSUE, EA ADDL 20 SQ CM     Wound care      2. Venous hypertension, chronic, with ulcer and inflammation, bilateral (H)  I87.333 metroNIDAZOLE (METROGEL) 1 % external gel     DEBRIDE SKIN/SUBQ TISSUE     AR DEBRIDEMENT,SUB-Q TISSUE, EA ADDL 20 SQ CM     Wound care      3. Elephantiasis  I89.0 metroNIDAZOLE (METROGEL) 1 % external gel     DEBRIDE SKIN/SUBQ TISSUE     AR DEBRIDEMENT,SUB-Q TISSUE, EA ADDL 20 SQ CM     Wound care      4. Venous (peripheral) insufficiency  I87.2 metroNIDAZOLE (METROGEL) 1 % external gel     DEBRIDE SKIN/SUBQ TISSUE     AR DEBRIDEMENT,SUB-Q TISSUE, EA ADDL 20 SQ CM     Wound care      5. Lipodermatosclerosis of both lower extremities  M79.3 metroNIDAZOLE (METROGEL) 1 % external gel     DEBRIDE SKIN/SUBQ TISSUE     AR DEBRIDEMENT,SUB-Q TISSUE, EA ADDL 20 SQ CM     Wound care      6. Papillomatosis  D36.9 metroNIDAZOLE (METROGEL) 1 % external gel     DEBRIDE SKIN/SUBQ TISSUE     AR DEBRIDEMENT,SUB-Q TISSUE, EA ADDL 20 SQ CM     Wound care      7. Obesity, morbid, BMI 40.0-49.9 (H)  E66.01 metroNIDAZOLE (METROGEL) 1 % external gel     DEBRIDE SKIN/SUBQ TISSUE     AR DEBRIDEMENT,SUB-Q TISSUE, EA ADDL 20 SQ CM     Wound care      8. Venous stasis ulcer of right lower leg with edema of right lower leg (H)  I83.019 metroNIDAZOLE  (METROGEL) 1 % external gel    I83.891 DEBRIDE SKIN/SUBQ TISSUE    L97.919 MD DEBRIDEMENT,SUB-Q TISSUE, EA ADDL 20 SQ CM    R60.0 Wound care      9. Type 2 diabetes mellitus with peripheral neuropathy (H)  E11.42 metroNIDAZOLE (METROGEL) 1 % external gel     DEBRIDE SKIN/SUBQ TISSUE     MD DEBRIDEMENT,SUB-Q TISSUE, EA ADDL 20 SQ CM     Wound care            Insurance Info:  INSURER: Payor: [employer]ET[employer] / Plan: [employer] MEDICARE ADVANTAGE / Product Type: Medicare /   Policy ID#:  [id number]  SECONDARY INSURANCE:    Secondary Policy ID#:  N/A        Physician Info:   Name:  JOHAN RICHMOND     Dept Address/Phones:   [address] [place] [address], [address]  [facility] [address] [phone]  Fax: [phone]    Lymphedema circumferential measurements (in cm):      12/6/2023     8:00 AM 1/26/2024     7:00 AM 2/28/2024     1:00 PM 4/10/2024    12:00 PM 5/8/2024    12:00 PM   Circumferential Measures   Right just above MTP 25.5 27 27.3 28.4 29   Right Ankle 36 33.5 34 35 38   Right Widest Calf 54 53 53.5 53.7 64   Left - just above MTP 25.5 27 27 28.2 29   Left Ankle 31.2 32 38 34.7 38   Left Widest Calf 53 52 48 47.5 57         Wound info:  Wound Leg Ulceration (Active)   Number of days: 702       VASC Wound rt lateral weeping (Active)   Pre Size Length 14 05/08/24 1200   Pre Size Width 54 05/08/24 1200   Pre Size Depth 0.5 05/08/24 1200   Pre Total Sq cm 756 05/08/24 1200   Post Size Length 10 08/02/23 0700   Post Size Width 10 08/02/23 0700   Post Size Depth 0.1 08/02/23 0700   Post Total Sq cm 100 08/02/23 0700   Description circuferential 05/08/24 1200   Number of days: 932       VASC Wound left posterior leg (Active)   Pre Size Length 19 05/08/24 1200   Pre Size Width 10 05/08/24 1200   Pre Size Depth 0.8 05/08/24 1200   Pre Total Sq cm 190 05/08/24 1200   Post Size Length 13 01/26/24 0700   Post Size Width 10 01/26/24 0700   Post Size Depth 0.1 01/26/24 0700   Post Total Sq cm 130 01/26/24 0700   Description  "weeping 01/26/24 0700   Number of days: 547       VASC Wound Right medial shin (Active)   Number of days: 519       VASC Wound Right upper lateral skin etar (Active)   Pre Size Length 3.5 02/28/24 1300   Pre Size Width 0.2 02/28/24 1300   Pre Size Depth 0.1 02/28/24 1300   Pre Total Sq cm 0.7 02/28/24 1300   Number of days: 70        Drainage: heavy  Thickness:  full  Duration of Need: 30 DAYS  Days Supply: 30 DAYS  Start Date: 5/8/2024  Starter Kit, Ancillary Kit (saline, gloves, gauze): Yes   Qualifying wound/Debridement: Yes     NO SUBSTITUTIONS. Call [phone].      Dressing Type Brand Size Frequency of change  Quantity   Primary Silvercel (no substitution)  4\"x8\" DAILY and as needed 120    ABD  8\"x10\" DAILY and as needed 120    Kerlix roll gauze  4\"x75\" DAILY and as needed 60   tape paper  2\" DAILY and as needed 2 rolls     NO SUBSTITUTIONS. Call [phone] with questions.     Wound Care Instructions    Every day your family will , Cleanse your bilateral legs and wound(s) with Dilute hibiclens 30cc in 500cc NS.    Then do a light wash of Dakin's solution; focus on the right lateral leg weeping area    Apply Triad paste thick over all the weeping skin    Primary Dressing: apply metrogel onto the Silvercel 4\"x8\" to all the wounds    Cover with ABD 8\"x10\"    OK to forward to covered supplier.    Electronically Signed Physician:  JOHAN RICHMOND             Date: May 8, 2024    "

## 2024-05-08 NOTE — PATIENT INSTRUCTIONS
"Continue on all blood pressure medications as prescribed    Make appt with PCP right away to get diabetes check    Focus on weight loss and low sodium diet  Keep sodium intake 2.5-4 grams per day      Use lymphedema pumps twice per day  Your wounds are worse today; you are at risk for amputation of the right leg    I sent a prescription for Metrogel to help with the wound odor            Wound Care Instructions    Every day your family will , Cleanse your bilateral legs and wound(s) with Dilute hibiclens 30cc in 500cc NS.    Then do a light wash of Dakin's solution; focus on the right lateral leg weeping area    Pat Dry with non-sterile gauze    Apply Lotion to the intact skin surrounding your wound and other dry skin locations. Some good lotions include: Remedy Skin Repair Cream, Sarna, Vanicream or Cetaphil    Apply Triad paste thick over all the weeping skin    Apply Ammonium Lac Hydrin lotion to the thick scaling crusting areas    Triad paste to the periwound skin on the right leg to protect from all the drainage    Primary Dressing: apply metrogel onto the Silvercel to all the wounds    ABDs or super absorbant pads    Secure with non-sterile roll gauze (4\" x 75\" roll) and tape (1\" roll tape) as needed; avoid adhesive directly on the skin    Compression:  tubular compression; foam; short stretch bilaterally    Elevation of the legs    Use lymphedema pump twice per day    It is not ok to get your wound wet in the bath or shower    You need to elevate your legs throughout the day    Get a new recliner chair or get into your bed; try to get the legs above your heart      If for some reason you are not able to get your dressing(s) changed as outlined above (due to illness, lack of supplies, lack of help) please do the following: remove old, soiled dressings; wash the wounds with saline; pat dry; apply ABD pad or other absorbant pad and secure with rolled gauze; avoid tape directly on your skin; Call the clinic as " soon as possible to let us know what the current issues are in receiving wound care [phone].      SEEK MEDICAL CARE IF:  You have an increase in swelling, pain, or redness around the wound.  You have an increase in the amount of pus coming from the wound.  There is a bad smell coming from the wound.  The wound appears to be worsening/enlarging  You have a fever greater than 101.5 F      It is ok to continue current wound care treatment/products for the next 2-3 days until new wound care supplies are ordered and arrive. If longer than this please contact our office at [phone].    If you have a 2 layer or 4 layer compression wrap on these are safe to have on for ONLY 7 days. If for some reason you are not able to get the wrap(s) changed (due to illness; lack of supplies, lack of help, lack of transportation) please do the following: unwrap the old 2 or 4 layer compression wrap; avoid using scissors as you could cut your skin and cause wounds; use tubular compression when available. Call to reschedule your home care or clinic visit appointment as soon as possible.    Please NOTE: if you are 15 minutes late to your clinic appointment you will have to be rescheduled. Please call our clinic as soon as possible if you know you will not be able to get to your appointment at [phone].    If you fail to show up to 3 scheduled clinic appointments you will be dismissed from our clinic.              We want to hear from you!  In the next few weeks, you should receive a call or email to complete a survey about your visit at [facility]. Please help us improve your appointment experience by letting us know how we did today. We strive to make your experience good and value any ways in which we could do better.      We value your input and suggestions.    Thank you for choosing the [facility]!      It is recommended that you do not get your ulcer wet when showering.  Listed below  are several ways of keeping it dry when you shower.     1. Wrap it with Press and Seal plastic wrap.  It can be found in the stores where the plastic wraps or tin foil is kept.               2.  Some people take a bath and hang their leg/foot out of the tub.                        3  Put your leg in a plastic bag and tape it on.           4. You can purchase a shower cover for casts at some pharmacies and through the Internet.            5. Take a Bed Bath or wash up at the sink

## 2024-05-22 ENCOUNTER — TELEPHONE (OUTPATIENT)
Dept: VASCULAR SURGERY | Facility: CLINIC | Age: 69
End: 2024-05-22
Payer: COMMERCIAL

## 2024-05-22 NOTE — TELEPHONE ENCOUNTER
Pt called stating he received a substitute for Silvercel because Silvercel was out of stock. This writer reviewed the last order that was placed through [facility] and it clearly states no substitutions. This writer called [facility] and spoke with Lyn. The will put a hopper on the order of Silvercel and the pt should receive within 2 days. She will call the pt to follow up.    Nicol Nowak RN, CWOCN  [phone]

## 2024-06-11 ENCOUNTER — HOSPITAL ENCOUNTER (EMERGENCY)
Facility: HOSPITAL | Age: 69
Discharge: HOME | End: 2024-06-11
Admitting: PHYSICIAN ASSISTANT
Payer: COMMERCIAL

## 2024-06-11 VITALS
TEMPERATURE: 97.9 F | HEIGHT: 75 IN | DIASTOLIC BLOOD PRESSURE: 67 MMHG | SYSTOLIC BLOOD PRESSURE: 154 MMHG | OXYGEN SATURATION: 97 % | HEART RATE: 70 BPM | BODY MASS INDEX: 39.17 KG/M2 | RESPIRATION RATE: 18 BRPM | WEIGHT: 315 LBS

## 2024-06-11 DIAGNOSIS — L97.911 ULCER OF RIGHT LOWER EXTREMITY, LIMITED TO BREAKDOWN OF SKIN (H): ICD-10-CM

## 2024-06-11 DIAGNOSIS — I87.2 VENOUS STASIS DERMATITIS OF BOTH LOWER EXTREMITIES: ICD-10-CM

## 2024-06-11 DIAGNOSIS — L97.921 ULCER OF LEFT LOWER EXTREMITY, LIMITED TO BREAKDOWN OF SKIN (H): ICD-10-CM

## 2024-06-11 LAB
ALBUMIN SERPL BCG-MCNC: 3.5 G/DL (ref 3.5–5.2)
ALP SERPL-CCNC: 74 U/L (ref 40–150)
ALT SERPL W P-5'-P-CCNC: <5 U/L (ref 0–70)
ANION GAP SERPL CALCULATED.3IONS-SCNC: 9 MMOL/L (ref 7–15)
AST SERPL W P-5'-P-CCNC: 12 U/L (ref 0–45)
BASOPHILS # BLD AUTO: 0 10E3/UL (ref 0–0.2)
BASOPHILS NFR BLD AUTO: 0 %
BILIRUB SERPL-MCNC: 0.3 MG/DL
BUN SERPL-MCNC: 18.9 MG/DL (ref 8–23)
CALCIUM SERPL-MCNC: 9.2 MG/DL (ref 8.8–10.2)
CHLORIDE SERPL-SCNC: 99 MMOL/L (ref 98–107)
CREAT SERPL-MCNC: 1.03 MG/DL (ref 0.67–1.17)
DEPRECATED HCO3 PLAS-SCNC: 27 MMOL/L (ref 22–29)
EGFRCR SERPLBLD CKD-EPI 2021: 78 ML/MIN/1.73M2
EOSINOPHIL # BLD AUTO: 0.1 10E3/UL (ref 0–0.7)
EOSINOPHIL NFR BLD AUTO: 1 %
ERYTHROCYTE [DISTWIDTH] IN BLOOD BY AUTOMATED COUNT: 15 % (ref 10–15)
GLUCOSE SERPL-MCNC: 92 MG/DL (ref 70–99)
HCT VFR BLD AUTO: 33.1 % (ref 40–53)
HGB BLD-MCNC: 9.6 G/DL (ref 13.3–17.7)
IMM GRANULOCYTES # BLD: 0 10E3/UL
IMM GRANULOCYTES NFR BLD: 1 %
LYMPHOCYTES # BLD AUTO: 1.2 10E3/UL (ref 0.8–5.3)
LYMPHOCYTES NFR BLD AUTO: 16 %
MCH RBC QN AUTO: 23.2 PG (ref 26.5–33)
MCHC RBC AUTO-ENTMCNC: 29 G/DL (ref 31.5–36.5)
MCV RBC AUTO: 80 FL (ref 78–100)
MONOCYTES # BLD AUTO: 0.5 10E3/UL (ref 0–1.3)
MONOCYTES NFR BLD AUTO: 6 %
NEUTROPHILS # BLD AUTO: 6 10E3/UL (ref 1.6–8.3)
NEUTROPHILS NFR BLD AUTO: 77 %
NRBC # BLD AUTO: 0 10E3/UL
NRBC BLD AUTO-RTO: 0 /100
PLATELET # BLD AUTO: 320 10E3/UL (ref 150–450)
POTASSIUM SERPL-SCNC: 5 MMOL/L (ref 3.4–5.3)
PROT SERPL-MCNC: 8.2 G/DL (ref 6.4–8.3)
RBC # BLD AUTO: 4.13 10E6/UL (ref 4.4–5.9)
SODIUM SERPL-SCNC: 135 MMOL/L (ref 135–145)
WBC # BLD AUTO: 7.8 10E3/UL (ref 4–11)

## 2024-06-11 PROCEDURE — 99283 EMERGENCY DEPT VISIT LOW MDM: CPT

## 2024-06-11 PROCEDURE — 85025 COMPLETE CBC W/AUTO DIFF WBC: CPT | Performed by: STUDENT IN AN ORGANIZED HEALTH CARE EDUCATION/TRAINING PROGRAM

## 2024-06-11 PROCEDURE — 82374 ASSAY BLOOD CARBON DIOXIDE: CPT | Performed by: STUDENT IN AN ORGANIZED HEALTH CARE EDUCATION/TRAINING PROGRAM

## 2024-06-11 PROCEDURE — 36415 COLL VENOUS BLD VENIPUNCTURE: CPT | Performed by: STUDENT IN AN ORGANIZED HEALTH CARE EDUCATION/TRAINING PROGRAM

## 2024-06-11 PROCEDURE — 82040 ASSAY OF SERUM ALBUMIN: CPT | Performed by: STUDENT IN AN ORGANIZED HEALTH CARE EDUCATION/TRAINING PROGRAM

## 2024-06-11 ASSESSMENT — COLUMBIA-SUICIDE SEVERITY RATING SCALE - C-SSRS
1. IN THE PAST MONTH, HAVE YOU WISHED YOU WERE DEAD OR WISHED YOU COULD GO TO SLEEP AND NOT WAKE UP?: NO
2. HAVE YOU ACTUALLY HAD ANY THOUGHTS OF KILLING YOURSELF IN THE PAST MONTH?: NO
6. HAVE YOU EVER DONE ANYTHING, STARTED TO DO ANYTHING, OR PREPARED TO DO ANYTHING TO END YOUR LIFE?: NO

## 2024-06-11 NOTE — ED TRIAGE NOTES
Pt on metformin has bilat leg wounds for months,  pt reports increased pain and drainage in past week and does not have supplies to change the dressings more frequently.  Pt sees vascular cliniic monthly and was discharged from home health care nurse service.     Triage Assessment (Adult)       Row Name 06/11/24 1451          Triage Assessment    Airway WDL WDL        Respiratory WDL    Respiratory WDL WDL        Skin Circulation/Temperature WDL    Skin Circulation/Temperature WDL WDL        Cardiac WDL    Cardiac WDL WDL        Peripheral/Neurovascular WDL    Peripheral Neurovascular WDL WDL        Cognitive/Neuro/Behavioral WDL    Cognitive/Neuro/Behavioral WDL WDL        McCalla Coma Scale    Best Eye Response 4-->(E4) spontaneous     Best Motor Response 6-->(M6) obeys commands     Best Verbal Response 5-->(V5) oriented     McCalla Coma Scale Score 15

## 2024-06-11 NOTE — ED PROVIDER NOTES
EMERGENCY DEPARTMENT ENCOUNTER   NAME: Shaheen Sepulveda ; AGE: 68 year old male ; YOB: 1955 ; MRN: [id number] ; PCP: Ban Orr     Evaluation Date & Time: No admission date for patient encounter.    ED Provider: Leelee Norris PA-C    CHIEF COMPLAINT     Wound Infection      FINAL ASSESSMENT       ICD-10-CM    1. Ulcer of left lower extremity, limited to breakdown of skin (H)  L97.921 Home Care Referral      2. Ulcer of right lower extremity, limited to breakdown of skin (H)  L97.911 Home Care Referral      3. Venous stasis dermatitis of both lower extremities  I87.2 Home Care Referral          ED COURSE, MEDICAL DECISION MAKING, PLAN     ED course     3:40 PM: Evaluated patient. Performed physical exam. Call out to vascular for input.   3:50 PM: Spoke with Dr. Newman from vascular who will come down and see patient.   4:03 PM: Dr. Newman recommends checking for elevated WBC. If elevated admit for IV antibiotics. If not, home with ongoing wound cares as previously prescribed.   4:33 PM: Reviewed labs. No elevated WBC. Plan to discharge to home with referral for home health care. Return precautions reviewed.   ______________________________________________________________________    Shaheen Sepulveda is a 68 year old male with pertinent medical history of lymphedema, open wounds of lower limbs, HTN, DM2, morbid obesity, DG, chronic pain syndrome presenting for worsening wounds to bilateral lower extremities and issues with getting appropriate supplies to care for the wounds.    Exam with circumferential ulcerations to bilateral lower extremities with bilateral nonpitting edema.  See photos below under physical exam.  Slightly elevated blood pressure 154/67, otherwise vitally normal.  No evidence of endorgan damage.    Laboratory workup without leukocytosis.    Spoke with Dr. Newman from vascular surgery who evaluated patient here in the ER.  No further recommendations at this time.    Interventions here  included: Wound dressings were reapplied by ER tech.  Unfortunately patient uses silvercel which we do not have here per pharmacy.  Used ABD and gauze.    History and exam consistent with bilateral lower extremity ulcers due to venous stasis  Low suspicion for secondary infection, osteomyelitis, sepsis, necrotizing fasciitis.  No red flag s/s present to suggest an acutely serious or life threatening condition that would warrant hospitalization.     I did place an order for home care to see if this can be arranged to help him with supplies and regular dressing changes.    Recommended continued follow-up with vascular.    Indications for re-evaluation in the ER discussed.     Patient understanding and agreeable with the plan and will discharge to home in good condition.     ______________________________________________________________________    *All pertinent lab & imaging studies independently reviewed. (See chart for details)   *Discussed the results of all the tests and plan with patient and family/guardians.   *The patient and/or family/guardian acknowledged understanding and was agreeable with the care plan.    HISTORY OF PRESENT ILLNESS   Patient information was obtained from: Patient  Use of Intrepreter: N/A     Shaheen Sepulveda is a 68 year old male with a pertinent history of lymphedema, open wounds of lower limbs, HTN, DM2, morbid obesity, DG, chronic pain syndrome presenting by means of walk-in with his daughter for worsening wounds to bilateral lower extremities and issues with getting appropriate supplies to care for the wounds.     Patient has been dealing with these venous stasis ulcers for quite some time and follows with [facility] vascular clinic in [place].    Patient states that he changes the dressings every other day, but is supposed to be changing them every day.  States that he just does not have enough supplies to do that.    Here today because he feels like the ulcers are spreading and  because they are draining a lot more fluid than usual.    States that he has a follow-up appointment with vascular on the 28th.  Has not seen his primary care provider in over a year.    No fevers, chills, nausea, vomiting.  No new paresthesias.    No other concerns.    Per my chart review   5/8/2024 [facility] vascular center [place] for b/l extremity swelling and ulcers 2/2 venous stasis.   Using Silvercel, ABD, gauze - this is being done by family every other day. Not enough supplies to do daily. Not using his lymphadema pump as recommended. Has not reduced his methadone as recommended by pulmonology. Has not seen PCP in a long time.   Ulcers were debrided. Metrogel started due to odor. Will continue with dilute hibiclens; dakins wash; triad paste; silvercel; aBD; rolled gauze  Ulcer #1 Location: right lateral leg  Size: 14L x 54W x 0.1depth.  no sinus tract present, Wound base: slough; macerated; tissue  no undermining present. Ulcer is full thickness. There is heavy drainage. Periwound: no denudement, erythema, induration, maceration or warmth.  +odor  Ulcer #2 Location: left lateral leg  Size: 19x10 x 0.1depth. one area with 0.8cm depth; otherwise superficial  no sinus tract present, Wound base: slough; macerated; tissue  no undermining present. Ulcer is full thickness. There is heavy drainage. Periwound: no denudement, erythema, induration, maceration or warmth.  +odor      MEDICAL HISTORY     Past Medical History:   Diagnosis Date    COPD (chronic obstructive pulmonary disease) (H)     Diabetes (H)     H/O angioedema 06/15/2020    History of tobacco use disorder 08/01/2013    Hypertension     Lymphedema of both lower extremities 12/22/2014    Methadone use 05/08/2012    Obstructive sleep apnea 02/02/2015    Pleural mass 07/02/2013    Uncomplicated asthma        Past Surgical History:   Procedure Laterality Date    JOINT REPLACEMENT         Family History   Problem Relation Age of Onset    No Known Problems Mother  "    No Known Problems Father     Edema Sister     Edema Sister        Social History     Tobacco Use    Smoking status: Former    Smokeless tobacco: Former   Vaping Use    Vaping status: Never Used   Substance Use Topics    Alcohol use: Not Currently    Drug use: Not Currently       ammonium lactate (LAC-HYDRIN) 12 % external lotion  atorvastatin (LIPITOR) 20 MG tablet  chlorthalidone (HYGROTON) 25 MG tablet  famotidine (PEPCID) 20 MG tablet  gentamicin (GARAMYCIN) 0.1 % external ointment  hydrOXYzine (ATARAX) 25 MG tablet  losartan (COZAAR) 100 MG tablet  melatonin 3 MG tablet  metFORMIN (GLUCOPHAGE) 500 MG tablet  methadone (DOLOPHINE-INTENSOL) 10 MG/ML (HIGH CONC) solution  metroNIDAZOLE (METROGEL) 1 % external gel  sodium hypochlorite (QUARTER-STRENGTH DAKINS) external solution  spironolactone (ALDACTONE) 25 MG tablet  zolpidem (AMBIEN) 10 MG tablet          PHYSICAL EXAM     First Vitals:  Patient Vitals for the past 24 hrs:   BP Temp Temp src Pulse Resp SpO2 Height Weight   06/11/24 1450 (!) 154/67 97.9  F (36.6  C) Oral 70 18 97 % 1.905 m (6' 3\") (!) 170.1 kg (375 lb)         PHYSICAL EXAM:   Constitutional: No acute distress.  Neuro: Awake and alert. No focal deficits.  Psych: Calm and cooperative.  Eyes: PERRL. EOMI. Conjunctivae clear.   Cardio: Regular rate. Adequate perfusion to extremities.   Pulmonary: Oxygenating well on RA. No labored breathing.    Upper extremities: Moves freely.    Lower extremities: Difficulty with moving both legs, but moves freely.  Nonpitting edema bilaterally.  Right worse than left. Distal pulses intact. Sensations intact.   Skin: See photos below. Both of the lower extremities have circumferential ulcerations present.  They do not appear to extend into the muscular tissue or fatty tissue.  No purulent drainage appreciated.  No significant surrounding cellulitis appreciated.                                  RESULTS     LAB:  All pertinent labs reviewed and interpreted  Labs " Ordered and Resulted from Time of ED Arrival to Time of ED Departure   CBC WITH PLATELETS AND DIFFERENTIAL - Abnormal       Result Value    WBC Count 7.8      RBC Count 4.13 (*)     Hemoglobin 9.6 (*)     Hematocrit 33.1 (*)     MCV 80      MCH 23.2 (*)     MCHC 29.0 (*)     RDW 15.0      Platelet Count 320      % Neutrophils 77      % Lymphocytes 16      % Monocytes 6      % Eosinophils 1      % Basophils 0      % Immature Granulocytes 1      NRBCs per 100 WBC 0      Absolute Neutrophils 6.0      Absolute Lymphocytes 1.2      Absolute Monocytes 0.5      Absolute Eosinophils 0.1      Absolute Basophils 0.0      Absolute Immature Granulocytes 0.0      Absolute NRBCs 0.0     COMPREHENSIVE METABOLIC PANEL - Normal    Sodium 135      Potassium 5.0      Carbon Dioxide (CO2) 27      Anion Gap 9      Urea Nitrogen 18.9      Creatinine 1.03      GFR Estimate 78      Calcium 9.2      Chloride 99      Glucose 92      Alkaline Phosphatase 74      AST 12      ALT <5      Protein Total 8.2      Albumin 3.5      Bilirubin Total 0.3         RADIOLOGY:  No orders to display       ECG:    N/A      PROCEDURES     None         MEDICAL DECISION MAKING:  Obtained supplemental history:Supplemental history obtained?: No  Reviewed external records: External records reviewed?: Outpatient Record: See HPI  Care impacted by chronic illness:Chronic Pain, Diabetes, Hypertension, and Other: venous stasis with chronic b/l lower extremity wounds  Care significantly affected by social determinants of health:Other: Access to medical supplies.   Did you consider but not order tests?: Work up considered but not performed and documented in chart, if applicable  Did you interpret images independently?: Independent interpretation of ECG and images noted in documentation, when applicable.  Consultation discussion with other provider:Did you involve another provider (consultant, MH, pharmacy, etc.)?: I discussed the care with another health care provider,  see documentation for details.  Discharge. No recommendations on prescription strength medication(s). See documentation for any additional details.      FINAL IMPRESSION:    ICD-10-CM    1. Ulcer of left lower extremity, limited to breakdown of skin (H)  L97.921 Home Care Referral      2. Ulcer of right lower extremity, limited to breakdown of skin (H)  L97.911 Home Care Referral      3. Venous stasis dermatitis of both lower extremities  I87.2 Home Care Referral            MEDICATIONS GIVEN IN THE EMERGENCY DEPARTMENT:  Medications - No data to display      NEW PRESCRIPTIONS STARTED AT TODAY'S ED VISIT:  New Prescriptions    No medications on file              Some or all of this documentation has been completed using dictation software and mild grammatical errors may be present. Please contact me with any concerns regarding this.       Leelee Norris PA-C  Emergency Medicine   [facility] EMERGENCY DEPARTMENT       Leelee Norris PA-C  06/11/24 [id number]

## 2024-06-11 NOTE — DISCHARGE INSTRUCTIONS
I have placed a referral for home care.  They should be reaching out to you soon.  Please follow-up with your vascular provider as scheduled.  For any new or worsening symptoms do not hesitate to return to the ER.

## 2024-06-12 ENCOUNTER — PATIENT OUTREACH (OUTPATIENT)
Dept: CARE COORDINATION | Facility: CLINIC | Age: 69
End: 2024-06-12
Payer: COMMERCIAL

## 2024-06-12 NOTE — PROGRESS NOTES
Clinic Care Coordination Contact  Follow Up Progress Note      Assessment:  The pt was recently in the ED, I called to check up on the pt and help the pt setup a ED follow up. The pt was at [facility] for wound infection.  I called and talked to the pt, pt stated that he is doing better. Pt wanted a follow up, so I was able to get the pt in tomorrow at 1:40pm with .    Care Gaps:    Health Maintenance Due   Topic Date Due    DIABETIC FOOT EXAM  Never done    ADVANCE CARE PLANNING  Never done    EYE EXAM  Never done    ZOSTER IMMUNIZATION (1 of 2) Never done    RSV VACCINE (Pregnancy & 60+) (1 - 1-dose 60+ series) Never done    DTAP/TDAP/TD IMMUNIZATION (5 - Tdap) 02/19/2021    MEDICARE ANNUAL WELLNESS VISIT  05/17/2022    LIPID  04/21/2023    MICROALBUMIN  04/21/2023    FALL RISK ASSESSMENT  06/08/2023    A1C  08/20/2023    COVID-19 Vaccine (5 - 2023-24 season) 09/01/2023    LUNG CANCER SCREENING  10/19/2023    COLORECTAL CANCER SCREENING  10/26/2023    PHQ-2 (once per calendar year)  01/01/2024           Care Plans      Intervention/Education provided during outreach:               Plan:     Care Coordinator will follow up in

## 2024-06-12 NOTE — PROGRESS NOTES
RNCM received update post discharge that MD placed orders for homecare for RN service for wound cares.     RNCM placed homecare referral to find agency.    Karen Orosco RN

## 2024-06-13 ENCOUNTER — OFFICE VISIT (OUTPATIENT)
Dept: FAMILY MEDICINE | Facility: CLINIC | Age: 69
End: 2024-06-13
Payer: COMMERCIAL

## 2024-06-13 VITALS
TEMPERATURE: 97.6 F | DIASTOLIC BLOOD PRESSURE: 68 MMHG | BODY MASS INDEX: 39.17 KG/M2 | RESPIRATION RATE: 22 BRPM | SYSTOLIC BLOOD PRESSURE: 159 MMHG | HEART RATE: 89 BPM | HEIGHT: 75 IN | OXYGEN SATURATION: 95 % | WEIGHT: 315 LBS

## 2024-06-13 DIAGNOSIS — I10 BENIGN ESSENTIAL HYPERTENSION: ICD-10-CM

## 2024-06-13 DIAGNOSIS — I83.029 VENOUS ULCERS OF BOTH LOWER EXTREMITIES (H): ICD-10-CM

## 2024-06-13 DIAGNOSIS — K21.00 GASTROESOPHAGEAL REFLUX DISEASE WITH ESOPHAGITIS WITHOUT HEMORRHAGE: ICD-10-CM

## 2024-06-13 DIAGNOSIS — L97.929 VENOUS ULCERS OF BOTH LOWER EXTREMITIES (H): ICD-10-CM

## 2024-06-13 DIAGNOSIS — L97.919 VENOUS ULCERS OF BOTH LOWER EXTREMITIES (H): ICD-10-CM

## 2024-06-13 DIAGNOSIS — I83.019 VENOUS ULCERS OF BOTH LOWER EXTREMITIES (H): ICD-10-CM

## 2024-06-13 DIAGNOSIS — Z12.11 SCREEN FOR COLON CANCER: ICD-10-CM

## 2024-06-13 DIAGNOSIS — E11.42 TYPE 2 DIABETES MELLITUS WITH PERIPHERAL NEUROPATHY (H): Primary | ICD-10-CM

## 2024-06-13 LAB
CREAT UR-MCNC: 148 MG/DL
HBA1C MFR BLD: 6.1 % (ref 0–5.6)
MICROALBUMIN UR-MCNC: <12 MG/L
MICROALBUMIN/CREAT UR: NORMAL MG/G{CREAT}

## 2024-06-13 PROCEDURE — 82043 UR ALBUMIN QUANTITATIVE: CPT

## 2024-06-13 PROCEDURE — 83036 HEMOGLOBIN GLYCOSYLATED A1C: CPT

## 2024-06-13 PROCEDURE — 99214 OFFICE O/P EST MOD 30 MIN: CPT | Mod: GC

## 2024-06-13 PROCEDURE — 82570 ASSAY OF URINE CREATININE: CPT

## 2024-06-13 PROCEDURE — 80061 LIPID PANEL: CPT

## 2024-06-13 PROCEDURE — 36415 COLL VENOUS BLD VENIPUNCTURE: CPT

## 2024-06-13 RX ORDER — SPIRONOLACTONE 25 MG/1
25 TABLET ORAL DAILY
Qty: 90 TABLET | Refills: 2 | Status: SHIPPED | OUTPATIENT
Start: 2024-06-13

## 2024-06-13 RX ORDER — FAMOTIDINE 20 MG/1
20 TABLET, FILM COATED ORAL 2 TIMES DAILY
Qty: 180 TABLET | Refills: 3 | Status: SHIPPED | OUTPATIENT
Start: 2024-06-13 | End: 2025-06-08

## 2024-06-13 NOTE — PROGRESS NOTES
Assessment & Plan     Benign essential hypertension  Comment: Blood pressure elevated at this visit but patient noted that his at home readings have been within goal. Advised to check daily and follow up to confirm within goal BP.   - spironolactone (ALDACTONE) 25 MG tablet; Take 1 tablet (25 mg) by mouth daily  - will have patient diary at home readings and follow-up confirm that blood pressures have been within range.     Type 2 diabetes mellitus with peripheral neuropathy (H)  Comment: Diabetes well managed with current medications. Patient did not endorse any new symptoms.  - Adult Eye  Referral; Future  - Albumin Random Urine Quantitative with Creat Ratio; Future  - Lipid panel; Future  - Hemoglobin A1c; Future    Screen for colon cancer  Comment: Patient declined colonoscopy screening at this visit but will revisit discussion in follow-up appointment.    Venous ulcers of both lower extremities (H)  Comment: Post ED visit for venous stasis ulcers with associated lymphedema; patient is managing lower extremity ulcer well with assistance of home care w/o any symptoms of infection.  - Vascular surgery follow-up 06/28/2024    Gastroesophageal reflux disease with esophagitis without hemorrhage  Comment: Patient adherent to medication strategy and has not have any notable symptoms.  - famotidine (PEPCID) 20 MG tablet; Take 1 tablet (20 mg) by mouth 2 times daily for 360 days    MED REC REQUIRED  Post Medication Reconciliation Status: discharge medications reconciled, continue medications without change      Nikhil Graff, MS-3    I was present with the medical student who participated in the service and in the documentation of this note. I have verified the history and personally performed the physical exam and medical decision making, and have verified the content of the note, which accurately reflects my assessment of the patient and the plan of care.   FARSHAD Loredo  [facility]  "PGY2      Subjective   Shaheen is a 68 year old, presenting for the following health issues:  ER F/U and RECHECK (DM and get refills)      6/13/2024     1:48 PM   Additional Questions   Roomed by Marsha   Accompanied by NITHYA         6/13/2024    Information    services provided? No     ED/UC Followup:    Facility: [facility]  Date of visit: 6/11/24  Reason for visit: leg wounds  Current Status: stable    HPI   Patient reports that he has been managing care of his lower extremities well post ER admission. He has home care nurses that keep his ulcers clean and perform regular bandage changes. The patient did not note the presence of any new ulcers that have happened since the visit nor does he note any infectious complications. He has a vascular surgery follow-up on 06/28/2024.    Patient wanted to discuss diabetes management and follow-up during this visit and in particular his HbA1C. He notes that he has been having normal blood pressure readings at home despite an elevated BP reading in clinic today. He has been taking all of his medications as recommended daily.     Discussed low hemoglobin levels with the patient. He does not recall having any dark stools or blood in his stool; does not note any unusual fatigue that he has been experiencing. He notes that he will have upcoming hematology outpatient appointment.       Review of Systems  Constitutional, HEENT, cardiovascular, pulmonary, gi and gu systems are negative, except as otherwise noted.      Objective    BP (!) 159/68   Pulse 89   Temp 97.6  F (36.4  C)   Resp 22   Ht 1.905 m (6' 3\")   Wt (!) 161.5 kg (356 lb)   SpO2 95%   BMI 44.50 kg/m    Body mass index is 44.5 kg/m .  Physical Exam   GENERAL: alert and no distress  RESP: lungs clear to auscultation - no rales, rhonchi or wheezes  CV: regular rate and rhythm, normal S1 S2, no S3 or S4, no murmur, click or rub,   MSK: lower extremities with bandages up to below the knees. "           Signed Electronically by: FARSHAD Loredo

## 2024-06-14 LAB
CHOLEST SERPL-MCNC: 103 MG/DL
FASTING STATUS PATIENT QL REPORTED: NORMAL
HDLC SERPL-MCNC: 55 MG/DL
LDLC SERPL CALC-MCNC: 33 MG/DL
NONHDLC SERPL-MCNC: 48 MG/DL
TRIGL SERPL-MCNC: 75 MG/DL

## 2024-06-27 DIAGNOSIS — I10 BENIGN ESSENTIAL HYPERTENSION: ICD-10-CM

## 2024-06-27 RX ORDER — CHLORTHALIDONE 25 MG/1
25 TABLET ORAL DAILY
Qty: 90 TABLET | Refills: 3 | Status: SHIPPED | OUTPATIENT
Start: 2024-06-27

## 2024-06-27 RX ORDER — LOSARTAN POTASSIUM 100 MG/1
100 TABLET ORAL DAILY
Qty: 90 TABLET | Refills: 3 | Status: SHIPPED | OUTPATIENT
Start: 2024-06-27

## 2024-06-28 ENCOUNTER — OFFICE VISIT (OUTPATIENT)
Dept: VASCULAR SURGERY | Facility: CLINIC | Age: 69
End: 2024-06-28
Attending: NURSE PRACTITIONER
Payer: COMMERCIAL

## 2024-06-28 VITALS
TEMPERATURE: 98.6 F | DIASTOLIC BLOOD PRESSURE: 70 MMHG | RESPIRATION RATE: 18 BRPM | WEIGHT: 315 LBS | SYSTOLIC BLOOD PRESSURE: 133 MMHG | HEART RATE: 76 BPM | OXYGEN SATURATION: 98 % | BODY MASS INDEX: 45.12 KG/M2

## 2024-06-28 DIAGNOSIS — I83.891 VENOUS STASIS ULCER OF RIGHT LOWER LEG WITH EDEMA OF RIGHT LOWER LEG (H): ICD-10-CM

## 2024-06-28 DIAGNOSIS — M79.3 LIPODERMATOSCLEROSIS OF BOTH LOWER EXTREMITIES: ICD-10-CM

## 2024-06-28 DIAGNOSIS — E66.01 OBESITY, MORBID, BMI 40.0-49.9 (H): ICD-10-CM

## 2024-06-28 DIAGNOSIS — I87.333 VENOUS HYPERTENSION, CHRONIC, WITH ULCER AND INFLAMMATION, BILATERAL (H): ICD-10-CM

## 2024-06-28 DIAGNOSIS — R60.0 VENOUS STASIS ULCER OF RIGHT LOWER LEG WITH EDEMA OF RIGHT LOWER LEG (H): ICD-10-CM

## 2024-06-28 DIAGNOSIS — I89.0 LYMPHEDEMA OF BOTH LOWER EXTREMITIES: Primary | ICD-10-CM

## 2024-06-28 DIAGNOSIS — I83.019 VENOUS STASIS ULCER OF RIGHT LOWER LEG WITH EDEMA OF RIGHT LOWER LEG (H): ICD-10-CM

## 2024-06-28 DIAGNOSIS — D36.9 PAPILLOMATOSIS: ICD-10-CM

## 2024-06-28 DIAGNOSIS — I89.0 ELEPHANTIASIS: ICD-10-CM

## 2024-06-28 DIAGNOSIS — E11.42 TYPE 2 DIABETES MELLITUS WITH PERIPHERAL NEUROPATHY (H): ICD-10-CM

## 2024-06-28 DIAGNOSIS — I87.2 VENOUS (PERIPHERAL) INSUFFICIENCY: ICD-10-CM

## 2024-06-28 DIAGNOSIS — L97.919 VENOUS STASIS ULCER OF RIGHT LOWER LEG WITH EDEMA OF RIGHT LOWER LEG (H): ICD-10-CM

## 2024-06-28 PROCEDURE — 11042 DBRDMT SUBQ TIS 1ST 20SQCM/<: CPT | Performed by: NURSE PRACTITIONER

## 2024-06-28 PROCEDURE — 11045 DBRDMT SUBQ TISS EACH ADDL: CPT | Performed by: NURSE PRACTITIONER

## 2024-06-28 RX ORDER — METRONIDAZOLE 10 MG/G
GEL TOPICAL DAILY
Qty: 60 G | Refills: 4 | Status: SHIPPED | OUTPATIENT
Start: 2024-06-28

## 2024-06-28 RX ORDER — LIDOCAINE 50 MG/G
OINTMENT TOPICAL DAILY PRN
Status: ACTIVE | OUTPATIENT
Start: 2024-06-28

## 2024-06-28 ASSESSMENT — PAIN SCALES - GENERAL: PAINLEVEL: NO PAIN (0)

## 2024-06-28 NOTE — PROGRESS NOTES
Compression Applied to Bilateral  2-Layer [place]: I Applied the inner foam layer with the foot dorsiflexed and started atthe base of the fifth metatarsal head. I left the bottom of the heel exposed, and proceed by winding the foam up the leg using minimal overlap to just below the fibular head. I then applied the compression layer with the foot dorsiflexed and startingat the base of the fifth metatarsal head. I applied at full stretch and proceeded up the leg using 50% overlap. The bottom of the heel is covered with the compression layer up to the end at the fibular head just below the back of the knee and levelwith the top edge of the foam layer.  I gently pressed and conformed the entire surface of the system to ensurethat the two layers are firmly bound together

## 2024-06-28 NOTE — PATIENT INSTRUCTIONS
"Continue on all blood pressure medications as prescribed      Focus on weight loss and low sodium diet  Keep sodium intake 2.5-4 grams per day      Use lymphedema pumps twice per day      Wound Care Instructions    Twice per week home care will cleanse your bilateral legs and wound(s) with Dilute hibiclens 30cc in 500cc NS.    Then do a light wash of Dakin's solution or Vashe ok to soak the areas for 5-10 minutes to all the open areas    Pat Dry with non-sterile gauze    Apply Lotion to the intact skin surrounding your wound and other dry skin locations. Some good lotions include: Remedy Skin Repair Cream, Sarna, Vanicream or Cetaphil    Apply Triad paste thick over all the weeping skin    Apply Ammonium Lac Hydrin lotion to the thick scaling crusting areas    Triad paste to the periwound skin on the right leg to protect from all the drainage    Primary Dressing: apply metrogel onto the Silvercel to all the wounds    ABDs or super absorbant pads    Secure with non-sterile roll gauze (4\" x 75\" roll) and tape (1\" roll tape) as needed; avoid adhesive directly on the skin    Compression: 2 layer bilaterally    Elevation of the legs    Use lymphedema pump twice per day    It is not ok to get your wound wet in the bath or shower    You need to elevate your legs throughout the day      If for some reason you are not able to get your dressing(s) changed as outlined above (due to illness, lack of supplies, lack of help) please do the following: remove old, soiled dressings; wash the wounds with saline; pat dry; apply ABD pad or other absorbant pad and secure with rolled gauze; avoid tape directly on your skin; Call the clinic as soon as possible to let us know what the current issues are in receiving wound care [phone].      SEEK MEDICAL CARE IF:  You have an increase in swelling, pain, or redness around the wound.  You have an increase in the amount of pus coming from the wound.  There is a bad smell coming from the " wound.  The wound appears to be worsening/enlarging  You have a fever greater than 101.5 F      It is ok to continue current wound care treatment/products for the next 2-3 days until new wound care supplies are ordered and arrive. If longer than this please contact our office at [phone].    If you have a 2 layer or 4 layer compression wrap on these are safe to have on for ONLY 7 days. If for some reason you are not able to get the wrap(s) changed (due to illness; lack of supplies, lack of help, lack of transportation) please do the following: unwrap the old 2 or 4 layer compression wrap; avoid using scissors as you could cut your skin and cause wounds; use tubular compression when available. Call to reschedule your home care or clinic visit appointment as soon as possible.    Please NOTE: if you are 15 minutes late to your clinic appointment you will have to be rescheduled. Please call our clinic as soon as possible if you know you will not be able to get to your appointment at [phone].    If you fail to show up to 3 scheduled clinic appointments you will be dismissed from our clinic.              We want to hear from you!  In the next few weeks, you should receive a call or email to complete a survey about your visit at [facility]. Please help us improve your appointment experience by letting us know how we did today. We strive to make your experience good and value any ways in which we could do better.      We value your input and suggestions.    Thank you for choosing the [facility]!      It is recommended that you do not get your ulcer wet when showering.  Listed below are several ways of keeping it dry when you shower.     1. Wrap it with Press and Seal plastic wrap.  It can be found in the stores where the plastic wraps or tin foil is kept.               2.  Some people take a bath and hang their leg/foot out of the tub.                        3  Put your leg in  a plastic bag and tape it on.           4. You can purchase a shower cover for casts at some pharmacies and through the Internet.            5. Take a Bed Bath or wash up at the sink

## 2024-06-28 NOTE — PROGRESS NOTES
"            Follow up Vascular Visit       Date of Service:06/28/24      Chief Complaint: BLE swelling; BLE ulcers      Pt returns to [facility] with regards to their BLE swelling and ulcers.  They arrive today alone. They are currently using Dakin's to wash; then silvercel; ABD; rolled gauze to the wounds. This is being done by home care. Pt was seen in the ER 2 weeks ago and they ordered home are. They are using tubular compression and short stretch this was changed to 2 layers by ER MD for compression. He is doing really well with these.  They are feeling well today. Denies fevers, chills. No shortness of breath.     Allergies:   Allergies   Allergen Reactions    Lisinopril Swelling     Patient reports \"neck swelling\"  Swelling in throat       Medications:   Current Outpatient Medications:     ammonium lactate (LAC-HYDRIN) 12 % external lotion, Apply topically daily as needed for dry skin With dressing changes, Disp: 225 g, Rfl: 3    atorvastatin (LIPITOR) 20 MG tablet, Take 1 tablet (20 mg) by mouth daily, Disp: 90 tablet, Rfl: 3    chlorthalidone (HYGROTON) 25 MG tablet, Take 1 tablet (25 mg) by mouth daily, Disp: 90 tablet, Rfl: 3    famotidine (PEPCID) 20 MG tablet, Take 1 tablet (20 mg) by mouth 2 times daily for 360 days, Disp: 180 tablet, Rfl: 3    gentamicin (GARAMYCIN) 0.1 % external ointment, Apply topically daily, Disp: 30 g, Rfl: 3    hydrOXYzine (ATARAX) 25 MG tablet, Take 0.5-1 tablets (12.5-25 mg) by mouth 3 times daily as needed for anxiety, Disp: 60 tablet, Rfl: 3    losartan (COZAAR) 100 MG tablet, Take 1 tablet (100 mg) by mouth daily, Disp: 90 tablet, Rfl: 3    melatonin 3 MG tablet, Take 1 tablet (3 mg) by mouth nightly as needed for sleep, Disp: 30 tablet, Rfl: 1    metFORMIN (GLUCOPHAGE) 500 MG tablet, Take 1 tablet (500 mg) by mouth 2 times daily (with meals), Disp: 180 tablet, Rfl: 3    methadone (DOLOPHINE-INTENSOL) 10 MG/ML (HIGH CONC) solution, Take 100 mg by mouth " daily, Disp: , Rfl:     metroNIDAZOLE (METROGEL) 1 % external gel, Apply topically daily, Disp: 60 g, Rfl: 4    sodium hypochlorite (QUARTER-STRENGTH DAKINS) external solution, Apply topically every 72 hours Use 300mL every 72 hours to wash the bilateral legs and wounds on the legs, Disp: 1000 mL, Rfl: 3    spironolactone (ALDACTONE) 25 MG tablet, Take 1 tablet (25 mg) by mouth daily, Disp: 90 tablet, Rfl: 2    zolpidem (AMBIEN) 10 MG tablet, Take tablet by mouth 15 minutes prior to sleep, for Sleep Study, Disp: 1 tablet, Rfl: 0    Current Facility-Administered Medications:     lidocaine (PF) (XYLOCAINE) 1 % injection 10 mL, 10 mL, Intradermal, Once, Gerson Jaquez MD    lidocaine (XYLOCAINE) 2 % external gel, , Topical, Daily PRN, Lucia Reyes MD, Given at 10/19/21 0828    History:   Past Medical History:   Diagnosis Date    COPD (chronic obstructive pulmonary disease) (H)     Diabetes (H)     H/O angioedema 06/15/2020    Formatting of this note might be different from the original. Unexplained angioedema twice, discontinue lisinopril for possible connection to it, though he had used it afterwards with no symptoms    History of tobacco use disorder 08/01/2013    Formatting of this note might be different from the original. Added per documetation    Hypertension     Lymphedema of both lower extremities 12/22/2014    Methadone use 05/08/2012    Obstructive sleep apnea 02/02/2015    Formatting of this note might be different from the original. Epic    Pleural mass 07/02/2013    Uncomplicated asthma        Physical Exam:    /70   Pulse 76   Temp 98.6  F (37  C)   Resp 18   Wt (!) 361 lb (163.7 kg)   SpO2 98%   BMI 45.12 kg/m      General:  Patient presents to clinic in no apparent distress.  Head: normocephalic atraumatic  Psychiatric:  Alert and oriented x3.   Respiratory: unlabored breathing; no cough  Integumentary:  Skin is uniformly warm, dry and pink.    Ulcer #1 Location: right lateral leg   Size: 15.5L x 17.5W x 0.5depth.  no sinus tract present, Wound base: slough; weeping  no undermining present. Ulcer is full thickness. There is heavy drainage. Periwound: no denudement, erythema, induration, maceration or warmth.        Ulcer #2  Location: left lateral leg  Size: 12x7x0.1 cm depth.  no sinus tract present, Wound base: slough; weeping  no undermining present. Ulcer is full thickness. There is heavy drainage. Periwound: no denudement, erythema, induration, maceration or warmth.      Odor significantly improved today  Swelling improved today  Wound Leg Ulceration (Active)   Number of days: 753       VASC Wound rt lateral weeping (Active)   Pre Size Length 15.5 06/28/24 1200   Pre Size Width 17.5 06/28/24 1200   Pre Size Depth 1.8 06/28/24 1200   Pre Total Sq cm 271.25 06/28/24 1200   Post Size Length 10 08/02/23 0700   Post Size Width 10 08/02/23 0700   Post Size Depth 0.1 08/02/23 0700   Post Total Sq cm 100 08/02/23 0700   Description circumferential 06/28/24 1200   Number of days: 983       VASC Wound left posterior leg (Active)   Pre Size Length 19 05/08/24 1200   Pre Size Width 10 05/08/24 1200   Pre Size Depth 0.8 05/08/24 1200   Pre Total Sq cm 190 05/08/24 1200   Post Size Length 13 01/26/24 0700   Post Size Width 10 01/26/24 0700   Post Size Depth 0.1 01/26/24 0700   Post Total Sq cm 130 01/26/24 0700   Description weeping 01/26/24 0700   Number of days: 598       VASC Wound Right medial shin (Active)   Number of days: 570       VASC Wound Right upper lateral skin etar (Active)   Pre Size Length 3.5 02/28/24 1300   Pre Size Width 0.2 02/28/24 1300   Pre Size Depth 0.1 02/28/24 1300   Pre Total Sq cm 0.7 02/28/24 1300   Number of days: 121       VASC Wound Left lateral shin (Active)   Pre Size Length 12 06/28/24 1200   Pre Size Width 7 06/28/24 1200   Pre Size Depth 0.5 06/28/24 1200   Pre Total Sq cm 119 06/28/24 1200   Number of days: 0            Circumferential volume measures:           "1/26/2024     7:00 AM 2/28/2024     1:00 PM 4/10/2024    12:00 PM 5/8/2024    12:00 PM 6/28/2024    12:00 PM   Circumferential Measures   Right just above MTP 27 27.3 28.4 29 26   Right Ankle 33.5 34 35 38 33   Right Widest Calf 53 53.5 53.7 64 43   Left - just above MTP 27 27 28.2 29 27.2   Left Ankle 32 38 34.7 38 33.4   Left Widest Calf 52 48 47.5 57 43.5       Labs:    I personally reviewed the following lab results today and those on care everywhere    CRP   Date Value Ref Range Status   04/23/2021 8.5 (H) 0.0 - 0.8 mg/dL Final      No results found for: \"SED\"   Last Renal Panel:  Sodium   Date Value Ref Range Status   06/11/2024 135 135 - 145 mmol/L Final     Comment:     Reference intervals for this test were updated on 09/26/2023 to more accurately reflect our healthy population. There may be differences in the flagging of prior results with similar values performed with this method. Interpretation of those prior results can be made in the context of the updated reference intervals.    06/10/2021 142.0 133.0 - 144.0 mmol/L Final     Potassium   Date Value Ref Range Status   06/11/2024 5.0 3.4 - 5.3 mmol/L Final   06/07/2023 4.2 3.5 - 5.0 mmol/L Final   06/10/2021 4.4 3.4 - 5.3 mmol/L Final     Chloride   Date Value Ref Range Status   06/11/2024 99 98 - 107 mmol/L Final   06/07/2023 102 98 - 107 mmol/L Final   06/10/2021 100.0 94.0 - 109.0 mmol/L Final     Carbon Dioxide   Date Value Ref Range Status   06/10/2021 33.0 (H) 20.0 - 32.0 mmol/L Final     Carbon Dioxide (CO2)   Date Value Ref Range Status   06/11/2024 27 22 - 29 mmol/L Final   06/07/2023 28 22 - 31 mmol/L Final     Anion Gap   Date Value Ref Range Status   06/11/2024 9 7 - 15 mmol/L Final   06/07/2023 8 5 - 18 mmol/L Final     Glucose   Date Value Ref Range Status   06/11/2024 92 70 - 99 mg/dL Final   06/07/2023 115 70 - 125 mg/dL Final   06/10/2021 112.0 (H) 60.0 - 109.0 mg/dL Final     Urea Nitrogen   Date Value Ref Range Status   06/11/2024 " "18.9 8.0 - 23.0 mg/dL Final   06/07/2023 17 8 - 22 mg/dL Final   06/10/2021 16.0 7.0 - 30.0 mg/dL Final     Creatinine   Date Value Ref Range Status   06/11/2024 1.03 0.67 - 1.17 mg/dL Final   06/10/2021 1.0 0.8 - 1.5 mg/dL Final     GFR Estimate   Date Value Ref Range Status   06/11/2024 78 >60 mL/min/1.73m2 Final   04/24/2021 >60 >60 mL/min/1.73m2 Final     Calcium   Date Value Ref Range Status   06/11/2024 9.2 8.8 - 10.2 mg/dL Final   06/10/2021 9.5 8.5 - 10.4 mg/dL Final     Albumin   Date Value Ref Range Status   06/11/2024 3.5 3.5 - 5.2 g/dL Final   06/07/2023 3.3 (L) 3.5 - 5.0 g/dL Final      Lab Results   Component Value Date    WBC 7.8 06/11/2024     Lab Results   Component Value Date    RBC 4.13 06/11/2024     Lab Results   Component Value Date    HGB 9.6 06/11/2024     Lab Results   Component Value Date    HCT 33.1 06/11/2024     No components found for: \"MCT\"  Lab Results   Component Value Date    MCV 80 06/11/2024     Lab Results   Component Value Date    MCH 23.2 06/11/2024     Lab Results   Component Value Date    MCHC 29.0 06/11/2024     Lab Results   Component Value Date    RDW 15.0 06/11/2024     Lab Results   Component Value Date     06/11/2024      Lab Results   Component Value Date    A1C 6.1 06/13/2024    A1C 6.8 02/20/2023    A1C 6.2 07/14/2022    A1C 6.7 04/21/2022    A1C 6.2 04/24/2021    A1C 6.2 04/24/2021      TSH   Date Value Ref Range Status   10/18/2022 1.76 0.30 - 5.00 uIU/mL Final      No results found for: \"VITDT\"                Impression:  Encounter Diagnoses   Name Primary?    Lymphedema of both lower extremities Yes    Venous hypertension, chronic, with ulcer and inflammation, bilateral (H)     Elephantiasis     Venous (peripheral) insufficiency     Lipodermatosclerosis of both lower extremities     Papillomatosis     Obesity, morbid, BMI 40.0-49.9 (H)     Venous stasis ulcer of right lower leg with edema of right lower leg (H)     Type 2 diabetes mellitus with peripheral " neuropathy (H)                              Are any of these ulcers new today: No; Location: na    Assessment/Plan:          1. Debridement: Nursing staff removed the old dressing and cleanse the wound(s) with specified solution. After discussion of risk factors and verbal consent was obtained 2% Lidocaine HCL jelly was applied, under clean conditions, the BLE  ulceration(s) were debrided using currette. Devitalized and nonviable tissue, along with any fibrin and slough, was removed to improve granulation tissue formation, stimulate wound healing, decrease overall bacteria load, disrupt biofilm formation and decrease edge senescence.  Total excisional debridement was 390.25 sq cm from the epidermis/dermis area and into the subcutaneous tissue with a depth of 0.1 cm.   Ulcers were improved afterwards and .  Measures were unchanged after debridement.       2.  Ulcer treatment: ulcer treatment will include irrigation and dressings to promote autolytic debridement which will include:continue to wash first with dilute hibiclens; then Vashe; then metrogel; then silvercel; then super abosrbant dressing; change 2-3 times per week [facility] home care now.  If for some reason the patient is not able to get their dressing(s) changed as outlined above (due to illness, lack of supplies, lack of help) please do the following: remove old, soiled dressings; wash the ulcers with saline; pat dry; apply ABD pad or other absorbant pad and secure with rolled gauze; avoid tape directly on your skin; patient instructed to call the clinic as soon as possible to let us know what the current issues are in receiving ulcer care. Stable            3. Edema: doing much better with the 2 layer; has home care now; coming out twice per week; encouraged elevation; pt should be using his lymphedema pump twice per day; he admits he is now doing this!! The compression wraps were applied today in clinic.     If a 2 layer or 4 layer compression wrap is  being used; these are safe to have on for ONLY 7 days. If for some reason the patient is not able to get the wrap(s) changed (due to illness; lack of supplies, lack of help, lack of transportation) please do the following: unwrap the old 2 or 4 layer compression wrap; avoid using scissors as you could cut your skin and cause ulcers; use tubular compression when available. Call to reschedule your home care or clinic visit appointment as soon as possible.  Stable            4. Nutrition: continue to work on diet/weight loss; diabetes well controlled           5. Offloading: keep pressure off wound area          6. Wound Etiology: venous      Patient will follow up with me in 4 weeks for reevaluation. They were instructed to call the clinic sooner with any signs or symptoms of infection or any further questions/concerns. Answered all questions.          Kim Fuentes DNP, RN, CNP, CWOCN, CFCN, CLT  [facility]   [phone]        This note was electronically signed by Kim Fuentes NP

## 2024-07-05 ENCOUNTER — TELEPHONE (OUTPATIENT)
Dept: SLEEP MEDICINE | Facility: CLINIC | Age: 69
End: 2024-07-05
Payer: COMMERCIAL

## 2024-07-05 NOTE — TELEPHONE ENCOUNTER
Pt was called to schedule annual cpap appointment. LVM for them to call back    Lynne Becerril    Rashida Stovall

## 2024-07-09 ENCOUNTER — MEDICAL CORRESPONDENCE (OUTPATIENT)
Dept: HEALTH INFORMATION MANAGEMENT | Facility: CLINIC | Age: 69
End: 2024-07-09
Payer: COMMERCIAL

## 2024-08-05 DIAGNOSIS — E11.9 TYPE 2 DIABETES MELLITUS WITHOUT COMPLICATION, WITHOUT LONG-TERM CURRENT USE OF INSULIN (H): ICD-10-CM

## 2024-08-05 RX ORDER — ATORVASTATIN CALCIUM 20 MG/1
20 TABLET, FILM COATED ORAL DAILY
Qty: 90 TABLET | Refills: 3 | Status: SHIPPED | OUTPATIENT
Start: 2024-08-05

## 2024-08-14 ENCOUNTER — OFFICE VISIT (OUTPATIENT)
Dept: VASCULAR SURGERY | Facility: CLINIC | Age: 69
End: 2024-08-14
Attending: NURSE PRACTITIONER
Payer: COMMERCIAL

## 2024-08-14 VITALS
RESPIRATION RATE: 20 BRPM | WEIGHT: 315 LBS | DIASTOLIC BLOOD PRESSURE: 74 MMHG | TEMPERATURE: 98.7 F | SYSTOLIC BLOOD PRESSURE: 112 MMHG | BODY MASS INDEX: 45.62 KG/M2

## 2024-08-14 DIAGNOSIS — L97.929 VENOUS STASIS ULCER OF LEFT LOWER LEG WITH EDEMA OF LEFT LOWER LEG (H): ICD-10-CM

## 2024-08-14 DIAGNOSIS — L97.919 VENOUS STASIS ULCER OF RIGHT LOWER LEG WITH EDEMA OF RIGHT LOWER LEG (H): ICD-10-CM

## 2024-08-14 DIAGNOSIS — R60.0 VENOUS STASIS ULCER OF LEFT LOWER LEG WITH EDEMA OF LEFT LOWER LEG (H): ICD-10-CM

## 2024-08-14 DIAGNOSIS — I87.2 VENOUS (PERIPHERAL) INSUFFICIENCY: ICD-10-CM

## 2024-08-14 DIAGNOSIS — I89.0 ELEPHANTIASIS: ICD-10-CM

## 2024-08-14 DIAGNOSIS — M79.3 LIPODERMATOSCLEROSIS OF BOTH LOWER EXTREMITIES: ICD-10-CM

## 2024-08-14 DIAGNOSIS — D36.9 PAPILLOMATOSIS: ICD-10-CM

## 2024-08-14 DIAGNOSIS — I89.0 LYMPHEDEMA OF BOTH LOWER EXTREMITIES: Primary | ICD-10-CM

## 2024-08-14 DIAGNOSIS — I83.019 VENOUS STASIS ULCER OF RIGHT LOWER LEG WITH EDEMA OF RIGHT LOWER LEG (H): ICD-10-CM

## 2024-08-14 DIAGNOSIS — E66.01 OBESITY, MORBID, BMI 40.0-49.9 (H): ICD-10-CM

## 2024-08-14 DIAGNOSIS — I83.029 VENOUS STASIS ULCER OF LEFT LOWER LEG WITH EDEMA OF LEFT LOWER LEG (H): ICD-10-CM

## 2024-08-14 DIAGNOSIS — I83.892 VENOUS STASIS ULCER OF LEFT LOWER LEG WITH EDEMA OF LEFT LOWER LEG (H): ICD-10-CM

## 2024-08-14 DIAGNOSIS — I83.891 VENOUS STASIS ULCER OF RIGHT LOWER LEG WITH EDEMA OF RIGHT LOWER LEG (H): ICD-10-CM

## 2024-08-14 DIAGNOSIS — E11.42 TYPE 2 DIABETES MELLITUS WITH PERIPHERAL NEUROPATHY (H): ICD-10-CM

## 2024-08-14 DIAGNOSIS — R60.0 VENOUS STASIS ULCER OF RIGHT LOWER LEG WITH EDEMA OF RIGHT LOWER LEG (H): ICD-10-CM

## 2024-08-14 DIAGNOSIS — I87.333 VENOUS HYPERTENSION, CHRONIC, WITH ULCER AND INFLAMMATION, BILATERAL (H): ICD-10-CM

## 2024-08-14 PROCEDURE — 11045 DBRDMT SUBQ TISS EACH ADDL: CPT | Performed by: NURSE PRACTITIONER

## 2024-08-14 PROCEDURE — 11721 DEBRIDE NAIL 6 OR MORE: CPT | Mod: 59 | Performed by: NURSE PRACTITIONER

## 2024-08-14 PROCEDURE — 11042 DBRDMT SUBQ TIS 1ST 20SQCM/<: CPT | Performed by: NURSE PRACTITIONER

## 2024-08-14 PROCEDURE — 11721 DEBRIDE NAIL 6 OR MORE: CPT | Mod: XU | Performed by: NURSE PRACTITIONER

## 2024-08-14 RX ORDER — LIDOCAINE 50 MG/G
OINTMENT TOPICAL DAILY PRN
Status: ACTIVE | OUTPATIENT
Start: 2024-08-14

## 2024-08-14 RX ADMIN — LIDOCAINE: 50 OINTMENT TOPICAL at 13:30

## 2024-08-14 ASSESSMENT — PAIN SCALES - GENERAL: PAINLEVEL: NO PAIN (0)

## 2024-08-14 NOTE — PATIENT INSTRUCTIONS
"Continue on all blood pressure medications as prescribed      Focus on weight loss and low sodium diet  Keep sodium intake 2.5-4 grams per day      Use lymphedema pumps twice per day      Wound Care Instructions    Twice per week home care will cleanse your bilateral legs and wound(s) with Dilute hibiclens 30cc in 500cc NS.    Then do a light wash of Dakin's solution or Vashe ok to soak the areas for 5-10 minutes to all the open areas    Pat Dry with non-sterile gauze    Apply Lotion to the intact skin surrounding your wound and other dry skin locations. Some good lotions include: Remedy Skin Repair Cream, Sarna, Vanicream or Cetaphil    Apply Triad paste thick over all the weeping skin    Apply Ammonium Lac Hydrin lotion to the thick scaling crusting areas    Triad paste to the periwound skin on the right leg to protect from all the drainage    Primary Dressing: apply metrogel onto the Silvercel to all the wounds    ABDs or super absorbant pads    Secure with non-sterile roll gauze (4\" x 75\" roll) and tape (1\" roll tape) as needed; avoid adhesive directly on the skin    Compression: 2 layer bilaterally    Elevation of the legs    Use lymphedema pump twice per day    It is not ok to get your wound wet in the bath or shower    You need to elevate your legs throughout the day      If for some reason you are not able to get your dressing(s) changed as outlined above (due to illness, lack of supplies, lack of help) please do the following: remove old, soiled dressings; wash the wounds with saline; pat dry; apply ABD pad or other absorbant pad and secure with rolled gauze; avoid tape directly on your skin; Call the clinic as soon as possible to let us know what the current issues are in receiving wound care [phone].      SEEK MEDICAL CARE IF:  You have an increase in swelling, pain, or redness around the wound.  You have an increase in the amount of pus coming from the wound.  There is a bad smell coming from the " wound.  The wound appears to be worsening/enlarging  You have a fever greater than 101.5 F      It is ok to continue current wound care treatment/products for the next 2-3 days until new wound care supplies are ordered and arrive. If longer than this please contact our office at [phone].    If you have a 2 layer or 4 layer compression wrap on these are safe to have on for ONLY 7 days. If for some reason you are not able to get the wrap(s) changed (due to illness; lack of supplies, lack of help, lack of transportation) please do the following: unwrap the old 2 or 4 layer compression wrap; avoid using scissors as you could cut your skin and cause wounds; use tubular compression when available. Call to reschedule your home care or clinic visit appointment as soon as possible.    Please NOTE: if you are 15 minutes late to your clinic appointment you will have to be rescheduled. Please call our clinic as soon as possible if you know you will not be able to get to your appointment at [phone].    If you fail to show up to 3 scheduled clinic appointments you will be dismissed from our clinic.              We want to hear from you!  In the next few weeks, you should receive a call or email to complete a survey about your visit at [facility]. Please help us improve your appointment experience by letting us know how we did today. We strive to make your experience good and value any ways in which we could do better.      We value your input and suggestions.    Thank you for choosing the [facility]!      It is recommended that you do not get your ulcer wet when showering.  Listed below are several ways of keeping it dry when you shower.     1. Wrap it with Press and Seal plastic wrap.  It can be found in the stores where the plastic wraps or tin foil is kept.               2.  Some people take a bath and hang their leg/foot out of the tub.                        3  Put your leg in  a plastic bag and tape it on.           4. You can purchase a shower cover for casts at some pharmacies and through the Internet.            5. Take a Bed Bath or wash up at the sink

## 2024-08-14 NOTE — PROGRESS NOTES
"            Follow up Vascular Visit       Date of Service:08/14/24      Chief Complaint: BLE swelling and ulcers      Pt returns to [facility] with regards to their BLE swelling and ulcers related to his severe lymphedema.  They arrive today alone. They are currently using Dakin's or Vasche; triad paste to periwound; silvercel; super absorbant pads to the wounds. This is being done by home care 2 days per week. They are using 2 layers bilaterally for compression.; however today he arrives with just tubular compression on the left leg. They are feeling well today. Denies fevers, chills. No shortness of breath.     Allergies:   Allergies   Allergen Reactions    Lisinopril Swelling     Patient reports \"neck swelling\"  Swelling in throat       Medications:   Current Outpatient Medications:     ammonium lactate (LAC-HYDRIN) 12 % external lotion, Apply topically daily as needed for dry skin With dressing changes, Disp: 225 g, Rfl: 3    atorvastatin (LIPITOR) 20 MG tablet, TAKE 1 TABLET DAILY, Disp: 90 tablet, Rfl: 3    chlorthalidone (HYGROTON) 25 MG tablet, Take 1 tablet (25 mg) by mouth daily, Disp: 90 tablet, Rfl: 3    famotidine (PEPCID) 20 MG tablet, Take 1 tablet (20 mg) by mouth 2 times daily for 360 days, Disp: 180 tablet, Rfl: 3    gentamicin (GARAMYCIN) 0.1 % external ointment, Apply topically daily, Disp: 30 g, Rfl: 3    hydrOXYzine (ATARAX) 25 MG tablet, Take 0.5-1 tablets (12.5-25 mg) by mouth 3 times daily as needed for anxiety, Disp: 60 tablet, Rfl: 3    losartan (COZAAR) 100 MG tablet, Take 1 tablet (100 mg) by mouth daily, Disp: 90 tablet, Rfl: 3    melatonin 3 MG tablet, Take 1 tablet (3 mg) by mouth nightly as needed for sleep, Disp: 30 tablet, Rfl: 1    metFORMIN (GLUCOPHAGE) 500 MG tablet, Take 1 tablet (500 mg) by mouth 2 times daily (with meals), Disp: 180 tablet, Rfl: 3    methadone (DOLOPHINE-INTENSOL) 10 MG/ML (HIGH CONC) solution, Take 100 mg by mouth daily, Disp: , Rfl:     " metroNIDAZOLE (METROGEL) 1 % external gel, Apply topically daily, Disp: 60 g, Rfl: 4    sodium hypochlorite (QUARTER-STRENGTH DAKINS) external solution, Apply topically every 72 hours Use 300mL every 72 hours to wash the bilateral legs and wounds on the legs, Disp: 1000 mL, Rfl: 3    spironolactone (ALDACTONE) 25 MG tablet, Take 1 tablet (25 mg) by mouth daily, Disp: 90 tablet, Rfl: 2    zolpidem (AMBIEN) 10 MG tablet, Take tablet by mouth 15 minutes prior to sleep, for Sleep Study, Disp: 1 tablet, Rfl: 0    Current Facility-Administered Medications:     lidocaine (PF) (XYLOCAINE) 1 % injection 10 mL, 10 mL, Intradermal, Once, Gerson Jaquez MD    lidocaine (XYLOCAINE) 2 % external gel, , Topical, Daily PRN, Lucia Reyes MD, Given at 10/19/21 0828    lidocaine (XYLOCAINE) 5 % ointment, , Topical, Daily PRN, Kim Fuentes NP, Given at 08/14/24 1330    lidocaine (XYLOCAINE) 5 % ointment, , Topical, Daily PRN, Kim Fuentes NP    History:   Past Medical History:   Diagnosis Date    COPD (chronic obstructive pulmonary disease) (H)     Diabetes (H)     H/O angioedema 06/15/2020    Formatting of this note might be different from the original. Unexplained angioedema twice, discontinue lisinopril for possible connection to it, though he had used it afterwards with no symptoms    History of tobacco use disorder 08/01/2013    Formatting of this note might be different from the original. Added per documetation    Hypertension     Lymphedema of both lower extremities 12/22/2014    Methadone use 05/08/2012    Obstructive sleep apnea 02/02/2015    Formatting of this note might be different from the original. Epic    Pleural mass 07/02/2013    Uncomplicated asthma        Physical Exam:    /74   Temp 98.7  F (37.1  C)   Resp 20   Wt (!) 375 lb (170.1 kg)   BMI 46.87 kg/m      General:  Patient presents to clinic in no apparent distress.  Head: normocephalic atraumatic  Psychiatric:  Alert and oriented x3.    Respiratory: unlabored breathing; no cough  Integumentary:  Skin is uniformly warm, dry and pink.    BLE swelling stable; multiple full thickness irregular ulcers bilaterally; see measures and photos below; heavy drainage; no warmth to the tissues; chronic swelling skin changes; papillomatosis; elephantiasis;  fibrosis    Nails 1-5 bilaterally  elongated, thick, yellow with subungal debris. Trophic changes noted including diminished hair growth and shiny skin.      Wound Leg Ulceration (Active)   Number of days: 800       VASC Wound rt lateral weeping (Active)   Pre Size Length 19 08/14/24 1300   Pre Size Width 19 08/14/24 1300   Pre Size Depth 0.5 08/14/24 1300   Pre Total Sq cm 319 08/14/24 1300   Post Size Length 10 08/02/23 0700   Post Size Width 10 08/02/23 0700   Post Size Depth 0.1 08/02/23 0700   Post Total Sq cm 100 08/02/23 0700   Description circumferential 08/14/24 1300   Number of days: 1030       VASC Wound left posterior leg (Active)   Pre Size Length 19 05/08/24 1200   Pre Size Width 10 05/08/24 1200   Pre Size Depth 0.8 05/08/24 1200   Pre Total Sq cm 190 05/08/24 1200   Post Size Length 13 01/26/24 0700   Post Size Width 10 01/26/24 0700   Post Size Depth 0.1 01/26/24 0700   Post Total Sq cm 130 01/26/24 0700   Description weeping 01/26/24 0700   Number of days: 645       VASC Wound Right medial shin (Active)   Number of days: 617       VASC Wound Right upper lateral skin etar (Active)   Pre Size Length 3.5 02/28/24 1300   Pre Size Width 0.2 02/28/24 1300   Pre Size Depth 0.1 02/28/24 1300   Pre Total Sq cm 0.7 02/28/24 1300   Number of days: 168       VASC Wound Left lateral shin (Active)   Pre Size Length 16 08/14/24 1300   Pre Size Width 4 08/14/24 1300   Pre Size Depth 0.3 08/14/24 1300   Pre Total Sq cm 119 06/28/24 1200   Description scattered o/w 08/14/24 1300   Number of days: 47            Circumferential volume measures:          1/26/2024     7:00 AM 2/28/2024     1:00 PM 4/10/2024     "12:00 PM 5/8/2024    12:00 PM 6/28/2024    12:00 PM   Circumferential Measures   Right just above MTP 27 27.3 28.4 29 26   Right Ankle 33.5 34 35 38 33   Right Widest Calf 53 53.5 53.7 64 43   Left - just above MTP 27 27 28.2 29 27.2   Left Ankle 32 38 34.7 38 33.4   Left Widest Calf 52 48 47.5 57 43.5       Labs:    I personally reviewed the following lab results today and those on care everywhere    CRP   Date Value Ref Range Status   04/23/2021 8.5 (H) 0.0 - 0.8 mg/dL Final      No results found for: \"SED\"   Last Renal Panel:  Sodium   Date Value Ref Range Status   06/11/2024 135 135 - 145 mmol/L Final     Comment:     Reference intervals for this test were updated on 09/26/2023 to more accurately reflect our healthy population. There may be differences in the flagging of prior results with similar values performed with this method. Interpretation of those prior results can be made in the context of the updated reference intervals.    06/10/2021 142.0 133.0 - 144.0 mmol/L Final     Potassium   Date Value Ref Range Status   06/11/2024 5.0 3.4 - 5.3 mmol/L Final   06/07/2023 4.2 3.5 - 5.0 mmol/L Final   06/10/2021 4.4 3.4 - 5.3 mmol/L Final     Chloride   Date Value Ref Range Status   06/11/2024 99 98 - 107 mmol/L Final   06/07/2023 102 98 - 107 mmol/L Final   06/10/2021 100.0 94.0 - 109.0 mmol/L Final     Carbon Dioxide   Date Value Ref Range Status   06/10/2021 33.0 (H) 20.0 - 32.0 mmol/L Final     Carbon Dioxide (CO2)   Date Value Ref Range Status   06/11/2024 27 22 - 29 mmol/L Final   06/07/2023 28 22 - 31 mmol/L Final     Anion Gap   Date Value Ref Range Status   06/11/2024 9 7 - 15 mmol/L Final   06/07/2023 8 5 - 18 mmol/L Final     Glucose   Date Value Ref Range Status   06/11/2024 92 70 - 99 mg/dL Final   06/07/2023 115 70 - 125 mg/dL Final   06/10/2021 112.0 (H) 60.0 - 109.0 mg/dL Final     Urea Nitrogen   Date Value Ref Range Status   06/11/2024 18.9 8.0 - 23.0 mg/dL Final   06/07/2023 17 8 - 22 mg/dL " "Final   06/10/2021 16.0 7.0 - 30.0 mg/dL Final     Creatinine   Date Value Ref Range Status   06/11/2024 1.03 0.67 - 1.17 mg/dL Final   06/10/2021 1.0 0.8 - 1.5 mg/dL Final     GFR Estimate   Date Value Ref Range Status   06/11/2024 78 >60 mL/min/1.73m2 Final   04/24/2021 >60 >60 mL/min/1.73m2 Final     Calcium   Date Value Ref Range Status   06/11/2024 9.2 8.8 - 10.2 mg/dL Final   06/10/2021 9.5 8.5 - 10.4 mg/dL Final     Albumin   Date Value Ref Range Status   06/11/2024 3.5 3.5 - 5.2 g/dL Final   06/07/2023 3.3 (L) 3.5 - 5.0 g/dL Final      Lab Results   Component Value Date    WBC 7.8 06/11/2024     Lab Results   Component Value Date    RBC 4.13 06/11/2024     Lab Results   Component Value Date    HGB 9.6 06/11/2024     Lab Results   Component Value Date    HCT 33.1 06/11/2024     No components found for: \"MCT\"  Lab Results   Component Value Date    MCV 80 06/11/2024     Lab Results   Component Value Date    MCH 23.2 06/11/2024     Lab Results   Component Value Date    MCHC 29.0 06/11/2024     Lab Results   Component Value Date    RDW 15.0 06/11/2024     Lab Results   Component Value Date     06/11/2024      Lab Results   Component Value Date    A1C 6.1 06/13/2024    A1C 6.8 02/20/2023    A1C 6.2 07/14/2022    A1C 6.7 04/21/2022    A1C 6.2 04/24/2021    A1C 6.2 04/24/2021      TSH   Date Value Ref Range Status   10/18/2022 1.76 0.30 - 5.00 uIU/mL Final      No results found for: \"VITDT\"                Impression:  Encounter Diagnoses   Name Primary?    Lymphedema of both lower extremities Yes    Venous hypertension, chronic, with ulcer and inflammation, bilateral (H)     Elephantiasis     Venous (peripheral) insufficiency     Lipodermatosclerosis of both lower extremities     Papillomatosis     Obesity, morbid, BMI 40.0-49.9 (H)     Venous stasis ulcer of right lower leg with edema of right lower leg (H)     Type 2 diabetes mellitus with peripheral neuropathy (H)     Venous stasis ulcer of left lower leg " with edema of left lower leg (H)                                          Are any of these ulcers new today: No; Location: na    Assessment/Plan:          1. Debridement: Nursing staff removed the old dressing and cleanse the wound(s) with specified solution. After discussion of risk factors and verbal consent was obtained 2% Lidocaine HCL jelly was applied, under clean conditions, the BLE ulceration(s) were debrided using currette. Devitalized and nonviable tissue, along with any fibrin and slough, was removed to improve granulation tissue formation, stimulate wound healing, decrease overall bacteria load, disrupt biofilm formation and decrease edge senescence.  Total excisional debridement was 383 sq cm from the epidermis/dermis area and into the subcutaneous tissue with a depth of 0.1 cm.   Ulcers were improved afterwards and .  Measures were unchanged after debridement.       2.  Ulcer treatment: ulcer treatment will include irrigation and dressings to promote autolytic debridement which will include:will continue palliative management of the wounds; continue home care; continue to first wash with dilute hibiclens; then dakin's soak; then metrogel; silvercel; super absorbant pads; rolled gauze; change twice per week If for some reason the patient is not able to get their dressing(s) changed as outlined above (due to illness, lack of supplies, lack of help) please do the following: remove old, soiled dressings; wash the ulcers with saline; pat dry; apply ABD pad or other absorbant pad and secure with rolled gauze; avoid tape directly on your skin; patient instructed to call the clinic as soon as possible to let us know what the current issues are in receiving ulcer care. Stable            3. Edema: needs 2 layer bilaterally; elevation; lymphedema pumps  . The compression wraps were applied today in clinic.     If a 2 layer or 4 layer compression wrap is being used; these are safe to have on for ONLY 7  days. If for some reason the patient is not able to get the wrap(s) changed (due to illness; lack of supplies, lack of help, lack of transportation) please do the following: unwrap the old 2 or 4 layer compression wrap; avoid using scissors as you could cut your skin and cause ulcers; use tubular compression when available. Call to reschedule your home care or clinic visit appointment as soon as possible.             4. Nutrition: focus on weight loss           5. Offloading: na          6. Wound Etiology: venous stasis         7. Nails: nails 1-5 bilaterally were debrided and filed smooth; tolerated well, no complications.      Right Lower Extremity Left Lower Extremity   Class A Findings - Q7     Non-Traumatic amputation of foot or integral skeletal portion thereof          Class B Findings - Q8 with 2 Class B findings     Absent DP/PT pulse     Advanced Trophic Changes (Three are Required x x        Absent or decreased hair growth x x        Nail Changes x x        Pigment Changes x x        Skin Texture Changes x x        Skin Color Changes x x        Class C Findings Q9 with 1 class B and 2 Class C Findings     Claudication     Temperature Changes     Edema x x   Paresthesias x x   Burning            Patient will follow up with me in 4 weeks for reevaluation. They were instructed to call the clinic sooner with any signs or symptoms of infection or any further questions/concerns. Answered all questions.          Kim Fuentes DNP, RN, CNP, CWOCN, CFCN, CLT  [facility]   [phone]        This note was electronically signed by Kim Fuentes NP

## 2024-09-09 ENCOUNTER — TELEPHONE (OUTPATIENT)
Dept: VASCULAR SURGERY | Facility: CLINIC | Age: 69
End: 2024-09-09
Payer: COMMERCIAL

## 2024-09-09 NOTE — TELEPHONE ENCOUNTER
"Received call from  at [facility], wanting to leave message for NP. She reports she stopped using the hydrofera blue as is was \"making the wound drain more and making the wound worse.\" Writer informed nurse that the hydrofera would not make more drainage come out, to which  stated that pt has been using his leg pumps more often- writer confirmed that this would cause more drainage. She has been using silvercel instead and has used it the last couple of dressing changes. Pt has appt on 9/11 with NP.   "

## 2024-09-11 ENCOUNTER — OFFICE VISIT (OUTPATIENT)
Dept: VASCULAR SURGERY | Facility: CLINIC | Age: 69
End: 2024-09-11
Attending: NURSE PRACTITIONER
Payer: COMMERCIAL

## 2024-09-11 VITALS
BODY MASS INDEX: 45.37 KG/M2 | WEIGHT: 315 LBS | OXYGEN SATURATION: 97 % | DIASTOLIC BLOOD PRESSURE: 72 MMHG | SYSTOLIC BLOOD PRESSURE: 136 MMHG | TEMPERATURE: 98 F | HEART RATE: 78 BPM

## 2024-09-11 DIAGNOSIS — I87.2 VENOUS (PERIPHERAL) INSUFFICIENCY: ICD-10-CM

## 2024-09-11 DIAGNOSIS — I83.019 VENOUS STASIS ULCER OF RIGHT LOWER LEG WITH EDEMA OF RIGHT LOWER LEG (H): ICD-10-CM

## 2024-09-11 DIAGNOSIS — I87.333 VENOUS HYPERTENSION, CHRONIC, WITH ULCER AND INFLAMMATION, BILATERAL (H): ICD-10-CM

## 2024-09-11 DIAGNOSIS — M79.3 LIPODERMATOSCLEROSIS OF BOTH LOWER EXTREMITIES: ICD-10-CM

## 2024-09-11 DIAGNOSIS — R60.0 VENOUS STASIS ULCER OF RIGHT LOWER LEG WITH EDEMA OF RIGHT LOWER LEG (H): ICD-10-CM

## 2024-09-11 DIAGNOSIS — E11.42 TYPE 2 DIABETES MELLITUS WITH PERIPHERAL NEUROPATHY (H): ICD-10-CM

## 2024-09-11 DIAGNOSIS — I89.0 LYMPHEDEMA OF BOTH LOWER EXTREMITIES: Primary | ICD-10-CM

## 2024-09-11 DIAGNOSIS — L97.919 VENOUS STASIS ULCER OF RIGHT LOWER LEG WITH EDEMA OF RIGHT LOWER LEG (H): ICD-10-CM

## 2024-09-11 DIAGNOSIS — E66.01 OBESITY, MORBID, BMI 40.0-49.9 (H): ICD-10-CM

## 2024-09-11 DIAGNOSIS — I83.891 VENOUS STASIS ULCER OF RIGHT LOWER LEG WITH EDEMA OF RIGHT LOWER LEG (H): ICD-10-CM

## 2024-09-11 DIAGNOSIS — I89.0 ELEPHANTIASIS: ICD-10-CM

## 2024-09-11 DIAGNOSIS — D36.9 PAPILLOMATOSIS: ICD-10-CM

## 2024-09-11 PROCEDURE — 11042 DBRDMT SUBQ TIS 1ST 20SQCM/<: CPT | Performed by: NURSE PRACTITIONER

## 2024-09-11 PROCEDURE — 87205 SMEAR GRAM STAIN: CPT | Performed by: NURSE PRACTITIONER

## 2024-09-11 PROCEDURE — 11045 DBRDMT SUBQ TISS EACH ADDL: CPT | Performed by: NURSE PRACTITIONER

## 2024-09-11 RX ORDER — LIDOCAINE 50 MG/G
OINTMENT TOPICAL DAILY PRN
Status: ACTIVE | OUTPATIENT
Start: 2024-09-11

## 2024-09-11 RX ORDER — GENTAMICIN SULFATE 1 MG/G
OINTMENT TOPICAL DAILY PRN
Status: ACTIVE | OUTPATIENT
Start: 2024-09-11

## 2024-09-11 RX ADMIN — GENTAMICIN SULFATE: 1 OINTMENT TOPICAL at 13:57

## 2024-09-11 ASSESSMENT — PAIN SCALES - GENERAL: PAINLEVEL: MILD PAIN (2)

## 2024-09-11 NOTE — PATIENT INSTRUCTIONS
"Continue on all blood pressure medications as prescribed      Focus on weight loss and low sodium diet  Keep sodium intake 2.5-4 grams per day      Use lymphedema pumps twice per day      Wound Care Instructions    Twice per week home care will cleanse your bilateral legs and wound(s) with Dilute hibiclens 30cc in 500cc NS.    Then do a light wash of Dakin's solution or Vashe ok to soak the areas for 5-10 minutes to all the open areas    Pat Dry with non-sterile gauze    Apply Lotion to the intact skin surrounding your wound and other dry skin locations. Some good lotions include: Remedy Skin Repair Cream, Sarna, Vanicream or Cetaphil    Apply Triad paste thick over all the weeping skin    Apply Ammonium Lac Hydrin lotion to the thick scaling crusting areas    Triad paste to the periwound skin on the right leg to protect from all the drainage    Primary Dressing: apply metrogel onto the silvercel  to all the wounds    ABDs or super absorbant pads    Secure with non-sterile roll gauze (4\" x 75\" roll) and tape (1\" roll tape) as needed; avoid adhesive directly on the skin    Compression: 2 layer bilaterally    Elevation of the legs    Use lymphedema pump twice per day    It is not ok to get your wound wet in the bath or shower    You need to elevate your legs throughout the day      If for some reason you are not able to get your dressing(s) changed as outlined above (due to illness, lack of supplies, lack of help) please do the following: remove old, soiled dressings; wash the wounds with saline; pat dry; apply ABD pad or other absorbant pad and secure with rolled gauze; avoid tape directly on your skin; Call the clinic as soon as possible to let us know what the current issues are in receiving wound care [phone].      SEEK MEDICAL CARE IF:  You have an increase in swelling, pain, or redness around the wound.  You have an increase in the amount of pus coming from the wound.  There is a bad smell coming from the " wound.  The wound appears to be worsening/enlarging  You have a fever greater than 101.5 F      It is ok to continue current wound care treatment/products for the next 2-3 days until new wound care supplies are ordered and arrive. If longer than this please contact our office at [phone].    If you have a 2 layer or 4 layer compression wrap on these are safe to have on for ONLY 7 days. If for some reason you are not able to get the wrap(s) changed (due to illness; lack of supplies, lack of help, lack of transportation) please do the following: unwrap the old 2 or 4 layer compression wrap; avoid using scissors as you could cut your skin and cause wounds; use tubular compression when available. Call to reschedule your home care or clinic visit appointment as soon as possible.    Please NOTE: if you are 15 minutes late to your clinic appointment you will have to be rescheduled. Please call our clinic as soon as possible if you know you will not be able to get to your appointment at [phone].    If you fail to show up to 3 scheduled clinic appointments you will be dismissed from our clinic.              We want to hear from you!  In the next few weeks, you should receive a call or email to complete a survey about your visit at [facility]. Please help us improve your appointment experience by letting us know how we did today. We strive to make your experience good and value any ways in which we could do better.      We value your input and suggestions.    Thank you for choosing the [facility]!      It is recommended that you do not get your ulcer wet when showering.  Listed below are several ways of keeping it dry when you shower.     1. Wrap it with Press and Seal plastic wrap.  It can be found in the stores where the plastic wraps or tin foil is kept.               2.  Some people take a bath and hang their leg/foot out of the tub.                        3  Put your leg in  a plastic bag and tape it on.           4. You can purchase a shower cover for casts at some pharmacies and through the Internet.            5. Take a Bed Bath or wash up at the sink

## 2024-09-11 NOTE — PROGRESS NOTES
Clinic Administered Medication Documentation    Patient was applied Lidocaine prior to debridement. Prior to medication administration, verified patient's identity using patient's name and date of birth.    Harini Ch LPN

## 2024-09-11 NOTE — PROGRESS NOTES
"            Follow up Vascular Visit       Date of Service:09/11/24      Chief Complaint: BLE severe lymphedema and leg ulcers      Pt returns to [facility] with regards to their BLE severe lymphedema and leg ulcers.  They arrive today alone. They are currently using silvercel; ABD; rolled gauze to the wounds. He states that the home care tried hydrofera blue ready on the wounds but he felt this made things worse so they went back to silvercel. He is having more pain in the right lateral leg. This is being done by home care 2 days per week and family the other days. They are using 2 layer for compression. They are feeling well today. Denies fevers, chills. No shortness of breath.     Allergies:   Allergies   Allergen Reactions    Lisinopril Swelling     Patient reports \"neck swelling\"  Swelling in throat       Medications:   Current Outpatient Medications:     ammonium lactate (LAC-HYDRIN) 12 % external lotion, Apply topically daily as needed for dry skin With dressing changes, Disp: 225 g, Rfl: 3    atorvastatin (LIPITOR) 20 MG tablet, TAKE 1 TABLET DAILY, Disp: 90 tablet, Rfl: 3    chlorthalidone (HYGROTON) 25 MG tablet, Take 1 tablet (25 mg) by mouth daily, Disp: 90 tablet, Rfl: 3    famotidine (PEPCID) 20 MG tablet, Take 1 tablet (20 mg) by mouth 2 times daily for 360 days, Disp: 180 tablet, Rfl: 3    gentamicin (GARAMYCIN) 0.1 % external ointment, Apply topically daily, Disp: 30 g, Rfl: 3    hydrOXYzine (ATARAX) 25 MG tablet, Take 0.5-1 tablets (12.5-25 mg) by mouth 3 times daily as needed for anxiety, Disp: 60 tablet, Rfl: 3    losartan (COZAAR) 100 MG tablet, Take 1 tablet (100 mg) by mouth daily, Disp: 90 tablet, Rfl: 3    melatonin 3 MG tablet, Take 1 tablet (3 mg) by mouth nightly as needed for sleep, Disp: 30 tablet, Rfl: 1    metFORMIN (GLUCOPHAGE) 500 MG tablet, Take 1 tablet (500 mg) by mouth 2 times daily (with meals), Disp: 180 tablet, Rfl: 3    methadone (DOLOPHINE-INTENSOL) 10 MG/ML " (HIGH CONC) solution, Take 100 mg by mouth daily, Disp: , Rfl:     metroNIDAZOLE (METROGEL) 1 % external gel, Apply topically daily, Disp: 60 g, Rfl: 4    sodium hypochlorite (QUARTER-STRENGTH DAKINS) external solution, Apply topically every 72 hours Use 300mL every 72 hours to wash the bilateral legs and wounds on the legs, Disp: 1000 mL, Rfl: 3    spironolactone (ALDACTONE) 25 MG tablet, Take 1 tablet (25 mg) by mouth daily, Disp: 90 tablet, Rfl: 2    zolpidem (AMBIEN) 10 MG tablet, Take tablet by mouth 15 minutes prior to sleep, for Sleep Study, Disp: 1 tablet, Rfl: 0    Current Facility-Administered Medications:     lidocaine (PF) (XYLOCAINE) 1 % injection 10 mL, 10 mL, Intradermal, Once, Gerson Jaquez MD    lidocaine (XYLOCAINE) 2 % external gel, , Topical, Daily PRN, Lucia Reyes MD, Given at 10/19/21 0828    lidocaine (XYLOCAINE) 5 % ointment, , Topical, Daily PRN, Kim Fuentes NP    lidocaine (XYLOCAINE) 5 % ointment, , Topical, Daily PRN, Kim Fuentes NP, Given at 08/14/24 1330    lidocaine (XYLOCAINE) 5 % ointment, , Topical, Daily PRN, Kim Fuentes NP    History:   Past Medical History:   Diagnosis Date    COPD (chronic obstructive pulmonary disease) (H)     Diabetes (H)     H/O angioedema 06/15/2020    Formatting of this note might be different from the original. Unexplained angioedema twice, discontinue lisinopril for possible connection to it, though he had used it afterwards with no symptoms    History of tobacco use disorder 08/01/2013    Formatting of this note might be different from the original. Added per documetation    Hypertension     Lymphedema of both lower extremities 12/22/2014    Methadone use 05/08/2012    Obstructive sleep apnea 02/02/2015    Formatting of this note might be different from the original. Epic    Pleural mass 07/02/2013    Uncomplicated asthma        Physical Exam:    /72   Pulse 78   Temp 98  F (36.7  C)   Wt (!) 363 lb (164.7 kg)   SpO2 97%    BMI 45.37 kg/m      General:  Patient presents to clinic in no apparent distress.  Head: normocephalic atraumatic  Psychiatric:  Alert and oriented x3.   Respiratory: unlabored breathing; no cough  Integumentary:  Skin is uniformly warm, dry and pink.    Ulcer #1 Location: right lateral leg  Size: 14L x 16W x 0.1depth.  no sinus tract present, Wound base: irregular; pink  no undermining present. Ulcer is full thickness. There is heavy drainage. Periwound: no denudement, erythema, induration, maceration or warmth.      Ulcer #2 Location: left lateral shin leg  Size:7x7x 0.1depth.  no sinus tract present, Wound base: irregular; pink  no undermining present. Ulcer is full thickness. There is heavy drainage. Periwound: no denudement, erythema, induration, maceration or warmth.      Ulcer #3 Location: left shin leg  Size:7x7x 0.1depth.  no sinus tract present, Wound base: irregular; pink  no undermining present. Ulcer is full thickness. There is heavy drainage. Periwound: no denudement, erythema, induration, maceration or warmth.      Ulcer #4 Location: right posterior leg  Size: 7x6x 0.1depth.  no sinus tract present, Wound base: irregular; pink  no undermining present. Ulcer is full thickness. There is heavy drainage. Periwound: no denudement, erythema, induration, maceration or warmth.          Wound Leg Ulceration (Active)   Number of days: 828       VASC Wound rt lateral weeping (Active)   Pre Size Length 14 09/11/24 1300   Pre Size Width 16 09/11/24 1300   Pre Size Depth 0.2 09/11/24 1300   Pre Total Sq cm 224 09/11/24 1300   Post Size Length 10 08/02/23 0700   Post Size Width 10 08/02/23 0700   Post Size Depth 0.1 08/02/23 0700   Post Total Sq cm 100 08/02/23 0700   Description circumferential 08/14/24 1300   Number of days: 1058       VASC Wound left posterior leg (Active)   Pre Size Length 19 05/08/24 1200   Pre Size Width 10 05/08/24 1200   Pre Size Depth 0.8 05/08/24 1200   Pre Total Sq cm 190 05/08/24 1200    Post Size Length 13 01/26/24 0700   Post Size Width 10 01/26/24 0700   Post Size Depth 0.1 01/26/24 0700   Post Total Sq cm 130 01/26/24 0700   Description weeping 01/26/24 0700   Number of days: 673       VASC Wound Right medial shin (Active)   Number of days: 645       VASC Wound Right upper lateral skin etar (Active)   Pre Size Length 3.5 02/28/24 1300   Pre Size Width 0.2 02/28/24 1300   Pre Size Depth 0.1 02/28/24 1300   Pre Total Sq cm 0.7 02/28/24 1300   Number of days: 196       VASC Wound Left lateral shin (Active)   Pre Size Length 7 09/11/24 1300   Pre Size Width 7 09/11/24 1300   Pre Size Depth 0.3 09/11/24 1300   Pre Total Sq cm 49 09/11/24 1300   Description scattered o/w 08/14/24 1300   Number of days: 75       VASC Wound Right posterior (Active)   Pre Size Length 7 09/11/24 1300   Pre Size Width 6 09/11/24 1300   Pre Size Depth 0.2 09/11/24 1300   Pre Total Sq cm 42 09/11/24 1300   Number of days: 0       VASC Wound left lateral shin (Active)   Pre Size Length 12 09/11/24 1300   Pre Size Width 12 09/11/24 1300   Pre Size Depth 0.2 09/11/24 1300   Pre Total Sq cm 144 09/11/24 1300   Number of days: 0       VASC Wound left shin (Active)   Pre Size Length 7 09/11/24 1300   Pre Size Width 7 09/11/24 1300   Pre Size Depth 0.2 09/11/24 1300   Pre Total Sq cm 49 09/11/24 1300   Number of days: 0            Circumferential volume measures:          2/28/2024     1:00 PM 4/10/2024    12:00 PM 5/8/2024    12:00 PM 6/28/2024    12:00 PM 9/11/2024     1:00 PM   Circumferential Measures   Right just above MTP 27.3 28.4 29 26 27   Right Ankle 34 35 38 33 34   Right Widest Calf 53.5 53.7 64 43 44   Left - just above MTP 27 28.2 29 27.2 27   Left Ankle 38 34.7 38 33.4 34   Left Widest Calf 48 47.5 57 43.5 40       Labs:    I personally reviewed the following lab results today and those on care everywhere    CRP   Date Value Ref Range Status   04/23/2021 8.5 (H) 0.0 - 0.8 mg/dL Final      No results found for:  "\"SED\"   Last Renal Panel:  Sodium   Date Value Ref Range Status   06/11/2024 135 135 - 145 mmol/L Final     Comment:     Reference intervals for this test were updated on 09/26/2023 to more accurately reflect our healthy population. There may be differences in the flagging of prior results with similar values performed with this method. Interpretation of those prior results can be made in the context of the updated reference intervals.    06/10/2021 142.0 133.0 - 144.0 mmol/L Final     Potassium   Date Value Ref Range Status   06/11/2024 5.0 3.4 - 5.3 mmol/L Final   06/07/2023 4.2 3.5 - 5.0 mmol/L Final   06/10/2021 4.4 3.4 - 5.3 mmol/L Final     Chloride   Date Value Ref Range Status   06/11/2024 99 98 - 107 mmol/L Final   06/07/2023 102 98 - 107 mmol/L Final   06/10/2021 100.0 94.0 - 109.0 mmol/L Final     Carbon Dioxide   Date Value Ref Range Status   06/10/2021 33.0 (H) 20.0 - 32.0 mmol/L Final     Carbon Dioxide (CO2)   Date Value Ref Range Status   06/11/2024 27 22 - 29 mmol/L Final   06/07/2023 28 22 - 31 mmol/L Final     Anion Gap   Date Value Ref Range Status   06/11/2024 9 7 - 15 mmol/L Final   06/07/2023 8 5 - 18 mmol/L Final     Glucose   Date Value Ref Range Status   06/11/2024 92 70 - 99 mg/dL Final   06/07/2023 115 70 - 125 mg/dL Final   06/10/2021 112.0 (H) 60.0 - 109.0 mg/dL Final     Urea Nitrogen   Date Value Ref Range Status   06/11/2024 18.9 8.0 - 23.0 mg/dL Final   06/07/2023 17 8 - 22 mg/dL Final   06/10/2021 16.0 7.0 - 30.0 mg/dL Final     Creatinine   Date Value Ref Range Status   06/11/2024 1.03 0.67 - 1.17 mg/dL Final   06/10/2021 1.0 0.8 - 1.5 mg/dL Final     GFR Estimate   Date Value Ref Range Status   06/11/2024 78 >60 mL/min/1.73m2 Final   04/24/2021 >60 >60 mL/min/1.73m2 Final     Calcium   Date Value Ref Range Status   06/11/2024 9.2 8.8 - 10.2 mg/dL Final   06/10/2021 9.5 8.5 - 10.4 mg/dL Final     Albumin   Date Value Ref Range Status   06/11/2024 3.5 3.5 - 5.2 g/dL Final " "  06/07/2023 3.3 (L) 3.5 - 5.0 g/dL Final      Lab Results   Component Value Date    WBC 7.8 06/11/2024     Lab Results   Component Value Date    RBC 4.13 06/11/2024     Lab Results   Component Value Date    HGB 9.6 06/11/2024     Lab Results   Component Value Date    HCT 33.1 06/11/2024     No components found for: \"MCT\"  Lab Results   Component Value Date    MCV 80 06/11/2024     Lab Results   Component Value Date    MCH 23.2 06/11/2024     Lab Results   Component Value Date    MCHC 29.0 06/11/2024     Lab Results   Component Value Date    RDW 15.0 06/11/2024     Lab Results   Component Value Date     06/11/2024      Lab Results   Component Value Date    A1C 6.1 06/13/2024    A1C 6.8 02/20/2023    A1C 6.2 07/14/2022    A1C 6.7 04/21/2022    A1C 6.2 04/24/2021    A1C 6.2 04/24/2021      TSH   Date Value Ref Range Status   10/18/2022 1.76 0.30 - 5.00 uIU/mL Final      No results found for: \"VITDT\"                Impression:  Encounter Diagnoses   Name Primary?    Lymphedema of both lower extremities Yes    Venous hypertension, chronic, with ulcer and inflammation, bilateral (H)     Elephantiasis     Venous (peripheral) insufficiency     Lipodermatosclerosis of both lower extremities     Papillomatosis     Obesity, morbid, BMI 40.0-49.9 (H)     Venous stasis ulcer of right lower leg with edema of right lower leg (H)     Type 2 diabetes mellitus with peripheral neuropathy (H)                                          Are any of these ulcers new today: No; Location: na    Assessment/Plan:          1. Debridement: Nursing staff removed the old dressing and cleanse the wound(s) with specified solution. After discussion of risk factors and verbal consent was obtained 2% Lidocaine HCL jelly was applied, under clean conditions, the BLE ulceration(s) were debrided using currette. Devitalized and nonviable tissue, along with any fibrin and slough, was removed to improve granulation tissue formation, stimulate wound " healing, decrease overall bacteria load, disrupt biofilm formation and decrease edge senescence.  Total excisional debridement was 364 sq cm from the epidermis/dermis area and into the subcutaneous tissue with a depth of 0.1 cm.   Ulcers were improved afterwards and .  Measures were unchanged after debridement.       2.  Ulcer treatment: ulcer treatment will include irrigation and dressings to promote autolytic debridement which will include:hydrofera made things worse per the patient will resume silvercel; first wash with dilute hibiclens; then vashe soak; then metrogel; then silvercel; triad to periwound prn; ABD or superabsorbant pads; rolled gauze; change twice per week by home care If for some reason the patient is not able to get their dressing(s) changed as outlined above (due to illness, lack of supplies, lack of help) please do the following: remove old, soiled dressings; wash the ulcers with saline; pat dry; apply ABD pad or other absorbant pad and secure with rolled gauze; avoid tape directly on your skin; patient instructed to call the clinic as soon as possible to let us know what the current issues are in receiving ulcer care. Stable ADDENDUM: culture was polymicrobial will treat with cipro and pcn vk; treat for extended course due to severity of wounds. Will call to update pt.           3. Edema: continue 2 layer bilaterally; encouraged lymphedema pumps twice per day. The compression wraps were applied today in clinic.     If a 2 layer or 4 layer compression wrap is being used; these are safe to have on for ONLY 7 days. If for some reason the patient is not able to get the wrap(s) changed (due to illness; lack of supplies, lack of help, lack of transportation) please do the following: unwrap the old 2 or 4 layer compression wrap; avoid using scissors as you could cut your skin and cause ulcers; use tubular compression when available. Call to reschedule your home care or clinic visit appointment  as soon as possible.  Stable            4. Nutrition: focus on weight loss           5. Offloading: na          6. Wound Etiology: venous     Patient will follow up with me in 4 weeks for reevaluation. They were instructed to call the clinic sooner with any signs or symptoms of infection or any further questions/concerns. Answered all questions.          Kim Fuentes DNP, RN, CNP, CWOCN, CFCN, CLT  [facility]   [phone]        This note was electronically signed by Kim Fuentes NP

## 2024-09-15 LAB
BACTERIA WND CULT: ABNORMAL
GRAM STAIN RESULT: ABNORMAL

## 2024-09-16 ENCOUNTER — TELEPHONE (OUTPATIENT)
Dept: VASCULAR SURGERY | Facility: CLINIC | Age: 69
End: 2024-09-16
Payer: COMMERCIAL

## 2024-09-16 RX ORDER — PENICILLIN V POTASSIUM 500 MG/1
500 TABLET, FILM COATED ORAL 2 TIMES DAILY
Qty: 40 TABLET | Refills: 0 | Status: SHIPPED | OUTPATIENT
Start: 2024-09-16 | End: 2024-10-06

## 2024-09-16 RX ORDER — CIPROFLOXACIN 500 MG/1
500 TABLET, FILM COATED ORAL 2 TIMES DAILY
Qty: 40 TABLET | Refills: 0 | Status: SHIPPED | OUTPATIENT
Start: 2024-09-16 | End: 2024-10-06

## 2024-09-16 NOTE — TELEPHONE ENCOUNTER
Shaheen called wondering about the prescriptions after his culture. Reviewed the instructions from NP, they were sent to his mail pharmacy and he should get them from that pharmacy. Pt verbalized understanding.

## 2024-10-09 ENCOUNTER — OFFICE VISIT (OUTPATIENT)
Dept: VASCULAR SURGERY | Facility: CLINIC | Age: 69
End: 2024-10-09
Attending: NURSE PRACTITIONER
Payer: COMMERCIAL

## 2024-10-09 VITALS
HEART RATE: 74 BPM | SYSTOLIC BLOOD PRESSURE: 153 MMHG | OXYGEN SATURATION: 97 % | TEMPERATURE: 98.3 F | DIASTOLIC BLOOD PRESSURE: 71 MMHG

## 2024-10-09 DIAGNOSIS — I83.019 VENOUS STASIS ULCER OF RIGHT LOWER LEG WITH EDEMA OF RIGHT LOWER LEG (H): ICD-10-CM

## 2024-10-09 DIAGNOSIS — R60.0 VENOUS STASIS ULCER OF LEFT LOWER LEG WITH EDEMA OF LEFT LOWER LEG (H): ICD-10-CM

## 2024-10-09 DIAGNOSIS — I87.2 VENOUS (PERIPHERAL) INSUFFICIENCY: ICD-10-CM

## 2024-10-09 DIAGNOSIS — I83.029 VENOUS STASIS ULCER OF LEFT LOWER LEG WITH EDEMA OF LEFT LOWER LEG (H): ICD-10-CM

## 2024-10-09 DIAGNOSIS — I89.0 ELEPHANTIASIS: ICD-10-CM

## 2024-10-09 DIAGNOSIS — I83.892 VENOUS STASIS ULCER OF LEFT LOWER LEG WITH EDEMA OF LEFT LOWER LEG (H): ICD-10-CM

## 2024-10-09 DIAGNOSIS — I87.333 VENOUS HYPERTENSION, CHRONIC, WITH ULCER AND INFLAMMATION, BILATERAL (H): ICD-10-CM

## 2024-10-09 DIAGNOSIS — L97.929 VENOUS STASIS ULCER OF LEFT LOWER LEG WITH EDEMA OF LEFT LOWER LEG (H): ICD-10-CM

## 2024-10-09 DIAGNOSIS — L97.919 VENOUS STASIS ULCER OF RIGHT LOWER LEG WITH EDEMA OF RIGHT LOWER LEG (H): ICD-10-CM

## 2024-10-09 DIAGNOSIS — D36.9 PAPILLOMATOSIS: ICD-10-CM

## 2024-10-09 DIAGNOSIS — I83.891 VENOUS STASIS ULCER OF RIGHT LOWER LEG WITH EDEMA OF RIGHT LOWER LEG (H): ICD-10-CM

## 2024-10-09 DIAGNOSIS — E11.42 TYPE 2 DIABETES MELLITUS WITH PERIPHERAL NEUROPATHY (H): ICD-10-CM

## 2024-10-09 DIAGNOSIS — R60.0 VENOUS STASIS ULCER OF RIGHT LOWER LEG WITH EDEMA OF RIGHT LOWER LEG (H): ICD-10-CM

## 2024-10-09 DIAGNOSIS — M79.3 LIPODERMATOSCLEROSIS OF BOTH LOWER EXTREMITIES: ICD-10-CM

## 2024-10-09 DIAGNOSIS — I89.0 LYMPHEDEMA OF BOTH LOWER EXTREMITIES: Primary | ICD-10-CM

## 2024-10-09 DIAGNOSIS — E66.01 OBESITY, MORBID, BMI 40.0-49.9 (H): ICD-10-CM

## 2024-10-09 PROCEDURE — 11042 DBRDMT SUBQ TIS 1ST 20SQCM/<: CPT | Performed by: NURSE PRACTITIONER

## 2024-10-09 PROCEDURE — 11045 DBRDMT SUBQ TISS EACH ADDL: CPT | Performed by: NURSE PRACTITIONER

## 2024-10-09 RX ORDER — LIDOCAINE 50 MG/G
OINTMENT TOPICAL DAILY PRN
Status: ACTIVE | OUTPATIENT
Start: 2024-10-09

## 2024-10-09 RX ADMIN — LIDOCAINE: 50 OINTMENT TOPICAL at 12:50

## 2024-10-09 ASSESSMENT — PAIN SCALES - GENERAL: PAINLEVEL: NO PAIN (0)

## 2024-10-09 NOTE — PROGRESS NOTES
Clinic Administered Medication Documentation    Patient was given Lidocaine to open wounds prior to debridement. Prior to medication administration, verified patient's identity using patient's name and date of birth.    Harini Ch LPN

## 2024-10-09 NOTE — PROGRESS NOTES
"            Follow up Vascular Visit       Date of Service:10/09/24      Chief Complaint: BLE swelling and ulcers      Pt returns to [facility] with regards to their BLE swelling and ulcers.  They arrive today alone. They are currently using dakins; silvercel; ABD; rolled gauze to the wounds. This is being done by home care 2-3 days per week. They are using 2 layer bilaterally for compression. They are feeling well today. Denies fevers, chills. No shortness of breath.  Last visit we did a culture this was polymicrobial; we treated with PCN VK and cipro; completed today; tolerated well. Feels the wounds are much improved.    Allergies:   Allergies   Allergen Reactions    Lisinopril Swelling     Patient reports \"neck swelling\"  Swelling in throat       Medications:   Current Outpatient Medications:     ammonium lactate (LAC-HYDRIN) 12 % external lotion, Apply topically daily as needed for dry skin With dressing changes, Disp: 225 g, Rfl: 3    atorvastatin (LIPITOR) 20 MG tablet, TAKE 1 TABLET DAILY, Disp: 90 tablet, Rfl: 3    chlorthalidone (HYGROTON) 25 MG tablet, Take 1 tablet (25 mg) by mouth daily, Disp: 90 tablet, Rfl: 3    famotidine (PEPCID) 20 MG tablet, Take 1 tablet (20 mg) by mouth 2 times daily for 360 days, Disp: 180 tablet, Rfl: 3    gentamicin (GARAMYCIN) 0.1 % external ointment, Apply topically daily, Disp: 30 g, Rfl: 3    hydrOXYzine (ATARAX) 25 MG tablet, Take 0.5-1 tablets (12.5-25 mg) by mouth 3 times daily as needed for anxiety, Disp: 60 tablet, Rfl: 3    losartan (COZAAR) 100 MG tablet, Take 1 tablet (100 mg) by mouth daily, Disp: 90 tablet, Rfl: 3    melatonin 3 MG tablet, Take 1 tablet (3 mg) by mouth nightly as needed for sleep, Disp: 30 tablet, Rfl: 1    metFORMIN (GLUCOPHAGE) 500 MG tablet, Take 1 tablet (500 mg) by mouth 2 times daily (with meals), Disp: 180 tablet, Rfl: 3    methadone (DOLOPHINE-INTENSOL) 10 MG/ML (HIGH CONC) solution, Take 100 mg by mouth daily, Disp: , " Rfl:     metroNIDAZOLE (METROGEL) 1 % external gel, Apply topically daily, Disp: 60 g, Rfl: 4    sodium hypochlorite (QUARTER-STRENGTH DAKINS) external solution, Apply topically every 72 hours Use 300mL every 72 hours to wash the bilateral legs and wounds on the legs, Disp: 1000 mL, Rfl: 3    spironolactone (ALDACTONE) 25 MG tablet, Take 1 tablet (25 mg) by mouth daily, Disp: 90 tablet, Rfl: 2    zolpidem (AMBIEN) 10 MG tablet, Take tablet by mouth 15 minutes prior to sleep, for Sleep Study, Disp: 1 tablet, Rfl: 0    Current Facility-Administered Medications:     gentamicin (GARAMYCIN) 0.1 % ointment, , Topical, Daily PRN, Alfredo, Kim, NP, Given at 09/11/24 1357    lidocaine (PF) (XYLOCAINE) 1 % injection 10 mL, 10 mL, Intradermal, Once, Gerson Jaquez MD    lidocaine (XYLOCAINE) 2 % external gel, , Topical, Daily PRN, Lucia Reyes MD, Given at 10/19/21 0828    lidocaine (XYLOCAINE) 5 % ointment, , Topical, Daily PRN, Alfredo, Kim, NP, Given at 10/09/24 1250    lidocaine (XYLOCAINE) 5 % ointment, , Topical, Daily PRN, Kim Fuentes NP    lidocaine (XYLOCAINE) 5 % ointment, , Topical, Daily PRN, Kim Fuentes NP, Given at 08/14/24 1330    lidocaine (XYLOCAINE) 5 % ointment, , Topical, Daily PRN, Kim Fuentes NP    History:   Past Medical History:   Diagnosis Date    COPD (chronic obstructive pulmonary disease) (H)     Diabetes (H)     H/O angioedema 06/15/2020    Formatting of this note might be different from the original. Unexplained angioedema twice, discontinue lisinopril for possible connection to it, though he had used it afterwards with no symptoms    History of tobacco use disorder 08/01/2013    Formatting of this note might be different from the original. Added per documetation    Hypertension     Lymphedema of both lower extremities 12/22/2014    Methadone use 05/08/2012    Obstructive sleep apnea 02/02/2015    Formatting of this note might be different from the original. Epic    Pleural  mass 07/02/2013    Uncomplicated asthma        Physical Exam:    BP (!) 153/71   Pulse 74   Temp 98.3  F (36.8  C)   SpO2 97%     General:  Patient presents to clinic in no apparent distress.  Head: normocephalic atraumatic  Psychiatric:  Alert and oriented x3.   Respiratory: unlabored breathing; no cough  Integumentary:  Skin is uniformly warm, dry and pink.    Ulcer #1 Location: right latera leg  Size: 6L x 4W x 0.2depth.  no sinus tract present, Wound base: slough  no undermining present. Ulcer is full thickness. There is moderate drainage. Periwound: no denudement, erythema, induration, maceration or warmth.      Ulcer #2 Location: right posterior leg  Size: 3x3 x 0.2depth.  no sinus tract present, Wound base: slough  no undermining present. Ulcer is full thickness. There is moderate drainage. Periwound: no denudement, erythema, induration, maceration or warmth.     Ulcer #3 Location: left lateral leg  Size:4x4x0.1depth.  no sinus tract present, Wound base: slough  no undermining present. Ulcer is full thickness. There is moderate drainage. Periwound: no denudement, erythema, induration, maceration or warmth.           Wound Leg Ulceration (Active)   Number of days: 856       VASC Wound rt lateral weeping (Active)   Pre Size Length 6 10/09/24 1200   Pre Size Width 4 10/09/24 1200   Pre Size Depth 0.2 10/09/24 1200   Pre Total Sq cm 24 10/09/24 1200   Post Size Length 10 08/02/23 0700   Post Size Width 10 08/02/23 0700   Post Size Depth 0.1 08/02/23 0700   Post Total Sq cm 100 08/02/23 0700   Description circumferential 08/14/24 1300   Number of days: 1086       VASC Wound left posterior leg (Active)   Pre Size Length 19 05/08/24 1200   Pre Size Width 10 05/08/24 1200   Pre Size Depth 0.8 05/08/24 1200   Pre Total Sq cm 190 05/08/24 1200   Post Size Length 13 01/26/24 0700   Post Size Width 10 01/26/24 0700   Post Size Depth 0.1 01/26/24 0700   Post Total Sq cm 130 01/26/24 0700   Description weeping 01/26/24  "0700   Number of days: 701       VASC Wound Right medial shin (Active)   Number of days: 673       VASC Wound Right upper lateral skin etar (Active)   Pre Size Length 3.5 02/28/24 1300   Pre Size Width 0.2 02/28/24 1300   Pre Size Depth 0.1 02/28/24 1300   Pre Total Sq cm 0.7 02/28/24 1300   Number of days: 224       VASC Wound Left lateral shin (Active)   Pre Size Length 10 10/09/24 1200   Pre Size Width 7 10/09/24 1200   Pre Size Depth 1 10/09/24 1200   Pre Total Sq cm 70 10/09/24 1200   Description scattered o/w 08/14/24 1300   Number of days: 103       VASC Wound Right posterior (Active)   Pre Size Length 3 10/09/24 1200   Pre Size Width 3 10/09/24 1200   Pre Size Depth 0.2 10/09/24 1200   Pre Total Sq cm 9 10/09/24 1200   Number of days: 28       VASC Wound left lateral shin (Active)   Number of days: 28       VASC Wound left shin (Active)   Pre Size Length 0 10/09/24 1200   Pre Size Width 0 10/09/24 1200   Pre Size Depth 0 10/09/24 1200   Pre Total Sq cm 0 10/09/24 1200   Number of days: 28            Circumferential volume measures:          4/10/2024    12:00 PM 5/8/2024    12:00 PM 6/28/2024    12:00 PM 9/11/2024     1:00 PM 10/9/2024    12:00 PM   Circumferential Measures   Right just above MTP 28.4 29 26 27 27   Right Ankle 35 38 33 34 36   Right Widest Calf 53.7 64 43 44 56   Left - just above MTP 28.2 29 27.2 27 27   Left Ankle 34.7 38 33.4 34 34   Left Widest Calf 47.5 57 43.5 40 44       Labs:    I personally reviewed the following lab results today and those on care everywhere    CRP   Date Value Ref Range Status   04/23/2021 8.5 (H) 0.0 - 0.8 mg/dL Final      No results found for: \"SED\"   Last Renal Panel:  Sodium   Date Value Ref Range Status   06/11/2024 135 135 - 145 mmol/L Final     Comment:     Reference intervals for this test were updated on 09/26/2023 to more accurately reflect our healthy population. There may be differences in the flagging of prior results with similar values performed " "with this method. Interpretation of those prior results can be made in the context of the updated reference intervals.    06/10/2021 142.0 133.0 - 144.0 mmol/L Final     Potassium   Date Value Ref Range Status   06/11/2024 5.0 3.4 - 5.3 mmol/L Final   06/07/2023 4.2 3.5 - 5.0 mmol/L Final   06/10/2021 4.4 3.4 - 5.3 mmol/L Final     Chloride   Date Value Ref Range Status   06/11/2024 99 98 - 107 mmol/L Final   06/07/2023 102 98 - 107 mmol/L Final   06/10/2021 100.0 94.0 - 109.0 mmol/L Final     Carbon Dioxide   Date Value Ref Range Status   06/10/2021 33.0 (H) 20.0 - 32.0 mmol/L Final     Carbon Dioxide (CO2)   Date Value Ref Range Status   06/11/2024 27 22 - 29 mmol/L Final   06/07/2023 28 22 - 31 mmol/L Final     Anion Gap   Date Value Ref Range Status   06/11/2024 9 7 - 15 mmol/L Final   06/07/2023 8 5 - 18 mmol/L Final     Glucose   Date Value Ref Range Status   06/11/2024 92 70 - 99 mg/dL Final   06/07/2023 115 70 - 125 mg/dL Final   06/10/2021 112.0 (H) 60.0 - 109.0 mg/dL Final     Urea Nitrogen   Date Value Ref Range Status   06/11/2024 18.9 8.0 - 23.0 mg/dL Final   06/07/2023 17 8 - 22 mg/dL Final   06/10/2021 16.0 7.0 - 30.0 mg/dL Final     Creatinine   Date Value Ref Range Status   06/11/2024 1.03 0.67 - 1.17 mg/dL Final   06/10/2021 1.0 0.8 - 1.5 mg/dL Final     GFR Estimate   Date Value Ref Range Status   06/11/2024 78 >60 mL/min/1.73m2 Final   04/24/2021 >60 >60 mL/min/1.73m2 Final     Calcium   Date Value Ref Range Status   06/11/2024 9.2 8.8 - 10.2 mg/dL Final   06/10/2021 9.5 8.5 - 10.4 mg/dL Final     Albumin   Date Value Ref Range Status   06/11/2024 3.5 3.5 - 5.2 g/dL Final   06/07/2023 3.3 (L) 3.5 - 5.0 g/dL Final      Lab Results   Component Value Date    WBC 7.8 06/11/2024     Lab Results   Component Value Date    RBC 4.13 06/11/2024     Lab Results   Component Value Date    HGB 9.6 06/11/2024     Lab Results   Component Value Date    HCT 33.1 06/11/2024     No components found for: \"MCT\"  Lab " "Results   Component Value Date    MCV 80 06/11/2024     Lab Results   Component Value Date    MCH 23.2 06/11/2024     Lab Results   Component Value Date    MCHC 29.0 06/11/2024     Lab Results   Component Value Date    RDW 15.0 06/11/2024     Lab Results   Component Value Date     06/11/2024      Lab Results   Component Value Date    A1C 6.1 06/13/2024    A1C 6.8 02/20/2023    A1C 6.2 07/14/2022    A1C 6.7 04/21/2022    A1C 6.2 04/24/2021    A1C 6.2 04/24/2021      TSH   Date Value Ref Range Status   10/18/2022 1.76 0.30 - 5.00 uIU/mL Final      No results found for: \"VITDT\"                Impression:  Encounter Diagnoses   Name Primary?    Lymphedema of both lower extremities Yes    Venous hypertension, chronic, with ulcer and inflammation, bilateral (H)     Elephantiasis     Venous (peripheral) insufficiency     Lipodermatosclerosis of both lower extremities     Papillomatosis     Obesity, morbid, BMI 40.0-49.9 (H)     Venous stasis ulcer of right lower leg with edema of right lower leg (H)     Type 2 diabetes mellitus with peripheral neuropathy (H)     Venous stasis ulcer of left lower leg with edema of left lower leg (H)                                  Are any of these ulcers new today: No; Location: na    Assessment/Plan:          1. Debridement: Nursing staff removed the old dressing and cleanse the wound(s) with specified solution. After discussion of risk factors and verbal consent was obtained 2% Lidocaine HCL jelly was applied, under clean conditions, the BLE ulceration(s) were debrided using currette. Devitalized and nonviable tissue, along with any fibrin and slough, was removed to improve granulation tissue formation, stimulate wound healing, decrease overall bacteria load, disrupt biofilm formation and decrease edge senescence.  Total excisional debridement was 49 sq cm from the epidermis/dermis area and into the subcutaneous tissue with a depth of 0.1 cm.   Ulcers were improved afterwards and " .  Measures were as noted on the flow sheet.       2.  Ulcer treatment: ulcer treatment will include irrigation and dressings to promote autolytic debridement which will include:will continue dilute hibiclens; vashe; metrogel; silvercel; abd; rolled gauze; change twice per week by home care. If for some reason the patient is not able to get their dressing(s) changed as outlined above (due to illness, lack of supplies, lack of help) please do the following: remove old, soiled dressings; wash the ulcers with saline; pat dry; apply ABD pad or other absorbant pad and secure with rolled gauze; avoid tape directly on your skin; patient instructed to call the clinic as soon as possible to let us know what the current issues are in receiving ulcer care. Stable            3. Edema: continue lymphedema pump; 2 layer wraps; elevation. The compression wraps were applied today in clinic.     If a 2 layer or 4 layer compression wrap is being used; these are safe to have on for ONLY 7 days. If for some reason the patient is not able to get the wrap(s) changed (due to illness; lack of supplies, lack of help, lack of transportation) please do the following: unwrap the old 2 or 4 layer compression wrap; avoid using scissors as you could cut your skin and cause ulcers; use tubular compression when available. Call to reschedule your home care or clinic visit appointment as soon as possible.             4. Nutrition: focus on weight loss           5. Offloading: na          6. Wound Etiology: venous stasis     Patient will follow up with me in 4 weeks for reevaluation. They were instructed to call the clinic sooner with any signs or symptoms of infection or any further questions/concerns. Answered all questions.          Kim Fuentes DNP, RN, CNP, CWOCN, CFCN, CLT  [facility]   [phone]        This note was electronically signed by Kim Fuentes NP

## 2024-10-09 NOTE — PATIENT INSTRUCTIONS
"Continue on all blood pressure medications as prescribed      Focus on weight loss and low sodium diet  Keep sodium intake 2.5-4 grams per day      Use lymphedema pumps twice per day      Wound Care Instructions    Twice per week home care will cleanse your bilateral legs and wound(s) with Dilute hibiclens 30cc in 500cc NS.    Then do a light wash of Dakin's solution or Vashe ok to soak the areas for 5-10 minutes to all the open areas    Pat Dry with non-sterile gauze    Apply Lotion to the intact skin surrounding your wound and other dry skin locations. Some good lotions include: Remedy Skin Repair Cream, Sarna, Vanicream or Cetaphil    Apply Ammonium Lac Hydrin lotion to the thick scaling crusting areas    Triad paste to the periwound skin on the right leg to protect from all the drainage    Primary Dressing: apply metrogel onto the silvercel  to all the wounds    ABDs or super absorbant pads    Secure with non-sterile roll gauze (4\" x 75\" roll) and tape (1\" roll tape) as needed; avoid adhesive directly on the skin    Compression: 2 layer bilaterally    Elevation of the legs    Use lymphedema pump twice per day    It is not ok to get your wound wet in the bath or shower    You need to elevate your legs throughout the day      If for some reason you are not able to get your dressing(s) changed as outlined above (due to illness, lack of supplies, lack of help) please do the following: remove old, soiled dressings; wash the wounds with saline; pat dry; apply ABD pad or other absorbant pad and secure with rolled gauze; avoid tape directly on your skin; Call the clinic as soon as possible to let us know what the current issues are in receiving wound care [phone].      SEEK MEDICAL CARE IF:  You have an increase in swelling, pain, or redness around the wound.  You have an increase in the amount of pus coming from the wound.  There is a bad smell coming from the wound.  The wound appears to be " worsening/enlarging  You have a fever greater than 101.5 F      It is ok to continue current wound care treatment/products for the next 2-3 days until new wound care supplies are ordered and arrive. If longer than this please contact our office at [phone].    If you have a 2 layer or 4 layer compression wrap on these are safe to have on for ONLY 7 days. If for some reason you are not able to get the wrap(s) changed (due to illness; lack of supplies, lack of help, lack of transportation) please do the following: unwrap the old 2 or 4 layer compression wrap; avoid using scissors as you could cut your skin and cause wounds; use tubular compression when available. Call to reschedule your home care or clinic visit appointment as soon as possible.    Please NOTE: if you are 15 minutes late to your clinic appointment you will have to be rescheduled. Please call our clinic as soon as possible if you know you will not be able to get to your appointment at [phone].    If you fail to show up to 3 scheduled clinic appointments you will be dismissed from our clinic.              We want to hear from you!  In the next few weeks, you should receive a call or email to complete a survey about your visit at [facility]. Please help us improve your appointment experience by letting us know how we did today. We strive to make your experience good and value any ways in which we could do better.      We value your input and suggestions.    Thank you for choosing the [facility]!      It is recommended that you do not get your ulcer wet when showering.  Listed below are several ways of keeping it dry when you shower.     1. Wrap it with Press and Seal plastic wrap.  It can be found in the stores where the plastic wraps or tin foil is kept.               2.  Some people take a bath and hang their leg/foot out of the tub.                        3  Put your leg in a plastic bag and tape it  on.           4. You can purchase a shower cover for casts at some pharmacies and through the Internet.            5. Take a Bed Bath or wash up at the sink

## 2024-11-20 ENCOUNTER — OFFICE VISIT (OUTPATIENT)
Dept: VASCULAR SURGERY | Facility: CLINIC | Age: 69
End: 2024-11-20
Attending: NURSE PRACTITIONER
Payer: COMMERCIAL

## 2024-11-20 VITALS
BODY MASS INDEX: 48.12 KG/M2 | RESPIRATION RATE: 18 BRPM | DIASTOLIC BLOOD PRESSURE: 74 MMHG | HEART RATE: 98 BPM | WEIGHT: 315 LBS | SYSTOLIC BLOOD PRESSURE: 155 MMHG | TEMPERATURE: 97.9 F | OXYGEN SATURATION: 96 %

## 2024-11-20 DIAGNOSIS — I83.019 VENOUS STASIS ULCER OF RIGHT LOWER LEG WITH EDEMA OF RIGHT LOWER LEG (H): ICD-10-CM

## 2024-11-20 DIAGNOSIS — M79.3 LIPODERMATOSCLEROSIS OF BOTH LOWER EXTREMITIES: ICD-10-CM

## 2024-11-20 DIAGNOSIS — E11.42 TYPE 2 DIABETES MELLITUS WITH PERIPHERAL NEUROPATHY (H): ICD-10-CM

## 2024-11-20 DIAGNOSIS — I87.333 VENOUS HYPERTENSION, CHRONIC, WITH ULCER AND INFLAMMATION, BILATERAL (H): ICD-10-CM

## 2024-11-20 DIAGNOSIS — I87.2 VENOUS (PERIPHERAL) INSUFFICIENCY: Primary | ICD-10-CM

## 2024-11-20 DIAGNOSIS — L97.919 VENOUS STASIS ULCER OF RIGHT LOWER LEG WITH EDEMA OF RIGHT LOWER LEG (H): ICD-10-CM

## 2024-11-20 DIAGNOSIS — I89.0 ELEPHANTIASIS: ICD-10-CM

## 2024-11-20 DIAGNOSIS — D36.9 PAPILLOMATOSIS: ICD-10-CM

## 2024-11-20 DIAGNOSIS — I89.0 LYMPHEDEMA OF BOTH LOWER EXTREMITIES: ICD-10-CM

## 2024-11-20 DIAGNOSIS — I83.891 VENOUS STASIS ULCER OF RIGHT LOWER LEG WITH EDEMA OF RIGHT LOWER LEG (H): ICD-10-CM

## 2024-11-20 DIAGNOSIS — E66.01 OBESITY, MORBID, BMI 40.0-49.9 (H): ICD-10-CM

## 2024-11-20 DIAGNOSIS — R60.0 VENOUS STASIS ULCER OF RIGHT LOWER LEG WITH EDEMA OF RIGHT LOWER LEG (H): ICD-10-CM

## 2024-11-20 PROCEDURE — 11042 DBRDMT SUBQ TIS 1ST 20SQCM/<: CPT | Performed by: NURSE PRACTITIONER

## 2024-11-20 PROCEDURE — 87205 SMEAR GRAM STAIN: CPT | Performed by: NURSE PRACTITIONER

## 2024-11-20 PROCEDURE — 87070 CULTURE OTHR SPECIMN AEROBIC: CPT | Performed by: NURSE PRACTITIONER

## 2024-11-20 PROCEDURE — 11045 DBRDMT SUBQ TISS EACH ADDL: CPT | Performed by: NURSE PRACTITIONER

## 2024-11-20 PROCEDURE — 11057 PARNG/CUTG B9 HYPRKR LES >4: CPT | Mod: Q9 | Performed by: NURSE PRACTITIONER

## 2024-11-20 PROCEDURE — G0463 HOSPITAL OUTPT CLINIC VISIT: HCPCS | Performed by: NURSE PRACTITIONER

## 2024-11-20 RX ORDER — LIDOCAINE 50 MG/G
OINTMENT TOPICAL DAILY PRN
Status: ACTIVE | OUTPATIENT
Start: 2024-11-20

## 2024-11-20 ASSESSMENT — PAIN SCALES - GENERAL: PAINLEVEL_OUTOF10: NO PAIN (0)

## 2024-11-20 NOTE — PROGRESS NOTES
"            Follow up Vascular Visit       Date of Service:11/20/24      Chief Complaint: BLE swelling and ulcer      Pt returns to [facility] with regards to their BLE swelling and ulcers.  They arrive today alone. They are currently using silvercel; abd; rolled gauze to the wounds. This is being done by home care twice per week. They are using 2 layer bilaterally for compression. They are feeling well today. Denies fevers, chills. No shortness of breath. He is concerned about recurrent infection; feels the wounds have stalled out again and are not making progress; he has tolerated antibiotics in the past.    Allergies:   Allergies   Allergen Reactions    Lisinopril Swelling     Patient reports \"neck swelling\"  Swelling in throat       Medications:   Current Outpatient Medications:     ammonium lactate (LAC-HYDRIN) 12 % external lotion, Apply topically daily as needed for dry skin With dressing changes, Disp: 225 g, Rfl: 3    atorvastatin (LIPITOR) 20 MG tablet, TAKE 1 TABLET DAILY, Disp: 90 tablet, Rfl: 3    chlorthalidone (HYGROTON) 25 MG tablet, Take 1 tablet (25 mg) by mouth daily, Disp: 90 tablet, Rfl: 3    famotidine (PEPCID) 20 MG tablet, Take 1 tablet (20 mg) by mouth 2 times daily for 360 days, Disp: 180 tablet, Rfl: 3    gentamicin (GARAMYCIN) 0.1 % external ointment, Apply topically daily, Disp: 30 g, Rfl: 3    hydrOXYzine (ATARAX) 25 MG tablet, Take 0.5-1 tablets (12.5-25 mg) by mouth 3 times daily as needed for anxiety, Disp: 60 tablet, Rfl: 3    losartan (COZAAR) 100 MG tablet, Take 1 tablet (100 mg) by mouth daily, Disp: 90 tablet, Rfl: 3    melatonin 3 MG tablet, Take 1 tablet (3 mg) by mouth nightly as needed for sleep, Disp: 30 tablet, Rfl: 1    metFORMIN (GLUCOPHAGE) 500 MG tablet, Take 1 tablet (500 mg) by mouth 2 times daily (with meals), Disp: 180 tablet, Rfl: 3    methadone (DOLOPHINE-INTENSOL) 10 MG/ML (HIGH CONC) solution, Take 100 mg by mouth daily, Disp: , Rfl:     " metroNIDAZOLE (METROGEL) 1 % external gel, Apply topically daily, Disp: 60 g, Rfl: 4    sodium hypochlorite (QUARTER-STRENGTH DAKINS) external solution, Apply topically every 72 hours Use 300mL every 72 hours to wash the bilateral legs and wounds on the legs, Disp: 1000 mL, Rfl: 3    spironolactone (ALDACTONE) 25 MG tablet, Take 1 tablet (25 mg) by mouth daily, Disp: 90 tablet, Rfl: 2    zolpidem (AMBIEN) 10 MG tablet, Take tablet by mouth 15 minutes prior to sleep, for Sleep Study, Disp: 1 tablet, Rfl: 0    Current Facility-Administered Medications:     gentamicin (GARAMYCIN) 0.1 % ointment, , Topical, Daily PRN, Alfredo, Kim, NP, Given at 09/11/24 1357    lidocaine (PF) (XYLOCAINE) 1 % injection 10 mL, 10 mL, Intradermal, Once, Gerson Jaquez MD    lidocaine (XYLOCAINE) 2 % external gel, , Topical, Daily PRN, Lucia Reyes MD, Given at 10/19/21 0828    lidocaine (XYLOCAINE) 5 % ointment, , Topical, Daily PRN, Kim Fuentes NP    lidocaine (XYLOCAINE) 5 % ointment, , Topical, Daily PRN, [place], Kim, NP, Given at 10/09/24 1250    lidocaine (XYLOCAINE) 5 % ointment, , Topical, Daily PRN, Kim Fuentes NP    lidocaine (XYLOCAINE) 5 % ointment, , Topical, Daily PRN, Kim Fuentes NP, Given at 08/14/24 1330    lidocaine (XYLOCAINE) 5 % ointment, , Topical, Daily PRN, Kim Fuentes NP    History:   Past Medical History:   Diagnosis Date    COPD (chronic obstructive pulmonary disease) (H)     Diabetes (H)     H/O angioedema 06/15/2020    Formatting of this note might be different from the original. Unexplained angioedema twice, discontinue lisinopril for possible connection to it, though he had used it afterwards with no symptoms    History of tobacco use disorder 08/01/2013    Formatting of this note might be different from the original. Added per documetation    Hypertension     Lymphedema of both lower extremities 12/22/2014    Methadone use 05/08/2012    Obstructive sleep apnea 02/02/2015    Formatting  of this note might be different from the original. Epic    Pleural mass 07/02/2013    Uncomplicated asthma        Physical Exam:    BP (!) 155/74   Pulse 98   Temp 97.9  F (36.6  C)   Resp 18   Wt (!) 385 lb (174.6 kg)   SpO2 96%   BMI 48.12 kg/m      General:  Patient presents to clinic in no apparent distress.  Head: normocephalic atraumatic  Psychiatric:  Alert and oriented x3.   Respiratory: unlabored breathing; no cough  Integumentary:  Skin is uniformly warm, dry and pink.    Ulcer #1 Location: right lateral leg  Size: 11.2L x 1.4W x 0.4depth.  no sinus tract present, Wound base: slough  no undermining present. Ulcer is full thickness. There is moderate drainage. Periwound: no denudement, erythema, induration, maceration or warmth.      Ulcer #2 Location: left lateral leg  Size: 6.5x2x 0.2depth.  no sinus tract present, Wound base: slough  no undermining present. Ulcer is full thickness. There is moderate drainage. Periwound: no denudement, erythema, induration, maceration or warmth.      Several hammer toes bilaterally with callous on the ends of the toes; these were pared down and intact underneath    Swelling well controlled    Continues with extensive scaling      Wound Leg Ulceration (Active)   Number of days: 898       VASC Wound rt lateral weeping (Active)   Pre Size Length 11.2 11/20/24 1300   Pre Size Width 4.4 11/20/24 1300   Pre Size Depth 0.4 11/20/24 1300   Pre Total Sq cm 49.28 11/20/24 1300   Post Size Length 10 08/02/23 0700   Post Size Width 10 08/02/23 0700   Post Size Depth 0.1 08/02/23 0700   Post Total Sq cm 100 08/02/23 0700   Description circumferential 08/14/24 1300   Number of days: 1128       VASC Wound left posterior leg (Active)   Pre Size Length 19 05/08/24 1200   Pre Size Width 10 05/08/24 1200   Pre Size Depth 0.8 05/08/24 1200   Pre Total Sq cm 190 05/08/24 1200   Post Size Length 13 01/26/24 0700   Post Size Width 10 01/26/24 0700   Post Size Depth 0.1 01/26/24 0700  "  Post Total Sq cm 130 01/26/24 0700   Description weeping 01/26/24 0700   Number of days: 743       VASC Wound Right medial shin (Active)   Number of days: 715       VASC Wound Right upper lateral skin etar (Active)   Pre Size Length 3.5 02/28/24 1300   Pre Size Width 0.2 02/28/24 1300   Pre Size Depth 0.1 02/28/24 1300   Pre Total Sq cm 0.7 02/28/24 1300   Number of days: 266       VASC Wound Left lateral shin (Active)   Pre Size Length 6.5 11/20/24 1300   Pre Size Width 2 11/20/24 1300   Pre Size Depth 0.2 11/20/24 1300   Pre Total Sq cm 13 11/20/24 1300   Post Size Length 4 10/09/24 1200   Post Size Width 4 10/09/24 1200   Post Size Depth 0.1 10/09/24 1200   Post Total Sq cm 16 10/09/24 1200   Description scattered o/w 08/14/24 1300   Number of days: 145       VASC Wound Right posterior (Active)   Pre Size Length 0 11/20/24 1300   Pre Size Width 0 11/20/24 1300   Pre Size Depth 0 11/20/24 1300   Pre Total Sq cm 0 11/20/24 1300   Number of days: 70       VASC Wound left lateral shin (Active)   Number of days: 70       VASC Wound left shin (Active)   Pre Size Length 0 10/09/24 1200   Pre Size Width 0 10/09/24 1200   Pre Size Depth 0 10/09/24 1200   Pre Total Sq cm 0 10/09/24 1200   Number of days: 70            Circumferential volume measures:          5/8/2024    12:00 PM 6/28/2024    12:00 PM 9/11/2024     1:00 PM 10/9/2024    12:00 PM 11/20/2024     1:00 PM   Circumferential Measures   Right just above MTP 29 26 27 27 27   Right Ankle 38 33 34 36 32   Right Widest Calf 64 43 44 56 45   Left - just above MTP 29 27.2 27 27 25   Left Ankle 38 33.4 34 34 30.5   Left Widest Calf 57 43.5 40 44 45       Labs:    I personally reviewed the following lab results today and those on care everywhere    CRP   Date Value Ref Range Status   04/23/2021 8.5 (H) 0.0 - 0.8 mg/dL Final      No results found for: \"SED\"   Last Renal Panel:  Sodium   Date Value Ref Range Status   06/11/2024 135 135 - 145 mmol/L Final     Comment:     " Reference intervals for this test were updated on 09/26/2023 to more accurately reflect our healthy population. There may be differences in the flagging of prior results with similar values performed with this method. Interpretation of those prior results can be made in the context of the updated reference intervals.    06/10/2021 142.0 133.0 - 144.0 mmol/L Final     Potassium   Date Value Ref Range Status   06/11/2024 5.0 3.4 - 5.3 mmol/L Final   06/07/2023 4.2 3.5 - 5.0 mmol/L Final   06/10/2021 4.4 3.4 - 5.3 mmol/L Final     Chloride   Date Value Ref Range Status   06/11/2024 99 98 - 107 mmol/L Final   06/07/2023 102 98 - 107 mmol/L Final   06/10/2021 100.0 94.0 - 109.0 mmol/L Final     Carbon Dioxide   Date Value Ref Range Status   06/10/2021 33.0 (H) 20.0 - 32.0 mmol/L Final     Carbon Dioxide (CO2)   Date Value Ref Range Status   06/11/2024 27 22 - 29 mmol/L Final   06/07/2023 28 22 - 31 mmol/L Final     Anion Gap   Date Value Ref Range Status   06/11/2024 9 7 - 15 mmol/L Final   06/07/2023 8 5 - 18 mmol/L Final     Glucose   Date Value Ref Range Status   06/11/2024 92 70 - 99 mg/dL Final   06/07/2023 115 70 - 125 mg/dL Final   06/10/2021 112.0 (H) 60.0 - 109.0 mg/dL Final     Urea Nitrogen   Date Value Ref Range Status   06/11/2024 18.9 8.0 - 23.0 mg/dL Final   06/07/2023 17 8 - 22 mg/dL Final   06/10/2021 16.0 7.0 - 30.0 mg/dL Final     Creatinine   Date Value Ref Range Status   06/11/2024 1.03 0.67 - 1.17 mg/dL Final   06/10/2021 1.0 0.8 - 1.5 mg/dL Final     GFR Estimate   Date Value Ref Range Status   06/11/2024 78 >60 mL/min/1.73m2 Final   04/24/2021 >60 >60 mL/min/1.73m2 Final     Calcium   Date Value Ref Range Status   06/11/2024 9.2 8.8 - 10.2 mg/dL Final   06/10/2021 9.5 8.5 - 10.4 mg/dL Final     Albumin   Date Value Ref Range Status   06/11/2024 3.5 3.5 - 5.2 g/dL Final   06/07/2023 3.3 (L) 3.5 - 5.0 g/dL Final      Lab Results   Component Value Date    WBC 7.8 06/11/2024     Lab Results  "  Component Value Date    RBC 4.13 06/11/2024     Lab Results   Component Value Date    HGB 9.6 06/11/2024     Lab Results   Component Value Date    HCT 33.1 06/11/2024     No components found for: \"MCT\"  Lab Results   Component Value Date    MCV 80 06/11/2024     Lab Results   Component Value Date    MCH 23.2 06/11/2024     Lab Results   Component Value Date    MCHC 29.0 06/11/2024     Lab Results   Component Value Date    RDW 15.0 06/11/2024     Lab Results   Component Value Date     06/11/2024      Lab Results   Component Value Date    A1C 6.1 06/13/2024    A1C 6.8 02/20/2023    A1C 6.2 07/14/2022    A1C 6.7 04/21/2022    A1C 6.2 04/24/2021    A1C 6.2 04/24/2021      TSH   Date Value Ref Range Status   10/18/2022 1.76 0.30 - 5.00 uIU/mL Final      No results found for: \"VITDT\"                Impression:  Encounter Diagnoses   Name Primary?    Venous (peripheral) insufficiency Yes    Lymphedema of both lower extremities     Venous hypertension, chronic, with ulcer and inflammation, bilateral (H)     Elephantiasis     Lipodermatosclerosis of both lower extremities     Papillomatosis     Obesity, morbid, BMI 40.0-49.9 (H)     Venous stasis ulcer of right lower leg with edema of right lower leg (H)     Type 2 diabetes mellitus with peripheral neuropathy (H)                                      Are any of these ulcers new today: No; Location: na    Assessment/Plan:          1. Debridement: Nursing staff removed the old dressing and cleanse the wound(s) with specified solution. After discussion of risk factors and verbal consent was obtained 2% Lidocaine HCL jelly was applied, under clean conditions, the BLE ulceration(s) were debrided using currette. Devitalized and nonviable tissue, along with any fibrin and slough, was removed to improve granulation tissue formation, stimulate wound healing, decrease overall bacteria load, disrupt biofilm formation and decrease edge senescence.  Total excisional debridement " was 62.28 sq cm from the epidermis/dermis area and into the subcutaneous tissue with a depth of 0.2-0.4 cm.   Ulcers were improved afterwards and .  Measures were unchanged after debridement.       2.  Ulcer treatment: ulcer treatment will include irrigation and dressings to promote autolytic debridement which will include:will obtain a culture today; will await sensitivities before prescribing antibiotics; will call patient with results; will continue vashe; silvercel; abd; rolled gauze; metrol gel as needed if odor redevelops If for some reason the patient is not able to get their dressing(s) changed as outlined above (due to illness, lack of supplies, lack of help) please do the following: remove old, soiled dressings; wash the ulcers with saline; pat dry; apply ABD pad or other absorbant pad and secure with rolled gauze; avoid tape directly on your skin; patient instructed to call the clinic as soon as possible to let us know what the current issues are in receiving ulcer care. Stable            3. Edema: continue 2 layer bilaterally; change by home care twice per week; encouraged elevation; encouraged lyphedema pump use. The compression wraps were applied today in clinic.     If a 2 layer or 4 layer compression wrap is being used; these are safe to have on for ONLY 7 days. If for some reason the patient is not able to get the wrap(s) changed (due to illness; lack of supplies, lack of help, lack of transportation) please do the following: unwrap the old 2 or 4 layer compression wrap; avoid using scissors as you could cut your skin and cause ulcers; use tubular compression when available. Call to reschedule your home care or clinic visit appointment as soon as possible.  Stable            4. Nutrition: focus on diabetic diet and weight loss           5. Offloading: needs diabetic shoes and inserts; hammer toe deformity with development of pre-ulcerative callous on several tips of the toes          6. Wound  Etiology: venous stasis           7. Callous: Callous was pared using a #15 blade scalpel; this was done to evaluate for underlying ulceration; reduce pressure; patient comfort; and to reduce risk of future ulceration formation. The skin was intact underneath and patient tolerated well; no complications. A total of 6 calluses were pared down today.        Patient will follow up with me in 4 weeks for reevaluation. They were instructed to call the clinic sooner with any signs or symptoms of infection or any further questions/concerns. Answered all questions.          Kim Fuentes DNP, RN, CNP, CWOCN, CFCN, CLT  [facility]   [phone]        This note was electronically signed by Kim Fuentes NP

## 2024-11-20 NOTE — PROGRESS NOTES
Clinic Administered Medication Documentation    Patient was given Lidocaine 5% to open wounds prior to debridement. Prior to medication administration, verified patient's identity using patient's name and date of birth.    Harini Ch LPN

## 2024-11-20 NOTE — PATIENT INSTRUCTIONS
"Continue on all blood pressure medications as prescribed    Culture taken today this will take 4 days to get results    Diabetic shoes and inserts ordered call [phone] to make appt ASAP!!!!!      Focus on weight loss and low sodium diet  Keep sodium intake 2.5-4 grams per day      Use lymphedema pumps twice per day      Wound Care Instructions    Twice per week home care will cleanse your bilateral legs and wound(s) with Dilute hibiclens 30cc in 500cc NS.    Then do a light wash of Dakin's solution or Vashe ok to soak the areas for 5-10 minutes to all the open areas    Pat Dry with non-sterile gauze    Apply Lotion to the intact skin surrounding your wound and other dry skin locations. Some good lotions include: Remedy Skin Repair Cream, Sarna, Vanicream or Cetaphil    Apply Ammonium Lac Hydrin lotion to the thick scaling crusting areas    Triad paste to the periwound skin on the right leg to protect from all the drainage    Primary Dressing: apply metrogel as needed if odor present onto the silvercel  to all the wounds    ABDs or super absorbant pads    Secure with non-sterile roll gauze (4\" x 75\" roll) and tape (1\" roll tape) as needed; avoid adhesive directly on the skin    Compression: 2 layer bilaterally    Elevation of the legs    Use lymphedema pump twice per day    It is not ok to get your wound wet in the bath or shower    You need to elevate your legs throughout the day      If for some reason you are not able to get your dressing(s) changed as outlined above (due to illness, lack of supplies, lack of help) please do the following: remove old, soiled dressings; wash the wounds with saline; pat dry; apply ABD pad or other absorbant pad and secure with rolled gauze; avoid tape directly on your skin; Call the clinic as soon as possible to let us know what the current issues are in receiving wound care [phone].      SEEK MEDICAL CARE IF:  You have an increase in swelling, pain, or redness around the " wound.  You have an increase in the amount of pus coming from the wound.  There is a bad smell coming from the wound.  The wound appears to be worsening/enlarging  You have a fever greater than 101.5 F      It is ok to continue current wound care treatment/products for the next 2-3 days until new wound care supplies are ordered and arrive. If longer than this please contact our office at [phone].    If you have a 2 layer or 4 layer compression wrap on these are safe to have on for ONLY 7 days. If for some reason you are not able to get the wrap(s) changed (due to illness; lack of supplies, lack of help, lack of transportation) please do the following: unwrap the old 2 or 4 layer compression wrap; avoid using scissors as you could cut your skin and cause wounds; use tubular compression when available. Call to reschedule your home care or clinic visit appointment as soon as possible.    Please NOTE: if you are 15 minutes late to your clinic appointment you will have to be rescheduled. Please call our clinic as soon as possible if you know you will not be able to get to your appointment at [phone].    If you fail to show up to 3 scheduled clinic appointments you will be dismissed from our clinic.              We want to hear from you!  In the next few weeks, you should receive a call or email to complete a survey about your visit at [facility]. Please help us improve your appointment experience by letting us know how we did today. We strive to make your experience good and value any ways in which we could do better.      We value your input and suggestions.    Thank you for choosing the [facility]!      It is recommended that you do not get your ulcer wet when showering.  Listed below are several ways of keeping it dry when you shower.     1. Wrap it with Press and Seal plastic wrap.  It can be found in the stores where the plastic wraps or tin foil is kept.                2.  Some people take a bath and hang their leg/foot out of the tub.                        3  Put your leg in a plastic bag and tape it on.           4. You can purchase a shower cover for casts at some pharmacies and through the Internet.            5. Take a Bed Bath or wash up at the sink

## 2024-11-21 LAB
BACTERIA WND CULT: NORMAL
GRAM STAIN RESULT: NORMAL
GRAM STAIN RESULT: NORMAL

## 2024-11-24 LAB
BACTERIA WND CULT: ABNORMAL
BACTERIA WND CULT: ABNORMAL
GRAM STAIN RESULT: ABNORMAL
GRAM STAIN RESULT: ABNORMAL

## 2024-11-25 ENCOUNTER — TELEPHONE (OUTPATIENT)
Dept: VASCULAR SURGERY | Facility: CLINIC | Age: 69
End: 2024-11-25
Payer: COMMERCIAL

## 2024-11-25 DIAGNOSIS — L97.919 VENOUS STASIS ULCER OF RIGHT LOWER LEG WITH EDEMA OF RIGHT LOWER LEG (H): ICD-10-CM

## 2024-11-25 DIAGNOSIS — M79.3 LIPODERMATOSCLEROSIS OF BOTH LOWER EXTREMITIES: ICD-10-CM

## 2024-11-25 DIAGNOSIS — I87.2 VENOUS (PERIPHERAL) INSUFFICIENCY: ICD-10-CM

## 2024-11-25 DIAGNOSIS — D36.9 PAPILLOMATOSIS: ICD-10-CM

## 2024-11-25 DIAGNOSIS — I83.019 VENOUS STASIS ULCER OF RIGHT LOWER LEG WITH EDEMA OF RIGHT LOWER LEG (H): ICD-10-CM

## 2024-11-25 DIAGNOSIS — R60.0 VENOUS STASIS ULCER OF RIGHT LOWER LEG WITH EDEMA OF RIGHT LOWER LEG (H): ICD-10-CM

## 2024-11-25 DIAGNOSIS — I89.0 ELEPHANTIASIS: ICD-10-CM

## 2024-11-25 DIAGNOSIS — I89.0 LYMPHEDEMA OF BOTH LOWER EXTREMITIES: ICD-10-CM

## 2024-11-25 DIAGNOSIS — E66.01 OBESITY, MORBID, BMI 40.0-49.9 (H): ICD-10-CM

## 2024-11-25 DIAGNOSIS — I83.891 VENOUS STASIS ULCER OF RIGHT LOWER LEG WITH EDEMA OF RIGHT LOWER LEG (H): ICD-10-CM

## 2024-11-25 DIAGNOSIS — I87.333 VENOUS HYPERTENSION, CHRONIC, WITH ULCER AND INFLAMMATION, BILATERAL (H): ICD-10-CM

## 2024-11-25 DIAGNOSIS — E11.42 TYPE 2 DIABETES MELLITUS WITH PERIPHERAL NEUROPATHY (H): ICD-10-CM

## 2024-11-25 RX ORDER — DOXYCYCLINE HYCLATE 100 MG
100 TABLET ORAL 2 TIMES DAILY
Qty: 20 TABLET | Refills: 0 | Status: SHIPPED | OUTPATIENT
Start: 2024-11-25

## 2024-11-25 NOTE — TELEPHONE ENCOUNTER
Discussed results with patient. Medication was sent to his mail order pharmacy. Will need send to a different pharmacy. He requests this be sent to [facility] in [place]. He verbalized understanding, and was in agreement with the plan.     The patient feels that the cataract is significantly impacting daily activities and has elected cataract surgery. The risks, benefits, and alternatives to surgery were discussed. The patient elects to proceed with surgery.

## 2024-11-25 NOTE — TELEPHONE ENCOUNTER
----- Message from Kim Fuentes sent at 11/25/2024  7:41 AM CST -----  Team - please call patient and let him know his culture grew out staph    will treat with doxycyline 100mg BID for 10 days; avoid vitamins, supplements; dairy products around the time they take the antibiotic as this will decrease the effectiveness of the medication. The medication will also make them more sensitive to the sun so they should avoid the sun. Ok to take with food. Patient can also  some probiotics such as Culturelle to help prevent Antibiotic associated diarrhea. They can take this 1 hour before or 2 hours after taking their antibiotic. Should not be taken at the same time as they can cancel out the effects.

## 2024-12-18 ENCOUNTER — OFFICE VISIT (OUTPATIENT)
Dept: VASCULAR SURGERY | Facility: CLINIC | Age: 69
End: 2024-12-18
Attending: NURSE PRACTITIONER
Payer: COMMERCIAL

## 2024-12-18 VITALS
OXYGEN SATURATION: 96 % | WEIGHT: 315 LBS | TEMPERATURE: 97.8 F | BODY MASS INDEX: 48 KG/M2 | HEART RATE: 74 BPM | SYSTOLIC BLOOD PRESSURE: 158 MMHG | DIASTOLIC BLOOD PRESSURE: 76 MMHG | RESPIRATION RATE: 18 BRPM

## 2024-12-18 DIAGNOSIS — L97.919 VENOUS STASIS ULCER OF RIGHT LOWER LEG WITH EDEMA OF RIGHT LOWER LEG (H): ICD-10-CM

## 2024-12-18 DIAGNOSIS — D36.9 PAPILLOMATOSIS: ICD-10-CM

## 2024-12-18 DIAGNOSIS — E11.42 TYPE 2 DIABETES MELLITUS WITH PERIPHERAL NEUROPATHY (H): ICD-10-CM

## 2024-12-18 DIAGNOSIS — M79.3 LIPODERMATOSCLEROSIS OF BOTH LOWER EXTREMITIES: ICD-10-CM

## 2024-12-18 DIAGNOSIS — I83.891 VENOUS STASIS ULCER OF RIGHT LOWER LEG WITH EDEMA OF RIGHT LOWER LEG (H): ICD-10-CM

## 2024-12-18 DIAGNOSIS — I87.2 VENOUS (PERIPHERAL) INSUFFICIENCY: Primary | ICD-10-CM

## 2024-12-18 DIAGNOSIS — I89.0 ELEPHANTIASIS: ICD-10-CM

## 2024-12-18 DIAGNOSIS — E66.01 OBESITY, MORBID, BMI 40.0-49.9 (H): ICD-10-CM

## 2024-12-18 DIAGNOSIS — I83.019 VENOUS STASIS ULCER OF RIGHT LOWER LEG WITH EDEMA OF RIGHT LOWER LEG (H): ICD-10-CM

## 2024-12-18 DIAGNOSIS — R60.0 VENOUS STASIS ULCER OF RIGHT LOWER LEG WITH EDEMA OF RIGHT LOWER LEG (H): ICD-10-CM

## 2024-12-18 DIAGNOSIS — I89.0 LYMPHEDEMA OF BOTH LOWER EXTREMITIES: ICD-10-CM

## 2024-12-18 DIAGNOSIS — I87.333 VENOUS HYPERTENSION, CHRONIC, WITH ULCER AND INFLAMMATION, BILATERAL (H): ICD-10-CM

## 2024-12-18 PROCEDURE — 11045 DBRDMT SUBQ TISS EACH ADDL: CPT | Performed by: NURSE PRACTITIONER

## 2024-12-18 PROCEDURE — 11042 DBRDMT SUBQ TIS 1ST 20SQCM/<: CPT | Performed by: NURSE PRACTITIONER

## 2024-12-18 RX ORDER — LIDOCAINE 50 MG/G
OINTMENT TOPICAL DAILY PRN
Status: ACTIVE | OUTPATIENT
Start: 2024-12-18

## 2024-12-18 ASSESSMENT — PAIN SCALES - GENERAL: PAINLEVEL_OUTOF10: NO PAIN (0)

## 2024-12-18 NOTE — PROGRESS NOTES
"            Follow up Vascular Visit       Date of Service:12/18/24      Chief Complaint: Ble swelling and ulcers      Pt returns to [facility] with regards to their Ble swelling and ulcers.  They arrive today alone. They are currently using vashe; silvercel; super absorbant pads; rolled gauze to the wounds. This is being done by home care twice per week. They are using 2 layer bilaterally for compression. They are feeling well today. Denies fevers, chills. No shortness of breath.     Allergies:   Allergies   Allergen Reactions    Lisinopril Swelling     Patient reports \"neck swelling\"  Swelling in throat       Medications:   Current Outpatient Medications:     ammonium lactate (LAC-HYDRIN) 12 % external lotion, Apply topically daily as needed for dry skin With dressing changes, Disp: 225 g, Rfl: 3    atorvastatin (LIPITOR) 20 MG tablet, TAKE 1 TABLET DAILY, Disp: 90 tablet, Rfl: 3    chlorthalidone (HYGROTON) 25 MG tablet, Take 1 tablet (25 mg) by mouth daily, Disp: 90 tablet, Rfl: 3    doxycycline hyclate (VIBRA-TABS) 100 MG tablet, Take 1 tablet (100 mg) by mouth 2 times daily., Disp: 20 tablet, Rfl: 0    famotidine (PEPCID) 20 MG tablet, Take 1 tablet (20 mg) by mouth 2 times daily for 360 days, Disp: 180 tablet, Rfl: 3    gentamicin (GARAMYCIN) 0.1 % external ointment, Apply topically daily, Disp: 30 g, Rfl: 3    hydrOXYzine (ATARAX) 25 MG tablet, Take 0.5-1 tablets (12.5-25 mg) by mouth 3 times daily as needed for anxiety, Disp: 60 tablet, Rfl: 3    losartan (COZAAR) 100 MG tablet, Take 1 tablet (100 mg) by mouth daily, Disp: 90 tablet, Rfl: 3    melatonin 3 MG tablet, Take 1 tablet (3 mg) by mouth nightly as needed for sleep, Disp: 30 tablet, Rfl: 1    metFORMIN (GLUCOPHAGE) 500 MG tablet, Take 1 tablet (500 mg) by mouth 2 times daily (with meals), Disp: 180 tablet, Rfl: 3    methadone (DOLOPHINE-INTENSOL) 10 MG/ML (HIGH CONC) solution, Take 100 mg by mouth daily, Disp: , Rfl:     " metroNIDAZOLE (METROGEL) 1 % external gel, Apply topically daily, Disp: 60 g, Rfl: 4    sodium hypochlorite (QUARTER-STRENGTH DAKINS) external solution, Apply topically every 72 hours Use 300mL every 72 hours to wash the bilateral legs and wounds on the legs, Disp: 1000 mL, Rfl: 3    spironolactone (ALDACTONE) 25 MG tablet, Take 1 tablet (25 mg) by mouth daily, Disp: 90 tablet, Rfl: 2    zolpidem (AMBIEN) 10 MG tablet, Take tablet by mouth 15 minutes prior to sleep, for Sleep Study, Disp: 1 tablet, Rfl: 0    Current Facility-Administered Medications:     gentamicin (GARAMYCIN) 0.1 % ointment, , Topical, Daily PRN, Alfredo, Kim, NP, Given at 09/11/24 1357    lidocaine (PF) (XYLOCAINE) 1 % injection 10 mL, 10 mL, Intradermal, Once, Gerson Jaquez MD    lidocaine (XYLOCAINE) 2 % external gel, , Topical, Daily PRN, Lucia Reyes MD, Given at 10/19/21 0828    lidocaine (XYLOCAINE) 5 % ointment, , Topical, Daily PRN, Kim Fuentes NP    lidocaine (XYLOCAINE) 5 % ointment, , Topical, Daily PRN, Kim Fuentes, NP    lidocaine (XYLOCAINE) 5 % ointment, , Topical, Daily PRN, Kim Fuentes, NP, Given at 10/09/24 1250    lidocaine (XYLOCAINE) 5 % ointment, , Topical, Daily PRN, Kim Fuentes, NP    lidocaine (XYLOCAINE) 5 % ointment, , Topical, Daily PRN, Kim Fuentes, NP, Given at 08/14/24 1330    lidocaine (XYLOCAINE) 5 % ointment, , Topical, Daily PRN, Kim Fuentes NP    History:   Past Medical History:   Diagnosis Date    COPD (chronic obstructive pulmonary disease) (H)     Diabetes (H)     H/O angioedema 06/15/2020    Formatting of this note might be different from the original. Unexplained angioedema twice, discontinue lisinopril for possible connection to it, though he had used it afterwards with no symptoms    History of tobacco use disorder 08/01/2013    Formatting of this note might be different from the original. Added per documetation    Hypertension     Lymphedema of both lower extremities 12/22/2014     Methadone use 05/08/2012    Obstructive sleep apnea 02/02/2015    Formatting of this note might be different from the original. Epic    Pleural mass 07/02/2013    Uncomplicated asthma        Physical Exam:    BP (!) 158/76   Pulse 74   Temp 97.8  F (36.6  C)   Resp 18   Wt (!) 384 lb (174.2 kg)   SpO2 96%   BMI 48.00 kg/m      General:  Patient presents to clinic in no apparent distress.  Head: normocephalic atraumatic  Psychiatric:  Alert and oriented x3.   Respiratory: unlabored breathing; no cough  Integumentary:  Skin is uniformly warm, dry and pink.    Ulcer #1 Location: right lateral leg  Size: 6L x 3W x 0.1depth.  no sinus tract present, Wound base: red; pink  no undermining present. Ulcer is full thickness. There is moderate drainage. Periwound: no denudement, erythema, induration, maceration or warmth.      Ulcer #2 Location: right lateral shin leg  Size: 2.8x2 x 0.1depth.  no sinus tract present, Wound base: red; pink  no undermining present. Ulcer is full thickness. There is moderate drainage. Periwound: no denudement, erythema, induration, maceration or warmth.      Ulcer #3 Location: left  lateral leg  Size:9x2.2x 0.2depth.  no sinus tract present, Wound base: red; pink  no undermining present. Ulcer is full thickness. There is moderate drainage. Periwound: no denudement, erythema, induration, maceration or warmth.      Wound Leg Ulceration (Active)   Number of days: 926       VASC Wound rt lateral weeping (Active)   Pre Size Length 6 12/18/24 1300   Pre Size Width 3 12/18/24 1300   Pre Size Depth 0.1 12/18/24 1300   Pre Total Sq cm 18 12/18/24 1300   Post Size Length 10 08/02/23 0700   Post Size Width 10 08/02/23 0700   Post Size Depth 0.1 08/02/23 0700   Post Total Sq cm 100 08/02/23 0700   Description circumferential 08/14/24 1300   Number of days: 1156       VASC Wound left posterior leg (Active)   Pre Size Length 19 05/08/24 1200   Pre Size Width 10 05/08/24 1200   Pre Size Depth 0.8  05/08/24 1200   Pre Total Sq cm 190 05/08/24 1200   Post Size Length 13 01/26/24 0700   Post Size Width 10 01/26/24 0700   Post Size Depth 0.1 01/26/24 0700   Post Total Sq cm 130 01/26/24 0700   Description weeping 01/26/24 0700   Number of days: 771       VASC Wound Right medial shin (Active)   Number of days: 743       VASC Wound Right upper lateral skin etar (Active)   Pre Size Length 3.5 02/28/24 1300   Pre Size Width 0.2 02/28/24 1300   Pre Size Depth 0.1 02/28/24 1300   Pre Total Sq cm 0.7 02/28/24 1300   Number of days: 294       VASC Wound Left lateral shin (Active)   Pre Size Length 9 12/18/24 1300   Pre Size Width 2.2 12/18/24 1300   Pre Size Depth 0.2 12/18/24 1300   Pre Total Sq cm 19.8 12/18/24 1300   Post Size Length 4 10/09/24 1200   Post Size Width 4 10/09/24 1200   Post Size Depth 0.1 10/09/24 1200   Post Total Sq cm 16 10/09/24 1200   Description scattered o/w 08/14/24 1300   Number of days: 173       VASC Wound Right posterior (Active)   Pre Size Length 0 11/20/24 1300   Pre Size Width 0 11/20/24 1300   Pre Size Depth 0 11/20/24 1300   Pre Total Sq cm 0 11/20/24 1300   Number of days: 98       VASC Wound left lateral shin (Active)   Number of days: 98       VASC Wound left shin (Active)   Pre Size Length 0 10/09/24 1200   Pre Size Width 0 10/09/24 1200   Pre Size Depth 0 10/09/24 1200   Pre Total Sq cm 0 10/09/24 1200   Number of days: 98       VASC Wound Right lateral shin superior (Active)   Pre Size Length 2.8 12/18/24 1300   Pre Size Width 2 12/18/24 1300   Pre Size Depth 0.2 12/18/24 1300   Pre Total Sq cm 5.6 12/18/24 1300   Number of days: 0            Circumferential volume measures:          6/28/2024    12:00 PM 9/11/2024     1:00 PM 10/9/2024    12:00 PM 11/20/2024     1:00 PM 12/18/2024     1:00 PM   Circumferential Measures   Right just above MTP 26 27 27 27 27   Right Ankle 33 34 36 32 30   Right Widest Calf 43 44 56 45 45   Left - just above MTP 27.2 27 27 25 26.5   Left Ankle 33.4  "34 34 30.5 30.5   Left Widest Calf 43.5 40 44 45 42       Labs:    I personally reviewed the following lab results today and those on care everywhere    CRP   Date Value Ref Range Status   04/23/2021 8.5 (H) 0.0 - 0.8 mg/dL Final      No results found for: \"SED\"   Last Renal Panel:  Sodium   Date Value Ref Range Status   06/11/2024 135 135 - 145 mmol/L Final     Comment:     Reference intervals for this test were updated on 09/26/2023 to more accurately reflect our healthy population. There may be differences in the flagging of prior results with similar values performed with this method. Interpretation of those prior results can be made in the context of the updated reference intervals.    06/10/2021 142.0 133.0 - 144.0 mmol/L Final     Potassium   Date Value Ref Range Status   06/11/2024 5.0 3.4 - 5.3 mmol/L Final   06/07/2023 4.2 3.5 - 5.0 mmol/L Final   06/10/2021 4.4 3.4 - 5.3 mmol/L Final     Chloride   Date Value Ref Range Status   06/11/2024 99 98 - 107 mmol/L Final   06/07/2023 102 98 - 107 mmol/L Final   06/10/2021 100.0 94.0 - 109.0 mmol/L Final     Carbon Dioxide   Date Value Ref Range Status   06/10/2021 33.0 (H) 20.0 - 32.0 mmol/L Final     Carbon Dioxide (CO2)   Date Value Ref Range Status   06/11/2024 27 22 - 29 mmol/L Final   06/07/2023 28 22 - 31 mmol/L Final     Anion Gap   Date Value Ref Range Status   06/11/2024 9 7 - 15 mmol/L Final   06/07/2023 8 5 - 18 mmol/L Final     Glucose   Date Value Ref Range Status   06/11/2024 92 70 - 99 mg/dL Final   06/07/2023 115 70 - 125 mg/dL Final   06/10/2021 112.0 (H) 60.0 - 109.0 mg/dL Final     Urea Nitrogen   Date Value Ref Range Status   06/11/2024 18.9 8.0 - 23.0 mg/dL Final   06/07/2023 17 8 - 22 mg/dL Final   06/10/2021 16.0 7.0 - 30.0 mg/dL Final     Creatinine   Date Value Ref Range Status   06/11/2024 1.03 0.67 - 1.17 mg/dL Final   06/10/2021 1.0 0.8 - 1.5 mg/dL Final     GFR Estimate   Date Value Ref Range Status   06/11/2024 78 >60 mL/min/1.73m2 " "Final   04/24/2021 >60 >60 mL/min/1.73m2 Final     Calcium   Date Value Ref Range Status   06/11/2024 9.2 8.8 - 10.2 mg/dL Final   06/10/2021 9.5 8.5 - 10.4 mg/dL Final     Albumin   Date Value Ref Range Status   06/11/2024 3.5 3.5 - 5.2 g/dL Final   06/07/2023 3.3 (L) 3.5 - 5.0 g/dL Final      Lab Results   Component Value Date    WBC 7.8 06/11/2024     Lab Results   Component Value Date    RBC 4.13 06/11/2024     Lab Results   Component Value Date    HGB 9.6 06/11/2024     Lab Results   Component Value Date    HCT 33.1 06/11/2024     No components found for: \"MCT\"  Lab Results   Component Value Date    MCV 80 06/11/2024     Lab Results   Component Value Date    MCH 23.2 06/11/2024     Lab Results   Component Value Date    MCHC 29.0 06/11/2024     Lab Results   Component Value Date    RDW 15.0 06/11/2024     Lab Results   Component Value Date     06/11/2024      Lab Results   Component Value Date    A1C 6.1 06/13/2024    A1C 6.8 02/20/2023    A1C 6.2 07/14/2022    A1C 6.7 04/21/2022    A1C 6.2 04/24/2021    A1C 6.2 04/24/2021      TSH   Date Value Ref Range Status   10/18/2022 1.76 0.30 - 5.00 uIU/mL Final      No results found for: \"VITDT\"                Impression:  Encounter Diagnoses   Name Primary?    Venous (peripheral) insufficiency Yes    Lymphedema of both lower extremities     Elephantiasis     Lipodermatosclerosis of both lower extremities     Papillomatosis     Venous hypertension, chronic, with ulcer and inflammation, bilateral (H)     Obesity, morbid, BMI 40.0-49.9 (H)     Venous stasis ulcer of right lower leg with edema of right lower leg (H)     Type 2 diabetes mellitus with peripheral neuropathy (H)           12/18/2024 BLE    5/8/24 BLE                   Are any of these ulcers new today: No; Location: na    Assessment/Plan:          1. Debridement: Nursing staff removed the old dressing and cleanse the wound(s) with specified solution. After discussion of risk factors and verbal consent " was obtained 2% Lidocaine HCL jelly was applied, under clean conditions, the BLE ulceration(s) were debrided using currette. Devitalized and nonviable tissue, along with any fibrin and slough, was removed to improve granulation tissue formation, stimulate wound healing, decrease overall bacteria load, disrupt biofilm formation and decrease edge senescence.  Total excisional debridement was 43.4 sq cm from the epidermis/dermis area and into the subcutaneous tissue with a depth of 0.1-0.2 cm.   Ulcers were improved afterwards and .  Measures were unchanged after debridement.       2.  Ulcer treatment: ulcer treatment will include irrigation and dressings to promote autolytic debridement which will include:will continue with home care; continue with vashe; silvercel; abd; rolled gauze; change twice per week. If for some reason the patient is not able to get their dressing(s) changed as outlined above (due to illness, lack of supplies, lack of help) please do the following: remove old, soiled dressings; wash the ulcers with saline; pat dry; apply ABD pad or other absorbant pad and secure with rolled gauze; avoid tape directly on your skin; patient instructed to call the clinic as soon as possible to let us know what the current issues are in receiving ulcer care. Stable            3. Edema: continue with 2 layer bilaterally; elevation; use lymphedema pumps twice per day. The compression wraps were applied today in clinic.     If a 2 layer or 4 layer compression wrap is being used; these are safe to have on for ONLY 7 days. If for some reason the patient is not able to get the wrap(s) changed (due to illness; lack of supplies, lack of help, lack of transportation) please do the following: unwrap the old 2 or 4 layer compression wrap; avoid using scissors as you could cut your skin and cause ulcers; use tubular compression when available. Call to reschedule your home care or clinic visit appointment as soon as  possible.  Stable            4. Nutrition: focus on weight loss           5. Offloading: avoid pressure over the wound areas          6. Wound Etiology: venous     Patient will follow up with me in 4 weeks for reevaluation. They were instructed to call the clinic sooner with any signs or symptoms of infection or any further questions/concerns. Answered all questions.          Kim Fuentes DNP, RN, CNP, CWOCN, CFCN, CLT  [facility]   [phone]        This note was electronically signed by Kim Fuentes NP

## 2024-12-18 NOTE — PATIENT INSTRUCTIONS
"Continue on all blood pressure medications as prescribed    Diabetic shoes and inserts ordered call [phone] to make appt ASAP!!!!!      Focus on weight loss and low sodium diet  Keep sodium intake 2.5-4 grams per day      Use lymphedema pumps twice per day      Wound Care Instructions    Twice per week home care will cleanse your bilateral legs and wound(s) with Dilute hibiclens 30cc in 500cc NS.    Then do a light wash of Dakin's solution or Vashe ok to soak the areas for 5-10 minutes to all the open areas    Pat Dry with non-sterile gauze    Apply Lotion to the intact skin surrounding your wound and other dry skin locations. Some good lotions include: Remedy Skin Repair Cream, Sarna, Vanicream or Cetaphil    Apply Ammonium Lac Hydrin lotion to the thick scaling crusting areas    Triad paste to the periwound skin on the right leg to protect from all the drainage    Primary Dressing: apply metrogel as needed if odor present onto the silvercel  to all the wounds    ABDs or super absorbant pads    Secure with non-sterile roll gauze (4\" x 75\" roll) and tape (1\" roll tape) as needed; avoid adhesive directly on the skin    Compression: 2 layer bilaterally    Elevation of the legs    Use lymphedema pump twice per day    It is not ok to get your wound wet in the bath or shower    You need to elevate your legs throughout the day      If for some reason you are not able to get your dressing(s) changed as outlined above (due to illness, lack of supplies, lack of help) please do the following: remove old, soiled dressings; wash the wounds with saline; pat dry; apply ABD pad or other absorbant pad and secure with rolled gauze; avoid tape directly on your skin; Call the clinic as soon as possible to let us know what the current issues are in receiving wound care [phone].      SEEK MEDICAL CARE IF:  You have an increase in swelling, pain, or redness around the wound.  You have an increase in the amount of pus coming " from the wound.  There is a bad smell coming from the wound.  The wound appears to be worsening/enlarging  You have a fever greater than 101.5 F      It is ok to continue current wound care treatment/products for the next 2-3 days until new wound care supplies are ordered and arrive. If longer than this please contact our office at [phone].    If you have a 2 layer or 4 layer compression wrap on these are safe to have on for ONLY 7 days. If for some reason you are not able to get the wrap(s) changed (due to illness; lack of supplies, lack of help, lack of transportation) please do the following: unwrap the old 2 or 4 layer compression wrap; avoid using scissors as you could cut your skin and cause wounds; use tubular compression when available. Call to reschedule your home care or clinic visit appointment as soon as possible.    Please NOTE: if you are 15 minutes late to your clinic appointment you will have to be rescheduled. Please call our clinic as soon as possible if you know you will not be able to get to your appointment at [phone].    If you fail to show up to 3 scheduled clinic appointments you will be dismissed from our clinic.              We want to hear from you!  In the next few weeks, you should receive a call or email to complete a survey about your visit at [facility]. Please help us improve your appointment experience by letting us know how we did today. We strive to make your experience good and value any ways in which we could do better.      We value your input and suggestions.    Thank you for choosing the [facility]!      It is recommended that you do not get your ulcer wet when showering.  Listed below are several ways of keeping it dry when you shower.     1. Wrap it with Press and Seal plastic wrap.  It can be found in the stores where the plastic wraps or tin foil is kept.               2.  Some people take a bath and hang their leg/foot out  of the tub.                        3  Put your leg in a plastic bag and tape it on.           4. You can purchase a shower cover for casts at some pharmacies and through the Internet.            5. Take a Bed Bath or wash up at the sink

## 2024-12-21 DIAGNOSIS — E11.9 TYPE 2 DIABETES MELLITUS WITHOUT COMPLICATION, WITHOUT LONG-TERM CURRENT USE OF INSULIN (H): ICD-10-CM

## 2024-12-29 ENCOUNTER — HEALTH MAINTENANCE LETTER (OUTPATIENT)
Age: 69
End: 2024-12-29

## 2025-02-01 ENCOUNTER — HEALTH MAINTENANCE LETTER (OUTPATIENT)
Age: 70
End: 2025-02-01

## 2025-02-11 ENCOUNTER — TELEPHONE (OUTPATIENT)
Dept: FAMILY MEDICINE | Facility: CLINIC | Age: 70
End: 2025-02-11
Payer: COMMERCIAL

## 2025-02-11 ENCOUNTER — MYC MEDICAL ADVICE (OUTPATIENT)
Dept: FAMILY MEDICINE | Facility: CLINIC | Age: 70
End: 2025-02-11
Payer: COMMERCIAL

## 2025-02-11 NOTE — TELEPHONE ENCOUNTER
General Call      Reason for Call:  Patient called stating [employer] insurance is needing verification that he has diabetes, otherwise they will cancel his insurance. Member representative at [place] given him this number to reach out to verify [phone]. Would like provider and team to help him call this, please review and call back to patient if needed. Thank you.          Could we send this information to you in [employer] or would you prefer to receive a phone call?:   Patient would prefer a phone call   Okay to leave a detailed message?: Yes at Cell number on file:    Telephone Information:   Mobile [phone]

## 2025-02-11 NOTE — TELEPHONE ENCOUNTER
Patient called asking if this documentation could be filled out. Told patient that a PDF or the physical form would need to be brought in for it to be completed. The submitted photo on [employer] we could not fill out. Patient stated that they didn't really understand what that meant. They decided that they are going to bring the document in.

## 2025-02-11 NOTE — TELEPHONE ENCOUNTER
Forms/Letter Request    Type of form/letter: OTHER: [employer] Chronic condition release of information form       Do we have the form/letter: Yes:     Who is the form from? Patient    Where did/will the form come from? form was sent via [employer]    When is form/letter needed by: before 2/28/25    How would you like the form/letter returned: Fax : [phone]    Patient Notified form requests are processed in 5-7 business days:Yes    Could we send this information to you in [employer] or would you prefer to receive a phone call?:   Patient would prefer a phone call   Okay to leave a detailed message?: Yes at Cell number on file:    Telephone Information:   Mobile [phone]

## 2025-02-11 NOTE — TELEPHONE ENCOUNTER
Patient's daughter called asking about the form-- decided to try to send it a better way and will send to Amalia's email.  Did advise her that there is a possibility patient will need to be seen to fill the form -- that once Dr sees it we will know and contact patient.

## 2025-02-12 NOTE — TELEPHONE ENCOUNTER
Dr. Chinchilla is covering for Dr. Orr at the moment so, will place the form on Dr. Chinchilla desk.

## 2025-02-13 NOTE — TELEPHONE ENCOUNTER
Faxed original to fax # on the form- [phone] 2/13 DOC. Messaged patient via [employer] to let him know we have a copy at  for him to  also. Original to be scanned into chart

## 2025-02-19 ENCOUNTER — OFFICE VISIT (OUTPATIENT)
Dept: VASCULAR SURGERY | Facility: CLINIC | Age: 70
End: 2025-02-19
Attending: NURSE PRACTITIONER
Payer: COMMERCIAL

## 2025-02-19 VITALS
SYSTOLIC BLOOD PRESSURE: 166 MMHG | HEART RATE: 84 BPM | DIASTOLIC BLOOD PRESSURE: 74 MMHG | BODY MASS INDEX: 49.5 KG/M2 | OXYGEN SATURATION: 96 % | WEIGHT: 315 LBS

## 2025-02-19 DIAGNOSIS — D36.9 PAPILLOMATOSIS: ICD-10-CM

## 2025-02-19 DIAGNOSIS — I87.333 VENOUS HYPERTENSION, CHRONIC, WITH ULCER AND INFLAMMATION, BILATERAL (H): ICD-10-CM

## 2025-02-19 DIAGNOSIS — I83.019 VENOUS STASIS ULCER OF RIGHT LOWER LEG WITH EDEMA OF RIGHT LOWER LEG (H): ICD-10-CM

## 2025-02-19 DIAGNOSIS — L97.919 VENOUS STASIS ULCER OF RIGHT LOWER LEG WITH EDEMA OF RIGHT LOWER LEG (H): ICD-10-CM

## 2025-02-19 DIAGNOSIS — I89.0 LYMPHEDEMA OF BOTH LOWER EXTREMITIES: ICD-10-CM

## 2025-02-19 DIAGNOSIS — R60.0 VENOUS STASIS ULCER OF RIGHT LOWER LEG WITH EDEMA OF RIGHT LOWER LEG (H): ICD-10-CM

## 2025-02-19 DIAGNOSIS — I89.0 ELEPHANTIASIS: ICD-10-CM

## 2025-02-19 DIAGNOSIS — I83.892 VENOUS STASIS ULCER OF LEFT LOWER LEG WITH EDEMA OF LEFT LOWER LEG (H): ICD-10-CM

## 2025-02-19 DIAGNOSIS — I83.891 VENOUS STASIS ULCER OF RIGHT LOWER LEG WITH EDEMA OF RIGHT LOWER LEG (H): ICD-10-CM

## 2025-02-19 DIAGNOSIS — I83.029 VENOUS STASIS ULCER OF LEFT LOWER LEG WITH EDEMA OF LEFT LOWER LEG (H): ICD-10-CM

## 2025-02-19 DIAGNOSIS — E66.01 OBESITY, MORBID, BMI 40.0-49.9 (H): ICD-10-CM

## 2025-02-19 DIAGNOSIS — L97.929 VENOUS STASIS ULCER OF LEFT LOWER LEG WITH EDEMA OF LEFT LOWER LEG (H): ICD-10-CM

## 2025-02-19 DIAGNOSIS — E11.42 TYPE 2 DIABETES MELLITUS WITH PERIPHERAL NEUROPATHY (H): ICD-10-CM

## 2025-02-19 DIAGNOSIS — I87.2 VENOUS (PERIPHERAL) INSUFFICIENCY: Primary | ICD-10-CM

## 2025-02-19 DIAGNOSIS — M79.3 LIPODERMATOSCLEROSIS OF BOTH LOWER EXTREMITIES: ICD-10-CM

## 2025-02-19 DIAGNOSIS — R60.0 VENOUS STASIS ULCER OF LEFT LOWER LEG WITH EDEMA OF LEFT LOWER LEG (H): ICD-10-CM

## 2025-02-19 PROCEDURE — 11056 PARNG/CUTG B9 HYPRKR LES 2-4: CPT | Performed by: NURSE PRACTITIONER

## 2025-02-19 PROCEDURE — 99213 OFFICE O/P EST LOW 20 MIN: CPT | Mod: 25 | Performed by: NURSE PRACTITIONER

## 2025-02-19 PROCEDURE — 11045 DBRDMT SUBQ TISS EACH ADDL: CPT | Performed by: NURSE PRACTITIONER

## 2025-02-19 PROCEDURE — 11042 DBRDMT SUBQ TIS 1ST 20SQCM/<: CPT | Performed by: NURSE PRACTITIONER

## 2025-02-19 PROCEDURE — 87070 CULTURE OTHR SPECIMN AEROBIC: CPT | Performed by: NURSE PRACTITIONER

## 2025-02-19 RX ORDER — METRONIDAZOLE 7.5 MG/G
GEL TOPICAL
Qty: 45 G | Refills: 4 | Status: SHIPPED | OUTPATIENT
Start: 2025-02-20

## 2025-02-19 RX ORDER — GENTAMICIN SULFATE 1 MG/G
OINTMENT TOPICAL DAILY
Qty: 30 G | Refills: 3 | Status: SHIPPED | OUTPATIENT
Start: 2025-02-19

## 2025-02-19 RX ORDER — AMMONIUM LACTATE 12 G/100G
LOTION TOPICAL DAILY PRN
Qty: 225 G | Refills: 3 | Status: SHIPPED | OUTPATIENT
Start: 2025-02-19

## 2025-02-19 RX ADMIN — LIDOCAINE: 50 OINTMENT TOPICAL at 13:32

## 2025-02-19 ASSESSMENT — PAIN SCALES - GENERAL: PAINLEVEL_OUTOF10: NO PAIN (0)

## 2025-02-19 NOTE — LETTER
"  Patient:     Shaheen Sepulveda MRN:  [id number]   [address]  [place] [address] :  1955  Sex:  M   Phone: [phone]        [employer] # SUBSCRIBER ID   Primary:    [facility] [phone] [id number]            Allergies   Allergen Reactions    Lisinopril Swelling       Patient reports \"neck swelling\"  Swelling in throat      Order Date:  2025  Wound care       (Order ID: [id number])     Diagnosis:     Priority:  Routine Expiration Date:   Interval:   Count:    Comments: Continue on all blood pressure medications as prescribed     Diabetic shoes and inserts ordered call [phone] to make appt ASAP!!!!!        Focus on weight loss and low sodium diet  Keep sodium intake 2.5-4 grams per day        Use lymphedema pumps twice per day        Wound Care Instructions     Twice per week home care will cleanse your bilateral legs and wound(s) with Dilute hibiclens 30cc in 500cc NS.     Then do a light wash of Dakin's solution or Vashe ok to soak the areas for 5-10 minutes to all the open areas     Pat Dry with non-sterile gauze     Apply Lotion to the intact skin surrounding your wound and other dry skin locations. Some good lotions include: Remedy Skin Repair Cream, Sarna, Vanicream or Cetaphil     Apply Ammonium Lac Hydrin lotion to the thick scaling crusting areas; apply thick layer     Triad paste to the periwound skin on the right leg to protect from all the drainage as needed     Primary Dressing: apply metrogel as needed if odor present onto the silvercel  to all the wounds     ABDs or super absorbant pads     Secure with non-sterile roll gauze (4\" x 75\" roll) and tape (1\" roll tape) as needed; avoid adhesive directly on the skin     Compression: 2 layer bilaterally     Elevation of the legs     Use lymphedema pump twice per day     It is not ok to get your wound wet in the bath or shower     You need to elevate your legs throughout the day        If for some reason you " are not able to get your dressing(s) changed as outlined above (due to illness, lack of supplies, lack of help) please do the following: remove old, soiled dressings; wash the wounds with saline; pat dry; apply ABD pad or other absorbant pad and secure with rolled gauze; avoid tape directly on your skin; Call the clinic as soon as possible to let us know what the current issues are in receiving wound care [phone].        SEEK MEDICAL CARE IF:  ?You have an increase in swelling, pain, or redness around the wound.  ?You have an increase in the amount of pus coming from the wound.  ?There is a bad smell coming from the wound.  ?The wound appears to be worsening/enlarging  ?You have a fever greater than 101.5 F        It is ok to continue current wound care treatment/products for the next 2-3 days until new wound care supplies are ordered and arrive. If longer than this please contact our office at [phone].     If you have a 2 layer or 4 layer compression wrap on these are safe to have on for ONLY 7 days. If for some reason you are not able to get the wrap(s) changed (due to illness; lack of supplies, lack of help, lack of transportation) please do the following: unwrap the old 2 or 4 layer compression wrap; avoid using scissors as you could cut your skin and cause wounds; use tubular compression when available. Call to reschedule your home care or clinic visit appointment as soon as possible.     Please NOTE: if you are 15 minutes late to your clinic appointment you will have to be rescheduled. Please call our clinic as soon as possible if you know you will not be able to get to your appointment at [phone].     If you fail to show up to 3 scheduled clinic appointments you will be dismissed from our clinic.                    We want to hear from you!  In the next few weeks, you should receive a call or email to complete a survey about your visit at [facility]. Please help us improve your  appointment experience by letting us know how we did today. We strive to make your experience good and value any ways in which we could do better.       We value your input and suggestions.     Thank you for choosing the [facility]!        It is recommended that you do not get your ulcer wet when showering.  Listed below are several ways of keeping it dry when you shower.      1. Wrap it with Press and Seal plastic wrap.  It can be found in the stores where the plastic wraps or tin foil is kept.                    2.  Some people take a bath and hang their leg/foot out of the tub.                              3  Put your leg in a plastic bag and tape it on.              4. You can purchase a shower cover for casts at some pharmacies and through the Internet.                          5. Take a Bed Bath or wash up at the sink     Ordered by: Harini Moscoso LPN  Authorized by:  Kim Fuentes NP   (NPI: [id number])

## 2025-02-19 NOTE — PROGRESS NOTES
Clinic Administered Medication Documentation    Patient was given lidocaine 5%. Prior to medication administration, verified patient's identity using patient's name and date of birth.    Anjali Guillaume

## 2025-02-19 NOTE — PATIENT INSTRUCTIONS
"Continue on all blood pressure medications as prescribed    Diabetic shoes and inserts ordered call [phone] to make appt ASAP!!!!!      Focus on weight loss and low sodium diet  Keep sodium intake 2.5-4 grams per day      Use lymphedema pumps twice per day      Wound Care Instructions    Twice per week home care will cleanse your bilateral legs and wound(s) with Dilute hibiclens 30cc in 500cc NS.    Then do a light wash of Dakin's solution or Vashe ok to soak the areas for 5-10 minutes to all the open areas    Pat Dry with non-sterile gauze    Apply Lotion to the intact skin surrounding your wound and other dry skin locations. Some good lotions include: Remedy Skin Repair Cream, Sarna, Vanicream or Cetaphil    Apply Ammonium Lac Hydrin lotion to the thick scaling crusting areas; apply thick layer    Triad paste to the periwound skin on the right leg to protect from all the drainage as needed    Primary Dressing: apply metrogel as needed if odor present onto the silvercel  to all the wounds    ABDs or super absorbant pads    Secure with non-sterile roll gauze (4\" x 75\" roll) and tape (1\" roll tape) as needed; avoid adhesive directly on the skin    Compression: 2 layer bilaterally    Elevation of the legs    Use lymphedema pump twice per day    It is not ok to get your wound wet in the bath or shower    You need to elevate your legs throughout the day      If for some reason you are not able to get your dressing(s) changed as outlined above (due to illness, lack of supplies, lack of help) please do the following: remove old, soiled dressings; wash the wounds with saline; pat dry; apply ABD pad or other absorbant pad and secure with rolled gauze; avoid tape directly on your skin; Call the clinic as soon as possible to let us know what the current issues are in receiving wound care [phone].      SEEK MEDICAL CARE IF:  You have an increase in swelling, pain, or redness around the wound.  You have an increase in " the amount of pus coming from the wound.  There is a bad smell coming from the wound.  The wound appears to be worsening/enlarging  You have a fever greater than 101.5 F      It is ok to continue current wound care treatment/products for the next 2-3 days until new wound care supplies are ordered and arrive. If longer than this please contact our office at [phone].    If you have a 2 layer or 4 layer compression wrap on these are safe to have on for ONLY 7 days. If for some reason you are not able to get the wrap(s) changed (due to illness; lack of supplies, lack of help, lack of transportation) please do the following: unwrap the old 2 or 4 layer compression wrap; avoid using scissors as you could cut your skin and cause wounds; use tubular compression when available. Call to reschedule your home care or clinic visit appointment as soon as possible.    Please NOTE: if you are 15 minutes late to your clinic appointment you will have to be rescheduled. Please call our clinic as soon as possible if you know you will not be able to get to your appointment at [phone].    If you fail to show up to 3 scheduled clinic appointments you will be dismissed from our clinic.              We want to hear from you!  In the next few weeks, you should receive a call or email to complete a survey about your visit at [facility]. Please help us improve your appointment experience by letting us know how we did today. We strive to make your experience good and value any ways in which we could do better.      We value your input and suggestions.    Thank you for choosing the [facility]!      It is recommended that you do not get your ulcer wet when showering.  Listed below are several ways of keeping it dry when you shower.     1. Wrap it with Press and Seal plastic wrap.  It can be found in the stores where the plastic wraps or tin foil is kept.               2.  Some people take a bath  and hang their leg/foot out of the tub.                        3  Put your leg in a plastic bag and tape it on.           4. You can purchase a shower cover for casts at some pharmacies and through the Internet.            5. Take a Bed Bath or wash up at the sink

## 2025-02-19 NOTE — PROGRESS NOTES
"            Follow up Vascular Visit       Date of Service:02/19/25      Chief Complaint: BLE swelling and ulcers      Pt returns to [facility] with regards to their BLE swelling and ulcers.  They arrive today alone; he walked with a cane. They are currently using silvercel; abd; rolled gauze to the wounds. This is being done by home care twice per week. They are using  2 layer bilaterally for compression. They are feeling well today. Denies fevers, chills. No shortness of breath.     Allergies:   Allergies   Allergen Reactions    Lisinopril Swelling     Patient reports \"neck swelling\"  Swelling in throat       Medications:   Current Outpatient Medications:     ammonium lactate (LAC-HYDRIN) 12 % external lotion, Apply topically daily as needed for dry skin. With dressing changes, Disp: 225 g, Rfl: 3    atorvastatin (LIPITOR) 20 MG tablet, TAKE 1 TABLET DAILY, Disp: 90 tablet, Rfl: 3    chlorthalidone (HYGROTON) 25 MG tablet, Take 1 tablet (25 mg) by mouth daily, Disp: 90 tablet, Rfl: 3    doxycycline hyclate (VIBRA-TABS) 100 MG tablet, Take 1 tablet (100 mg) by mouth 2 times daily., Disp: 20 tablet, Rfl: 0    famotidine (PEPCID) 20 MG tablet, Take 1 tablet (20 mg) by mouth 2 times daily for 360 days, Disp: 180 tablet, Rfl: 3    gentamicin (GARAMYCIN) 0.1 % external ointment, Apply topically daily, Disp: 30 g, Rfl: 3    hydrOXYzine (ATARAX) 25 MG tablet, Take 0.5-1 tablets (12.5-25 mg) by mouth 3 times daily as needed for anxiety, Disp: 60 tablet, Rfl: 3    losartan (COZAAR) 100 MG tablet, Take 1 tablet (100 mg) by mouth daily, Disp: 90 tablet, Rfl: 3    melatonin 3 MG tablet, Take 1 tablet (3 mg) by mouth nightly as needed for sleep, Disp: 30 tablet, Rfl: 1    metFORMIN (GLUCOPHAGE) 500 MG tablet, TAKE 1 TABLET TWICE DAILY  WITH MEALS, Disp: 180 tablet, Rfl: 3    methadone (DOLOPHINE-INTENSOL) 10 MG/ML (HIGH CONC) solution, Take 100 mg by mouth daily, Disp: , Rfl:     metroNIDAZOLE (METROGEL) 1 % " external gel, Apply topically daily, Disp: 60 g, Rfl: 4    sodium hypochlorite (QUARTER-STRENGTH DAKINS) external solution, Apply topically every 72 hours Use 300mL every 72 hours to wash the bilateral legs and wounds on the legs, Disp: 1000 mL, Rfl: 3    spironolactone (ALDACTONE) 25 MG tablet, Take 1 tablet (25 mg) by mouth daily, Disp: 90 tablet, Rfl: 2    zolpidem (AMBIEN) 10 MG tablet, Take tablet by mouth 15 minutes prior to sleep, for Sleep Study, Disp: 1 tablet, Rfl: 0    Current Facility-Administered Medications:     gentamicin (GARAMYCIN) 0.1 % ointment, , Topical, Daily PRN, Alfredo, Kim, NP, Given at 09/11/24 1357    lidocaine (PF) (XYLOCAINE) 1 % injection 10 mL, 10 mL, Intradermal, Once, Gerson Jaquez MD    lidocaine (XYLOCAINE) 2 % external gel, , Topical, Daily PRN, Lucia Reyes MD, Given at 10/19/21 0828    lidocaine (XYLOCAINE) 5 % ointment, , Topical, Daily PRN, Kim Fuentes NP, Given at 02/19/25 1332    lidocaine (XYLOCAINE) 5 % ointment, , Topical, Daily PRN, Kim Fuentes NP    lidocaine (XYLOCAINE) 5 % ointment, , Topical, Daily PRN, Kim Fuentes, NP    lidocaine (XYLOCAINE) 5 % ointment, , Topical, Daily PRN, Kim Fuentes, NP, Given at 10/09/24 1250    lidocaine (XYLOCAINE) 5 % ointment, , Topical, Daily PRN, Kim Fuentes, NP    lidocaine (XYLOCAINE) 5 % ointment, , Topical, Daily PRN, Kim Fuentes, NP, Given at 08/14/24 1330    lidocaine (XYLOCAINE) 5 % ointment, , Topical, Daily PRN, Kim Fuentes NP    History:   Past Medical History:   Diagnosis Date    COPD (chronic obstructive pulmonary disease) (H)     Diabetes (H)     H/O angioedema 06/15/2020    Formatting of this note might be different from the original. Unexplained angioedema twice, discontinue lisinopril for possible connection to it, though he had used it afterwards with no symptoms    History of tobacco use disorder 08/01/2013    Formatting of this note might be different from the original. Added per  documetation    Hypertension     Lymphedema of both lower extremities 12/22/2014    Methadone use 05/08/2012    Obstructive sleep apnea 02/02/2015    Formatting of this note might be different from the original. Epic    Pleural mass 07/02/2013    Uncomplicated asthma        Physical Exam:    BP (!) 166/74   Pulse 84   Wt (!) 396 lb (179.6 kg)   SpO2 96%   BMI 49.50 kg/m      General:  Patient presents to clinic in no apparent distress.  Head: normocephalic atraumatic  Psychiatric:  Alert and oriented x3.   Respiratory: unlabored breathing; no cough  Integumentary:  Skin is uniformly warm, dry and pink.    Ulcer #1 Location: right lateral leg  Size: 16L x 12W x 0.2depth.  no red; friable; nosinus tract present, Wound base: red; friable; much larger  no undermining present. Ulcer is full thickness. There is heavy drainage. Periwound: no denudement, erythema, induration, maceration or warmth.  +odor    Ulcer 2  Location: LLE  Size: 1L x 1W x 0.1depth.  no sinus tract present, Wound base: red;   no undermining present. Wound is full thickness. There is moderate drainage. Periwound: no denudement, erythema, induration, maceration or warmth.      Ulcer 3  Location: LLE shin  Size: 1L x 1W x 0.4depth.  no sinus tract present, Wound base: red;   no undermining present. Wound is full thickness. There is moderate drainage. Periwound: no denudement, erythema, induration, maceration or warmth.      Several hammer toes with callous formation; these were pared down and intact underneath      Wound Leg Ulceration (Active)   Number of days: 989       VASC Wound rt lateral leg (Active)   Pre Size Length 16 02/19/25 1300   Pre Size Width 12 02/19/25 1300   Pre Size Depth 0.2 02/19/25 1300   Pre Total Sq cm 192 02/19/25 1300   Post Size Length 10 08/02/23 0700   Post Size Width 10 08/02/23 0700   Post Size Depth 0.1 08/02/23 0700   Post Total Sq cm 100 08/02/23 0700   Description circumferential 08/14/24 1300   Number of days:  1219       VASC Wound left posterior leg (Active)   Pre Size Length 0.5 02/19/25 1300   Pre Size Width 0.5 02/19/25 1300   Pre Size Depth 0.1 02/19/25 1300   Pre Total Sq cm 0.25 02/19/25 1300   Post Size Length 13 01/26/24 0700   Post Size Width 10 01/26/24 0700   Post Size Depth 0.1 01/26/24 0700   Post Total Sq cm 130 01/26/24 0700   Description weeping 01/26/24 0700   Number of days: 834       VASC Wound Right medial shin (Active)   Number of days: 806       VASC Wound Right upper lateral skin etar (Active)   Pre Size Length 3.5 02/28/24 1300   Pre Size Width 0.2 02/28/24 1300   Pre Size Depth 0.1 02/28/24 1300   Pre Total Sq cm 0.7 02/28/24 1300   Number of days: 357       VASC Wound Left lateral shin (Active)   Pre Size Length 1 02/19/25 1300   Pre Size Width 1 02/19/25 1300   Pre Size Depth 0.4 02/19/25 1300   Pre Total Sq cm 1 02/19/25 1300   Post Size Length 4 10/09/24 1200   Post Size Width 4 10/09/24 1200   Post Size Depth 0.1 10/09/24 1200   Post Total Sq cm 16 10/09/24 1200   Description scattered o/w 08/14/24 1300   Number of days: 236       VASC Wound Right posterior (Active)   Pre Size Length 0 11/20/24 1300   Pre Size Width 0 11/20/24 1300   Pre Size Depth 0 11/20/24 1300   Pre Total Sq cm 0 11/20/24 1300   Number of days: 161       VASC Wound left lateral shin (Active)   Number of days: 161       VASC Wound left shin (Active)   Pre Size Length 0 10/09/24 1200   Pre Size Width 0 10/09/24 1200   Pre Size Depth 0 10/09/24 1200   Pre Total Sq cm 0 10/09/24 1200   Number of days: 161       VASC Wound Right lateral shin superior (Active)   Pre Size Length 3 01/22/25 1300   Pre Size Width 3 01/22/25 1300   Pre Size Depth 0.2 01/22/25 1300   Pre Total Sq cm 9 01/22/25 1300   Description scattered o/w 01/22/25 1300   Number of days: 63            Circumferential volume measures:          10/9/2024    12:00 PM 11/20/2024     1:00 PM 12/18/2024     1:00 PM 1/22/2025     1:00 PM 2/19/2025     1:00 PM  "  Circumferential Measures   Right just above MTP 27 27 27 27 26.2   Right Ankle 36 32 30 31.2 30.5   Right Widest Calf 56 45 45 46.4 44   Left - just above MTP 27 25 26.5 28 26   Left Ankle 34 30.5 30.5 34 32   Left Widest Calf 44 45 42 45.5 41.4       Labs:    I personally reviewed the following lab results today and those on care everywhere    CRP   Date Value Ref Range Status   04/23/2021 8.5 (H) 0.0 - 0.8 mg/dL Final      No results found for: \"SED\"   Last Renal Panel:  Sodium   Date Value Ref Range Status   06/11/2024 135 135 - 145 mmol/L Final     Comment:     Reference intervals for this test were updated on 09/26/2023 to more accurately reflect our healthy population. There may be differences in the flagging of prior results with similar values performed with this method. Interpretation of those prior results can be made in the context of the updated reference intervals.    06/10/2021 142.0 133.0 - 144.0 mmol/L Final     Potassium   Date Value Ref Range Status   06/11/2024 5.0 3.4 - 5.3 mmol/L Final   06/07/2023 4.2 3.5 - 5.0 mmol/L Final   06/10/2021 4.4 3.4 - 5.3 mmol/L Final     Chloride   Date Value Ref Range Status   06/11/2024 99 98 - 107 mmol/L Final   06/07/2023 102 98 - 107 mmol/L Final   06/10/2021 100.0 94.0 - 109.0 mmol/L Final     Carbon Dioxide   Date Value Ref Range Status   06/10/2021 33.0 (H) 20.0 - 32.0 mmol/L Final     Carbon Dioxide (CO2)   Date Value Ref Range Status   06/11/2024 27 22 - 29 mmol/L Final   06/07/2023 28 22 - 31 mmol/L Final     Anion Gap   Date Value Ref Range Status   06/11/2024 9 7 - 15 mmol/L Final   06/07/2023 8 5 - 18 mmol/L Final     Glucose   Date Value Ref Range Status   06/11/2024 92 70 - 99 mg/dL Final   06/07/2023 115 70 - 125 mg/dL Final   06/10/2021 112.0 (H) 60.0 - 109.0 mg/dL Final     Urea Nitrogen   Date Value Ref Range Status   06/11/2024 18.9 8.0 - 23.0 mg/dL Final   06/07/2023 17 8 - 22 mg/dL Final   06/10/2021 16.0 7.0 - 30.0 mg/dL Final " "    Creatinine   Date Value Ref Range Status   06/11/2024 1.03 0.67 - 1.17 mg/dL Final   06/10/2021 1.0 0.8 - 1.5 mg/dL Final     GFR Estimate   Date Value Ref Range Status   06/11/2024 78 >60 mL/min/1.73m2 Final   04/24/2021 >60 >60 mL/min/1.73m2 Final     Calcium   Date Value Ref Range Status   06/11/2024 9.2 8.8 - 10.2 mg/dL Final   06/10/2021 9.5 8.5 - 10.4 mg/dL Final     Albumin   Date Value Ref Range Status   06/11/2024 3.5 3.5 - 5.2 g/dL Final   06/07/2023 3.3 (L) 3.5 - 5.0 g/dL Final      Lab Results   Component Value Date    WBC 7.8 06/11/2024     Lab Results   Component Value Date    RBC 4.13 06/11/2024     Lab Results   Component Value Date    HGB 9.6 06/11/2024     Lab Results   Component Value Date    HCT 33.1 06/11/2024     No components found for: \"MCT\"  Lab Results   Component Value Date    MCV 80 06/11/2024     Lab Results   Component Value Date    MCH 23.2 06/11/2024     Lab Results   Component Value Date    MCHC 29.0 06/11/2024     Lab Results   Component Value Date    RDW 15.0 06/11/2024     Lab Results   Component Value Date     06/11/2024      Lab Results   Component Value Date    A1C 6.1 06/13/2024    A1C 6.8 02/20/2023    A1C 6.2 07/14/2022    A1C 6.7 04/21/2022    A1C 6.2 04/24/2021    A1C 6.2 04/24/2021      TSH   Date Value Ref Range Status   10/18/2022 1.76 0.30 - 5.00 uIU/mL Final      No results found for: \"VITDT\"                Impression:  Encounter Diagnoses   Name Primary?    Venous (peripheral) insufficiency Yes    Lymphedema of both lower extremities     Elephantiasis     Lipodermatosclerosis of both lower extremities     Papillomatosis     Venous hypertension, chronic, with ulcer and inflammation, bilateral (H)     Obesity, morbid, BMI 40.0-49.9 (H)     Venous stasis ulcer of right lower leg with edema of right lower leg (H)     Type 2 diabetes mellitus with peripheral neuropathy (H)                              Are any of these ulcers new today: No; Location: " na    Assessment/Plan:          1. Debridement: Nursing staff removed the old dressing and cleanse the wound(s) with specified solution. After discussion of risk factors and verbal consent was obtained 2% Lidocaine HCL jelly was applied, under clean conditions, the BLE ulceration(s) were debrided using currette. Devitalized and nonviable tissue, along with any fibrin and slough, was removed to improve granulation tissue formation, stimulate wound healing, decrease overall bacteria load, disrupt biofilm formation and decrease edge senescence.  Total excisional debridement was 193.25 sq cm from the epidermis/dermis area and into the subcutaneous tissue with a depth of 0.1-0.2 cm.   Ulcers were improved afterwards and .  Measures were unchanged after debridement.       2.  Ulcer treatment: ulcer treatment will include irrigation and dressings to promote autolytic debridement which will include:will obtain a culture to the right leg as the wound is much worse; will await sensitivities before prescribing antibiotics; will use dilute hibiclens; then vashe soak; then gentamicin ointment; metrogel or crushed flagyl; silvercel; ABD; rolled gauze.If for some reason the patient is not able to get their dressing(s) changed as outlined above (due to illness, lack of supplies, lack of help) please do the following: remove old, soiled dressings; wash the ulcers with saline; pat dry; apply ABD pad or other absorbant pad and secure with rolled gauze; avoid tape directly on your skin; patient instructed to call the clinic as soon as possible to let us know what the current issues are in receiving ulcer care. Stable            3. Edema: continue elevation; lymphedema pumps; 2 layer bilaterally. The compression wraps were applied today in clinic.     Patient presents with moderate, bilateral  lower extremity secondary lymphedema.      Patient requires a standard-fit knee high compression stocking and/or velcro wraps    Secondary  Lymphedema &Edema: continue elevation and velcro wraps; needs to wear these consistently; replace every 6 months. The compression wraps were applied today in clinic. Patient presents with severe, bilateral secondary lymphedema. Patient requires daytime and nighttime compression garments; I have prescribed velcro wraps to accommodate and deliver gradient compression throughout the affected extremities. Quantity of 3 is needed for each leg for proper wash and wear.         If a 2 layer or 4 layer compression wrap is being used; these are safe to have on for ONLY 7 days. If for some reason the patient is not able to get the wrap(s) changed (due to illness; lack of supplies, lack of help, lack of transportation) please do the following: unwrap the old 2 or 4 layer compression wrap; avoid using scissors as you could cut your skin and cause ulcers; use tubular compression when available. Call to reschedule your home care or clinic visit appointment as soon as possible.  Stable            4. Nutrition: focus on weight loss and diabetes control; he knows to make appt with pcp for annual lab checks including his diabetes check           5. Offloading: still has not gotten inserts; explained to patient again about his hammer toes and callous formation which can result in ulcer and amputation          6. Wound Etiology: venous stasis ulcer          7. Callous: Callous a total of 4 was pared using a #15 blade scalpel; this was done to evaluate for underlying ulceration; reduce pressure; patient comfort; and to reduce risk of future ulceration formation. The skin was intact underneath and patient tolerated well; no complications.            Patient will follow up with me in 4 weeks for reevaluation. They were instructed to call the clinic sooner with any signs or symptoms of infection or any further questions/concerns. Answered all questions.          Kim Fuentes DNP, RN, CNP, CWOCN, CFCN, CLT  [facility]    [phone]        This note was electronically signed by Kim Fuentes NP

## 2025-02-20 LAB
BACTERIA WND CULT: ABNORMAL
GRAM STAIN RESULT: ABNORMAL

## 2025-02-23 LAB
BACTERIA WND CULT: ABNORMAL
GRAM STAIN RESULT: ABNORMAL

## 2025-03-13 DIAGNOSIS — K21.00 GASTROESOPHAGEAL REFLUX DISEASE WITH ESOPHAGITIS WITHOUT HEMORRHAGE: ICD-10-CM

## 2025-03-13 DIAGNOSIS — I10 BENIGN ESSENTIAL HYPERTENSION: ICD-10-CM

## 2025-03-13 DIAGNOSIS — E11.9 TYPE 2 DIABETES MELLITUS WITHOUT COMPLICATION, WITHOUT LONG-TERM CURRENT USE OF INSULIN (H): ICD-10-CM

## 2025-03-13 RX ORDER — FAMOTIDINE 20 MG/1
20 TABLET, FILM COATED ORAL 2 TIMES DAILY
Qty: 180 TABLET | Refills: 3 | Status: SHIPPED | OUTPATIENT
Start: 2025-03-13

## 2025-03-13 RX ORDER — SPIRONOLACTONE 25 MG/1
25 TABLET ORAL DAILY
Qty: 90 TABLET | Refills: 3 | Status: SHIPPED | OUTPATIENT
Start: 2025-03-13

## 2025-04-02 ENCOUNTER — OFFICE VISIT (OUTPATIENT)
Dept: VASCULAR SURGERY | Facility: CLINIC | Age: 70
End: 2025-04-02
Attending: NURSE PRACTITIONER
Payer: COMMERCIAL

## 2025-04-02 VITALS
HEART RATE: 86 BPM | OXYGEN SATURATION: 95 % | RESPIRATION RATE: 18 BRPM | DIASTOLIC BLOOD PRESSURE: 71 MMHG | WEIGHT: 315 LBS | SYSTOLIC BLOOD PRESSURE: 165 MMHG | BODY MASS INDEX: 51 KG/M2 | TEMPERATURE: 98.1 F

## 2025-04-02 DIAGNOSIS — M79.3 LIPODERMATOSCLEROSIS OF BOTH LOWER EXTREMITIES: ICD-10-CM

## 2025-04-02 DIAGNOSIS — I89.0 LYMPHEDEMA OF BOTH LOWER EXTREMITIES: ICD-10-CM

## 2025-04-02 DIAGNOSIS — D36.9 PAPILLOMATOSIS: ICD-10-CM

## 2025-04-02 DIAGNOSIS — I83.019 VENOUS STASIS ULCER OF RIGHT LOWER LEG WITH EDEMA OF RIGHT LOWER LEG (H): Primary | ICD-10-CM

## 2025-04-02 DIAGNOSIS — R09.89 OTHER SPECIFIED SYMPTOMS AND SIGNS INVOLVING THE CIRCULATORY AND RESPIRATORY SYSTEMS: ICD-10-CM

## 2025-04-02 DIAGNOSIS — I87.333 VENOUS HYPERTENSION, CHRONIC, WITH ULCER AND INFLAMMATION, BILATERAL (H): ICD-10-CM

## 2025-04-02 DIAGNOSIS — R60.0 VENOUS STASIS ULCER OF LEFT LOWER LEG WITH EDEMA OF LEFT LOWER LEG (H): ICD-10-CM

## 2025-04-02 DIAGNOSIS — I87.2 VENOUS (PERIPHERAL) INSUFFICIENCY: ICD-10-CM

## 2025-04-02 DIAGNOSIS — L97.929 VENOUS STASIS ULCER OF LEFT LOWER LEG WITH EDEMA OF LEFT LOWER LEG (H): ICD-10-CM

## 2025-04-02 DIAGNOSIS — I83.029 VENOUS STASIS ULCER OF LEFT LOWER LEG WITH EDEMA OF LEFT LOWER LEG (H): ICD-10-CM

## 2025-04-02 DIAGNOSIS — E11.42 TYPE 2 DIABETES MELLITUS WITH PERIPHERAL NEUROPATHY (H): ICD-10-CM

## 2025-04-02 DIAGNOSIS — E66.01 OBESITY, MORBID, BMI 40.0-49.9 (H): ICD-10-CM

## 2025-04-02 DIAGNOSIS — L97.919 VENOUS STASIS ULCER OF RIGHT LOWER LEG WITH EDEMA OF RIGHT LOWER LEG (H): Primary | ICD-10-CM

## 2025-04-02 DIAGNOSIS — I89.0 ELEPHANTIASIS: ICD-10-CM

## 2025-04-02 DIAGNOSIS — I83.891 VENOUS STASIS ULCER OF RIGHT LOWER LEG WITH EDEMA OF RIGHT LOWER LEG (H): Primary | ICD-10-CM

## 2025-04-02 DIAGNOSIS — I83.892 VENOUS STASIS ULCER OF LEFT LOWER LEG WITH EDEMA OF LEFT LOWER LEG (H): ICD-10-CM

## 2025-04-02 DIAGNOSIS — R60.0 VENOUS STASIS ULCER OF RIGHT LOWER LEG WITH EDEMA OF RIGHT LOWER LEG (H): Primary | ICD-10-CM

## 2025-04-02 PROCEDURE — 11042 DBRDMT SUBQ TIS 1ST 20SQCM/<: CPT | Performed by: NURSE PRACTITIONER

## 2025-04-02 PROCEDURE — 11045 DBRDMT SUBQ TISS EACH ADDL: CPT | Performed by: NURSE PRACTITIONER

## 2025-04-02 PROCEDURE — 97597 DBRDMT OPN WND 1ST 20 CM/<: CPT | Performed by: NURSE PRACTITIONER

## 2025-04-02 PROCEDURE — 97598 DBRDMT OPN WND ADDL 20CM/<: CPT | Performed by: NURSE PRACTITIONER

## 2025-04-02 RX ORDER — SULFAMETHOXAZOLE AND TRIMETHOPRIM 400; 80 MG/1; MG/1
1 TABLET ORAL 2 TIMES DAILY
Qty: 40 TABLET | Refills: 0 | Status: SHIPPED | OUTPATIENT
Start: 2025-04-02 | End: 2025-04-22

## 2025-04-02 ASSESSMENT — PAIN SCALES - GENERAL: PAINLEVEL_OUTOF10: NO PAIN (0)

## 2025-04-02 NOTE — PATIENT INSTRUCTIONS
"Continue on all blood pressure medications as prescribed    Diabetic shoes and inserts ordered call [phone] to make appt ASAP!!!!!    Make annual visit with PCP; check A1c; labs      Focus on weight loss and low sodium diet  Keep sodium intake 2.5-4 grams per day      Use lymphedema pumps twice per day      Your left leg is healed today  We will have you daily:  Remove the tubular compression and velcro  Wash the leg with soap and water  Put on am lactin lotion  Apply double tubular compression and velcro wrap again  Wear compression 24 hours per day    We will do another course of antibiotics for the right leg      Wound Care Instructions    Twice per week home care will cleanse your bilateral legs and wound(s) with Dilute hibiclens 30cc in 500cc NS.    Then do a light wash of Dakin's solution or Vashe ok to soak the areas for 5-10 minutes to all the open areas    Pat Dry with non-sterile gauze    Apply Lotion to the intact skin surrounding your wound and other dry skin locations. Some good lotions include: Remedy Skin Repair Cream, Sarna, Vanicream or Cetaphil    Apply Ammonium Lac Hydrin lotion to the thick scaling crusting areas; apply thick layer    Triad paste to the periwound skin on the right leg to protect from all the drainage as needed    Primary Dressing: apply metrogel as needed if odor present onto the silvercel  to all the wounds    ABDs or super absorbant pads    Secure with non-sterile roll gauze (4\" x 75\" roll) and tape (1\" roll tape) as needed; avoid adhesive directly on the skin    Compression: 2 layer to the right; double tubular compression and velcro to the left; if the left leg begins to drain again; begin to dress and wrap again with 2 layer    Elevation of the legs    Use lymphedema pump twice per day    It is not ok to get your wound wet in the bath or shower    You need to elevate your legs throughout the day      If for some reason you are not able to get your dressing(s) changed as " outlined above (due to illness, lack of supplies, lack of help) please do the following: remove old, soiled dressings; wash the wounds with saline; pat dry; apply ABD pad or other absorbant pad and secure with rolled gauze; avoid tape directly on your skin; Call the clinic as soon as possible to let us know what the current issues are in receiving wound care [phone].      SEEK MEDICAL CARE IF:  You have an increase in swelling, pain, or redness around the wound.  You have an increase in the amount of pus coming from the wound.  There is a bad smell coming from the wound.  The wound appears to be worsening/enlarging  You have a fever greater than 101.5 F      It is ok to continue current wound care treatment/products for the next 2-3 days until new wound care supplies are ordered and arrive. If longer than this please contact our office at [phone].    If you have a 2 layer or 4 layer compression wrap on these are safe to have on for ONLY 7 days. If for some reason you are not able to get the wrap(s) changed (due to illness; lack of supplies, lack of help, lack of transportation) please do the following: unwrap the old 2 or 4 layer compression wrap; avoid using scissors as you could cut your skin and cause wounds; use tubular compression when available. Call to reschedule your home care or clinic visit appointment as soon as possible.    Please NOTE: if you are 15 minutes late to your clinic appointment you will have to be rescheduled. Please call our clinic as soon as possible if you know you will not be able to get to your appointment at [phone].    If you fail to show up to 3 scheduled clinic appointments you will be dismissed from our clinic.              We want to hear from you!  In the next few weeks, you should receive a call or email to complete a survey about your visit at [facility]. Please help us improve your appointment experience by letting us know how we did today. We  strive to make your experience good and value any ways in which we could do better.      We value your input and suggestions.    Thank you for choosing the [facility]!      It is recommended that you do not get your ulcer wet when showering.  Listed below are several ways of keeping it dry when you shower.     1. Wrap it with Press and Seal plastic wrap.  It can be found in the stores where the plastic wraps or tin foil is kept.               2.  Some people take a bath and hang their leg/foot out of the tub.                        3  Put your leg in a plastic bag and tape it on.           4. You can purchase a shower cover for casts at some pharmacies and through the Internet.            5. Take a Bed Bath or wash up at the sink

## 2025-04-02 NOTE — PROGRESS NOTES
"Compression Applied to Right  2-Layer [place]: I Applied the inner foam layer with the foot dorsiflexed and started atthe base of the fifth metatarsal head. I left the bottom of the heel exposed, and proceed by winding the foam up the leg using minimal overlap to just below the fibular head. I then applied the compression layer with the foot dorsiflexed and startingat the base of the fifth metatarsal head. I applied at full stretch and proceeded up the leg using 50% overlap. The bottom of the heel is covered with the compression layer up to the end at the fibular head just below the back of the knee and levelwith the top edge of the foam layer.  I gently pressed and conformed the entire surface of the system to ensurethat the two layers are firmly bound together  Compression Applied to Left  Velcro\", Compression Stockings XL long    "

## 2025-04-02 NOTE — PROGRESS NOTES
"            Follow up Vascular Visit       Date of Service:04/02/25      Chief Complaint: BLE ulcers and swelling      Pt returns to [facility] with regards to their BLE swelling and ulcers.  They arrive today alone. They are currently using dakins or vashe wash; metrogel; silvercel; ABD; rolled gauzeto the wounds. This is being done by  home care twice per week. They are using 2 layer for compression. They are feeling well today. Denies fevers, chills. No shortness of breath. Last culture grew out e. Coli, stenotrophomonas, enterococcus; staph; and alcaligenes faecalis. He was treated with bactrim and augmentin; has completed these tolerated well. He is using his lymphedema pump inconsistently. He has not had vascular studies for a few years. Diabetes is well controlled is due for annual visit upcoming    Allergies:   Allergies   Allergen Reactions    Lisinopril Swelling     Patient reports \"neck swelling\"  Swelling in throat       Medications:   Current Outpatient Medications:     ammonium lactate (LAC-HYDRIN) 12 % external lotion, Apply topically daily as needed for dry skin. With dressing changes, Disp: 225 g, Rfl: 3    amoxicillin-clavulanate (AUGMENTIN) 875-125 MG tablet, Take 1 tablet by mouth 2 times daily for 20 days., Disp: 40 tablet, Rfl: 0    atorvastatin (LIPITOR) 20 MG tablet, TAKE 1 TABLET DAILY, Disp: 90 tablet, Rfl: 3    chlorthalidone (HYGROTON) 25 MG tablet, Take 1 tablet (25 mg) by mouth daily, Disp: 90 tablet, Rfl: 3    doxycycline hyclate (VIBRA-TABS) 100 MG tablet, Take 1 tablet (100 mg) by mouth 2 times daily., Disp: 20 tablet, Rfl: 0    famotidine (PEPCID) 20 MG tablet, Take 1 tablet (20 mg) by mouth 2 times daily., Disp: 180 tablet, Rfl: 3    gentamicin (GARAMYCIN) 0.1 % external ointment, Apply topically daily., Disp: 30 g, Rfl: 3    hydrOXYzine (ATARAX) 25 MG tablet, Take 0.5-1 tablets (12.5-25 mg) by mouth 3 times daily as needed for anxiety, Disp: 60 tablet, Rfl: 3    " losartan (COZAAR) 100 MG tablet, Take 1 tablet (100 mg) by mouth daily, Disp: 90 tablet, Rfl: 3    melatonin 3 MG tablet, Take 1 tablet (3 mg) by mouth nightly as needed for sleep, Disp: 30 tablet, Rfl: 1    metFORMIN (GLUCOPHAGE) 500 MG tablet, Take 1 tablet (500 mg) by mouth 2 times daily (with meals)., Disp: 180 tablet, Rfl: 3    methadone (DOLOPHINE-INTENSOL) 10 MG/ML (HIGH CONC) solution, Take 100 mg by mouth daily, Disp: , Rfl:     metroNIDAZOLE (METROGEL) 0.75 % external gel, Apply topically twice a week., Disp: 45 g, Rfl: 4    sodium hypochlorite (QUARTER-STRENGTH DAKINS) external solution, Apply topically every 72 hours Use 300mL every 72 hours to wash the bilateral legs and wounds on the legs, Disp: 1000 mL, Rfl: 3    spironolactone (ALDACTONE) 25 MG tablet, Take 1 tablet (25 mg) by mouth daily., Disp: 90 tablet, Rfl: 3    sulfamethoxazole-trimethoprim (BACTRIM) 400-80 MG tablet, Take 1 tablet by mouth 2 times daily for 20 days., Disp: 40 tablet, Rfl: 0    zolpidem (AMBIEN) 10 MG tablet, Take tablet by mouth 15 minutes prior to sleep, for Sleep Study, Disp: 1 tablet, Rfl: 0    Current Facility-Administered Medications:     gentamicin (GARAMYCIN) 0.1 % ointment, , Topical, Daily PRN, Alfredo, Kim, NP, Given at 09/11/24 1357    lidocaine (PF) (XYLOCAINE) 1 % injection 10 mL, 10 mL, Intradermal, Once, Gerson Jaquez MD    lidocaine (XYLOCAINE) 2 % external gel, , Topical, Daily PRN, Lucia Reyes MD, Given at 10/19/21 0828    lidocaine (XYLOCAINE) 5 % ointment, , Topical, Daily PRN, Kim Fuentes NP, Given at 02/19/25 1332    lidocaine (XYLOCAINE) 5 % ointment, , Topical, Daily PRN, Kim Fuentes NP    lidocaine (XYLOCAINE) 5 % ointment, , Topical, Daily PRN, Kim Fuentes NP    lidocaine (XYLOCAINE) 5 % ointment, , Topical, Daily PRN, Kim Fuentes, NP, Given at 10/09/24 1250    lidocaine (XYLOCAINE) 5 % ointment, , Topical, Daily PRN, Kim Fuentes, NP    lidocaine (XYLOCAINE) 5 % ointment, ,  Topical, Daily PRN, Kim Fuentes NP, Given at 08/14/24 1330    lidocaine (XYLOCAINE) 5 % ointment, , Topical, Daily PRN, Kim Fuentes NP    History:   Past Medical History:   Diagnosis Date    COPD (chronic obstructive pulmonary disease) (H)     Diabetes (H)     H/O angioedema 06/15/2020    Formatting of this note might be different from the original. Unexplained angioedema twice, discontinue lisinopril for possible connection to it, though he had used it afterwards with no symptoms    History of tobacco use disorder 08/01/2013    Formatting of this note might be different from the original. Added per documetation    Hypertension     Lymphedema of both lower extremities 12/22/2014    Methadone use 05/08/2012    Obstructive sleep apnea 02/02/2015    Formatting of this note might be different from the original. Epic    Pleural mass 07/02/2013    Uncomplicated asthma        Physical Exam:    BP (!) 165/71   Pulse 86   Temp 98.1  F (36.7  C) (Oral)   Resp 18   Wt (!) 408 lb (185.1 kg)   SpO2 95%   BMI 51.00 kg/m      General:  Patient presents to clinic in no apparent distress.  Head: normocephalic atraumatic  Psychiatric:  Alert and oriented x3.   Respiratory: unlabored breathing; no cough  Integumentary:  Skin is uniformly warm, dry and pink.    Ulcer #1 Location: right lateral posterior leg  Size: 17L x 11W x 0.2depth.  no sinus tract present, Wound base: irregular; slough  no undermining present. Ulcer is full thickness. There is heavy drainage. Periwound: no denudement, erythema, induration, maceration or warmth.      LLE healed; scaling/crusting present    Swelling stable    Wound Leg Ulceration (Active)   Number of days: 1031       VASC Wound rt lateral leg (Active)   Pre Size Length 17 04/02/25 1300   Pre Size Width 11 04/02/25 1300   Pre Size Depth 0.2 04/02/25 1300   Pre Total Sq cm 187 04/02/25 1300   Post Size Length 10 08/02/23 0700   Post Size Width 10 08/02/23 0700   Post Size Depth 0.1 08/02/23  0700   Post Total Sq cm 100 08/02/23 0700   Description circumferential 08/14/24 1300   Number of days: 1261       VASC Wound left posterior leg (Active)   Pre Size Length 0.2 04/02/25 1300   Pre Size Width 0.2 04/02/25 1300   Pre Size Depth 0.1 04/02/25 1300   Pre Total Sq cm 0.4 04/02/25 1300   Post Size Length 13 01/26/24 0700   Post Size Width 10 01/26/24 0700   Post Size Depth 0.1 01/26/24 0700   Post Total Sq cm 130 01/26/24 0700   Description weeping 01/26/24 0700   Number of days: 876       VASC Wound Right medial shin (Active)   Number of days: 848       VASC Wound Right upper lateral skin etar (Active)   Pre Size Length 3.5 02/28/24 1300   Pre Size Width 0.2 02/28/24 1300   Pre Size Depth 0.1 02/28/24 1300   Pre Total Sq cm 0.7 02/28/24 1300   Number of days: 399       VASC Wound Left lateral shin (Active)   Pre Size Length 0 04/02/25 1300   Pre Size Width 0 04/02/25 1300   Pre Size Depth 0 04/02/25 1300   Pre Total Sq cm 0 04/02/25 1300   Post Size Length 4 10/09/24 1200   Post Size Width 4 10/09/24 1200   Post Size Depth 0.1 10/09/24 1200   Post Total Sq cm 16 10/09/24 1200   Description scattered o/w 08/14/24 1300   Number of days: 278       VASC Wound Right posterior (Active)   Pre Size Length 0 11/20/24 1300   Pre Size Width 0 11/20/24 1300   Pre Size Depth 0 11/20/24 1300   Pre Total Sq cm 0 11/20/24 1300   Number of days: 203       VASC Wound left lateral shin (Active)   Number of days: 203       VASC Wound left shin (Active)   Pre Size Length 0 10/09/24 1200   Pre Size Width 0 10/09/24 1200   Pre Size Depth 0 10/09/24 1200   Pre Total Sq cm 0 10/09/24 1200   Number of days: 203       VASC Wound Right lateral shin superior (Active)   Pre Size Length 3 01/22/25 1300   Pre Size Width 3 01/22/25 1300   Pre Size Depth 0.2 01/22/25 1300   Pre Total Sq cm 9 01/22/25 1300   Description scattered o/w 01/22/25 1300   Number of days: 105            Circumferential volume measures:          11/20/2024     1:00  "PM 12/18/2024     1:00 PM 1/22/2025     1:00 PM 2/19/2025     1:00 PM 4/2/2025     1:00 PM   Circumferential Measures   Right just above MTP 27 27 27 26.2 27   Right Ankle 32 30 31.2 30.5 32   Right Widest Calf 45 45 46.4 44 49   Left - just above MTP 25 26.5 28 26 25.5   Left Ankle 30.5 30.5 34 32 29   Left Widest Calf 45 42 45.5 41.4 46.2       Labs:    I personally reviewed the following lab results today and those on care everywhere    CRP   Date Value Ref Range Status   04/23/2021 8.5 (H) 0.0 - 0.8 mg/dL Final      No results found for: \"SED\"   Last Renal Panel:  Sodium   Date Value Ref Range Status   06/11/2024 135 135 - 145 mmol/L Final     Comment:     Reference intervals for this test were updated on 09/26/2023 to more accurately reflect our healthy population. There may be differences in the flagging of prior results with similar values performed with this method. Interpretation of those prior results can be made in the context of the updated reference intervals.    06/10/2021 142.0 133.0 - 144.0 mmol/L Final     Potassium   Date Value Ref Range Status   06/11/2024 5.0 3.4 - 5.3 mmol/L Final   06/07/2023 4.2 3.5 - 5.0 mmol/L Final   06/10/2021 4.4 3.4 - 5.3 mmol/L Final     Chloride   Date Value Ref Range Status   06/11/2024 99 98 - 107 mmol/L Final   06/07/2023 102 98 - 107 mmol/L Final   06/10/2021 100.0 94.0 - 109.0 mmol/L Final     Carbon Dioxide   Date Value Ref Range Status   06/10/2021 33.0 (H) 20.0 - 32.0 mmol/L Final     Carbon Dioxide (CO2)   Date Value Ref Range Status   06/11/2024 27 22 - 29 mmol/L Final   06/07/2023 28 22 - 31 mmol/L Final     Anion Gap   Date Value Ref Range Status   06/11/2024 9 7 - 15 mmol/L Final   06/07/2023 8 5 - 18 mmol/L Final     Glucose   Date Value Ref Range Status   06/11/2024 92 70 - 99 mg/dL Final   06/07/2023 115 70 - 125 mg/dL Final   06/10/2021 112.0 (H) 60.0 - 109.0 mg/dL Final     Urea Nitrogen   Date Value Ref Range Status   06/11/2024 18.9 8.0 - 23.0 mg/dL " "Final   06/07/2023 17 8 - 22 mg/dL Final   06/10/2021 16.0 7.0 - 30.0 mg/dL Final     Creatinine   Date Value Ref Range Status   06/11/2024 1.03 0.67 - 1.17 mg/dL Final   06/10/2021 1.0 0.8 - 1.5 mg/dL Final     GFR Estimate   Date Value Ref Range Status   06/11/2024 78 >60 mL/min/1.73m2 Final   04/24/2021 >60 >60 mL/min/1.73m2 Final     Calcium   Date Value Ref Range Status   06/11/2024 9.2 8.8 - 10.2 mg/dL Final   06/10/2021 9.5 8.5 - 10.4 mg/dL Final     Albumin   Date Value Ref Range Status   06/11/2024 3.5 3.5 - 5.2 g/dL Final   06/07/2023 3.3 (L) 3.5 - 5.0 g/dL Final      Lab Results   Component Value Date    WBC 7.8 06/11/2024     Lab Results   Component Value Date    RBC 4.13 06/11/2024     Lab Results   Component Value Date    HGB 9.6 06/11/2024     Lab Results   Component Value Date    HCT 33.1 06/11/2024     No components found for: \"MCT\"  Lab Results   Component Value Date    MCV 80 06/11/2024     Lab Results   Component Value Date    MCH 23.2 06/11/2024     Lab Results   Component Value Date    MCHC 29.0 06/11/2024     Lab Results   Component Value Date    RDW 15.0 06/11/2024     Lab Results   Component Value Date     06/11/2024      Lab Results   Component Value Date    A1C 6.1 06/13/2024    A1C 6.8 02/20/2023    A1C 6.2 07/14/2022    A1C 6.7 04/21/2022    A1C 6.2 04/24/2021    A1C 6.2 04/24/2021      TSH   Date Value Ref Range Status   10/18/2022 1.76 0.30 - 5.00 uIU/mL Final      No results found for: \"VITDT\"                Impression:  Encounter Diagnoses   Name Primary?    Venous stasis ulcer of right lower leg with edema of right lower leg (H) Yes    Venous (peripheral) insufficiency     Lymphedema of both lower extremities     Elephantiasis     Papillomatosis     Venous hypertension, chronic, with ulcer and inflammation, bilateral (H)     Type 2 diabetes mellitus with peripheral neuropathy (H)     Venous stasis ulcer of left lower leg with edema of left lower leg (H)     " Lipodermatosclerosis of both lower extremities     Obesity, morbid, BMI 40.0-49.9 (H)     BMI 50.0-59.9, adult (H)     Other specified symptoms and signs involving the circulatory and respiratory systems                                        Are any of these ulcers new today: No; Location: na    Assessment/Plan:          1. Debridement: Nursing staff removed the old dressing and cleanse the wound(s) with specified solution. After discussion of risk factors and verbal consent was obtained 2% Lidocaine HCL jelly was applied, under clean conditions, the RLE ulceration(s) were debrided using currette. Devitalized and nonviable tissue, along with any fibrin and slough, was removed to improve granulation tissue formation, stimulate wound healing, decrease overall bacteria load, disrupt biofilm formation and decrease edge senescence.  Total excisional debridement was 187 sq cm from the epidermis/dermis area and into the subcutaneous tissue with a depth of 0.1-0.2 cm.   Ulcers were improved afterwards and .  Measures were unchanged after debridement.    Nursing staff removed the old dressing and cleanse the wound(s) with specified solution. After discussion of risk factors and verbal consent was obtained 2% Lidocaine HCL jelly was applied, under clean conditions, the scaling on the LLE was debrided using currette. Devitalized and nonviable tissue, along with any fibrin and slough, was removed to improve granulation tissue formation, stimulate wound healing, decrease overall bacteria load, disrupt biofilm formation and decrease edge senescence.  Total excisional debridement was 100 sq cm from the epidermis/dermis area and into the subcutaneous tissue with a depth of 0 cm.   Ulcers were improved afterwards and .  Measures were unchanged after debridement.       2.  Ulcer treatment: ulcer treatment will include irrigation and dressings to promote autolytic debridement which will include:dilute hibiclens; then  vashe or dakins; then silvercel; abd; rolled gauze; change twice per week; will prescribe another course of bactrim and augmentin based off last culture results. Will update the enid and venous studies. left leg is healed If for some reason the patient is not able to get their dressing(s) changed as outlined above (due to illness, lack of supplies, lack of help) please do the following: remove old, soiled dressings; wash the ulcers with saline; pat dry; apply ABD pad or other absorbant pad and secure with rolled gauze; avoid tape directly on your skin; patient instructed to call the clinic as soon as possible to let us know what the current issues are in receiving ulcer care. Stable            3. Edema: will go to double tubular compression to the LLE with velcro; and 2 layer to the right leg; remove the velcro daily; wash and lotion the leg and reapply the velcro; if the leg begins to drain or ulcerate; resume the 2 layer. The compression wraps were applied today in clinic.     Patient presents with moderate, bilateral  lower extremity secondary lymphedema.      Patient requires a standard-fit knee high compression stocking and/or velcro wraps    Secondary Lymphedema &Edema: continue elevation and velcro wraps; needs to wear these consistently; replace every 6 months. The compression wraps were applied today in clinic. Patient presents with severe, bilateral secondary lymphedema. Patient requires daytime and nighttime compression garments; I have prescribed velcro wraps to accommodate and deliver gradient compression throughout the affected extremities. Quantity of 3 is needed for each leg for proper wash and wear.         If a 2 layer or 4 layer compression wrap is being used; these are safe to have on for ONLY 7 days. If for some reason the patient is not able to get the wrap(s) changed (due to illness; lack of supplies, lack of help, lack of transportation) please do the following: unwrap the old 2 or 4 layer  compression wrap; avoid using scissors as you could cut your skin and cause ulcers; use tubular compression when available. Call to reschedule your home care or clinic visit appointment as soon as possible.  Stable            4. Nutrition: work on weight loss and diabetes; pt due for updated labs; told him to make appt with [facility]           5. Offloading: has new shoes but not new inserts; educated pt on risks of ulcers on toes due to hammertoe deformities and risk of future amputation he really needs custom inserts          6. Wound Etiology: venous stasis     Patient will follow up with me in 4 weeks for reevaluation. They were instructed to call the clinic sooner with any signs or symptoms of infection or any further questions/concerns. Answered all questions.          Kim Fuentes DNP, RN, CNP, CWOCN, CFCN, CLT  [facility]   [phone]        This note was electronically signed by Kim Fuentes NP

## 2025-04-15 ENCOUNTER — TELEPHONE (OUTPATIENT)
Dept: VASCULAR SURGERY | Facility: CLINIC | Age: 70
End: 2025-04-15
Payer: COMMERCIAL

## 2025-04-15 NOTE — TELEPHONE ENCOUNTER
Reviewed below with patient. He will call to get Diabetic shoes and inserts. He does have an appointment with his PCP for labs.    Diabetic shoes and inserts ordered call [phone] to make appt ASAP!!!!!     Make annual visit with PCP; check A1c; labs

## 2025-04-17 ENCOUNTER — TELEPHONE (OUTPATIENT)
Dept: VASCULAR SURGERY | Facility: CLINIC | Age: 70
End: 2025-04-17
Payer: COMMERCIAL

## 2025-04-17 NOTE — TELEPHONE ENCOUNTER
Pt was updated per Dr. Lobo below. He will contact us or his PCP if bloody stools don't subside. He will stop antibiotics.     Based on the pictures and Kim's note it doesn't sound there were any signs of infection and abx were started based on a culture.  If he is having problems which seem related to the antibiotic I would suggest he stop taking the abx.      Kendrick Lobo MD

## 2025-04-17 NOTE — TELEPHONE ENCOUNTER
Pt called concerned, he stated that he woke up and had a bloody stool today. He began Bactrim and Augmentin by Kim on 4/2. He did not take the antibiotics this AM. He is wondering if she would like to switch his ABX? He states that he has no had bloody stool with ABX in the past. He is not having any abdominal pain. He is not on any blood thinners. Kim is out today, will send to wound team providers.

## 2025-05-01 ENCOUNTER — ANCILLARY PROCEDURE (OUTPATIENT)
Dept: VASCULAR ULTRASOUND | Facility: CLINIC | Age: 70
End: 2025-05-01
Attending: NURSE PRACTITIONER
Payer: COMMERCIAL

## 2025-05-01 DIAGNOSIS — I83.892 VENOUS STASIS ULCER OF LEFT LOWER LEG WITH EDEMA OF LEFT LOWER LEG (H): ICD-10-CM

## 2025-05-01 DIAGNOSIS — I87.333 VENOUS HYPERTENSION, CHRONIC, WITH ULCER AND INFLAMMATION, BILATERAL (H): ICD-10-CM

## 2025-05-01 DIAGNOSIS — I89.0 ELEPHANTIASIS: ICD-10-CM

## 2025-05-01 DIAGNOSIS — R60.0 VENOUS STASIS ULCER OF LEFT LOWER LEG WITH EDEMA OF LEFT LOWER LEG (H): ICD-10-CM

## 2025-05-01 DIAGNOSIS — L97.919 VENOUS STASIS ULCER OF RIGHT LOWER LEG WITH EDEMA OF RIGHT LOWER LEG (H): ICD-10-CM

## 2025-05-01 DIAGNOSIS — E66.01 OBESITY, MORBID, BMI 40.0-49.9 (H): ICD-10-CM

## 2025-05-01 DIAGNOSIS — R09.89 OTHER SPECIFIED SYMPTOMS AND SIGNS INVOLVING THE CIRCULATORY AND RESPIRATORY SYSTEMS: ICD-10-CM

## 2025-05-01 DIAGNOSIS — I83.891 VENOUS STASIS ULCER OF RIGHT LOWER LEG WITH EDEMA OF RIGHT LOWER LEG (H): ICD-10-CM

## 2025-05-01 DIAGNOSIS — D36.9 PAPILLOMATOSIS: ICD-10-CM

## 2025-05-01 DIAGNOSIS — I83.029 VENOUS STASIS ULCER OF LEFT LOWER LEG WITH EDEMA OF LEFT LOWER LEG (H): ICD-10-CM

## 2025-05-01 DIAGNOSIS — I83.019 VENOUS STASIS ULCER OF RIGHT LOWER LEG WITH EDEMA OF RIGHT LOWER LEG (H): ICD-10-CM

## 2025-05-01 DIAGNOSIS — R60.0 VENOUS STASIS ULCER OF RIGHT LOWER LEG WITH EDEMA OF RIGHT LOWER LEG (H): ICD-10-CM

## 2025-05-01 DIAGNOSIS — E11.42 TYPE 2 DIABETES MELLITUS WITH PERIPHERAL NEUROPATHY (H): ICD-10-CM

## 2025-05-01 DIAGNOSIS — M79.3 LIPODERMATOSCLEROSIS OF BOTH LOWER EXTREMITIES: ICD-10-CM

## 2025-05-01 DIAGNOSIS — L97.929 VENOUS STASIS ULCER OF LEFT LOWER LEG WITH EDEMA OF LEFT LOWER LEG (H): ICD-10-CM

## 2025-05-01 DIAGNOSIS — I87.2 VENOUS (PERIPHERAL) INSUFFICIENCY: ICD-10-CM

## 2025-05-01 DIAGNOSIS — I89.0 LYMPHEDEMA OF BOTH LOWER EXTREMITIES: ICD-10-CM

## 2025-05-01 PROCEDURE — 93970 EXTREMITY STUDY: CPT | Mod: 26 | Performed by: STUDENT IN AN ORGANIZED HEALTH CARE EDUCATION/TRAINING PROGRAM

## 2025-05-01 PROCEDURE — 93923 UPR/LXTR ART STDY 3+ LVLS: CPT

## 2025-05-01 PROCEDURE — 93970 EXTREMITY STUDY: CPT

## 2025-05-07 ENCOUNTER — OFFICE VISIT (OUTPATIENT)
Dept: VASCULAR SURGERY | Facility: CLINIC | Age: 70
End: 2025-05-07
Attending: NURSE PRACTITIONER
Payer: COMMERCIAL

## 2025-05-07 VITALS — OXYGEN SATURATION: 93 % | HEART RATE: 87 BPM | DIASTOLIC BLOOD PRESSURE: 74 MMHG | SYSTOLIC BLOOD PRESSURE: 155 MMHG

## 2025-05-07 DIAGNOSIS — I87.2 VENOUS (PERIPHERAL) INSUFFICIENCY: Primary | ICD-10-CM

## 2025-05-07 DIAGNOSIS — I89.0 ELEPHANTIASIS: ICD-10-CM

## 2025-05-07 DIAGNOSIS — B35.1 ONYCHOMYCOSIS: ICD-10-CM

## 2025-05-07 DIAGNOSIS — L84 PRE-ULCERATIVE CORN OR CALLOUS: ICD-10-CM

## 2025-05-07 DIAGNOSIS — E66.01 OBESITY, MORBID, BMI 40.0-49.9 (H): ICD-10-CM

## 2025-05-07 DIAGNOSIS — I83.892 VENOUS STASIS ULCER OF LEFT LOWER LEG WITH EDEMA OF LEFT LOWER LEG (H): ICD-10-CM

## 2025-05-07 DIAGNOSIS — I83.029 VENOUS STASIS ULCER OF LEFT LOWER LEG WITH EDEMA OF LEFT LOWER LEG (H): ICD-10-CM

## 2025-05-07 DIAGNOSIS — M79.3 LIPODERMATOSCLEROSIS OF BOTH LOWER EXTREMITIES: ICD-10-CM

## 2025-05-07 DIAGNOSIS — D36.9 PAPILLOMATOSIS: ICD-10-CM

## 2025-05-07 DIAGNOSIS — I87.333 VENOUS HYPERTENSION, CHRONIC, WITH ULCER AND INFLAMMATION, BILATERAL (H): ICD-10-CM

## 2025-05-07 DIAGNOSIS — E11.42 TYPE 2 DIABETES MELLITUS WITH PERIPHERAL NEUROPATHY (H): ICD-10-CM

## 2025-05-07 DIAGNOSIS — R60.0 VENOUS STASIS ULCER OF LEFT LOWER LEG WITH EDEMA OF LEFT LOWER LEG (H): ICD-10-CM

## 2025-05-07 DIAGNOSIS — L97.919 VENOUS STASIS ULCER OF RIGHT LOWER LEG WITH EDEMA OF RIGHT LOWER LEG (H): ICD-10-CM

## 2025-05-07 DIAGNOSIS — I83.019 VENOUS STASIS ULCER OF RIGHT LOWER LEG WITH EDEMA OF RIGHT LOWER LEG (H): ICD-10-CM

## 2025-05-07 DIAGNOSIS — R60.0 VENOUS STASIS ULCER OF RIGHT LOWER LEG WITH EDEMA OF RIGHT LOWER LEG (H): ICD-10-CM

## 2025-05-07 DIAGNOSIS — L85.9 HYPERKERATOSIS OF SKIN: ICD-10-CM

## 2025-05-07 DIAGNOSIS — I89.0 LYMPHEDEMA OF BOTH LOWER EXTREMITIES: ICD-10-CM

## 2025-05-07 DIAGNOSIS — I83.891 VENOUS STASIS ULCER OF RIGHT LOWER LEG WITH EDEMA OF RIGHT LOWER LEG (H): ICD-10-CM

## 2025-05-07 DIAGNOSIS — L97.929 VENOUS STASIS ULCER OF LEFT LOWER LEG WITH EDEMA OF LEFT LOWER LEG (H): ICD-10-CM

## 2025-05-07 PROCEDURE — 11045 DBRDMT SUBQ TISS EACH ADDL: CPT | Performed by: NURSE PRACTITIONER

## 2025-05-07 PROCEDURE — 11056 PARNG/CUTG B9 HYPRKR LES 2-4: CPT | Mod: Q9 | Performed by: NURSE PRACTITIONER

## 2025-05-07 PROCEDURE — 11721 DEBRIDE NAIL 6 OR MORE: CPT | Mod: Q9 | Performed by: NURSE PRACTITIONER

## 2025-05-07 PROCEDURE — 11042 DBRDMT SUBQ TIS 1ST 20SQCM/<: CPT | Performed by: NURSE PRACTITIONER

## 2025-05-07 ASSESSMENT — PAIN SCALES - GENERAL: PAINLEVEL_OUTOF10: NO PAIN (0)

## 2025-05-07 NOTE — PROGRESS NOTES
"Compression Applied to Right  2-Layer [place]: I Applied the inner foam layer with the foot dorsiflexed and started atthe base of the fifth metatarsal head. I left the bottom of the heel exposed, and proceed by winding the foam up the leg using minimal overlap to just below the fibular head. I then applied the compression layer with the foot dorsiflexed and startingat the base of the fifth metatarsal head. I applied at full stretch and proceeded up the leg using 50% overlap. The bottom of the heel is covered with the compression layer up to the end at the fibular head just below the back of the knee and levelwith the top edge of the foam layer.  I gently pressed and conformed the entire surface of the system to ensurethat the two layers are firmly bound together  Compression Applied to Left  Velcro\", Compression Stockings    "

## 2025-05-07 NOTE — PATIENT INSTRUCTIONS
"Continue on all blood pressure medications as prescribed    Diabetic shoes and inserts ordered call [phone] see tomorrow as scheduled    Make annual visit with PCP; check A1c; labs      Focus on weight loss and low sodium diet  Keep sodium intake 2.5-4 grams per day      Use lymphedema pumps twice per day      Your left leg is healed today  We will have you daily:  Remove the tubular compression and velcro  Wash the leg with soap and water  Put on am lactin lotion  Apply double tubular compression and velcro wrap again  Wear compression 24 hours per day    I have provided you with high density foam you can apply this on the right lateral leg over the dressing to add compression    You can also move up to a 4 layer compression wrap    You can discuss with you home care nurse    Wound Care Instructions    Twice per week home care will cleanse your bilateral legs and wound(s) with Dilute hibiclens 30cc in 500cc NS.    Then do a light wash of Dakin's solution or Vashe ok to soak the areas for 5-10 minutes to all the open areas    Pat Dry with non-sterile gauze    Apply Lotion to the intact skin surrounding your wound and other dry skin locations. Some good lotions include: Remedy Skin Repair Cream, Sarna, Vanicream or Cetaphil    Apply Ammonium Lac Hydrin lotion to the thick scaling crusting areas; apply thick layer    Triad paste to the periwound skin on the right leg to protect from all the drainage as needed    Primary Dressing: apply metrogel as needed if odor present onto the silvercel  to all the wounds    ABDs or super absorbant pads    Secure with non-sterile roll gauze (4\" x 75\" roll) and tape (1\" roll tape) as needed; avoid adhesive directly on the skin    Compression: 2 layer to the right; double tubular compression and velcro to the left; if the left leg begins to drain again; begin to dress and wrap again with 2 layer    Elevation of the legs    Use lymphedema pump twice per day    It is not ok to get " your wound wet in the bath or shower    You need to elevate your legs throughout the day      If for some reason you are not able to get your dressing(s) changed as outlined above (due to illness, lack of supplies, lack of help) please do the following: remove old, soiled dressings; wash the wounds with saline; pat dry; apply ABD pad or other absorbant pad and secure with rolled gauze; avoid tape directly on your skin; Call the clinic as soon as possible to let us know what the current issues are in receiving wound care [phone].      SEEK MEDICAL CARE IF:  You have an increase in swelling, pain, or redness around the wound.  You have an increase in the amount of pus coming from the wound.  There is a bad smell coming from the wound.  The wound appears to be worsening/enlarging  You have a fever greater than 101.5 F      It is ok to continue current wound care treatment/products for the next 2-3 days until new wound care supplies are ordered and arrive. If longer than this please contact our office at [phone].    If you have a 2 layer or 4 layer compression wrap on these are safe to have on for ONLY 7 days. If for some reason you are not able to get the wrap(s) changed (due to illness; lack of supplies, lack of help, lack of transportation) please do the following: unwrap the old 2 or 4 layer compression wrap; avoid using scissors as you could cut your skin and cause wounds; use tubular compression when available. Call to reschedule your home care or clinic visit appointment as soon as possible.    Please NOTE: if you are 15 minutes late to your clinic appointment you will have to be rescheduled. Please call our clinic as soon as possible if you know you will not be able to get to your appointment at [phone].    If you fail to show up to 3 scheduled clinic appointments you will be dismissed from our clinic.              We want to hear from you!  In the next few weeks, you should receive a call or  email to complete a survey about your visit at [facility]. Please help us improve your appointment experience by letting us know how we did today. We strive to make your experience good and value any ways in which we could do better.      We value your input and suggestions.    Thank you for choosing the [facility]!      It is recommended that you do not get your ulcer wet when showering.  Listed below are several ways of keeping it dry when you shower.     1. Wrap it with Press and Seal plastic wrap.  It can be found in the stores where the plastic wraps or tin foil is kept.               2.  Some people take a bath and hang their leg/foot out of the tub.                        3  Put your leg in a plastic bag and tape it on.           4. You can purchase a shower cover for casts at some pharmacies and through the Internet.            5. Take a Bed Bath or wash up at the sink

## 2025-05-07 NOTE — PROGRESS NOTES
"            Follow up Vascular Visit       Date of Service:05/07/25      Chief Complaint: BLE swelling and ulcers      Pt returns to [facility] with regards to their BLE swelling and ulcers.  They arrive today alone used a cane to get to the room. They are currently using silvercel; ABD; rolled gauze to the wounds. This is being done by home care twice per week. They are using 2 layer on the right leg; double tubular compression and velcro on the left for compression. They are feeling well today. Denies fevers, chills. No shortness of breath.     Allergies:   Allergies   Allergen Reactions    Lisinopril Swelling     Patient reports \"neck swelling\"  Swelling in throat       Medications:   Current Outpatient Medications:     ammonium lactate (LAC-HYDRIN) 12 % external lotion, Apply topically daily as needed for dry skin. With dressing changes, Disp: 225 g, Rfl: 3    atorvastatin (LIPITOR) 20 MG tablet, TAKE 1 TABLET DAILY, Disp: 90 tablet, Rfl: 3    chlorthalidone (HYGROTON) 25 MG tablet, Take 1 tablet (25 mg) by mouth daily, Disp: 90 tablet, Rfl: 3    doxycycline hyclate (VIBRA-TABS) 100 MG tablet, Take 1 tablet (100 mg) by mouth 2 times daily., Disp: 20 tablet, Rfl: 0    famotidine (PEPCID) 20 MG tablet, Take 1 tablet (20 mg) by mouth 2 times daily., Disp: 180 tablet, Rfl: 3    gentamicin (GARAMYCIN) 0.1 % external ointment, Apply topically daily., Disp: 30 g, Rfl: 3    hydrOXYzine (ATARAX) 25 MG tablet, Take 0.5-1 tablets (12.5-25 mg) by mouth 3 times daily as needed for anxiety, Disp: 60 tablet, Rfl: 3    losartan (COZAAR) 100 MG tablet, Take 1 tablet (100 mg) by mouth daily, Disp: 90 tablet, Rfl: 3    melatonin 3 MG tablet, Take 1 tablet (3 mg) by mouth nightly as needed for sleep, Disp: 30 tablet, Rfl: 1    metFORMIN (GLUCOPHAGE) 500 MG tablet, Take 1 tablet (500 mg) by mouth 2 times daily (with meals)., Disp: 180 tablet, Rfl: 3    methadone (DOLOPHINE-INTENSOL) 10 MG/ML (HIGH CONC) solution, " Take 100 mg by mouth daily, Disp: , Rfl:     metroNIDAZOLE (METROGEL) 0.75 % external gel, Apply topically twice a week., Disp: 45 g, Rfl: 4    sodium hypochlorite (QUARTER-STRENGTH DAKINS) external solution, Apply topically every 72 hours Use 300mL every 72 hours to wash the bilateral legs and wounds on the legs, Disp: 1000 mL, Rfl: 3    spironolactone (ALDACTONE) 25 MG tablet, Take 1 tablet (25 mg) by mouth daily., Disp: 90 tablet, Rfl: 3    zolpidem (AMBIEN) 10 MG tablet, Take tablet by mouth 15 minutes prior to sleep, for Sleep Study, Disp: 1 tablet, Rfl: 0    Current Facility-Administered Medications:     gentamicin (GARAMYCIN) 0.1 % ointment, , Topical, Daily PRN, [place], Kim, NP, Given at 09/11/24 1357    lidocaine (PF) (XYLOCAINE) 1 % injection 10 mL, 10 mL, Intradermal, Once, Gerson Jaquez MD    lidocaine (XYLOCAINE) 2 % external gel, , Topical, Daily PRN, Lucia Reyes MD, Given at 10/19/21 0828    lidocaine (XYLOCAINE) 5 % ointment, , Topical, Daily PRN, Kim Fuentes NP, Given at 02/19/25 1332    lidocaine (XYLOCAINE) 5 % ointment, , Topical, Daily PRN, Kim Fuentes, NP    lidocaine (XYLOCAINE) 5 % ointment, , Topical, Daily PRN, Kim Fuentes, NP    lidocaine (XYLOCAINE) 5 % ointment, , Topical, Daily PRN, [place], Kim, NP, Given at 10/09/24 1250    lidocaine (XYLOCAINE) 5 % ointment, , Topical, Daily PRN, Kim Fuentes, NP    lidocaine (XYLOCAINE) 5 % ointment, , Topical, Daily PRN, Kim Fuentes NP, Given at 08/14/24 1330    lidocaine (XYLOCAINE) 5 % ointment, , Topical, Daily PRN, Kim Fuentes NP    History:   Past Medical History:   Diagnosis Date    Cellulitis of right lower extremity 06/06/2022    Chronic pain syndrome 08/05/2022    COPD (chronic obstructive pulmonary disease) (H)     Diabetes (H)     Disease of circulatory system 08/17/2022    H/O angioedema 06/15/2020    Formatting of this note might be different from the original. Unexplained angioedema twice, discontinue  lisinopril for possible connection to it, though he had used it afterwards with no symptoms    Hip joint replacement status 06/05/2012    History of tobacco use disorder 08/01/2013    Formatting of this note might be different from the original. 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 Epic    Open wound of lower limb 09/22/2021    Osteoarthritis of hip 03/23/2012    Osteoarthritis of knee 03/23/2012    Pleural mass 07/02/2013    Type 2 diabetes mellitus with peripheral neuropathy (H)     Uncomplicated asthma     Vitamin B12 deficiency (non anemic) 12/14/2022       Physical Exam:    BP (!) 155/74 (BP Location: Left arm)   Pulse 87   SpO2 93%     General:  Patient presents to clinic in no apparent distress.  Head: normocephalic atraumatic  Psychiatric:  Alert and oriented x3.   Respiratory: unlabored breathing; no cough  Integumentary:  Skin is uniformly warm, dry and pink.    Ulcer #1 Location: right lateral leg  Size: 10.5L x 11W x 0.2depth.  no sinus tract present, Wound base: slough  no undermining present. Ulcer is full thickness. There is heavy drainage. Periwound: no denudement, erythema, induration, maceration or warmth.      Left leg essentially healed; pinpoint opening on the lateral leg; scant drainage; swelling well controlled on the left leg; scaling is significantly reduced    Nails 1-5  elongated, thick, yellow with subungal debris. Trophic changes noted including diminished hair growth and shiny skin.    Hammertoe deformities with several callous; these were intact underneath      Wound Leg Ulceration (Active)   Number of days: 1066       VASC Wound rt  "lateral leg (Active)   Pre Size Length 10.5 05/07/25 1322   Pre Size Width 11 05/07/25 1322   Pre Size Depth 0.8 05/07/25 1322   Pre Total Sq cm 115.5 05/07/25 1322   Post Size Length 10 08/02/23 0700   Post Size Width 10 08/02/23 0700   Post Size Depth 0.1 08/02/23 0700   Post Total Sq cm 100 08/02/23 0700   Description circumferential posterior to shin; weeping 05/07/25 1322   Number of days: 1296       VASC Wound Right medial shin (Active)   Number of days: 883       VASC Wound L lateral leg (Active)   Pre Size Length 1.5 05/07/25 1322   Pre Size Width 1 05/07/25 1322   Pre Size Depth 0.1 05/07/25 1322   Pre Total Sq cm 1.5 05/07/25 1322   Post Size Length 0.1 05/07/25 1322   Post Size Width 0.1 05/07/25 1322   Post Size Depth 0.1 05/07/25 1322   Post Total Sq cm 0.01 05/07/25 1322   Number of days: 0            Circumferential volume measures:          11/20/2024     1:00 PM 12/18/2024     1:00 PM 1/22/2025     1:00 PM 2/19/2025     1:00 PM 4/2/2025     1:00 PM   Circumferential Measures   Right just above MTP 27 27 27 26.2 27   Right Ankle 32 30 31.2 30.5 32   Right Widest Calf 45 45 46.4 44 49   Left - just above MTP 25 26.5 28 26 25.5   Left Ankle 30.5 30.5 34 32 29   Left Widest Calf 45 42 45.5 41.4 46.2       Labs:    I personally reviewed the following lab results today and those on care everywhere    CRP   Date Value Ref Range Status   04/23/2021 8.5 (H) 0.0 - 0.8 mg/dL Final      No results found for: \"SED\"   Last Renal Panel:  Sodium   Date Value Ref Range Status   06/11/2024 135 135 - 145 mmol/L Final     Comment:     Reference intervals for this test were updated on 09/26/2023 to more accurately reflect our healthy population. There may be differences in the flagging of prior results with similar values performed with this method. Interpretation of those prior results can be made in the context of the updated reference intervals.    06/10/2021 142.0 133.0 - 144.0 mmol/L Final     Potassium   Date " "Value Ref Range Status   06/11/2024 5.0 3.4 - 5.3 mmol/L Final   06/07/2023 4.2 3.5 - 5.0 mmol/L Final   06/10/2021 4.4 3.4 - 5.3 mmol/L Final     Chloride   Date Value Ref Range Status   06/11/2024 99 98 - 107 mmol/L Final   06/07/2023 102 98 - 107 mmol/L Final   06/10/2021 100.0 94.0 - 109.0 mmol/L Final     Carbon Dioxide   Date Value Ref Range Status   06/10/2021 33.0 (H) 20.0 - 32.0 mmol/L Final     Carbon Dioxide (CO2)   Date Value Ref Range Status   06/11/2024 27 22 - 29 mmol/L Final   06/07/2023 28 22 - 31 mmol/L Final     Anion Gap   Date Value Ref Range Status   06/11/2024 9 7 - 15 mmol/L Final   06/07/2023 8 5 - 18 mmol/L Final     Glucose   Date Value Ref Range Status   06/11/2024 92 70 - 99 mg/dL Final   06/07/2023 115 70 - 125 mg/dL Final   06/10/2021 112.0 (H) 60.0 - 109.0 mg/dL Final     Urea Nitrogen   Date Value Ref Range Status   06/11/2024 18.9 8.0 - 23.0 mg/dL Final   06/07/2023 17 8 - 22 mg/dL Final   06/10/2021 16.0 7.0 - 30.0 mg/dL Final     Creatinine   Date Value Ref Range Status   06/11/2024 1.03 0.67 - 1.17 mg/dL Final   06/10/2021 1.0 0.8 - 1.5 mg/dL Final     GFR Estimate   Date Value Ref Range Status   06/11/2024 78 >60 mL/min/1.73m2 Final   04/24/2021 >60 >60 mL/min/1.73m2 Final     Calcium   Date Value Ref Range Status   06/11/2024 9.2 8.8 - 10.2 mg/dL Final   06/10/2021 9.5 8.5 - 10.4 mg/dL Final     Albumin   Date Value Ref Range Status   06/11/2024 3.5 3.5 - 5.2 g/dL Final   06/07/2023 3.3 (L) 3.5 - 5.0 g/dL Final      Lab Results   Component Value Date    WBC 7.8 06/11/2024     Lab Results   Component Value Date    RBC 4.13 06/11/2024     Lab Results   Component Value Date    HGB 9.6 06/11/2024     Lab Results   Component Value Date    HCT 33.1 06/11/2024     No components found for: \"MCT\"  Lab Results   Component Value Date    MCV 80 06/11/2024     Lab Results   Component Value Date    MCH 23.2 06/11/2024     Lab Results   Component Value Date    MCHC 29.0 06/11/2024     Lab " "Results   Component Value Date    RDW 15.0 06/11/2024     Lab Results   Component Value Date     06/11/2024      Lab Results   Component Value Date    A1C 6.1 06/13/2024    A1C 6.8 02/20/2023    A1C 6.2 07/14/2022    A1C 6.7 04/21/2022    A1C 6.2 04/24/2021    A1C 6.2 04/24/2021      TSH   Date Value Ref Range Status   10/18/2022 1.76 0.30 - 5.00 uIU/mL Final      No results found for: \"VITDT\"                Impression:  Encounter Diagnoses   Name Primary?    Venous (peripheral) insufficiency Yes    Lymphedema of both lower extremities     Elephantiasis     Papillomatosis     Venous hypertension, chronic, with ulcer and inflammation, bilateral (H)     Venous stasis ulcer of right lower leg with edema of right lower leg (H)     Type 2 diabetes mellitus with peripheral neuropathy (H)     Venous stasis ulcer of left lower leg with edema of left lower leg (H)     Lipodermatosclerosis of both lower extremities     Obesity, morbid, BMI 40.0-49.9 (H)     BMI 50.0-59.9, adult (H)     Onychomycosis     Hyperkeratosis of skin     Pre-ulcerative corn or callous                              Are any of these ulcers new today: No; Location: na    Assessment/Plan:          1. Debridement: Nursing staff removed the old dressing and cleanse the wound(s) with specified solution. After discussion of risk factors and verbal consent was obtained 2% Lidocaine HCL jelly was applied, under clean conditions, the RLE ulceration(s) were debrided using currette. Devitalized and nonviable tissue, along with any fibrin and slough, was removed to improve granulation tissue formation, stimulate wound healing, decrease overall bacteria load, disrupt biofilm formation and decrease edge senescence.  Total excisional debridement was 115.5 sq cm from the epidermis/dermis area and into the subcutaneous tissue with a depth of 0.1 cm.   Ulcers were improved afterwards and .  Measures were unchanged after debridement.       2.  Ulcer " treatment: ulcer treatment will include irrigation and dressings to promote autolytic debridement which will include:continue home care; first wash with dilute hibiclens; then vashe; then silvercel; ABD; rolled gauze; change twice per week If for some reason the patient is not able to get their dressing(s) changed as outlined above (due to illness, lack of supplies, lack of help) please do the following: remove old, soiled dressings; wash the ulcers with saline; pat dry; apply ABD pad or other absorbant pad and secure with rolled gauze; avoid tape directly on your skin; patient instructed to call the clinic as soon as possible to let us know what the current issues are in receiving ulcer care. Stable            3. Edema: will continue 2 layer on the right and velcro on the left; encouraged elevaton and lymphedema pumps. The compression wraps were applied today in clinic. We discussed his venous insufficiency results will have him see Dr. aCrl.     Patient presents with moderate, bilateral  lower extremity secondary lymphedema.      Patient requires a standard-fit knee high compression stocking and/or velcro wraps    Secondary Lymphedema &Edema: continue elevation and velcro wraps; needs to wear these consistently; replace every 6 months. The compression wraps were applied today in clinic. Patient presents with severe, bilateral secondary lymphedema. Patient requires daytime and nighttime compression garments; I have prescribed velcro wraps to accommodate and deliver gradient compression throughout the affected extremities. Quantity of 3 is needed for each leg for proper wash and wear.         If a 2 layer or 4 layer compression wrap is being used; these are safe to have on for ONLY 7 days. If for some reason the patient is not able to get the wrap(s) changed (due to illness; lack of supplies, lack of help, lack of transportation) please do the following: unwrap the old 2 or 4 layer compression wrap; avoid using  scissors as you could cut your skin and cause ulcers; use tubular compression when available. Call to reschedule your home care or clinic visit appointment as soon as possible.  Stable            4. Nutrition: focus on weight loss           5. Offloading: position off the wound          6. Wound Etiology: venous     7. Nails: nails 1-5 bilaterally were debrided and filed smooth; tolerated well, no complications.      Right Lower Extremity Left Lower Extremity   Class A Findings - Q7     Non-Traumatic amputation of foot or integral skeletal portion thereof          Class B Findings - Q8 with 2 Class B findings     Absent DP/PT pulse     Advanced Trophic Changes (Three are Required x x        Absent or decreased hair growth x x        Nail Changes x x        Pigment Changes x x        Skin Texture Changes x x        Skin Color Changes x x        Class C Findings Q9 with 1 class B and 2 Class C Findings     Claudication     Temperature Changes x x   Edema x x   Paresthesias     Burning         8. Callous: Callous x4 was pared using a #15 blade scalpel; this was done to evaluate for underlying ulceration; reduce pressure; patient comfort; and to reduce risk of future ulceration formation. The skin was intact underneath and patient tolerated well; no complications.        Patient will follow up with me in 4 weeks for reevaluation. They were instructed to call the clinic sooner with any signs or symptoms of infection or any further questions/concerns. Answered all questions.          Kim Fuentes DNP, RN, CNP, CWOCN, CFCN, CLT  [facility]   [phone]        This note was electronically signed by Kim Fuentes NP

## 2025-05-09 ENCOUNTER — RESULTS FOLLOW-UP (OUTPATIENT)
Dept: VASCULAR SURGERY | Facility: CLINIC | Age: 70
End: 2025-05-09

## 2025-05-12 ENCOUNTER — OFFICE VISIT (OUTPATIENT)
Dept: FAMILY MEDICINE | Facility: CLINIC | Age: 70
End: 2025-05-12
Payer: COMMERCIAL

## 2025-05-12 DIAGNOSIS — I10 PRIMARY HYPERTENSION: ICD-10-CM

## 2025-05-12 DIAGNOSIS — E11.9 TYPE 2 DIABETES MELLITUS WITHOUT COMPLICATION, WITHOUT LONG-TERM CURRENT USE OF INSULIN (H): ICD-10-CM

## 2025-05-12 DIAGNOSIS — Z12.11 SCREEN FOR COLON CANCER: ICD-10-CM

## 2025-05-12 DIAGNOSIS — F11.99 OPIOID USE, UNSPECIFIED WITH UNSPECIFIED OPIOID-INDUCED DISORDER (H): ICD-10-CM

## 2025-05-12 DIAGNOSIS — E11.42 TYPE 2 DIABETES MELLITUS WITH PERIPHERAL NEUROPATHY (H): ICD-10-CM

## 2025-05-12 DIAGNOSIS — I10 BENIGN ESSENTIAL HYPERTENSION: ICD-10-CM

## 2025-05-12 DIAGNOSIS — Z23 NEED FOR VACCINATION: ICD-10-CM

## 2025-05-12 DIAGNOSIS — Z87.891 FORMER SMOKER: ICD-10-CM

## 2025-05-12 DIAGNOSIS — I89.0 LYMPHEDEMA OF BOTH LOWER EXTREMITIES: ICD-10-CM

## 2025-05-12 DIAGNOSIS — Z00.00 ENCOUNTER FOR MEDICARE ANNUAL WELLNESS EXAM: Primary | ICD-10-CM

## 2025-05-12 LAB
ANION GAP SERPL CALCULATED.3IONS-SCNC: 9 MMOL/L (ref 7–15)
BUN SERPL-MCNC: 14.3 MG/DL (ref 8–23)
CALCIUM SERPL-MCNC: 9.2 MG/DL (ref 8.8–10.4)
CHLORIDE SERPL-SCNC: 101 MMOL/L (ref 98–107)
CHOLEST SERPL-MCNC: 124 MG/DL
CREAT SERPL-MCNC: 1.02 MG/DL (ref 0.67–1.17)
EGFRCR SERPLBLD CKD-EPI 2021: 79 ML/MIN/1.73M2
EST. AVERAGE GLUCOSE BLD GHB EST-MCNC: 160 MG/DL
FASTING STATUS PATIENT QL REPORTED: NO
FASTING STATUS PATIENT QL REPORTED: NO
GLUCOSE SERPL-MCNC: 108 MG/DL (ref 70–99)
HBA1C MFR BLD: 7.2 % (ref 0–5.6)
HCO3 SERPL-SCNC: 26 MMOL/L (ref 22–29)
HDLC SERPL-MCNC: 59 MG/DL
LDLC SERPL CALC-MCNC: 46 MG/DL
NONHDLC SERPL-MCNC: 65 MG/DL
POTASSIUM SERPL-SCNC: 4.5 MMOL/L (ref 3.4–5.3)
SODIUM SERPL-SCNC: 136 MMOL/L (ref 135–145)
TRIGL SERPL-MCNC: 97 MG/DL

## 2025-05-12 PROCEDURE — G0439 PPPS, SUBSEQ VISIT: HCPCS | Mod: GC

## 2025-05-12 PROCEDURE — 36415 COLL VENOUS BLD VENIPUNCTURE: CPT

## 2025-05-12 PROCEDURE — 80061 LIPID PANEL: CPT

## 2025-05-12 PROCEDURE — 3075F SYST BP GE 130 - 139MM HG: CPT

## 2025-05-12 PROCEDURE — 80048 BASIC METABOLIC PNL TOTAL CA: CPT

## 2025-05-12 PROCEDURE — 83036 HEMOGLOBIN GLYCOSYLATED A1C: CPT

## 2025-05-12 PROCEDURE — 3078F DIAST BP <80 MM HG: CPT

## 2025-05-12 RX ORDER — ATORVASTATIN CALCIUM 20 MG/1
20 TABLET, FILM COATED ORAL DAILY
Qty: 90 TABLET | Refills: 3 | Status: SHIPPED | OUTPATIENT
Start: 2025-05-12

## 2025-05-12 RX ORDER — LOSARTAN POTASSIUM 100 MG/1
100 TABLET ORAL DAILY
Qty: 90 TABLET | Refills: 3 | Status: SHIPPED | OUTPATIENT
Start: 2025-05-12

## 2025-05-12 RX ORDER — CHLORTHALIDONE 25 MG/1
25 TABLET ORAL DAILY
Qty: 90 TABLET | Refills: 3 | Status: SHIPPED | OUTPATIENT
Start: 2025-05-12

## 2025-05-12 SDOH — HEALTH STABILITY: PHYSICAL HEALTH: ON AVERAGE, HOW MANY MINUTES DO YOU ENGAGE IN EXERCISE AT THIS LEVEL?: 10 MIN

## 2025-05-12 SDOH — HEALTH STABILITY: PHYSICAL HEALTH: ON AVERAGE, HOW MANY DAYS PER WEEK DO YOU ENGAGE IN MODERATE TO STRENUOUS EXERCISE (LIKE A BRISK WALK)?: 1 DAY

## 2025-05-12 ASSESSMENT — SOCIAL DETERMINANTS OF HEALTH (SDOH): HOW OFTEN DO YOU GET TOGETHER WITH FRIENDS OR RELATIVES?: MORE THAN THREE TIMES A WEEK

## 2025-05-12 NOTE — Clinical Note
Johnnie Cevallos - this needs a few things before I can close - 1) update HTN diagnosis, 2) address BPAs, 3) finish progress note.  Thanks! MERRITT

## 2025-05-12 NOTE — PROGRESS NOTES
"Preventive Care Visit  [facility]  Ban Orr MD, Family Medicine  May 12, 2025  {Provider  Link to [facility] :[id number]}    SUBJECTIVE:   Shaheen is a 69 year old, presenting for the following:  Annual wellness today, Wants to check A1C , Need to get diabetic foot exam , Want a walker to sport walking , and Refill Request (Needs refill. )        5/12/2025     2:20 PM   Additional Questions   Roomed by Jeanna   Accompanied by Daughter         5/12/2025    Information    services provided? No     Are you in the first 12 months of your Medicare coverage?  No    HPI  Here for preventive health. Feel overall well.     /74 this morning when homecare nurse checked.    130s SBP mostly when he gets twice weekly visits.     Needs diabetic shoes. Needs eye exam this year.     History of smoking 20 pack year history--declined screening in the past but open to it today.     Uses cane at home but walking has been harder lately and requests walker. Feels balance is hard.   Needs a walker--DME order     Today's PHQ-2 Score:       5/12/2025     2:10 PM   PHQ-2 ( 1999 [employer])   Q1: Little interest or pleasure in doing things 0   Q2: Feeling down, depressed or hopeless 0   PHQ-2 Score 0    Q1: Little interest or pleasure in doing things Not at all   Q2: Feeling down, depressed or hopeless Not at all   PHQ-2 Score 0       Patient-reported           Have you ever done Advance Care Planning? (For example, a Health Directive, POLST, or a discussion with a medical provider or your loved ones about your wishes): Yes, patient states has an Advance Care Planning document and will bring a copy to the clinic.       Fall risk  Fallen 2 or more times in the past year?: (Patient-Rptd) No      Cognitive Screening   1) Repeat 3 items (Leader, Season, Table)  {Get patient's attention, then say, \"I am going to say three words that I want you to remember now and later.  The words are Leader, Season, " "and Table.  Please say them for me now.\"  If pt. unable after 3 tries, go to next item}  2) Clock draw: {Say the following phrases in order: \"Please draw a clock.  Start by drawing a large Kiana.  Put all the numbers in the Kiana and set the hands to show 11:10 (10 past 11).\"  If pt cannot complete in 3 minutes, stop and ask for recall items.  \"NORMAL\" test = all twelve numbers in correct location, and clock hands correctly designating 11:10}NORMAL  3) 3 item recall: {Say: \"What were the three words I asked you to remember?\"}Recalls 3 objects  Results: NORMAL clock, 1-2 items recalled: COGNITIVE IMPAIRMENT LESS LIKELY    Mini-CogTM Copyright S Winsome. Licensed by the author for use in [facility]; reprinted with permission ([email or web address]). All rights reserved.          Reviewed and updated as needed this visit by clinical staff   Tobacco  Allergies  Meds              Reviewed and updated as needed this visit by Provider                  Social History     Tobacco Use    Smoking status: Former     Passive exposure: Never    Smokeless tobacco: Former   Substance Use Topics    Alcohol use: Not Currently    30 pack year history, quit about 11 years ago. Agreed to screening today.   {Rooming staff  Click this link to complete the Prescreen if response below is not for today's visit  Alcohol Use Prescreen >3 drinks/day or > 7 drinks/week.  If the prescreen question answer is YES, complete the full AUDIT  :[id number]}    I have reviewed this patient's family history and updated it with pertinent information if needed.  Family History   Problem Relation Age of Onset    No Known Problems Mother     No Known Problems Father     Edema Sister     Edema Sister           5/12/2025     2:09 PM   Alcohol Use   Prescreen: >3 drinks/day or >7 drinks/week? Not Applicable     Do you have a current opioid prescription? (!) YES methadone  {Provider  Link to Opioid Risk Tool  Assess risk of opioid use disorder " ":[id number]}  Follows with methadone clinic.   Do you use any other controlled substances or medications that are not prescribed by a provider? None  {Provider  If there are gaps in the social history shown above, please follow the link and refresh the note Link to Social and Substance History :[id number]}        Patient Care Team:  Ban Orr MD as PCP - General (Student in organized health care education/training program)  Jamila Penaloza MD as Assigned Cancer Care Provider  Ban Orr MD as Assigned PCP  Devon Vaughn MD as Assigned Pulmonology Provider  Kim Fuentes NP as Assigned Heart and Vascular Surgical Provider    The following health maintenance items are reviewed in Epic and correct as of today:  Health Maintenance   Topic Date Due    DIABETIC FOOT EXAM  Never done    ADVANCE CARE PLANNING  Never done    EYE EXAM  Never done    ZOSTER IMMUNIZATION (1 of 2) Never done    RSV VACCINE (1 - Risk 60-74 years 1-dose series) Never done    DTAP/TDAP/TD IMMUNIZATION (5 - Tdap) 02/19/2021    MEDICARE ANNUAL WELLNESS VISIT  05/17/2022    LUNG CANCER SCREENING  10/19/2023    COLORECTAL CANCER SCREENING  10/26/2023    COVID-19 Vaccine (5 - 2024-25 season) 09/01/2024    A1C  12/13/2024    BMP  06/11/2025    LIPID  06/13/2025    MICROALBUMIN  06/13/2025    INFLUENZA VACCINE (Season Ended) 09/01/2025    FALL RISK ASSESSMENT  05/12/2026    HEPATITIS C SCREENING  Completed    PHQ-2 (once per calendar year)  Completed    Pneumococcal Vaccine: 50+ Years  Completed    AORTIC ANEURYSM SCREENING (SYSTEM ASSIGNED)  Completed    HPV IMMUNIZATION  Aged Out    MENINGITIS IMMUNIZATION  Aged Out             Review of Systems     Review of Systems  Constitutional, HEENT, cardiovascular, pulmonary, gi and gu systems are negative, except as otherwise noted.    OBJECTIVE:   BP (!) 158/78   Pulse 80   Temp 98.5  F (36.9  C) (Tympanic)   Resp 16   Ht 1.82 m (5' 11.65\")   Wt (!) 181.4 kg (400 lb)   SpO2 98%   BMI 54.77 kg/m   " "  Estimated body mass index is 54.77 kg/m  as calculated from the following:    Height as of this encounter: 1.82 m (5' 11.65\").    Weight as of this encounter: 181.4 kg (400 lb).  Physical Exam  GENERAL: alert and no distress  EYES: Eyes grossly normal to inspection, PERRL and conjunctivae and sclerae normal  HENT: ear canals and TM's normal, nose and mouth without ulcers or lesions  NECK: no adenopathy, no asymmetry, masses, or scars  RESP: lungs clear to auscultation - no rales, rhonchi or wheezes  CV: regular rate and rhythm, normal S1 S2, no S3 or S4, no murmur, click or rub, no peripheral edema  ABDOMEN: soft, nontender, no hepatosplenomegaly, no masses and bowel sounds normal  MS: no gross musculoskeletal defects noted, no edema  SKIN: no suspicious lesions or rashes  NEURO: Normal strength and tone, mentation intact and speech normal  PSYCH: mentation appears normal, affect normal/bright  Diabetic foot exam: Venous stasis changes --B/L dorsal foot rash without erythema drainage. Lymphedema wraps present. Decreased sensation on monofilament exam plantar surface B/L.         ASSESSMENT / PLAN:   (Z00.00) Encounter for Medicare annual wellness exam  (primary encounter diagnosis)  Comment: Discussed screening as detailed below. Will recheck A1c and lipids. Well controlled diabetes based on A1c in the past few years.   Plan: A1c, lipids.            Colon cancer screening           Former tobacco use, lung cancer screening    (Z12.11) Screen for colon cancer  Plan: Fecal colorectal cancer screen FIT - Future         (S+30)            (E11.42) Type 2 diabetes mellitus with peripheral neuropathy (H)  Comment: Previously well-controlled diabetes based on A1c for the past few years.  Repeat A1c.  Discussed adding GLP-1 in the future to help with weight loss and diabetes management patient is not interested at this time we will continue discussion.  Plan: HEMOGLOBIN A1C, Adult Eye  Referral,         Lipid " "panel reflex to direct LDL Fasting        Refill Lipitor            (I10) Hypertension  Comment: BP well-controlled, has home care nurses twice weekly and blood pressures consistently within goal.  Plan: BASIC METABOLIC PANEL  Refill hydrochlorothiazide and losartan            (I89.0) Lymphedema of both lower extremities  Comment: History of lymphedema and venous stasis.  Follows with vascular clinic also has home care wound dressing.  Has lymphedema wraps.  No symptoms or signs of infection.  Has been having issues with balance lately and walking especially lymphedema that has been worsening.  Will get DME order for walker.  Plan: Walker Order for DME - ONLY FOR DME            (Z87.891) Former smoker  Comment: History of smoking 20+ years.  Quit about 11 years ago.  Open to getting lung cancer screening.  Declined in the past.  Plan: CT Chest Lung Cancer Scrn Low Dose wo            (F11.99) Opioid use, unspecified with unspecified opioid-induced disorder (H)  Comment: Currently on methadone.  Follows with at a methadone clinic.        Counseling  Reviewed preventive health counseling, as reflected in patient instructions      BMI  Estimated body mass index is 54.77 kg/m  as calculated from the following:    Height as of this encounter: 1.82 m (5' 11.65\").    Weight as of this encounter: 181.4 kg (400 lb).   Weight management plan: Discussed healthy diet and exercise guidelines      He reports that he has quit smoking. He has never been exposed to tobacco smoke. He has quit using smokeless tobacco.      Appropriate preventive services were discussed with this patient, including applicable screening as appropriate for fall prevention, nutrition, physical activity, Tobacco-use cessation, weight loss and cognition.  Checklist reviewing preventive services available has been given to the patient.    Reviewed patients plan of care and provided an AVS. The Basic Care Plan (routine screening as documented in Health " Maintenance) for Shaheen meets the Care Plan requirement. This Care Plan has been established and reviewed with the Patient and daughter.    {Counseling Resources  [place] Preventive Services Task Force  Cholesterol Screening  Health diet/nutrition  Pooled Cohorts Equation Calculator  USDA's MyPlate  ASA Prophylaxis  Lung CA Screening  Osteoporosis prevention/bone health :263489}  {Prostate Cancer Screening  Consider for men 55-69 per guidance from USPSTF :792588}    Signed Electronically by: FARSHAD Loredo PGY3    Identified Health Risks  I have reviewed Opioid Use Disorder and Substance Use Disorder risk factors and made any needed referrals.

## 2025-05-12 NOTE — PATIENT INSTRUCTIONS
Patient Education   Preventive Care Advice   This is general advice given by our system to help you stay healthy. However, your care team may have specific advice just for you. Please talk to your care team about your preventive care needs.  Nutrition  Eat 5 or more servings of fruits and vegetables each day.  Try wheat bread, brown rice and whole grain pasta (instead of white bread, rice, and pasta).  Get enough calcium and vitamin D. Check the label on foods and aim for 100% of the RDA (recommended daily allowance).  Lifestyle  Exercise at least 150 minutes each week  (30 minutes a day, 5 days a week).  Do muscle strengthening activities 2 days a week. These help control your weight and prevent disease.  No smoking.  Wear sunscreen to prevent skin cancer.  Have a dental exam and cleaning every 6 months.  Yearly exams  See your health care team every year to talk about:  Any changes in your health.  Any medicines your care team has prescribed.  Preventive care, family planning, and ways to prevent chronic diseases.  Shots (vaccines)   HPV shots (up to age 26), if you've never had them before.  Hepatitis B shots (up to age 59), if you've never had them before.  COVID-19 shot: Get this shot when it's due.  Flu shot: Get a flu shot every year.  Tetanus shot: Get a tetanus shot every 10 years.  Pneumococcal, hepatitis A, and RSV shots: Ask your care team if you need these based on your risk.  Shingles shot (for age 50 and up)  General health tests  Diabetes screening:  Starting at age 35, Get screened for diabetes at least every 3 years.  If you are younger than age 35, ask your care team if you should be screened for diabetes.  Cholesterol test: At age 39, start having a cholesterol test every 5 years, or more often if advised.  Bone density scan (DEXA): At age 50, ask your care team if you should have this scan for osteoporosis (brittle bones).  Hepatitis C: Get tested at least once in your life.  STIs (sexually  transmitted infections)  Before age 24: Ask your care team if you should be screened for STIs.  After age 24: Get screened for STIs if you're at risk. You are at risk for STIs (including HIV) if:  You are sexually active with more than one person.  You don't use condoms every time.  You or a partner was diagnosed with a sexually transmitted infection.  If you are at risk for HIV, ask about PrEP medicine to prevent HIV.  Get tested for HIV at least once in your life, whether you are at risk for HIV or not.  Cancer screening tests  Cervical cancer screening: If you have a cervix, begin getting regular cervical cancer screening tests starting at age 21.  Breast cancer scan (mammogram): If you've ever had breasts, begin having regular mammograms starting at age 40. This is a scan to check for breast cancer.  Colon cancer screening: It is important to start screening for colon cancer at age 45.  Have a colonoscopy test every 10 years (or more often if you're at risk) Or, ask your provider about stool tests like a FIT test every year or Cologuard test every 3 years.  To learn more about your testing options, visit:   .  For help making a decision, visit:   https://bit.ly/yk88762.  Prostate cancer screening test: If you have a prostate, ask your care team if a prostate cancer screening test (PSA) at age 55 is right for you.  Lung cancer screening: If you are a current or former smoker ages 50 to 80, ask your care team if ongoing lung cancer screenings are right for you.  For informational purposes only. Not to replace the advice of your health care provider. Copyright   2023 [facility]. All rights reserved. Clinically reviewed by the [facility]itions [facility]. [employer] [id number] - REV 01/24.  Preventing Falls: Care Instructions  Injuries and health problems such as trouble walking or poor eyesight can increase your risk of falling. So can some medicines. But there are things you can do to help  "prevent falls. You can exercise to get stronger. You can also arrange your home to make it safer.    Talk to your doctor about the medicines you take. Ask if any of them increase the risk of falls and whether they can be changed or stopped.   Try to exercise regularly. It can help improve your strength and balance. This can help lower your risk of falling.         Practice fall safety and prevention.   Wear low-heeled shoes that fit well and give your feet good support. Talk to your doctor if you have foot problems that make this hard.  Carry a cellphone or wear a medical alert device that you can use to call for help.  Use stepladders instead of chairs to reach high objects. Don't climb if you're at risk for falls. Ask for help, if needed.  Wear the correct eyeglasses, if you need them.        Make your home safer.   Remove rugs, cords, clutter, and furniture from walkways.  Keep your house well lit. Use night-lights in hallways and bathrooms.  Install and use sturdy handrails on stairways.  Wear nonskid footwear, even inside. Don't walk barefoot or in socks without shoes.        Be safe outside.   Use handrails, curb cuts, and ramps whenever possible.  Keep your hands free by using a shoulder bag or backpack.  Try to walk in well-lit areas. Watch out for uneven ground, changes in pavement, and debris.  Be careful in the winter. Walk on the grass or gravel when sidewalks are slippery. Use de-icer on steps and walkways. Add non-slip devices to shoes.    Put grab bars and nonskid mats in your shower or tub and near the toilet. Try to use a shower chair or bath bench when bathing.   Get into a tub or shower by putting in your weaker leg first. Get out with your strong side first. Have a phone or medical alert device in the bathroom with you.   Where can you learn more?  Go to https://www.Startistwise.net/patiented  Enter G117 in the search box to learn more about \"Preventing Falls: Care Instructions.\"  Current as of: " July 31, 2024  Content Version: 14.4    4942-3592 [employer].   Care instructions adapted under license by your healthcare professional. If you have questions about a medical condition or this instruction, always ask your healthcare professional. [employer] disclaims any warranty or liability for your use of this information.    Chronic Pain: Care Instructions  Your Care Instructions     Chronic pain is pain that lasts a long time (months or even years) and may or may not have a clear cause. It is different from acute pain, which usually does have a clear cause--like an injury or illness--and gets better over time. Chronic pain:  Lasts over time but may vary from day to day.  Does not go away despite efforts to end it.  May disrupt your sleep and lead to fatigue.  May cause depression or anxiety.  May make your muscles tense, causing more pain.  Can disrupt your work, hobbies, home life, and relationships with friends and family.  Chronic pain is a very real condition. It is not just in your head. Treatment can help and usually includes several methods used together, such as medicines, physical therapy, exercise, and other treatments. Learning how to relax and changing negative thought patterns can also help you cope.  Chronic pain is complex. Taking an active role in your treatment will help you better manage your pain. Tell your doctor if you have trouble dealing with your pain. You may have to try several things before you find what works best for you.  Follow-up care is a key part of your treatment and safety. Be sure to make and go to all appointments, and call your doctor if you are having problems. It's also a good idea to know your test results and keep a list of the medicines you take.  How can you care for yourself at home?  Pace yourself. Break up large jobs into smaller tasks. Save harder tasks for days when you have less pain, or go back and forth between hard tasks and easier  ones. Take rest breaks.  Relax, and reduce stress. Relaxation techniques such as deep breathing or meditation can help.  Keep moving. Gentle, daily exercise can help reduce pain over the long run. Try low- or no-impact exercises such as walking, swimming, and stationary biking. Do stretches to stay flexible.  Try heat, cold packs, and massage.  Get enough sleep. Chronic pain can make you tired and drain your energy. Talk with your doctor if you have trouble sleeping because of pain.  Think positive. Your thoughts can affect your pain level. Do things that you enjoy to distract yourself when you have pain instead of focusing on the pain. See a movie, read a book, listen to music, or spend time with a friend.  If you think you are depressed, talk to your doctor about treatment.  Keep a daily pain diary. Record how your moods, thoughts, sleep patterns, activities, and medicine affect your pain. You may find that your pain is worse during or after certain activities or when you are feeling a certain emotion. Having a record of your pain can help you and your doctor find the best ways to treat your pain.  Take pain medicines exactly as directed.  If the doctor gave you a prescription medicine for pain, take it as prescribed.  If you are not taking a prescription pain medicine, ask your doctor if you can take an over-the-counter medicine.  Reducing constipation caused by pain medicine  Talk to your doctor about a laxative. If a laxative doesn't work, your doctor may suggest a prescription medicine.  Include fruits, vegetables, beans, and whole grains in your diet each day. These foods are high in fiber.  If your doctor recommends it, get more exercise. Walking is a good choice. Bit by bit, increase the amount you walk every day. Try for at least 30 minutes on most days of the week.  Schedule time each day for a bowel movement. A daily routine may help. Take your time and do not strain when having a bowel movement.  When  "should you call for help?   Call your doctor now or seek immediate medical care if:    Your pain gets worse or is out of control.     You feel down or blue, or you do not enjoy things like you once did. You may be depressed, which is common in people with chronic pain. Depression can be treated.     You have vomiting or cramps for more than 2 hours.   Watch closely for changes in your health, and be sure to contact your doctor if:    You cannot sleep because of pain.     You are very worried or anxious about your pain.     You have trouble taking your pain medicine.     You have any concerns about your pain medicine.     You have trouble with bowel movements, such as:  No bowel movement in 3 days.  Blood in the anal area, in your stool, or on the toilet paper.  Diarrhea for more than 24 hours.   Where can you learn more?  Go to https://www.[employer].net/patiented  Enter N004 in the search box to learn more about \"Chronic Pain: Care Instructions.\"  Current as of: July 31, 2024  Content Version: 14.4    9005-4073 [employer].   Care instructions adapted under license by your healthcare professional. If you have questions about a medical condition or this instruction, always ask your healthcare professional. [employer] disclaims any warranty or liability for your use of this information.       "

## 2025-05-12 NOTE — PROGRESS NOTES
DME ORDER  What was ordered? Walker     What location did patient pick? [facility] [place]     Was copy given to patient? Yes     Was DME order copy faxed to patient preferred DME location? Yes     What is the fax number for preferred location? [facility] ( [phone]), [place] ()

## 2025-05-12 NOTE — PROGRESS NOTES
Preceptor Attestation:  Patient's case reviewed and discussed with the resident, Ban Orr MD, and I personally evaluated the patient. I agree with written assessment and plan of care.    Supervising Physician:  Verona Jones MD   [facility]

## 2025-05-13 ENCOUNTER — PATIENT OUTREACH (OUTPATIENT)
Dept: CARE COORDINATION | Facility: CLINIC | Age: 70
End: 2025-05-13
Payer: COMMERCIAL

## 2025-05-14 VITALS
DIASTOLIC BLOOD PRESSURE: 74 MMHG | RESPIRATION RATE: 16 BRPM | HEART RATE: 80 BPM | OXYGEN SATURATION: 98 % | BODY MASS INDEX: 42.66 KG/M2 | WEIGHT: 315 LBS | HEIGHT: 72 IN | SYSTOLIC BLOOD PRESSURE: 132 MMHG | TEMPERATURE: 98.5 F

## 2025-05-15 ENCOUNTER — RESULTS FOLLOW-UP (OUTPATIENT)
Dept: FAMILY MEDICINE | Facility: CLINIC | Age: 70
End: 2025-05-15

## 2025-05-15 ENCOUNTER — PATIENT OUTREACH (OUTPATIENT)
Dept: CARE COORDINATION | Facility: CLINIC | Age: 70
End: 2025-05-15
Payer: COMMERCIAL

## 2025-05-15 ENCOUNTER — DOCUMENTATION ONLY (OUTPATIENT)
Dept: FAMILY MEDICINE | Facility: CLINIC | Age: 70
End: 2025-05-15
Payer: COMMERCIAL

## 2025-05-15 NOTE — PROGRESS NOTES
Medicare requires that the MD/DO treating the patient for diabetes answers all of the questions and signs the pended order for the patient to qualify for diabetic shoes. Once the order is completed, please route the encounter back to sender.    Mari Dias

## 2025-06-04 ENCOUNTER — OFFICE VISIT (OUTPATIENT)
Dept: VASCULAR SURGERY | Facility: CLINIC | Age: 70
End: 2025-06-04
Attending: NURSE PRACTITIONER
Payer: COMMERCIAL

## 2025-06-04 VITALS
SYSTOLIC BLOOD PRESSURE: 142 MMHG | HEART RATE: 80 BPM | TEMPERATURE: 98.8 F | DIASTOLIC BLOOD PRESSURE: 72 MMHG | RESPIRATION RATE: 18 BRPM

## 2025-06-04 DIAGNOSIS — I87.333 VENOUS HYPERTENSION, CHRONIC, WITH ULCER AND INFLAMMATION, BILATERAL (H): ICD-10-CM

## 2025-06-04 DIAGNOSIS — I83.892 VENOUS STASIS ULCER OF LEFT LOWER LEG WITH EDEMA OF LEFT LOWER LEG (H): ICD-10-CM

## 2025-06-04 DIAGNOSIS — I89.0 LYMPHEDEMA OF BOTH LOWER EXTREMITIES: ICD-10-CM

## 2025-06-04 DIAGNOSIS — I83.019 VENOUS STASIS ULCER OF RIGHT LOWER LEG WITH EDEMA OF RIGHT LOWER LEG (H): ICD-10-CM

## 2025-06-04 DIAGNOSIS — D36.9 PAPILLOMATOSIS: ICD-10-CM

## 2025-06-04 DIAGNOSIS — E11.42 TYPE 2 DIABETES MELLITUS WITH PERIPHERAL NEUROPATHY (H): ICD-10-CM

## 2025-06-04 DIAGNOSIS — E66.01 OBESITY, MORBID, BMI 40.0-49.9 (H): ICD-10-CM

## 2025-06-04 DIAGNOSIS — R60.0 VENOUS STASIS ULCER OF LEFT LOWER LEG WITH EDEMA OF LEFT LOWER LEG (H): ICD-10-CM

## 2025-06-04 DIAGNOSIS — R60.0 VENOUS STASIS ULCER OF RIGHT LOWER LEG WITH EDEMA OF RIGHT LOWER LEG (H): ICD-10-CM

## 2025-06-04 DIAGNOSIS — I87.2 VENOUS (PERIPHERAL) INSUFFICIENCY: Primary | ICD-10-CM

## 2025-06-04 DIAGNOSIS — L97.919 VENOUS STASIS ULCER OF RIGHT LOWER LEG WITH EDEMA OF RIGHT LOWER LEG (H): ICD-10-CM

## 2025-06-04 DIAGNOSIS — I83.891 VENOUS STASIS ULCER OF RIGHT LOWER LEG WITH EDEMA OF RIGHT LOWER LEG (H): ICD-10-CM

## 2025-06-04 DIAGNOSIS — I89.0 ELEPHANTIASIS: ICD-10-CM

## 2025-06-04 DIAGNOSIS — M79.3 LIPODERMATOSCLEROSIS OF BOTH LOWER EXTREMITIES: ICD-10-CM

## 2025-06-04 DIAGNOSIS — I83.029 VENOUS STASIS ULCER OF LEFT LOWER LEG WITH EDEMA OF LEFT LOWER LEG (H): ICD-10-CM

## 2025-06-04 DIAGNOSIS — L97.929 VENOUS STASIS ULCER OF LEFT LOWER LEG WITH EDEMA OF LEFT LOWER LEG (H): ICD-10-CM

## 2025-06-04 PROCEDURE — 11045 DBRDMT SUBQ TISS EACH ADDL: CPT | Performed by: NURSE PRACTITIONER

## 2025-06-04 PROCEDURE — 11042 DBRDMT SUBQ TIS 1ST 20SQCM/<: CPT | Performed by: NURSE PRACTITIONER

## 2025-06-04 ASSESSMENT — PAIN SCALES - GENERAL: PAINLEVEL_OUTOF10: NO PAIN (0)

## 2025-06-04 NOTE — PROGRESS NOTES
"            Follow up Vascular Visit       Date of Service:06/04/25      Chief Complaint: BLE severe lymphedema and leg ulcers      Pt returns to [facility] with regards to their BLE severe lymphedema and leg ulcers.  They arrive today alone. They are currently using silvercel ABD; rolled gauze to the wounds. This is being done by home care twice per week. They are using 2 layer to the right and double tubular compression and velcro to the left for compression. We added a high density foam over the right lateral leg ulcer; this is comfortable and working well. They are feeling well today. Denies fevers, chills. No shortness of breath. Using lymphedema pump twice per day. He developed a new wound to the left leg a few weeks ago.    Allergies:   Allergies   Allergen Reactions    Lisinopril Swelling     Patient reports \"neck swelling\"  Swelling in throat       Medications:   Current Outpatient Medications:     ammonium lactate (LAC-HYDRIN) 12 % external lotion, Apply topically daily as needed for dry skin. With dressing changes, Disp: 225 g, Rfl: 3    atorvastatin (LIPITOR) 20 MG tablet, Take 1 tablet (20 mg) by mouth daily., Disp: 90 tablet, Rfl: 3    chlorthalidone (HYGROTON) 25 MG tablet, Take 1 tablet (25 mg) by mouth daily., Disp: 90 tablet, Rfl: 3    doxycycline hyclate (VIBRA-TABS) 100 MG tablet, Take 1 tablet (100 mg) by mouth 2 times daily., Disp: 20 tablet, Rfl: 0    famotidine (PEPCID) 20 MG tablet, Take 1 tablet (20 mg) by mouth 2 times daily., Disp: 180 tablet, Rfl: 3    gentamicin (GARAMYCIN) 0.1 % external ointment, Apply topically daily., Disp: 30 g, Rfl: 3    hydrOXYzine (ATARAX) 25 MG tablet, Take 0.5-1 tablets (12.5-25 mg) by mouth 3 times daily as needed for anxiety, Disp: 60 tablet, Rfl: 3    losartan (COZAAR) 100 MG tablet, Take 1 tablet (100 mg) by mouth daily., Disp: 90 tablet, Rfl: 3    melatonin 3 MG tablet, Take 1 tablet (3 mg) by mouth nightly as needed for sleep, Disp: 30 " tablet, Rfl: 1    metFORMIN (GLUCOPHAGE) 500 MG tablet, Take 1 tablet (500 mg) by mouth 2 times daily (with meals)., Disp: 180 tablet, Rfl: 3    methadone (DOLOPHINE-INTENSOL) 10 MG/ML (HIGH CONC) solution, Take 100 mg by mouth daily, Disp: , Rfl:     metroNIDAZOLE (METROGEL) 0.75 % external gel, Apply topically twice a week., Disp: 45 g, Rfl: 4    sodium hypochlorite (QUARTER-STRENGTH DAKINS) external solution, Apply topically every 72 hours Use 300mL every 72 hours to wash the bilateral legs and wounds on the legs, Disp: 1000 mL, Rfl: 3    spironolactone (ALDACTONE) 25 MG tablet, Take 1 tablet (25 mg) by mouth daily., Disp: 90 tablet, Rfl: 3    Current Facility-Administered Medications:     gentamicin (GARAMYCIN) 0.1 % ointment, , Topical, Daily PRN, Kim Fuentes, NP, Given at 09/11/24 1357    lidocaine (PF) (XYLOCAINE) 1 % injection 10 mL, 10 mL, Intradermal, Once, Gerson Jaquez MD    lidocaine (XYLOCAINE) 2 % external gel, , Topical, Daily PRN, Lucia Reyes MD, Given at 10/19/21 0828    lidocaine (XYLOCAINE) 5 % ointment, , Topical, Daily PRN, Kim Fuentes NP, Given at 02/19/25 1332    lidocaine (XYLOCAINE) 5 % ointment, , Topical, Daily PRN, Kim Fuentes, NP    lidocaine (XYLOCAINE) 5 % ointment, , Topical, Daily PRN, Kim Fuentes NP    lidocaine (XYLOCAINE) 5 % ointment, , Topical, Daily PRN, Kim Fuentes, NP, Given at 10/09/24 1250    lidocaine (XYLOCAINE) 5 % ointment, , Topical, Daily PRN, Kim Fuentes NP    lidocaine (XYLOCAINE) 5 % ointment, , Topical, Daily PRN, Kim Fuentes, NP, Given at 08/14/24 1330    lidocaine (XYLOCAINE) 5 % ointment, , Topical, Daily PRN, Kim Fuentes, NP    History:   Past Medical History:   Diagnosis Date    Cellulitis of right lower extremity 06/06/2022    Chronic pain syndrome 08/05/2022    COPD (chronic obstructive pulmonary disease) (H)     Diabetes (H)     Disease of circulatory system 08/17/2022    H/O angioedema 06/15/2020    Formatting of this note  might be different from the original. Unexplained angioedema twice, discontinue lisinopril for possible connection to it, though he had used it afterwards with no symptoms    Hip joint replacement status 06/05/2012    History of tobacco use disorder 08/01/2013    Formatting of this note might be different from the original. 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 Epic    Open wound of lower limb 09/22/2021    Osteoarthritis of hip 03/23/2012    Osteoarthritis of knee 03/23/2012    Pleural mass 07/02/2013    Type 2 diabetes mellitus with peripheral neuropathy (H)     Uncomplicated asthma     Vitamin B12 deficiency (non anemic) 12/14/2022       Physical Exam:    BP (!) 142/72   Pulse 80   Temp 98.8  F (37.1  C)   Resp 18     General:  Patient presents to clinic in no apparent distress.  Head: normocephalic atraumatic  Psychiatric:  Alert and oriented x3.   Respiratory: unlabored breathing; no cough  Integumentary:  Skin is uniformly warm, dry and pink.    Ulcer #1 Location: left lateral leg  Size: 1.2L x 0.4W x 0.2depth.  no sinus tract present, Wound base: red  no undermining present. Ulcer is full thickness. There is moderate drainage. Periwound: no denudement, erythema, induration, maceration or warmth.      Ulcer #2  Location: right lateral leg  Size: 10x9x 0.2depth.  no sinus tract present, Wound base: red  no undermining present. Ulcer is full thickness. There is moderate drainage. Periwound: no denudement, erythema, induration, maceration or warmth.      Wound Leg Ulceration (Active)   Number of days: 1094       VASC Wound rt lateral leg (Active)   Pre Size  "Length 10 06/04/25 1300   Pre Size Width 9 06/04/25 1300   Pre Size Depth 0.2 06/04/25 1300   Pre Total Sq cm 90 06/04/25 1300   Post Size Length 10 08/02/23 0700   Post Size Width 10 08/02/23 0700   Post Size Depth 0.1 08/02/23 0700   Post Total Sq cm 100 08/02/23 0700   Description circumferential posterior to shin; weeping 05/07/25 1322   Number of days: 1324       VASC Wound Right medial shin (Active)   Number of days: 911       VASC Wound L lateral leg (Active)   Pre Size Length 1.5 05/07/25 1322   Pre Size Width 1 05/07/25 1322   Pre Size Depth 0.1 05/07/25 1322   Pre Total Sq cm 1.5 05/07/25 1322   Post Size Length 0.1 05/07/25 1322   Post Size Width 0.1 05/07/25 1322   Post Size Depth 0.1 05/07/25 1322   Post Total Sq cm 0.01 05/07/25 1322   Number of days: 28            Circumferential volume measures:          11/20/2024     1:00 PM 12/18/2024     1:00 PM 1/22/2025     1:00 PM 2/19/2025     1:00 PM 4/2/2025     1:00 PM   Circumferential Measures   Right just above MTP 27 27 27 26.2 27   Right Ankle 32 30 31.2 30.5 32   Right Widest Calf 45 45 46.4 44 49   Left - just above MTP 25 26.5 28 26 25.5   Left Ankle 30.5 30.5 34 32 29   Left Widest Calf 45 42 45.5 41.4 46.2       Labs:    I personally reviewed the following lab results today and those on care everywhere    CRP   Date Value Ref Range Status   04/23/2021 8.5 (H) 0.0 - 0.8 mg/dL Final      No results found for: \"SED\"   Last Renal Panel:  Sodium   Date Value Ref Range Status   05/12/2025 136 135 - 145 mmol/L Final   06/10/2021 142.0 133.0 - 144.0 mmol/L Final     Potassium   Date Value Ref Range Status   05/12/2025 4.5 3.4 - 5.3 mmol/L Final   06/07/2023 4.2 3.5 - 5.0 mmol/L Final   06/10/2021 4.4 3.4 - 5.3 mmol/L Final     Chloride   Date Value Ref Range Status   05/12/2025 101 98 - 107 mmol/L Final   06/07/2023 102 98 - 107 mmol/L Final   06/10/2021 100.0 94.0 - 109.0 mmol/L Final     Carbon Dioxide   Date Value Ref Range Status   06/10/2021 33.0 " "(H) 20.0 - 32.0 mmol/L Final     Carbon Dioxide (CO2)   Date Value Ref Range Status   05/12/2025 26 22 - 29 mmol/L Final   06/07/2023 28 22 - 31 mmol/L Final     Anion Gap   Date Value Ref Range Status   05/12/2025 9 7 - 15 mmol/L Final   06/07/2023 8 5 - 18 mmol/L Final     Glucose   Date Value Ref Range Status   05/12/2025 108 (H) 70 - 99 mg/dL Final   06/07/2023 115 70 - 125 mg/dL Final   06/10/2021 112.0 (H) 60.0 - 109.0 mg/dL Final     Urea Nitrogen   Date Value Ref Range Status   05/12/2025 14.3 8.0 - 23.0 mg/dL Final   06/07/2023 17 8 - 22 mg/dL Final   06/10/2021 16.0 7.0 - 30.0 mg/dL Final     Creatinine   Date Value Ref Range Status   05/12/2025 1.02 0.67 - 1.17 mg/dL Final   06/10/2021 1.0 0.8 - 1.5 mg/dL Final     GFR Estimate   Date Value Ref Range Status   05/12/2025 79 >60 mL/min/1.73m2 Final     Comment:     eGFR calculated using 2021 CKD-EPI equation.   04/24/2021 >60 >60 mL/min/1.73m2 Final     Calcium   Date Value Ref Range Status   05/12/2025 9.2 8.8 - 10.4 mg/dL Final   06/10/2021 9.5 8.5 - 10.4 mg/dL Final     Albumin   Date Value Ref Range Status   06/11/2024 3.5 3.5 - 5.2 g/dL Final   06/07/2023 3.3 (L) 3.5 - 5.0 g/dL Final      Lab Results   Component Value Date    WBC 7.8 06/11/2024     Lab Results   Component Value Date    RBC 4.13 06/11/2024     Lab Results   Component Value Date    HGB 9.6 06/11/2024     Lab Results   Component Value Date    HCT 33.1 06/11/2024     No components found for: \"MCT\"  Lab Results   Component Value Date    MCV 80 06/11/2024     Lab Results   Component Value Date    MCH 23.2 06/11/2024     Lab Results   Component Value Date    MCHC 29.0 06/11/2024     Lab Results   Component Value Date    RDW 15.0 06/11/2024     Lab Results   Component Value Date     06/11/2024      Lab Results   Component Value Date    A1C 7.2 05/12/2025    A1C 6.1 06/13/2024    A1C 6.8 02/20/2023    A1C 6.2 07/14/2022    A1C 6.7 04/21/2022    A1C 6.2 04/24/2021      TSH   Date Value " "Ref Range Status   10/18/2022 1.76 0.30 - 5.00 uIU/mL Final      No results found for: \"VITDT\"                Impression:  Encounter Diagnoses   Name Primary?    Venous (peripheral) insufficiency Yes    Lymphedema of both lower extremities     Elephantiasis     Papillomatosis     Venous stasis ulcer of right lower leg with edema of right lower leg (H)     Venous hypertension, chronic, with ulcer and inflammation, bilateral (H)     Type 2 diabetes mellitus with peripheral neuropathy (H)     Lipodermatosclerosis of both lower extremities     Obesity, morbid, BMI 40.0-49.9 (H)     BMI 50.0-59.9, adult (H)     Venous stasis ulcer of left lower leg with edema of left lower leg (H)       6/4/2025 right lateral leg    5/9/25 right lateral leg      6/4/2025 left lateral leg new                   Are any of these ulcers new today: No; Location: na    Assessment/Plan:          1. Debridement: Nursing staff removed the old dressing and cleanse the wound(s) with specified solution. After discussion of risk factors and verbal consent was obtained 2% Lidocaine HCL jelly was applied, under clean conditions, the right leg ulceration(s) were debrided using currette. Devitalized and nonviable tissue, along with any fibrin and slough, was removed to improve granulation tissue formation, stimulate wound healing, decrease overall bacteria load, disrupt biofilm formation and decrease edge senescence.  Total excisional debridement was 90.48 sq cm from the epidermis/dermis area and into the subcutaneous tissue with a depth of 0.2 cm.   Ulcers were improved afterwards and .  Measures were unchanged after debridement.       2.  Ulcer treatment: ulcer treatment will include irrigation and dressings to promote autolytic debridement which will include:continue home care; continue silvercel; abd; rolled gauze; change twice per week If for some reason the patient is not able to get their dressing(s) changed as outlined above (due to " illness, lack of supplies, lack of help) please do the following: remove old, soiled dressings; wash the ulcers with saline; pat dry; apply ABD pad or other absorbant pad and secure with rolled gauze; avoid tape directly on your skin; patient instructed to call the clinic as soon as possible to let us know what the current issues are in receiving ulcer care. Stable            3. Edema: continue lymphedema pump; continue elevation; continue double tubular compression and velcro on the left and continue 2 layer on the right. The compression wraps were applied today in clinic.     Patient presents with moderate, bilateral  lower extremity secondary lymphedema.      Patient requires a standard-fit knee high compression stocking and/or velcro wraps    Secondary Lymphedema &Edema: continue elevation and velcro wraps; needs to wear these consistently; replace every 6 months. The compression wraps were applied today in clinic. Patient presents with severe, bilateral secondary lymphedema. Patient requires daytime and nighttime compression garments; I have prescribed velcro wraps to accommodate and deliver gradient compression throughout the affected extremities. Quantity of 3 is needed for each leg for proper wash and wear.         If a 2 layer or 4 layer compression wrap is being used; these are safe to have on for ONLY 7 days. If for some reason the patient is not able to get the wrap(s) changed (due to illness; lack of supplies, lack of help, lack of transportation) please do the following: unwrap the old 2 or 4 layer compression wrap; avoid using scissors as you could cut your skin and cause ulcers; use tubular compression when available. Call to reschedule your home care or clinic visit appointment as soon as possible.  Stable            4. Nutrition: focus on weight loss           5. Offloading: position off the wounds; put legs up on fluffy pillows for added padding          6. Wound Etiology: venous stasis      Patient will follow up with me in 4 weeks for reevaluation. They were instructed to call the clinic sooner with any signs or symptoms of infection or any further questions/concerns. Answered all questions.          Kim Fuentes DNP, RN, CNP, CWOCN, CFCN, CLT  [facility]   [phone]        This note was electronically signed by Kim Fuentes NP

## 2025-06-04 NOTE — PROGRESS NOTES
"Compression Applied to Left  Velcro\", Compression Stockings  Compression Applied to Right  2-Layer Coban: I Applied the inner foam layer with the foot dorsiflexed and started atthe base of the fifth metatarsal head. I left the bottom of the heel exposed, and proceed by winding the foam up the leg using minimal overlap to just below the fibular head. I then applied the compression layer with the foot dorsiflexed and startingat the base of the fifth metatarsal head. I applied at full stretch and proceeded up the leg using 50% overlap. The bottom of the heel is covered with the compression layer up to the end at the fibular head just below the back of the knee and levelwith the top edge of the foam layer.  I gently pressed and conformed the entire surface of the system to ensurethat the two layers are firmly bound together      "

## 2025-06-04 NOTE — PATIENT INSTRUCTIONS
"Continue on all blood pressure medications as prescribed    Diabetic shoes and inserts ordered call [phone] should be ready soon    Make annual visit with PCP; check A1c; labs      Focus on weight loss and low sodium diet  Keep sodium intake 2.5-4 grams per day      Use lymphedema pumps twice per day      Your left leg has reopened  We will have you daily:  Remove the tubular compression and velcro  Wash the leg with soap and water  Put on am lactin lotion  Apply double tubular compression and velcro wrap again  Wear compression 24 hours per day  But we will apply silvercel; gauze; rolled gauze over the wound home care to change twice per week by home care  Will add high density black foam over the wound area here as well.        Wound Care Instructions    Twice per week home care will cleanse your bilateral legs and wound(s) with Dilute hibiclens 30cc in 500cc NS.    Then do a light wash of Dakin's solution or Vashe ok to soak the areas for 5-10 minutes to all the open areas    Pat Dry with non-sterile gauze    Apply Lotion to the intact skin surrounding your wound and other dry skin locations. Some good lotions include: Remedy Skin Repair Cream, Sarna, Vanicream or Cetaphil    Apply Ammonium Lac Hydrin lotion to the thick scaling crusting areas; apply thick layer    Triad paste to the periwound skin on the right leg to protect from all the drainage as needed    Primary Dressing: apply metrogel as needed if odor present onto the silvercel  to all the wounds    ABDs or super absorbant pads    Secure with non-sterile roll gauze (4\" x 75\" roll) and tape (1\" roll tape) as needed; avoid adhesive directly on the skin    Black high density foam pads over the wound areas    Compression: 2 layer to the right; double tubular compression and velcro to the left; if the left leg begins to drain again; begin to dress and wrap again with 2 layer    Elevation of the legs    Use lymphedema pump twice per day    It is not ok to " get your wound wet in the bath or shower    You need to elevate your legs throughout the day      If for some reason you are not able to get your dressing(s) changed as outlined above (due to illness, lack of supplies, lack of help) please do the following: remove old, soiled dressings; wash the wounds with saline; pat dry; apply ABD pad or other absorbant pad and secure with rolled gauze; avoid tape directly on your skin; Call the clinic as soon as possible to let us know what the current issues are in receiving wound care [phone].      SEEK MEDICAL CARE IF:  You have an increase in swelling, pain, or redness around the wound.  You have an increase in the amount of pus coming from the wound.  There is a bad smell coming from the wound.  The wound appears to be worsening/enlarging  You have a fever greater than 101.5 F      It is ok to continue current wound care treatment/products for the next 2-3 days until new wound care supplies are ordered and arrive. If longer than this please contact our office at [phone].    If you have a 2 layer or 4 layer compression wrap on these are safe to have on for ONLY 7 days. If for some reason you are not able to get the wrap(s) changed (due to illness; lack of supplies, lack of help, lack of transportation) please do the following: unwrap the old 2 or 4 layer compression wrap; avoid using scissors as you could cut your skin and cause wounds; use tubular compression when available. Call to reschedule your home care or clinic visit appointment as soon as possible.    Please NOTE: if you are 15 minutes late to your clinic appointment you will have to be rescheduled. Please call our clinic as soon as possible if you know you will not be able to get to your appointment at [phone].    If you fail to show up to 3 scheduled clinic appointments you will be dismissed from our clinic.              We want to hear from you!  In the next few weeks, you should receive a call or  email to complete a survey about your visit at [facility]. Please help us improve your appointment experience by letting us know how we did today. We strive to make your experience good and value any ways in which we could do better.      We value your input and suggestions.    Thank you for choosing the [facility]!      It is recommended that you do not get your ulcer wet when showering.  Listed below are several ways of keeping it dry when you shower.     1. Wrap it with Press and Seal plastic wrap.  It can be found in the stores where the plastic wraps or tin foil is kept.               2.  Some people take a bath and hang their leg/foot out of the tub.                        3  Put your leg in a plastic bag and tape it on.           4. You can purchase a shower cover for casts at some pharmacies and through the Internet.            5. Take a Bed Bath or wash up at the sink

## 2025-06-06 ENCOUNTER — DOCUMENTATION ONLY (OUTPATIENT)
Dept: FAMILY MEDICINE | Facility: CLINIC | Age: 70
End: 2025-06-06
Payer: COMMERCIAL

## 2025-06-06 NOTE — PROGRESS NOTES
Medicare requires that the MD/DO treating the patient for diabetes answers all of the questions and signs the pended order for the patient to qualify for diabetic shoes. Once the order is completed, please route the encounter back to sender.    Jayden Dias

## 2025-06-24 ENCOUNTER — TELEPHONE (OUTPATIENT)
Dept: FAMILY MEDICINE | Facility: CLINIC | Age: 70
End: 2025-06-24
Payer: COMMERCIAL

## 2025-06-24 NOTE — TELEPHONE ENCOUNTER
Forms/Letter Request    Type of form/letter: OTHER: Standard Written Order: Repairs       Do we have the form/letter: Yes: Bed Pendant 6 way before 4/2003 or for bariatric    Who is the form from? [employer] (if other please explain)    Where did/will the form come from? form was faxed in    When is form/letter needed by:     How would you like the form/letter returned: Fax : [phone]    Patient Notified form requests are processed in 5-7 business days:Yes

## 2025-07-09 ENCOUNTER — OFFICE VISIT (OUTPATIENT)
Dept: VASCULAR SURGERY | Facility: CLINIC | Age: 70
End: 2025-07-09
Attending: NURSE PRACTITIONER
Payer: COMMERCIAL

## 2025-07-09 VITALS
HEART RATE: 76 BPM | BODY MASS INDEX: 39.17 KG/M2 | HEIGHT: 75 IN | WEIGHT: 315 LBS | TEMPERATURE: 98.2 F | SYSTOLIC BLOOD PRESSURE: 158 MMHG | DIASTOLIC BLOOD PRESSURE: 76 MMHG | OXYGEN SATURATION: 96 %

## 2025-07-09 DIAGNOSIS — E11.42 TYPE 2 DIABETES MELLITUS WITH PERIPHERAL NEUROPATHY (H): ICD-10-CM

## 2025-07-09 DIAGNOSIS — D36.9 PAPILLOMATOSIS: ICD-10-CM

## 2025-07-09 DIAGNOSIS — B35.1 ONYCHOMYCOSIS: ICD-10-CM

## 2025-07-09 DIAGNOSIS — L97.919 VENOUS STASIS ULCER OF RIGHT LOWER LEG WITH EDEMA OF RIGHT LOWER LEG (H): ICD-10-CM

## 2025-07-09 DIAGNOSIS — I89.0 ELEPHANTIASIS: ICD-10-CM

## 2025-07-09 DIAGNOSIS — R60.0 VENOUS STASIS ULCER OF RIGHT LOWER LEG WITH EDEMA OF RIGHT LOWER LEG (H): ICD-10-CM

## 2025-07-09 DIAGNOSIS — I83.891 VENOUS STASIS ULCER OF RIGHT LOWER LEG WITH EDEMA OF RIGHT LOWER LEG (H): ICD-10-CM

## 2025-07-09 DIAGNOSIS — M79.3 LIPODERMATOSCLEROSIS OF BOTH LOWER EXTREMITIES: ICD-10-CM

## 2025-07-09 DIAGNOSIS — I83.019 VENOUS STASIS ULCER OF RIGHT LOWER LEG WITH EDEMA OF RIGHT LOWER LEG (H): ICD-10-CM

## 2025-07-09 DIAGNOSIS — I87.2 VENOUS (PERIPHERAL) INSUFFICIENCY: Primary | ICD-10-CM

## 2025-07-09 DIAGNOSIS — E66.01 OBESITY, MORBID, BMI 40.0-49.9 (H): ICD-10-CM

## 2025-07-09 DIAGNOSIS — L85.9 HYPERKERATOSIS OF SKIN: ICD-10-CM

## 2025-07-09 DIAGNOSIS — L08.9 WOUND INFECTION: ICD-10-CM

## 2025-07-09 DIAGNOSIS — I89.0 LYMPHEDEMA OF BOTH LOWER EXTREMITIES: ICD-10-CM

## 2025-07-09 DIAGNOSIS — T14.8XXA WOUND INFECTION: ICD-10-CM

## 2025-07-09 DIAGNOSIS — I87.333 VENOUS HYPERTENSION, CHRONIC, WITH ULCER AND INFLAMMATION, BILATERAL (H): ICD-10-CM

## 2025-07-09 PROCEDURE — 97597 DBRDMT OPN WND 1ST 20 CM/<: CPT | Performed by: NURSE PRACTITIONER

## 2025-07-09 PROCEDURE — 11045 DBRDMT SUBQ TISS EACH ADDL: CPT | Performed by: NURSE PRACTITIONER

## 2025-07-09 PROCEDURE — 11042 DBRDMT SUBQ TIS 1ST 20SQCM/<: CPT | Performed by: NURSE PRACTITIONER

## 2025-07-09 PROCEDURE — 97598 DBRDMT OPN WND ADDL 20CM/<: CPT | Performed by: NURSE PRACTITIONER

## 2025-07-09 PROCEDURE — 11056 PARNG/CUTG B9 HYPRKR LES 2-4: CPT | Mod: Q9 | Performed by: NURSE PRACTITIONER

## 2025-07-09 ASSESSMENT — PAIN SCALES - GENERAL: PAINLEVEL_OUTOF10: NO PAIN (0)

## 2025-07-09 NOTE — PATIENT INSTRUCTIONS
"Continue on all blood pressure medications as prescribed      Make annual visit with PCP; check A1c; labs      Focus on weight loss and low sodium diet  Keep sodium intake 2.5-4 grams per day      Use lymphedema pumps twice per day        Wound Care Instructions    Twice per week home care will cleanse your bilateral legs and wound(s) with Dilute hibiclens 30cc in 500cc NS.    Then do a light wash of Dakin's solution or Vashe ok to soak the areas for 5-10 minutes to all the open areas    Pat Dry with non-sterile gauze    Apply Lotion to the intact skin surrounding your wound and other dry skin locations. Some good lotions include: Remedy Skin Repair Cream, Sarna, Vanicream or Cetaphil    Apply Ammonium Lac Hydrin lotion to the thick scaling crusting areas; apply thick layer    Triad paste to the periwound skin on the right leg to protect from all the drainage as needed    Primary Dressing: apply metrogel as needed if odor present onto the silvercel  to all the wounds    ABDs or super absorbant pads    Secure with non-sterile roll gauze (4\" x 75\" roll) and tape (1\" roll tape) as needed; avoid adhesive directly on the skin    Black high density foam pads over the wound areas    Compression: 2 layer bilaterally    Elevation of the legs    Use lymphedema pump twice per day    It is not ok to get your wound wet in the bath or shower    You need to elevate your legs throughout the day      If for some reason you are not able to get your dressing(s) changed as outlined above (due to illness, lack of supplies, lack of help) please do the following: remove old, soiled dressings; wash the wounds with saline; pat dry; apply ABD pad or other absorbant pad and secure with rolled gauze; avoid tape directly on your skin; Call the clinic as soon as possible to let us know what the current issues are in receiving wound care [phone].      SEEK MEDICAL CARE IF:  You have an increase in swelling, pain, or redness around the " wound.  You have an increase in the amount of pus coming from the wound.  There is a bad smell coming from the wound.  The wound appears to be worsening/enlarging  You have a fever greater than 101.5 F      It is ok to continue current wound care treatment/products for the next 2-3 days until new wound care supplies are ordered and arrive. If longer than this please contact our office at [phone].    If you have a 2 layer or 4 layer compression wrap on these are safe to have on for ONLY 7 days. If for some reason you are not able to get the wrap(s) changed (due to illness; lack of supplies, lack of help, lack of transportation) please do the following: unwrap the old 2 or 4 layer compression wrap; avoid using scissors as you could cut your skin and cause wounds; use tubular compression when available. Call to reschedule your home care or clinic visit appointment as soon as possible.    Please NOTE: if you are 15 minutes late to your clinic appointment you will have to be rescheduled. Please call our clinic as soon as possible if you know you will not be able to get to your appointment at [phone].    If you fail to show up to 3 scheduled clinic appointments you will be dismissed from our clinic.              We want to hear from you!  In the next few weeks, you should receive a call or email to complete a survey about your visit at [facility]. Please help us improve your appointment experience by letting us know how we did today. We strive to make your experience good and value any ways in which we could do better.      We value your input and suggestions.    Thank you for choosing the [facility]!      It is recommended that you do not get your ulcer wet when showering.  Listed below are several ways of keeping it dry when you shower.     1. Wrap it with Press and Seal plastic wrap.  It can be found in the stores where the plastic wraps or tin foil is kept.                2.  Some people take a bath and hang their leg/foot out of the tub.                        3  Put your leg in a plastic bag and tape it on.           4. You can purchase a shower cover for casts at some pharmacies and through the Internet.            5. Take a Bed Bath or wash up at the sink

## 2025-07-09 NOTE — PROGRESS NOTES
"            Follow up Vascular Visit       Date of Service:07/09/25      Chief Complaint: BLE swelling and ulcers      Pt returns to [facility] with regards to their BLE swelling and ulcers.  They arrive today alone. They are currently using silvercel; gauze; rolled gauze to the wounds. This is being done by home care twice per week. They are using 2 layer on the right and velcro on the left for compression. They are feeling well today. Denies fevers, chills. No shortness of breath.     Allergies:   Allergies   Allergen Reactions    Lisinopril Swelling     Patient reports \"neck swelling\"  Swelling in throat       Medications:   Current Outpatient Medications:     ammonium lactate (LAC-HYDRIN) 12 % external lotion, Apply topically daily as needed for dry skin. With dressing changes, Disp: 225 g, Rfl: 3    atorvastatin (LIPITOR) 20 MG tablet, Take 1 tablet (20 mg) by mouth daily., Disp: 90 tablet, Rfl: 3    chlorthalidone (HYGROTON) 25 MG tablet, Take 1 tablet (25 mg) by mouth daily., Disp: 90 tablet, Rfl: 3    doxycycline hyclate (VIBRA-TABS) 100 MG tablet, Take 1 tablet (100 mg) by mouth 2 times daily., Disp: 20 tablet, Rfl: 0    famotidine (PEPCID) 20 MG tablet, Take 1 tablet (20 mg) by mouth 2 times daily., Disp: 180 tablet, Rfl: 3    gentamicin (GARAMYCIN) 0.1 % external ointment, Apply topically daily., Disp: 30 g, Rfl: 3    hydrOXYzine (ATARAX) 25 MG tablet, Take 0.5-1 tablets (12.5-25 mg) by mouth 3 times daily as needed for anxiety, Disp: 60 tablet, Rfl: 3    losartan (COZAAR) 100 MG tablet, Take 1 tablet (100 mg) by mouth daily., Disp: 90 tablet, Rfl: 3    melatonin 3 MG tablet, Take 1 tablet (3 mg) by mouth nightly as needed for sleep, Disp: 30 tablet, Rfl: 1    metFORMIN (GLUCOPHAGE) 500 MG tablet, Take 1 tablet (500 mg) by mouth 2 times daily (with meals)., Disp: 180 tablet, Rfl: 3    methadone (DOLOPHINE-INTENSOL) 10 MG/ML (HIGH CONC) solution, Take 100 mg by mouth daily, Disp: , Rfl:     " metroNIDAZOLE (METROGEL) 0.75 % external gel, Apply topically twice a week., Disp: 45 g, Rfl: 4    sodium hypochlorite (QUARTER-STRENGTH DAKINS) external solution, Apply topically every 72 hours Use 300mL every 72 hours to wash the bilateral legs and wounds on the legs, Disp: 1000 mL, Rfl: 3    spironolactone (ALDACTONE) 25 MG tablet, Take 1 tablet (25 mg) by mouth daily., Disp: 90 tablet, Rfl: 3    Current Facility-Administered Medications:     gentamicin (GARAMYCIN) 0.1 % ointment, , Topical, Daily PRN, Dallas, Kim, NP, Given at 09/11/24 1357    lidocaine (PF) (XYLOCAINE) 1 % injection 10 mL, 10 mL, Intradermal, Once, Gerson Jaquez MD    lidocaine (XYLOCAINE) 2 % external gel, , Topical, Daily PRN, Lucia Reyes MD, Given at 10/19/21 0828    lidocaine (XYLOCAINE) 5 % ointment, , Topical, Daily PRN, Kim Fuentes NP, Given at 02/19/25 1332    lidocaine (XYLOCAINE) 5 % ointment, , Topical, Daily PRN, Kim Fuentes NP    lidocaine (XYLOCAINE) 5 % ointment, , Topical, Daily PRN, Kim Fuentes NP    lidocaine (XYLOCAINE) 5 % ointment, , Topical, Daily PRN, Dallas, Kim, NP, Given at 10/09/24 1250    lidocaine (XYLOCAINE) 5 % ointment, , Topical, Daily PRN, Kim Fuentes NP    lidocaine (XYLOCAINE) 5 % ointment, , Topical, Daily PRN, Kim Fuentes NP, Given at 08/14/24 1330    lidocaine (XYLOCAINE) 5 % ointment, , Topical, Daily PRN, Kim Fuentes NP    History:   Past Medical History:   Diagnosis Date    Cellulitis of right lower extremity 06/06/2022    Chronic pain syndrome 08/05/2022    COPD (chronic obstructive pulmonary disease) (H)     Diabetes (H)     Disease of circulatory system 08/17/2022    H/O angioedema 06/15/2020    Formatting of this note might be different from the original. Unexplained angioedema twice, discontinue lisinopril for possible connection to it, though he had used it afterwards with no symptoms    Hip joint replacement status 06/05/2012    History of tobacco use disorder  "08/01/2013    Formatting of this note might be different from the original. 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 Epic    Open wound of lower limb 09/22/2021    Osteoarthritis of hip 03/23/2012    Osteoarthritis of knee 03/23/2012    Pleural mass 07/02/2013    Type 2 diabetes mellitus with peripheral neuropathy (H)     Uncomplicated asthma     Vitamin B12 deficiency (non anemic) 12/14/2022       Physical Exam:    BP (!) 158/76   Pulse 76   Temp 98.2  F (36.8  C) (Oral)   Ht 6' 3\" (1.905 m)   Wt (!) 403 lb (182.8 kg)   SpO2 96%   BMI 50.37 kg/m      General:  Patient presents to clinic in no apparent distress.  Head: normocephalic atraumatic  Psychiatric:  Alert and oriented x3.   Respiratory: unlabored breathing; no cough  Integumentary:  Skin is uniformly warm, dry and pink.    Ulcer #1 Location: right lateral leg  Size: 10L x 8.5W x 0.2depth.  no sinus tract present, Wound base: within the 10x8.5cm area there are scattered full thickness ulcers  no undermining present. Ulcer is full thickness. There is moderate drainage. Periwound: no denudement, erythema, induration, maceration or warmth.      Ulcer #2 Location:left lateral leg  Size: 9x2 x 0.2depth.  no sinus tract present, Wound base: within the 9x2cm area there are scattered full thickness ulcers  no undermining present. Ulcer is full thickness. There is moderate drainage. Periwound: no denudement, erythema, induration, maceration or warmth.      Swelling is increased bilaterally    X4 calluses on the tips of the toes; these were pared and intact underneath    Mild scaling to bilateral legs; this was " "removed and intact underneath    Wound Leg Ulceration (Active)   Number of days: 1129       VASC Wound rt lateral leg (Active)   Pre Size Length 10 07/09/25 1300   Pre Size Width 8.5 07/09/25 1300   Pre Size Depth 0.2 07/09/25 1300   Pre Total Sq cm 85 07/09/25 1300   Post Size Length 10 08/02/23 0700   Post Size Width 10 08/02/23 0700   Post Size Depth 0.1 08/02/23 0700   Post Total Sq cm 100 08/02/23 0700   Description circumferential posterior to shin; weeping 05/07/25 1322   Number of days: 1359       VASC Wound Right medial shin (Active)   Number of days: 946       VASC Wound L lateral leg (Active)   Pre Size Length 9 07/09/25 1300   Pre Size Width 2 07/09/25 1300   Pre Size Depth 0.2 07/09/25 1300   Pre Total Sq cm 18 07/09/25 1300   Post Size Length 1.2 06/04/25 1300   Post Size Width 0.4 06/04/25 1300   Post Size Depth 0.2 06/04/25 1300   Post Total Sq cm 0.48 06/04/25 1300   Number of days: 63            Circumferential volume measures:          12/18/2024     1:00 PM 1/22/2025     1:00 PM 2/19/2025     1:00 PM 4/2/2025     1:00 PM 7/9/2025     1:00 PM   Circumferential Measures   Right just above MTP 27 27 26.2 27 27   Right Ankle 30 31.2 30.5 32 32.5   Right Widest Calf 45 46.4 44 49 48   Left - just above MTP 26.5 28 26 25.5 26.8   Left Ankle 30.5 34 32 29 32   Left Widest Calf 42 45.5 41.4 46.2 46.5       Labs:    I personally reviewed the following lab results today and those on care everywhere    CRP   Date Value Ref Range Status   04/23/2021 8.5 (H) 0.0 - 0.8 mg/dL Final      No results found for: \"SED\"   Last Renal Panel:  Sodium   Date Value Ref Range Status   05/12/2025 136 135 - 145 mmol/L Final   06/10/2021 142.0 133.0 - 144.0 mmol/L Final     Potassium   Date Value Ref Range Status   05/12/2025 4.5 3.4 - 5.3 mmol/L Final   06/07/2023 4.2 3.5 - 5.0 mmol/L Final   06/10/2021 4.4 3.4 - 5.3 mmol/L Final     Chloride   Date Value Ref Range Status   05/12/2025 101 98 - 107 mmol/L Final   06/07/2023 " "102 98 - 107 mmol/L Final   06/10/2021 100.0 94.0 - 109.0 mmol/L Final     Carbon Dioxide   Date Value Ref Range Status   06/10/2021 33.0 (H) 20.0 - 32.0 mmol/L Final     Carbon Dioxide (CO2)   Date Value Ref Range Status   05/12/2025 26 22 - 29 mmol/L Final   06/07/2023 28 22 - 31 mmol/L Final     Anion Gap   Date Value Ref Range Status   05/12/2025 9 7 - 15 mmol/L Final   06/07/2023 8 5 - 18 mmol/L Final     Glucose   Date Value Ref Range Status   05/12/2025 108 (H) 70 - 99 mg/dL Final   06/07/2023 115 70 - 125 mg/dL Final   06/10/2021 112.0 (H) 60.0 - 109.0 mg/dL Final     Urea Nitrogen   Date Value Ref Range Status   05/12/2025 14.3 8.0 - 23.0 mg/dL Final   06/07/2023 17 8 - 22 mg/dL Final   06/10/2021 16.0 7.0 - 30.0 mg/dL Final     Creatinine   Date Value Ref Range Status   05/12/2025 1.02 0.67 - 1.17 mg/dL Final   06/10/2021 1.0 0.8 - 1.5 mg/dL Final     GFR Estimate   Date Value Ref Range Status   05/12/2025 79 >60 mL/min/1.73m2 Final     Comment:     eGFR calculated using 2021 CKD-EPI equation.   04/24/2021 >60 >60 mL/min/1.73m2 Final     Calcium   Date Value Ref Range Status   05/12/2025 9.2 8.8 - 10.4 mg/dL Final   06/10/2021 9.5 8.5 - 10.4 mg/dL Final     Albumin   Date Value Ref Range Status   06/11/2024 3.5 3.5 - 5.2 g/dL Final   06/07/2023 3.3 (L) 3.5 - 5.0 g/dL Final      Lab Results   Component Value Date    WBC 7.8 06/11/2024     Lab Results   Component Value Date    RBC 4.13 06/11/2024     Lab Results   Component Value Date    HGB 9.6 06/11/2024     Lab Results   Component Value Date    HCT 33.1 06/11/2024     No components found for: \"MCT\"  Lab Results   Component Value Date    MCV 80 06/11/2024     Lab Results   Component Value Date    MCH 23.2 06/11/2024     Lab Results   Component Value Date    MCHC 29.0 06/11/2024     Lab Results   Component Value Date    RDW 15.0 06/11/2024     Lab Results   Component Value Date     06/11/2024      Lab Results   Component Value Date    A1C 7.2 " "05/12/2025    A1C 6.1 06/13/2024    A1C 6.8 02/20/2023    A1C 6.2 07/14/2022    A1C 6.7 04/21/2022    A1C 6.2 04/24/2021      TSH   Date Value Ref Range Status   10/18/2022 1.76 0.30 - 5.00 uIU/mL Final      No results found for: \"VITDT\"                Impression:  No diagnosis found.                              Are any of these ulcers new today: No; Location: na    Assessment/Plan:          1. Debridement: Nursing staff removed the old dressing and cleanse the wound(s) with specified solution. After discussion of risk factors and verbal consent was obtained 2% Lidocaine HCL jelly was applied, under clean conditions, the BLE ulceration(s) were debrided using currette. Devitalized and nonviable tissue, along with any fibrin and slough, was removed to improve granulation tissue formation, stimulate wound healing, decrease overall bacteria load, disrupt biofilm formation and decrease edge senescence.  Total excisional debridement was 103 sq cm from the epidermis/dermis area and into the subcutaneous tissue with a depth of 0.1 cm.   Ulcers were improved afterwards and .  Measures were unchanged after debridement.    Nursing staff removed the old dressing and cleanse the wound(s) with specified solution. After discussion of risk factors and verbal consent was obtained 2% Lidocaine HCL jelly was applied, under clean conditions, the BLE scaling were debrided using currette. Devitalized and nonviable tissue, along with any fibrin and slough, was removed to improve granulation tissue formation, stimulate wound healing, decrease overall bacteria load, disrupt biofilm formation and decrease edge senescence.  Total excisional debridement was 100 sq cm from the epidermis/dermis area and into the subcutaneous tissue with a depth of 0 cm.   Intact underneath     2.  Ulcer treatment: ulcer treatment will include irrigation and dressings to promote autolytic debridement which will include:will continue home care; continue " dilute hibiclens wash; then vashe soak; then silvercel; am lactin to scaling; ABD; rolled gauze; change twice per week. If for some reason the patient is not able to get their dressing(s) changed as outlined above (due to illness, lack of supplies, lack of help) please do the following: remove old, soiled dressings; wash the ulcers with saline; pat dry; apply ABD pad or other absorbant pad and secure with rolled gauze; avoid tape directly on your skin; patient instructed to call the clinic as soon as possible to let us know what the current issues are in receiving ulcer care. Stable            3. Edema: will wrap both legs with 2 layer; he declined 4 layer; encouraged elevation. The compression wraps were applied today in clinic.     Patient presents with moderate, bilateral  lower extremity secondary lymphedema.      Patient requires a standard-fit knee high compression stocking and/or velcro wraps    Secondary Lymphedema &Edema: continue elevation and velcro wraps; needs to wear these consistently; replace every 6 months. The compression wraps were applied today in clinic. Patient presents with severe, bilateral secondary lymphedema. Patient requires daytime and nighttime compression garments; I have prescribed velcro wraps to accommodate and deliver gradient compression throughout the affected extremities. Quantity of 3 is needed for each leg for proper wash and wear.         If a 2 layer or 4 layer compression wrap is being used; these are safe to have on for ONLY 7 days. If for some reason the patient is not able to get the wrap(s) changed (due to illness; lack of supplies, lack of help, lack of transportation) please do the following: unwrap the old 2 or 4 layer compression wrap; avoid using scissors as you could cut your skin and cause ulcers; use tubular compression when available. Call to reschedule your home care or clinic visit appointment as soon as possible.  Stable            4. Nutrition: focus on  weight loss and diabetes control           5. Offloading: has new diabetic shoes; he is breaking these in          6. Wound Etiology: venous stasis         7 Callous: Callous was pared using a #15 blade scalpel; this was done to evaluate for underlying ulceration; reduce pressure; patient comfort; and to reduce risk of future ulceration formation. The skin was intact underneath and patient tolerated well; no complications.      Patient will follow up with me in 4 weeks for reevaluation. They were instructed to call the clinic sooner with any signs or symptoms of infection or any further questions/concerns. Answered all questions.          Kim Fuentes DNP, RN, CNP, CWOCN, CFCN, CLT  [facility]   [phone]        This note was electronically signed by Kim Fuentes NP

## 2025-07-15 ENCOUNTER — PATIENT OUTREACH (OUTPATIENT)
Dept: CARE COORDINATION | Facility: CLINIC | Age: 70
End: 2025-07-15
Payer: COMMERCIAL

## 2025-08-06 ENCOUNTER — OFFICE VISIT (OUTPATIENT)
Dept: VASCULAR SURGERY | Facility: CLINIC | Age: 70
End: 2025-08-06
Attending: NURSE PRACTITIONER
Payer: COMMERCIAL

## 2025-08-06 VITALS
HEART RATE: 87 BPM | SYSTOLIC BLOOD PRESSURE: 160 MMHG | OXYGEN SATURATION: 100 % | DIASTOLIC BLOOD PRESSURE: 74 MMHG | TEMPERATURE: 97.1 F

## 2025-08-06 DIAGNOSIS — R60.0 VENOUS STASIS ULCER OF RIGHT LOWER LEG WITH EDEMA OF RIGHT LOWER LEG (H): ICD-10-CM

## 2025-08-06 DIAGNOSIS — I87.2 VENOUS (PERIPHERAL) INSUFFICIENCY: Primary | ICD-10-CM

## 2025-08-06 DIAGNOSIS — I83.891 VENOUS STASIS ULCER OF RIGHT LOWER LEG WITH EDEMA OF RIGHT LOWER LEG (H): ICD-10-CM

## 2025-08-06 DIAGNOSIS — I89.0 LYMPHEDEMA OF BOTH LOWER EXTREMITIES: ICD-10-CM

## 2025-08-06 DIAGNOSIS — E66.01 OBESITY, MORBID, BMI 40.0-49.9 (H): ICD-10-CM

## 2025-08-06 DIAGNOSIS — I87.333 VENOUS HYPERTENSION, CHRONIC, WITH ULCER AND INFLAMMATION, BILATERAL (H): ICD-10-CM

## 2025-08-06 DIAGNOSIS — I83.019 VENOUS STASIS ULCER OF RIGHT LOWER LEG WITH EDEMA OF RIGHT LOWER LEG (H): ICD-10-CM

## 2025-08-06 DIAGNOSIS — D36.9 PAPILLOMATOSIS: ICD-10-CM

## 2025-08-06 DIAGNOSIS — M79.3 LIPODERMATOSCLEROSIS OF BOTH LOWER EXTREMITIES: ICD-10-CM

## 2025-08-06 DIAGNOSIS — E11.42 TYPE 2 DIABETES MELLITUS WITH PERIPHERAL NEUROPATHY (H): ICD-10-CM

## 2025-08-06 DIAGNOSIS — I89.0 ELEPHANTIASIS: ICD-10-CM

## 2025-08-06 DIAGNOSIS — L97.919 VENOUS STASIS ULCER OF RIGHT LOWER LEG WITH EDEMA OF RIGHT LOWER LEG (H): ICD-10-CM

## 2025-08-06 PROCEDURE — 11045 DBRDMT SUBQ TISS EACH ADDL: CPT | Performed by: NURSE PRACTITIONER

## 2025-08-06 PROCEDURE — 11042 DBRDMT SUBQ TIS 1ST 20SQCM/<: CPT | Performed by: NURSE PRACTITIONER

## 2025-08-06 ASSESSMENT — PAIN SCALES - GENERAL: PAINLEVEL_OUTOF10: NO PAIN (0)

## 2025-08-07 ENCOUNTER — TELEPHONE (OUTPATIENT)
Dept: VASCULAR SURGERY | Facility: CLINIC | Age: 70
End: 2025-08-07
Payer: COMMERCIAL

## 2025-09-03 ENCOUNTER — OFFICE VISIT (OUTPATIENT)
Dept: VASCULAR SURGERY | Facility: CLINIC | Age: 70
End: 2025-09-03
Attending: NURSE PRACTITIONER
Payer: COMMERCIAL

## 2025-09-03 VITALS — DIASTOLIC BLOOD PRESSURE: 77 MMHG | HEART RATE: 87 BPM | OXYGEN SATURATION: 93 % | SYSTOLIC BLOOD PRESSURE: 166 MMHG

## 2025-09-03 DIAGNOSIS — E66.01 OBESITY, MORBID, BMI 40.0-49.9 (H): ICD-10-CM

## 2025-09-03 DIAGNOSIS — E11.42 TYPE 2 DIABETES MELLITUS WITH PERIPHERAL NEUROPATHY (H): ICD-10-CM

## 2025-09-03 DIAGNOSIS — I83.019 VENOUS STASIS ULCER OF RIGHT LOWER LEG WITH EDEMA OF RIGHT LOWER LEG (H): ICD-10-CM

## 2025-09-03 DIAGNOSIS — R60.0 VENOUS STASIS ULCER OF RIGHT LOWER LEG WITH EDEMA OF RIGHT LOWER LEG (H): ICD-10-CM

## 2025-09-03 DIAGNOSIS — I87.2 VENOUS (PERIPHERAL) INSUFFICIENCY: Primary | ICD-10-CM

## 2025-09-03 DIAGNOSIS — L85.9 HYPERKERATOSIS OF SKIN: ICD-10-CM

## 2025-09-03 DIAGNOSIS — D36.9 PAPILLOMATOSIS: ICD-10-CM

## 2025-09-03 DIAGNOSIS — I89.0 ELEPHANTIASIS: ICD-10-CM

## 2025-09-03 DIAGNOSIS — I89.0 LYMPHEDEMA OF BOTH LOWER EXTREMITIES: ICD-10-CM

## 2025-09-03 DIAGNOSIS — I87.333 VENOUS HYPERTENSION, CHRONIC, WITH ULCER AND INFLAMMATION, BILATERAL (H): ICD-10-CM

## 2025-09-03 DIAGNOSIS — I83.891 VENOUS STASIS ULCER OF RIGHT LOWER LEG WITH EDEMA OF RIGHT LOWER LEG (H): ICD-10-CM

## 2025-09-03 DIAGNOSIS — M79.3 LIPODERMATOSCLEROSIS OF BOTH LOWER EXTREMITIES: ICD-10-CM

## 2025-09-03 DIAGNOSIS — L97.919 VENOUS STASIS ULCER OF RIGHT LOWER LEG WITH EDEMA OF RIGHT LOWER LEG (H): ICD-10-CM

## 2025-09-03 PROCEDURE — 11042 DBRDMT SUBQ TIS 1ST 20SQCM/<: CPT | Performed by: NURSE PRACTITIONER

## 2025-09-03 PROCEDURE — 11045 DBRDMT SUBQ TISS EACH ADDL: CPT | Performed by: NURSE PRACTITIONER

## 2025-09-03 ASSESSMENT — PAIN SCALES - GENERAL: PAINLEVEL_OUTOF10: NO PAIN (0)

## (undated) RX ORDER — FENTANYL CITRATE 50 UG/ML
INJECTION, SOLUTION INTRAMUSCULAR; INTRAVENOUS
Status: DISPENSED
Start: 2022-12-07

## (undated) RX ORDER — HEPARIN SODIUM 200 [USP'U]/100ML
INJECTION, SOLUTION INTRAVENOUS
Status: DISPENSED
Start: 2022-12-07

## (undated) RX ORDER — LIDOCAINE HYDROCHLORIDE 10 MG/ML
INJECTION, SOLUTION EPIDURAL; INFILTRATION; INTRACAUDAL; PERINEURAL
Status: DISPENSED
Start: 2022-12-07

## (undated) RX ORDER — LIDOCAINE HYDROCHLORIDE 10 MG/ML
INJECTION, SOLUTION EPIDURAL; INFILTRATION; INTRACAUDAL; PERINEURAL
Status: DISPENSED
Start: 2022-11-07

## (undated) RX ORDER — LIDOCAINE HYDROCHLORIDE 10 MG/ML
INJECTION, SOLUTION INFILTRATION; PERINEURAL
Status: DISPENSED
Start: 2022-12-07